# Patient Record
Sex: MALE | Race: WHITE | URBAN - METROPOLITAN AREA
[De-identification: names, ages, dates, MRNs, and addresses within clinical notes are randomized per-mention and may not be internally consistent; named-entity substitution may affect disease eponyms.]

---

## 2017-04-03 ENCOUNTER — DOCTOR'S OFFICE (OUTPATIENT)
Dept: URBAN - METROPOLITAN AREA CLINIC 137 | Facility: CLINIC | Age: 62
Setting detail: OPHTHALMOLOGY
End: 2017-04-03
Payer: COMMERCIAL

## 2017-04-03 DIAGNOSIS — H52.4: ICD-10-CM

## 2017-04-03 DIAGNOSIS — H25.13: ICD-10-CM

## 2017-04-03 DIAGNOSIS — E11.9: ICD-10-CM

## 2017-04-03 PROCEDURE — 92014 COMPRE OPH EXAM EST PT 1/>: CPT | Performed by: OPHTHALMOLOGY

## 2017-04-03 ASSESSMENT — REFRACTION_OUTSIDERX
OS_AXIS: 14
OS_ADD: +2.25
OD_VA3: 20/
OU_VA: 20/
OS_VA1: 20/20
OS_CYLINDER: +0.25
OD_SPHERE: 0.00
OD_VA1: 20/20
OS_SPHERE: +0.75
OS_VA2: 20/20(J1+)
OD_ADD: +2.25
OS_ADD: +2.25
OS_VA1: 20/20
OD_VA2: 20/
OS_VA3: 20/
OU_VA: 20/
OS_VA3: 20/
OD_ADD: +2.25
OD_VA3: 20/
OS_SPHERE: +0.75
OS_VA2: 20/
OD_VA1: 20/20-
OS_AXIS: 005
OD_CYLINDER: +1.00
OD_VA2: 20/20(J1+)
OS_CYLINDER: +0.50
OD_CYLINDER: +1.00
OD_SPHERE: +0.50
OD_AXIS: 154
OD_AXIS: 170

## 2017-04-03 ASSESSMENT — REFRACTION_CURRENTRX
OS_CYLINDER: +0.50
OS_AXIS: 005
OD_OVR_VA: 20/
OD_ADD: +2.25
OD_OVR_VA: 20/
OS_VPRISM_DIRECTION: PROGS
OD_OVR_VA: 20/
OD_SPHERE: +2.75
OD_CYLINDER: +1.00
OS_OVR_VA: 20/
OS_SPHERE: +2.75
OS_ADD: +2.25
OD_AXIS: 170
OS_SPHERE: +0.75
OD_VPRISM_DIRECTION: PROGS
OD_SPHERE: +0.50
OS_OVR_VA: 20/
OS_OVR_VA: 20/

## 2017-04-03 ASSESSMENT — REFRACTION_AUTOREFRACTION
OS_CYLINDER: +0.25
OD_AXIS: 009
OS_AXIS: 007
OS_SPHERE: +0.75
OD_SPHERE: +0.75
OD_CYLINDER: +0.75

## 2017-04-03 ASSESSMENT — REFRACTION_MANIFEST
OS_VA2: 20/
OD_VA3: 20/
OD_VA2: 20/
OD_VA1: 20/
OU_VA: 20/
OS_VA1: 20/
OS_VA3: 20/

## 2017-04-03 ASSESSMENT — SPHEQUIV_DERIVED
OD_SPHEQUIV: 1.125
OS_SPHEQUIV: 0.875

## 2017-04-03 ASSESSMENT — VISUAL ACUITY
OS_BCVA: 20/20-2
OD_BCVA: 20/20-3

## 2017-04-03 ASSESSMENT — CONFRONTATIONAL VISUAL FIELD TEST (CVF)
OD_FINDINGS: FULL
OS_FINDINGS: FULL

## 2017-11-27 ENCOUNTER — OPTICAL OFFICE (OUTPATIENT)
Dept: URBAN - METROPOLITAN AREA CLINIC 146 | Facility: CLINIC | Age: 62
Setting detail: OPHTHALMOLOGY
End: 2017-11-27
Payer: COMMERCIAL

## 2017-11-27 DIAGNOSIS — H52.223: ICD-10-CM

## 2017-11-27 PROCEDURE — V2781 PROGRESSIVE LENS PER LENS: HCPCS | Performed by: OPHTHALMOLOGY

## 2017-11-27 PROCEDURE — V2203 LENS SPHCYL BIFOCAL 4.00D/.1: HCPCS | Performed by: OPHTHALMOLOGY

## 2017-11-27 PROCEDURE — V2750 ANTI-REFLECTIVE COATING: HCPCS | Performed by: OPHTHALMOLOGY

## 2017-11-27 PROCEDURE — V2744 TINT PHOTOCHROMATIC LENS/ES: HCPCS | Performed by: OPHTHALMOLOGY

## 2018-04-12 ENCOUNTER — DOCTOR'S OFFICE (OUTPATIENT)
Dept: URBAN - METROPOLITAN AREA CLINIC 137 | Facility: CLINIC | Age: 63
Setting detail: OPHTHALMOLOGY
End: 2018-04-12
Payer: COMMERCIAL

## 2018-04-12 DIAGNOSIS — H52.4: ICD-10-CM

## 2018-04-12 PROCEDURE — 92014 COMPRE OPH EXAM EST PT 1/>: CPT | Performed by: OPHTHALMOLOGY

## 2018-04-12 ASSESSMENT — REFRACTION_OUTSIDERX
OS_SPHERE: +0.75
OD_VA2: 20/20(J1+)
OS_VA1: 20/20
OS_VA1: 20/20-1
OD_VA3: 20/
OD_CYLINDER: +1.00
OD_VA1: 20/20-1
OU_VA: 20/20-1
OD_VA1: 20/20-
OD_AXIS: 180
OS_SPHERE: +0.50
OD_ADD: +2.25
OD_VA3: 20/
OS_CYLINDER: +0.50
OS_ADD: +2.25
OD_AXIS: 170
OS_AXIS: 180
OS_AXIS: 005
OS_VA3: 20/
OD_ADD: +2.25
OD_CYLINDER: +1.00
OD_SPHERE: +0.50
OS_VA3: 20/
OS_ADD: +2.25
OD_VA2: 20/20
OS_VA2: 20/20(J1+)
OS_CYLINDER: +0.50
OD_SPHERE: +0.50
OS_VA2: 20/20
OU_VA: 20/

## 2018-04-12 ASSESSMENT — REFRACTION_CURRENTRX
OS_ADD: +2.25
OD_OVR_VA: 20/
OS_OVR_VA: 20/
OS_SPHERE: +0.75
OD_AXIS: 169
OS_AXIS: 026
OS_OVR_VA: 20/
OD_VPRISM_DIRECTION: PROGS
OS_CYLINDER: +2.00
OD_OVR_VA: 20/
OD_SPHERE: +2.75
OD_SPHERE: +0.50
OD_ADD: +2.25
OS_VPRISM_DIRECTION: PROGS
OS_OVR_VA: 20/
OD_CYLINDER: +1.75
OS_SPHERE: +2.75
OD_OVR_VA: 20/

## 2018-04-12 ASSESSMENT — SPHEQUIV_DERIVED: OS_SPHEQUIV: 1.125

## 2018-04-12 ASSESSMENT — CONFRONTATIONAL VISUAL FIELD TEST (CVF)
OS_FINDINGS: FULL
OD_FINDINGS: FULL

## 2018-04-12 ASSESSMENT — REFRACTION_AUTOREFRACTION
OS_CYLINDER: +0.75
OS_SPHERE: +0.75
OS_AXIS: 180
OD_CYLINDER: +1.50
OD_SPHERE: PLANO
OD_AXIS: 153

## 2018-04-12 ASSESSMENT — REFRACTION_MANIFEST
OD_VA3: 20/
OS_VA2: 20/
OU_VA: 20/
OD_VA2: 20/
OS_VA1: 20/
OD_VA1: 20/
OS_VA3: 20/

## 2018-04-12 ASSESSMENT — VISUAL ACUITY
OD_BCVA: 20/20
OS_BCVA: 20/30

## 2018-05-04 ENCOUNTER — OFFICE VISIT (OUTPATIENT)
Dept: AUDIOLOGY | Facility: CLINIC | Age: 63
End: 2018-05-04
Payer: COMMERCIAL

## 2018-05-04 DIAGNOSIS — H69.93 EUSTACHIAN TUBE DISORDER, BILATERAL: Primary | ICD-10-CM

## 2018-05-04 DIAGNOSIS — H90.0 CONDUCTIVE HEARING LOSS, BILATERAL: ICD-10-CM

## 2018-05-04 PROCEDURE — 92557 COMPREHENSIVE HEARING TEST: CPT | Performed by: AUDIOLOGIST

## 2018-05-04 PROCEDURE — 92567 TYMPANOMETRY: CPT | Performed by: AUDIOLOGIST

## 2018-05-04 NOTE — LETTER
May 5, 2018     Wayne Portillo MD  [address]  [place] [id number]    Patient: Candice Lenz   YOB: 1955   Date of Visit: 2018       Dear Dr Edilma Ramos:    Thank you for referring Jf Oh to me for evaluation  Below are my notes for this consultation  If you have questions, please do not hesitate to call me  Sincerely,        Jesusa Favre, Au D , CCC/A  Clinical Audiologist   [place] [id number]        CC: Jarvis Hoang  2018 10:54 AM  Sign at close encounter  [address] EVALUATION      Name:  Candice Lenz  :  1955  Age:  60 y o  Date of Evaluation:  18    History: Ear Infection, Tinnitus and noise exposure   Reason for visit: Candice Lenz is being seen today at the request of Dr Edilma Ramos for an evaluation of hearing  Patient reports decreased hearing over the past few months  He is being treated for allergies and middle ear issues currently  He had PE tubes placed as an adult over 15 years ago per his report  He describes a history of work and recreationally related noise exposure  EVALUATION:    Otoscopic Evaluation:   Right Ear: Minimum cerumen    Left Ear: canal clear, beige/yellow appearance to eardrum/see audio    Tympanometry:   See tracings attached  Both ears abnormal    Audiogram Results:    *see attached audiogram    RECOMMENDATIONS:  1  Return to Dr Edilma Ramos for follow-up  The results were faxed to the office 18  2  Audiological re-evaluation upon completion of otologic intervention  PATIENT EDUCATION:   Discussed results and recommendations with Mr Elise Devi  Questions were addressed and the patient was encouraged to contact our department should concerns arise        Jesusa Favre, Au D , CCC-A, [place]# [id number], Hearing Aid Dispenser, [place]# [id number]  Clinical Audiologist

## 2018-06-06 LAB
ALBUMIN (HISTORICAL): 69.7 MG/DL
ALBUMIN CREATININE RATIO (HISTORICAL): 383.6 MG/G
ALBUMIN SERPL BCP-MCNC: 4.2 G/DL (ref 3.5–5.7)
ALP SERPL-CCNC: 40 IU/L (ref 55–165)
ALT SERPL W P-5'-P-CCNC: 17 IU/L (ref 10–35)
ANION GAP SERPL CALCULATED.3IONS-SCNC: 12.3 MM/L
AST SERPL W P-5'-P-CCNC: 15 U/L (ref 8–27)
BILIRUB SERPL-MCNC: 0.5 MG/DL (ref 0.3–1)
BUN SERPL-MCNC: 11 MG/DL (ref 7–25)
CALCIUM SERPL-MCNC: 9.2 MG/DL (ref 8.6–10.5)
CHLORIDE SERPL-SCNC: 102 MM/L (ref 98–107)
CHOLEST SERPL-MCNC: 165 MG/DL (ref 0–200)
CO2 SERPL-SCNC: 28 MM/L (ref 21–31)
CREAT SERPL-MCNC: 0.81 MG/DL (ref 0.7–1.3)
CREATININE, RANDOM URINE (HISTORICAL): 181.7 MG/DL
EGFR (HISTORICAL): > 60 GFR
EGFR AFRICAN AMERICAN (HISTORICAL): > 60 GFR
EST. AVERAGE GLUCOSE BLD GHB EST-MCNC: 158 MG/DL
GLUCOSE (HISTORICAL): 85 MG/DL (ref 65–99)
HBA1C MFR BLD HPLC: 7.1 % (ref 4–6.2)
HDLC SERPL-MCNC: 58 MG/DL (ref 40–60)
LDLC SERPL CALC-MCNC: 81.5 MG/DL (ref 75–193)
OSMOLALITY, SERUM (HISTORICAL): 274 MOSM (ref 262–291)
POTASSIUM SERPL-SCNC: 4.3 MM/L (ref 3.5–5.5)
SODIUM SERPL-SCNC: 138 MM/L (ref 134–143)
TOTAL PROTEIN (HISTORICAL): 6.9 G/DL (ref 6.4–8.9)
TRIGL SERPL-MCNC: 129 MG/DL (ref 44–166)
VLDL CHOLESTEROL (HISTORICAL): 26 MG/DL (ref 5–51)

## 2018-09-24 ENCOUNTER — TRANSCRIBE ORDERS (OUTPATIENT)
Dept: ADMINISTRATIVE | Facility: HOSPITAL | Age: 63
End: 2018-09-24

## 2018-09-24 ENCOUNTER — APPOINTMENT (OUTPATIENT)
Dept: LAB | Facility: HOSPITAL | Age: 63
End: 2018-09-24
Payer: COMMERCIAL

## 2018-09-24 DIAGNOSIS — I10 BENIGN HYPERTENSION: ICD-10-CM

## 2018-09-24 DIAGNOSIS — Z79.4 TYPE 2 DIABETES MELLITUS WITHOUT COMPLICATION, WITH LONG-TERM CURRENT USE OF INSULIN (HCC): ICD-10-CM

## 2018-09-24 DIAGNOSIS — I10 BENIGN HYPERTENSION: Primary | ICD-10-CM

## 2018-09-24 DIAGNOSIS — E11.9 TYPE 2 DIABETES MELLITUS WITHOUT COMPLICATION, WITH LONG-TERM CURRENT USE OF INSULIN (HCC): ICD-10-CM

## 2018-09-24 DIAGNOSIS — E78.00 HYPERCHOLESTEREMIA: ICD-10-CM

## 2018-09-24 LAB
ALBUMIN SERPL BCP-MCNC: 4.1 G/DL (ref 3.5–5.7)
ALP SERPL-CCNC: 44 U/L (ref 55–165)
ALT SERPL W P-5'-P-CCNC: 17 U/L (ref 7–52)
ANION GAP SERPL CALCULATED.3IONS-SCNC: 11 MMOL/L (ref 4–13)
AST SERPL W P-5'-P-CCNC: 16 U/L (ref 13–39)
BILIRUB SERPL-MCNC: 0.3 MG/DL (ref 0.2–1)
BUN SERPL-MCNC: 12 MG/DL (ref 7–25)
CALCIUM SERPL-MCNC: 9.4 MG/DL (ref 8.6–10.5)
CHLORIDE SERPL-SCNC: 103 MMOL/L (ref 98–107)
CHOLEST SERPL-MCNC: 228 MG/DL (ref 0–200)
CO2 SERPL-SCNC: 25 MMOL/L (ref 21–31)
CREAT SERPL-MCNC: 0.8 MG/DL (ref 0.7–1.3)
CREAT UR-MCNC: 141 MG/DL
EST. AVERAGE GLUCOSE BLD GHB EST-MCNC: 148 MG/DL
GFR SERPL CREATININE-BSD FRML MDRD: 95 ML/MIN/1.73SQ M
GLUCOSE P FAST SERPL-MCNC: 81 MG/DL (ref 65–99)
HBA1C MFR BLD: 6.8 % (ref 4.2–6.3)
HCT VFR BLD AUTO: 44.8 % (ref 42–52)
HDLC SERPL-MCNC: 62 MG/DL (ref 40–60)
HGB BLD-MCNC: 14.8 G/DL (ref 12–17)
LDLC SERPL CALC-MCNC: 134 MG/DL (ref 0–100)
MICROALBUMIN UR-MCNC: 290 MG/L (ref 0–20)
MICROALBUMIN/CREAT 24H UR: 206 MG/G CREATININE (ref 0–30)
NONHDLC SERPL-MCNC: 166 MG/DL
POTASSIUM SERPL-SCNC: 4 MMOL/L (ref 3.5–5.5)
PROT SERPL-MCNC: 6.8 G/DL (ref 6.4–8.9)
SODIUM SERPL-SCNC: 139 MMOL/L (ref 134–143)
TRIGL SERPL-MCNC: 158 MG/DL (ref 44–166)

## 2018-09-24 PROCEDURE — 82570 ASSAY OF URINE CREATININE: CPT | Performed by: INTERNAL MEDICINE

## 2018-09-24 PROCEDURE — 82043 UR ALBUMIN QUANTITATIVE: CPT | Performed by: INTERNAL MEDICINE

## 2018-09-24 PROCEDURE — 80053 COMPREHEN METABOLIC PANEL: CPT

## 2018-09-24 PROCEDURE — 85014 HEMATOCRIT: CPT

## 2018-09-24 PROCEDURE — 36415 COLL VENOUS BLD VENIPUNCTURE: CPT

## 2018-09-24 PROCEDURE — 83036 HEMOGLOBIN GLYCOSYLATED A1C: CPT

## 2018-09-24 PROCEDURE — 85018 HEMOGLOBIN: CPT

## 2018-09-24 PROCEDURE — 80061 LIPID PANEL: CPT

## 2019-01-08 ENCOUNTER — TRANSCRIBE ORDERS (OUTPATIENT)
Dept: ADMINISTRATIVE | Facility: HOSPITAL | Age: 64
End: 2019-01-08

## 2019-01-08 ENCOUNTER — APPOINTMENT (OUTPATIENT)
Dept: LAB | Facility: HOSPITAL | Age: 64
End: 2019-01-08
Payer: COMMERCIAL

## 2019-01-08 DIAGNOSIS — D55.9: ICD-10-CM

## 2019-01-08 DIAGNOSIS — E11.9 TYPE 2 DIABETES MELLITUS WITHOUT COMPLICATION, UNSPECIFIED WHETHER LONG TERM INSULIN USE (HCC): ICD-10-CM

## 2019-01-08 DIAGNOSIS — I10 ESSENTIAL HYPERTENSION: ICD-10-CM

## 2019-01-08 DIAGNOSIS — E78.2 MIXED HYPERLIPIDEMIA: ICD-10-CM

## 2019-01-08 DIAGNOSIS — D63.8 CHRONIC DISEASE ANEMIA: ICD-10-CM

## 2019-01-08 DIAGNOSIS — E03.9 HYPOTHYROIDISM, UNSPECIFIED TYPE: ICD-10-CM

## 2019-01-08 DIAGNOSIS — I10 ESSENTIAL HYPERTENSION: Primary | ICD-10-CM

## 2019-01-08 LAB
25(OH)D3 SERPL-MCNC: 33.1 NG/ML (ref 30–100)
ALBUMIN SERPL BCP-MCNC: 4.1 G/DL (ref 3.5–5.7)
ALP SERPL-CCNC: 40 U/L (ref 55–165)
ALT SERPL W P-5'-P-CCNC: 25 U/L (ref 7–52)
ANION GAP SERPL CALCULATED.3IONS-SCNC: 9 MMOL/L (ref 4–13)
AST SERPL W P-5'-P-CCNC: 24 U/L (ref 13–39)
BILIRUB SERPL-MCNC: 0.4 MG/DL (ref 0.2–1)
BUN SERPL-MCNC: 11 MG/DL (ref 7–25)
CALCIUM SERPL-MCNC: 9.1 MG/DL (ref 8.6–10.5)
CHLORIDE SERPL-SCNC: 101 MMOL/L (ref 98–107)
CHOLEST SERPL-MCNC: 161 MG/DL (ref 0–200)
CO2 SERPL-SCNC: 27 MMOL/L (ref 21–31)
CREAT SERPL-MCNC: 0.86 MG/DL (ref 0.7–1.3)
CREAT UR-MCNC: 171 MG/DL
EST. AVERAGE GLUCOSE BLD GHB EST-MCNC: 154 MG/DL
GFR SERPL CREATININE-BSD FRML MDRD: 92 ML/MIN/1.73SQ M
GLUCOSE P FAST SERPL-MCNC: 131 MG/DL (ref 65–99)
HBA1C MFR BLD: 7 % (ref 4.2–6.3)
HCT VFR BLD AUTO: 44.9 % (ref 42–52)
HDLC SERPL-MCNC: 71 MG/DL (ref 40–60)
HGB BLD-MCNC: 15.1 G/DL (ref 12–17)
LDLC SERPL CALC-MCNC: 67 MG/DL (ref 0–100)
MICROALBUMIN UR-MCNC: 1050 MG/L (ref 0–20)
MICROALBUMIN/CREAT 24H UR: 614 MG/G CREATININE (ref 0–30)
NONHDLC SERPL-MCNC: 90 MG/DL
POTASSIUM SERPL-SCNC: 4.1 MMOL/L (ref 3.5–5.5)
PROT SERPL-MCNC: 6.8 G/DL (ref 6.4–8.9)
PSA SERPL-MCNC: 2 NG/ML (ref 0–4)
SODIUM SERPL-SCNC: 137 MMOL/L (ref 134–143)
TRIGL SERPL-MCNC: 114 MG/DL (ref 44–166)
TSH SERPL DL<=0.05 MIU/L-ACNC: 2.55 UIU/ML (ref 0.45–5.33)

## 2019-01-08 PROCEDURE — 80053 COMPREHEN METABOLIC PANEL: CPT

## 2019-01-08 PROCEDURE — 85018 HEMOGLOBIN: CPT

## 2019-01-08 PROCEDURE — 82043 UR ALBUMIN QUANTITATIVE: CPT | Performed by: INTERNAL MEDICINE

## 2019-01-08 PROCEDURE — 82570 ASSAY OF URINE CREATININE: CPT | Performed by: INTERNAL MEDICINE

## 2019-01-08 PROCEDURE — 84443 ASSAY THYROID STIM HORMONE: CPT

## 2019-01-08 PROCEDURE — G0103 PSA SCREENING: HCPCS

## 2019-01-08 PROCEDURE — 82306 VITAMIN D 25 HYDROXY: CPT

## 2019-01-08 PROCEDURE — 36415 COLL VENOUS BLD VENIPUNCTURE: CPT

## 2019-01-08 PROCEDURE — 85014 HEMATOCRIT: CPT

## 2019-01-08 PROCEDURE — 83036 HEMOGLOBIN GLYCOSYLATED A1C: CPT

## 2019-01-08 PROCEDURE — 80061 LIPID PANEL: CPT

## 2019-01-14 ENCOUNTER — TRANSCRIBE ORDERS (OUTPATIENT)
Dept: ADMINISTRATIVE | Facility: HOSPITAL | Age: 64
End: 2019-01-14

## 2019-01-14 DIAGNOSIS — I70.1 ATHEROSCLEROSIS OF RENAL ARTERY (HCC): Primary | ICD-10-CM

## 2019-01-15 ENCOUNTER — DOCTOR'S OFFICE (OUTPATIENT)
Dept: URBAN - METROPOLITAN AREA CLINIC 137 | Facility: CLINIC | Age: 64
Setting detail: OPHTHALMOLOGY
End: 2019-01-15
Payer: COMMERCIAL

## 2019-01-15 DIAGNOSIS — H25.13: ICD-10-CM

## 2019-01-15 DIAGNOSIS — E11.9: ICD-10-CM

## 2019-01-15 PROCEDURE — 99214 OFFICE O/P EST MOD 30 MIN: CPT | Performed by: OPTOMETRIST

## 2019-01-15 ASSESSMENT — REFRACTION_AUTOREFRACTION
OD_CYLINDER: +1.50
OD_AXIS: 153
OS_CYLINDER: +0.75
OS_AXIS: 180
OS_SPHERE: +0.75
OD_SPHERE: PLANO

## 2019-01-15 ASSESSMENT — CONFRONTATIONAL VISUAL FIELD TEST (CVF)
OD_FINDINGS: FULL
OS_FINDINGS: FULL

## 2019-01-15 ASSESSMENT — REFRACTION_CURRENTRX
OD_OVR_VA: 20/
OD_AXIS: 169
OD_OVR_VA: 20/
OS_ADD: +2.25
OS_AXIS: 026
OD_VPRISM_DIRECTION: PROGS
OD_SPHERE: +0.50
OS_OVR_VA: 20/
OS_VPRISM_DIRECTION: PROGS
OS_SPHERE: +0.75
OS_OVR_VA: 20/
OS_CYLINDER: +2.00
OD_CYLINDER: +1.75
OD_ADD: +2.25
OS_OVR_VA: 20/
OS_SPHERE: +2.75
OD_OVR_VA: 20/
OD_SPHERE: +2.75

## 2019-01-15 ASSESSMENT — REFRACTION_MANIFEST
OU_VA: 20/
OS_CYLINDER: +0.50
OS_ADD: +2.25
OS_VA3: 20/
OD_VA3: 20/
OS_VA1: 20/20-1
OD_VA1: 20/20-1
OD_SPHERE: +0.50
OD_SPHERE: +0.50
OD_VA3: 20/
OD_AXIS: 180
OS_SPHERE: +0.75
OS_VA2: 20/20(J1+)
OS_VA2: 20/20
OS_ADD: +2.25
OD_AXIS: 170
OS_AXIS: 005
OS_VA3: 20/
OS_VA1: 20/20
OU_VA: 20/20-1
OS_SPHERE: +0.50
OD_VA1: 20/20-
OS_AXIS: 180
OD_CYLINDER: +1.00
OD_ADD: +2.25
OD_VA2: 20/20
OD_CYLINDER: +1.00
OD_ADD: +2.25
OS_CYLINDER: +0.50
OD_VA2: 20/20(J1+)

## 2019-01-15 ASSESSMENT — SPHEQUIV_DERIVED
OS_SPHEQUIV: 0.75
OS_SPHEQUIV: 1.125
OD_SPHEQUIV: 1
OS_SPHEQUIV: 1
OD_SPHEQUIV: 1

## 2019-01-15 ASSESSMENT — VISUAL ACUITY
OS_BCVA: 20/40-1
OD_BCVA: 20/40

## 2019-02-25 ENCOUNTER — APPOINTMENT (OUTPATIENT)
Dept: LAB | Facility: HOSPITAL | Age: 64
End: 2019-02-25
Attending: INTERNAL MEDICINE
Payer: COMMERCIAL

## 2019-02-25 ENCOUNTER — HOSPITAL ENCOUNTER (OUTPATIENT)
Dept: RADIOLOGY | Facility: HOSPITAL | Age: 64
Discharge: HOME | End: 2019-02-25
Attending: INTERNAL MEDICINE
Payer: COMMERCIAL

## 2019-02-25 ENCOUNTER — TRANSCRIBE ORDERS (OUTPATIENT)
Dept: ADMINISTRATIVE | Facility: HOSPITAL | Age: 64
End: 2019-02-25

## 2019-02-25 DIAGNOSIS — I70.1 ATHEROSCLEROSIS OF RENAL ARTERY (HCC): ICD-10-CM

## 2019-02-25 DIAGNOSIS — I10 ESSENTIAL HYPERTENSION, MALIGNANT: Primary | ICD-10-CM

## 2019-02-25 LAB
ANION GAP SERPL CALCULATED.3IONS-SCNC: 10 MMOL/L (ref 4–13)
BUN SERPL-MCNC: 15 MG/DL (ref 5–25)
CALCIUM SERPL-MCNC: 9.3 MG/DL (ref 8.3–10.1)
CHLORIDE SERPL-SCNC: 101 MMOL/L (ref 100–108)
CO2 SERPL-SCNC: 28 MMOL/L (ref 21–32)
CREAT SERPL-MCNC: 0.93 MG/DL (ref 0.6–1.3)
GFR SERPL CREATININE-BSD FRML MDRD: 86 ML/MIN/1.73SQ M
GLUCOSE P FAST SERPL-MCNC: 157 MG/DL (ref 65–99)
POTASSIUM SERPL-SCNC: 4.1 MMOL/L (ref 3.5–5.3)
SODIUM SERPL-SCNC: 139 MMOL/L (ref 136–145)

## 2019-02-25 PROCEDURE — 80048 BASIC METABOLIC PNL TOTAL CA: CPT | Performed by: INTERNAL MEDICINE

## 2019-02-25 PROCEDURE — 93975 VASCULAR STUDY: CPT | Performed by: SURGERY

## 2019-02-25 PROCEDURE — 36415 COLL VENOUS BLD VENIPUNCTURE: CPT | Performed by: INTERNAL MEDICINE

## 2019-02-25 PROCEDURE — 93975 VASCULAR STUDY: CPT

## 2019-03-05 ENCOUNTER — DOCTOR'S OFFICE (OUTPATIENT)
Dept: URBAN - METROPOLITAN AREA CLINIC 136 | Facility: CLINIC | Age: 64
Setting detail: OPHTHALMOLOGY
End: 2019-03-05
Payer: COMMERCIAL

## 2019-03-05 DIAGNOSIS — H25.013: ICD-10-CM

## 2019-03-05 DIAGNOSIS — E11.9: ICD-10-CM

## 2019-03-05 PROCEDURE — 92014 COMPRE OPH EXAM EST PT 1/>: CPT | Performed by: OPHTHALMOLOGY

## 2019-03-05 ASSESSMENT — CONFRONTATIONAL VISUAL FIELD TEST (CVF)
OD_FINDINGS: FULL
OS_FINDINGS: FULL

## 2019-03-05 ASSESSMENT — REFRACTION_CURRENTRX
OS_AXIS: 026
OS_OVR_VA: 20/
OD_SPHERE: +0.50
OS_OVR_VA: 20/
OD_ADD: +2.25
OS_OVR_VA: 20/
OS_SPHERE: +2.75
OS_ADD: +2.25
OD_OVR_VA: 20/
OS_CYLINDER: +2.00
OS_SPHERE: +0.75
OD_OVR_VA: 20/
OD_AXIS: 169
OS_VPRISM_DIRECTION: PROGS
OD_VPRISM_DIRECTION: PROGS
OD_OVR_VA: 20/
OD_SPHERE: +2.75
OD_CYLINDER: +1.75

## 2019-03-05 ASSESSMENT — SPHEQUIV_DERIVED
OD_SPHEQUIV: 1
OD_SPHEQUIV: 1.25
OS_SPHEQUIV: 1
OD_SPHEQUIV: 1
OS_SPHEQUIV: 0.75

## 2019-03-05 ASSESSMENT — REFRACTION_MANIFEST
OD_CYLINDER: +1.00
OD_ADD: +2.25
OS_VA2: 20/20(J1+)
OS_VA1: 20/20-1
OD_VA1: 20/20-1
OS_ADD: +2.25
OS_AXIS: 005
OD_VA2: 20/20
OS_VA3: 20/
OS_VA1: 20/20
OS_CYLINDER: +0.50
OS_SPHERE: +0.75
OS_SPHERE: +0.50
OD_AXIS: 170
OD_VA3: 20/
OS_AXIS: 180
OU_VA: 20/
OS_VA2: 20/20
OS_VA3: 20/
OD_AXIS: 180
OS_CYLINDER: +0.50
OD_VA3: 20/
OS_ADD: +2.25
OD_SPHERE: +0.50
OU_VA: 20/20-1
OD_VA1: 20/20-
OD_VA2: 20/20(J1+)
OD_CYLINDER: +1.00
OD_ADD: +2.25
OD_SPHERE: +0.50

## 2019-03-05 ASSESSMENT — REFRACTION_AUTOREFRACTION
OS_CYLINDER: PLANO
OS_SPHERE: +1.25
OD_AXIS: 082
OD_SPHERE: +2.00
OD_CYLINDER: -1.50

## 2019-03-05 ASSESSMENT — VISUAL ACUITY
OD_BCVA: 20/30-2
OS_BCVA: 20/30-2

## 2019-04-17 ENCOUNTER — OPTICAL OFFICE (OUTPATIENT)
Dept: URBAN - METROPOLITAN AREA CLINIC 146 | Facility: CLINIC | Age: 64
Setting detail: OPHTHALMOLOGY
End: 2019-04-17
Payer: COMMERCIAL

## 2019-04-17 ENCOUNTER — DOCTOR'S OFFICE (OUTPATIENT)
Dept: URBAN - METROPOLITAN AREA CLINIC 137 | Facility: CLINIC | Age: 64
Setting detail: OPHTHALMOLOGY
End: 2019-04-17
Payer: COMMERCIAL

## 2019-04-17 DIAGNOSIS — H52.223: ICD-10-CM

## 2019-04-17 DIAGNOSIS — H52.03: ICD-10-CM

## 2019-04-17 DIAGNOSIS — H52.4: ICD-10-CM

## 2019-04-17 PROCEDURE — V2750 ANTI-REFLECTIVE COATING: HCPCS | Performed by: OPTOMETRIST

## 2019-04-17 PROCEDURE — V2020 VISION SVCS FRAMES PURCHASES: HCPCS | Performed by: OPTOMETRIST

## 2019-04-17 PROCEDURE — V2203 LENS SPHCYL BIFOCAL 4.00D/.1: HCPCS | Performed by: OPTOMETRIST

## 2019-04-17 PROCEDURE — V2781 PROGRESSIVE LENS PER LENS: HCPCS | Performed by: OPTOMETRIST

## 2019-04-17 PROCEDURE — 92014 COMPRE OPH EXAM EST PT 1/>: CPT | Performed by: OPTOMETRIST

## 2019-04-17 PROCEDURE — V2025 EYEGLASSES DELUX FRAMES: HCPCS | Performed by: OPTOMETRIST

## 2019-04-17 PROCEDURE — V2744 TINT PHOTOCHROMATIC LENS/ES: HCPCS | Performed by: OPTOMETRIST

## 2019-04-17 ASSESSMENT — REFRACTION_MANIFEST
OD_CYLINDER: +1.00
OS_ADD: +2.25
OD_VA3: 20/
OD_AXIS: 180
OS_SPHERE: +0.75
OS_CYLINDER: -0.75
OD_ADD: +2.25
OD_VA3: 20/
OD_VA1: 20/20-
OD_VA1: 20/20-1
OS_CYLINDER: +0.50
OD_SPHERE: +0.50
OD_AXIS: 105
OS_CYLINDER: +0.50
OD_VA2: 20/20(J1+)
OS_VA2: 20/20
OS_VA3: 20/
OS_VA1: 20/20-1
OS_AXIS: 005
OS_VA3: 20/
OS_AXIS: 66
OS_SPHERE: +1.75
OS_VA1: 20/20
OD_VA2: 20/20
OS_VA1: 20/20
OD_CYLINDER: +1.00
OD_ADD: +2.25
OD_CYLINDER: -1.75
OS_ADD: +2.25
OD_ADD: +2.25
OS_SPHERE: +0.50
OU_VA: 20/20-1
OD_SPHERE: +2.50
OS_VA2: 20/20(J1+)
OD_SPHERE: +0.50
OU_VA: 20/
OD_AXIS: 170
OD_VA1: 20/20
OS_ADD: +2.25
OS_AXIS: 180

## 2019-04-17 ASSESSMENT — REFRACTION_CURRENTRX
OS_SPHERE: +2.75
OS_CYLINDER: -1.50
OS_OVR_VA: 20/
OS_SPHERE: +2.75
OD_ADD: +2.25
OS_OVR_VA: 20/
OS_AXIS: 117
OD_OVR_VA: 20/
OD_VPRISM_DIRECTION: PROGS
OD_OVR_VA: 20/
OD_AXIS: 69
OS_OVR_VA: 20/
OD_OVR_VA: 20/
OD_SPHERE: +3.00
OD_CYLINDER: -2.00
OS_ADD: +2.25
OS_VPRISM_DIRECTION: PROGS
OD_SPHERE: +2.75

## 2019-04-17 ASSESSMENT — VISUAL ACUITY
OS_BCVA: 20/40+2
OD_BCVA: 20/30-1

## 2019-04-17 ASSESSMENT — SPHEQUIV_DERIVED
OD_SPHEQUIV: 1.625
OD_SPHEQUIV: 1
OD_SPHEQUIV: 1
OD_SPHEQUIV: 1.25
OS_SPHEQUIV: 1.375
OS_SPHEQUIV: 1
OS_SPHEQUIV: 0.875
OS_SPHEQUIV: 0.75

## 2019-04-17 ASSESSMENT — REFRACTION_AUTOREFRACTION
OD_AXIS: 93
OS_AXIS: 86
OD_CYLINDER: -1.00
OD_SPHERE: +1.75
OS_CYLINDER: -0.75
OS_SPHERE: +1.25

## 2019-04-17 ASSESSMENT — CONFRONTATIONAL VISUAL FIELD TEST (CVF)
OS_FINDINGS: FULL
OD_FINDINGS: FULL

## 2019-06-18 ENCOUNTER — APPOINTMENT (OUTPATIENT)
Dept: LAB | Facility: HOSPITAL | Age: 64
End: 2019-06-18
Payer: COMMERCIAL

## 2019-06-18 ENCOUNTER — TRANSCRIBE ORDERS (OUTPATIENT)
Dept: ADMINISTRATIVE | Facility: HOSPITAL | Age: 64
End: 2019-06-18

## 2019-06-18 DIAGNOSIS — I10 ESSENTIAL HYPERTENSION: Primary | ICD-10-CM

## 2019-06-18 DIAGNOSIS — E78.2 MIXED HYPERLIPIDEMIA: ICD-10-CM

## 2019-06-18 DIAGNOSIS — I10 ESSENTIAL HYPERTENSION: ICD-10-CM

## 2019-06-18 DIAGNOSIS — E11.9 TYPE 2 DIABETES MELLITUS WITHOUT COMPLICATION, WITH LONG-TERM CURRENT USE OF INSULIN (HCC): ICD-10-CM

## 2019-06-18 DIAGNOSIS — Z79.4 TYPE 2 DIABETES MELLITUS WITHOUT COMPLICATION, WITH LONG-TERM CURRENT USE OF INSULIN (HCC): ICD-10-CM

## 2019-06-18 LAB
ALBUMIN SERPL BCP-MCNC: 4.1 G/DL (ref 3.5–5.7)
ALP SERPL-CCNC: 42 U/L (ref 55–165)
ALT SERPL W P-5'-P-CCNC: 16 U/L (ref 7–52)
ANION GAP SERPL CALCULATED.3IONS-SCNC: 10 MMOL/L (ref 4–13)
AST SERPL W P-5'-P-CCNC: 14 U/L (ref 13–39)
BILIRUB SERPL-MCNC: 0.5 MG/DL (ref 0.2–1)
BUN SERPL-MCNC: 14 MG/DL (ref 7–25)
CALCIUM SERPL-MCNC: 9.5 MG/DL (ref 8.6–10.5)
CHLORIDE SERPL-SCNC: 100 MMOL/L (ref 98–107)
CHOLEST SERPL-MCNC: 164 MG/DL (ref 0–200)
CO2 SERPL-SCNC: 28 MMOL/L (ref 21–31)
CREAT SERPL-MCNC: 1.04 MG/DL (ref 0.7–1.3)
CREAT UR-MCNC: 164 MG/DL
EST. AVERAGE GLUCOSE BLD GHB EST-MCNC: 171 MG/DL
GFR SERPL CREATININE-BSD FRML MDRD: 76 ML/MIN/1.73SQ M
GLUCOSE P FAST SERPL-MCNC: 142 MG/DL (ref 65–99)
HBA1C MFR BLD: 7.6 % (ref 4.2–6.3)
HDLC SERPL-MCNC: 51 MG/DL (ref 40–60)
LDLC SERPL CALC-MCNC: 84 MG/DL (ref 0–100)
MICROALBUMIN UR-MCNC: 231 MG/L (ref 0–20)
MICROALBUMIN/CREAT 24H UR: 141 MG/G CREATININE (ref 0–30)
NONHDLC SERPL-MCNC: 113 MG/DL
POTASSIUM SERPL-SCNC: 4.3 MMOL/L (ref 3.5–5.5)
PROT SERPL-MCNC: 6.7 G/DL (ref 6.4–8.9)
SODIUM SERPL-SCNC: 138 MMOL/L (ref 134–143)
TRIGL SERPL-MCNC: 144 MG/DL (ref 44–166)

## 2019-06-18 PROCEDURE — 82570 ASSAY OF URINE CREATININE: CPT | Performed by: INTERNAL MEDICINE

## 2019-06-18 PROCEDURE — 36415 COLL VENOUS BLD VENIPUNCTURE: CPT

## 2019-06-18 PROCEDURE — 82043 UR ALBUMIN QUANTITATIVE: CPT | Performed by: INTERNAL MEDICINE

## 2019-06-18 PROCEDURE — 80053 COMPREHEN METABOLIC PANEL: CPT

## 2019-06-18 PROCEDURE — 83036 HEMOGLOBIN GLYCOSYLATED A1C: CPT

## 2019-06-18 PROCEDURE — 80061 LIPID PANEL: CPT

## 2019-09-10 ENCOUNTER — DOCTOR'S OFFICE (OUTPATIENT)
Dept: URBAN - METROPOLITAN AREA CLINIC 136 | Facility: CLINIC | Age: 64
Setting detail: OPHTHALMOLOGY
End: 2019-09-10
Payer: COMMERCIAL

## 2019-09-10 DIAGNOSIS — E11.9: ICD-10-CM

## 2019-09-10 DIAGNOSIS — H25.013: ICD-10-CM

## 2019-09-10 PROCEDURE — 92014 COMPRE OPH EXAM EST PT 1/>: CPT | Performed by: OPHTHALMOLOGY

## 2019-09-10 ASSESSMENT — REFRACTION_MANIFEST
OS_VA3: 20/
OS_SPHERE: +0.50
OS_VA2: 20/20(J1+)
OS_CYLINDER: +0.50
OS_AXIS: 005
OD_ADD: +2.25
OD_CYLINDER: +1.00
OS_VA3: 20/
OD_ADD: +2.25
OS_CYLINDER: +0.50
OS_SPHERE: +1.75
OD_AXIS: 180
OS_VA2: 20/
OS_ADD: +2.25
OD_VA1: 20/20
OD_CYLINDER: +1.00
OS_VA3: 20/
OD_VA2: 20/20
OD_VA3: 20/
OD_ADD: +2.25
OD_VA1: 20/20-1
OD_VA3: 20/
OS_AXIS: 66
OD_SPHERE: +0.50
OD_SPHERE: +0.50
OS_VA2: 20/20
OD_VA3: 20/
OS_CYLINDER: -0.75
OD_VA1: 20/20-
OS_ADD: +2.25
OS_AXIS: 180
OS_VA1: 20/20
OS_ADD: +2.25
OD_VA2: 20/
OD_VA2: 20/20(J1+)
OD_AXIS: 170
OD_AXIS: 105
OU_VA: 20/
OU_VA: 20/
OD_CYLINDER: -1.75
OD_SPHERE: +2.50
OS_VA1: 20/20
OS_SPHERE: +0.75
OU_VA: 20/20-1
OS_VA1: 20/20-1

## 2019-09-10 ASSESSMENT — CONFRONTATIONAL VISUAL FIELD TEST (CVF)
OS_FINDINGS: FULL
OD_FINDINGS: FULL

## 2019-09-10 ASSESSMENT — REFRACTION_CURRENTRX
OD_OVR_VA: 20/
OD_OVR_VA: 20/
OS_SPHERE: +2.75
OS_OVR_VA: 20/
OD_CYLINDER: -2.00
OD_SPHERE: +3.00
OS_CYLINDER: -1.50
OS_ADD: +2.25
OD_VPRISM_DIRECTION: PROGS
OS_OVR_VA: 20/
OD_AXIS: 69
OS_OVR_VA: 20/
OS_VPRISM_DIRECTION: PROGS
OS_AXIS: 117
OD_ADD: +2.25
OS_SPHERE: +2.75
OD_OVR_VA: 20/
OD_SPHERE: +2.75

## 2019-09-10 ASSESSMENT — SPHEQUIV_DERIVED
OS_SPHEQUIV: 1.375
OD_SPHEQUIV: 1
OD_SPHEQUIV: 1.625
OS_SPHEQUIV: 1
OD_SPHEQUIV: 1
OS_SPHEQUIV: 0.75
OD_SPHEQUIV: 1.25
OS_SPHEQUIV: 0.875

## 2019-09-10 ASSESSMENT — REFRACTION_AUTOREFRACTION
OD_SPHERE: +1.75
OS_SPHERE: +1.25
OS_AXIS: 86
OS_CYLINDER: -0.75
OD_AXIS: 93
OD_CYLINDER: -1.00

## 2019-09-10 ASSESSMENT — VISUAL ACUITY
OS_BCVA: 20/25-2
OD_BCVA: 20/40-2

## 2019-09-23 ENCOUNTER — APPOINTMENT (OUTPATIENT)
Dept: LAB | Facility: HOSPITAL | Age: 64
End: 2019-09-23
Payer: COMMERCIAL

## 2019-09-23 ENCOUNTER — TRANSCRIBE ORDERS (OUTPATIENT)
Dept: ADMINISTRATIVE | Facility: HOSPITAL | Age: 64
End: 2019-09-23

## 2019-09-23 DIAGNOSIS — E10.9 TYPE 1 DIABETES MELLITUS WITHOUT COMPLICATION (HCC): ICD-10-CM

## 2019-09-23 DIAGNOSIS — I10 ESSENTIAL HYPERTENSION: Primary | ICD-10-CM

## 2019-09-23 DIAGNOSIS — I10 ESSENTIAL HYPERTENSION: ICD-10-CM

## 2019-09-23 DIAGNOSIS — E78.2 MIXED HYPERLIPIDEMIA: ICD-10-CM

## 2019-09-23 LAB
ALBUMIN SERPL BCP-MCNC: 3.6 G/DL (ref 3.5–5)
ALP SERPL-CCNC: 40 U/L (ref 46–116)
ALT SERPL W P-5'-P-CCNC: 29 U/L (ref 12–78)
ANION GAP SERPL CALCULATED.3IONS-SCNC: 5 MMOL/L (ref 4–13)
AST SERPL W P-5'-P-CCNC: 13 U/L (ref 5–45)
BILIRUB SERPL-MCNC: 0.43 MG/DL (ref 0.2–1)
BUN SERPL-MCNC: 11 MG/DL (ref 5–25)
CALCIUM SERPL-MCNC: 9.6 MG/DL (ref 8.3–10.1)
CHLORIDE SERPL-SCNC: 102 MMOL/L (ref 100–108)
CHOLEST SERPL-MCNC: 137 MG/DL (ref 50–200)
CO2 SERPL-SCNC: 26 MMOL/L (ref 21–32)
CREAT SERPL-MCNC: 1.25 MG/DL (ref 0.6–1.3)
CREAT UR-MCNC: 116 MG/DL
EST. AVERAGE GLUCOSE BLD GHB EST-MCNC: 166 MG/DL
GFR SERPL CREATININE-BSD FRML MDRD: 60 ML/MIN/1.73SQ M
GLUCOSE P FAST SERPL-MCNC: 144 MG/DL (ref 65–99)
HBA1C MFR BLD: 7.4 % (ref 4.2–6.3)
HDLC SERPL-MCNC: 47 MG/DL (ref 40–60)
LDLC SERPL CALC-MCNC: 71 MG/DL (ref 0–100)
MICROALBUMIN UR-MCNC: 35.5 MG/L (ref 0–20)
MICROALBUMIN/CREAT 24H UR: 31 MG/G CREATININE (ref 0–30)
NONHDLC SERPL-MCNC: 90 MG/DL
POTASSIUM SERPL-SCNC: 4.7 MMOL/L (ref 3.5–5.3)
PROT SERPL-MCNC: 7.2 G/DL (ref 6.4–8.2)
SODIUM SERPL-SCNC: 133 MMOL/L (ref 136–145)
TRIGL SERPL-MCNC: 96 MG/DL

## 2019-09-23 PROCEDURE — 82043 UR ALBUMIN QUANTITATIVE: CPT

## 2019-09-23 PROCEDURE — 36415 COLL VENOUS BLD VENIPUNCTURE: CPT

## 2019-09-23 PROCEDURE — 82570 ASSAY OF URINE CREATININE: CPT

## 2019-09-23 PROCEDURE — 83036 HEMOGLOBIN GLYCOSYLATED A1C: CPT

## 2019-09-23 PROCEDURE — 80053 COMPREHEN METABOLIC PANEL: CPT

## 2019-09-23 PROCEDURE — 80061 LIPID PANEL: CPT

## 2019-09-26 PROBLEM — K75.81 STEATOHEPATITIS: Status: ACTIVE | Noted: 2019-09-26

## 2019-09-26 PROBLEM — E55.9 VITAMIN D DEFICIENCY: Status: ACTIVE | Noted: 2019-09-26

## 2019-09-26 PROBLEM — R80.9 PROTEINURIA: Status: ACTIVE | Noted: 2019-09-26

## 2019-09-26 PROBLEM — J30.9 ALLERGIC RHINITIS: Status: ACTIVE | Noted: 2019-09-26

## 2019-09-26 PROBLEM — I10 ESSENTIAL HYPERTENSION: Status: ACTIVE | Noted: 2019-09-26

## 2019-09-26 PROBLEM — Z79.4 TYPE 2 DIABETES MELLITUS, WITH LONG-TERM CURRENT USE OF INSULIN (HCC): Status: ACTIVE | Noted: 2019-09-26

## 2019-09-26 PROBLEM — E66.9 OBESITY: Status: ACTIVE | Noted: 2019-09-26

## 2019-09-26 PROBLEM — E11.9 TYPE 2 DIABETES MELLITUS, WITH LONG-TERM CURRENT USE OF INSULIN (HCC): Status: ACTIVE | Noted: 2019-09-26

## 2019-09-26 PROBLEM — E78.00 HYPERCHOLESTEREMIA: Status: ACTIVE | Noted: 2019-09-26

## 2019-10-21 ENCOUNTER — DOCTOR'S OFFICE (OUTPATIENT)
Dept: URBAN - METROPOLITAN AREA CLINIC 137 | Facility: CLINIC | Age: 64
Setting detail: OPHTHALMOLOGY
End: 2019-10-21

## 2019-10-21 DIAGNOSIS — H52.03: ICD-10-CM

## 2019-10-21 PROCEDURE — NCRX N/C GLASSES RX: Performed by: OPTOMETRIST

## 2019-10-21 ASSESSMENT — REFRACTION_MANIFEST
OU_VA: 20/
OD_VA1: 20/20-
OD_VA2: 20/
OD_VA3: 20/
OS_ADD: +2.25
OD_VA2: 20/20
OS_VA3: 20/
OD_SPHERE: +0.50
OD_VA3: 20/
OD_ADD: +2.25
OS_VA1: 20/20-1
OS_VA2: 20/
OS_VA1: 20/20
OD_AXIS: 105
OS_SPHERE: +1.75
OS_CYLINDER: +0.50
OS_VA1: 20/20
OS_VA3: 20/
OS_AXIS: 180
OD_CYLINDER: +1.00
OD_ADD: +2.25
OS_AXIS: 005
OU_VA: 20/20-1
OS_VA2: 20/20(J1+)
OD_VA2: 20/20(J1+)
OS_CYLINDER: +0.50
OD_VA1: 20/20
OS_CYLINDER: -0.75
OD_SPHERE: +0.50
OS_VA2: 20/20
OS_ADD: +2.25
OD_CYLINDER: -1.75
OD_CYLINDER: +1.00
OD_AXIS: 180
OU_VA: 20/
OD_VA1: 20/20-1
OS_ADD: +2.25
OS_SPHERE: +0.50
OS_AXIS: 66
OD_AXIS: 170
OD_ADD: +2.25
OS_SPHERE: +0.75
OD_SPHERE: +2.50
OS_VA3: 20/
OD_VA3: 20/

## 2019-10-21 ASSESSMENT — REFRACTION_CURRENTRX
OD_SPHERE: +2.50
OS_SPHERE: +1.75
OS_ADD: +2.25
OS_CYLINDER: -0.75
OS_OVR_VA: 20/
OD_OVR_VA: 20/
OS_OVR_VA: 20/
OS_SPHERE: +2.75
OS_VPRISM_DIRECTION: PROGS
OS_OVR_VA: 20/
OD_VPRISM_DIRECTION: PROGS
OD_OVR_VA: 20/
OD_CYLINDER: -1.75
OD_SPHERE: +2.75
OD_OVR_VA: 20/
OS_AXIS: 63
OD_ADD: +2.25
OD_AXIS: 101

## 2019-10-21 ASSESSMENT — SPHEQUIV_DERIVED
OD_SPHEQUIV: 1.25
OS_SPHEQUIV: 1.375
OD_SPHEQUIV: 1
OD_SPHEQUIV: 1.625
OD_SPHEQUIV: 1
OS_SPHEQUIV: 0.875
OS_SPHEQUIV: 0.75
OS_SPHEQUIV: 1

## 2019-10-21 ASSESSMENT — REFRACTION_AUTOREFRACTION
OS_CYLINDER: -0.75
OD_CYLINDER: -1.00
OD_AXIS: 93
OS_SPHERE: +1.25
OD_SPHERE: +1.75
OS_AXIS: 86

## 2019-10-21 ASSESSMENT — VISUAL ACUITY
OS_BCVA: 20/30-2
OD_BCVA: 20/30-2

## 2019-10-21 ASSESSMENT — CONFRONTATIONAL VISUAL FIELD TEST (CVF)
OS_FINDINGS: FULL
OD_FINDINGS: FULL

## 2019-12-23 ENCOUNTER — APPOINTMENT (OUTPATIENT)
Dept: LAB | Facility: HOSPITAL | Age: 64
End: 2019-12-23
Payer: MEDICARE

## 2019-12-23 ENCOUNTER — TRANSCRIBE ORDERS (OUTPATIENT)
Dept: ADMINISTRATIVE | Facility: HOSPITAL | Age: 64
End: 2019-12-23

## 2019-12-23 DIAGNOSIS — E11.29 TYPE 2 DIABETES MELLITUS WITH MICROALBUMINURIA, UNSPECIFIED WHETHER LONG TERM INSULIN USE (HCC): ICD-10-CM

## 2019-12-23 DIAGNOSIS — I10 ESSENTIAL HYPERTENSION: ICD-10-CM

## 2019-12-23 DIAGNOSIS — Z79.4 ENCOUNTER FOR LONG-TERM (CURRENT) USE OF INSULIN (HCC): ICD-10-CM

## 2019-12-23 DIAGNOSIS — R80.9 TYPE 2 DIABETES MELLITUS WITH MICROALBUMINURIA, UNSPECIFIED WHETHER LONG TERM INSULIN USE (HCC): ICD-10-CM

## 2019-12-23 DIAGNOSIS — I10 ESSENTIAL HYPERTENSION, MALIGNANT: Primary | ICD-10-CM

## 2019-12-23 DIAGNOSIS — R80.9 PROTEINURIA, UNSPECIFIED TYPE: ICD-10-CM

## 2019-12-23 DIAGNOSIS — I10 ESSENTIAL HYPERTENSION, MALIGNANT: ICD-10-CM

## 2019-12-23 LAB
ALBUMIN SERPL BCP-MCNC: 3.5 G/DL (ref 3.5–5)
ALP SERPL-CCNC: 45 U/L (ref 46–116)
ALT SERPL W P-5'-P-CCNC: 21 U/L (ref 12–78)
ANION GAP SERPL CALCULATED.3IONS-SCNC: 6 MMOL/L (ref 4–13)
AST SERPL W P-5'-P-CCNC: 10 U/L (ref 5–45)
BILIRUB SERPL-MCNC: 0.39 MG/DL (ref 0.2–1)
BUN SERPL-MCNC: 22 MG/DL (ref 5–25)
CALCIUM SERPL-MCNC: 9.5 MG/DL (ref 8.3–10.1)
CHLORIDE SERPL-SCNC: 106 MMOL/L (ref 100–108)
CHOLEST SERPL-MCNC: 216 MG/DL (ref 50–200)
CO2 SERPL-SCNC: 26 MMOL/L (ref 21–32)
CREAT SERPL-MCNC: 1.08 MG/DL (ref 0.6–1.3)
CREAT UR-MCNC: 73 MG/DL
EST. AVERAGE GLUCOSE BLD GHB EST-MCNC: 146 MG/DL
GFR SERPL CREATININE-BSD FRML MDRD: 72 ML/MIN/1.73SQ M
GLUCOSE P FAST SERPL-MCNC: 101 MG/DL (ref 65–99)
HBA1C MFR BLD: 6.7 % (ref 4.2–6.3)
HDLC SERPL-MCNC: 62 MG/DL
LDLC SERPL CALC-MCNC: 123 MG/DL (ref 0–100)
MICROALBUMIN UR-MCNC: 15.8 MG/L (ref 0–20)
MICROALBUMIN/CREAT 24H UR: 22 MG/G CREATININE (ref 0–30)
NONHDLC SERPL-MCNC: 154 MG/DL
POTASSIUM SERPL-SCNC: 4.2 MMOL/L (ref 3.5–5.3)
PROT SERPL-MCNC: 6.9 G/DL (ref 6.4–8.2)
SODIUM SERPL-SCNC: 138 MMOL/L (ref 136–145)
TRIGL SERPL-MCNC: 157 MG/DL

## 2019-12-23 PROCEDURE — 80061 LIPID PANEL: CPT

## 2019-12-23 PROCEDURE — 82043 UR ALBUMIN QUANTITATIVE: CPT | Performed by: INTERNAL MEDICINE

## 2019-12-23 PROCEDURE — 82570 ASSAY OF URINE CREATININE: CPT | Performed by: INTERNAL MEDICINE

## 2019-12-23 PROCEDURE — 80053 COMPREHEN METABOLIC PANEL: CPT

## 2019-12-23 PROCEDURE — 36415 COLL VENOUS BLD VENIPUNCTURE: CPT

## 2019-12-23 PROCEDURE — 83036 HEMOGLOBIN GLYCOSYLATED A1C: CPT

## 2019-12-27 PROBLEM — E66.812 CLASS 2 SEVERE OBESITY DUE TO EXCESS CALORIES WITH SERIOUS COMORBIDITY AND BODY MASS INDEX (BMI) OF 35.0 TO 35.9 IN ADULT (HCC): Status: ACTIVE | Noted: 2019-09-26

## 2019-12-27 PROBLEM — E66.01 CLASS 2 SEVERE OBESITY DUE TO EXCESS CALORIES WITH SERIOUS COMORBIDITY AND BODY MASS INDEX (BMI) OF 35.0 TO 35.9 IN ADULT (HCC): Status: ACTIVE | Noted: 2019-09-26

## 2019-12-27 PROBLEM — R80.1 PERSISTENT PROTEINURIA: Status: ACTIVE | Noted: 2019-09-26

## 2020-03-24 ENCOUNTER — APPOINTMENT (OUTPATIENT)
Dept: LAB | Facility: CLINIC | Age: 65
End: 2020-03-24
Payer: MEDICARE

## 2020-03-24 ENCOUNTER — TRANSCRIBE ORDERS (OUTPATIENT)
Dept: URGENT CARE | Facility: CLINIC | Age: 65
End: 2020-03-24

## 2020-03-24 DIAGNOSIS — E78.00 HYPERCHOLESTEREMIA: ICD-10-CM

## 2020-03-24 DIAGNOSIS — E10.9 TYPE 1 DIABETES MELLITUS WITHOUT COMPLICATION (HCC): Primary | ICD-10-CM

## 2020-03-24 DIAGNOSIS — E10.9 TYPE 1 DIABETES MELLITUS WITHOUT COMPLICATION (HCC): ICD-10-CM

## 2020-03-24 LAB
ALBUMIN SERPL BCP-MCNC: 3.6 G/DL (ref 3.5–5)
ALP SERPL-CCNC: 31 U/L (ref 46–116)
ALT SERPL W P-5'-P-CCNC: 18 U/L (ref 12–78)
ANION GAP SERPL CALCULATED.3IONS-SCNC: 3 MMOL/L (ref 4–13)
AST SERPL W P-5'-P-CCNC: 13 U/L (ref 5–45)
BILIRUB SERPL-MCNC: 0.48 MG/DL (ref 0.2–1)
BUN SERPL-MCNC: 9 MG/DL (ref 5–25)
CALCIUM SERPL-MCNC: 9.1 MG/DL (ref 8.3–10.1)
CHLORIDE SERPL-SCNC: 104 MMOL/L (ref 100–108)
CHOLEST SERPL-MCNC: 214 MG/DL (ref 50–200)
CO2 SERPL-SCNC: 29 MMOL/L (ref 21–32)
CREAT SERPL-MCNC: 0.91 MG/DL (ref 0.6–1.3)
EST. AVERAGE GLUCOSE BLD GHB EST-MCNC: 134 MG/DL
GFR SERPL CREATININE-BSD FRML MDRD: 88 ML/MIN/1.73SQ M
GLUCOSE P FAST SERPL-MCNC: 97 MG/DL (ref 65–99)
HBA1C MFR BLD: 6.3 %
HDLC SERPL-MCNC: 89 MG/DL
LDLC SERPL CALC-MCNC: 93 MG/DL (ref 0–100)
NONHDLC SERPL-MCNC: 125 MG/DL
POTASSIUM SERPL-SCNC: 4.2 MMOL/L (ref 3.5–5.3)
PROT SERPL-MCNC: 6.8 G/DL (ref 6.4–8.2)
SODIUM SERPL-SCNC: 136 MMOL/L (ref 136–145)
TRIGL SERPL-MCNC: 158 MG/DL

## 2020-03-24 PROCEDURE — 80061 LIPID PANEL: CPT

## 2020-03-24 PROCEDURE — 80053 COMPREHEN METABOLIC PANEL: CPT

## 2020-03-24 PROCEDURE — 83036 HEMOGLOBIN GLYCOSYLATED A1C: CPT

## 2020-03-24 PROCEDURE — 36415 COLL VENOUS BLD VENIPUNCTURE: CPT

## 2020-05-18 ENCOUNTER — DOCTOR'S OFFICE (OUTPATIENT)
Dept: URBAN - METROPOLITAN AREA CLINIC 137 | Facility: CLINIC | Age: 65
Setting detail: OPHTHALMOLOGY
End: 2020-05-18
Payer: COMMERCIAL

## 2020-05-18 DIAGNOSIS — E11.9: ICD-10-CM

## 2020-05-18 DIAGNOSIS — H25.013: ICD-10-CM

## 2020-05-18 PROCEDURE — 99214 OFFICE O/P EST MOD 30 MIN: CPT | Performed by: OPTOMETRIST

## 2020-05-18 ASSESSMENT — REFRACTION_MANIFEST
OS_AXIS: 005
OS_VA2: 20/20(J1+)
OD_AXIS: 170
OD_SPHERE: +2.50
OD_CYLINDER: +1.00
OS_VA1: 20/20
OS_CYLINDER: +0.50
OU_VA: 20/20-1
OD_VA2: 20/20
OS_CYLINDER: -0.75
OS_CYLINDER: +0.50
OS_ADD: +2.25
OS_ADD: +2.25
OS_SPHERE: +0.50
OD_AXIS: 105
OS_ADD: +2.25
OS_SPHERE: +1.75
OD_CYLINDER: -1.75
OD_ADD: +2.25
OS_SPHERE: +0.75
OD_AXIS: 180
OD_CYLINDER: +1.00
OD_VA1: 20/20
OD_VA2: 20/20(J1+)
OD_ADD: +2.25
OS_AXIS: 66
OS_VA2: 20/20
OD_VA1: 20/20-
OD_ADD: +2.25
OS_VA1: 20/20-1
OD_SPHERE: +0.50
OD_VA1: 20/20-1
OS_AXIS: 180
OS_VA1: 20/20
OD_SPHERE: +0.50

## 2020-05-18 ASSESSMENT — CONFRONTATIONAL VISUAL FIELD TEST (CVF)
OD_FINDINGS: FULL
OS_FINDINGS: FULL

## 2020-05-18 ASSESSMENT — SPHEQUIV_DERIVED
OD_SPHEQUIV: 1.625
OS_SPHEQUIV: 1
OD_SPHEQUIV: 1.25
OD_SPHEQUIV: 1
OS_SPHEQUIV: 0.75
OS_SPHEQUIV: 0.875
OD_SPHEQUIV: 1
OS_SPHEQUIV: 1.375

## 2020-05-18 ASSESSMENT — REFRACTION_CURRENTRX
OD_ADD: +2.25
OS_ADD: +2.25
OD_VPRISM_DIRECTION: PROGS
OD_AXIS: 101
OS_OVR_VA: 20/
OD_SPHERE: +2.75
OD_OVR_VA: 20/
OS_CYLINDER: -0.75
OS_SPHERE: +1.75
OS_AXIS: 63
OD_CYLINDER: -1.75
OD_OVR_VA: 20/
OS_OVR_VA: 20/
OD_SPHERE: +2.50
OS_VPRISM_DIRECTION: PROGS
OS_SPHERE: +2.75

## 2020-05-18 ASSESSMENT — REFRACTION_AUTOREFRACTION
OD_SPHERE: +1.75
OS_AXIS: 86
OS_CYLINDER: -0.75
OS_SPHERE: +1.25
OD_CYLINDER: -1.00
OD_AXIS: 93

## 2020-05-18 ASSESSMENT — VISUAL ACUITY
OD_BCVA: 20/30-2
OS_BCVA: 20/30-2

## 2020-06-29 ENCOUNTER — APPOINTMENT (OUTPATIENT)
Dept: LAB | Facility: CLINIC | Age: 65
End: 2020-06-29
Payer: MEDICARE

## 2020-06-29 DIAGNOSIS — E11.29 TYPE 2 DIABETES MELLITUS WITH MICROALBUMINURIA, WITH LONG-TERM CURRENT USE OF INSULIN (HCC): ICD-10-CM

## 2020-06-29 DIAGNOSIS — I10 ESSENTIAL HYPERTENSION: ICD-10-CM

## 2020-06-29 DIAGNOSIS — E78.00 HYPERCHOLESTEREMIA: ICD-10-CM

## 2020-06-29 DIAGNOSIS — R80.9 TYPE 2 DIABETES MELLITUS WITH MICROALBUMINURIA, WITH LONG-TERM CURRENT USE OF INSULIN (HCC): ICD-10-CM

## 2020-06-29 DIAGNOSIS — Z79.4 TYPE 2 DIABETES MELLITUS WITH MICROALBUMINURIA, WITH LONG-TERM CURRENT USE OF INSULIN (HCC): ICD-10-CM

## 2020-06-29 LAB
ALBUMIN SERPL BCP-MCNC: 3.5 G/DL (ref 3.5–5)
ALP SERPL-CCNC: 31 U/L (ref 46–116)
ALT SERPL W P-5'-P-CCNC: 17 U/L (ref 12–78)
ANION GAP SERPL CALCULATED.3IONS-SCNC: 5 MMOL/L (ref 4–13)
AST SERPL W P-5'-P-CCNC: 12 U/L (ref 5–45)
BILIRUB SERPL-MCNC: 0.33 MG/DL (ref 0.2–1)
BUN SERPL-MCNC: 16 MG/DL (ref 5–25)
CALCIUM SERPL-MCNC: 9.3 MG/DL (ref 8.3–10.1)
CHLORIDE SERPL-SCNC: 111 MMOL/L (ref 100–108)
CHOLEST SERPL-MCNC: 230 MG/DL (ref 50–200)
CO2 SERPL-SCNC: 26 MMOL/L (ref 21–32)
CREAT SERPL-MCNC: 0.93 MG/DL (ref 0.6–1.3)
EST. AVERAGE GLUCOSE BLD GHB EST-MCNC: 131 MG/DL
GFR SERPL CREATININE-BSD FRML MDRD: 86 ML/MIN/1.73SQ M
GLUCOSE P FAST SERPL-MCNC: 84 MG/DL (ref 65–99)
HBA1C MFR BLD: 6.2 %
HDLC SERPL-MCNC: 87 MG/DL
LDLC SERPL CALC-MCNC: 122 MG/DL (ref 0–100)
NONHDLC SERPL-MCNC: 143 MG/DL
POTASSIUM SERPL-SCNC: 4.6 MMOL/L (ref 3.5–5.3)
PROT SERPL-MCNC: 7 G/DL (ref 6.4–8.2)
SODIUM SERPL-SCNC: 142 MMOL/L (ref 136–145)
TRIGL SERPL-MCNC: 105 MG/DL

## 2020-06-29 PROCEDURE — 83036 HEMOGLOBIN GLYCOSYLATED A1C: CPT

## 2020-06-29 PROCEDURE — 80061 LIPID PANEL: CPT

## 2020-06-29 PROCEDURE — 36415 COLL VENOUS BLD VENIPUNCTURE: CPT

## 2020-06-29 PROCEDURE — 80053 COMPREHEN METABOLIC PANEL: CPT

## 2020-11-02 ENCOUNTER — APPOINTMENT (OUTPATIENT)
Dept: LAB | Facility: CLINIC | Age: 65
End: 2020-11-02
Payer: MEDICARE

## 2020-11-02 ENCOUNTER — TRANSCRIBE ORDERS (OUTPATIENT)
Dept: URGENT CARE | Facility: CLINIC | Age: 65
End: 2020-11-02

## 2020-11-02 DIAGNOSIS — E78.00 HYPERCHOLESTEREMIA: ICD-10-CM

## 2020-11-02 DIAGNOSIS — I10 ESSENTIAL HYPERTENSION: Primary | ICD-10-CM

## 2020-11-02 DIAGNOSIS — E11.69 TYPE 2 DIABETES MELLITUS WITH OTHER SPECIFIED COMPLICATION, WITH LONG-TERM CURRENT USE OF INSULIN (HCC): ICD-10-CM

## 2020-11-02 DIAGNOSIS — Z79.4 TYPE 2 DIABETES MELLITUS WITH OTHER SPECIFIED COMPLICATION, WITH LONG-TERM CURRENT USE OF INSULIN (HCC): ICD-10-CM

## 2020-11-02 DIAGNOSIS — I10 ESSENTIAL HYPERTENSION: ICD-10-CM

## 2020-11-02 LAB
ALBUMIN SERPL BCP-MCNC: 3.6 G/DL (ref 3.5–5)
ALP SERPL-CCNC: 37 U/L (ref 46–116)
ALT SERPL W P-5'-P-CCNC: 27 U/L (ref 12–78)
ANION GAP SERPL CALCULATED.3IONS-SCNC: 3 MMOL/L (ref 4–13)
AST SERPL W P-5'-P-CCNC: 15 U/L (ref 5–45)
BILIRUB SERPL-MCNC: 0.35 MG/DL (ref 0.2–1)
BUN SERPL-MCNC: 14 MG/DL (ref 5–25)
CALCIUM SERPL-MCNC: 8.8 MG/DL (ref 8.3–10.1)
CHLORIDE SERPL-SCNC: 106 MMOL/L (ref 100–108)
CHOLEST SERPL-MCNC: 163 MG/DL (ref 50–200)
CO2 SERPL-SCNC: 29 MMOL/L (ref 21–32)
CREAT SERPL-MCNC: 0.92 MG/DL (ref 0.6–1.3)
EST. AVERAGE GLUCOSE BLD GHB EST-MCNC: 157 MG/DL
GFR SERPL CREATININE-BSD FRML MDRD: 86 ML/MIN/1.73SQ M
GLUCOSE P FAST SERPL-MCNC: 108 MG/DL (ref 65–99)
HBA1C MFR BLD: 7.1 %
HDLC SERPL-MCNC: 84 MG/DL
LDLC SERPL CALC-MCNC: 59 MG/DL (ref 0–100)
NONHDLC SERPL-MCNC: 79 MG/DL
POTASSIUM SERPL-SCNC: 4.6 MMOL/L (ref 3.5–5.3)
PROT SERPL-MCNC: 6.9 G/DL (ref 6.4–8.2)
PSA SERPL-MCNC: 1.8 NG/ML (ref 0–4)
SODIUM SERPL-SCNC: 138 MMOL/L (ref 136–145)
TRIGL SERPL-MCNC: 99 MG/DL

## 2020-11-02 PROCEDURE — 80061 LIPID PANEL: CPT

## 2020-11-02 PROCEDURE — G0103 PSA SCREENING: HCPCS

## 2020-11-02 PROCEDURE — 80053 COMPREHEN METABOLIC PANEL: CPT

## 2020-11-02 PROCEDURE — 36415 COLL VENOUS BLD VENIPUNCTURE: CPT

## 2020-11-02 PROCEDURE — 83036 HEMOGLOBIN GLYCOSYLATED A1C: CPT

## 2020-11-23 LAB
LEFT EYE DIABETIC RETINOPATHY: NORMAL
RIGHT EYE DIABETIC RETINOPATHY: NORMAL

## 2020-11-24 PROBLEM — Z00.00 MEDICARE ANNUAL WELLNESS VISIT, INITIAL: Status: ACTIVE | Noted: 2020-11-24

## 2020-11-25 ENCOUNTER — DOCTOR'S OFFICE (OUTPATIENT)
Dept: URBAN - METROPOLITAN AREA CLINIC 136 | Facility: CLINIC | Age: 65
Setting detail: OPHTHALMOLOGY
End: 2020-11-25
Payer: COMMERCIAL

## 2020-11-25 DIAGNOSIS — H25.013: ICD-10-CM

## 2020-11-25 DIAGNOSIS — E11.9: ICD-10-CM

## 2020-11-25 PROBLEM — H52.03 HYPEROPIA; BOTH EYES: Status: ACTIVE | Noted: 2019-04-17

## 2020-11-25 PROBLEM — H52.4 PRESBYOPIA ; BOTH EYES: Status: ACTIVE | Noted: 2017-04-03

## 2020-11-25 PROBLEM — H52.223 ASTIGMATISM, REGULAR; BOTH EYES: Status: ACTIVE | Noted: 2019-04-17

## 2020-11-25 PROCEDURE — 92014 COMPRE OPH EXAM EST PT 1/>: CPT | Performed by: OPHTHALMOLOGY

## 2020-11-25 ASSESSMENT — REFRACTION_MANIFEST
OD_VA1: 20/20
OD_VA1: 20/20-1
OD_VA2: 20/20
OD_CYLINDER: +1.00
OD_CYLINDER: +1.00
OD_AXIS: 105
OS_AXIS: 005
OS_ADD: +2.25
OD_SPHERE: +0.50
OS_VA1: 20/20-1
OS_AXIS: 180
OD_VA1: 20/20-
OS_ADD: +2.25
OD_ADD: +2.25
OS_AXIS: 66
OS_CYLINDER: +0.50
OS_SPHERE: +1.75
OS_ADD: +2.25
OS_SPHERE: +0.75
OS_VA2: 20/20
OS_VA1: 20/20
OD_VA2: 20/20(J1+)
OD_ADD: +2.25
OD_AXIS: 170
OU_VA: 20/20-1
OS_CYLINDER: -0.75
OS_CYLINDER: +0.50
OS_VA2: 20/20(J1+)
OD_ADD: +2.25
OD_SPHERE: +2.50
OD_CYLINDER: -1.75
OS_SPHERE: +0.50
OS_VA1: 20/20
OD_AXIS: 180
OD_SPHERE: +0.50

## 2020-11-25 ASSESSMENT — REFRACTION_AUTOREFRACTION
OD_SPHERE: +1.75
OS_AXIS: 110
OD_CYLINDER: -1.00
OD_AXIS: 84
OS_CYLINDER: -0.25
OS_SPHERE: +1.50

## 2020-11-25 ASSESSMENT — REFRACTION_CURRENTRX
OD_CYLINDER: -1.75
OS_SPHERE: +1.75
OD_OVR_VA: 20/
OD_SPHERE: +2.50
OD_ADD: +2.25
OS_SPHERE: +2.75
OS_ADD: +2.25
OS_OVR_VA: 20/
OD_VPRISM_DIRECTION: PROGS
OS_OVR_VA: 20/
OD_AXIS: 105
OS_AXIS: 66
OS_CYLINDER: -0.75
OD_OVR_VA: 20/
OD_SPHERE: +2.75
OS_VPRISM_DIRECTION: PROGS

## 2020-11-25 ASSESSMENT — SPHEQUIV_DERIVED
OS_SPHEQUIV: 1.375
OD_SPHEQUIV: 1
OD_SPHEQUIV: 1.25
OD_SPHEQUIV: 1.625
OD_SPHEQUIV: 1
OS_SPHEQUIV: 0.75
OS_SPHEQUIV: 1
OS_SPHEQUIV: 1.375

## 2020-11-25 ASSESSMENT — TONOMETRY
OD_IOP_MMHG: 21
OS_IOP_MMHG: 19

## 2020-11-25 ASSESSMENT — VISUAL ACUITY
OD_BCVA: 20/25
OS_BCVA: 20/25

## 2020-11-25 ASSESSMENT — CONFRONTATIONAL VISUAL FIELD TEST (CVF)
OS_FINDINGS: FULL
OD_FINDINGS: FULL

## 2021-03-16 ENCOUNTER — APPOINTMENT (OUTPATIENT)
Dept: LAB | Facility: CLINIC | Age: 66
End: 2021-03-16
Payer: MEDICARE

## 2021-03-16 DIAGNOSIS — Z79.4 TYPE 2 DIABETES MELLITUS WITH MICROALBUMINURIA, WITH LONG-TERM CURRENT USE OF INSULIN (HCC): ICD-10-CM

## 2021-03-16 DIAGNOSIS — Z11.59 ENCOUNTER FOR HEPATITIS C SCREENING TEST FOR LOW RISK PATIENT: ICD-10-CM

## 2021-03-16 DIAGNOSIS — E11.29 TYPE 2 DIABETES MELLITUS WITH MICROALBUMINURIA, WITH LONG-TERM CURRENT USE OF INSULIN (HCC): ICD-10-CM

## 2021-03-16 DIAGNOSIS — E10.9 TYPE 1 DIABETES MELLITUS WITHOUT COMPLICATION (HCC): ICD-10-CM

## 2021-03-16 DIAGNOSIS — E78.00 HYPERCHOLESTEREMIA: ICD-10-CM

## 2021-03-16 DIAGNOSIS — R80.9 TYPE 2 DIABETES MELLITUS WITH MICROALBUMINURIA, WITH LONG-TERM CURRENT USE OF INSULIN (HCC): ICD-10-CM

## 2021-03-16 LAB
CHOLEST SERPL-MCNC: 178 MG/DL (ref 50–200)
EST. AVERAGE GLUCOSE BLD GHB EST-MCNC: 206 MG/DL
HBA1C MFR BLD: 8.8 %
HCV AB SER QL: NORMAL
HDLC SERPL-MCNC: 81 MG/DL
LDLC SERPL CALC-MCNC: 61 MG/DL (ref 0–100)
NONHDLC SERPL-MCNC: 97 MG/DL
TRIGL SERPL-MCNC: 181 MG/DL

## 2021-03-16 PROCEDURE — 36415 COLL VENOUS BLD VENIPUNCTURE: CPT

## 2021-03-16 PROCEDURE — 83036 HEMOGLOBIN GLYCOSYLATED A1C: CPT

## 2021-03-16 PROCEDURE — 80061 LIPID PANEL: CPT

## 2021-03-16 PROCEDURE — 86803 HEPATITIS C AB TEST: CPT

## 2021-03-19 ENCOUNTER — OFFICE VISIT (OUTPATIENT)
Dept: INTERNAL MEDICINE CLINIC | Facility: CLINIC | Age: 66
End: 2021-03-19
Payer: MEDICARE

## 2021-03-19 VITALS
TEMPERATURE: 98.1 F | HEIGHT: 71 IN | WEIGHT: 275 LBS | OXYGEN SATURATION: 97 % | HEART RATE: 77 BPM | BODY MASS INDEX: 38.5 KG/M2

## 2021-03-19 DIAGNOSIS — R80.1 PERSISTENT PROTEINURIA: ICD-10-CM

## 2021-03-19 DIAGNOSIS — E11.29 TYPE 2 DIABETES MELLITUS WITH MICROALBUMINURIA, WITH LONG-TERM CURRENT USE OF INSULIN (HCC): Primary | ICD-10-CM

## 2021-03-19 DIAGNOSIS — Z79.4 TYPE 2 DIABETES MELLITUS WITH MICROALBUMINURIA, WITH LONG-TERM CURRENT USE OF INSULIN (HCC): Primary | ICD-10-CM

## 2021-03-19 DIAGNOSIS — J30.1 ALLERGIC RHINITIS DUE TO POLLEN, UNSPECIFIED SEASONALITY: ICD-10-CM

## 2021-03-19 DIAGNOSIS — R80.9 TYPE 2 DIABETES MELLITUS WITH MICROALBUMINURIA, WITH LONG-TERM CURRENT USE OF INSULIN (HCC): Primary | ICD-10-CM

## 2021-03-19 DIAGNOSIS — E55.9 VITAMIN D DEFICIENCY: ICD-10-CM

## 2021-03-19 DIAGNOSIS — I10 ESSENTIAL HYPERTENSION: ICD-10-CM

## 2021-03-19 DIAGNOSIS — E78.00 HYPERCHOLESTEREMIA: ICD-10-CM

## 2021-03-19 DIAGNOSIS — E66.01 CLASS 2 SEVERE OBESITY DUE TO EXCESS CALORIES WITH SERIOUS COMORBIDITY AND BODY MASS INDEX (BMI) OF 35.0 TO 35.9 IN ADULT (HCC): ICD-10-CM

## 2021-03-19 PROCEDURE — 99214 OFFICE O/P EST MOD 30 MIN: CPT | Performed by: INTERNAL MEDICINE

## 2021-03-19 RX ORDER — BLOOD SUGAR DIAGNOSTIC
STRIP MISCELLANEOUS
Qty: 300 EACH | Refills: 5 | Status: SHIPPED | OUTPATIENT
Start: 2021-03-19

## 2021-03-19 NOTE — PATIENT INSTRUCTIONS
Recommend to go and get the colonoscopy done in April  Watch your carbohydrate intake  Hypertension( High Blood Pressure ):    Continue Home BP check daily and bring log, if you are not checking consider checking daily    Take your Blood Pressure medicine as advised    Do not take your Blood pressure medicine if systolic Blood Pressure (top number) is less than 100 or heart rate less than 60  Notify you physician  Diabetes    Check your fingerstick Accu-Chek once a day  Please check your fingerstick Accu-Chek different time of the day either at 7:00 a m  or 11:00 a m  for for p m  4 9:00 p m  [facility] Follow Diabetic 1800 calorie diet for diabetes as advised  If you would sugar less than 80 or more than 300 to please call us on next visit is day  If the sugar is less than 80 follow-up hypoglycemia instructions as advised  Take your diabetic medicine as advised  Cholesterol    Eat low cholesterol diet    Continue taking your cholesterol medicine as advised    Call if any side effects    Lipid Profile and LFT prior to next visit or as advised  ( You should Get periodically to monitor liver side effects)    KNOW YOUR DIABETIC GOAL( HBA1C AND SUGAR), BLOOD PRESSURE TARGET NUMBER AND CHOLESTEROL( LDL, HDL AND TRIGLYCERIDE)   Follow with Consultants as per their and our suggestion    Follow up in 12 week(s) or as needed earlier    Follow all instructions as advised and discussed  Take your medications as prescribed  Call the office immediately if you experience any side effects  Ask questions if you do not understand  Keep your scheduled appointment as advised or come sooner if necessary or in doubt  Best time to call for non-urgent matter or questions on weekdays is between 9am and 12 noon  See physician for any new symptoms or worsening of current symptoms  Urgent or emergent situations call 911 and report to nearest emergency room      I spent  30 -40 minutes taking care of this patient including clinical care, conseling, collaboration, chart, lab and consultaion review as appropriate    Patient is to get labs 1 week(s) prior to next visit if advised      BMI Counseling:       The BMI is above normal  Know Body weight goal    Weigh yourself daily or weekly as per your doctor    Nutrition recommendations include decreasing portion sizes, encouraging healthy choices of fruits and vegetables, decreasing     fast food intake, consuming healthier snacks, limiting drinks that contain sugar, moderation in carbohydrate intake, increasing     intake of lean protein, reducing intake of saturated and trans fat and reducing intake of cholesterol

## 2021-03-19 NOTE — PROGRESS NOTES
[facility] Office Visit Note  21     Abby Mcpherson 76 y o  male MRN: [id number]  : 1955    Assessment:     No problem-specific Assessment & Plan notes found for this encounter  Diagnoses and all orders for this visit:    Allergic rhinitis due to pollen, unspecified seasonality    Type 2 diabetes mellitus with microalbuminuria, with long-term current use of insulin ([facility])  -     glucose blood (OneTouch Ultra) test strip; Test 3 times daily  -     Comprehensive metabolic panel; Future  -     Hemoglobin A1C; Future  -     Microalbumin / creatinine urine ratio  -     Lipid panel; Future    Class 2 severe obesity due to excess calories with serious comorbidity and body mass index (BMI) of 35 0 to 35 9 in adult [facility])    Essential hypertension  -     Comprehensive metabolic panel; Future  -     Hemoglobin A1C; Future  -     Microalbumin / creatinine urine ratio  -     Lipid panel; Future    Persistent proteinuria    Vitamin D deficiency    Hypercholesteremia  -     Comprehensive metabolic panel; Future  -     Hemoglobin A1C; Future  -     Microalbumin / creatinine urine ratio  -     Lipid panel; Future          Discussion Summary and Plan: Today's care plan and medications were reviewed with patient in detail and all their questions answered to their satisfaction  In summary diabetes suboptimal control due to eating lot more carbohydrate  Patient educated  Allergic rhinitis reasonable  Of obesity recommend exercise lifestyle modification  Hypertension fair control  Protein urea continue losartan as as well as amlodipine  Vitamin-D on supplement  Hyperlipidemia fair control  Recommend to stay up-to-date with eye examination  How will follow back in 3 months  He did very well on the same insulin before    Of    The up-to-date with PSA and colonoscopy advised to stay up to date he is going in summer for it  Chief Complaint   Patient presents with    Hypertension    Hyperlipidemia    Obesity    Abnormal Lab      Subjective: The patient is here for chronic disease management  PATIENT IS DOING VERY WELL  OFFERS NO SPECIFIC COMPLAINT  Diabetes:  Symptom-free Kita A1c 6 2- went up to 8 8  Urine for microalbumin reasonable  Currently on NovoLog 70/30 CURRENTLY ON 8 units twice a day  Has been doing exercises regularly  Remains on metformin 1000 mg twice a day  Up-to-date with eye checkup  Obesity weight coming down watching diet  Diet reviewed    Hypertension:  Symptom-free he is  on amlodipine 10 mg daily losartan 100 mg daily and metoprolol the  mg daily a  His blood pressure is well controlled today  Hyperlipidemia LDL is 66 currently is on atorvastatin 40 mg daily  The PSA 1 8 11-8-2020    Eye examination of done UPTO DATE 2020    Now no other specific complaint particularly fatty liver unchanged, high allergic rhinitis reasonable        Appointment on 03/16/2021  Hemoglobin A1C            Value: 8 8(%)*            Dt: 03/16/2021  EAG                       Value: 206(mg/dl)         Dt: 03/16/2021  Cholesterol               Value: 178(mg/dL)         Dt: 03/16/2021  Triglycerides             Value: 181(mg/dL)*        Dt: 03/16/2021  HDL, Direct               Value: 81(mg/dL)          Dt: 03/16/2021  LDL Calculated            Value: 61(mg/dL)          Dt: 03/16/2021  Non-HDL-Chol (CHOL-HDL)   Value: 97(mg/dl)          Dt: 03/16/2021  Hepatitis C Ab                                      Dt: 03/16/2021                  Value: Non-reactive   ------------ - 2 weeks    Sodium                    Value: 142(mmol/L)        Dt: 06/29/2020  Potassium                 Value: 4 6(mmol/L)        Dt: 06/29/2020  Chloride                  Value: 111(mmol/L)*       Dt: 06/29/2020  CO2                       Value: 26(mmol/L)         Dt: 06/29/2020  ANION GAP                 Value: 5(mmol/L)          Dt: 06/29/2020  BUN                       Value: 16(mg/dL) Dt: 06/29/2020  Creatinine                Value: 0 93(mg/dL)        Dt: 06/29/2020  Glucose, Fasting          Value: 84(mg/dL)          Dt: 06/29/2020  Calcium                   Value: 9 3(mg/dL)         Dt: 06/29/2020  AST                       Value: 12(U/L)            Dt: 06/29/2020  ALT                       Value: 17(U/L)            Dt: 06/29/2020  Alkaline Phosphatase      Value: 31(U/L)*           Dt: 06/29/2020  Total Protein             Value: 7 0(g/dL)          Dt: 06/29/2020  Albumin                   Value: 3 5(g/dL)          Dt: 06/29/2020  Total Bilirubin           Value: 0 33(mg/dL)        Dt: 06/29/2020  eGFR                      Value: 86(ml/min/1 73sq m) Dt: 06/29/2020  Hemoglobin A1C            Value: 6 2(%)*            Dt: 06/29/2020  EAG                       Value: 131(mg/dl)         Dt: 06/29/2020  Cholesterol               Value: 230(mg/dL)*        Dt: 06/29/2020  Triglycerides             Value: 105(mg/dL)         Dt: 06/29/2020  HDL, Direct               Value: 87(mg/dL)          Dt: 06/29/2020  LDL Calculated            Value: 122(mg/dL)*        Dt: 06/29/2020  Non-HDL-Chol (CHOL-HDL)   Value: 143(mg/dl)         Dt: 06/29/2020  ------------ - 2 weeks          The following portions of the patient's history were reviewed and updated as appropriate: allergies, current medications, past family history, past medical history, past social history, past surgical history and problem list     Review of Systems   Constitutional: Negative for chills, fatigue, fever and unexpected weight change  HENT: Negative for ear pain, facial swelling, hearing loss, sinus pressure, sinus pain and sore throat  Eyes: Negative for photophobia, redness and visual disturbance  Respiratory: Negative for cough and shortness of breath  Cardiovascular: Negative for chest pain, palpitations and leg swelling  Gastrointestinal: Negative for abdominal pain, diarrhea and nausea     Endocrine: Negative for cold intolerance, heat intolerance, polyphagia and polyuria  Genitourinary: Negative for dysuria, frequency and urgency  Musculoskeletal: Negative for arthralgias, gait problem and myalgias  Neurological: Positive for numbness  Negative for dizziness, tremors, syncope, weakness and headaches  Hematological: Negative  Psychiatric/Behavioral: Negative  Historical Information   Patient Active Problem List   Diagnosis    Type 2 diabetes mellitus, with long-term current use of insulin ([address] )    Essential hypertension    Hypercholesteremia    Class 2 severe obesity due to excess calories with serious comorbidity and body mass index (BMI) of 35 0 to 35 9 in adult [facility])    Steatohepatitis    Allergic rhinitis    Vitamin D deficiency    Persistent proteinuria    Medicare annual wellness visit, initial     History reviewed  No pertinent past medical history  History reviewed  No pertinent surgical history  Social History     Substance and Sexual Activity   Alcohol Use None     Social History     Substance and Sexual Activity   Drug Use Not on file     Social History     Tobacco Use   Smoking Status Former Smoker   Smokeless Tobacco Never Used     History reviewed  No pertinent family history    Health Maintenance Due   Topic    DM Eye Exam     COVID-19 Vaccine (1)    DTaP,Tdap,and Td Vaccines (1 - Tdap)    Colorectal Cancer Screening     Pneumococcal Vaccine: 65+ Years (1 of 1 - PPSV23)    Influenza Vaccine (1)      Meds/Allergies       Current Outpatient Medications:     amLODIPine (NORVASC) 10 mg tablet, Take 1 tablet (10 mg total) by mouth daily, Disp: 30 tablet, Rfl: 5    ASA-APAP-Caff Buffered (Vanquish) 403-824-41 MG TABS, Take by mouth, Disp: , Rfl:     aspirin (ECOTRIN LOW STRENGTH) 81 mg EC tablet, Take 81 mg by mouth daily, Disp: , Rfl:     atorvastatin (LIPITOR) 40 mg tablet, Take 1 tablet (40 mg total) by mouth daily, Disp: 90 tablet, Rfl: 1    azelastine (ASTELIN) 0 1 % nasal spray, 1 spray into each nostril 2 (two) times a day Use in each nostril as directed, Disp: 3 Bottle, Rfl: 1    BD Pen Needle Katelyn U/F 32G X 4 MM MISC, Inject as directed 2 (two) times a day, Disp: 100 each, Rfl: 4    Cholecalciferol (VITAMIN D3) 2000 units TABS, Take 2,000 Units by mouth daily, Disp: , Rfl:     Glucose Blood (ONETOUCH ULTRA BLUE VI), 1 Units by In Vitro route 3 (three) times a day, Disp: , Rfl:     glucose blood (OneTouch Ultra) test strip, Test 3 times daily, Disp: 300 each, Rfl: 5    insulin aspart protamine-insulin aspart (NovoLOG Mix 70/30 FlexPen) 100 Units/mL injection pen, Inject 8 Units under the skin 2 (two) times a day before meals (Patient taking differently: Inject 6 Units under the skin 2 (two) times a day before meals ), Disp: 5 pen, Rfl: 5    Krill Oil 1000 MG CAPS, Take 1 capsule by mouth daily, Disp: , Rfl:     losartan (COZAAR) 100 MG tablet, Take 1 tablet (100 mg total) by mouth daily, Disp: 30 tablet, Rfl: 5    metFORMIN (GLUCOPHAGE) 1000 MG tablet, TAKE 1 TABLET BY MOUTH TWICE A DAY, Disp: 180 tablet, Rfl: 1    metoprolol succinate (TOPROL-XL) 100 mg 24 hr tablet, TAKE 1 TABLET BY MOUTH EVERY DAY, Disp: 90 tablet, Rfl: 1    Pseudoeph-HYDROcodone (PSEUDOEPHEDRINE-HYDROCODONE PO), Take 1 tablet by mouth 2 (two) times a day, Disp: , Rfl:     vitamin E, tocopherol, 400 units capsule, Take 400 Units by mouth daily, Disp: , Rfl:     ofloxacin (FLOXIN) 0 3 % otic solution, INSTILL 1 DROP TWICE DAILY INTO EACH EAR FOR 10 DAYS, Disp: , Rfl:       Objective:    Vitals:   Pulse 77   Temp 98 1 °F (36 7 °C)   Ht 5' 11" (1 803 m)   Wt 125 kg (275 lb)   SpO2 97%   BMI 38 35 kg/m²   Body mass index is 38 35 kg/m²  Vitals:    03/19/21 1244   Weight: 125 kg (275 lb)       Physical Exam  Vitals signs and nursing note reviewed  Constitutional:       Appearance: He is well-developed  HENT:      Head: Normocephalic and atraumatic     Eyes:      Conjunctiva/sclera: Conjunctivae normal    Neck:      Thyroid: No thyromegaly  Vascular: No JVD  Cardiovascular:      Rate and Rhythm: Regular rhythm  Pulses: no weak pulses          Dorsalis pedis pulses are 1+ on the right side and 1+ on the left side  Posterior tibial pulses are 1+ on the right side and 1+ on the left side  Heart sounds: Normal heart sounds  Pulmonary:      Effort: No respiratory distress  Breath sounds: Normal breath sounds  No wheezing or rales  Abdominal:      General: Bowel sounds are normal  There is no distension  Palpations: There is no mass  Tenderness: There is no abdominal tenderness  There is no rebound  Feet:      Right foot:      Skin integrity: No ulcer, skin breakdown, erythema, warmth, callus or dry skin  Left foot:      Skin integrity: No ulcer, skin breakdown, erythema, warmth, callus or dry skin  Lymphadenopathy:      Cervical: No cervical adenopathy  Skin:     General: Skin is warm  Findings: No rash  Neurological:      General: No focal deficit present  Mental Status: He is oriented to person, place, and time  Cranial Nerves: No cranial nerve deficit  Coordination: Coordination normal       Deep Tendon Reflexes: Reflexes normal    Psychiatric:         Mood and Affect: Mood normal          Thought Content: Thought content normal      Diabetic Foot Exam    Patient's shoes and socks removed  Right Foot/Ankle   Right Foot Inspection  Skin Exam: no dry skin, no warmth, no callus, no erythema, no maceration, no ulcer and no callus                          Toe Exam:  no right toe deformity  Sensory   Vibration: intact  Proprioception: intact   Monofilament testing: intact  Vascular  Capillary refills: < 3 seconds  The right DP pulse is 1+  The right PT pulse is 1+       Left Foot/Ankle  Left Foot Inspection  Skin Exam: no dry skin, no warmth, no erythema, no maceration, no ulcer and no callus                         Toe Exam: no left toe deformity                   Sensory   Vibration: intact  Proprioception: intact  Monofilament: intact  Vascular    The left DP pulse is 1+  The left PT pulse is 1+  Assign Risk Category:  No deformity present; No loss of protective sensation; No weak pulses       Risk: 0  BMI Counseling: Body mass index is 38 35 kg/m²  The BMI is above normal  Nutrition recommendations include decreasing portion sizes, encouraging healthy choices of fruits and vegetables, decreasing fast food intake, moderation in carbohydrate intake and increasing intake of lean protein  Exercise recommendations include moderate physical activity 150 minutes/week and exercising 3-5 times per week  Lab Review   Appointment on 03/16/2021   Component Date Value Ref Range Status    Hemoglobin A1C 03/16/2021 8 8* Normal 3 8-5 6%; PreDiabetic 5 7-6 4%; Diabetic >=6 5%; Glycemic control for adults with diabetes <7 0% % Final    EAG 03/16/2021 206  mg/dl Final    Cholesterol 03/16/2021 178  50 - 200 mg/dL Final    Cholesterol:       Desirable         <200 mg/dl       Borderline         200-239 mg/dl       High              >239           Triglycerides 03/16/2021 181* <=150 mg/dL Final    Triglyceride:     Normal          <150 mg/dl     Borderline High 150-199 mg/dl     High            200-499 mg/dl        Very High       >499 mg/dl    Specimen collection should occur prior to N-Acetylcysteine or Metamizole administration due to the potential for falsely depressed results   HDL, Direct 03/16/2021 81  >=40 mg/dL Final    HDL Cholesterol:       Low     <41 mg/dL  Specimen collection should occur prior to Metamizole administration due to the potential for falsley depressed results      LDL Calculated 03/16/2021 61  0 - 100 mg/dL Final    LDL Cholesterol:     Optimal           <100 mg/dl     Near Optimal      100-129 mg/dl     Above Optimal       Borderline High 130-159 mg/dl       High            160-189 mg/dl       Very High       >189 mg/dl         This screening LDL is a calculated result  It does not have the accuracy of the Direct Measured LDL in the monitoring of patients with hyperlipidemia and/or statin therapy  Direct Measure LDL (GDW707) must be ordered separately in these patients   Non-HDL-Chol (CHOL-HDL) 03/16/2021 97  mg/dl Final    Hepatitis C Ab 03/16/2021 Non-reactive  Non-reactive Final         Patient Instructions   Recommend to go and get the colonoscopy done in April  Watch your carbohydrate intake  Hypertension( High Blood Pressure ):    Continue Home BP check daily and bring log, if you are not checking consider checking daily    Take your Blood Pressure medicine as advised    Do not take your Blood pressure medicine if systolic Blood Pressure (top number) is less than 100 or heart rate less than 60  Notify you physician  Diabetes    Check your fingerstick Accu-Chek once a day  Please check your fingerstick Accu-Chek different time of the day either at 7:00 a m  or 11:00 a m  for for p m  4 9:00 p m  [facility] Follow Diabetic 1800 calorie diet for diabetes as advised  If you would sugar less than 80 or more than 300 to please call us on next visit is day  If the sugar is less than 80 follow-up hypoglycemia instructions as advised  Take your diabetic medicine as advised  Cholesterol    Eat low cholesterol diet    Continue taking your cholesterol medicine as advised    Call if any side effects    Lipid Profile and LFT prior to next visit or as advised  ( You should Get periodically to monitor liver side effects)    KNOW YOUR DIABETIC GOAL( HBA1C AND SUGAR), BLOOD PRESSURE TARGET NUMBER AND CHOLESTEROL( LDL, HDL AND TRIGLYCERIDE)   Follow with Consultants as per their and our suggestion    Follow up in 12 week(s) or as needed earlier    Follow all instructions as advised and discussed  Take your medications as prescribed  Call the office immediately if you experience any side effects      Ask questions if you do not understand  Keep your scheduled appointment as advised or come sooner if necessary or in doubt  Best time to call for non-urgent matter or questions on weekdays is between 9am and 12 noon  See physician for any new symptoms or worsening of current symptoms  Urgent or emergent situations call 911 and report to nearest emergency room  I spent  30 -40 minutes taking care of this patient including clinical care, conseling, collaboration, chart, lab and consultaion review as appropriate    Patient is to get labs 1 week(s) prior to next visit if advised      BMI Counseling: The BMI is above normal  Know Body weight goal    Weigh yourself daily or weekly as per your doctor    Nutrition recommendations include decreasing portion sizes, encouraging healthy choices of fruits and vegetables, decreasing     fast food intake, consuming healthier snacks, limiting drinks that contain sugar, moderation in carbohydrate intake, increasing     intake of lean protein, reducing intake of saturated and trans fat and reducing intake of cholesterol         Dr Katrina Yap MD  [facility] [place]       "This note has been constructed using a voice recognition system  Therefore there may be syntax, spelling, and/or grammatical errors   Please call if you have any questions  "

## 2021-03-22 ENCOUNTER — TELEPHONE (OUTPATIENT)
Dept: ADMINISTRATIVE | Facility: OTHER | Age: 66
End: 2021-03-22

## 2021-03-22 NOTE — TELEPHONE ENCOUNTER
----- Message from Alan Bell MD sent at 3/19/2021  1:19 PM EDT -----  Regarding: retinopathy  03/19/21 1:19 PM    Keenan, our patient Vonda Hampton has had Diabetic Eye Exam completed/performed  Please assist in updating the patient chart by making an External outreach to [facility] in [facility] located in [place]  The date of service is 2020      Thank you,  Alan Bell MD  [facility]

## 2021-03-22 NOTE — TELEPHONE ENCOUNTER
Upon review of the In Basket request and the patient's chart, initial outreach has been made via fax, please see Contacts section for details       Thank you  Fred Hebert MA

## 2021-03-22 NOTE — LETTER
Diabetic Eye Exam Form    Date Requested: 21  Patient: Anup Zarate  Patient : 1955   Referring Provider: Reji Hodges MD    Dilated Retinal Exam, Optomap-Iris Exam, or Fundus Photography Done         Yes ([place] one above)         No     Date of Diabetic Eye Exam ______________________________  Left Eye      Exam did show retinopathy    Exam did not show retinopathy         Mild       Moderate       None       Proliferative       Severe     Right Eye     Exam did show retinopathy    Exam did not show retinopathy         Mild       Moderate       None       Proliferative       Severe     Comments __________________________________________________________    Practice Providing Exam ______________________________________________    Exam Performed By (print name) _______________________________________      Provider Signature ___________________________________________________      These reports are needed for  compliance    Please fax this completed form and a copy of the Diabetic Eye Exam report to our office located at [place] [address] as soon as possible to [phone] kimberly Zuluaga: Phone [phone]    We thank you for your assistance in treating our mutual patient

## 2021-03-24 NOTE — TELEPHONE ENCOUNTER
Upon review of the In Basket request we were able to locate, review, and update the patient chart as requested for Diabetic Eye Exam     Any additional questions or concerns should be emailed to the Practice Liaisons via [email or web address] email, please do not reply via In Basket      Thank you  Manny Jacobo MA

## 2021-03-29 DIAGNOSIS — R80.9 TYPE 2 DIABETES MELLITUS WITH MICROALBUMINURIA, WITH LONG-TERM CURRENT USE OF INSULIN (HCC): ICD-10-CM

## 2021-03-29 DIAGNOSIS — I10 ESSENTIAL HYPERTENSION: ICD-10-CM

## 2021-03-29 DIAGNOSIS — Z79.4 TYPE 2 DIABETES MELLITUS WITH MICROALBUMINURIA, WITH LONG-TERM CURRENT USE OF INSULIN (HCC): ICD-10-CM

## 2021-03-29 DIAGNOSIS — E11.29 TYPE 2 DIABETES MELLITUS WITH MICROALBUMINURIA, WITH LONG-TERM CURRENT USE OF INSULIN (HCC): ICD-10-CM

## 2021-03-29 RX ORDER — HYDROCHLOROTHIAZIDE 25 MG/1
25 TABLET ORAL DAILY
COMMUNITY
End: 2021-03-29 | Stop reason: SDUPTHER

## 2021-03-29 RX ORDER — ATORVASTATIN CALCIUM 40 MG/1
40 TABLET, FILM COATED ORAL DAILY
Qty: 90 TABLET | Refills: 1 | Status: SHIPPED | OUTPATIENT
Start: 2021-03-29 | End: 2021-09-21

## 2021-03-29 RX ORDER — HYDROCHLOROTHIAZIDE 25 MG/1
25 TABLET ORAL DAILY
Qty: 90 TABLET | Refills: 1 | Status: SHIPPED | OUTPATIENT
Start: 2021-03-29 | End: 2021-09-21

## 2021-06-11 ENCOUNTER — APPOINTMENT (OUTPATIENT)
Dept: LAB | Facility: CLINIC | Age: 66
End: 2021-06-11
Payer: MEDICARE

## 2021-06-11 DIAGNOSIS — Z79.4 TYPE 2 DIABETES MELLITUS WITH MICROALBUMINURIA, WITH LONG-TERM CURRENT USE OF INSULIN (HCC): ICD-10-CM

## 2021-06-11 DIAGNOSIS — I10 ESSENTIAL HYPERTENSION: ICD-10-CM

## 2021-06-11 DIAGNOSIS — E11.29 TYPE 2 DIABETES MELLITUS WITH MICROALBUMINURIA, WITH LONG-TERM CURRENT USE OF INSULIN (HCC): ICD-10-CM

## 2021-06-11 DIAGNOSIS — E78.00 HYPERCHOLESTEREMIA: ICD-10-CM

## 2021-06-11 DIAGNOSIS — R80.9 TYPE 2 DIABETES MELLITUS WITH MICROALBUMINURIA, WITH LONG-TERM CURRENT USE OF INSULIN (HCC): ICD-10-CM

## 2021-06-11 LAB
ALBUMIN SERPL BCP-MCNC: 3.7 G/DL (ref 3.5–5)
ALP SERPL-CCNC: 40 U/L (ref 46–116)
ALT SERPL W P-5'-P-CCNC: 23 U/L (ref 12–78)
ANION GAP SERPL CALCULATED.3IONS-SCNC: 4 MMOL/L (ref 4–13)
AST SERPL W P-5'-P-CCNC: 15 U/L (ref 5–45)
BILIRUB SERPL-MCNC: 0.51 MG/DL (ref 0.2–1)
BUN SERPL-MCNC: 14 MG/DL (ref 5–25)
CALCIUM SERPL-MCNC: 10.1 MG/DL (ref 8.3–10.1)
CHLORIDE SERPL-SCNC: 101 MMOL/L (ref 100–108)
CHOLEST SERPL-MCNC: 174 MG/DL (ref 50–200)
CO2 SERPL-SCNC: 30 MMOL/L (ref 21–32)
CREAT SERPL-MCNC: 1 MG/DL (ref 0.6–1.3)
CREAT UR-MCNC: 234 MG/DL
EST. AVERAGE GLUCOSE BLD GHB EST-MCNC: 200 MG/DL
GFR SERPL CREATININE-BSD FRML MDRD: 78 ML/MIN/1.73SQ M
GLUCOSE P FAST SERPL-MCNC: 151 MG/DL (ref 65–99)
HBA1C MFR BLD: 8.6 %
HDLC SERPL-MCNC: 67 MG/DL
LDLC SERPL CALC-MCNC: 80 MG/DL (ref 0–100)
MICROALBUMIN UR-MCNC: 10 MG/L (ref 0–20)
MICROALBUMIN/CREAT 24H UR: 4 MG/G CREATININE (ref 0–30)
NONHDLC SERPL-MCNC: 107 MG/DL
POTASSIUM SERPL-SCNC: 4.2 MMOL/L (ref 3.5–5.3)
PROT SERPL-MCNC: 7.4 G/DL (ref 6.4–8.2)
SODIUM SERPL-SCNC: 135 MMOL/L (ref 136–145)
TRIGL SERPL-MCNC: 136 MG/DL

## 2021-06-11 PROCEDURE — 82043 UR ALBUMIN QUANTITATIVE: CPT | Performed by: INTERNAL MEDICINE

## 2021-06-11 PROCEDURE — 82570 ASSAY OF URINE CREATININE: CPT | Performed by: INTERNAL MEDICINE

## 2021-06-11 PROCEDURE — 36415 COLL VENOUS BLD VENIPUNCTURE: CPT

## 2021-06-11 PROCEDURE — 83036 HEMOGLOBIN GLYCOSYLATED A1C: CPT

## 2021-06-11 PROCEDURE — 80061 LIPID PANEL: CPT

## 2021-06-11 PROCEDURE — 80053 COMPREHEN METABOLIC PANEL: CPT

## 2021-07-08 ENCOUNTER — OFFICE VISIT (OUTPATIENT)
Dept: INTERNAL MEDICINE CLINIC | Facility: CLINIC | Age: 66
End: 2021-07-08
Payer: MEDICARE

## 2021-07-08 VITALS
OXYGEN SATURATION: 97 % | DIASTOLIC BLOOD PRESSURE: 100 MMHG | WEIGHT: 267 LBS | HEIGHT: 71 IN | HEART RATE: 75 BPM | SYSTOLIC BLOOD PRESSURE: 180 MMHG | BODY MASS INDEX: 37.38 KG/M2

## 2021-07-08 DIAGNOSIS — I10 ESSENTIAL HYPERTENSION: ICD-10-CM

## 2021-07-08 DIAGNOSIS — R80.9 TYPE 2 DIABETES MELLITUS WITH MICROALBUMINURIA, WITH LONG-TERM CURRENT USE OF INSULIN (HCC): ICD-10-CM

## 2021-07-08 DIAGNOSIS — E66.01 CLASS 2 SEVERE OBESITY DUE TO EXCESS CALORIES WITH SERIOUS COMORBIDITY AND BODY MASS INDEX (BMI) OF 35.0 TO 35.9 IN ADULT (HCC): ICD-10-CM

## 2021-07-08 DIAGNOSIS — E55.9 VITAMIN D DEFICIENCY: ICD-10-CM

## 2021-07-08 DIAGNOSIS — E10.9 TYPE 1 DIABETES MELLITUS WITHOUT COMPLICATION (HCC): ICD-10-CM

## 2021-07-08 DIAGNOSIS — K75.81 STEATOHEPATITIS: ICD-10-CM

## 2021-07-08 DIAGNOSIS — E78.00 HYPERCHOLESTEREMIA: ICD-10-CM

## 2021-07-08 DIAGNOSIS — R80.1 PERSISTENT PROTEINURIA: ICD-10-CM

## 2021-07-08 DIAGNOSIS — Z79.4 TYPE 2 DIABETES MELLITUS WITH MICROALBUMINURIA, WITH LONG-TERM CURRENT USE OF INSULIN (HCC): ICD-10-CM

## 2021-07-08 DIAGNOSIS — E11.29 TYPE 2 DIABETES MELLITUS WITH MICROALBUMINURIA, WITH LONG-TERM CURRENT USE OF INSULIN (HCC): ICD-10-CM

## 2021-07-08 DIAGNOSIS — J30.1 ALLERGIC RHINITIS DUE TO POLLEN, UNSPECIFIED SEASONALITY: Primary | ICD-10-CM

## 2021-07-08 PROCEDURE — 99214 OFFICE O/P EST MOD 30 MIN: CPT | Performed by: INTERNAL MEDICINE

## 2021-07-08 RX ORDER — METOPROLOL SUCCINATE 100 MG/1
100 TABLET, EXTENDED RELEASE ORAL DAILY
Qty: 90 TABLET | Refills: 1 | Status: SHIPPED | OUTPATIENT
Start: 2021-07-08 | End: 2022-07-07 | Stop reason: SDUPTHER

## 2021-07-08 RX ORDER — PEN NEEDLE, DIABETIC 32GX 5/32"
NEEDLE, DISPOSABLE MISCELLANEOUS 2 TIMES DAILY
Qty: 100 EACH | Refills: 4 | Status: SHIPPED | OUTPATIENT
Start: 2021-07-08 | End: 2021-10-15 | Stop reason: SDUPTHER

## 2021-07-08 RX ORDER — INSULIN ASPART 100 [IU]/ML
12 INJECTION, SUSPENSION SUBCUTANEOUS
Qty: 5 PEN | Refills: 5 | Status: SHIPPED | OUTPATIENT
Start: 2021-07-08 | End: 2022-06-17 | Stop reason: SDUPTHER

## 2021-07-08 RX ORDER — AMLODIPINE BESYLATE 10 MG/1
10 TABLET ORAL DAILY
Qty: 90 TABLET | Refills: 1 | Status: SHIPPED | OUTPATIENT
Start: 2021-07-08 | End: 2021-11-24 | Stop reason: SDUPTHER

## 2021-07-08 NOTE — PATIENT INSTRUCTIONS
Increase your NovoLog 147469 units in the morning 12 units in the evening  Paragraphs check with your insurance company if they would pay for Trulicity or Ozempic  Watch her diet more carefully as well as do exercise  You did very well with the sugar last year now it is going up your weight is going up hemoglobin A1c is going up  Consider going for colonoscopy as soon as possible      Hypertension( High Blood Pressure ):    Continue Home BP check daily and bring log, if you are not checking consider checking daily    Take your Blood Pressure medicine as advised    Do not take your Blood pressure medicine if systolic Blood Pressure (top number) is less than 100 or heart rate less than 60  Notify you physician  Diabetes    Check your fingerstick Accu-Chek once a day  Please check your fingerstick Accu-Chek different time of the day either at 7:00 a m  or 11:00 a m  for for p m  4 9:00 p m  Peyton Barakat Follow Diabetic 1800 calorie diet for diabetes as advised  If you would sugar less than 80 or more than 300 to please call us on next visit is day  If the sugar is less than 80 follow-up hypoglycemia instructions as advised  Take your diabetic medicine as advised  Cholesterol    Eat low cholesterol diet    Continue taking your cholesterol medicine as advised    Call if any side effects    Lipid Profile and LFT prior to next visit or as advised  ( You should Get periodically to monitor liver side effects)    KNOW YOUR DIABETIC GOAL( HBA1C AND SUGAR), BLOOD PRESSURE TARGET NUMBER AND CHOLESTEROL( LDL, HDL AND TRIGLYCERIDE)   Follow with Consultants as per their and our suggestion    Follow up in  4 and  12 week(s) or as needed earlier    Follow all instructions as advised and discussed  Take your medications as prescribed  Call the office immediately if you experience any side effects  Ask questions if you do not understand      Keep your scheduled appointment as advised or come sooner if necessary or in doubt  Best time to call for non-urgent matter or questions on weekdays is between 9am and 12 noon  See physician for any new symptoms or worsening of current symptoms  Urgent or emergent situations call 911 and report to nearest emergency room      I spent  30 -40 minutes taking care of this patient including clinical care, conseling, collaboration, chart, lab and consultaion review as appropriate    Patient is to get labs 1 week(s) prior to next visit if advised

## 2021-07-08 NOTE — PROGRESS NOTES
[facility] Office Visit Note  21     Jo Ann Jane 76 y o  male MRN: [id number]  : 1955    Assessment:     Steatohepatitis  Watch periodic LFT  The recommend weight loss  Continue statin  Type 2 diabetes mellitus, with long-term current use of insulin (HCC)    Lab Results   Component Value Date    HGBA1C 8 6 (H) 2021   Suboptimal diabetes control  Increase NovoLog [id number] units in the morning and 12 units in the evening  Recommend to think about Ozempic or Trulicity  Patient to check with his insurance  Patient to follow up with us in 4 weeks with Accu-Chek  Recommend to stay up-to-date with eye examination  Allergic rhinitis  Fair control  Allergic rhinitis fair control of symptoms    Recommend : 1  Avoidance of allergan that gives you allergic symptom  2  Saline nasal spray three times a day as needed  3  Nasal Fraser and/or Oral Medicine for allergy as advised or prescribed as appropriate        Essential hypertension  Blood pressure well controlled  Continue metoprolol  mg daily hydro chlorothiazide 25 mg daily losartan 100 mg daily amlodipine 10 mg daily  Recommend weight loss  A  Hypertension( High Blood Pressure ):    Continue Home BP check daily and bring log, if you are not checking consider checking daily    Take your Blood Pressure medicine as advised    Do not take your Blood pressure medicine if systolic Blood Pressure (top number) is less than 100 or heart rate less than 60  Notify you physician  Blood pressure is I due to he ran out of 2 blood pressure medication for a week and was not taking including home Norvasc as well as losartan    Class 2 severe obesity due to excess calories with serious comorbidity and body mass index (BMI) of 35 0 to 35 9 in adult ([facility] [address] )  BMI Counseling:       The BMI is above normal  Know Body weight goal    Weigh yourself daily or weekly as per your doctor    Nutrition recommendations include decreasing portion sizes, encouraging healthy choices of fruits and vegetables, decreasing     fast food intake, consuming healthier snacks, limiting drinks that contain sugar, moderation in carbohydrate intake, increasing     intake of lean protein, reducing intake of saturated and trans fat and reducing intake of cholesterol      Vitamin D deficiency  Continue vitamin-D 2000 unit daily    Persistent proteinuria  Continue losartan 100 mg daily       Diagnoses and all orders for this visit:    Allergic rhinitis due to pollen, unspecified seasonality    Essential hypertension  -     amLODIPine (NORVASC) 10 mg tablet; Take 1 tablet (10 mg total) by mouth daily  -     metoprolol succinate (TOPROL-XL) 100 mg 24 hr tablet; Take 1 tablet (100 mg total) by mouth daily    Type 2 diabetes mellitus with microalbuminuria, with long-term current use of insulin ([facility])  -     metFORMIN (GLUCOPHAGE) 1000 MG tablet; Take 1 tablet (1,000 mg total) by mouth 2 (two) times a day  -     insulin aspart protamine-insulin aspart (NovoLOG Mix 70/30 FlexPen) 100 Units/mL injection pen; Inject 12 Units under the skin 2 (two) times a day before meals    Type 1 diabetes mellitus without complication ([facility])  -     BD Pen Needle Katelyn U/F 32G X 4 MM MISC; Inject as directed 2 (two) times a day    Hypercholesteremia    Class 2 severe obesity due to excess calories with serious comorbidity and body mass index (BMI) of 35 0 to 35 9 in [facility])    Vitamin D deficiency    Persistent proteinuria    Steatohepatitis          Discussion Summary and Plan: Today's care plan and medications were reviewed with patient in detail and all their questions answered to their satisfaction  Chief Complaint   Patient presents with    Follow-up     no complaints    Hyperlipidemia    Diabetes    Obesity      Subjective: The patient is here for chronic disease management  PATIENT IS DOING VERY WELL  OFFERS NO SPECIFIC COMPLAINT      Diabetes:  Symptom-free Radhaan A1c 6 2- went up to 8 8   Urine for microalbumin reasonable  Currently on NovoLog 70/30 CURRENTLY ON 8 units twice a day  Has been doing exercises regularly  Remains on metformin 1000 mg twice a day  Up-to-date with eye checkup  Obesity weight coming down watching diet  Diet reviewed    Hypertension:  Symptom-free he is  on amlodipine 10 mg daily losartan 100 mg daily and metoprolol the  mg daily a  hydrochlorothiazide 12 5 mg daily  Patient ran out of the 2 blood pressure medication for a week and was not taking it  Complains recommended  He should have call us for the refill  Hyperlipidemia LDL is80 currently is on atorvastatin 40 mg daily  The PSA 1 8 11-8-2020    Eye examination of done UPTO DATE 2020    Now no other specific complaint particularly fatty liver unchanged, high allergic rhinitis reasonable  06/11/2021: How BMP sodium 135 rest BMP normal LFT normal blood sugar 151 GFR 78 hemoglobin A1c 8 6 urine for microalbumin/creatinine ratio 4 cholesterol 174, LDL 80,  A 03/16/2021:  Hemoglobin A1c 8 8, cholesterol 178 LDL 61 hepatitis C negative  06/29/2020 CMP normal except blood sugar 84, hemoglobin A1c 6 2  rest CMP and lipid profile unremarkable              The following portions of the patient's history were reviewed and updated as appropriate: allergies, current medications, past family history, past medical history, past social history, past surgical history and problem list     Review of Systems   Constitutional: Negative for chills, fatigue, fever and unexpected weight change  HENT: Negative for ear pain, hearing loss, postnasal drip, sinus pain and sore throat  Eyes: Negative for pain, redness and visual disturbance  Respiratory: Negative for cough and shortness of breath  Cardiovascular: Negative for chest pain, palpitations and leg swelling  Gastrointestinal: Negative for abdominal pain, diarrhea and nausea     Endocrine: Negative for cold intolerance, polydipsia and polyuria  Genitourinary: Negative for dysuria, frequency and urgency  Musculoskeletal: Negative for arthralgias, gait problem and myalgias  Skin: Negative for rash  Allergic/Immunologic: Negative  Neurological: Negative for dizziness, seizures, speech difficulty, numbness and headaches  Hematological: Negative for adenopathy  Psychiatric/Behavioral: Negative for behavioral problems, decreased concentration, dysphoric mood and hallucinations  The patient is not hyperactive  Historical Information   Patient Active Problem List   Diagnosis    Type 2 diabetes mellitus, with long-term current use of insulin ([address] )    Essential hypertension    Hypercholesteremia    Class 2 severe obesity due to excess calories with serious comorbidity and body mass index (BMI) of 35 0 to 35 9 in adult [facility])    Steatohepatitis    Allergic rhinitis    Vitamin D deficiency    Persistent proteinuria    Medicare annual wellness visit, initial     History reviewed  No pertinent past medical history  History reviewed  No pertinent surgical history  Social History     Substance and Sexual Activity   Alcohol Use None     Social History     Substance and Sexual Activity   Drug Use Not on file     Social History     Tobacco Use   Smoking Status Former Smoker   Smokeless Tobacco Never Used     History reviewed  No pertinent family history    Health Maintenance Due   Topic    Pneumococcal Vaccine: 65+ Years (1 of 2 - PPSV23)    COVID-19 Vaccine (1)    DTaP,Tdap,and Td Vaccines (1 - Tdap)    Colorectal Cancer Screening     Influenza Vaccine (1)    Depression Screening PHQ       Meds/Allergies       Current Outpatient Medications:     amLODIPine (NORVASC) 10 mg tablet, Take 1 tablet (10 mg total) by mouth daily, Disp: 90 tablet, Rfl: 1    ASA-APAP-Caff Buffered ([place]ish) 641-342-51 MG TABS, Take by mouth, Disp: , Rfl:     aspirin (ECOTRIN LOW STRENGTH) 81 mg EC tablet, Take 81 mg by mouth daily, Disp: , Rfl:   atorvastatin (LIPITOR) 40 mg tablet, Take 1 tablet (40 mg total) by mouth daily, Disp: 90 tablet, Rfl: 1    azelastine (ASTELIN) 0 1 % nasal spray, 1 spray into each nostril 2 (two) times a day Use in each nostril as directed, Disp: 3 Bottle, Rfl: 1    BD Pen Needle Katelyn U/F 32G X 4 MM MISC, Inject as directed 2 (two) times a day, Disp: 100 each, Rfl: 4    Cholecalciferol (VITAMIN D3) 2000 units TABS, Take 2,000 Units by mouth daily, Disp: , Rfl:     Glucose Blood (ONETOUCH ULTRA BLUE VI), 1 Units by In Vitro route 3 (three) times a day, Disp: , Rfl:     glucose blood (OneTouch Ultra) test strip, Test 3 times daily, Disp: 300 each, Rfl: 5    hydrochlorothiazide (HYDRODIURIL) 25 mg tablet, Take 1 tablet (25 mg total) by mouth daily, Disp: 90 tablet, Rfl: 1    insulin aspart protamine-insulin aspart (NovoLOG Mix 70/30 FlexPen) 100 Units/mL injection pen, Inject 12 Units under the skin 2 (two) times a day before meals, Disp: 5 pen, Rfl: 5    Krill Oil 1000 MG CAPS, Take 1 capsule by mouth daily, Disp: , Rfl:     losartan (COZAAR) 100 MG tablet, TAKE 1 TABLET BY MOUTH EVERY DAY, Disp: 90 tablet, Rfl: 1    metFORMIN (GLUCOPHAGE) 1000 MG tablet, Take 1 tablet (1,000 mg total) by mouth 2 (two) times a day, Disp: 180 tablet, Rfl: 1    metoprolol succinate (TOPROL-XL) 100 mg 24 hr tablet, Take 1 tablet (100 mg total) by mouth daily, Disp: 90 tablet, Rfl: 1    ofloxacin (FLOXIN) 0 3 % otic solution, INSTILL 1 DROP TWICE DAILY INTO EACH EAR FOR 10 DAYS, Disp: , Rfl:     Pseudoeph-HYDROcodone (PSEUDOEPHEDRINE-HYDROCODONE PO), Take 1 tablet by mouth 2 (two) times a day, Disp: , Rfl:     vitamin E, tocopherol, 400 units capsule, Take 400 Units by mouth daily, Disp: , Rfl:       Objective:    Vitals:   BP (!) 180/100   Pulse 75   Ht 5' 11" (1 803 m)   Wt 121 kg (267 lb)   SpO2 97%   BMI 37 24 kg/m²   Body mass index is 37 24 kg/m²    Vitals:    07/08/21 1146   Weight: 121 kg (267 lb)       Physical Exam  Vitals and nursing note reviewed  Constitutional:       Appearance: He is well-developed  HENT:      Head: Normocephalic and atraumatic  Right Ear: External ear normal       Left Ear: External ear normal    Eyes:      General:         Right eye: No discharge  Left eye: No discharge  Conjunctiva/sclera: Conjunctivae normal    Neck:      Thyroid: No thyromegaly  Vascular: No JVD  Cardiovascular:      Rate and Rhythm: Regular rhythm  Pulses:           Dorsalis pedis pulses are 1+ on the right side and 1+ on the left side  Posterior tibial pulses are 1+ on the right side and 1+ on the left side  Heart sounds: No murmur heard  No gallop  Pulmonary:      Effort: No respiratory distress  Breath sounds: Normal breath sounds  No wheezing or rales  Abdominal:      General: Bowel sounds are normal  There is no distension  Palpations: There is no mass  Tenderness: There is no abdominal tenderness  There is no rebound  Musculoskeletal:      Right lower leg: No edema  Feet:      Right foot:      Skin integrity: No ulcer, skin breakdown, erythema, warmth, callus or dry skin  Left foot:      Skin integrity: No ulcer, skin breakdown, erythema, warmth, callus or dry skin  Lymphadenopathy:      Cervical: No cervical adenopathy  Skin:     General: Skin is warm  Coloration: Skin is not jaundiced or pale  Findings: No rash  Neurological:      General: No focal deficit present  Mental Status: He is oriented to person, place, and time  Mental status is at baseline  Cranial Nerves: No cranial nerve deficit  Coordination: Coordination normal       Deep Tendon Reflexes: Reflexes normal    Psychiatric:         Mood and Affect: Mood normal          Behavior: Behavior normal          Thought Content:  Thought content normal          Judgment: Judgment normal          Lab Review   Appointment on 06/11/2021   Component Date Value Ref Range Status    Sodium 06/11/2021 135* 136 - 145 mmol/L Final    Potassium 06/11/2021 4 2  3 5 - 5 3 mmol/L Final    Chloride 06/11/2021 101  100 - 108 mmol/L Final    CO2 06/11/2021 30  21 - 32 mmol/L Final    ANION GAP 06/11/2021 4  4 - 13 mmol/L Final    BUN 06/11/2021 14  5 - 25 mg/dL Final    Creatinine 06/11/2021 1 00  0 60 - 1 30 mg/dL Final    Standardized to IDMS reference method    Glucose, Fasting 06/11/2021 151* 65 - 99 mg/dL Final    Specimen collection should occur prior to Sulfasalazine administration due to the potential for falsely depressed results  Specimen collection should occur prior to Sulfapyridine administration due to the potential for falsely elevated results   Calcium 06/11/2021 10 1  8 3 - 10 1 mg/dL Final    AST 06/11/2021 15  5 - 45 U/L Final    Specimen collection should occur prior to Sulfasalazine administration due to the potential for falsely depressed results   ALT 06/11/2021 23  12 - 78 U/L Final    Specimen collection should occur prior to Sulfasalazine and/or Sulfapyridine administration due to the potential for falsely depressed results   Alkaline Phosphatase 06/11/2021 40* 46 - 116 U/L Final    Total Protein 06/11/2021 7 4  6 4 - 8 2 g/dL Final    Albumin 06/11/2021 3 7  3 5 - 5 0 g/dL Final    Total Bilirubin 06/11/2021 0 51  0 20 - 1 00 mg/dL Final    Use of this assay is not recommended for patients undergoing treatment with eltrombopag due to the potential for falsely elevated results   eGFR 06/11/2021 78  ml/min/1 73sq m Final    Hemoglobin A1C 06/11/2021 8 6* Normal 3 8-5 6%; PreDiabetic 5 7-6 4%;  Diabetic >=6 5%; Glycemic control for adults with diabetes <7 0% % Final    EAG 06/11/2021 200  mg/dl Final    Cholesterol 06/11/2021 174  50 - 200 mg/dL Final    Cholesterol:       Desirable         <200 mg/dl       Borderline         200-239 mg/dl       High              >239           Triglycerides 06/11/2021 136  <=150 mg/dL Final Triglyceride:     Normal          <150 mg/dl     Borderline High 150-199 mg/dl     High            200-499 mg/dl        Very High       >499 mg/dl    Specimen collection should occur prior to N-Acetylcysteine or Metamizole administration due to the potential for falsely depressed results   HDL, Direct 06/11/2021 67  >=40 mg/dL Final    HDL Cholesterol:       Low     <41 mg/dL  Specimen collection should occur prior to Metamizole administration due to the potential for falsley depressed results   LDL Calculated 06/11/2021 80  0 - 100 mg/dL Final    LDL Cholesterol:     Optimal           <100 mg/dl     Near Optimal      100-129 mg/dl     Above Optimal       Borderline High 130-159 mg/dl       High            160-189 mg/dl       Very High       >189 mg/dl         This screening LDL is a calculated result  It does not have the accuracy of the Direct Measured LDL in the monitoring of patients with hyperlipidemia and/or statin therapy  Direct Measure LDL (YIX232) must be ordered separately in these patients   Non-HDL-Chol (CHOL-HDL) 06/11/2021 107  mg/dl Final         Patient Instructions   Increase your NovoLog [id number] units in the morning 12 units in the evening  Paragraphs check with your insurance company if they would pay for Trulicity or Ozempic  Watch her diet more carefully as well as do exercise  You did very well with the sugar last year now it is going up your weight is going up hemoglobin A1c is going up  Consider going for colonoscopy as soon as possible      Hypertension( High Blood Pressure ):    Continue Home BP check daily and bring log, if you are not checking consider checking daily    Take your Blood Pressure medicine as advised    Do not take your Blood pressure medicine if systolic Blood Pressure (top number) is less than 100 or heart rate less than 60  Notify you physician  Diabetes    Check your fingerstick Accu-Chek once a day      Please check your fingerstick Accu-Chek

## 2021-07-20 NOTE — ASSESSMENT & PLAN NOTE
Blood pressure well controlled  Continue metoprolol  mg daily hydro chlorothiazide 25 mg daily losartan 100 mg daily amlodipine 10 mg daily  Recommend weight loss  A  Hypertension( High Blood Pressure ):    Continue Home BP check daily and bring log, if you are not checking consider checking daily    Take your Blood Pressure medicine as advised    Do not take your Blood pressure medicine if systolic Blood Pressure (top number) is less than 100 or heart rate less than 60  Notify you physician        Blood pressure is I due to he ran out of 2 blood pressure medication for a week and was not taking including home Norvasc as well as losartan

## 2021-07-20 NOTE — ASSESSMENT & PLAN NOTE
Fair control  Allergic rhinitis fair control of symptoms    Recommend : 1  Avoidance of allergan that gives you allergic symptom  2  Saline nasal spray three times a day as needed  3   Nasal Filion and/or Oral Medicine for allergy as advised or prescribed as appropriate

## 2021-07-20 NOTE — ASSESSMENT & PLAN NOTE
Lab Results   Component Value Date    HGBA1C 8 6 (H) 06/11/2021   Suboptimal diabetes control  Increase NovoLog [id number] units in the morning and 12 units in the evening  Recommend to think about Ozempic or Trulicity  Patient to check with his insurance  Patient to follow up with us in 4 weeks with Accu-Chek  Recommend to stay up-to-date with eye examination

## 2021-08-13 NOTE — PROGRESS NOTES
AUDIOLOGY AUDIOMETRIC EVALUATION      Name:  Tia Wilson  :  1955  Age:  60 y o  Date of Evaluation:  18    History: Ear Infection, Tinnitus and noise exposure   Reason for visit: Tia Wilson is being seen today at the request of Dr Feroz Yoo for an evaluation of hearing  Patient reports decreased hearing over the past few months  He is being treated for allergies and middle ear issues currently  He had PE tubes placed as an adult over 15 years ago per his report  He describes a history of work and recreationally related noise exposure  EVALUATION:    Otoscopic Evaluation:   Right Ear: Minimum cerumen    Left Ear: canal clear, beige/yellow appearance to eardrum/see audio    Tympanometry:   See tracings attached  Both ears abnormal    Audiogram Results:    *see attached audiogram    RECOMMENDATIONS:  1  Return to Dr Feroz Yoo for follow-up  The results were faxed to the office 18  2  Audiological re-evaluation upon completion of otologic intervention  PATIENT EDUCATION:   Discussed results and recommendations with Mr Boykin Overall  Questions were addressed and the patient was encouraged to contact our department should concerns arise        Cathie Luz , [facility], [place]# [id number], [facility], [place]# [id number]  Clinical Audiologist 13-Aug-2021

## 2021-09-02 ENCOUNTER — OFFICE VISIT (OUTPATIENT)
Dept: INTERNAL MEDICINE CLINIC | Facility: CLINIC | Age: 66
End: 2021-09-02
Payer: MEDICARE

## 2021-09-02 VITALS
DIASTOLIC BLOOD PRESSURE: 84 MMHG | HEIGHT: 71 IN | HEART RATE: 72 BPM | SYSTOLIC BLOOD PRESSURE: 142 MMHG | OXYGEN SATURATION: 97 % | BODY MASS INDEX: 36.68 KG/M2 | WEIGHT: 262 LBS

## 2021-09-02 DIAGNOSIS — E11.29 TYPE 2 DIABETES MELLITUS WITH MICROALBUMINURIA, WITH LONG-TERM CURRENT USE OF INSULIN (HCC): ICD-10-CM

## 2021-09-02 DIAGNOSIS — R80.1 PERSISTENT PROTEINURIA: ICD-10-CM

## 2021-09-02 DIAGNOSIS — Z79.4 TYPE 2 DIABETES MELLITUS WITH MICROALBUMINURIA, WITH LONG-TERM CURRENT USE OF INSULIN (HCC): ICD-10-CM

## 2021-09-02 DIAGNOSIS — K75.81 STEATOHEPATITIS: ICD-10-CM

## 2021-09-02 DIAGNOSIS — E66.01 CLASS 2 SEVERE OBESITY DUE TO EXCESS CALORIES WITH SERIOUS COMORBIDITY AND BODY MASS INDEX (BMI) OF 35.0 TO 35.9 IN ADULT (HCC): ICD-10-CM

## 2021-09-02 DIAGNOSIS — I10 ESSENTIAL HYPERTENSION: Primary | ICD-10-CM

## 2021-09-02 DIAGNOSIS — E78.00 HYPERCHOLESTEREMIA: ICD-10-CM

## 2021-09-02 DIAGNOSIS — R80.9 TYPE 2 DIABETES MELLITUS WITH MICROALBUMINURIA, WITH LONG-TERM CURRENT USE OF INSULIN (HCC): ICD-10-CM

## 2021-09-02 PROCEDURE — 99213 OFFICE O/P EST LOW 20 MIN: CPT | Performed by: INTERNAL MEDICINE

## 2021-09-02 NOTE — PATIENT INSTRUCTIONS
Please check your blood pressure daily, check your weight daily, check your sugar daily, write down and bring report with you  Work on your [employer]  Your weight is 262 lb losing weight will help or blood pressure diabetes and a cholesterol  Hypertension( High Blood Pressure ):    Continue Home BP check daily and bring log, if you are not checking consider checking daily    Take your Blood Pressure medicine as advised    Do not take your Blood pressure medicine if systolic Blood Pressure (top number) is less than 100 or heart rate less than 60  Notify you physician  Diabetes    Check your fingerstick Accu-Chek once a day  Please check your fingerstick Accu-Chek different time of the day either at 7:00 a m  or 11:00 a m  for for p m  4 9:00 p m  Nan Rob Follow Diabetic 1800 calorie diet for diabetes as advised  If you would sugar less than 80 or more than 300 to please call us on next visit is day  If the sugar is less than 80 follow-up hypoglycemia instructions as advised  Take your diabetic medicine as advised  Cholesterol    Eat low cholesterol diet    Continue taking your cholesterol medicine as advised    Call if any side effects    Lipid Profile and LFT prior to next visit or as advised  ( You should Get periodically to monitor liver side effects)    KNOW YOUR DIABETIC GOAL( HBA1C AND SUGAR), BLOOD PRESSURE TARGET NUMBER AND CHOLESTEROL( LDL, HDL AND TRIGLYCERIDE)   BMI Counseling:       The BMI is above normal  Know Body weight goal    Weigh yourself daily or weekly as per your doctor    Nutrition recommendations include decreasing portion sizes, encouraging healthy choices of fruits and vegetables, decreasing     fast food intake, consuming healthier snacks, limiting drinks that contain sugar, moderation in carbohydrate intake, increasing     intake of lean protein, reducing intake of saturated and trans fat and reducing intake of cholesterol  Follow with Consultants as per their and our suggestion    Follow up in 2 months or as needed earlier    Follow all instructions as advised and discussed  Take your medications as prescribed  Call the office immediately if you experience any side effects  Ask questions if you do not understand  Keep your scheduled appointment as advised or come sooner if necessary or in doubt  Best time to call for non-urgent matter or questions on weekdays is between 9am and 12 noon  See physician for any new symptoms or worsening of current symptoms  Urgent or emergent situations call 911 and report to nearest emergency room      I spent  30+ minutes taking care of this patient including clinical care, conseling, collaboration, chart, lab and consultaion review as appropriate    Patient is to get labs 1 week(s) prior to next visit if advised

## 2021-09-02 NOTE — PROGRESS NOTES
[facility] Office Visit Note  21     Leopold Holler 76 y o  male MRN: [id number]  : 1955    Assessment:     1  Essential hypertension  Assessment & Plan:  imrpoving control    Continue amlodipine 10 mg daily HCTZ 25 mg daily and Metoprpolol  mg daily    Hypertension( High Blood Pressure ):    Continue Home BP check daily and bring log, if you are not checking consider checking daily    Take your Blood Pressure medicine as advised    Do not take your Blood pressure medicine if systolic Blood Pressure (top number) is less than 100 or heart rate less than 60  Notify you physician  rec weight loss    Orders:  -     Comprehensive metabolic panel; Future; Expected date: 10/02/2021  -     Hemoglobin A1C; Future; Expected date: 10/02/2021  -     Lipid panel; Future; Expected date: 10/02/2021  -     Microalbumin / creatinine urine ratio    2  Type 2 diabetes mellitus with microalbuminuria, with long-term current use of insulin (HCC)  -     Comprehensive metabolic panel; Future; Expected date: 10/02/2021  -     Hemoglobin A1C; Future; Expected date: 10/02/2021  -     Lipid panel; Future; Expected date: 10/02/2021  -     Microalbumin / creatinine urine ratio    3  Steatohepatitis    4  Hypercholesteremia  -     Comprehensive metabolic panel; Future; Expected date: 10/02/2021  -     Hemoglobin A1C; Future; Expected date: 10/02/2021  -     Lipid panel; Future; Expected date: 10/02/2021  -     Microalbumin / creatinine urine ratio    5  Class 2 severe obesity due to excess calories with serious comorbidity and body mass index (BMI) of 35 0 to 35 9 in adult (HCC)    6  Persistent proteinuria  -     Microalbumin / creatinine urine ratio        Discussion Summary and Plan: Today's care plan and medications were reviewed with patient in detail and all their questions answered to their satisfaction      Chief Complaint   Patient presents with    Hypertension    Hyperlipidemia    Diabetes    Obesity    Abnormal Lab      Subjective: The patient is here for chronic disease management  Is here for follow-up of his blood pressure  Last time his blood pressure was high as he ran out of the medication any was not taking report him back on his blood pressure medicine  PATIENT IS DOING VERY WELL  OFFERS NO SPECIFIC COMPLAINT  Diabetes:  Symptom-free Kita A1c 6 2- went up to 8 8  Urine for microalbumin reasonable  Currently on NovoLog 70/30 CURRENTLY ON 8 units twice a day  Has been doing exercises regularly  Remains on metformin 1000 mg twice a day  Up-to-date with eye checkup  Obesity weight coming down watching diet  Diet reviewed    Hypertension:  Symptom-free he is  on amlodipine 10 mg daily losartan 100 mg daily and metoprolol the  mg daily a  hydrochlorothiazide 12 5 mg daily  Hyperlipidemia LDL is80 currently is on atorvastatin 40 mg daily  The PSA 1 8 11-8-2020    Eye examination of done UPTO DATE 2020    Now no other specific complaint particularly fatty liver unchanged, high allergic rhinitis reasonable  06/11/2021: How BMP sodium 135 rest BMP normal LFT normal blood sugar 151 GFR 78 hemoglobin A1c 8 6 urine for microalbumin/creatinine ratio 4 cholesterol 174, LDL 80,  A 03/16/2021:  Hemoglobin A1c 8 8, cholesterol 178 LDL 61 hepatitis C negative  06/29/2020 CMP normal except blood sugar 84, hemoglobin A1c 6 2  rest CMP and lipid profile unremarkable                The following portions of the patient's history were reviewed and updated as appropriate: allergies, current medications, past family history, past medical history, past social history, past surgical history and problem list     Review of Systems   All other systems reviewed and are negative          Historical Information   Patient Active Problem List   Diagnosis    Type 2 diabetes mellitus, with long-term current use of insulin ([address] )    Essential hypertension    Hypercholesteremia    Class 2 severe obesity due to excess calories with serious comorbidity and body mass index (BMI) of 35 0 to 35 9 in adult [facility])    Steatohepatitis    Allergic rhinitis    Vitamin D deficiency    Persistent proteinuria    Medicare annual wellness visit, initial     History reviewed  No pertinent past medical history  History reviewed  No pertinent surgical history  Social History     Substance and Sexual Activity   Alcohol Use None     Social History     Substance and Sexual Activity   Drug Use Not on file     Social History     Tobacco Use   Smoking Status Former Smoker   Smokeless Tobacco Never Used     History reviewed  No pertinent family history    Health Maintenance Due   Topic    Pneumococcal Vaccine: 65+ Years (1 of 2 - PPSV23)    COVID-19 Vaccine (1)    DTaP,Tdap,and Td Vaccines (1 - Tdap)    Colorectal Cancer Screening     Influenza Vaccine (1)    Fall Risk     Depression Screening PHQ     Medicare Annual Wellness Visit (AWV)     HEMOGLOBIN A1C       Meds/Allergies       Current Outpatient Medications:     amLODIPine (NORVASC) 10 mg tablet, Take 1 tablet (10 mg total) by mouth daily, Disp: 90 tablet, Rfl: 1    ASA-APAP-Caff Buffered (Vanquish) 606-081-09 MG TABS, Take by mouth, Disp: , Rfl:     aspirin (ECOTRIN LOW STRENGTH) 81 mg EC tablet, Take 81 mg by mouth daily, Disp: , Rfl:     atorvastatin (LIPITOR) 40 mg tablet, Take 1 tablet (40 mg total) by mouth daily, Disp: 90 tablet, Rfl: 1    azelastine (ASTELIN) 0 1 % nasal spray, 1 spray into each nostril 2 (two) times a day Use in each nostril as directed, Disp: 3 Bottle, Rfl: 1    BD Pen Needle Katelyn U/F 32G X 4 MM MISC, Inject as directed 2 (two) times a day, Disp: 100 each, Rfl: 4    Cholecalciferol (VITAMIN D3) 2000 units TABS, Take 2,000 Units by mouth daily, Disp: , Rfl:     Glucose Blood (ONETOUCH ULTRA BLUE VI), 1 Units by In Vitro route 3 (three) times a day, Disp: , Rfl:     glucose blood (OneTouch Ultra) test strip, Test 3 times daily, Disp: 300 each, Rfl: 5    hydrochlorothiazide (HYDRODIURIL) 25 mg tablet, Take 1 tablet (25 mg total) by mouth daily, Disp: 90 tablet, Rfl: 1    insulin aspart protamine-insulin aspart (NovoLOG Mix 70/30 FlexPen) 100 Units/mL injection pen, Inject 12 Units under the skin 2 (two) times a day before meals, Disp: 5 pen, Rfl: 5    Krill Oil 1000 MG CAPS, Take 1 capsule by mouth daily, Disp: , Rfl:     losartan (COZAAR) 100 MG tablet, TAKE 1 TABLET BY MOUTH EVERY DAY, Disp: 90 tablet, Rfl: 1    metFORMIN (GLUCOPHAGE) 1000 MG tablet, Take 1 tablet (1,000 mg total) by mouth 2 (two) times a day, Disp: 180 tablet, Rfl: 1    metoprolol succinate (TOPROL-XL) 100 mg 24 hr tablet, Take 1 tablet (100 mg total) by mouth daily, Disp: 90 tablet, Rfl: 1    ofloxacin (FLOXIN) 0 3 % otic solution, INSTILL 1 DROP TWICE DAILY INTO EACH EAR FOR 10 DAYS, Disp: , Rfl:     Pseudoeph-HYDROcodone (PSEUDOEPHEDRINE-HYDROCODONE PO), Take 1 tablet by mouth 2 (two) times a day, Disp: , Rfl:     vitamin E, tocopherol, 400 units capsule, Take 400 Units by mouth daily, Disp: , Rfl:       Objective:    Vitals:   /84   Pulse 72   Ht 5' 11" (1 803 m)   Wt 119 kg (262 lb)   SpO2 97%   BMI 36 54 kg/m²   Body mass index is 36 54 kg/m²  Vitals:    09/02/21 1221   Weight: 119 kg (262 lb)       Physical Exam  Vitals and nursing note reviewed  Constitutional:       General: He is not in acute distress  Appearance: He is well-developed  He is not ill-appearing, toxic-appearing or diaphoretic  HENT:      Head: Normocephalic and atraumatic  Right Ear: External ear normal       Left Ear: External ear normal    Eyes:      General:         Right eye: No discharge  Left eye: No discharge  Conjunctiva/sclera: Conjunctivae normal    Neck:      Thyroid: No thyromegaly  Vascular: No carotid bruit or JVD  Cardiovascular:      Rate and Rhythm: Regular rhythm        Pulses:           Dorsalis pedis pulses are 1+ on the right side and 1+ on the left side  Posterior tibial pulses are 1+ on the right side and 1+ on the left side  Heart sounds: No murmur heard  No gallop  Pulmonary:      Effort: No respiratory distress  Breath sounds: Normal breath sounds  No wheezing or rales  Abdominal:      General: Bowel sounds are normal  There is no distension  Palpations: There is no mass  Tenderness: There is no abdominal tenderness  There is no rebound  Musculoskeletal:      Right lower leg: No edema  Feet:      Right foot:      Skin integrity: No ulcer, skin breakdown, erythema, warmth, callus or dry skin  Left foot:      Skin integrity: No ulcer, skin breakdown, erythema, warmth, callus or dry skin  Lymphadenopathy:      Cervical: No cervical adenopathy  Skin:     General: Skin is warm  Coloration: Skin is not jaundiced or pale  Findings: No rash  Neurological:      General: No focal deficit present  Mental Status: He is oriented to person, place, and time  Mental status is at baseline  Cranial Nerves: No cranial nerve deficit  Coordination: Coordination normal       Deep Tendon Reflexes: Reflexes normal    Psychiatric:         Mood and Affect: Mood normal          Behavior: Behavior normal          Thought Content: Thought content normal          Judgment: Judgment normal          Lab Review   No visits with results within 2 Month(s) from this visit     Latest known visit with results is:   Appointment on 06/11/2021   Component Date Value Ref Range Status    Sodium 06/11/2021 135* 136 - 145 mmol/L Final    Potassium 06/11/2021 4 2  3 5 - 5 3 mmol/L Final    Chloride 06/11/2021 101  100 - 108 mmol/L Final    CO2 06/11/2021 30  21 - 32 mmol/L Final    ANION GAP 06/11/2021 4  4 - 13 mmol/L Final    BUN 06/11/2021 14  5 - 25 mg/dL Final    Creatinine 06/11/2021 1 00  0 60 - 1 30 mg/dL Final    Standardized to IDMS reference method    Glucose, Fasting 06/11/2021 151* 65 - 99 mg/dL Final    Specimen collection should occur prior to Sulfasalazine administration due to the potential for falsely depressed results  Specimen collection should occur prior to Sulfapyridine administration due to the potential for falsely elevated results   Calcium 06/11/2021 10 1  8 3 - 10 1 mg/dL Final    AST 06/11/2021 15  5 - 45 U/L Final    Specimen collection should occur prior to Sulfasalazine administration due to the potential for falsely depressed results   ALT 06/11/2021 23  12 - 78 U/L Final    Specimen collection should occur prior to Sulfasalazine and/or Sulfapyridine administration due to the potential for falsely depressed results   Alkaline Phosphatase 06/11/2021 40* 46 - 116 U/L Final    Total Protein 06/11/2021 7 4  6 4 - 8 2 g/dL Final    Albumin 06/11/2021 3 7  3 5 - 5 0 g/dL Final    Total Bilirubin 06/11/2021 0 51  0 20 - 1 00 mg/dL Final    Use of this assay is not recommended for patients undergoing treatment with eltrombopag due to the potential for falsely elevated results   eGFR 06/11/2021 78  ml/min/1 73sq m Final    Hemoglobin A1C 06/11/2021 8 6* Normal 3 8-5 6%; PreDiabetic 5 7-6 4%; Diabetic >=6 5%; Glycemic control for adults with diabetes <7 0% % Final    EAG 06/11/2021 200  mg/dl Final    Cholesterol 06/11/2021 174  50 - 200 mg/dL Final    Cholesterol:       Desirable         <200 mg/dl       Borderline         200-239 mg/dl       High              >239           Triglycerides 06/11/2021 136  <=150 mg/dL Final    Triglyceride:     Normal          <150 mg/dl     Borderline High 150-199 mg/dl     High            200-499 mg/dl        Very High       >499 mg/dl    Specimen collection should occur prior to N-Acetylcysteine or Metamizole administration due to the potential for falsely depressed results      HDL, Direct 06/11/2021 67  >=40 mg/dL Final    HDL Cholesterol:       Low     <41 mg/dL  Specimen collection should occur prior to Metamizole administration due to the potential for falsley depressed results   LDL Calculated 06/11/2021 80  0 - 100 mg/dL Final    LDL Cholesterol:     Optimal           <100 mg/dl     Near Optimal      100-129 mg/dl     Above Optimal       Borderline High 130-159 mg/dl       High            160-189 mg/dl       Very High       >189 mg/dl         This screening LDL is a calculated result  It does not have the accuracy of the Direct Measured LDL in the monitoring of patients with hyperlipidemia and/or statin therapy  Direct Measure LDL (VKL599) must be ordered separately in these patients   Non-HDL-Chol (CHOL-HDL) 06/11/2021 107  mg/dl Final         Patient Instructions   Please check your blood pressure daily, check your weight daily, check your sugar daily, write down and bring report with you  Work on your [employer]  Your weight is 262 lb losing weight will help or blood pressure diabetes and a cholesterol  Hypertension( High Blood Pressure ):    Continue Home BP check daily and bring log, if you are not checking consider checking daily    Take your Blood Pressure medicine as advised    Do not take your Blood pressure medicine if systolic Blood Pressure (top number) is less than 100 or heart rate less than 60  Notify you physician  Diabetes    Check your fingerstick Accu-Chek once a day  Please check your fingerstick Accu-Chek different time of the day either at 7:00 a m  or 11:00 a m  for for p m  4 9:00 p m  Rosella Goldmann Follow Diabetic 1800 calorie diet for diabetes as advised  If you would sugar less than 80 or more than 300 to please call us on next visit is day  If the sugar is less than 80 follow-up hypoglycemia instructions as advised  Take your diabetic medicine as advised  Cholesterol    Eat low cholesterol diet    Continue taking your cholesterol medicine as advised    Call if any side effects    Lipid Profile and LFT prior to next visit or as advised  ( You should Get periodically to monitor liver side effects)    KNOW YOUR DIABETIC GOAL( HBA1C AND SUGAR), BLOOD PRESSURE TARGET NUMBER AND CHOLESTEROL( LDL, HDL AND TRIGLYCERIDE)   BMI Counseling: The BMI is above normal  Know Body weight goal    Weigh yourself daily or weekly as per your doctor    Nutrition recommendations include decreasing portion sizes, encouraging healthy choices of fruits and vegetables, decreasing     fast food intake, consuming healthier snacks, limiting drinks that contain sugar, moderation in carbohydrate intake, increasing     intake of lean protein, reducing intake of saturated and trans fat and reducing intake of cholesterol  Follow with Consultants as per their and our suggestion    Follow up in 2 months or as needed earlier    Follow all instructions as advised and discussed  Take your medications as prescribed  Call the office immediately if you experience any side effects  Ask questions if you do not understand  Keep your scheduled appointment as advised or come sooner if necessary or in doubt  Best time to call for non-urgent matter or questions on weekdays is between 9am and 12 noon  See physician for any new symptoms or worsening of current symptoms  Urgent or emergent situations call 911 and report to nearest emergency room  I spent  30+ minutes taking care of this patient including clinical care, conseling, collaboration, chart, lab and consultaion review as appropriate    Patient is to get labs 1 week(s) prior to next visit if advised         Dr Mary Jane Boswell MD  [facility]       "This note has been constructed using a voice recognition system  Therefore there may be syntax, spelling, and/or grammatical errors   Please call if you have any questions  "

## 2021-09-13 NOTE — ASSESSMENT & PLAN NOTE
imrpoving control    Continue amlodipine 10 mg daily HCTZ 25 mg daily and Metoprpolol  mg daily    Hypertension( High Blood Pressure ):    Continue Home BP check daily and bring log, if you are not checking consider checking daily    Take your Blood Pressure medicine as advised    Do not take your Blood pressure medicine if systolic Blood Pressure (top number) is less than 100 or heart rate less than 60  Notify you physician          rec weight loss

## 2021-10-15 DIAGNOSIS — E10.9 TYPE 1 DIABETES MELLITUS WITHOUT COMPLICATION (HCC): ICD-10-CM

## 2021-10-15 RX ORDER — PEN NEEDLE, DIABETIC 32GX 5/32"
NEEDLE, DISPOSABLE MISCELLANEOUS 2 TIMES DAILY
Qty: 100 EACH | Refills: 4 | Status: SHIPPED | OUTPATIENT
Start: 2021-10-15 | End: 2022-07-07 | Stop reason: SDUPTHER

## 2021-11-19 ENCOUNTER — APPOINTMENT (OUTPATIENT)
Dept: LAB | Facility: CLINIC | Age: 66
End: 2021-11-19

## 2021-11-19 DIAGNOSIS — E78.00 HYPERCHOLESTEREMIA: ICD-10-CM

## 2021-11-19 DIAGNOSIS — Z79.4 TYPE 2 DIABETES MELLITUS WITH MICROALBUMINURIA, WITH LONG-TERM CURRENT USE OF INSULIN (HCC): ICD-10-CM

## 2021-11-19 DIAGNOSIS — I10 ESSENTIAL HYPERTENSION: ICD-10-CM

## 2021-11-19 DIAGNOSIS — R80.9 TYPE 2 DIABETES MELLITUS WITH MICROALBUMINURIA, WITH LONG-TERM CURRENT USE OF INSULIN (HCC): ICD-10-CM

## 2021-11-19 DIAGNOSIS — E11.29 TYPE 2 DIABETES MELLITUS WITH MICROALBUMINURIA, WITH LONG-TERM CURRENT USE OF INSULIN (HCC): ICD-10-CM

## 2021-11-19 LAB
ALBUMIN SERPL BCP-MCNC: 3.7 G/DL (ref 3.5–5)
ALP SERPL-CCNC: 39 U/L (ref 46–116)
ALT SERPL W P-5'-P-CCNC: 20 U/L (ref 12–78)
ANION GAP SERPL CALCULATED.3IONS-SCNC: 5 MMOL/L (ref 4–13)
AST SERPL W P-5'-P-CCNC: 14 U/L (ref 5–45)
BILIRUB SERPL-MCNC: 0.41 MG/DL (ref 0.2–1)
BUN SERPL-MCNC: 22 MG/DL (ref 5–25)
CALCIUM SERPL-MCNC: 9.6 MG/DL (ref 8.3–10.1)
CHLORIDE SERPL-SCNC: 105 MMOL/L (ref 100–108)
CHOLEST SERPL-MCNC: 165 MG/DL
CO2 SERPL-SCNC: 28 MMOL/L (ref 21–32)
CREAT SERPL-MCNC: 1.07 MG/DL (ref 0.6–1.3)
CREAT UR-MCNC: 80.1 MG/DL
EST. AVERAGE GLUCOSE BLD GHB EST-MCNC: 146 MG/DL
GFR SERPL CREATININE-BSD FRML MDRD: 71 ML/MIN/1.73SQ M
GLUCOSE P FAST SERPL-MCNC: 109 MG/DL (ref 65–99)
HBA1C MFR BLD: 6.7 %
HDLC SERPL-MCNC: 84 MG/DL
LDLC SERPL CALC-MCNC: 59 MG/DL (ref 0–100)
MICROALBUMIN UR-MCNC: 55 MG/L (ref 0–20)
MICROALBUMIN/CREAT 24H UR: 69 MG/G CREATININE (ref 0–30)
NONHDLC SERPL-MCNC: 81 MG/DL
POTASSIUM SERPL-SCNC: 3.9 MMOL/L (ref 3.5–5.3)
PROT SERPL-MCNC: 7.4 G/DL (ref 6.4–8.2)
SODIUM SERPL-SCNC: 138 MMOL/L (ref 136–145)
TRIGL SERPL-MCNC: 109 MG/DL

## 2021-11-19 PROCEDURE — 80053 COMPREHEN METABOLIC PANEL: CPT

## 2021-11-19 PROCEDURE — 82570 ASSAY OF URINE CREATININE: CPT | Performed by: INTERNAL MEDICINE

## 2021-11-19 PROCEDURE — 36415 COLL VENOUS BLD VENIPUNCTURE: CPT

## 2021-11-19 PROCEDURE — 82043 UR ALBUMIN QUANTITATIVE: CPT | Performed by: INTERNAL MEDICINE

## 2021-11-19 PROCEDURE — 80061 LIPID PANEL: CPT

## 2021-11-19 PROCEDURE — 83036 HEMOGLOBIN GLYCOSYLATED A1C: CPT

## 2021-11-24 ENCOUNTER — TELEPHONE (OUTPATIENT)
Dept: INTERNAL MEDICINE CLINIC | Facility: CLINIC | Age: 66
End: 2021-11-24

## 2021-11-24 PROBLEM — R00.0 TACHYCARDIA: Status: ACTIVE | Noted: 2021-11-24

## 2021-11-26 ENCOUNTER — APPOINTMENT (OUTPATIENT)
Dept: LAB | Facility: CLINIC | Age: 66
End: 2021-11-26

## 2021-11-26 DIAGNOSIS — R00.0 TACHYCARDIA: ICD-10-CM

## 2021-11-26 DIAGNOSIS — I10 ESSENTIAL HYPERTENSION: ICD-10-CM

## 2021-11-26 LAB
D DIMER PPP FEU-MCNC: 0.41 UG/ML FEU
ERYTHROCYTE [DISTWIDTH] IN BLOOD BY AUTOMATED COUNT: 14 % (ref 11.6–15.1)
ERYTHROCYTE [SEDIMENTATION RATE] IN BLOOD: 18 MM/HOUR (ref 0–19)
HCT VFR BLD AUTO: 37.5 % (ref 36.5–49.3)
HGB BLD-MCNC: 12.6 G/DL (ref 12–17)
MCH RBC QN AUTO: 31.7 PG (ref 26.8–34.3)
MCHC RBC AUTO-ENTMCNC: 33.6 G/DL (ref 31.4–37.4)
MCV RBC AUTO: 95 FL (ref 82–98)
PLATELET # BLD AUTO: 211 THOUSANDS/UL (ref 149–390)
PMV BLD AUTO: 8.7 FL (ref 8.9–12.7)
RBC # BLD AUTO: 3.97 MILLION/UL (ref 3.88–5.62)
T4 FREE SERPL-MCNC: 0.93 NG/DL (ref 0.76–1.46)
TSH SERPL DL<=0.05 MIU/L-ACNC: 1.9 UIU/ML (ref 0.36–3.74)
WBC # BLD AUTO: 8.55 THOUSAND/UL (ref 4.31–10.16)

## 2021-11-26 PROCEDURE — 84443 ASSAY THYROID STIM HORMONE: CPT

## 2021-11-26 PROCEDURE — 36415 COLL VENOUS BLD VENIPUNCTURE: CPT

## 2021-11-26 PROCEDURE — 85379 FIBRIN DEGRADATION QUANT: CPT

## 2021-11-26 PROCEDURE — 85027 COMPLETE CBC AUTOMATED: CPT

## 2021-11-26 PROCEDURE — 84439 ASSAY OF FREE THYROXINE: CPT

## 2021-11-26 PROCEDURE — 85652 RBC SED RATE AUTOMATED: CPT

## 2022-04-06 ENCOUNTER — TELEPHONE (OUTPATIENT)
Dept: INTERNAL MEDICINE CLINIC | Facility: CLINIC | Age: 67
End: 2022-04-06

## 2022-04-28 DIAGNOSIS — R00.0 TACHYCARDIA: ICD-10-CM

## 2022-04-28 DIAGNOSIS — I10 ESSENTIAL HYPERTENSION: ICD-10-CM

## 2022-04-29 RX ORDER — METOPROLOL SUCCINATE 50 MG/1
TABLET, EXTENDED RELEASE ORAL
Qty: 90 TABLET | Refills: 0 | Status: SHIPPED | OUTPATIENT
Start: 2022-04-29 | End: 2022-07-07 | Stop reason: SDUPTHER

## 2022-06-17 DIAGNOSIS — Z12.5 SCREENING FOR PROSTATE CANCER: ICD-10-CM

## 2022-06-17 DIAGNOSIS — Z79.4 TYPE 2 DIABETES MELLITUS WITH MICROALBUMINURIA, WITH LONG-TERM CURRENT USE OF INSULIN (HCC): Primary | ICD-10-CM

## 2022-06-17 DIAGNOSIS — Z79.4 TYPE 2 DIABETES MELLITUS WITH MICROALBUMINURIA, WITH LONG-TERM CURRENT USE OF INSULIN (HCC): ICD-10-CM

## 2022-06-17 DIAGNOSIS — E78.00 HYPERCHOLESTEREMIA: ICD-10-CM

## 2022-06-17 DIAGNOSIS — R80.9 TYPE 2 DIABETES MELLITUS WITH MICROALBUMINURIA, WITH LONG-TERM CURRENT USE OF INSULIN (HCC): Primary | ICD-10-CM

## 2022-06-17 DIAGNOSIS — R80.9 TYPE 2 DIABETES MELLITUS WITH MICROALBUMINURIA, WITH LONG-TERM CURRENT USE OF INSULIN (HCC): ICD-10-CM

## 2022-06-17 DIAGNOSIS — E11.29 TYPE 2 DIABETES MELLITUS WITH MICROALBUMINURIA, WITH LONG-TERM CURRENT USE OF INSULIN (HCC): Primary | ICD-10-CM

## 2022-06-17 DIAGNOSIS — E11.29 TYPE 2 DIABETES MELLITUS WITH MICROALBUMINURIA, WITH LONG-TERM CURRENT USE OF INSULIN (HCC): ICD-10-CM

## 2022-06-17 DIAGNOSIS — I10 ESSENTIAL HYPERTENSION: ICD-10-CM

## 2022-06-17 RX ORDER — INSULIN ASPART 100 [IU]/ML
12 INJECTION, SUSPENSION SUBCUTANEOUS
Qty: 15 ML | Refills: 1 | Status: SHIPPED | OUTPATIENT
Start: 2022-06-17

## 2022-07-01 ENCOUNTER — APPOINTMENT (OUTPATIENT)
Dept: LAB | Facility: CLINIC | Age: 67
End: 2022-07-01
Payer: MEDICARE

## 2022-07-01 LAB
CREAT UR-MCNC: 96.9 MG/DL
MICROALBUMIN UR-MCNC: 65.1 MG/L (ref 0–20)
MICROALBUMIN/CREAT 24H UR: 67 MG/G CREATININE (ref 0–30)

## 2022-07-01 PROCEDURE — 82570 ASSAY OF URINE CREATININE: CPT

## 2022-07-01 PROCEDURE — 82043 UR ALBUMIN QUANTITATIVE: CPT

## 2022-07-07 ENCOUNTER — OFFICE VISIT (OUTPATIENT)
Dept: INTERNAL MEDICINE CLINIC | Facility: CLINIC | Age: 67
End: 2022-07-07
Payer: MEDICARE

## 2022-07-07 VITALS
BODY MASS INDEX: 40.6 KG/M2 | HEART RATE: 80 BPM | WEIGHT: 290 LBS | SYSTOLIC BLOOD PRESSURE: 144 MMHG | OXYGEN SATURATION: 96 % | DIASTOLIC BLOOD PRESSURE: 84 MMHG | HEIGHT: 71 IN

## 2022-07-07 DIAGNOSIS — R00.0 TACHYCARDIA: ICD-10-CM

## 2022-07-07 DIAGNOSIS — E10.9 TYPE 1 DIABETES MELLITUS WITHOUT COMPLICATION (HCC): ICD-10-CM

## 2022-07-07 DIAGNOSIS — K75.81 STEATOHEPATITIS: ICD-10-CM

## 2022-07-07 DIAGNOSIS — Z00.00 MEDICARE ANNUAL WELLNESS VISIT, SUBSEQUENT: ICD-10-CM

## 2022-07-07 DIAGNOSIS — R80.1 PERSISTENT PROTEINURIA: ICD-10-CM

## 2022-07-07 DIAGNOSIS — I10 ESSENTIAL HYPERTENSION: ICD-10-CM

## 2022-07-07 DIAGNOSIS — J30.1 ALLERGIC RHINITIS DUE TO POLLEN, UNSPECIFIED SEASONALITY: ICD-10-CM

## 2022-07-07 DIAGNOSIS — E55.9 VITAMIN D DEFICIENCY: ICD-10-CM

## 2022-07-07 DIAGNOSIS — Z79.4 TYPE 2 DIABETES MELLITUS WITH MICROALBUMINURIA, WITH LONG-TERM CURRENT USE OF INSULIN (HCC): Primary | ICD-10-CM

## 2022-07-07 DIAGNOSIS — R80.9 TYPE 2 DIABETES MELLITUS WITH MICROALBUMINURIA, WITH LONG-TERM CURRENT USE OF INSULIN (HCC): Primary | ICD-10-CM

## 2022-07-07 DIAGNOSIS — E11.29 TYPE 2 DIABETES MELLITUS WITH MICROALBUMINURIA, WITH LONG-TERM CURRENT USE OF INSULIN (HCC): Primary | ICD-10-CM

## 2022-07-07 DIAGNOSIS — E78.00 HYPERCHOLESTEREMIA: ICD-10-CM

## 2022-07-07 DIAGNOSIS — E66.01 CLASS 2 SEVERE OBESITY DUE TO EXCESS CALORIES WITH SERIOUS COMORBIDITY AND BODY MASS INDEX (BMI) OF 35.0 TO 35.9 IN ADULT (HCC): ICD-10-CM

## 2022-07-07 DIAGNOSIS — N18.30 STAGE 3 CHRONIC KIDNEY DISEASE, UNSPECIFIED WHETHER STAGE 3A OR 3B CKD (HCC): ICD-10-CM

## 2022-07-07 DIAGNOSIS — Z00.00 MEDICARE ANNUAL WELLNESS VISIT, INITIAL: ICD-10-CM

## 2022-07-07 PROCEDURE — 99214 OFFICE O/P EST MOD 30 MIN: CPT | Performed by: INTERNAL MEDICINE

## 2022-07-07 PROCEDURE — G0439 PPPS, SUBSEQ VISIT: HCPCS | Performed by: INTERNAL MEDICINE

## 2022-07-07 PROCEDURE — 1123F ACP DISCUSS/DSCN MKR DOCD: CPT | Performed by: INTERNAL MEDICINE

## 2022-07-07 RX ORDER — METOPROLOL SUCCINATE 100 MG/1
100 TABLET, EXTENDED RELEASE ORAL DAILY
Qty: 90 TABLET | Refills: 1 | Status: SHIPPED | OUTPATIENT
Start: 2022-07-07 | End: 2022-10-13 | Stop reason: DRUGHIGH

## 2022-07-07 RX ORDER — PEN NEEDLE, DIABETIC 32GX 5/32"
NEEDLE, DISPOSABLE MISCELLANEOUS 2 TIMES DAILY
Qty: 100 EACH | Refills: 4 | Status: SHIPPED | OUTPATIENT
Start: 2022-07-07 | End: 2022-08-26 | Stop reason: SDUPTHER

## 2022-07-07 RX ORDER — LOSARTAN POTASSIUM 100 MG/1
100 TABLET ORAL DAILY
Qty: 90 TABLET | Refills: 0 | Status: SHIPPED | OUTPATIENT
Start: 2022-07-07 | End: 2022-10-07

## 2022-07-07 RX ORDER — HYDROCHLOROTHIAZIDE 25 MG/1
25 TABLET ORAL DAILY
Qty: 90 TABLET | Refills: 0 | Status: SHIPPED | OUTPATIENT
Start: 2022-07-07 | End: 2022-10-06

## 2022-07-07 RX ORDER — METOPROLOL SUCCINATE 50 MG/1
50 TABLET, EXTENDED RELEASE ORAL DAILY
Qty: 90 TABLET | Refills: 0 | Status: SHIPPED | OUTPATIENT
Start: 2022-07-07 | End: 2022-10-02

## 2022-07-07 RX ORDER — AMLODIPINE BESYLATE 10 MG/1
10 TABLET ORAL DAILY
Qty: 90 TABLET | Refills: 0 | Status: SHIPPED | OUTPATIENT
Start: 2022-07-07 | End: 2022-10-06

## 2022-07-07 RX ORDER — AZELASTINE 1 MG/ML
1 SPRAY, METERED NASAL 2 TIMES DAILY
Qty: 30 ML | Refills: 1 | Status: SHIPPED | OUTPATIENT
Start: 2022-07-07

## 2022-07-07 RX ORDER — ATORVASTATIN CALCIUM 40 MG/1
40 TABLET, FILM COATED ORAL DAILY
Qty: 90 TABLET | Refills: 0 | Status: SHIPPED | OUTPATIENT
Start: 2022-07-07 | End: 2022-10-06

## 2022-07-07 NOTE — ASSESSMENT & PLAN NOTE
Lab Results   Component Value Date    HGBA1C 7 8 (H) 07/01/2022   Increase your NovoLog 70/30   15 units in the morning and 15 units in the evening instead of 12 units in the morning and 12 units in the evening

## 2022-07-07 NOTE — PATIENT INSTRUCTIONS
Increase your NovoLog 70/30   15 units in the morning and 15 units in the evening instead of 12 units in the morning and 12 units in the evening  Increase your fluid intake and water drinking  Of we will recheck your kidney functions and calcium back in 4 weeks  We will also recheck your diabetes check cholesterol and routine blood test for in 3 months  Consider losing weight you gain 30 lb weight that probably is contributing to your uncontrolled diabetes     Follow with Consultants as per their and our suggestion    Follow up in 12 week(s) or as needed earlier    Follow all instructions as advised and discussed  Take your medications as prescribed  Call the office immediately if you experience any side effects  Ask questions if you do not understand  Keep your scheduled appointment as advised or come sooner if necessary or in doubt  Best time to call for non-urgent matter or questions on weekdays is between 9am and 12 noon  See physician for any new symptoms or worsening of current symptoms  Urgent or emergent situations call 911 and report to nearest emergency room  I spent  30 -40 minutes taking care of this patient including clinical care, conseling, collaboration, chart, lab and consultaion review as appropriate    Patient is to get labs 1 week(s) prior to next visit if advised        Foot Care for People with Diabetes   AMBULATORY CARE:   What you need to know about foot care:   · Foot care helps protect your feet and prevent foot ulcers or sores  Long-term high blood sugar levels can damage the blood vessels and nerves in your legs and feet  This damage makes it hard to feel pressure, pain, temperature, and touch  You may not be able to feel a cut or sore, or shoes that are too tight  Foot care is needed to prevent serious problems, such as an infection or amputation  · Diabetes may cause your toes to become crooked or curved under   These changes may affect the way you walk and can lead to increased pressure on your foot  The pressure can decrease blood flow to your feet  Lack of blood flow increases your risk for a foot ulcer  Do not ignore small problems, such as dry skin or small wounds  These can become life-threatening over time without proper care  Call your care team provider if:   · Your feet become numb, weak, or hard to move  · You have pus draining from a sore on your foot  · You have a wound on your foot that gets bigger, deeper, or does not heal      · You see blisters, cuts, scratches, calluses, or sores on your foot  · You have a fever, and your feet become red, warm, and swollen  · Your toenails become thick, curled, or yellow  · You find it hard to check your feet because your vision is poor  · You have questions or concerns about your condition or care  How to care for your feet:   · Check your feet each day  Look at your whole foot, including the bottom, and between and under your toes  Check for wounds, corns, and calluses  Use a mirror to see the bottom of your feet  The skin on your feet may be shiny, tight, or darker than normal  Your feet may also be cold and pale  Feel your feet by running your hands along the tops, bottoms, sides, and between your toes  Redness, swelling, and warmth are signs of blood flow problems that can lead to a foot ulcer  Do not try to remove corns or calluses yourself  · Wash your feet each day with soap and warm water  Do not use hot water, because this can injure your foot  Dry your feet gently with a towel after you wash them  Dry between and under your toes  · Apply lotion or a moisturizer on your dry feet  Ask your care team provider what lotions are best to use  Do not put lotion or moisturizer between your toes  Moisture between your toes could lead to skin breakdown  · Cut your toenails correctly  File or cut your toenails straight across   Use a soft brush to clean around your toenails  If your toenails are very thick, you may need to have a care team provider or specialist cut them  · Protect your feet  Do not walk barefoot or wear your shoes without socks  Check your shoes for rocks or other objects that can hurt your feet  Wear cotton socks to help keep your feet dry  Wear socks without toe seams, or wear them with the seams inside out  Change your socks each day  Do not wear socks that are dirty or damp  · Wear shoes that fit well  Wear shoes that do not rub against any area of your feet  Your shoes should be ½ to ¾ inch (1 to 2 centimeters) longer than your feet  Your shoes should also have extra space around the widest part of your feet  Walking or athletic shoes with laces or straps that adjust are best  Ask your care team provider for help to choose shoes that fit you best  Ask him or her if you need to wear an insert, orthotic, or bandage on your feet  · Go to your follow-up visits  Your care team provider will do a foot exam at least once a year  You may need a foot exam more often if you have nerve damage, foot deformities, or ulcers  He will check for nerve damage and how well you can feel your feet  He will check your shoes to see if they fit well  · Do not smoke  Smoking can damage your blood vessels and put you at increased risk for foot ulcers  Ask your care team provider for information if you currently smoke and need help to quit  E-cigarettes or smokeless tobacco still contain nicotine  Talk to your care team provider before you use these products  Follow up with your diabetes care team provider or foot specialist as directed: You will need to have your feet checked at least once a year  You may need a foot exam more often if you have nerve damage, foot deformities, or ulcers  Write down your questions so you remember to ask them during your visits    © Copyright [employer] 2022 Information is for End User's use only and may not be sold, redistributed or otherwise used for commercial purposes  All illustrations and images included in CareNotes® are the copyrighted property of [employer]  or Leslie Hernandez  The above information is an  only  It is not intended as medical advice for individual conditions or treatments  Talk to your doctor, nurse or pharmacist before following any medical regimen to see if it is safe and effective for you  Medicare Preventive Visit Patient Instructions  Thank you for completing your Welcome to Medicare Visit or Medicare Annual Wellness Visit today  Your next wellness visit will be due in one year (7/8/2023)  The screening/preventive services that you may require over the next 5-10 years are detailed below  Some tests may not apply to you based off risk factors and/or age  Screening tests ordered at today's visit but not completed yet may show as past due  Also, please note that scanned in results may not display below  Preventive Screenings:  Service Recommendations Previous Testing/Comments   Colorectal Cancer Screening  · Colonoscopy    · Fecal Occult Blood Test (FOBT)/Fecal Immunochemical Test (FIT)  · Fecal DNA/Cologuard Test  · Flexible Sigmoidoscopy Age: 54-65 years old   Colonoscopy: every 10 years (May be performed more frequently if at higher risk)  OR  FOBT/FIT: every 1 year  OR  Cologuard: every 3 years  OR  Sigmoidoscopy: every 5 years  Screening may be recommended earlier than age 48 if at higher risk for colorectal cancer  Also, an individualized decision between you and your healthcare provider will decide whether screening between the ages of 74-80 would be appropriate   Colonoscopy: Not on file  FOBT/FIT: Not on file  Cologuard: Not on file  Sigmoidoscopy: Not on file          Prostate Cancer Screening Individualized decision between patient and health care provider in men between ages of 53-78   Medicare will cover every 12 months beginning on the day after your 50th birthday PSA: 2 6 ng/mL           Hepatitis C Screening Once for adults born between 1945 and 1965  More frequently in patients at high risk for Hepatitis C Hep C Antibody: 03/16/2021        Diabetes Screening 1-2 times per year if you're at risk for diabetes or have pre-diabetes Fasting glucose: 170 mg/dL   A1C: 7 8 %        Cholesterol Screening Once every 5 years if you don't have a lipid disorder  May order more often based on risk factors  Lipid panel: 07/01/2022           Other Preventive Screenings Covered by Medicare:  1  Abdominal Aortic Aneurysm (AAA) Screening: covered once if your at risk  You're considered to be at risk if you have a family history of AAA or a male between the age of 73-68 who smoking at least 100 cigarettes in your lifetime  2  Lung Cancer Screening: covers low dose CT scan once per year if you meet all of the following conditions: (1) Age 50-69; (2) No signs or symptoms of lung cancer; (3) Current smoker or have quit smoking within the last 15 years; (4) You have a tobacco smoking history of at least 30 pack years (packs per day x number of years you smoked); (5) You get a written order from a healthcare provider  3  Glaucoma Screening: covered annually if you're considered high risk: (1) You have diabetes OR (2) Family history of glaucoma OR (3)  aged 48 and older OR (3)  American aged 72 and older  3  Osteoporosis Screening: covered every 2 years if you meet one of the following conditions: (1) Have a vertebral abnormality; (2) On glucocorticoid therapy for more than 3 months; (3) Have primary hyperparathyroidism; (4) On osteoporosis medications and need to assess response to drug therapy  5  HIV Screening: covered annually if you're between the age of 12-76  Also covered annually if you are younger than 13 and older than 72 with risk factors for HIV infection  For pregnant patients, it is covered up to 3 times per pregnancy      Immunizations:  Immunization Recommendations   Influenza Vaccine Annual influenza vaccination during flu season is recommended for all persons aged >= 6 months who do not have contraindications   Pneumococcal Vaccine (Prevnar and Pneumovax)  * Prevnar = PCV13  * Pneumovax = PPSV23 Adults 25-60 years old: 1-3 doses may be recommended based on certain risk factors  Adults 72 years old: Prevnar (PCV13) vaccine recommended followed by Pneumovax (PPSV23) vaccine  If already received PPSV23 since turning 65, then PCV13 recommended at least one year after PPSV23 dose  Hepatitis B Vaccine 3 dose series if at intermediate or high risk (ex: diabetes, end stage renal disease, liver disease)   Tetanus (Td) Vaccine - COST NOT COVERED BY MEDICARE PART B Following completion of primary series, a booster dose should be given every 10 years to maintain immunity against tetanus  Td may also be given as tetanus wound prophylaxis  Tdap Vaccine - COST NOT COVERED BY MEDICARE PART B Recommended at least once for all adults  For pregnant patients, recommended with each pregnancy  Shingles Vaccine (Shingrix) - COST NOT COVERED BY MEDICARE PART B  2 shot series recommended in those aged 48 and above     Health Maintenance Due:      Topic Date Due    Colorectal Cancer Screening  Never done    Hepatitis C Screening  Completed     Immunizations Due:      Topic Date Due    COVID-19 Vaccine (1) Never done    Pneumococcal Vaccine: 65+ Years (1 - PCV) Never done    DTaP,Tdap,and Td Vaccines (1 - Tdap) Never done    Influenza Vaccine (1) 09/01/2022     Advance Directives   What are advance directives? Advance directives are legal documents that state your wishes and plans for medical care  These plans are made ahead of time in case you lose your ability to make decisions for yourself  Advance directives can apply to any medical decision, such as the treatments you want, and if you want to donate organs  What are the types of advance directives?   There are many types of advance directives, and each state has rules about how to use them  You may choose a combination of any of the following:  · Living will: This is a written record of the treatment you want  You can also choose which treatments you do not want, which to limit, and which to stop at a certain time  This includes surgery, medicine, IV fluid, and tube feedings  · Durable power of  for healthcare [facility]): This is a written record that states who you want to make healthcare choices for you when you are unable to make them for yourself  This person, called a proxy, is usually a family member or a friend  You may choose more than 1 proxy  · Do not resuscitate (DNR) order:  A DNR order is used in case your heart stops beating or you stop breathing  It is a request not to have certain forms of treatment, such as CPR  A DNR order may be included in other types of advance directives  · Medical directive: This covers the care that you want if you are in a coma, near death, or unable to make decisions for yourself  You can list the treatments you want for each condition  Treatment may include pain medicine, surgery, blood transfusions, dialysis, IV or tube feedings, and a ventilator (breathing machine)  · Values history: This document has questions about your views, beliefs, and how you feel and think about life  This information can help others choose the care that you would choose  Why are advance directives important? An advance directive helps you control your care  Although spoken wishes may be used, it is better to have your wishes written down  Spoken wishes can be misunderstood, or not followed  Treatments may be given even if you do not want them  An advance directive may make it easier for your family to make difficult choices about your care     Weight Management   Why it is important to manage your weight:  Being overweight increases your risk of health conditions such as heart disease, high blood pressure, type 2 diabetes, and certain types of cancer  It can also increase your risk for osteoarthritis, sleep apnea, and other respiratory problems  Aim for a slow, steady weight loss  Even a small amount of weight loss can lower your risk of health problems  How to lose weight safely:  A safe and healthy way to lose weight is to eat fewer calories and get regular exercise  You can lose up about 1 pound a week by decreasing the number of calories you eat by 500 calories each day  Healthy meal plan for weight management:  A healthy meal plan includes a variety of foods, contains fewer calories, and helps you stay healthy  A healthy meal plan includes the following:  · Eat whole-grain foods more often  A healthy meal plan should contain fiber  Fiber is the part of grains, fruits, and vegetables that is not broken down by your body  Whole-grain foods are healthy and provide extra fiber in your diet  Some examples of whole-grain foods are whole-wheat breads and pastas, oatmeal, brown rice, and bulgur  · Eat a variety of vegetables every day  Include dark, leafy greens such as spinach, kale, baldev greens, and mustard greens  Eat yellow and orange vegetables such as carrots, sweet potatoes, and winter squash  · Eat a variety of fruits every day  Choose fresh or canned fruit (canned in its own juice or light syrup) instead of juice  Fruit juice has very little or no fiber  · Eat low-fat dairy foods  Drink fat-free (skim) milk or 1% milk  Eat fat-free yogurt and low-fat cottage cheese  Try low-fat cheeses such as mozzarella and other reduced-fat cheeses  · Choose meat and other protein foods that are low in fat  Choose beans or other legumes such as split peas or lentils  Choose fish, skinless poultry (chicken or turkey), or lean cuts of red meat (beef or pork)  Before you cook meat or poultry, cut off any visible fat  · Use less fat and oil  Try baking foods instead of frying them   Add less fat, such as margarine, sour cream, regular salad dressing and mayonnaise to foods  Eat fewer high-fat foods  Some examples of high-fat foods include french fries, doughnuts, ice cream, and cakes  · Eat fewer sweets  Limit foods and drinks that are high in sugar  This includes candy, cookies, regular soda, and sweetened drinks  Exercise:  Exercise at least 30 minutes per day on most days of the week  Some examples of exercise include walking, biking, dancing, and swimming  You can also fit in more physical activity by taking the stairs instead of the elevator or parking farther away from stores  Ask your healthcare provider about the best exercise plan for you  © Copyright [employer] 2018 Information is for End User's use only and may not be sold, redistributed or otherwise used for commercial purposes  All illustrations and images included in CareNotes® are the copyrighted property of [employer]  or [facility] Preventive Visit Patient Instructions  Thank you for completing your Welcome to Medicare Visit or Medicare Annual Wellness Visit today  Your next wellness visit will be due in one year (7/8/2023)  The screening/preventive services that you may require over the next 5-10 years are detailed below  Some tests may not apply to you based off risk factors and/or age  Screening tests ordered at today's visit but not completed yet may show as past due  Also, please note that scanned in results may not display below    Preventive Screenings:  Service Recommendations Previous Testing/Comments   Colorectal Cancer Screening  · Colonoscopy    · Fecal Occult Blood Test (FOBT)/Fecal Immunochemical Test (FIT)  · Fecal DNA/Cologuard Test  · Flexible Sigmoidoscopy Age: 54-65 years old   Colonoscopy: every 10 years (May be performed more frequently if at higher risk)  OR  FOBT/FIT: every 1 year  OR  Cologuard: every 3 years  OR  Sigmoidoscopy: every 5 years  Screening may be recommended earlier than age 48 if at higher risk for colorectal cancer  Also, an individualized decision between you and your healthcare provider will decide whether screening between the ages of 74-80 would be appropriate  Colonoscopy: Not on file  FOBT/FIT: Not on file  Cologuard: Not on file  Sigmoidoscopy: Not on file          Prostate Cancer Screening Individualized decision between patient and health care provider in men between ages of 53-78   Medicare will cover every 12 months beginning on the day after your 50th birthday PSA: 2 6 ng/mL           Hepatitis C Screening Once for adults born between 1945 and 1965  More frequently in patients at high risk for Hepatitis C Hep C Antibody: 03/16/2021        Diabetes Screening 1-2 times per year if you're at risk for diabetes or have pre-diabetes Fasting glucose: 170 mg/dL   A1C: 7 8 %        Cholesterol Screening Once every 5 years if you don't have a lipid disorder  May order more often based on risk factors  Lipid panel: 07/01/2022           Other Preventive Screenings Covered by Medicare:  6  Abdominal Aortic Aneurysm (AAA) Screening: covered once if your at risk  You're considered to be at risk if you have a family history of AAA or a male between the age of 73-68 who smoking at least 100 cigarettes in your lifetime  7  Lung Cancer Screening: covers low dose CT scan once per year if you meet all of the following conditions: (1) Age 50-69; (2) No signs or symptoms of lung cancer; (3) Current smoker or have quit smoking within the last 15 years; (4) You have a tobacco smoking history of at least 30 pack years (packs per day x number of years you smoked); (5) You get a written order from a healthcare provider  8  Glaucoma Screening: covered annually if you're considered high risk: (1) You have diabetes OR (2) Family history of glaucoma OR (3)  aged 48 and older OR (3)  American aged 72 and older  5   Osteoporosis Screening: covered every 2 years if you meet one of the following conditions: (1) Have a vertebral abnormality; (2) On glucocorticoid therapy for more than 3 months; (3) Have primary hyperparathyroidism; (4) On osteoporosis medications and need to assess response to drug therapy  10  HIV Screening: covered annually if you're between the age of 12-76  Also covered annually if you are younger than 13 and older than 72 with risk factors for HIV infection  For pregnant patients, it is covered up to 3 times per pregnancy  Immunizations:  Immunization Recommendations   Influenza Vaccine Annual influenza vaccination during flu season is recommended for all persons aged >= 6 months who do not have contraindications   Pneumococcal Vaccine (Prevnar and Pneumovax)  * Prevnar = PCV13  * Pneumovax = PPSV23 Adults 25-60 years old: 1-3 doses may be recommended based on certain risk factors  Adults 72 years old: Prevnar (PCV13) vaccine recommended followed by Pneumovax (PPSV23) vaccine  If already received PPSV23 since turning 65, then PCV13 recommended at least one year after PPSV23 dose  Hepatitis B Vaccine 3 dose series if at intermediate or high risk (ex: diabetes, end stage renal disease, liver disease)   Tetanus (Td) Vaccine - COST NOT COVERED BY MEDICARE PART B Following completion of primary series, a booster dose should be given every 10 years to maintain immunity against tetanus  Td may also be given as tetanus wound prophylaxis  Tdap Vaccine - COST NOT COVERED BY MEDICARE PART B Recommended at least once for all adults  For pregnant patients, recommended with each pregnancy     Shingles Vaccine (Shingrix) - COST NOT COVERED BY MEDICARE PART B  2 shot series recommended in those aged 48 and above     Health Maintenance Due:      Topic Date Due    Colorectal Cancer Screening  Never done    Hepatitis C Screening  Completed     Immunizations Due:      Topic Date Due    COVID-19 Vaccine (1) Never done    Pneumococcal Vaccine: 65+ Years (1 - PCV) Never done   Anjelica Birch DTaP,Tdap,and Td Vaccines (1 - Tdap) Never done    Influenza Vaccine (1) 09/01/2022     Advance Directives   What are advance directives? Advance directives are legal documents that state your wishes and plans for medical care  These plans are made ahead of time in case you lose your ability to make decisions for yourself  Advance directives can apply to any medical decision, such as the treatments you want, and if you want to donate organs  What are the types of advance directives? There are many types of advance directives, and each state has rules about how to use them  You may choose a combination of any of the following:  · Living will: This is a written record of the treatment you want  You can also choose which treatments you do not want, which to limit, and which to stop at a certain time  This includes surgery, medicine, IV fluid, and tube feedings  · Durable power of  for healthcare [facility]): This is a written record that states who you want to make healthcare choices for you when you are unable to make them for yourself  This person, called a proxy, is usually a family member or a friend  You may choose more than 1 proxy  · Do not resuscitate (DNR) order:  A DNR order is used in case your heart stops beating or you stop breathing  It is a request not to have certain forms of treatment, such as CPR  A DNR order may be included in other types of advance directives  · Medical directive: This covers the care that you want if you are in a coma, near death, or unable to make decisions for yourself  You can list the treatments you want for each condition  Treatment may include pain medicine, surgery, blood transfusions, dialysis, IV or tube feedings, and a ventilator (breathing machine)  · Values history: This document has questions about your views, beliefs, and how you feel and think about life  This information can help others choose the care that you would choose    Why are advance directives important? An advance directive helps you control your care  Although spoken wishes may be used, it is better to have your wishes written down  Spoken wishes can be misunderstood, or not followed  Treatments may be given even if you do not want them  An advance directive may make it easier for your family to make difficult choices about your care  Weight Management   Why it is important to manage your weight:  Being overweight increases your risk of health conditions such as heart disease, high blood pressure, type 2 diabetes, and certain types of cancer  It can also increase your risk for osteoarthritis, sleep apnea, and other respiratory problems  Aim for a slow, steady weight loss  Even a small amount of weight loss can lower your risk of health problems  How to lose weight safely:  A safe and healthy way to lose weight is to eat fewer calories and get regular exercise  You can lose up about 1 pound a week by decreasing the number of calories you eat by 500 calories each day  Healthy meal plan for weight management:  A healthy meal plan includes a variety of foods, contains fewer calories, and helps you stay healthy  A healthy meal plan includes the following:  · Eat whole-grain foods more often  A healthy meal plan should contain fiber  Fiber is the part of grains, fruits, and vegetables that is not broken down by your body  Whole-grain foods are healthy and provide extra fiber in your diet  Some examples of whole-grain foods are whole-wheat breads and pastas, oatmeal, brown rice, and bulgur  · Eat a variety of vegetables every day  Include dark, leafy greens such as spinach, kale, baldev greens, and mustard greens  Eat yellow and orange vegetables such as carrots, sweet potatoes, and winter squash  · Eat a variety of fruits every day  Choose fresh or canned fruit (canned in its own juice or light syrup) instead of juice  Fruit juice has very little or no fiber    · Eat low-fat dairy foods   Drink fat-free (skim) milk or 1% milk  Eat fat-free yogurt and low-fat cottage cheese  Try low-fat cheeses such as mozzarella and other reduced-fat cheeses  · Choose meat and other protein foods that are low in fat  Choose beans or other legumes such as split peas or lentils  Choose fish, skinless poultry (chicken or turkey), or lean cuts of red meat (beef or pork)  Before you cook meat or poultry, cut off any visible fat  · Use less fat and oil  Try baking foods instead of frying them  Add less fat, such as margarine, sour cream, regular salad dressing and mayonnaise to foods  Eat fewer high-fat foods  Some examples of high-fat foods include french fries, doughnuts, ice cream, and cakes  · Eat fewer sweets  Limit foods and drinks that are high in sugar  This includes candy, cookies, regular soda, and sweetened drinks  Exercise:  Exercise at least 30 minutes per day on most days of the week  Some examples of exercise include walking, biking, dancing, and swimming  You can also fit in more physical activity by taking the stairs instead of the elevator or parking farther away from stores  Ask your healthcare provider about the best exercise plan for you  © Copyright [employer] 2018 Information is for End User's use only and may not be sold, redistributed or otherwise used for commercial purposes  All illustrations and images included in CareNotes® are the copyrighted property of [employer]  or [facility] checking your blood pressure keep diary at home    Also bring your   home sugar report  Hypertension( High Blood Pressure ):    Continue Home BP check daily and bring log, if you are not checking consider checking daily    Take your Blood Pressure medicine as advised    Do not take your Blood pressure medicine if systolic Blood Pressure (top number) is less than 100 or heart rate less than 60  Notify you physician      Diabetes    Check your fingerstick Accu-Chek once a day     Please check your fingerstick Accu-Chek different time of the day either at 7:00 a m  or 11:00 a m  for for p m  4 9:00 p m  Rand Dougherty Follow Diabetic 1800 calorie diet for diabetes as advised  If you would sugar less than 80 or more than 300 to please call us on next visit is day  If the sugar is less than 80 follow-up hypoglycemia instructions as advised  Take your diabetic medicine as advised  Cholesterol    Eat low cholesterol diet    Continue taking your cholesterol medicine as advised    Call if any side effects    Lipid Profile and LFT prior to next visit or as advised  ( You should Get periodically to monitor liver side effects)    KNOW YOUR DIABETIC GOAL( HBA1C AND SUGAR), BLOOD PRESSURE TARGET NUMBER AND CHOLESTEROL( LDL, HDL AND TRIGLYCERIDE)   Basic Carbohydrate Counting   AMBULATORY CARE:   Carbohydrate counting  is a way to plan your meals by counting the amount of carbohydrate in foods  Carbohydrates are the sugars, starches, and fiber found in fruit, grains, vegetables, and milk products  Carbohydrates increase your blood sugar levels  Carbohydrate counting can help you eat the right amount of carbohydrate to keep your blood sugar levels under control  What you need to know about planning meals using carbohydrate counting:  · A dietitian or healthcare provider will help you develop a healthy meal plan that works best for you  You will be taught how much carbohydrate to eat or drink for each meal and snack  Your meal plan will be based on your age, weight, usual food intake, and physical activity level  If you have diabetes, it will also include your blood sugar levels and diabetes medicine  Once you know how much carbohydrate you should eat, you can decide what type of food you want to eat  · You will need to know what foods contain carbohydrate and how much they contain  Keep track of the amount of carbohydrate in meals and snacks in order to follow your meal plan   Do not avoid carbohydrates or skip meals  Your blood sugar may fall too low if you do not eat enough carbohydrate or you skip meals  Foods that contain carbohydrate:   · Breads:  Each serving of food listed below contains about 15 g of carbohydrate   ? 1 slice of bread (1 ounce) or 1 flour or corn tortilla (6 inch)    ? ½ of a hamburger bun or ¼ of a large bagel (about 1 ounce)    ? 1 pancake (about 4 inches across and ¼ inch thick)    · Cereals and grains:  Serving sizes of ready-to-eat cereals vary  Look at the serving size and the total carbohydrate amount listed on the food label  Each serving of food listed below contains about 15 g of carbohydrate   ? ¾ cup of dry, unsweetened, ready-to-eat cereal or ¼ cup of low-fat granola     ? ½ cup of oatmeal or other cooked cereal     ? ? cup of cooked rice or pasta    · Starchy vegetables and beans:  Each serving of food listed below contains about 15 g of carbohydrate   ? ½ cup of corn, green peas, sweet potatoes, or mashed potatoes    ? ¼ of a large baked potato    ? ½ cup of beans, lentils, and peas (garbanzo, lopez, kidney, white, split, black-eyed)    · Crackers and snacks:  Each serving of food listed below contains about 15 g of carbohydrate   ? 3 melquiades cracker squares or 8 animal crackers     ? 6 saltine-type crackers    ? 3 cups of popcorn or ¾ ounce of pretzels, potato chips, or tortilla chips    · Fruit:  Each serving of food listed below contains about 15 g of carbohydrate   ? 1 small (4 ounce) piece of fresh fruit or ¾ to 1 cup of fresh fruit    ? ½ cup of canned or frozen fruit, packed in natural juice    ? ½ cup (4 ounces) of unsweetened fruit juice    ? 2 tablespoons of dried fruit    · Desserts or sugary foods:  Each serving of food listed below contains about 15 g of carbohydrate   ? 2-inch square unfrosted cake or brownie     ? 2 small cookies    ? ½ cup of ice cream, frozen yogurt, or nondairy frozen yogurt    ?  ¼ cup of sherbet or sorbet    ? 1 tablespoon of regular syrup, jam, or jelly    ? 2 tablespoons of light syrup    · Milk and yogurt:  Foods from the milk group contain about 12 g of carbohydrate per serving  ? 1 cup of fat-free or low-fat milk    ? 1 cup of soy milk    ? ? cup of fat-free, yogurt sweetened with artificial sweetener    · Non-starchy vegetables:  Each serving contains about 5 g of carbohydrate   Three servings of non-starch vegetables count as 1 carbohydrate serving  ? ½ cup of cooked vegetables or 1 cup of raw vegetables  This includes beets, broccoli, cabbage, cauliflower, cucumber, mushrooms, tomatoes, and zucchini    ? ½ cup of vegetable juice    How to use carbohydrate counting to plan meals:   · Count carbohydrate amounts using serving sizes:      ? Pasta dinner example: You plan to have pasta, tossed salad, and an 8-ounce glass of milk  Your healthcare provider tells you that you may have 4 carbohydrate servings for dinner  One carbohydrate serving of pasta is ? cup  One cup of pasta will equal 3 carbohydrate servings  An 8-ounce glass of milk will count as 1 carbohydrate serving  These amounts of food would equal 4 carbohydrate servings  One cup of tossed salad does not count toward your carbohydrate servings  · Count carbohydrate amounts using food labels:  Find the total amount of carbohydrate in a packaged food by reading the food label  Food labels tell you the serving size of the food and the total carbohydrate amount in each serving  Find the serving size on the food label and then decide how many servings you will eat  Multiply the number of servings you plan to eat by the carbohydrate amount per serving  ? Granola bar snack example: Your meal plan allows you to have 2 carbohydrate servings (30 grams) of carbohydrate for a snack  You plan to eat 1 package of granola bars, which contains 2 bars  According to the food label, the serving size of food in this package is 1 bar   Each serving (1 bar) contains 25 grams of carbohydrate  The total amount of carbohydrate in this package of granola bars would be 50 g  Based on your meal plan, you should eat only 1 bar  Follow up with your doctor as directed:  Write down your questions so you remember to ask them during your visits  © Copyright [employer] 2022 Information is for End User's use only and may not be sold, redistributed or otherwise used for commercial purposes  All illustrations and images included in CareNotes® are the copyrighted property of [employer]  or [facility] [employer]   The above information is an  only  It is not intended as medical advice for individual conditions or treatments  Talk to your doctor, nurse or pharmacist before following any medical regimen to see if it is safe and effective for you

## 2022-07-07 NOTE — ASSESSMENT & PLAN NOTE
Lab Results   Component Value Date    CHOLESTEROL 151 07/01/2022    CHOLESTEROL 165 11/19/2021    CHOLESTEROL 174 06/11/2021     Lab Results   Component Value Date    HDL 76 07/01/2022    HDL 84 11/19/2021    HDL 67 06/11/2021     Lab Results   Component Value Date    TRIG 77 07/01/2022    TRIG 109 11/19/2021    TRIG 136 06/11/2021     Lab Results   Component Value Date    [place] 75 07/01/2022    [place] 81 11/19/2021    [place] 107 06/11/2021     Hyperlipidemia well controlled triglyceride well controlled    Will continue atorvastatin 40 mg daily

## 2022-07-07 NOTE — PROGRESS NOTES
Assessment and Plan:     Problem List Items Addressed This Visit        Digestive    Steatohepatitis    Relevant Orders    Comprehensive metabolic panel       Endocrine    Type 2 diabetes mellitus, with long-term current use of insulin ([facility] ) - Primary       Lab Results   Component Value Date    HGBA1C 7 8 (H) 07/01/2022   Increase your NovoLog 70/30   15 units in the morning and 15 units in the evening instead of 12 units in the morning and 12 units in the evening  Relevant Medications    metFORMIN (GLUCOPHAGE) 1000 MG tablet    Other Relevant Orders    Comprehensive metabolic panel    Hemoglobin A1C    Microalbumin / creatinine urine ratio    Lipid panel       Respiratory    Allergic rhinitis     Chronic allergy symptoms  Recommend to avoid Sudafed  Under care of ENT  Continue local nasal spray saline nasal spray           Relevant Medications    azelastine (ASTELIN) 0 1 % nasal spray       Cardiovascular and Mediastinum    Essential hypertension     Patient's does have a tendency to have high blood pressure ear home blood pressure is well controlled  Remains on hydrochlorothiazide, losartan 100 mg daily, metoprolol succinate 150 mg daily total, home amlodipine 10 mg daily  Recommend to restart checking blood pressure at home    30 lb           Relevant Medications    amLODIPine (NORVASC) 10 mg tablet    atorvastatin (LIPITOR) 40 mg tablet    hydrochlorothiazide (HYDRODIURIL) 25 mg tablet    metoprolol succinate (TOPROL-XL) 50 mg 24 hr tablet    metoprolol succinate (TOPROL-XL) 100 mg 24 hr tablet    losartan (COZAAR) 100 MG tablet    Other Relevant Orders    Basic metabolic panel    Comprehensive metabolic panel    Hemoglobin A1C    Microalbumin / creatinine urine ratio    Lipid panel       Genitourinary    Stage 3 chronic kidney disease, unspecified whether stage 3a or 3b CKD ([facility] )     Lab Results   Component Value Date    EGFR 52 07/01/2022    EGFR 71 11/19/2021    EGFR 78 06/11/2021 CREATININE 1 39 (H) 07/01/2022    CREATININE 1 07 11/19/2021    CREATININE 1 00 06/11/2021   Patient's previous GFR was in the range of 60-90  Creatinine went up to 1 39 usual range was 0 9-1 1  We will encouraged to drink plenty of fluid  Recheck basic metabolic profile in 4 weeks  As well as CMP back in 3 months  If kidney function remains high we may have to back off on some of the metformin           Relevant Medications    hydrochlorothiazide (HYDRODIURIL) 25 mg tablet    Other Relevant Orders    Basic metabolic panel    Comprehensive metabolic panel       Other    Hypercholesteremia     Lab Results   Component Value Date    CHOLESTEROL 151 07/01/2022    CHOLESTEROL 165 11/19/2021    CHOLESTEROL 174 06/11/2021     Lab Results   Component Value Date    HDL 76 07/01/2022    HDL 84 11/19/2021    HDL 67 06/11/2021     Lab Results   Component Value Date    TRIG 77 07/01/2022    TRIG 109 11/19/2021    TRIG 136 06/11/2021     Lab Results   Component Value Date    [place] 07/01/2022    [place] 81 11/19/2021    [place] 107 06/11/2021     Hyperlipidemia well controlled triglyceride well controlled  Will continue atorvastatin 40 mg daily           Relevant Medications    atorvastatin (LIPITOR) 40 mg tablet    Other Relevant Orders    Comprehensive metabolic panel    Hemoglobin A1C    Microalbumin / creatinine urine ratio    Lipid panel    Class 2 severe obesity due to excess calories with serious comorbidity and body mass index (BMI) of 35 0 to 35 9 in adult ([facility] )     30 lb weight gain    Recommend exercise lifestyle modification weight loss diet and weekly weight           Vitamin D deficiency    Persistent proteinuria     Continue amlodipine as well as losartan and risk factor management           Medicare annual wellness visit, subsequent    Tachycardia    Relevant Medications    metoprolol succinate (TOPROL-XL) 50 mg 24 hr tablet          Depression Screening and Follow-up Plan: Patient was screened for depression during today's encounter  They screened negative with a PHQ-2 score of 0  Preventive health issues were discussed with patient, and age appropriate screening tests were ordered as noted in patient's After Visit Summary  Personalized health advice and appropriate referrals for health education or preventive services given if needed, as noted in patient's After Visit Summary  History of Present Illness:     Patient presents for a Medicare Wellness Visit    The patient is here for chronic disease management  Patient is here after long time be due to insurance he could not come here for a while  Hypertension:  Home blood pressure usually is excellent  He does have a tendency to have high blood pressure here  His heart rate is fair at this time  His symptom-free from the hypertension standpoint remains on amlodipine 10 mg daily  Hydrochlorothiazide 25 mg daily  Losartan 100 mg daily  Metoprolol succinate 100 mg in the morning and 50 mg in the evening  Of    He gain 30 lb weight probably contributing to his uncontrolled diabetes and uncontrolled blood pressure  The BMI is 40  Patient advised  Diabetes:  Currently he is on NovoLog 70/30 12 units twice a day    Denies any polydipsia polyuria  Home sugar in the range of 175-200 in the morning and 200-225 in the evening checks twice a day  Of recommend to follow-up and stay up-to-date with eye examination  Diabetic foot examination done today  Denies any hypoglycemic event  Also remains on metformin  We also talked about renal functions and metformin related issues  Hemoglobin A1c went up  7 8    Allergic rhinitis more symptomatic this time of the year under care of ENT physicians remains on nasal spray  Of recommend not to take Sudafed he takes at times  Symptoms are well controlled  He is also on Astelin nasal spray  Hyperlipidemia LDL is80 currently is on atorvastatin 40 mg daily      The PSA 1 8 11-8-2020, 07/01/2022: 2 6    CKD 2:  Creatinine went up 1 39 GFR dropped  He is not drinking enough fluid  Remains on hydrochlorothiazide as well as multiple other medications  Of he does not have any significant voiding problem  GFR is 52  Calcium is slightly up 10 4  All this is interrelated issues reviewed with him at length  Recommend to do a increase fluid intake recheck BMP in 4 weeks and CMP in 12 weeks  He is willing to go for that    Steatohepatitis unchanged  Now no other specific     11/19/2021:  Glucose 109, LFTs normal, electrolytes normal, hemoglobin A1c 6 7, cholesterol 165 LDL 59 HDL 84 urine for microalbumin 69  06/11/2021: How BMP sodium 135 rest BMP normal LFT normal blood sugar 151 GFR 78 hemoglobin A1c 8 6 urine for microalbumin/creatinine ratio 4 cholesterol 174, LDL 80,  A 03/16/2021:  Hemoglobin A1c 8 8, cholesterol 178 LDL 61 hepatitis C negative  06/29/2020 CMP normal except blood sugar 84, hemoglobin A1c 6 2  rest CMP and lipid profile unremarkable     07/01/2022:  CMP normal except creatinine up to 1 39 GFR 52 calcium a 10 4 lipid profile normal PSA 2 6, hemoglobin A1c 7 8, LFT normal, CBC normal urine for microalbumin/creatinine ratio 67               Patient Care Team:  Cecilia Rodríguez MD as PCP - General (Internal Medicine)     Review of Systems:     Review of Systems   All other systems reviewed and are negative         Problem List:     Patient Active Problem List   Diagnosis    Type 2 diabetes mellitus, with long-term current use of insulin ([facility] )    Essential hypertension    Hypercholesteremia    Class 2 severe obesity due to excess calories with serious comorbidity and body mass index (BMI) of 35 0 to 35 9 in adult [facility])    Steatohepatitis    Allergic rhinitis    Vitamin D deficiency    Persistent proteinuria    Medicare annual wellness visit, subsequent    Tachycardia    Stage 3 chronic kidney disease, unspecified whether stage 3a or 3b CKD ([facility] )      Past Medical and Surgical History:     History reviewed  No pertinent past medical history  History reviewed  No pertinent surgical history  Family History:     History reviewed  No pertinent family history     Social History:     Social History     Socioeconomic History    Marital status: /Civil Union     Spouse name: None    Number of children: None    Years of education: None    Highest education level: None   Occupational History    None   Tobacco Use    Smoking status: Former Smoker    Smokeless tobacco: Never Used   Substance and Sexual Activity    Alcohol use: None    Drug use: None    Sexual activity: None   Other Topics Concern    None   Social History Narrative    None     Social Determinants of Health     Financial Resource Strain: Not on file   Food Insecurity: Not on file   Transportation Needs: Not on file   Physical Activity: Not on file   Stress: Not on file   Social Connections: Not on file   Intimate Partner Violence: Not on file   Housing Stability: Not on file      Medications and Allergies:     Current Outpatient Medications   Medication Sig Dispense Refill    amLODIPine (NORVASC) 10 mg tablet Take 1 tablet (10 mg total) by mouth daily 90 tablet 0    ASA-APAP-Caff Buffered 227-194-33 MG TABS Take by mouth      aspirin (ECOTRIN LOW STRENGTH) 81 mg EC tablet Take 81 mg by mouth daily      atorvastatin (LIPITOR) 40 mg tablet Take 1 tablet (40 mg total) by mouth daily 90 tablet 0    azelastine (ASTELIN) 0 1 % nasal spray 1 spray into each nostril 2 (two) times a day Use in each nostril as directed 30 mL 1    BD Pen Needle Katelyn U/F 32G X 4 MM MISC Inject as directed 2 (two) times a day 100 each 4    Cholecalciferol (VITAMIN D3) 2000 units TABS Take 2,000 Units by mouth daily      Glucose Blood (ONETOUCH ULTRA BLUE VI) 1 Units by In Vitro route 3 (three) times a day      glucose blood (OneTouch Ultra) test strip Test 3 times daily 300 each 5    hydrochlorothiazide (HYDRODIURIL) 25 mg tablet Take 1 tablet (25 mg total) by mouth daily 90 tablet 0    insulin aspart protamine-insulin aspart (NovoLOG Mix 70/30 FlexPen) 100 Units/mL injection pen Inject 12 Units under the skin 2 (two) times a day before meals 15 mL 1    Krill Oil 1000 MG CAPS Take 1 capsule by mouth daily      losartan (COZAAR) 100 MG tablet Take 1 tablet (100 mg total) by mouth daily 90 tablet 0    metFORMIN (GLUCOPHAGE) 1000 MG tablet Take 1 tablet (1,000 mg total) by mouth 2 (two) times a day 180 tablet 0    metoprolol succinate (TOPROL-XL) 100 mg 24 hr tablet Take 1 tablet (100 mg total) by mouth daily 90 tablet 1    metoprolol succinate (TOPROL-XL) 50 mg 24 hr tablet Take 1 tablet (50 mg total) by mouth daily 90 tablet 0    ofloxacin (FLOXIN) 0 3 % otic solution INSTILL 1 DROP TWICE DAILY INTO EACH EAR FOR 10 DAYS      Pseudoeph-HYDROcodone (PSEUDOEPHEDRINE-HYDROCODONE PO) Take 1 tablet by mouth 2 (two) times a day      vitamin E, tocopherol, 400 units capsule Take 400 Units by mouth daily       No current facility-administered medications for this visit  No Known Allergies   Immunizations: There is no immunization history on file for this patient  Health Maintenance:         Topic Date Due    Colorectal Cancer Screening  Never done    Hepatitis C Screening  Completed         Topic Date Due    COVID-19 Vaccine (1) Never done    Pneumococcal Vaccine: 65+ Years (1 - PCV) Never done    DTaP,Tdap,and Td Vaccines (1 - Tdap) Never done    Influenza Vaccine (1) 09/01/2022      Medicare Screening Tests and Risk Assessments:     Kvng Puga is here for his Initial Wellness and Subsequent Wellness visit  Health Risk Assessment:   Patient rates overall health as very good  Patient feels that their physical health rating is same  Patient is satisfied with their life  Eyesight was rated as same  Hearing was rated as same  Patient feels that their emotional and mental health rating is slightly better   Patients states they are never, rarely angry  Patient states they are sometimes unusually tired/fatigued  Pain experienced in the last 7 days has been none  Patient states that he has experienced no weight loss or gain in last 6 months  Weight stays about the same  Weight is challenging for him  Depression Screening:   PHQ-2 Score: 0      Fall Risk Screening: In the past year, patient has experienced: no history of falling in past year      Home Safety:  Patient does not have trouble with stairs inside or outside of their home  Patient has working smoke alarms and has working carbon monoxide detector  Home safety hazards include: none  No home hazards  Pt feels safe  Lives with wife    Nutrition:   Current diet is Diabetic  Eats a Keto diet  Medications:   Patient is currently taking over-the-counter supplements  OTC medications include: see medication list  Patient is able to manage medications  Pt has no issues with meds  Activities of Daily Living (ADLs)/Instrumental Activities of Daily Living (IADLs):   Walk and transfer into and out of bed and chair?: Yes  Dress and groom yourself?: Yes    Bathe or shower yourself?: Yes    Feed yourself? Yes  Do your laundry/housekeeping?: Yes  Manage your money, pay your bills and track your expenses?: Yes  Make your own meals?: Yes    Do your own shopping?: Yes    ADL comments: Pt is independent  Still works  Previous Hospitalizations:   Any hospitalizations or ED visits within the last 12 months?: No      Hospitalization Comments: Chronic Conditions have been stable    Advance Care Planning:   Living will: Yes    Durable POA for healthcare: Yes    Advanced directive: Yes    Advanced directive counseling given: No    Five wishes given: No    Patient declined ACP directive: No    End of Life Decisions reviewed with patient: Yes    Provider agrees with end of life decisions: Yes      Comments: All documents in place per pt  Pt to discuss with wife   [id number] [facility] POA     Cognitive Screening:   Provider or family/friend/caregiver concerned regarding cognition?: No    PREVENTIVE SCREENINGS      Cardiovascular Screening:    General: Screening Not Indicated and History Lipid Disorder      Diabetes Screening:     General: Screening Not Indicated and History Diabetes      Colorectal Cancer Screening:     General: Risks and Benefits Discussed and Patient Declines    Due for: Colonoscopy - Low Risk      Prostate Cancer Screening:    General: Screening Current      Osteoporosis Screening:    General: Screening Not Indicated      Abdominal Aortic Aneurysm (AAA) Screening:    Risk factors include: family history of AAA, age between 73-69 yo and tobacco use        General: Risks and Benefits Discussed    Due for: Screening AAA Ultrasound      Lung Cancer Screening:     General: Screening Not Indicated      Hepatitis C Screening:    General: Screening Current    Hep C Screening Accepted: Yes        Preventive Screening Comments: He will do colgard  Pt agrees to AAA screening and Hep C screening  UTD with flu and pneumo  Pt sees his eye doctor solange six months  Has a Dilated Retianl exam one a year  Screening, Brief Intervention, and Referral to Treatment (SBIRT)    Screening  Typical number of drinks in a day: 2  Typical number of drinks in a week: 6  Interpretation: Low risk drinking behavior  AUDIT-C Screenin) How often did you have a drink containing alcohol in the past year? 4 or more times a week  2) How many drinks did you have on a typical day when you were drinking in the past year? 1 to 2  3) How often did you have 6 or more drinks on one occasion in the past year? never    AUDIT-C Score: 4  Interpretation: Score 4-12 (male): POSITIVE screen for alcohol misuse    AUDIT Screenin) How often during the last year have you found that you were not able to stop drinking once you had started?  0 - never  5) How often during the last year have you failed to do what was normally expected from you because of drinking? 0 - never  6) How often during the last year have you needed a first drink in the morning to get yourself going after a heavy drinking session? 0 - never  7) How often during the last year have you had a feeling of guilt or remorse after drinking? 0 - never  8) How often during the last year have you been unable to remember what happened the night before because you had been drinking? 0 - never  9) Have you or someone else been injured as a result of your drinking? 0 - no  10) Has a relative or friend or a doctor or another health worker been concerned about your drinking or suggested you cut down? 0 - no    AUDIT Score: 4  Interpretation: Low risk alcohol consumption    Single Item Drug Screening:  How often have you used an illegal drug (including marijuana) or a prescription medication for non-medical reasons in the past year? never    Single Item Drug Screen Score: 0  Interpretation: Negative screen for possible drug use disorder    Brief Intervention  Healthy alcohol use/limits discussed  Other Counseling Topics:   Skin self-exam      No exam data present     Physical Exam:     /84   Pulse 80   Ht 5' 11" (1 803 m)   Wt 132 kg (290 lb)   SpO2 96%   BMI 40 45 kg/m²     Physical Exam  Constitutional:       General: He is not in acute distress  Appearance: He is well-developed  He is obese  He is not ill-appearing, toxic-appearing or diaphoretic  HENT:      Head: Normocephalic and atraumatic  Right Ear: External ear normal       Left Ear: External ear normal    Eyes:      General:         Right eye: No discharge  Left eye: No discharge  Conjunctiva/sclera: Conjunctivae normal    Neck:      Thyroid: No thyromegaly  Vascular: No JVD  Cardiovascular:      Rate and Rhythm: Regular rhythm  Pulses: no weak pulses          Dorsalis pedis pulses are 2+ on the right side and 2+ on the left side          Posterior tibial pulses are 1+ on the right side and 1+ on the left side  Heart sounds: Normal heart sounds  Pulmonary:      Effort: No respiratory distress  Breath sounds: Normal breath sounds  No wheezing or rales  Abdominal:      General: Bowel sounds are normal  There is no distension  Palpations: There is no mass  Tenderness: There is no abdominal tenderness  There is no rebound  Musculoskeletal:      Right lower leg: No edema  Left lower leg: No edema  Feet:      Right foot:      Skin integrity: No ulcer, skin breakdown, erythema, warmth, callus or dry skin  Left foot:      Skin integrity: No ulcer, skin breakdown, erythema, warmth, callus or dry skin  Lymphadenopathy:      Cervical: No cervical adenopathy  Skin:     General: Skin is warm  Coloration: Skin is not jaundiced or pale  Findings: No rash  Neurological:      General: No focal deficit present  Mental Status: He is oriented to person, place, and time  Coordination: Coordination normal    Psychiatric:         Mood and Affect: Mood normal          Behavior: Behavior normal          Thought Content: Thought content normal          Judgment: Judgment normal        Diabetic Foot Exam    Patient's shoes and socks removed  Right Foot/Ankle   Right Foot Inspection  Skin Exam: skin normal and skin intact  No dry skin, no warmth, no callus, no erythema, no maceration, no abnormal color, no pre-ulcer, no ulcer and no callus  Toe Exam: ROM and strength within normal limits  no right toe deformity    Sensory   Proprioception: intact  Monofilament testing: intact    Vascular  Capillary refills: < 3 seconds  The right DP pulse is 2+  The right PT pulse is 1+  Left Foot/Ankle  Left Foot Inspection  Skin Exam: skin normal and skin intact  No dry skin, no warmth, no erythema, no maceration, normal color, no pre-ulcer, no ulcer and no callus  Toe Exam: ROM and strength within normal limits  No left toe deformity  Sensory   Proprioception: intact  Monofilament testing: intact    Vascular  Capillary refills: < 3 seconds  The left DP pulse is 2+  The left PT pulse is 1+       Assign Risk Category  No deformity present  No loss of protective sensation  No weak pulses  Risk: 0      David Avery MD

## 2022-07-07 NOTE — ASSESSMENT & PLAN NOTE
Patient's does have a tendency to have high blood pressure ear home blood pressure is well controlled  Remains on hydrochlorothiazide, losartan 100 mg daily, metoprolol succinate 150 mg daily total, home amlodipine 10 mg daily  Recommend to restart checking blood pressure at home    30 lb

## 2022-07-07 NOTE — PROGRESS NOTES
Carlos Flores Office Visit Note  22     Tigre Numbers  78 y o  male MRN: [id number]  : 1955    Assessment:     1  Type 2 diabetes mellitus with microalbuminuria, with long-term current use of insulin ([facility])  Assessment & Plan:    Lab Results   Component Value Date    HGBA1C 7 8 (H) 2022   Increase your NovoLog 70/30   15 units in the morning and 15 units in the evening instead of 12 units in the morning and 12 units in the evening  Orders:  -     metFORMIN (GLUCOPHAGE) 1000 MG tablet; Take 1 tablet (1,000 mg total) by mouth 2 (two) times a day  -     Comprehensive metabolic panel; Future; Expected date: 10/07/2022  -     Hemoglobin A1C; Future  -     Microalbumin / creatinine urine ratio  -     Lipid panel; Future    2  Steatohepatitis  -     Comprehensive metabolic panel; Future; Expected date: 10/07/2022    3  Essential hypertension  -     amLODIPine (NORVASC) 10 mg tablet; Take 1 tablet (10 mg total) by mouth daily  -     atorvastatin (LIPITOR) 40 mg tablet; Take 1 tablet (40 mg total) by mouth daily  -     hydrochlorothiazide (HYDRODIURIL) 25 mg tablet; Take 1 tablet (25 mg total) by mouth daily  -     metoprolol succinate (TOPROL-XL) 50 mg 24 hr tablet; Take 1 tablet (50 mg total) by mouth daily  -     metoprolol succinate (TOPROL-XL) 100 mg 24 hr tablet; Take 1 tablet (100 mg total) by mouth daily  -     losartan (COZAAR) 100 MG tablet; Take 1 tablet (100 mg total) by mouth daily  -     Basic metabolic panel; Future; Expected date: 2022  -     Comprehensive metabolic panel; Future; Expected date: 10/07/2022  -     Hemoglobin A1C; Future  -     Microalbumin / creatinine urine ratio  -     Lipid panel; Future    4   Hypercholesteremia  Assessment & Plan:  Lab Results   Component Value Date    CHOLESTEROL 151 2022    CHOLESTEROL 165 2021    CHOLESTEROL 174 2021     Lab Results   Component Value Date    HDL 76 2022    HDL 84 2021    HDL 67 2021 Lab Results   Component Value Date    TRIG 77 07/01/2022    TRIG 109 11/19/2021    TRIG 136 06/11/2021     Lab Results   Component Value Date    [place] 75 07/01/2022    [place] 81 11/19/2021    [place] 107 06/11/2021         Orders:  -     Comprehensive metabolic panel; Future; Expected date: 10/07/2022  -     Hemoglobin A1C; Future  -     Microalbumin / creatinine urine ratio  -     Lipid panel; Future    5  Class 2 severe obesity due to excess calories with serious comorbidity and body mass index (BMI) of 35 0 to 35 9 in adult (HCC)    6  Persistent proteinuria    7  Vitamin D deficiency    8  Stage 3 chronic kidney disease, unspecified whether stage 3a or 3b CKD [facility])  Assessment & Plan:  Lab Results   Component Value Date    EGFR 52 07/01/2022    EGFR 71 11/19/2021    EGFR 78 06/11/2021    CREATININE 1 39 (H) 07/01/2022    CREATININE 1 07 11/19/2021    CREATININE 1 00 06/11/2021   Patient's previous GFR was in the range of 60-90  Creatinine went up to 1 39 usual range was 0 9-1 1  We will encouraged to drink plenty of fluid  Recheck basic metabolic profile in 4 weeks  As well as CMP back in 3 months  If kidney function remains high we may have to back off on some of the metformin    Orders:  -     Basic metabolic panel; Future; Expected date: 08/07/2022  -     Comprehensive metabolic panel; Future; Expected date: 10/07/2022    9  Medicare annual wellness visit, subsequent    10  Allergic rhinitis due to pollen, unspecified seasonality  -     azelastine (ASTELIN) 0 1 % nasal spray; 1 spray into each nostril 2 (two) times a day Use in each nostril as directed    11  Tachycardia  -     metoprolol succinate (TOPROL-XL) 50 mg 24 hr tablet; Take 1 tablet (50 mg total) by mouth daily        Discussion Summary and Plan: Today's care plan and medications were reviewed with patient in detail and all their questions answered to their satisfaction      Chief Complaint   Patient presents with    Hypertension    Hyperlipidemia    Diabetes    Follow-up    Abnormal Lab    Obesity    Medicare Wellness Visit      Subjective: The patient is here for chronic disease management  Hypertension:  Patient is here for follow-up of hypertension  Home blood pressure in the range of 140/90  Heart rate today noted to be 112  He does report that in the morning his pulse rate is in the range of 110  He denies any chest pain palpitation PND orthopnea edema  Patient is noticed to have a tachycardia on arrival heart rate was 112 repeat heart rate was 106 when I did a checked manually repeat heart rate continue to be in the range of 100-106  EKG was done which revealed heart rate of 94  Unusual P axis though it appears regular to me on V3 to V6  Patient does not have any symptoms  Will check CBC teeth 4 free, the D-dimer,  Will increase metoprolol succinate additional 50 mg 1 tablet daily each evening in on top of 5100 mg daily a m     Will refer to the cardiologist   Annalisa Perez back in 2-3 weeks  Diabetes:  Symptom-free [place] A1c 6 7  Urine for microalbumin reasonable  Currently on NovoLog 70/30 CURRENTLY ON 10 units twice a day  Has been doing exercises regularly  Remains on metformin 1000 mg twice a day  Up-to-date with eye checkup  Diabetes:  Diagnosed:  Current Medicine for Diabetes: Per Med List  Finger stick: Once a day  Glucose Log:  Check sugar once a day no hypoglycemia  Hypoglycemia event and intervention: None  Diet: reviewed,  Exercise: None  Comorbidity: see HPI and Assessment/Plan  Last Eye Exam:  Done earlier  Last Foot exam done earlier     Allergic rhinitis:  He he is under care of ENT physician he has been taking Sudafed twice a day  He does have a chronic stuffy nose runny nose nasal congestion  Symptoms are well controlled  He is also on Astelin nasal spray      Obesity weight coming down watching diet  Diet reviewed    BMI 37 38    Hypertension:  Symptom-free he is  on amlodipine 10 mg daily losartan 100 mg daily and metoprolol the  mg daily a  hydrochlorothiazide 12 5 mg daily  Hyperlipidemia LDL is80 currently is on atorvastatin 40 mg daily  The PSA 1 8 11-8-2020    Eye examination of done UPTO DATE 2020    Now no other specific complaint particularly fatty liver unchanged, high allergic rhinitis reasonable  11/19/2021:  Glucose 109, LFTs normal, electrolytes normal, hemoglobin A1c 6 7, cholesterol 165 LDL 59 HDL 84 urine for microalbumin 69  06/11/2021: How BMP sodium 135 rest BMP normal LFT normal blood sugar 151 GFR 78 hemoglobin A1c 8 6 urine for microalbumin/creatinine ratio 4 cholesterol 174, LDL 80,  A 03/16/2021:  Hemoglobin A1c 8 8, cholesterol 178 LDL 61 hepatitis C negative  06/29/2020 CMP normal except blood sugar 84, hemoglobin A1c 6 2  rest CMP and lipid profile unremarkable      Lab on 06/30/2022  Sodium                    Value: 136(mmol/L)        Dt: 07/01/2022  Potassium                 Value: 4 3(mmol/L)        Dt: 07/01/2022  Chloride                  Value: 99(mmol/L) (A)     Dt: 07/01/2022  CO2                       Value: 30(mmol/L)         Dt: 07/01/2022  ANION GAP                 Value: 7(mmol/L)          Dt: 07/01/2022  BUN                       Value: 31(mg/dL) (A)      Dt: 07/01/2022  Creatinine                Value: 1 39(mg/dL) (A)    Dt: 07/01/2022  Glucose, Fasting          Value: 170(mg/dL) (A)     Dt: 07/01/2022  Calcium                   Value: 10  4(mg/dL) (A)    Dt: 07/01/2022  AST                       Value: 15(U/L)            Dt: 07/01/2022  ALT                       Value: 22(U/L)            Dt: 07/01/2022  Alkaline Phosphatase      Value: 41(U/L) (A)        Dt: 07/01/2022  Total Protein             Value: 7 7(g/dL)          Dt: 07/01/2022  Albumin                   Value: 3 6(g/dL)          Dt: 07/01/2022  Total Bilirubin           Value: 0 50(mg/dL)        Dt: 07/01/2022  eGFR                      Value: 52(ml/min/1 73sq m) Dt: 07/01/2022  WBC                       Value: 9 87(Thousand/uL)  Dt: 07/01/2022  RBC                       Value: 4 36(Million/uL)   Dt: 07/01/2022  Hemoglobin                Value: 14 0(g/dL)         Dt: 07/01/2022  Hematocrit                Value: 40 9(%)            Dt: 07/01/2022  MCV                       Value: 94(fL)             Dt: 07/01/2022  MCH                       Value: 32 1(pg)           Dt: 07/01/2022  MCHC                      Value: 34 2(g/dL)         Dt: 07/01/2022  RDW                       Value: 13 1(%)            Dt: 07/01/2022  MPV                       Value: 9 1(fL)            Dt: 07/01/2022  Platelets                 Value: 208(Thousands/uL)  Dt: 07/01/2022  nRBC                      Value: 0(/100 WBCs)       Dt: 07/01/2022  Neutrophils Relative      Value: 37(%) (A)          Dt: 07/01/2022  Immat GRANS %             Value: 1(%)               Dt: 07/01/2022  Lymphocytes Relative      Value: 42(%)              Dt: 07/01/2022  Monocytes Relative        Value: 9(%)               Dt: 07/01/2022  Eosinophils Relative      Value: 10(%) (A)          Dt: 07/01/2022  Basophils Relative        Value: 1(%)               Dt: 07/01/2022  Neutrophils Absolute      Value: 3 69(Thousands/µL) Dt: 07/01/2022  Immature Grans Absolute   Value: 0 05(Thousand/uL)  Dt: 07/01/2022  Lymphocytes Absolute      Value: 4 21(Thousands/µL) Dt: 07/01/2022  Monocytes Absolute        Value: 0 90(Thousand/µL)  Dt: 07/01/2022  Eosinophils Absolute      Value: 0 96(Thousand/µL) (A)Dt: 07/01/2022  Basophils Absolute        Value: 0 06(Thousands/µL) Dt: 07/01/2022  Cholesterol               Value: 151(mg/dL)         Dt: 07/01/2022  Triglycerides             Value: 77(mg/dL)          Dt: 07/01/2022  HDL, Direct               Value: 76(mg/dL)          Dt: 07/01/2022  LDL Calculated            Value: 60(mg/dL)          Dt: 07/01/2022  Non-HDL-Chol (CHOL-HDL)   Value: 75(mg/dl)          Dt: 07/01/2022  Hemoglobin A1C Value: 7 8(%) (A)         Dt: 07/01/2022  EAG                       Value: 177(mg/dl)         Dt: 07/01/2022  PSA                       Value: 2 6(ng/mL)         Dt: 07/01/2022  ------------ - 2 weeks              The following portions of the patient's history were reviewed and updated as appropriate: allergies, current medications, past family history, past medical history, past social history, past surgical history and problem list     Review of Systems   All other systems reviewed and are negative  Historical Information   Patient Active Problem List   Diagnosis    Type 2 diabetes mellitus, with long-term current use of insulin ([place] [address] )    Essential hypertension    Hypercholesteremia    Class 2 severe obesity due to excess calories with serious comorbidity and body mass index (BMI) of 35 0 to 35 9 in adult ([facility] )    Steatohepatitis    Allergic rhinitis    Vitamin D deficiency    Persistent proteinuria    Medicare annual wellness visit, initial    Tachycardia    Stage 3 chronic kidney disease, unspecified whether stage 3a or 3b CKD ([place] [address] )     History reviewed  No pertinent past medical history  History reviewed  No pertinent surgical history  Social History     Substance and Sexual Activity   Alcohol Use None     Social History     Substance and Sexual Activity   Drug Use Not on file     Social History     Tobacco Use   Smoking Status Former Smoker   Smokeless Tobacco Never Used     History reviewed  No pertinent family history    Health Maintenance Due   Topic    COVID-19 Vaccine (1)    Pneumococcal Vaccine: 65+ Years (1 - PCV)    DTaP,Tdap,and Td Vaccines (1 - Tdap)    Colorectal Cancer Screening     Medicare Annual Wellness Visit (AWV)     Diabetic Foot Exam     Influenza Vaccine (1)      Meds/Allergies       Current Outpatient Medications:     amLODIPine (NORVASC) 10 mg tablet, Take 1 tablet (10 mg total) by mouth daily, Disp: 90 tablet, Rfl: 0    ASA-APAP-Caff Buffered 227-194-33 MG TABS, Take by mouth, Disp: , Rfl:     aspirin (ECOTRIN LOW STRENGTH) 81 mg EC tablet, Take 81 mg by mouth daily, Disp: , Rfl:     atorvastatin (LIPITOR) 40 mg tablet, Take 1 tablet (40 mg total) by mouth daily, Disp: 90 tablet, Rfl: 0    azelastine (ASTELIN) 0 1 % nasal spray, 1 spray into each nostril 2 (two) times a day Use in each nostril as directed, Disp: 30 mL, Rfl: 1    BD Pen Needle Katelyn U/F 32G X 4 MM MISC, Inject as directed 2 (two) times a day, Disp: 100 each, Rfl: 4    Cholecalciferol (VITAMIN D3) 2000 units TABS, Take 2,000 Units by mouth daily, Disp: , Rfl:     Glucose Blood (ONETOUCH ULTRA BLUE VI), 1 Units by In Vitro route 3 (three) times a day, Disp: , Rfl:     glucose blood (OneTouch Ultra) test strip, Test 3 times daily, Disp: 300 each, Rfl: 5    hydrochlorothiazide (HYDRODIURIL) 25 mg tablet, Take 1 tablet (25 mg total) by mouth daily, Disp: 90 tablet, Rfl: 0    insulin aspart protamine-insulin aspart (NovoLOG Mix 70/30 FlexPen) 100 Units/mL injection pen, Inject 12 Units under the skin 2 (two) times a day before meals, Disp: 15 mL, Rfl: 1    Krill Oil 1000 MG CAPS, Take 1 capsule by mouth daily, Disp: , Rfl:     losartan (COZAAR) 100 MG tablet, Take 1 tablet (100 mg total) by mouth daily, Disp: 90 tablet, Rfl: 0    metFORMIN (GLUCOPHAGE) 1000 MG tablet, Take 1 tablet (1,000 mg total) by mouth 2 (two) times a day, Disp: 180 tablet, Rfl: 0    metoprolol succinate (TOPROL-XL) 100 mg 24 hr tablet, Take 1 tablet (100 mg total) by mouth daily, Disp: 90 tablet, Rfl: 1    metoprolol succinate (TOPROL-XL) 50 mg 24 hr tablet, Take 1 tablet (50 mg total) by mouth daily, Disp: 90 tablet, Rfl: 0    ofloxacin (FLOXIN) 0 3 % otic solution, INSTILL 1 DROP TWICE DAILY INTO EACH EAR FOR 10 DAYS, Disp: , Rfl:     Pseudoeph-HYDROcodone (PSEUDOEPHEDRINE-HYDROCODONE PO), Take 1 tablet by mouth 2 (two) times a day, Disp: , Rfl:     vitamin E, tocopherol, 400 units capsule, Take 400 Units by mouth daily, Disp: , Rfl:       Objective:    Vitals:   Pulse 80   Ht 5' 11" (1 803 m)   Wt 132 kg (290 lb)   SpO2 96%   BMI 40 45 kg/m²   Body mass index is 40 45 kg/m²  Vitals:    07/07/22 1043   Weight: 132 kg (290 lb)       Physical Exam  Vitals and nursing note reviewed  Constitutional:       General: He is not in acute distress  Appearance: He is well-developed  He is not ill-appearing, toxic-appearing or diaphoretic  HENT:      Head: Normocephalic and atraumatic  Right Ear: External ear normal       Left Ear: External ear normal    Eyes:      General:         Right eye: No discharge  Left eye: No discharge  Conjunctiva/sclera: Conjunctivae normal    Neck:      Thyroid: No thyromegaly  Vascular: No carotid bruit or JVD  Cardiovascular:      Rate and Rhythm: Regular rhythm  Pulses:           Dorsalis pedis pulses are 1+ on the right side and 1+ on the left side  Posterior tibial pulses are 1+ on the right side and 1+ on the left side  Heart sounds: No murmur heard  No gallop  Pulmonary:      Effort: No respiratory distress  Breath sounds: Normal breath sounds  No stridor  No wheezing, rhonchi or rales  Chest:      Chest wall: No tenderness  Abdominal:      General: Bowel sounds are normal  There is no distension  Palpations: There is no mass  Tenderness: There is no abdominal tenderness  There is no rebound  Musculoskeletal:      Right lower leg: No edema  Feet:      Right foot:      Skin integrity: No ulcer, skin breakdown, erythema, warmth, callus or dry skin  Left foot:      Skin integrity: No ulcer, skin breakdown, erythema, warmth, callus or dry skin  Lymphadenopathy:      Cervical: No cervical adenopathy  Skin:     General: Skin is warm  Coloration: Skin is not jaundiced or pale  Findings: No rash  Neurological:      General: No focal deficit present        Mental Status: He is oriented to person, place, and time  Mental status is at baseline  Cranial Nerves: No cranial nerve deficit  Coordination: Coordination normal       Deep Tendon Reflexes: Reflexes normal    Psychiatric:         Mood and Affect: Mood normal          Behavior: Behavior normal          Thought Content: Thought content normal          Judgment: Judgment normal      Diabetic Foot Exam    Patient's shoes and socks removed  Right Foot/Ankle   Right Foot Inspection  Skin Exam: skin normal and skin intact  No dry skin, no warmth, no callus, no erythema, no maceration, no abnormal color, no pre-ulcer, no ulcer and no callus  Toe Exam: ROM and strength within normal limits  no right toe deformity    Sensory   Proprioception: intact  Monofilament testing: intact    Vascular  Capillary refills: < 3 seconds  The right DP pulse is 1+  The right PT pulse is 1+  Left Foot/Ankle  Left Foot Inspection  Skin Exam: skin normal and skin intact  No dry skin, no warmth, no erythema, no maceration, normal color, no pre-ulcer, no ulcer and no callus  Toe Exam: ROM and strength within normal limits  No left toe deformity  Sensory   Proprioception: intact  Monofilament testing: intact    Vascular  Capillary refills: < 3 seconds  The left DP pulse is 1+  The left PT pulse is 1+  BMI Counseling: Body mass index is 40 45 kg/m²  The BMI is above normal  Nutrition recommendations include reducing portion sizes, decreasing overall calorie intake, 3-5 servings of fruits/vegetables daily, reducing fast food intake, consuming healthier snacks and decreasing soda and/or juice intake  Exercise recommendations include exercising 3-5 times per week and joining a gym    Lab Review   Lab on 06/30/2022   Component Date Value Ref Range Status    Sodium 07/01/2022 136  136 - 145 mmol/L Final    Potassium 07/01/2022 4 3  3 5 - 5 3 mmol/L Final    Chloride 07/01/2022 99 (A) 100 - 108 mmol/L Final    CO2 07/01/2022 30  21 - 32 mmol/L Final    ANION GAP 07/01/2022 7  4 - 13 mmol/L Final    BUN 07/01/2022 31 (A) 5 - 25 mg/dL Final    Creatinine 07/01/2022 1 39 (A) 0 60 - 1 30 mg/dL Final    Standardized to IDMS reference method    Glucose, Fasting 07/01/2022 170 (A) 65 - 99 mg/dL Final    Specimen collection should occur prior to Sulfasalazine administration due to the potential for falsely depressed results  Specimen collection should occur prior to Sulfapyridine administration due to the potential for falsely elevated results   Calcium 07/01/2022 10 4 (A) 8 3 - 10 1 mg/dL Final    AST 07/01/2022 15  5 - 45 U/L Final    Specimen collection should occur prior to Sulfasalazine administration due to the potential for falsely depressed results   ALT 07/01/2022 22  12 - 78 U/L Final    Specimen collection should occur prior to Sulfasalazine and/or Sulfapyridine administration due to the potential for falsely depressed results   Alkaline Phosphatase 07/01/2022 41 (A) 46 - 116 U/L Final    Total Protein 07/01/2022 7 7  6 4 - 8 2 g/dL Final    Albumin 07/01/2022 3 6  3 5 - 5 0 g/dL Final    Total Bilirubin 07/01/2022 0 50  0 20 - 1 00 mg/dL Final    Use of this assay is not recommended for patients undergoing treatment with eltrombopag due to the potential for falsely elevated results      eGFR 07/01/2022 52  ml/min/1 73sq m Final    WBC 07/01/2022 9 87  4 31 - 10 16 Thousand/uL Final    RBC 07/01/2022 4 36  3 88 - 5 62 Million/uL Final    Hemoglobin 07/01/2022 14 0  12 0 - 17 0 g/dL Final    Hematocrit 07/01/2022 40 9  36 5 - 49 3 % Final    MCV 07/01/2022 94  82 - 98 fL Final    MCH 07/01/2022 32 1  26 8 - 34 3 pg Final    MCHC 07/01/2022 34 2  31 4 - 37 4 g/dL Final    RDW 07/01/2022 13 1  11 6 - 15 1 % Final    MPV 07/01/2022 9 1  8 9 - 12 7 fL Final    Platelets 08/08/0363 208  149 - 390 Thousands/uL Final    nRBC 07/01/2022 0  /100 WBCs Final    Neutrophils Relative 07/01/2022 37 (A) 43 - 75 % Final    Immat GRANS % 07/01/2022 1  0 - 2 % Final    Lymphocytes Relative 07/01/2022 42  14 - 44 % Final    Monocytes Relative 07/01/2022 9  4 - 12 % Final    Eosinophils Relative 07/01/2022 10 (A) 0 - 6 % Final    Basophils Relative 07/01/2022 1  0 - 1 % Final    Neutrophils Absolute 07/01/2022 3 69  1 85 - 7 62 Thousands/µL Final    Immature Grans Absolute 07/01/2022 0 05  0 00 - 0 20 Thousand/uL Final    Lymphocytes Absolute 07/01/2022 4 21  0 60 - 4 47 Thousands/µL Final    Monocytes Absolute 07/01/2022 0 90  0 17 - 1 22 Thousand/µL Final    Eosinophils Absolute 07/01/2022 0 96 (A) 0 00 - 0 61 Thousand/µL Final    Basophils Absolute 07/01/2022 0 06  0 00 - 0 10 Thousands/µL Final    Cholesterol 07/01/2022 151  See Comment mg/dL Final    Cholesterol:         Pediatric <18 Years        Desirable          <170 mg/dL      Borderline High    170-199 mg/dL      High               >=200 mg/dL        Adult >=18 Years            Desirable         <200 mg/dL      Borderline High   200-239 mg/dL      High              >239 mg/dL      Triglycerides 07/01/2022 77  See Comment mg/dL Final    Triglyceride:     0-9Y            <75mg/dL     10Y-17Y         <90 mg/dL       >=18Y     Normal          <150 mg/dL     Borderline High 150-199 mg/dL     High            200-499 mg/dL        Very High       >499 mg/dL    Specimen collection should occur prior to N-Acetylcysteine or Metamizole administration due to the potential for falsely depressed results   HDL, Direct 07/01/2022 76  >=40 mg/dL Final    Specimen collection should occur prior to Metamizole administration due to the potential for falsley depressed results   LDL Calculated 07/01/2022 60  0 - 100 mg/dL Final    LDL Cholesterol:     Optimal           <100 mg/dl     Near Optimal      100-129 mg/dl     Above Optimal       Borderline High 130-159 mg/dl       High            160-189 mg/dl       Very High       >189 mg/dl         This screening LDL is a calculated result     It does not have the accuracy of the Direct Measured LDL in the monitoring of patients with hyperlipidemia and/or statin therapy  Direct Measure LDL (EVE270) must be ordered separately in these patients   Non-HDL-Chol (CHOL-HDL) 07/01/2022 75  mg/dl Final    Hemoglobin A1C 07/01/2022 7 8 (A) Normal 3 8-5 6%; PreDiabetic 5 7-6 4%; Diabetic >=6 5%; Glycemic control for adults with diabetes <7 0% % Final    EAG 07/01/2022 177  mg/dl Final    PSA 07/01/2022 2 6  0 0 - 4 0 ng/mL Final    American Urological Association Guidelines define biochemical recurrence of prostate cancer as a detectable or rising PSA value post-radical prostatectomy that is greater than or equal to 0 2 ng/mL with a second confirmatory level of greater than or equal to 0 2 ng/mL  Refill on 06/17/2022   Component Date Value Ref Range Status    Creatinine, Ur 07/01/2022 96 9  mg/dL Final    Microalbum  ,U,Random 07/01/2022 65 1 (A) 0 0 - 20 0 mg/L Final    Microalb Creat Ratio 07/01/2022 67 (A) 0 - 30 mg/g creatinine Final         Patient Instructions     Increase your NovoLog 70/30   15 units in the morning and 15 units in the evening instead of 12 units in the morning and 12 units in the evening  Increase your fluid intake and water drinking  Of we will recheck your kidney functions and calcium back in 4 weeks  We will also recheck your diabetes check cholesterol and routine blood test for in 3 months  Consider losing weight you gain 30 lb weight that probably is contributing to your uncontrolled diabetes     Follow with Consultants as per their and our suggestion    Follow up in 12 week(s) or as needed earlier    Follow all instructions as advised and discussed  Take your medications as prescribed  Call the office immediately if you experience any side effects  Ask questions if you do not understand  Keep your scheduled appointment as advised or come sooner if necessary or in doubt      Best time to call for non-urgent matter or questions on weekdays is between 9am and 12 noon  See physician for any new symptoms or worsening of current symptoms  Urgent or emergent situations call 911 and report to nearest emergency room  I spent  30 -40 minutes taking care of this patient including clinical care, conseling, collaboration, chart, lab and consultaion review as appropriate    Patient is to get labs 1 week(s) prior to next visit if advised        Foot Care for People with Diabetes   AMBULATORY CARE:   What you need to know about foot care:   · Foot care helps protect your feet and prevent foot ulcers or sores  Long-term high blood sugar levels can damage the blood vessels and nerves in your legs and feet  This damage makes it hard to feel pressure, pain, temperature, and touch  You may not be able to feel a cut or sore, or shoes that are too tight  Foot care is needed to prevent serious problems, such as an infection or amputation  · Diabetes may cause your toes to become crooked or curved under  These changes may affect the way you walk and can lead to increased pressure on your foot  The pressure can decrease blood flow to your feet  Lack of blood flow increases your risk for a foot ulcer  Do not ignore small problems, such as dry skin or small wounds  These can become life-threatening over time without proper care  Call your care team provider if:   · Your feet become numb, weak, or hard to move  · You have pus draining from a sore on your foot  · You have a wound on your foot that gets bigger, deeper, or does not heal      · You see blisters, cuts, scratches, calluses, or sores on your foot  · You have a fever, and your feet become red, warm, and swollen  · Your toenails become thick, curled, or yellow  · You find it hard to check your feet because your vision is poor  · You have questions or concerns about your condition or care  How to care for your feet:   · Check your feet each day    Look at your whole foot, including the bottom, and between and under your toes  Check for wounds, corns, and calluses  Use a mirror to see the bottom of your feet  The skin on your feet may be shiny, tight, or darker than normal  Your feet may also be cold and pale  Feel your feet by running your hands along the tops, bottoms, sides, and between your toes  Redness, swelling, and warmth are signs of blood flow problems that can lead to a foot ulcer  Do not try to remove corns or calluses yourself  · Wash your feet each day with soap and warm water  Do not use hot water, because this can injure your foot  Dry your feet gently with a towel after you wash them  Dry between and under your toes  · Apply lotion or a moisturizer on your dry feet  Ask your care team provider what lotions are best to use  Do not put lotion or moisturizer between your toes  Moisture between your toes could lead to skin breakdown  · Cut your toenails correctly  File or cut your toenails straight across  Use a soft brush to clean around your toenails  If your toenails are very thick, you may need to have a care team provider or specialist cut them  · Protect your feet  Do not walk barefoot or wear your shoes without socks  Check your shoes for rocks or other objects that can hurt your feet  Wear cotton socks to help keep your feet dry  Wear socks without toe seams, or wear them with the seams inside out  Change your socks each day  Do not wear socks that are dirty or damp  · Wear shoes that fit well  Wear shoes that do not rub against any area of your feet  Your shoes should be ½ to ¾ inch (1 to 2 centimeters) longer than your feet  Your shoes should also have extra space around the widest part of your feet  Walking or athletic shoes with laces or straps that adjust are best  Ask your care team provider for help to choose shoes that fit you best  Ask him or her if you need to wear an insert, orthotic, or bandage on your feet      · Go to your follow-up visits  Your care team provider will do a foot exam at least once a year  You may need a foot exam more often if you have nerve damage, foot deformities, or ulcers  He will check for nerve damage and how well you can feel your feet  He will check your shoes to see if they fit well  · Do not smoke  Smoking can damage your blood vessels and put you at increased risk for foot ulcers  Ask your care team provider for information if you currently smoke and need help to quit  E-cigarettes or smokeless tobacco still contain nicotine  Talk to your care team provider before you use these products  Follow up with your diabetes care team provider or foot specialist as directed: You will need to have your feet checked at least once a year  You may need a foot exam more often if you have nerve damage, foot deformities, or ulcers  Write down your questions so you remember to ask them during your visits  © [employer] 2022 Information is for End User's use only and may not be sold, redistributed or otherwise used for commercial purposes  All illustrations and images included in CareNotes® are the copyrighted property of A D A M , Inc  or [facility] Digna Hallman   The above information is an  only  It is not intended as medical advice for individual conditions or treatments  Talk to your doctor, nurse or pharmacist before following any medical regimen to see if it is safe and effective for you  Medicare Preventive Visit Patient Instructions  Thank you for completing your Welcome to Medicare Visit or Medicare Annual Wellness Visit today  Your next wellness visit will be due in one year (7/8/2023)  The screening/preventive services that you may require over the next 5-10 years are detailed below  Some tests may not apply to you based off risk factors and/or age  Screening tests ordered at today's visit but not completed yet may show as past due   Also, please note that scanned in results may not display below  Preventive Screenings:  Service Recommendations Previous Testing/Comments   Colorectal Cancer Screening  · Colonoscopy    · Fecal Occult Blood Test (FOBT)/Fecal Immunochemical Test (FIT)  · Fecal DNA/Cologuard Test  · Flexible Sigmoidoscopy Age: 54-65 years old   Colonoscopy: every 10 years (May be performed more frequently if at higher risk)  OR  FOBT/FIT: every 1 year  OR  Cologuard: every 3 years  OR  Sigmoidoscopy: every 5 years  Screening may be recommended earlier than age 48 if at higher risk for colorectal cancer  Also, an individualized decision between you and your healthcare provider will decide whether screening between the ages of 74-80 would be appropriate  Colonoscopy: Not on file  FOBT/FIT: Not on file  Cologuard: Not on file  Sigmoidoscopy: Not on file          Prostate Cancer Screening Individualized decision between patient and health care provider in men between ages of 53-78   Medicare will cover every 12 months beginning on the day after your 50th birthday PSA: 2 6 ng/mL           Hepatitis C Screening Once for adults born between 1945 and 1965  More frequently in patients at high risk for Hepatitis C Hep C Antibody: 03/16/2021        Diabetes Screening 1-2 times per year if you're at risk for diabetes or have pre-diabetes Fasting glucose: 170 mg/dL   A1C: 7 8 %        Cholesterol Screening Once every 5 years if you don't have a lipid disorder  May order more often based on risk factors  Lipid panel: 07/01/2022           Other Preventive Screenings Covered by Medicare:  1  Abdominal Aortic Aneurysm (AAA) Screening: covered once if your at risk  You're considered to be at risk if you have a family history of AAA or a male between the age of 73-68 who smoking at least 100 cigarettes in your lifetime    2  Lung Cancer Screening: covers low dose CT scan once per year if you meet all of the following conditions: (1) Age 50-69; (2) No signs or symptoms of lung cancer; (3) Current smoker or have quit smoking within the last 15 years; (4) You have a tobacco smoking history of at least 30 pack years (packs per day x number of years you smoked); (5) You get a written order from a healthcare provider  3  Glaucoma Screening: covered annually if you're considered high risk: (1) You have diabetes OR (2) Family history of glaucoma OR (3)  aged 48 and older OR (3)  American aged 72 and older  3  Osteoporosis Screening: covered every 2 years if you meet one of the following conditions: (1) Have a vertebral abnormality; (2) On glucocorticoid therapy for more than 3 months; (3) Have primary hyperparathyroidism; (4) On osteoporosis medications and need to assess response to drug therapy  5  HIV Screening: covered annually if you're between the age of 12-76  Also covered annually if you are younger than 13 and older than 72 with risk factors for HIV infection  For pregnant patients, it is covered up to 3 times per pregnancy  Immunizations:  Immunization Recommendations   Influenza Vaccine Annual influenza vaccination during flu season is recommended for all persons aged >= 6 months who do not have contraindications   Pneumococcal Vaccine (Prevnar and Pneumovax)  * Prevnar = PCV13  * Pneumovax = PPSV23 Adults 25-60 years old: 1-3 doses may be recommended based on certain risk factors  Adults 72 years old: Prevnar (PCV13) vaccine recommended followed by Pneumovax (PPSV23) vaccine  If already received PPSV23 since turning 65, then PCV13 recommended at least one year after PPSV23 dose  Hepatitis B Vaccine 3 dose series if at intermediate or high risk (ex: diabetes, end stage renal disease, liver disease)   Tetanus (Td) Vaccine - COST NOT COVERED BY MEDICARE PART B Following completion of primary series, a booster dose should be given every 10 years to maintain immunity against tetanus  Td may also be given as tetanus wound prophylaxis     Tdap Vaccine - COST NOT COVERED BY MEDICARE PART B Recommended at least once for all adults  For pregnant patients, recommended with each pregnancy  Shingles Vaccine (Shingrix) - COST NOT COVERED BY MEDICARE PART B  2 shot series recommended in those aged 48 and above     Health Maintenance Due:      Topic Date Due    Colorectal Cancer Screening  Never done    Hepatitis C Screening  Completed     Immunizations Due:      Topic Date Due    COVID-19 Vaccine (1) Never done    Pneumococcal Vaccine: 65+ Years (1 - PCV) Never done    DTaP,Tdap,and Td Vaccines (1 - Tdap) Never done    Influenza Vaccine (1) 09/01/2022     Advance Directives   What are advance directives? Advance directives are legal documents that state your wishes and plans for medical care  These plans are made ahead of time in case you lose your ability to make decisions for yourself  Advance directives can apply to any medical decision, such as the treatments you want, and if you want to donate organs  What are the types of advance directives? There are many types of advance directives, and each state has rules about how to use them  You may choose a combination of any of the following:  · Living will: This is a written record of the treatment you want  You can also choose which treatments you do not want, which to limit, and which to stop at a certain time  This includes surgery, medicine, IV fluid, and tube feedings  · Durable power of  for healthcare [facility]): This is a written record that states who you want to make healthcare choices for you when you are unable to make them for yourself  This person, called a proxy, is usually a family member or a friend  You may choose more than 1 proxy  · Do not resuscitate (DNR) order:  A DNR order is used in case your heart stops beating or you stop breathing  It is a request not to have certain forms of treatment, such as CPR  A DNR order may be included in other types of advance directives    · Medical directive: This covers the care that you want if you are in a coma, near death, or unable to make decisions for yourself  You can list the treatments you want for each condition  Treatment may include pain medicine, surgery, blood transfusions, dialysis, IV or tube feedings, and a ventilator (breathing machine)  · Values history: This document has questions about your views, beliefs, and how you feel and think about life  This information can help others choose the care that you would choose  Why are advance directives important? An advance directive helps you control your care  Although spoken wishes may be used, it is better to have your wishes written down  Spoken wishes can be misunderstood, or not followed  Treatments may be given even if you do not want them  An advance directive may make it easier for your family to make difficult choices about your care  Weight Management   Why it is important to manage your weight:  Being overweight increases your risk of health conditions such as heart disease, high blood pressure, type 2 diabetes, and certain types of cancer  It can also increase your risk for osteoarthritis, sleep apnea, and other respiratory problems  Aim for a slow, steady weight loss  Even a small amount of weight loss can lower your risk of health problems  How to lose weight safely:  A safe and healthy way to lose weight is to eat fewer calories and get regular exercise  You can lose up about 1 pound a week by decreasing the number of calories you eat by 500 calories each day  Healthy meal plan for weight management:  A healthy meal plan includes a variety of foods, contains fewer calories, and helps you stay healthy  A healthy meal plan includes the following:  · Eat whole-grain foods more often  A healthy meal plan should contain fiber  Fiber is the part of grains, fruits, and vegetables that is not broken down by your body   Whole-grain foods are healthy and provide extra fiber in your diet  Some examples of whole-grain foods are whole-wheat breads and pastas, oatmeal, brown rice, and bulgur  · Eat a variety of vegetables every day  Include dark, leafy greens such as spinach, kale, baldev greens, and mustard greens  Eat yellow and orange vegetables such as carrots, sweet potatoes, and winter squash  · Eat a variety of fruits every day  Choose fresh or canned fruit (canned in its own juice or light syrup) instead of juice  Fruit juice has very little or no fiber  · Eat low-fat dairy foods  Drink fat-free (skim) milk or 1% milk  Eat fat-free yogurt and low-fat cottage cheese  Try low-fat cheeses such as mozzarella and other reduced-fat cheeses  · Choose meat and other protein foods that are low in fat  Choose beans or other legumes such as split peas or lentils  Choose fish, skinless poultry (chicken or turkey), or lean cuts of red meat (beef or pork)  Before you cook meat or poultry, cut off any visible fat  · Use less fat and oil  Try baking foods instead of frying them  Add less fat, such as margarine, sour cream, regular salad dressing and mayonnaise to foods  Eat fewer high-fat foods  Some examples of high-fat foods include french fries, doughnuts, ice cream, and cakes  · Eat fewer sweets  Limit foods and drinks that are high in sugar  This includes candy, cookies, regular soda, and sweetened drinks  Exercise:  Exercise at least 30 minutes per day on most days of the week  Some examples of exercise include walking, biking, dancing, and swimming  You can also fit in more physical activity by taking the stairs instead of the elevator or parking farther away from stores  Ask your healthcare provider about the best exercise plan for you  © Copyright [employer] 2018 Information is for End User's use only and may not be sold, redistributed or otherwise used for commercial purposes   All illustrations and images included in CareNotes® are the copyrighted property of [employer]  or [facility] Preventive Visit Patient Instructions  Thank you for completing your Welcome to Medicare Visit or Medicare Annual Wellness Visit today  Your next wellness visit will be due in one year (7/8/2023)  The screening/preventive services that you may require over the next 5-10 years are detailed below  Some tests may not apply to you based off risk factors and/or age  Screening tests ordered at today's visit but not completed yet may show as past due  Also, please note that scanned in results may not display below  Preventive Screenings:  Service Recommendations Previous Testing/Comments   Colorectal Cancer Screening  · Colonoscopy    · Fecal Occult Blood Test (FOBT)/Fecal Immunochemical Test (FIT)  · Fecal DNA/Cologuard Test  · Flexible Sigmoidoscopy Age: 54-65 years old   Colonoscopy: every 10 years (May be performed more frequently if at higher risk)  OR  FOBT/FIT: every 1 year  OR  Cologuard: every 3 years  OR  Sigmoidoscopy: every 5 years  Screening may be recommended earlier than age 48 if at higher risk for colorectal cancer  Also, an individualized decision between you and your healthcare provider will decide whether screening between the ages of 74-80 would be appropriate  Colonoscopy: Not on file  FOBT/FIT: Not on file  Cologuard: Not on file  Sigmoidoscopy: Not on file          Prostate Cancer Screening Individualized decision between patient and health care provider in men between ages of 53-78   Medicare will cover every 12 months beginning on the day after your 50th birthday PSA: 2 6 ng/mL           Hepatitis C Screening Once for adults born between 1945 and 1965  More frequently in patients at high risk for Hepatitis C Hep C Antibody: 03/16/2021        Diabetes Screening 1-2 times per year if you're at risk for diabetes or have pre-diabetes Fasting glucose: 170 mg/dL   A1C: 7 8 %        Cholesterol Screening Once every 5 years if you don't have a lipid disorder   May order more often based on risk factors  Lipid panel: 07/01/2022           Other Preventive Screenings Covered by Medicare:  6  Abdominal Aortic Aneurysm (AAA) Screening: covered once if your at risk  You're considered to be at risk if you have a family history of AAA or a male between the age of 73-68 who smoking at least 100 cigarettes in your lifetime  7  Lung Cancer Screening: covers low dose CT scan once per year if you meet all of the following conditions: (1) Age 50-69; (2) No signs or symptoms of lung cancer; (3) Current smoker or have quit smoking within the last 15 years; (4) You have a tobacco smoking history of at least 30 pack years (packs per day x number of years you smoked); (5) You get a written order from a healthcare provider  8  Glaucoma Screening: covered annually if you're considered high risk: (1) You have diabetes OR (2) Family history of glaucoma OR (3)  aged 48 and older OR (3)  American aged 72 and older  5  Osteoporosis Screening: covered every 2 years if you meet one of the following conditions: (1) Have a vertebral abnormality; (2) On glucocorticoid therapy for more than 3 months; (3) Have primary hyperparathyroidism; (4) On osteoporosis medications and need to assess response to drug therapy  10  HIV Screening: covered annually if you're between the age of 12-76  Also covered annually if you are younger than 13 and older than 72 with risk factors for HIV infection  For pregnant patients, it is covered up to 3 times per pregnancy      Immunizations:  Immunization Recommendations   Influenza Vaccine Annual influenza vaccination during flu season is recommended for all persons aged >= 6 months who do not have contraindications   Pneumococcal Vaccine (Prevnar and Pneumovax)  * Prevnar = PCV13  * Pneumovax = PPSV23 Adults 25-60 years old: 1-3 doses may be recommended based on certain risk factors  Adults 72 years old: Prevnar (PCV13) vaccine recommended followed by Pneumovax (PPSV23) vaccine  If already received PPSV23 since turning 65, then PCV13 recommended at least one year after PPSV23 dose  Hepatitis B Vaccine 3 dose series if at intermediate or high risk (ex: diabetes, end stage renal disease, liver disease)   Tetanus (Td) Vaccine - COST NOT COVERED BY MEDICARE PART B Following completion of primary series, a booster dose should be given every 10 years to maintain immunity against tetanus  Td may also be given as tetanus wound prophylaxis  Tdap Vaccine - COST NOT COVERED BY MEDICARE PART B Recommended at least once for all adults  For pregnant patients, recommended with each pregnancy  Shingles Vaccine (Shingrix) - COST NOT COVERED BY MEDICARE PART B  2 shot series recommended in those aged 48 and above     Health Maintenance Due:      Topic Date Due    Colorectal Cancer Screening  Never done    Hepatitis C Screening  Completed     Immunizations Due:      Topic Date Due    COVID-19 Vaccine (1) Never done    Pneumococcal Vaccine: 65+ Years (1 - PCV) Never done    DTaP,Tdap,and Td Vaccines (1 - Tdap) Never done    Influenza Vaccine (1) 09/01/2022     Advance Directives   What are advance directives? Advance directives are legal documents that state your wishes and plans for medical care  These plans are made ahead of time in case you lose your ability to make decisions for yourself  Advance directives can apply to any medical decision, such as the treatments you want, and if you want to donate organs  What are the types of advance directives? There are many types of advance directives, and each state has rules about how to use them  You may choose a combination of any of the following:  · Living will: This is a written record of the treatment you want  You can also choose which treatments you do not want, which to limit, and which to stop at a certain time  This includes surgery, medicine, IV fluid, and tube feedings     · Durable power of  for [facility]): This is a written record that states who you want to make healthcare choices for you when you are unable to make them for yourself  This person, called a proxy, is usually a family member or a friend  You may choose more than 1 proxy  · Do not resuscitate (DNR) order:  A DNR order is used in case your heart stops beating or you stop breathing  It is a request not to have certain forms of treatment, such as CPR  A DNR order may be included in other types of advance directives  · Medical directive: This covers the care that you want if you are in a coma, near death, or unable to make decisions for yourself  You can list the treatments you want for each condition  Treatment may include pain medicine, surgery, blood transfusions, dialysis, IV or tube feedings, and a ventilator (breathing machine)  · Values history: This document has questions about your views, beliefs, and how you feel and think about life  This information can help others choose the care that you would choose  Why are advance directives important? An advance directive helps you control your care  Although spoken wishes may be used, it is better to have your wishes written down  Spoken wishes can be misunderstood, or not followed  Treatments may be given even if you do not want them  An advance directive may make it easier for your family to make difficult choices about your care  Weight Management   Why it is important to manage your weight:  Being overweight increases your risk of health conditions such as heart disease, high blood pressure, type 2 diabetes, and certain types of cancer  It can also increase your risk for osteoarthritis, sleep apnea, and other respiratory problems  Aim for a slow, steady weight loss  Even a small amount of weight loss can lower your risk of health problems  How to lose weight safely:  A safe and healthy way to lose weight is to eat fewer calories and get regular exercise   You can lose up about 1 pound a week by decreasing the number of calories you eat by 500 calories each day  Healthy meal plan for weight management:  A healthy meal plan includes a variety of foods, contains fewer calories, and helps you stay healthy  A healthy meal plan includes the following:  · Eat whole-grain foods more often  A healthy meal plan should contain fiber  Fiber is the part of grains, fruits, and vegetables that is not broken down by your body  Whole-grain foods are healthy and provide extra fiber in your diet  Some examples of whole-grain foods are whole-wheat breads and pastas, oatmeal, brown rice, and bulgur  · Eat a variety of vegetables every day  Include dark, leafy greens such as spinach, kale, baldev greens, and mustard greens  Eat yellow and orange vegetables such as carrots, sweet potatoes, and winter squash  · Eat a variety of fruits every day  Choose fresh or canned fruit (canned in its own juice or light syrup) instead of juice  Fruit juice has very little or no fiber  · Eat low-fat dairy foods  Drink fat-free (skim) milk or 1% milk  Eat fat-free yogurt and low-fat cottage cheese  Try low-fat cheeses such as mozzarella and other reduced-fat cheeses  · Choose meat and other protein foods that are low in fat  Choose beans or other legumes such as split peas or lentils  Choose fish, skinless poultry (chicken or turkey), or lean cuts of red meat (beef or pork)  Before you cook meat or poultry, cut off any visible fat  · Use less fat and oil  Try baking foods instead of frying them  Add less fat, such as margarine, sour cream, regular salad dressing and mayonnaise to foods  Eat fewer high-fat foods  Some examples of high-fat foods include french fries, doughnuts, ice cream, and cakes  · Eat fewer sweets  Limit foods and drinks that are high in sugar  This includes candy, cookies, regular soda, and sweetened drinks  Exercise:  Exercise at least 30 minutes per day on most days of the week  Some examples of exercise include walking, biking, dancing, and swimming  You can also fit in more physical activity by taking the stairs instead of the elevator or parking farther away from stores  Ask your healthcare provider about the best exercise plan for you  © Copyright [address] 2018 Information is for End User's use only and may not be sold, redistributed or otherwise used for commercial purposes  All illustrations and images included in CareNotes® are the copyrighted property of [employer]  or [employer]       Dr Ximena Hernandez MD  [facility]       "This note has been constructed using a voice recognition system  Therefore there may be syntax, spelling, and/or grammatical errors   Please call if you have any questions  "

## 2022-07-07 NOTE — ASSESSMENT & PLAN NOTE
Chronic allergy symptoms  Recommend to avoid Sudafed  Under care of ENT    Continue local nasal spray saline nasal spray

## 2022-07-07 NOTE — ASSESSMENT & PLAN NOTE
Lab Results   Component Value Date    EGFR 52 07/01/2022    EGFR 71 11/19/2021    EGFR 78 06/11/2021    CREATININE 1 39 (H) 07/01/2022    CREATININE 1 07 11/19/2021    CREATININE 1 00 06/11/2021   Patient's previous GFR was in the range of 60-90  Creatinine went up to 1 39 usual range was 0 9-1 1  We will encouraged to drink plenty of fluid  Recheck basic metabolic profile in 4 weeks  As well as CMP back in 3 months    If kidney function remains high we may have to back off on some of the metformin

## 2022-07-11 ENCOUNTER — APPOINTMENT (EMERGENCY)
Dept: RADIOLOGY | Facility: HOSPITAL | Age: 67
End: 2022-07-11
Payer: MEDICARE

## 2022-07-11 ENCOUNTER — HOSPITAL ENCOUNTER (EMERGENCY)
Facility: HOSPITAL | Age: 67
Discharge: HOME | End: 2022-07-11
Attending: EMERGENCY MEDICINE
Payer: MEDICARE

## 2022-07-11 VITALS
SYSTOLIC BLOOD PRESSURE: 205 MMHG | RESPIRATION RATE: 18 BRPM | TEMPERATURE: 98.4 F | HEART RATE: 98 BPM | OXYGEN SATURATION: 97 % | DIASTOLIC BLOOD PRESSURE: 107 MMHG

## 2022-07-11 DIAGNOSIS — S86.112A GASTROCNEMIUS TEAR, LEFT, INITIAL ENCOUNTER: Primary | ICD-10-CM

## 2022-07-11 PROCEDURE — 99282 EMERGENCY DEPT VISIT SF MDM: CPT | Performed by: PHYSICIAN ASSISTANT

## 2022-07-11 PROCEDURE — 99283 EMERGENCY DEPT VISIT LOW MDM: CPT

## 2022-07-11 PROCEDURE — 73590 X-RAY EXAM OF LOWER LEG: CPT

## 2022-07-11 RX ORDER — MELOXICAM 7.5 MG/1
7.5 TABLET ORAL DAILY
Qty: 5 TABLET | Refills: 0 | Status: SHIPPED | OUTPATIENT
Start: 2022-07-11 | End: 2022-07-11

## 2022-07-11 RX ORDER — MELOXICAM 7.5 MG/1
7.5 TABLET ORAL DAILY
Qty: 5 TABLET | Refills: 0 | Status: SHIPPED | OUTPATIENT
Start: 2022-07-11 | End: 2022-10-13

## 2022-07-11 NOTE — Clinical Note
Melissa Leyva was seen and treated in our emergency department on 7/11/2022  No weight-bearing left leg until cleared by Orthopedics    Diagnosis: Left calf the injury    Blondell Finger    He may return on this date: 07/13/2022         If you have any questions or concerns, please don't hesitate to call        Felicia Whitfield PA-C    ______________________________           _______________          _______________  Hospital Representative                              Date                                Time
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
## 2022-07-11 NOTE — DISCHARGE INSTRUCTIONS
It is not clear whether it is the muscle verses the tendon or both  You must elevate ice rest and follow-up with orthopedics  When walking use the crutches      Return with any worsening symptoms questions, or concerns

## 2022-07-29 ENCOUNTER — OFFICE VISIT (OUTPATIENT)
Dept: OBGYN CLINIC | Facility: CLINIC | Age: 67
End: 2022-07-29
Payer: MEDICARE

## 2022-07-29 ENCOUNTER — HOSPITAL ENCOUNTER (OUTPATIENT)
Dept: NON INVASIVE DIAGNOSTICS | Facility: HOSPITAL | Age: 67
Discharge: HOME | End: 2022-07-29
Attending: FAMILY MEDICINE
Payer: MEDICARE

## 2022-07-29 VITALS
SYSTOLIC BLOOD PRESSURE: 171 MMHG | BODY MASS INDEX: 40.6 KG/M2 | HEIGHT: 71 IN | WEIGHT: 290 LBS | DIASTOLIC BLOOD PRESSURE: 94 MMHG | HEART RATE: 91 BPM

## 2022-07-29 DIAGNOSIS — M79.605 PAIN AND SWELLING OF LOWER EXTREMITY, LEFT: ICD-10-CM

## 2022-07-29 DIAGNOSIS — M79.662 PAIN OF LEFT CALF: ICD-10-CM

## 2022-07-29 DIAGNOSIS — M79.89 PAIN AND SWELLING OF LOWER EXTREMITY, LEFT: ICD-10-CM

## 2022-07-29 DIAGNOSIS — S86.812A STRAIN OF CALF MUSCLE, LEFT, INITIAL ENCOUNTER: Primary | ICD-10-CM

## 2022-07-29 PROCEDURE — 99204 OFFICE O/P NEW MOD 45 MIN: CPT | Performed by: FAMILY MEDICINE

## 2022-07-29 PROCEDURE — 93971 EXTREMITY STUDY: CPT

## 2022-07-29 NOTE — LETTER
July 29, 2022     Patient: Lucianawanda Tawanda  YOB: 1955  Date of Visit: 7/29/2022      To Whom it May Concern:    [place] Bakari is under my professional care  Nellym Lefort was seen in my office on 7/29/2022  Charm Lefort may return to work with limitations - sedentary duty only  No climbing ladders  Limited walking with crutches/boot  If you have any questions or concerns, please don't hesitate to call           Sincerely,          Octavio Saleh MD        CC: Pierce Neff

## 2022-07-29 NOTE — PATIENT INSTRUCTIONS
F/u 2 wks  STAT- Venous duplex LLE-  L calf pain/swelling/injury/immobilization  r/o DVT  Walking boot  Crutches/cane as needed  Icing/heat/OTC pain meds as needed    Begin physical therapy
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
## 2022-07-30 PROCEDURE — 93971 EXTREMITY STUDY: CPT | Performed by: SURGERY

## 2022-08-04 ENCOUNTER — OFFICE VISIT (OUTPATIENT)
Dept: OBGYN CLINIC | Facility: CLINIC | Age: 67
End: 2022-08-04
Payer: MEDICARE

## 2022-08-04 VITALS
HEIGHT: 71 IN | WEIGHT: 290 LBS | HEART RATE: 97 BPM | DIASTOLIC BLOOD PRESSURE: 87 MMHG | BODY MASS INDEX: 40.6 KG/M2 | SYSTOLIC BLOOD PRESSURE: 155 MMHG

## 2022-08-04 DIAGNOSIS — S86.812D STRAIN OF CALF MUSCLE, LEFT, SUBSEQUENT ENCOUNTER: Primary | ICD-10-CM

## 2022-08-04 PROCEDURE — 99213 OFFICE O/P EST LOW 20 MIN: CPT | Performed by: FAMILY MEDICINE

## 2022-08-04 NOTE — LETTER
August 4, 2022     Patient: Ashley Voss  YOB: 1955  Date of Visit: 8/4/2022      To Whom it May Concern:    Reneealan William is under my professional care  Livier Mills was seen in my office on 8/4/2022  Livierrussell Mills may return to work with limitations -  sedentary duty only  No climbing ladders  Limited walking  If you have any questions or concerns, please don't hesitate to call           Sincerely,          Radha Mckeon MD        CC: No Recipients

## 2022-08-04 NOTE — PROGRESS NOTES
[facility] [place] [address]  [facility] [id number] [phone]      Chief Complaint:  Chief Complaint   Patient presents with    Left Leg - Follow-up       Vitals:  /87   Pulse 97   Ht 5' 11" (1 803 m)   Wt 132 kg (290 lb)   BMI 40 45 kg/m²     The following portions of the patient's history were reviewed and updated as appropriate: allergies, current medications, past family history, past medical history, past social history, past surgical history, and problem list       Subjective:   Patient ID: Wes Barraza  is a 78 y o  male  [place] f/u  L calf pain  Having less pain  Walking better  Stopped wearing boot  Taking aspirin PRN  Elevation  Mild pain with steps      Review of Systems   Constitutional: Negative for fatigue and fever  Respiratory: Negative for shortness of breath  Cardiovascular: Negative for chest pain  Gastrointestinal: Negative for abdominal pain and nausea  Genitourinary: Negative for dysuria  Musculoskeletal: Positive for gait problem and myalgias  Skin: Negative for rash and wound  Neurological: Negative for weakness and headaches  Objective:  Ortho Exam    Physical Exam  Constitutional:       Appearance: Normal appearance  He is normal weight  Eyes:      Extraocular Movements: Extraocular movements intact  Pulmonary:      Effort: Pulmonary effort is normal    Musculoskeletal:         General: Swelling and tenderness present  Cervical back: Normal range of motion  Comments: L calf TTP  No pain with resistance to plantarflexion   Skin:     General: Skin is warm and dry  Neurological:      General: No focal deficit present  Mental Status: He is alert and oriented to person, place, and time  Mental status is at baseline  Psychiatric:         Mood and Affect: Mood normal          Behavior: Behavior normal          Thought Content:  Thought content normal          Judgment: Judgment normal                Assessment/Plan:  Assessment/Plan   Diagnoses and all orders for this visit:    Strain of calf muscle, left, subsequent encounter  -     Ambulatory Referral to Physical Therapy; Future        Return in about 4 weeks (around 9/1/2022) for Recheck       Omar Thurston MD

## 2022-08-05 ENCOUNTER — EVALUATION (OUTPATIENT)
Dept: PHYSICAL THERAPY | Facility: CLINIC | Age: 67
End: 2022-08-05
Payer: MEDICARE

## 2022-08-05 DIAGNOSIS — R26.2 DIFFICULTY WALKING: ICD-10-CM

## 2022-08-05 DIAGNOSIS — S86.812D STRAIN OF CALF MUSCLE, LEFT, SUBSEQUENT ENCOUNTER: Primary | ICD-10-CM

## 2022-08-05 PROCEDURE — 97162 PT EVAL MOD COMPLEX 30 MIN: CPT | Performed by: PHYSICAL THERAPIST

## 2022-08-05 PROCEDURE — 97110 THERAPEUTIC EXERCISES: CPT | Performed by: PHYSICAL THERAPIST

## 2022-08-05 PROCEDURE — 97140 MANUAL THERAPY 1/> REGIONS: CPT | Performed by: PHYSICAL THERAPIST
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
## 2022-08-09 ENCOUNTER — OFFICE VISIT (OUTPATIENT)
Dept: PHYSICAL THERAPY | Facility: CLINIC | Age: 67
End: 2022-08-09
Payer: MEDICARE

## 2022-08-09 DIAGNOSIS — R26.2 DIFFICULTY WALKING: ICD-10-CM

## 2022-08-09 DIAGNOSIS — S86.812D STRAIN OF CALF MUSCLE, LEFT, SUBSEQUENT ENCOUNTER: Primary | ICD-10-CM

## 2022-08-09 PROCEDURE — 97110 THERAPEUTIC EXERCISES: CPT | Performed by: PHYSICAL THERAPIST

## 2022-08-09 PROCEDURE — 97140 MANUAL THERAPY 1/> REGIONS: CPT | Performed by: PHYSICAL THERAPIST

## 2022-08-09 NOTE — PROGRESS NOTES
Daily Note     Today's date: 2022  Patient name: Barney Welch  : 1955  MRN: [id number]  Referring provider: Marcela Girard MD  Dx:   Encounter Diagnosis     ICD-10-CM    1  Strain of calf muscle, left, subsequent encounter  S86 812D    2  Difficulty walking  R26 2        Start Time: 820  Stop Time: 920  Total time in clinic (min): 60 minutes    Subjective: Patient states he really isn't getting pain in calf with walking any more  Does c/o stiffness in left ankle when first getting OOB in am       Objective: See treatment diary below      Assessment: Tolerated treatment well  Restriction of TC jt mobility with tightness of medial calf contributing to current symptoms  Tenderness noted along medial gastroc  Patient was given updated HEP this session  Patient would benefit from continued PT working on ankle strength, flexibility and stability  Plan: Continue per plan of care  Progress treatment as tolerated         Precautions: none noted  Progress note:   POC:     Manuals       PROM        Active release/stretch plantarfoot Plantarfoot, calf      TC, midfoot, rearfoot mobs Grade III Grade III-IV      IASTM Graston to calf using GT 4,5 graston to calf using GT 4,5      Neuro Re-Ed        BAPS (cw,ccw)  10x ea      SLB        Tandem   *     airex        Fitter board                        Ther Ex        Nustep  L1 10 min      Ankle pumps 30x 30x      ABCs 1x 1x      Ankle TB all planes        HR  10x      TR  10x      Towel scrunch  2 min      Toe yoga  2 min      [place] pickup        Arch lifts        gastroc stretch With strap :30x3 With strap :30x3      soleus stretch With strap :30x3 With strap :30x3      Hamstring stretch  Seated 3x:30                                              Ther Activity                        Gait Training                        Modalities        cryo                Barney Welch  was given a handout and verbal instruction of customized home exercise program  Patient accepted program and was able to demonstrate exercises  Access Code: [email or web address]  URL: [email or web address]  Date: 08/05/2022  Prepared by: Emily Quiroga    Exercises  · Seated Ankle Pumps - 3 x daily - 7 x weekly - 3 sets - 10 reps  · Seated Ankle Alphabet - 3 x daily - 7 x weekly - 3 reps  · Seated Gastroc Stretch with Strap - 3 x daily - 7 x weekly - 1 sets - 3 reps - 30 sec hold  · Long Sitting Soleus Stretch on Bolster with Strap - 3 x daily - 7 x weekly - 1 sets - 3 reps - 30 sec hold

## 2022-08-12 ENCOUNTER — OFFICE VISIT (OUTPATIENT)
Dept: PHYSICAL THERAPY | Facility: CLINIC | Age: 67
End: 2022-08-12
Payer: MEDICARE

## 2022-08-12 DIAGNOSIS — S86.812D STRAIN OF CALF MUSCLE, LEFT, SUBSEQUENT ENCOUNTER: Primary | ICD-10-CM

## 2022-08-12 DIAGNOSIS — R26.2 DIFFICULTY WALKING: ICD-10-CM

## 2022-08-12 PROCEDURE — 97110 THERAPEUTIC EXERCISES: CPT | Performed by: PHYSICAL THERAPIST

## 2022-08-12 PROCEDURE — 97140 MANUAL THERAPY 1/> REGIONS: CPT | Performed by: PHYSICAL THERAPIST

## 2022-08-12 PROCEDURE — 97112 NEUROMUSCULAR REEDUCATION: CPT | Performed by: PHYSICAL THERAPIST

## 2022-08-12 NOTE — PROGRESS NOTES
Daily Note     Today's date: 2022  Patient name: Celestine Baker  : 1955  MRN: [id number]  Referring provider: Anselmo Lawton MD  Dx:   Encounter Diagnosis     ICD-10-CM    1  Strain of calf muscle, left, subsequent encounter  S86 812D    2  Difficulty walking  R26 2        Start Time: 0830  Stop Time: 0920  Total time in clinic (min): 50 minutes    Subjective: C/o numbness when waking in 3rd-5th digits  Overall is feeling improvement, has been consistent with HEP  Objective: See treatment diary below      Assessment: Tolerated treatment well  Continued tightness and restriction of medial left calf with limited mobility of TC jt  Tenderness to deep palpation of medial calf with some pitting edema  Patient would benefit from continued PT      Plan: Continue per plan of care  Progress treatment as tolerated         Precautions: none noted  Progress note:   POC:     Manuals      PROM        Active release/stretch plantarfoot Plantarfoot, calf Plantarfoot, calf     TC, midfoot, rearfoot mobs Grade III Grade III-IV Grade III-IV     IASTM Graston to calf using GT 4,5 graston to calf using GT 4,5 graston to calf using GT 4,5     Neuro Re-Ed        BAPS (cw,ccw)  10x ea 10x ea     SLB        Tandem   2x:30 bilat     airex        Fitter board                        Ther Ex        Nustep  L1 10 min L2 10 min     Ankle pumps 30x 30x      ABCs 1x 1x      Ankle TB all planes        HR  10x 3x10     TR  10x 3x10     Towel scrunch  2 min 2 min     Toe yoga  2 min 2 min     [place] pickup        Arch lifts        gastroc stretch With strap :30x3 With strap :30x3 Wedge 3x:30     soleus stretch With strap :30x3 With strap :30x3      Hamstring stretch  Seated 3x:30 Seated 3x:30                                             Ther Activity                        Gait Training                        Modalities        cryo                Celestine Baker  was given a handout and verbal instruction of customized home exercise program  Patient accepted program and was able to demonstrate exercises  Access Code: [email or web address]  URL: [email or web address]  Date: 08/05/2022  Prepared by: Belgica Valera    Exercises  · Seated Ankle Pumps - 3 x daily - 7 x weekly - 3 sets - 10 reps  · Seated Ankle Alphabet - 3 x daily - 7 x weekly - 3 reps  · Seated Gastroc Stretch with Strap - 3 x daily - 7 x weekly - 1 sets - 3 reps - 30 sec hold  · Long Sitting Soleus Stretch on Bolster with Strap - 3 x daily - 7 x weekly - 1 sets - 3 reps - 30 sec hold

## 2022-08-16 ENCOUNTER — OFFICE VISIT (OUTPATIENT)
Dept: PHYSICAL THERAPY | Facility: CLINIC | Age: 67
End: 2022-08-16
Payer: MEDICARE

## 2022-08-16 DIAGNOSIS — R26.2 DIFFICULTY WALKING: ICD-10-CM

## 2022-08-16 DIAGNOSIS — S86.812D STRAIN OF CALF MUSCLE, LEFT, SUBSEQUENT ENCOUNTER: Primary | ICD-10-CM

## 2022-08-16 PROCEDURE — 97140 MANUAL THERAPY 1/> REGIONS: CPT | Performed by: PHYSICAL THERAPIST

## 2022-08-16 PROCEDURE — 97112 NEUROMUSCULAR REEDUCATION: CPT | Performed by: PHYSICAL THERAPIST

## 2022-08-16 PROCEDURE — 97110 THERAPEUTIC EXERCISES: CPT | Performed by: PHYSICAL THERAPIST

## 2022-08-16 NOTE — PROGRESS NOTES
Daily Note     Today's date: 2022  Patient name: Janey Looney  : 1955  MRN: [id number]  Referring provider: Eloisa Carter MD  Dx:   Encounter Diagnosis     ICD-10-CM    1  Strain of calf muscle, left, subsequent encounter  S86 812D    2  Difficulty walking  R26 2        Start Time: 0830  Stop Time: 2885  Total time in clinic (min): 55 minutes    Subjective: Stiffness in ankle in am, improves with movement  Also c/o low leg "itching" when getting OOBin am        Objective: See treatment diary below      Assessment: Tolerated treatment well  Improved tone noted during MT today, although still restricted  Noted continued restriction of TC and mid foot mobility  Instructed patient to attempt back extensions in am to see if they cause a change with low leg symptoms  Will reassess next sessions  Patient would benefit from continued PT      Plan: Continue per plan of care  Progress treatment as tolerated         Precautions: none noted  Progress note:   POC:     Manuals     PROM        Active release/stretch plantarfoot Plantarfoot, calf Plantarfoot, calf Plantarfoot, calf    TC, midfoot, rearfoot mobs Grade III Grade III-IV Grade III-IV Grade III-IV    IASTM Graston to calf using GT 4,5 graston to calf using GT 4,5 graston to calf using GT 4,5 graston to calf using GT 4,5,3    Neuro Re-Ed        BAPS (cw,ccw)  10x ea 10x ea 10x ea    SLB        Tandem   2x:30 bilat 2x:30 bilat    airex        Fitter board    10x ea                    Ther Ex        Nustep  L1 10 min L2 10 min L3 10 min    Ankle pumps 30x 30x      ABCs 1x 1x      Ankle TB all planes        HR  10x 3x10 3x10    TR  10x 3x10 3x10    Towel scrunch  2 min 2 min 2 min    Toe yoga  2 min 2 min 2 min    [place] pickup        Arch lifts        gastroc stretch With strap :30x3 With strap :30x3 Wedge 3x:30 Wedge 3x:30    soleus stretch With strap :30x3 With strap :30x3      Hamstring stretch  Seated 3x:30 Seated 3x:30 Seated 3x:30                                            Ther Activity                        Gait Training                        Modalities        cryo                Ines Childers  was given a handout and verbal instruction of customized home exercise program  Patient accepted program and was able to demonstrate exercises  Access Code: [email or web address]  URL: [email or web address]  Date: 08/05/2022  Prepared by: Marga Terrell    Exercises  · Seated Ankle Pumps - 3 x daily - 7 x weekly - 3 sets - 10 reps  · Seated Ankle Alphabet - 3 x daily - 7 x weekly - 3 reps  · Seated Gastroc Stretch with Strap - 3 x daily - 7 x weekly - 1 sets - 3 reps - 30 sec hold  · Long Sitting Soleus Stretch on Bolster with Strap - 3 x daily - 7 x weekly - 1 sets - 3 reps - 30 sec hold

## 2022-08-19 ENCOUNTER — OFFICE VISIT (OUTPATIENT)
Dept: PHYSICAL THERAPY | Facility: CLINIC | Age: 67
End: 2022-08-19
Payer: MEDICARE

## 2022-08-19 DIAGNOSIS — R26.2 DIFFICULTY WALKING: ICD-10-CM

## 2022-08-19 DIAGNOSIS — S86.812D STRAIN OF CALF MUSCLE, LEFT, SUBSEQUENT ENCOUNTER: Primary | ICD-10-CM

## 2022-08-19 PROCEDURE — 97112 NEUROMUSCULAR REEDUCATION: CPT | Performed by: PHYSICAL THERAPIST

## 2022-08-19 PROCEDURE — 97110 THERAPEUTIC EXERCISES: CPT | Performed by: PHYSICAL THERAPIST

## 2022-08-19 PROCEDURE — 97140 MANUAL THERAPY 1/> REGIONS: CPT | Performed by: PHYSICAL THERAPIST

## 2022-08-19 NOTE — PROGRESS NOTES
Daily Note     Today's date: 2022  Patient name: Tay Garcia  : 1955  MRN: [id number]  Referring provider: Tianna Lane MD  Dx:   Encounter Diagnosis     ICD-10-CM    1  Strain of calf muscle, left, subsequent encounter  S86 812D    2  Difficulty walking  R26 2        Start Time: 0815  Stop Time: 0910  Total time in clinic (min): 55 minutes    Subjective: States the most tender area has been his medial calf, otherwise is feeling good  Does continue to feel tightness in anterior ankle  Objective: See treatment diary below      Assessment: Tolerated treatment well  Continued restriction of left TC jt, AP and PA contributing to symptoms of stiffness  Included self mob into program today and was instructed to continue at home  Showing progress with tone of left calf, still with hypertone and TTP of medial portion  Patient would benefit from continued PT      Plan: Continue per plan of care  Progress treatment as tolerated         Precautions: none noted  Progress note:   POC:     Manuals    PROM        Active release/stretch plantarfoot Plantarfoot, calf Plantarfoot, calf Plantarfoot, calf Plantarfoot, calf   TC, midfoot, rearfoot mobs Grade III Grade III-IV Grade III-IV Grade III-IV Grade III-IV   IASTM Graston to calf using GT 4,5 graston to calf using GT 4,5 graston to calf using GT 4,5 graston to calf using GT 4,5,3 graston to calf using GT 4,5,3   Neuro Re-Ed        BAPS (cw,ccw)  10x ea 10x ea 10x ea 10x ea   SLB        Tandem   2x:30 bilat 2x:30 bilat 2x:30 bilat   airex        Fitter board    10x ea 10x ea                   Ther Ex        Nustep  L1 10 min L2 10 min L3 10 min L3 10 min   Ankle pumps 30x 30x      ABCs 1x 1x      Ankle TB all planes        HR  10x 3x10 3x10 3x10   TR  10x 3x10 3x10 3x10   Towel scrunch  2 min 2 min 2 min 2 min   Toe yoga  2 min 2 min 2 min 2 min   [place] pickup        Arch lifts        gastroc stretch With strap :30x3 With strap :30x3 Wedge 3x:30 Wedge 3x:30 Wedge 3x:30   soleus stretch With strap :30x3 With strap :30x3      Hamstring stretch  Seated 3x:30 Seated 3x:30 Seated 3x:30 Seated 3x:30   DF mob on step     :10x10                                   Ther Activity                        Gait Training                        Modalities        trista Bertrand  was given a handout and verbal instruction of customized home exercise program  Patient accepted program and was able to demonstrate exercises  Access Code: [email or web address]  URL: [email or web address]  Date: 08/05/2022  Prepared by: Chelsea Yeboah    Exercises  · Seated Ankle Pumps - 3 x daily - 7 x weekly - 3 sets - 10 reps  · Seated Ankle Alphabet - 3 x daily - 7 x weekly - 3 reps  · Seated Gastroc Stretch with Strap - 3 x daily - 7 x weekly - 1 sets - 3 reps - 30 sec hold  · Long Sitting Soleus Stretch on Bolster with Strap - 3 x daily - 7 x weekly - 1 sets - 3 reps - 30 sec hold

## 2022-08-23 ENCOUNTER — OFFICE VISIT (OUTPATIENT)
Dept: PHYSICAL THERAPY | Facility: CLINIC | Age: 67
End: 2022-08-23
Payer: MEDICARE

## 2022-08-23 DIAGNOSIS — S86.812D STRAIN OF CALF MUSCLE, LEFT, SUBSEQUENT ENCOUNTER: Primary | ICD-10-CM

## 2022-08-23 DIAGNOSIS — R26.2 DIFFICULTY WALKING: ICD-10-CM

## 2022-08-23 PROCEDURE — 97110 THERAPEUTIC EXERCISES: CPT | Performed by: PHYSICAL THERAPIST

## 2022-08-23 PROCEDURE — 97140 MANUAL THERAPY 1/> REGIONS: CPT | Performed by: PHYSICAL THERAPIST

## 2022-08-23 PROCEDURE — 97112 NEUROMUSCULAR REEDUCATION: CPT | Performed by: PHYSICAL THERAPIST

## 2022-08-23 NOTE — PROGRESS NOTES
Daily Note     Today's date: 2022  Patient name: Mag Talavera  : 1955  MRN: [id number]  Referring provider: Fide Maldonado MD  Dx:   Encounter Diagnosis     ICD-10-CM    1  Strain of calf muscle, left, subsequent encounter  S86 812D    2  Difficulty walking  R26 2        Start Time: 0845  Stop Time: 09  Total time in clinic (min): 45 minutes    Subjective: Patient states no pain, only itching at night and in am making it difficult to sleep  Objective: See treatment diary below      Assessment: Tolerated treatment well  Decreased pitting edema noted at medial calf with improved tone and flexibility  Showing good improvement towards goals  Discussed desensitization techniques for itching, but also discussed speaking with referring provider or PCP about possible topical for itching  Patient would benefit from continued PT      Plan: Continue per plan of care  Progress treatment as tolerated         Precautions: none noted  Progress note:   POC:     Manuals    PROM        Active release/stretch Plantarfoot, calf Plantarfoot, calf Plantarfoot, calf Plantarfoot, calf Plantarfoot, calf   TC, midfoot, rearfoot mobs Grade III-IV Grade III-IV Grade III-IV Grade III-IV Grade III-IV   IASTM graston to calf using GT 4,5,3 graston to calf using GT 4,5 graston to calf using GT 4,5 graston to calf using GT 4,5,3 graston to calf using GT 4,5,3   Neuro Re-Ed        BAPS (cw,ccw) 10x ea 10x ea 10x ea 10x ea 10x ea   SLB        Tandem 3x:30 bilat  2x:30 bilat 2x:30 bilat 2x:30 bilat   airex        Fitter board 10x ea   10x ea 10x ea                   Ther Ex        Nustep L3 10 min L1 10 min L2 10 min L3 10 min L3 10 min   Ankle pumps  30x      ABCs  1x      Ankle TB all planes        HR 3x10 10x 3x10 3x10 3x10   TR 3x10 10x 3x10 3x10 3x10   Towel scrunch 2 min 2 min 2 min 2 min 2 min   Toe yoga 2 min 2 min 2 min 2 min 2 min   [place] pickup        Arch lifts        gastroc stretch Wedge 3x:30 With strap :30x3 Wedge 3x:30 Wedge 3x:30 Wedge 3x:30   soleus stretch  With strap :30x3      Hamstring stretch Seated 3x:30 Seated 3x:30 Seated 3x:30 Seated 3x:30 Seated 3x:30   DF mob on step :10x10    :10x10                                   Ther Activity                        Gait Training                        Modalities        trista Talavera  was given a handout and verbal instruction of customized home exercise program  Patient accepted program and was able to demonstrate exercises  Access Code: [email or web address]  URL: [email or web address]  Date: 08/05/2022  Prepared by: Klarissa Gonzalez    Exercises  · Seated Ankle Pumps - 3 x daily - 7 x weekly - 3 sets - 10 reps  · Seated Ankle Alphabet - 3 x daily - 7 x weekly - 3 reps  · Seated Gastroc Stretch with Strap - 3 x daily - 7 x weekly - 1 sets - 3 reps - 30 sec hold  · Long Sitting Soleus Stretch on Bolster with Strap - 3 x daily - 7 x weekly - 1 sets - 3 reps - 30 sec hold

## 2022-08-26 ENCOUNTER — EVALUATION (OUTPATIENT)
Dept: PHYSICAL THERAPY | Facility: CLINIC | Age: 67
End: 2022-08-26
Payer: MEDICARE

## 2022-08-26 DIAGNOSIS — R26.2 DIFFICULTY WALKING: ICD-10-CM

## 2022-08-26 DIAGNOSIS — E10.9 TYPE 1 DIABETES MELLITUS WITHOUT COMPLICATION (HCC): ICD-10-CM

## 2022-08-26 DIAGNOSIS — S86.812D STRAIN OF CALF MUSCLE, LEFT, SUBSEQUENT ENCOUNTER: Primary | ICD-10-CM

## 2022-08-26 PROCEDURE — 97112 NEUROMUSCULAR REEDUCATION: CPT

## 2022-08-26 PROCEDURE — 97110 THERAPEUTIC EXERCISES: CPT | Performed by: PHYSICAL THERAPIST

## 2022-08-26 PROCEDURE — 97140 MANUAL THERAPY 1/> REGIONS: CPT

## 2022-08-26 RX ORDER — PEN NEEDLE, DIABETIC 32GX 5/32"
NEEDLE, DISPOSABLE MISCELLANEOUS 2 TIMES DAILY
Qty: 100 EACH | Refills: 4 | Status: SHIPPED | OUTPATIENT
Start: 2022-08-26

## 2022-08-26 NOTE — PROGRESS NOTES
PT Re-Evaluation  and PT Discharge    Today's date: 2022  Patient name: Marsha Ann  : 1955  MRN: [id number]  Referring provider: Kimberly Valdez MD  Dx:   Encounter Diagnosis     ICD-10-CM    1  Strain of calf muscle, left, subsequent encounter  S86 812D    2  Difficulty walking  R26 2        Start Time: 0804  Stop Time: 0934  Total time in clinic (min): 46 minutes      Flowsheet Rows    Flowsheet Row Most Recent Value   PT/OT G-Codes    Current Score 57   Projected Score 71       Assessment  Assessment details: Marsha Ann  was seen for an initial PT evaluation and 6 f/u sessions  Patient is a 78 y o  male with diagnosis of left calf strain and past medical history significant for DM type 2, HTN, Stage 3 CKD, hypercholesteremia, tachycardia, obesity  FOTO functional score shows improvement from 47% at intake to 57% today progressing towards predicted value of 71%  Findings of examination today show return to baseline with left ankle range of motion and stability  Minor deficit with left plantar flexor strength which was addressed in home exercise program  Did note new onset of redness in left lower leg with streaking and petechiae, recommended patient consult PCP or urgent care for possible cellulitis  At this time patient no longer requires skilled care and will be discharged from PT at this time  Home exercise program was reviewed  Goals  STG (6 weeks)  1  Patient will have reported 0/10 pain in left calf at rest  - MET   2  Improve patient's left ankle DF to 10 degrees for increased ability to take proper strides during ambulation  - MET   3  Increase patient's left single leg balance to 5 seconds for increased stability on stairs  LTG (12 weeks)  1  Patient's LE strength will be equal bilaterally for ability to ambulate and return to functional activities at PLOF  - PROGRESSING  2  Patient will be able to return to work with 0/10 pain in left calf     3  Patient will be independent with home exercise program for continued maintenance post PT discharge  - MET       Plan  Plan details: DC PT  Plan of Care beginning date: 2022  Treatment plan discussed with: patient and PTA        Subjective Evaluation    History of Present Illness  Date of onset: 2022  Subjective : Patient states 100% improvement since the start of PT  Will occasionally have random "zings" of pain in medial left calf, itch of skin is more bothersome than anything else (has been improving with cortisone treatment)  Does have am numbness in toes  Some fatigue with prolonged standing and walking  Mechanism of injury: Jordan Elam  is a 78 y o  male who presents to outpatient Physical Therapy today with complaints of left calf pain  States approximately 2 weeks ago he was pushing a broke down car off the road when he felt "like something hit my leg"  Had immediate pain and difficulty walking  Progressively worsening and he went to ED  Dx with calf strain, saw ortho who Rx CAM boot which was removed yesterday at f/u  Instructed to begin PT  To return in 1 month  C/o swelling at end of day    Pain      Current pain ratin  0  At best pain ratin  0  At worst pain ratin  3  Location: posterior calf and ankle  Relieving factors: medications    Social Support    Employment status: working (large machine  (ladders, kneeling, walking, 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, crawling))  Patient Goals  Patient goals for therapy: return to work and decreased pain  Patient goal: decrease swelling, improve walking        Objective     Observations   Left Ankle/Foot   Positive for edema and effusion  : (+) lower leg redness with anterior shin streaking and petechiae  Palpation   Left   Hypertonic in the lateral gastrocnemius  Tenderness of the lateral gastrocnemius and medial gastrocnemius       Tenderness     Additional Tenderness Details  Medial calf    Neurological Testing     Sensation Ankle/Foot   Left Ankle/Foot   Hypersensation: light touch    Right Ankle/Foot   Intact: light touch     Active Range of Motion   8/26   Left Ankle/Foot   Dorsiflexion (ke): 5 degrees with pain  15 pain free  Plantar flexion: 30 degrees    40 stiff  Inversion: 30 degrees     40  Eversion: 30 degrees     30    Right Ankle/Foot   Dorsiflexion (ke): 15 degrees   Plantar flexion: 40 degrees   Inversion: 40 degrees   Eversion: 30 degrees         Joint Play   Left Ankle/Foot  Hypomobile in the talocrural joint, subtalar joint, midfoot and forefoot       Strength/Myotome Testing  8/26    Left Ankle/Foot   Dorsiflexion: 5  Plantar flexion: 4+   4+ SL HR  Inversion: 5  Eversion: 5    Right Ankle/Foot   Normal strength    Swelling   Left Ankle/Foot   Figure 8: 62 5 cm    Right Ankle/Foot   Figure 8: 65 cm  8/26: equal bilat             Precautions: none noted      Manuals 8/23 8/26 8/12 8/16 8/19   PROM        Active release/stretch Plantarfoot, calf Plantarfoot, calf Plantarfoot, calf Plantarfoot, calf Plantarfoot, calf   TC, midfoot, rearfoot mobs Grade III-IV Grade III-IV Grade III-IV Grade III-IV Grade III-IV   IASTM graston to calf using GT 4,5,3 graston to calf using GT 4,5,3 graston to calf using GT 4,5 graston to calf using GT 4,5,3 graston to calf using GT 4,5,3   Neuro Re-Ed        BAPS (cw,ccw) 10x ea  10x ea 10x ea 10x ea   SLB        Tandem 3x:30 bilat 3x:30 bilat 2x:30 bilat 2x:30 bilat 2x:30 bilat   airex        Fitter board 10x ea   10x ea 10x ea                   Ther Ex        Nustep L3 10 min L1 10 min L2 10 min L3 10 min L3 10 min   Ankle pumps        ABCs        Ankle TB all planes        HR 3x10 3x10 3x10 3x10 3x10   TR 3x10 3x10 3x10 3x10 3x10   Towel scrunch 2 min 2 min 2 min 2 min 2 min   Toe yoga 2 min 2 min 2 min 2 min 2 min   [place] pickup        Arch lifts        gastroc stretch Wedge 3x:30 Strap :30x3 Wedge 3x:30 Wedge 3x:30 Wedge 3x:30   soleus stretch        Hamstring stretch Seated 3x:30 Seated :30x3 Seated 3x:30 Seated 3x:30 Seated 3x:30   DF mob on step :10x10    :10x10                                   Ther Activity                        Gait Training                        Modalities        trista Marrero Delphine  was given a handout and verbal instruction of customized home exercise program  Patient accepted program and was able to demonstrate exercises  Access Code: [email or web address]  URL: [email or web address]  Date: 08/05/2022  Prepared by: Carlos Mak    Exercises  · Seated Ankle Pumps - 3 x daily - 7 x weekly - 3 sets - 10 reps  · Seated Ankle Alphabet - 3 x daily - 7 x weekly - 3 reps  · Seated Gastroc Stretch with Strap - 3 x daily - 7 x weekly - 1 sets - 3 reps - 30 sec hold  · Long Sitting Soleus Stretch on Bolster with Strap - 3 x daily - 7 x weekly - 1 sets - 3 reps - 30 sec hold

## 2022-08-30 ENCOUNTER — APPOINTMENT (OUTPATIENT)
Dept: PHYSICAL THERAPY | Facility: CLINIC | Age: 67
End: 2022-08-30
Payer: MEDICARE

## 2022-09-01 ENCOUNTER — OFFICE VISIT (OUTPATIENT)
Dept: OBGYN CLINIC | Facility: CLINIC | Age: 67
End: 2022-09-01
Payer: MEDICARE

## 2022-09-01 VITALS
WEIGHT: 299 LBS | SYSTOLIC BLOOD PRESSURE: 147 MMHG | DIASTOLIC BLOOD PRESSURE: 81 MMHG | BODY MASS INDEX: 41.86 KG/M2 | HEART RATE: 85 BPM | HEIGHT: 71 IN

## 2022-09-01 DIAGNOSIS — R21 RASH: ICD-10-CM

## 2022-09-01 DIAGNOSIS — S86.812D STRAIN OF CALF MUSCLE, LEFT, SUBSEQUENT ENCOUNTER: ICD-10-CM

## 2022-09-01 DIAGNOSIS — L03.116 CELLULITIS AND ABSCESS OF LEFT LEG: Primary | ICD-10-CM

## 2022-09-01 DIAGNOSIS — L02.416 CELLULITIS AND ABSCESS OF LEFT LEG: Primary | ICD-10-CM

## 2022-09-01 PROCEDURE — 99214 OFFICE O/P EST MOD 30 MIN: CPT | Performed by: FAMILY MEDICINE

## 2022-09-01 RX ORDER — CEPHALEXIN 500 MG/1
500 CAPSULE ORAL EVERY 8 HOURS SCHEDULED
Qty: 21 CAPSULE | Refills: 0 | Status: SHIPPED | OUTPATIENT
Start: 2022-09-01 | End: 2022-09-08

## 2022-09-01 RX ORDER — TRIAMCINOLONE ACETONIDE 1 MG/G
CREAM TOPICAL 2 TIMES DAILY
Qty: 30 G | Refills: 0 | Status: SHIPPED | OUTPATIENT
Start: 2022-09-01

## 2022-09-01 NOTE — PROGRESS NOTES
[facility] [place] [address]  [facility] [address]  [phone]      Chief Complaint:  Chief Complaint   Patient presents with    Left Leg - Follow-up       Vitals:  /81   Pulse 85   Ht 5' 11" (1 803 m)   Wt 136 kg (299 lb)   BMI 41 70 kg/m²     The following portions of the patient's history were reviewed and updated as appropriate: allergies, current medications, past family history, past medical history, past social history, past surgical history, and problem list       Subjective:   Patient ID: Sherley Baker  is a 78 y o  male  [place] f/u  L calf pain  No pain  Finished PT  Doing much better  He also has a rash on lower leg- itching/ using steroid cream- helps a little  Walking better  Denies new soap/detergent/creams  Rash started about 4 wks ago      Review of Systems   Constitutional: Negative for fatigue and fever  Respiratory: Negative for shortness of breath  Cardiovascular: Negative for chest pain  Gastrointestinal: Negative for abdominal pain and nausea  Genitourinary: Negative for dysuria  Skin: Positive for rash  Negative for wound  Neurological: Negative for weakness and headaches  Objective:  Ortho Exam    Physical Exam  Constitutional:       Appearance: Normal appearance  He is normal weight  Eyes:      Extraocular Movements: Extraocular movements intact  Pulmonary:      Effort: Pulmonary effort is normal    Musculoskeletal:      Cervical back: Normal range of motion  Comments: L calf no TTP  Pos swelling  LLE- erythematous plaque- rash ant/posterior lower leg  No TTP   Skin:     General: Skin is warm and dry  Neurological:      General: No focal deficit present  Mental Status: He is alert and oriented to person, place, and time  Mental status is at baseline  Psychiatric:         Mood and Affect: Mood normal          Behavior: Behavior normal          Thought Content:  Thought content normal          Judgment: Judgment normal                Assessment/Plan:  Assessment/Plan   There are no diagnoses linked to this encounter  No follow-ups on file       Ren Norris MD

## 2022-10-02 DIAGNOSIS — R00.0 TACHYCARDIA: ICD-10-CM

## 2022-10-02 DIAGNOSIS — I10 ESSENTIAL HYPERTENSION: ICD-10-CM

## 2022-10-02 RX ORDER — METOPROLOL SUCCINATE 50 MG/1
TABLET, EXTENDED RELEASE ORAL
Qty: 90 TABLET | Refills: 0 | Status: SHIPPED | OUTPATIENT
Start: 2022-10-02 | End: 2022-10-13 | Stop reason: DRUGHIGH

## 2022-10-06 ENCOUNTER — RA CDI HCC (OUTPATIENT)
Dept: OTHER | Facility: HOSPITAL | Age: 67
End: 2022-10-06

## 2022-10-06 DIAGNOSIS — I10 ESSENTIAL HYPERTENSION: ICD-10-CM

## 2022-10-06 RX ORDER — HYDROCHLOROTHIAZIDE 25 MG/1
25 TABLET ORAL DAILY
Qty: 90 TABLET | Refills: 0 | Status: SHIPPED | OUTPATIENT
Start: 2022-10-06 | End: 2022-10-13 | Stop reason: SDUPTHER

## 2022-10-06 RX ORDER — ATORVASTATIN CALCIUM 40 MG/1
TABLET, FILM COATED ORAL
Qty: 90 TABLET | Refills: 0 | Status: SHIPPED | OUTPATIENT
Start: 2022-10-06 | End: 2022-10-13 | Stop reason: SDUPTHER

## 2022-10-06 RX ORDER — AMLODIPINE BESYLATE 10 MG/1
TABLET ORAL
Qty: 90 TABLET | Refills: 0 | Status: SHIPPED | OUTPATIENT
Start: 2022-10-06 | End: 2022-10-13 | Stop reason: SDUPTHER

## 2022-10-06 NOTE — PROGRESS NOTES
[facility] 75  coding opportunities          Chart Reviewed number of suggestions sent to Provider: 2  E11 65  E11 22, N18 30   Patients Insurance     Medicare Insurance: [employer]

## 2022-10-07 DIAGNOSIS — E11.29 TYPE 2 DIABETES MELLITUS WITH MICROALBUMINURIA, WITH LONG-TERM CURRENT USE OF INSULIN (HCC): ICD-10-CM

## 2022-10-07 DIAGNOSIS — I10 ESSENTIAL HYPERTENSION: ICD-10-CM

## 2022-10-07 DIAGNOSIS — R80.9 TYPE 2 DIABETES MELLITUS WITH MICROALBUMINURIA, WITH LONG-TERM CURRENT USE OF INSULIN (HCC): ICD-10-CM

## 2022-10-07 DIAGNOSIS — Z79.4 TYPE 2 DIABETES MELLITUS WITH MICROALBUMINURIA, WITH LONG-TERM CURRENT USE OF INSULIN (HCC): ICD-10-CM

## 2022-10-07 RX ORDER — LOSARTAN POTASSIUM 100 MG/1
TABLET ORAL
Qty: 90 TABLET | Refills: 0 | Status: SHIPPED | OUTPATIENT
Start: 2022-10-07 | End: 2022-10-13 | Stop reason: SDUPTHER

## 2022-10-11 ENCOUNTER — LAB (OUTPATIENT)
Dept: LAB | Facility: CLINIC | Age: 67
End: 2022-10-11
Payer: MEDICARE

## 2022-10-11 DIAGNOSIS — Z79.4 TYPE 2 DIABETES MELLITUS WITH MICROALBUMINURIA, WITH LONG-TERM CURRENT USE OF INSULIN (HCC): ICD-10-CM

## 2022-10-11 DIAGNOSIS — R80.9 TYPE 2 DIABETES MELLITUS WITH MICROALBUMINURIA, WITH LONG-TERM CURRENT USE OF INSULIN (HCC): ICD-10-CM

## 2022-10-11 DIAGNOSIS — K75.81 STEATOHEPATITIS: ICD-10-CM

## 2022-10-11 DIAGNOSIS — E78.00 HYPERCHOLESTEREMIA: ICD-10-CM

## 2022-10-11 DIAGNOSIS — I10 ESSENTIAL HYPERTENSION: ICD-10-CM

## 2022-10-11 DIAGNOSIS — E11.29 TYPE 2 DIABETES MELLITUS WITH MICROALBUMINURIA, WITH LONG-TERM CURRENT USE OF INSULIN (HCC): ICD-10-CM

## 2022-10-11 DIAGNOSIS — N18.30 STAGE 3 CHRONIC KIDNEY DISEASE, UNSPECIFIED WHETHER STAGE 3A OR 3B CKD (HCC): ICD-10-CM

## 2022-10-11 PROBLEM — Z00.00 MEDICARE ANNUAL WELLNESS VISIT, SUBSEQUENT: Status: RESOLVED | Noted: 2020-11-24 | Resolved: 2022-10-11

## 2022-10-11 LAB
ALBUMIN SERPL BCP-MCNC: 3.7 G/DL (ref 3.5–5)
ALP SERPL-CCNC: 44 U/L (ref 46–116)
ALT SERPL W P-5'-P-CCNC: 27 U/L (ref 12–78)
ANION GAP SERPL CALCULATED.3IONS-SCNC: 5 MMOL/L (ref 4–13)
AST SERPL W P-5'-P-CCNC: 14 U/L (ref 5–45)
BILIRUB SERPL-MCNC: 0.45 MG/DL (ref 0.2–1)
BUN SERPL-MCNC: 22 MG/DL (ref 5–25)
CALCIUM SERPL-MCNC: 9.8 MG/DL (ref 8.3–10.1)
CHLORIDE SERPL-SCNC: 100 MMOL/L (ref 96–108)
CHOLEST SERPL-MCNC: 165 MG/DL
CO2 SERPL-SCNC: 29 MMOL/L (ref 21–32)
CREAT SERPL-MCNC: 1.18 MG/DL (ref 0.6–1.3)
CREAT UR-MCNC: 81.7 MG/DL
EST. AVERAGE GLUCOSE BLD GHB EST-MCNC: 209 MG/DL
GFR SERPL CREATININE-BSD FRML MDRD: 63 ML/MIN/1.73SQ M
GLUCOSE P FAST SERPL-MCNC: 225 MG/DL (ref 65–99)
HBA1C MFR BLD: 8.9 %
HDLC SERPL-MCNC: 81 MG/DL
LDLC SERPL CALC-MCNC: 47 MG/DL (ref 0–100)
MICROALBUMIN UR-MCNC: 179 MG/L (ref 0–20)
MICROALBUMIN/CREAT 24H UR: 219 MG/G CREATININE (ref 0–30)
NONHDLC SERPL-MCNC: 84 MG/DL
POTASSIUM SERPL-SCNC: 4.2 MMOL/L (ref 3.5–5.3)
PROT SERPL-MCNC: 7.4 G/DL (ref 6.4–8.4)
SODIUM SERPL-SCNC: 134 MMOL/L (ref 135–147)
TRIGL SERPL-MCNC: 186 MG/DL

## 2022-10-11 PROCEDURE — 82043 UR ALBUMIN QUANTITATIVE: CPT | Performed by: INTERNAL MEDICINE

## 2022-10-11 PROCEDURE — 82570 ASSAY OF URINE CREATININE: CPT | Performed by: INTERNAL MEDICINE

## 2022-10-11 PROCEDURE — 36415 COLL VENOUS BLD VENIPUNCTURE: CPT

## 2022-10-11 PROCEDURE — 83036 HEMOGLOBIN GLYCOSYLATED A1C: CPT

## 2022-10-11 PROCEDURE — 80061 LIPID PANEL: CPT

## 2022-10-11 PROCEDURE — 80053 COMPREHEN METABOLIC PANEL: CPT

## 2022-10-13 ENCOUNTER — OFFICE VISIT (OUTPATIENT)
Dept: INTERNAL MEDICINE CLINIC | Facility: CLINIC | Age: 67
End: 2022-10-13
Payer: MEDICARE

## 2022-10-13 VITALS
BODY MASS INDEX: 40.88 KG/M2 | SYSTOLIC BLOOD PRESSURE: 152 MMHG | HEIGHT: 71 IN | HEART RATE: 99 BPM | OXYGEN SATURATION: 97 % | DIASTOLIC BLOOD PRESSURE: 80 MMHG | WEIGHT: 292 LBS | TEMPERATURE: 97.8 F

## 2022-10-13 DIAGNOSIS — I10 ESSENTIAL HYPERTENSION: ICD-10-CM

## 2022-10-13 DIAGNOSIS — N18.30 STAGE 3 CHRONIC KIDNEY DISEASE, UNSPECIFIED WHETHER STAGE 3A OR 3B CKD (HCC): Primary | ICD-10-CM

## 2022-10-13 DIAGNOSIS — J30.1 ALLERGIC RHINITIS DUE TO POLLEN, UNSPECIFIED SEASONALITY: ICD-10-CM

## 2022-10-13 DIAGNOSIS — E66.01 CLASS 2 SEVERE OBESITY DUE TO EXCESS CALORIES WITH SERIOUS COMORBIDITY AND BODY MASS INDEX (BMI) OF 35.0 TO 35.9 IN ADULT (HCC): ICD-10-CM

## 2022-10-13 DIAGNOSIS — R80.9 TYPE 2 DIABETES MELLITUS WITH MICROALBUMINURIA, WITH LONG-TERM CURRENT USE OF INSULIN (HCC): ICD-10-CM

## 2022-10-13 DIAGNOSIS — Z79.4 TYPE 2 DIABETES MELLITUS WITH MICROALBUMINURIA, WITH LONG-TERM CURRENT USE OF INSULIN (HCC): ICD-10-CM

## 2022-10-13 DIAGNOSIS — R00.0 TACHYCARDIA: ICD-10-CM

## 2022-10-13 DIAGNOSIS — E78.00 HYPERCHOLESTEREMIA: ICD-10-CM

## 2022-10-13 DIAGNOSIS — E11.29 TYPE 2 DIABETES MELLITUS WITH MICROALBUMINURIA, WITH LONG-TERM CURRENT USE OF INSULIN (HCC): ICD-10-CM

## 2022-10-13 DIAGNOSIS — E55.9 VITAMIN D DEFICIENCY: ICD-10-CM

## 2022-10-13 PROCEDURE — 99214 OFFICE O/P EST MOD 30 MIN: CPT | Performed by: INTERNAL MEDICINE

## 2022-10-13 RX ORDER — HYDROCHLOROTHIAZIDE 25 MG/1
25 TABLET ORAL DAILY
Qty: 90 TABLET | Refills: 0 | Status: SHIPPED | OUTPATIENT
Start: 2022-10-13

## 2022-10-13 RX ORDER — LOSARTAN POTASSIUM 100 MG/1
100 TABLET ORAL DAILY
Qty: 90 TABLET | Refills: 1 | Status: SHIPPED | OUTPATIENT
Start: 2022-10-13

## 2022-10-13 RX ORDER — ATORVASTATIN CALCIUM 40 MG/1
40 TABLET, FILM COATED ORAL DAILY
Qty: 90 TABLET | Refills: 0 | Status: SHIPPED | OUTPATIENT
Start: 2022-10-13

## 2022-10-13 RX ORDER — INSULIN ASPART 100 [IU]/ML
20 INJECTION, SUSPENSION SUBCUTANEOUS
Qty: 36 ML | Refills: 1 | Status: SHIPPED | OUTPATIENT
Start: 2022-10-13 | End: 2023-01-11

## 2022-10-13 RX ORDER — METOPROLOL SUCCINATE 100 MG/1
100 TABLET, EXTENDED RELEASE ORAL DAILY
Qty: 90 TABLET | Refills: 1 | Status: SHIPPED | OUTPATIENT
Start: 2022-10-13 | End: 2023-01-11

## 2022-10-13 RX ORDER — AMLODIPINE BESYLATE 10 MG/1
10 TABLET ORAL DAILY
Qty: 90 TABLET | Refills: 0 | Status: SHIPPED | OUTPATIENT
Start: 2022-10-13

## 2022-10-13 NOTE — ASSESSMENT & PLAN NOTE
Lab Results   Component Value Date    ALT 27 10/11/2022    AST 14 10/11/2022    ALKPHOS 44 (L) 10/11/2022    BILITOT 0 5 06/06/2018   Continue to monitor LFT

## 2022-10-13 NOTE — ASSESSMENT & PLAN NOTE
Continue vitamin-D3 2000 unit daily Quality 431: Preventive Care And Screening: Unhealthy Alcohol Use - Screening: Patient not identified as an unhealthy alcohol user when screened for unhealthy alcohol use using a systematic screening method Quality 111:Pneumonia Vaccination Status For Older Adults: Pneumococcal Vaccination Previously Received Quality 110: Preventive Care And Screening: Influenza Immunization: Influenza Immunization previously received during influenza season Quality 226: Preventive Care And Screening: Tobacco Use: Screening And Cessation Intervention: Patient screened for tobacco use and is an ex/non-smoker Quality 130: Documentation Of Current Medications In The Medical Record: Current Medications Documented Detail Level: Detailed

## 2022-10-13 NOTE — ASSESSMENT & PLAN NOTE
Lab Results   Component Value Date    HGBA1C 8 9 (H) 10/11/2022   Diabetes suboptimal control  Last year around same time it was 6 7 and then it went up to 7 8 hemoglobin A1c  We talked about weight diet exercise weight loss  Currently taking metformin 1000 mg twice a day,    Currently on NovoLog mix 70/30  15 units twice a day    Will increase NovoLog mix 70/30 20 units twice  A day    Mild to check sugar 2 to 3 times a day and we will meet with him in 6 weeks advised to bring Home Sugar records

## 2022-10-13 NOTE — ASSESSMENT & PLAN NOTE
CMP reviewed  Sodium 134  Heart rate is 99  Blood pressure at home is in the range of 150  Blood pressure here is twice high in the same range of 150  Home diastolic around 90  Currently on hydrochlorothiazide 25 mg daily losartan 100 mg daily and metoprolol succinate 50 mg daily as well as amlodipine 10 mg daily    Plan will be to increase metoprolol succinate 100 mg daily  Continue hydrochlorothiazide 25 mg daily, , continue amlodipine 10 mg daily and continue losartan 100 mg daily  recommend lifestyle modifications low-salt diet exercise weight loss on the they all will help     Will recheck blood pressure back in 6 weeks

## 2022-10-13 NOTE — ASSESSMENT & PLAN NOTE
Lab Results   Component Value Date    LDLCALC 47 10/11/2022       Lab Results   Component Value Date    CHOLESTEROL 165 10/11/2022    CHOLESTEROL 151 07/01/2022    CHOLESTEROL 165 11/19/2021     Lab Results   Component Value Date    HDL 81 10/11/2022    HDL 76 07/01/2022    HDL 84 11/19/2021     Lab Results   Component Value Date    TRIG 186 (H) 10/11/2022    TRIG 77 07/01/2022    TRIG 109 11/19/2021     Lab Results   Component Value Date    NONHDLC 84 10/11/2022    [place] 75 07/01/2022    [place] 81 11/19/2021     Lab Results   Component Value Date    ALT 27 10/11/2022    ALT 17 06/06/2018     Continue atorvastatin 40 mg daily continue low-cholesterol diet

## 2022-10-13 NOTE — PROGRESS NOTES
Name: Janes Akers  : 1955      MRN: [id number]  Encounter Provider: Rajesh Espino MD  Encounter Date: 10/13/2022   Encounter department: [address]     1  Stage 3 chronic kidney disease, unspecified whether stage 3a or 3b CKD [facility])  Assessment & Plan:  Lab Results   Component Value Date    EGFR 63 10/11/2022    EGFR 52 2022    EGFR 71 2021    CREATININE 1 18 10/11/2022    CREATININE 1 39 (H) 2022    CREATININE 1 07 2021   GFR is baseline  Creat is baseline  Recommend periodic blood test for monitoring of BUN and Creat  Avoid Non Steroidal Antiinflammatory such as ibuprofen, aleve etc   Potassium, HCO3 and calcium are wnl and will require periodic blood test   Bone and Mineral disease monitoring as appropriate  All patient with GFR less than 30( CKD 4 or 5 or 6) advised to follow with nephrologist         2  Essential hypertension  Assessment & Plan:  CMP reviewed  Sodium 134  Heart rate is 99  Blood pressure at home is in the range of 150  Blood pressure here is twice high in the same range of 150  Home diastolic around 90  Currently on hydrochlorothiazide 25 mg daily losartan 100 mg daily and metoprolol succinate 50 mg daily as well as amlodipine 10 mg daily    Plan will be to increase metoprolol succinate 100 mg daily  Continue hydrochlorothiazide 25 mg daily, , continue amlodipine 10 mg daily and continue losartan 100 mg daily  recommend lifestyle modifications low-salt diet exercise weight loss on the they all will help     Will recheck blood pressure back in 6 weeks    Orders:  -     amLODIPine (NORVASC) 10 mg tablet; Take 1 tablet (10 mg total) by mouth daily  -     atorvastatin (LIPITOR) 40 mg tablet; Take 1 tablet (40 mg total) by mouth daily  -     hydrochlorothiazide (HYDRODIURIL) 25 mg tablet; Take 1 tablet (25 mg total) by mouth daily  -     losartan (COZAAR) 100 MG tablet;  Take 1 tablet (100 mg total) by mouth daily  -     metoprolol succinate (TOPROL-XL) 100 mg 24 hr tablet; Take 1 tablet (100 mg total) by mouth daily    3  Tachycardia  Assessment & Plan:  Increase metoprolol succinate 100 mg daily  Check TSH and free T4 in the future    Orders:  -     metoprolol succinate (TOPROL-XL) 100 mg 24 hr tablet; Take 1 tablet (100 mg total) by mouth daily    4  Type 2 diabetes mellitus with microalbuminuria, with long-term current use of insulin [facility])  Assessment & Plan:    Lab Results   Component Value Date    HGBA1C 8 9 (H) 10/11/2022   Diabetes suboptimal control  Last year around same time it was 6 7 and then it went up to 7 8 hemoglobin A1c  We talked about weight diet exercise weight loss  Currently taking metformin 1000 mg twice a day,  Currently on NovoLog mix 70/30  15 units twice a day    Will increase NovoLog mix 70/30 20 units twice  A day    Mild to check sugar 2 to 3 times a day and we will meet with him in 6 weeks advised to bring Home Sugar records    Orders:  -     metFORMIN (GLUCOPHAGE) 1000 MG tablet; Take 1 tablet (1,000 mg total) by mouth 2 (two) times a day  -     insulin aspart protamine-insulin aspart (NovoLOG Mix 70/30 FlexPen) 100 Units/mL injection pen; Inject 20 Units under the skin 2 (two) times a day before meals    5   Hypercholesteremia  Assessment & Plan:  Lab Results   Component Value Date    LDLCALC 47 10/11/2022       Lab Results   Component Value Date    CHOLESTEROL 165 10/11/2022    CHOLESTEROL 151 07/01/2022    CHOLESTEROL 165 11/19/2021     Lab Results   Component Value Date    HDL 81 10/11/2022    HDL 76 07/01/2022    HDL 84 11/19/2021     Lab Results   Component Value Date    TRIG 186 (H) 10/11/2022    TRIG 77 07/01/2022    TRIG 109 11/19/2021     Lab Results   Component Value Date    [place] 84 10/11/2022    [place] 75 07/01/2022    [place] 81 11/19/2021     Lab Results   Component Value Date    ALT 27 10/11/2022    ALT 17 06/06/2018     Continue atorvastatin 40 mg daily continue low-cholesterol diet        6  Class 2 severe obesity due to excess calories with serious comorbidity and body mass index (BMI) of 35 0 to 35 9 in adult [facility])  Assessment & Plan:  BMI is high discussed with the patient's he could    Do better he will try his best   To lose weight      BMI Counseling: The BMI is above normal  Know Body weight goal    Weigh yourself daily or weekly as per your doctor    Nutrition recommendations include decreasing portion sizes, encouraging healthy choices of fruits and vegetables, decreasing     fast food intake, consuming healthier snacks, limiting drinks that contain sugar, moderation in carbohydrate intake, increasing     intake of lean protein, reducing intake of saturated and trans fat and reducing intake of cholesterol          7  Vitamin D deficiency  Assessment & Plan:  Continue vitamin-D3 2000 unit daily      8  Allergic rhinitis due to pollen, unspecified seasonality  Assessment & Plan:  Allergy not bad this year  Recommend saline nasal spray p r n  Subjective     The patient is here for chronic disease management  Patient is here after long time be due to insurance he could not come here for a while  Hypertension:    Home blood pressure usually is excellent  He does have a tendency to have high blood pressure here  His heart rate is fair at this time  His symptom-free from the hypertension standpoint remains on amlodipine 10 mg daily  Hydrochlorothiazide 25 mg daily  Losartan 100 mg daily  Metoprolol succinate 100 mg in the morning and 50 mg in the evening  Of    He gain 30 lb weight probably contributing to his uncontrolled diabetes and uncontrolled blood pressure  The BMI is 40  Patient advised  Diabetes:  Currently he is on NovoLog 70/30 12 units twice a day    Denies any polydipsia polyuria  Home sugar in the range of 175-200 in the morning and 200-225 in the evening checks twice a day    Of recommend to follow-up and stay up-to-date with eye examination  Diabetic foot examination done today  Denies any hypoglycemic event  Also remains on metformin  We also talked about renal functions and metformin related issues  Hemoglobin A1c went up  7 8    Allergic rhinitis more symptomatic this time of the year under care of ENT physicians remains on nasal spray  Of recommend not to take Sudafed he takes at times  Symptoms are well controlled  He is also on Astelin nasal spray  Hyperlipidemia LDL is80 currently is on atorvastatin 40 mg daily  The PSA 1 8 11-8-2020, 07/01/2022:  2 6    CKD 2:  Creatinine went up 1 39 GFR dropped  He is not drinking enough fluid  Remains on hydrochlorothiazide as well as multiple other medications  Of he does not have any significant voiding problem  GFR is 52  Calcium is slightly up 10 4  All this is interrelated issues reviewed with him at length  Recommend to do a increase fluid intake recheck BMP in 4 weeks and CMP in 12 weeks  He is willing to go for that    Steatohepatitis unchanged  Now no other specific     11/19/2021:  Glucose 109, LFTs normal, electrolytes normal, hemoglobin A1c 6 7, cholesterol 165 LDL 59 HDL 84 urine for microalbumin 69  06/11/2021:   How BMP sodium 135 rest BMP normal LFT normal blood sugar 151 GFR 78 hemoglobin A1c 8 6 urine for microalbumin/creatinine ratio 4 cholesterol 174, LDL 80,  A 03/16/2021:  Hemoglobin A1c 8 8, cholesterol 178 LDL 61 hepatitis C negative  06/29/2020 CMP normal except blood sugar 84, hemoglobin A1c 6 2  rest CMP and lipid profile unremarkable     07/01/2022:  CMP normal except creatinine up to 1 39 GFR 52 calcium a 10 4 lipid profile normal PSA 2 6, hemoglobin A1c 7 8, LFT normal, CBC normal urine for microalbumin/creatinine ratio 67      Lab on 10/11/2022  Sodium                    Value: 134(mmol/L) (A)    Dt: 10/11/2022  Potassium                 Value: 4 2(mmol/L)        Dt: 10/11/2022  Chloride Value: 100(mmol/L)        Dt: 10/11/2022  CO2                       Value: 29(mmol/L)         Dt: 10/11/2022  ANION GAP                 Value: 5(mmol/L)          Dt: 10/11/2022  BUN                       Value: 22(mg/dL)          Dt: 10/11/2022  Creatinine                Value: 1 18(mg/dL)        Dt: 10/11/2022  Glucose, Fasting          Value: 225(mg/dL) (A)     Dt: 10/11/2022  Calcium                   Value: 9 8(mg/dL)         Dt: 10/11/2022  AST                       Value: 14(U/L)            Dt: 10/11/2022  ALT                       Value: 27(U/L)            Dt: 10/11/2022  Alkaline Phosphatase      Value: 44(U/L) (A)        Dt: 10/11/2022  Total Protein             Value: 7 4(g/dL)          Dt: 10/11/2022  Albumin                   Value: 3 7(g/dL)          Dt: 10/11/2022  Total Bilirubin           Value: 0 45(mg/dL)        Dt: 10/11/2022  eGFR                      Value: 63(ml/min/1 73sq m) Dt: 10/11/2022  Hemoglobin A1C            Value: 8 9(%) (A)         Dt: 10/11/2022  EAG                       Value: 209(mg/dl)         Dt: 10/11/2022  Cholesterol               Value: 165(mg/dL)         Dt: 10/11/2022  Triglycerides             Value: 186(mg/dL) (A)     Dt: 10/11/2022  HDL, Direct               Value: 81(mg/dL)          Dt: 10/11/2022  LDL Calculated            Value: 47(mg/dL)          Dt: 10/11/2022  Non-HDL-Chol (CHOL-HDL)   Value: 84(mg/dl)          Dt: 10/11/2022  ------------ - 2 weeks          Review of Systems   Constitutional: Negative for chills, fatigue, fever and unexpected weight change  HENT: Negative for ear pain, postnasal drip, sinus pain and sore throat  Eyes: Negative for pain and redness  Respiratory: Negative for cough and shortness of breath  Cardiovascular: Negative for chest pain, palpitations and leg swelling  Gastrointestinal: Negative for abdominal pain, diarrhea and nausea  Endocrine: Negative for cold intolerance, polydipsia and polyuria     Genitourinary: Negative for dysuria, frequency and urgency  Musculoskeletal: Negative for arthralgias, gait problem and myalgias  Skin: Negative for rash  Allergic/Immunologic: Negative  Neurological: Negative for dizziness and headaches  Hematological: Negative for adenopathy  Psychiatric/Behavioral: Negative for behavioral problems, confusion, decreased concentration, dysphoric mood, hallucinations, self-injury, sleep disturbance and suicidal ideas  The patient is not nervous/anxious and is not hyperactive  Past Medical History:   Diagnosis Date   • Hypertension      History reviewed  No pertinent surgical history  History reviewed  No pertinent family history  Social History     Socioeconomic History   • Marital status: /Civil Union     Spouse name: None   • Number of children: None   • Years of education: None   • Highest education level: None   Occupational History   • None   Tobacco Use   • Smoking status: Former Smoker   • Smokeless tobacco: Never Used   Substance and Sexual Activity   • Alcohol use:  Yes     Alcohol/week: 4 0 standard drinks     Types: 4 Shots of liquor per week   • Drug use: Never   • Sexual activity: None   Other Topics Concern   • None   Social History Narrative   • None     Social Determinants of Health     Financial Resource Strain: Not on file   Food Insecurity: Not on file   Transportation Needs: Not on file   Physical Activity: Not on file   Stress: Not on file   Social Connections: Not on file   Intimate Partner Violence: Not on file   Housing Stability: Not on file     Current Outpatient Medications on File Prior to Visit   Medication Sig   • ASA-APAP-Caff Buffered 227-194-33 MG TABS Take by mouth   • aspirin (ECOTRIN LOW STRENGTH) 81 mg EC tablet Take 81 mg by mouth daily   • azelastine (ASTELIN) 0 1 % nasal spray 1 spray into each nostril 2 (two) times a day Use in each nostril as directed   • BD Pen Needle Katelyn U/F 32G X 4 MM MISC Inject as directed 2 (two) times a day   • Cholecalciferol (VITAMIN D3) 2000 units TABS Take 2,000 Units by mouth daily   • Glucose Blood (ONETOUCH ULTRA BLUE VI) 1 Units by In Vitro route 3 (three) times a day   • glucose blood (OneTouch Ultra) test strip Test 3 times daily   • Krill Oil 1000 MG CAPS Take 1 capsule by mouth daily   • ofloxacin (FLOXIN) 0 3 % otic solution INSTILL 1 DROP TWICE DAILY INTO EACH EAR FOR 10 DAYS   • triamcinolone (KENALOG) 0 1 % cream Apply topically 2 (two) times a day   • vitamin E, tocopherol, 400 units capsule Take 400 Units by mouth daily   • [DISCONTINUED] amLODIPine (NORVASC) 10 mg tablet TAKE 1 TABLET BY MOUTH EVERY DAY   • [DISCONTINUED] atorvastatin (LIPITOR) 40 mg tablet TAKE 1 TABLET BY MOUTH EVERY DAY   • [DISCONTINUED] hydrochlorothiazide (HYDRODIURIL) 25 mg tablet TAKE 1 TABLET (25 MG TOTAL) BY MOUTH DAILY  • [DISCONTINUED] insulin aspart protamine-insulin aspart (NovoLOG Mix 70/30 FlexPen) 100 Units/mL injection pen Inject 12 Units under the skin 2 (two) times a day before meals (Patient taking differently: Inject 15 Units under the skin 2 (two) times a day before meals)   • [DISCONTINUED] losartan (COZAAR) 100 MG tablet TAKE 1 TABLET BY MOUTH EVERY DAY   • [DISCONTINUED] metFORMIN (GLUCOPHAGE) 1000 MG tablet TAKE 1 TABLET BY MOUTH TWICE A DAY   • [DISCONTINUED] metoprolol succinate (TOPROL-XL) 50 mg 24 hr tablet TAKE 1 TABLET BY MOUTH EVERY DAY   • [DISCONTINUED] meloxicam (Mobic) 7 5 mg tablet Take 1 tablet (7 5 mg total) by mouth daily (Patient not taking: Reported on 10/13/2022)   • [DISCONTINUED] metoprolol succinate (TOPROL-XL) 100 mg 24 hr tablet Take 1 tablet (100 mg total) by mouth daily (Patient not taking: Reported on 10/13/2022)   • [DISCONTINUED] Pseudoeph-HYDROcodone (PSEUDOEPHEDRINE-HYDROCODONE PO) Take 1 tablet by mouth 2 (two) times a day (Patient not taking: Reported on 10/13/2022)     No Known Allergies    There is no immunization history on file for this patient      Objective     BP 152/80   Pulse 99   Temp 97 8 °F (36 6 °C)   Ht 5' 11" (1 803 m)   Wt 132 kg (292 lb)   SpO2 97%   BMI 40 73 kg/m²     Physical Exam  Constitutional:       General: He is not in acute distress  Appearance: He is well-developed  He is not ill-appearing or diaphoretic  HENT:      Head: Normocephalic and atraumatic  Right Ear: External ear normal       Left Ear: External ear normal    Eyes:      General: Lids are normal          Right eye: No discharge  Left eye: No discharge  Conjunctiva/sclera: Conjunctivae normal    Neck:      Thyroid: No thyroid mass or thyromegaly  Vascular: No carotid bruit or JVD  Trachea: Trachea normal    Cardiovascular:      Rate and Rhythm: Regular rhythm  Heart sounds: Normal heart sounds  Pulmonary:      Effort: No respiratory distress  Breath sounds: Normal breath sounds  No wheezing or rales  Abdominal:      General: Bowel sounds are normal  There is no distension  Palpations: There is no mass  Tenderness: There is no rebound  Musculoskeletal:      Right lower leg: No edema  Left lower leg: No edema  Lymphadenopathy:      Cervical: No cervical adenopathy  Skin:     General: Skin is warm  Coloration: Skin is not jaundiced or pale  Findings: No rash  Neurological:      General: No focal deficit present  Mental Status: He is alert and oriented to person, place, and time  Mental status is at baseline  Coordination: Coordination normal    Psychiatric:         Behavior: Behavior normal          Judgment: Judgment normal      Recently tore/sprain left calf muscle orthopedic notes were noted  Much better at this time  Not taking nonsteroidal anymore anyway function is better    Vandana Richardson MD

## 2022-10-13 NOTE — ASSESSMENT & PLAN NOTE
Lab Results   Component Value Date    EGFR 63 10/11/2022    EGFR 52 07/01/2022    EGFR 71 11/19/2021    CREATININE 1 18 10/11/2022    CREATININE 1 39 (H) 07/01/2022    CREATININE 1 07 11/19/2021   GFR is baseline  Creat is baseline  Recommend periodic blood test for monitoring of BUN and Creat  Avoid Non Steroidal Antiinflammatory such as ibuprofen, aleve etc   Potassium, HCO3 and calcium are wnl and will require periodic blood test   Bone and Mineral disease monitoring as appropriate    All patient with GFR less than 30( CKD 4 or 5 or 6) advised to follow with nephrologist

## 2022-10-13 NOTE — PATIENT INSTRUCTIONS
Plan will be to increase metoprolol succinate 100 mg daily  Continue hydrochlorothiazide 25 mg daily , continue amlodipine 10 mg daily and continue losartan 100 mg daily  recommend lifestyle modifications low-salt diet exercise weight loss on the they all will help     Will recheck blood pressure back in 6 weeks    Will increase NovoLog mix 70/30 20 units twice  A day    Mild to check sugar 2 to 3 times a day and we will meet with him in 6 weeks advised to bring Home Sugar records    Follow with Consultants as per their and our suggestion    Follow up in 6  week(s) or as needed earlier    Follow all instructions as advised and discussed  Take your medications as prescribed  Call the office immediately if you experience any side effects  Ask questions if you do not understand  Keep your scheduled appointment as advised or come sooner if necessary or in doubt  Best time to call for non-urgent matter or questions on weekdays is between 9am and 12 noon  See physician for any new symptoms or worsening of current symptoms  Urgent or emergent situations call 911 and report to nearest emergency room  I spent  30 minutes taking care of this patient including clinical care, conseling, collaboration, chart, lab and consultaion review  [facility]  BMI Counseling:       The BMI is above normal  Know Body weight goal    Weigh yourself daily or weekly as per your doctor    Nutrition recommendations include decreasing portion sizes, encouraging healthy choices of fruits and vegetables, decreasing     fast food intake, consuming healthier snacks, limiting drinks that contain sugar, moderation in carbohydrate intake, increasing     intake of lean protein, reducing intake of saturated and trans fat and reducing intake of cholesterol    Check your fingerstick Accu-Chek three times a day and any time you feel like having a low sugar, Document and bring record with you every visit    Please check your fingerstick Accu-Chek different time of the day either at 7:00 a m ,11:00 a m  4 p m , 9:00 p m ( Check three different times out these four times)    Diabetic diet for diabetes as advised  and Snacks at night time    If you would sugar less than 80 or more than 300 to please call us on next visit is day  If the sugar is less than 80 follow-up hypoglycemia instructions as advised  You and your family members should know and be prepared how to handle low sugar symptoms and/or low sugar number  See your eye doctor yearly or per them    Take your diabetic medicine as advised  Know your goal of HBA1c and urine for protein  Basic Carbohydrate Counting   AMBULATORY CARE:   Carbohydrate counting  is a way to plan your meals by counting the amount of carbohydrate in foods  Carbohydrates are the sugars, starches, and fiber found in fruit, grains, vegetables, and milk products  Carbohydrates increase your blood sugar levels  Carbohydrate counting can help you eat the right amount of carbohydrate to keep your blood sugar levels under control  What you need to know about planning meals using carbohydrate counting:  · A dietitian or healthcare provider will help you develop a healthy meal plan that works best for you  You will be taught how much carbohydrate to eat or drink for each meal and snack  Your meal plan will be based on your age, weight, usual food intake, and physical activity level  If you have diabetes, it will also include your blood sugar levels and diabetes medicine  Once you know how much carbohydrate you should eat, you can decide what type of food you want to eat  · You will need to know what foods contain carbohydrate and how much they contain  Keep track of the amount of carbohydrate in meals and snacks in order to follow your meal plan  Do not avoid carbohydrates or skip meals   Your blood sugar may fall too low if you do not eat enough carbohydrate or you skip meals  Foods that contain carbohydrate:   · Breads:  Each serving of food listed below contains about 15 g of carbohydrate   ? 1 slice of bread (1 ounce) or 1 flour or corn tortilla (6 inch)    ? ½ of a hamburger bun or ¼ of a large bagel (about 1 ounce)    ? 1 pancake (about 4 inches across and ¼ inch thick)    · Cereals and grains:  Serving sizes of ready-to-eat cereals vary  Look at the serving size and the total carbohydrate amount listed on the food label  Each serving of food listed below contains about 15 g of carbohydrate   ? ¾ cup of dry, unsweetened, ready-to-eat cereal or ¼ cup of low-fat granola     ? ½ cup of oatmeal or other cooked cereal     ? ? cup of cooked rice or pasta    · Starchy vegetables and beans:  Each serving of food listed below contains about 15 g of carbohydrate   ? ½ cup of corn, green peas, sweet potatoes, or mashed potatoes    ? ¼ of a large baked potato    ? ½ cup of beans, lentils, and peas (garbanzo, lopez, kidney, white, split, black-eyed)    · Crackers and snacks:  Each serving of food listed below contains about 15 g of carbohydrate   ? 3 melquiades cracker squares or 8 animal crackers     ? 6 saltine-type crackers    ? 3 cups of popcorn or ¾ ounce of pretzels, potato chips, or tortilla chips    · Fruit:  Each serving of food listed below contains about 15 g of carbohydrate   ? 1 small (4 ounce) piece of fresh fruit or ¾ to 1 cup of fresh fruit    ? ½ cup of canned or frozen fruit, packed in natural juice    ? ½ cup (4 ounces) of unsweetened fruit juice    ? 2 tablespoons of dried fruit    · Desserts or sugary foods:  Each serving of food listed below contains about 15 g of carbohydrate   ? 2-inch square unfrosted cake or brownie     ? 2 small cookies    ? ½ cup of ice cream, frozen yogurt, or nondairy frozen yogurt    ? ¼ cup of sherbet or sorbet    ? 1 tablespoon of regular syrup, jam, or jelly    ?  2 tablespoons of light syrup    · Milk and yogurt:  Foods from the milk group contain about 12 g of carbohydrate per serving  ? 1 cup of fat-free or low-fat milk    ? 1 cup of soy milk    ? ? cup of fat-free, yogurt sweetened with artificial sweetener    · Non-starchy vegetables:  Each serving contains about 5 g of carbohydrate   Three servings of non-starch vegetables count as 1 carbohydrate serving  ? ½ cup of cooked vegetables or 1 cup of raw vegetables  This includes beets, broccoli, cabbage, cauliflower, cucumber, mushrooms, tomatoes, and zucchini    ? ½ cup of vegetable juice    How to use carbohydrate counting to plan meals:   · Count carbohydrate amounts using serving sizes:      ? Pasta dinner example: You plan to have pasta, tossed salad, and an 8-ounce glass of milk  Your healthcare provider tells you that you may have 4 carbohydrate servings for dinner  One carbohydrate serving of pasta is ? cup  One cup of pasta will equal 3 carbohydrate servings  An 8-ounce glass of milk will count as 1 carbohydrate serving  These amounts of food would equal 4 carbohydrate servings  One cup of tossed salad does not count toward your carbohydrate servings  · Count carbohydrate amounts using food labels:  Find the total amount of carbohydrate in a packaged food by reading the food label  Food labels tell you the serving size of the food and the total carbohydrate amount in each serving  Find the serving size on the food label and then decide how many servings you will eat  Multiply the number of servings you plan to eat by the carbohydrate amount per serving  ? Granola bar snack example: Your meal plan allows you to have 2 carbohydrate servings (30 grams) of carbohydrate for a snack  You plan to eat 1 package of granola bars, which contains 2 bars  According to the food label, the serving size of food in this package is 1 bar  Each serving (1 bar) contains 25 grams of carbohydrate   The total amount of carbohydrate in this package of granola bars would be 50 g  Based on your meal plan, you should eat only 1 bar  Follow up with your doctor as directed:  Write down your questions so you remember to ask them during your visits  © Copyright [employer] 2022 Information is for End User's use only and may not be sold, redistributed or otherwise used for commercial purposes  All illustrations and images included in CareNotes® are the copyrighted property of A D A M , Inc  or [facility] Digna Hallman   The above information is an  only  It is not intended as medical advice for individual conditions or treatments  Talk to your doctor, nurse or pharmacist before following any medical regimen to see if it is safe and effective for you

## 2022-11-18 ENCOUNTER — RA CDI HCC (OUTPATIENT)
Dept: OTHER | Facility: HOSPITAL | Age: 67
End: 2022-11-18

## 2022-11-18 NOTE — PROGRESS NOTES
[facility] 75  coding opportunities          Chart Reviewed number of suggestions sent to Provider: 2  E11 65  E11 22     Patients Insurance     Medicare Insurance: [employer]

## 2022-12-06 ENCOUNTER — OFFICE VISIT (OUTPATIENT)
Dept: INTERNAL MEDICINE CLINIC | Facility: CLINIC | Age: 67
End: 2022-12-06

## 2022-12-06 VITALS
OXYGEN SATURATION: 97 % | HEIGHT: 71 IN | WEIGHT: 297.2 LBS | BODY MASS INDEX: 41.61 KG/M2 | HEART RATE: 80 BPM | SYSTOLIC BLOOD PRESSURE: 152 MMHG | DIASTOLIC BLOOD PRESSURE: 90 MMHG

## 2022-12-06 DIAGNOSIS — E11.29 TYPE 2 DIABETES MELLITUS WITH MICROALBUMINURIA, WITH LONG-TERM CURRENT USE OF INSULIN (HCC): ICD-10-CM

## 2022-12-06 DIAGNOSIS — R80.9 TYPE 2 DIABETES MELLITUS WITH MICROALBUMINURIA, WITH LONG-TERM CURRENT USE OF INSULIN (HCC): ICD-10-CM

## 2022-12-06 DIAGNOSIS — Z12.11 COLON CANCER SCREENING: ICD-10-CM

## 2022-12-06 DIAGNOSIS — R60.0 EDEMA OF BOTH LEGS: Primary | ICD-10-CM

## 2022-12-06 DIAGNOSIS — N18.30 STAGE 3 CHRONIC KIDNEY DISEASE, UNSPECIFIED WHETHER STAGE 3A OR 3B CKD (HCC): ICD-10-CM

## 2022-12-06 DIAGNOSIS — I10 ESSENTIAL HYPERTENSION: ICD-10-CM

## 2022-12-06 DIAGNOSIS — Z79.4 TYPE 2 DIABETES MELLITUS WITH MICROALBUMINURIA, WITH LONG-TERM CURRENT USE OF INSULIN (HCC): ICD-10-CM

## 2022-12-06 DIAGNOSIS — R21 RASH: ICD-10-CM

## 2022-12-06 RX ORDER — FUROSEMIDE 40 MG/1
40 TABLET ORAL DAILY
Qty: 30 TABLET | Refills: 1 | Status: SHIPPED | OUTPATIENT
Start: 2022-12-06

## 2022-12-06 RX ORDER — POTASSIUM CHLORIDE 20 MEQ/1
20 TABLET, EXTENDED RELEASE ORAL DAILY
Qty: 30 TABLET | Refills: 1 | Status: SHIPPED | OUTPATIENT
Start: 2022-12-06

## 2022-12-06 RX ORDER — CLOBETASOL PROPIONATE 0.5 MG/G
CREAM TOPICAL 2 TIMES DAILY
Qty: 30 G | Refills: 2 | Status: SHIPPED | OUTPATIENT
Start: 2022-12-06

## 2022-12-06 NOTE — PROGRESS NOTES
Name: Ayaz Dolan  : 1955      MRN: [id number]  Encounter Provider: Monroe Curling, MD  Encounter Date: 2022   Encounter department: [address]     1  Edema of both legs  Assessment & Plan: Will discontinue hydrochlorothiazide 25 mg daily  We will switch to Lasix 40 mg daily and KCl 20 M EQ daily  Recommend CMP and proBNP in 2 weeks  Recommend to avoid salt  Orders:  -     furosemide (LASIX) 40 mg tablet; Take 1 tablet (40 mg total) by mouth daily  -     potassium chloride (K-DUR,KLOR-CON) 20 mEq tablet; Take 1 tablet (20 mEq total) by mouth daily  -     Comprehensive metabolic panel; Future; Expected date: 2022  -     TSH, 3rd generation; Future; Expected date: 2022  -     CBC; Future; Expected date: 2022  -     Ambulatory Referral to Cardiology; Future    2  Type 2 diabetes mellitus with microalbuminuria, with long-term current use of insulin (HCC)  Assessment & Plan:    Lab Results   Component Value Date    HGBA1C 8 9 (H) 10/11/2022   Suboptimal control of diabetes but improving  NovoLog mix 70/30 20 units twice  Recommend 3 healthy meals  Check sugar bring the home records next visit        Orders:  -     Comprehensive metabolic panel; Future; Expected date: 2022  -     TSH, 3rd generation; Future; Expected date: 2022  -     CBC; Future; Expected date: 2022    3  Essential hypertension  Assessment & Plan:  Hypertension improving control  However he does have edema which is probably contributing to his elevated blood pressure  Will discontinue hydrochlorothiazide we will switch it over to Lasix 40 mg daily KCl 20 [place] daily  Continue losartan 100 mg daily and metoprolol succinate 100 mg daily  Recommend daily weight  Follow-up lab in 3 to 4 weeks  Follow-up back in 4 weeks  No symptoms of LV failure    Orders:  -     Comprehensive metabolic panel;  Future; Expected date: 12/20/2022  -     TSH, 3rd generation; Future; Expected date: 12/20/2022  -     CBC; Future; Expected date: 12/20/2022  -     Ambulatory Referral to Cardiology; Future    4  Stage 3 chronic kidney disease, unspecified whether stage 3a or 3b CKD [facility])  Assessment & Plan:  Lab Results   Component Value Date    EGFR 63 10/11/2022    EGFR 52 07/01/2022    EGFR 71 11/19/2021    CREATININE 1 18 10/11/2022    CREATININE 1 39 (H) 07/01/2022    CREATININE 1 07 11/19/2021   CKD level 2/3 will manage as above    Orders:  -     Comprehensive metabolic panel; Future; Expected date: 12/20/2022  -     TSH, 3rd generation; Future; Expected date: 12/20/2022  -     CBC; Future; Expected date: 12/20/2022  -     Ambulatory Referral to Cardiology; Future    5  Rash  Assessment & Plan:  New -since he tore the Muscle probably in July or August     He has been applying hydrocortisone cream with the partial relief does have itching  Appearance is that of eczema  Size is that of a 2 x 1 inch on location is behind the left lower medial side of the calf  Recommend clobetasol cream twice a day    Orders:  -     clobetasol (TEMOVATE) 0 05 % cream; Apply topically 2 (two) times a day Apply to the left lower leg at your rash twice a day  -     Comprehensive metabolic panel; Future; Expected date: 12/20/2022  -     TSH, 3rd generation; Future; Expected date: 12/20/2022  -     CBC; Future; Expected date: 12/20/2022    6  Colon cancer screening  -     Ambulatory referral for colonoscopy; Future           Subjective     The patient is here for chronic disease management  Patient is here after long time be due to insurance he could not come here for a while  Hypertension: Hypertension was high last visit  We had increase metoprolol succinate 100 mg daily from 50 mg daily  Patient's current hypertensive medications regiment is metoprolol succinate 100 mg daily    Continue hydrochlorothiazide 25 mg daily, , continue amlodipine 10 mg daily and continue losartan 100 mg daily  Home blood pressure reviewed currently once systolic average 003 since last visit and diastolic in the range of 90  He is noticed to have mild to moderate edema of both lower extremities somewhat more on the right than the left  Right is mild left is mild to moderate  In July he had a negative venous Doppler  This problem started when he had pulled his legs and had sprain left leg  He was seen by orthopedic doctor does not have any pain anymore it is totally healed  New complaint is that of a rash localized on the left leg somewhat pruritic patient tried hydrocortisone cream with partial relief but he does have a prior pruritus  Rest started probably in the month of August when he sprained his leg  Rash appears classic that of eczema        Weight gain: Patient had gained 30 pound weight  His BMI is 40+  Last visit it was 292 this time it is 297  Strongly recommend diet exercise (modification and weight loss  Diabetes: NovoLog mix 70/30 20 units twice  He is watching diet his home sugar last week the average sugar is 123 no hypoglycemia  He is watching diet  He was working hard and skipping lunch I think that it impacted we talked about risk of hypoglycemia he will eat 3 healthy meals right amount of the calories and continue to monitor blood sugar regularly  Hyperlipidemia continue atorvastatin 40 mg  Allergic rhinitis : He is not taking Sudafed anymore  He is under care of ear nose throat doctor  Remains on Astelin nasal spray symptoms well can    He is due for eye examination he will call directly  He is due for colonoscopy will assist       The PSA 1 8 11-8-2020, 07/01/2022:  2 6    CKD 2:  Creatinine went up 1 39 GFR dropped  He is not drinking enough fluid  Remains on hydrochlorothiazide as well as multiple other medications  Of he does not have any significant voiding problem  GFR is 52  Calcium is slightly up 10 4    All this is interrelated issues reviewed with him at length  Recommend to do a increase fluid intake recheck BMP in 4 weeks and CMP in 12 weeks  He is willing to go for that    Steatohepatitis unchanged  Now no other specific     11/19/2021:  Glucose 109, LFTs normal, electrolytes normal, hemoglobin A1c 6 7, cholesterol 165 LDL 59 HDL 84 urine for microalbumin 69  06/11/2021: How BMP sodium 135 rest BMP normal LFT normal blood sugar 151 GFR 78 hemoglobin A1c 8 6 urine for microalbumin/creatinine ratio 4 cholesterol 174, LDL 80,  A 03/16/2021:  Hemoglobin A1c 8 8, cholesterol 178 LDL 61 hepatitis C negative  06/29/2020 CMP normal except blood sugar 84, hemoglobin A1c 6 2  rest CMP and lipid profile unremarkable     07/01/2022:  CMP normal except creatinine up to 1 39 GFR 52 calcium a 10 4 lipid profile normal PSA 2 6, hemoglobin A1c 7 8, LFT normal, CBC normal urine for microalbumin/creatinine ratio 67  10/11/2022: CMP normal except blood sugar 225: GFR 63: Hemoglobin A1c 8 9, cholesterol 165, LDL 47            Review of Systems   Constitutional: Negative for chills, fatigue, fever and unexpected weight change  HENT: Negative for ear pain, postnasal drip, sinus pain and sore throat  Eyes: Negative for pain and redness  Respiratory: Negative for cough and shortness of breath  Cardiovascular: Positive for leg swelling  Negative for chest pain and palpitations  Gastrointestinal: Negative for abdominal pain, diarrhea and nausea  Endocrine: Negative for cold intolerance, polydipsia and polyuria  Genitourinary: Negative for dysuria, frequency and urgency  Musculoskeletal: Negative for arthralgias, gait problem and myalgias  Skin: Positive for rash  Allergic/Immunologic: Negative  Neurological: Negative for dizziness and headaches  Hematological: Negative for adenopathy  Psychiatric/Behavioral: Negative for behavioral problems         Past Medical History:   Diagnosis Date   • Hypertension History reviewed  No pertinent surgical history  History reviewed  No pertinent family history  Social History     Socioeconomic History   • Marital status: /Civil Union     Spouse name: None   • Number of children: None   • Years of education: None   • Highest education level: None   Occupational History   • None   Tobacco Use   • Smoking status: Former   • Smokeless tobacco: Never   Substance and Sexual Activity   • Alcohol use:  Yes     Alcohol/week: 4 0 standard drinks     Types: 4 Shots of liquor per week   • Drug use: Never   • Sexual activity: None   Other Topics Concern   • None   Social History Narrative   • None     Social Determinants of Health     Financial Resource Strain: Not on file   Food Insecurity: Not on file   Transportation Needs: Not on file   Physical Activity: Not on file   Stress: Not on file   Social Connections: Not on file   Intimate Partner Violence: Not on file   Housing Stability: Not on file     Current Outpatient Medications on File Prior to Visit   Medication Sig   • amLODIPine (NORVASC) 10 mg tablet Take 1 tablet (10 mg total) by mouth daily   • ASA-APAP-Caff Buffered 227-194-33 MG TABS Take by mouth   • aspirin (ECOTRIN LOW STRENGTH) 81 mg EC tablet Take 81 mg by mouth daily   • atorvastatin (LIPITOR) 40 mg tablet Take 1 tablet (40 mg total) by mouth daily   • azelastine (ASTELIN) 0 1 % nasal spray 1 spray into each nostril 2 (two) times a day Use in each nostril as directed   • BD Pen Needle Katelyn U/F 32G X 4 MM MISC Inject as directed 2 (two) times a day   • Cholecalciferol (VITAMIN D3) 2000 units TABS Take 2,000 Units by mouth daily   • Glucose Blood (ONETOUCH ULTRA BLUE VI) 1 Units by In Vitro route 3 (three) times a day   • glucose blood (OneTouch Ultra) test strip Test 3 times daily   • insulin aspart protamine-insulin aspart (NovoLOG Mix 70/30 FlexPen) 100 Units/mL injection pen Inject 20 Units under the skin 2 (two) times a day before meals   • Krill Oil 1000 MG CAPS Take 1 capsule by mouth daily   • losartan (COZAAR) 100 MG tablet Take 1 tablet (100 mg total) by mouth daily   • metFORMIN (GLUCOPHAGE) 1000 MG tablet Take 1 tablet (1,000 mg total) by mouth 2 (two) times a day   • metoprolol succinate (TOPROL-XL) 100 mg 24 hr tablet Take 1 tablet (100 mg total) by mouth daily   • ofloxacin (FLOXIN) 0 3 % otic solution INSTILL 1 DROP TWICE DAILY INTO EACH EAR FOR 10 DAYS   • triamcinolone (KENALOG) 0 1 % cream Apply topically 2 (two) times a day   • vitamin E, tocopherol, 400 units capsule Take 400 Units by mouth daily   • [DISCONTINUED] hydrochlorothiazide (HYDRODIURIL) 25 mg tablet Take 1 tablet (25 mg total) by mouth daily     No Known Allergies    There is no immunization history on file for this patient  Objective     /90   Pulse 80   Ht 5' 11" (1 803 m)   Wt 135 kg (297 lb 3 2 oz)   SpO2 97%   BMI 41 45 kg/m²     Physical Exam  Constitutional:       General: He is not in acute distress  Appearance: He is well-developed  He is obese  He is not ill-appearing or diaphoretic  HENT:      Head: Normocephalic and atraumatic  Right Ear: External ear normal       Left Ear: External ear normal    Eyes:      General: Lids are normal          Right eye: No discharge  Left eye: No discharge  Conjunctiva/sclera: Conjunctivae normal    Neck:      Thyroid: No thyroid mass or thyromegaly  Vascular: No carotid bruit or JVD  Trachea: Trachea normal    Cardiovascular:      Rate and Rhythm: Regular rhythm  Heart sounds: Normal heart sounds  Pulmonary:      Effort: No respiratory distress  Breath sounds: Normal breath sounds  No wheezing or rales  Abdominal:      General: Bowel sounds are normal  There is no distension  Palpations: There is no mass  Tenderness: There is no rebound  Musculoskeletal:      Right lower le+ Edema present  Left lower le+ Edema present     Lymphadenopathy:      Cervical: No cervical adenopathy  Skin:     General: Skin is warm  Coloration: Skin is not jaundiced or pale  Findings: Rash present  No bruising  Rash is crusting, papular and scaling  Comments: 2 x 1 inch eczematous type of rash on the right left distal medial side of the leg above ankle pain had type of somewhat crusty scaly rash   Neurological:      General: No focal deficit present  Mental Status: He is alert and oriented to person, place, and time  Mental status is at baseline        Coordination: Coordination normal    Psychiatric:         Behavior: Behavior normal          Judgment: Judgment normal        Koki Boyd MD

## 2022-12-06 NOTE — ASSESSMENT & PLAN NOTE
Hypertension improving control  However he does have edema which is probably contributing to his elevated blood pressure  Will discontinue hydrochlorothiazide we will switch it over to Lasix 40 mg daily KCl 20 [place] daily  Continue losartan 100 mg daily and metoprolol succinate 100 mg daily  Recommend daily weight  Follow-up lab in 3 to 4 weeks  Follow-up back in 4 weeks    No symptoms of LV failure

## 2022-12-06 NOTE — ASSESSMENT & PLAN NOTE
Lab Results   Component Value Date    HGBA1C 8 9 (H) 10/11/2022   Suboptimal control of diabetes but improving  NovoLog mix 70/30 20 units twice  Recommend 3 healthy meals    Check sugar bring the home records next visit

## 2022-12-06 NOTE — ASSESSMENT & PLAN NOTE
New -since he tore the Muscle probably in July or August     He has been applying hydrocortisone cream with the partial relief does have itching  Appearance is that of eczema  Size is that of a 2 x 1 inch on location is behind the left lower medial side of the calf      Recommend clobetasol cream twice a day

## 2022-12-06 NOTE — ASSESSMENT & PLAN NOTE
Lab Results   Component Value Date    EGFR 63 10/11/2022    EGFR 52 07/01/2022    EGFR 71 11/19/2021    CREATININE 1 18 10/11/2022    CREATININE 1 39 (H) 07/01/2022    CREATININE 1 07 11/19/2021   CKD level 2/3 will manage as above

## 2022-12-06 NOTE — ASSESSMENT & PLAN NOTE
Will discontinue hydrochlorothiazide 25 mg daily  We will switch to Lasix 40 mg daily and KCl 20 M EQ daily  Recommend CMP and proBNP in 2 weeks  Recommend to avoid salt

## 2023-01-03 ENCOUNTER — RA CDI HCC (OUTPATIENT)
Dept: OTHER | Facility: HOSPITAL | Age: 68
End: 2023-01-03

## 2023-01-03 NOTE — PROGRESS NOTES
[address]  coding opportunities          Chart Reviewed number of suggestions sent to Provider: 1  E11 65     Patients Insurance     Medicare Insurance: [employer]

## 2023-02-01 DIAGNOSIS — R60.0 EDEMA OF BOTH LEGS: ICD-10-CM

## 2023-02-01 RX ORDER — FUROSEMIDE 40 MG/1
40 TABLET ORAL DAILY
Qty: 30 TABLET | Refills: 1 | Status: SHIPPED | OUTPATIENT
Start: 2023-02-01

## 2023-04-01 DIAGNOSIS — R60.0 EDEMA OF BOTH LEGS: ICD-10-CM

## 2023-04-03 DIAGNOSIS — R80.9 TYPE 2 DIABETES MELLITUS WITH MICROALBUMINURIA, WITH LONG-TERM CURRENT USE OF INSULIN (HCC): ICD-10-CM

## 2023-04-03 DIAGNOSIS — I10 ESSENTIAL HYPERTENSION: ICD-10-CM

## 2023-04-03 DIAGNOSIS — E11.29 TYPE 2 DIABETES MELLITUS WITH MICROALBUMINURIA, WITH LONG-TERM CURRENT USE OF INSULIN (HCC): ICD-10-CM

## 2023-04-03 DIAGNOSIS — Z79.4 TYPE 2 DIABETES MELLITUS WITH MICROALBUMINURIA, WITH LONG-TERM CURRENT USE OF INSULIN (HCC): ICD-10-CM

## 2023-04-03 RX ORDER — ATORVASTATIN CALCIUM 40 MG/1
TABLET, FILM COATED ORAL
Qty: 90 TABLET | Refills: 0 | Status: SHIPPED | OUTPATIENT
Start: 2023-04-03

## 2023-04-03 RX ORDER — FUROSEMIDE 40 MG/1
TABLET ORAL
Qty: 90 TABLET | Refills: 1 | Status: SHIPPED | OUTPATIENT
Start: 2023-04-03

## 2023-04-03 RX ORDER — AMLODIPINE BESYLATE 10 MG/1
TABLET ORAL
Qty: 90 TABLET | Refills: 0 | Status: SHIPPED | OUTPATIENT
Start: 2023-04-03

## 2023-05-23 DIAGNOSIS — E10.9 TYPE 1 DIABETES MELLITUS WITHOUT COMPLICATION (HCC): ICD-10-CM

## 2023-05-23 RX ORDER — PEN NEEDLE, DIABETIC 32GX 5/32"
NEEDLE, DISPOSABLE MISCELLANEOUS 2 TIMES DAILY
Qty: 100 EACH | Refills: 4 | Status: SHIPPED | OUTPATIENT
Start: 2023-05-23

## 2023-05-24 ENCOUNTER — APPOINTMENT (OUTPATIENT)
Dept: LAB | Facility: CLINIC | Age: 68
End: 2023-05-24

## 2023-05-24 DIAGNOSIS — R21 RASH: ICD-10-CM

## 2023-05-24 DIAGNOSIS — N18.30 STAGE 3 CHRONIC KIDNEY DISEASE, UNSPECIFIED WHETHER STAGE 3A OR 3B CKD (HCC): ICD-10-CM

## 2023-05-24 DIAGNOSIS — R60.0 EDEMA OF BOTH LEGS: ICD-10-CM

## 2023-05-24 DIAGNOSIS — I10 ESSENTIAL HYPERTENSION: ICD-10-CM

## 2023-05-24 DIAGNOSIS — Z79.4 TYPE 2 DIABETES MELLITUS WITH MICROALBUMINURIA, WITH LONG-TERM CURRENT USE OF INSULIN (HCC): ICD-10-CM

## 2023-05-24 DIAGNOSIS — R80.9 TYPE 2 DIABETES MELLITUS WITH MICROALBUMINURIA, WITH LONG-TERM CURRENT USE OF INSULIN (HCC): ICD-10-CM

## 2023-05-24 DIAGNOSIS — E11.29 TYPE 2 DIABETES MELLITUS WITH MICROALBUMINURIA, WITH LONG-TERM CURRENT USE OF INSULIN (HCC): ICD-10-CM

## 2023-05-24 LAB
ALBUMIN SERPL BCP-MCNC: 3.4 G/DL (ref 3.5–5)
ALP SERPL-CCNC: 54 U/L (ref 46–116)
ALT SERPL W P-5'-P-CCNC: 28 U/L (ref 12–78)
ANION GAP SERPL CALCULATED.3IONS-SCNC: 0 MMOL/L (ref 4–13)
AST SERPL W P-5'-P-CCNC: 15 U/L (ref 5–45)
BILIRUB SERPL-MCNC: 0.54 MG/DL (ref 0.2–1)
BUN SERPL-MCNC: 21 MG/DL (ref 5–25)
CALCIUM ALBUM COR SERPL-MCNC: 10 MG/DL (ref 8.3–10.1)
CALCIUM SERPL-MCNC: 9.5 MG/DL (ref 8.3–10.1)
CHLORIDE SERPL-SCNC: 100 MMOL/L (ref 96–108)
CO2 SERPL-SCNC: 32 MMOL/L (ref 21–32)
CREAT SERPL-MCNC: 1.3 MG/DL (ref 0.6–1.3)
ERYTHROCYTE [DISTWIDTH] IN BLOOD BY AUTOMATED COUNT: 12.2 % (ref 11.6–15.1)
GFR SERPL CREATININE-BSD FRML MDRD: 56 ML/MIN/1.73SQ M
GLUCOSE P FAST SERPL-MCNC: 242 MG/DL (ref 65–99)
HCT VFR BLD AUTO: 40.2 % (ref 36.5–49.3)
HGB BLD-MCNC: 13.9 G/DL (ref 12–17)
MCH RBC QN AUTO: 31.6 PG (ref 26.8–34.3)
MCHC RBC AUTO-ENTMCNC: 34.6 G/DL (ref 31.4–37.4)
MCV RBC AUTO: 91 FL (ref 82–98)
PLATELET # BLD AUTO: 302 THOUSANDS/UL (ref 149–390)
PMV BLD AUTO: 8.7 FL (ref 8.9–12.7)
POTASSIUM SERPL-SCNC: 4.1 MMOL/L (ref 3.5–5.3)
PROT SERPL-MCNC: 7.9 G/DL (ref 6.4–8.4)
RBC # BLD AUTO: 4.4 MILLION/UL (ref 3.88–5.62)
SODIUM SERPL-SCNC: 132 MMOL/L (ref 135–147)
TSH SERPL DL<=0.05 MIU/L-ACNC: 2.88 UIU/ML (ref 0.45–4.5)
WBC # BLD AUTO: 10.06 THOUSAND/UL (ref 4.31–10.16)

## 2023-05-26 DIAGNOSIS — Z79.4 TYPE 2 DIABETES MELLITUS WITH MICROALBUMINURIA, WITH LONG-TERM CURRENT USE OF INSULIN (HCC): ICD-10-CM

## 2023-05-26 DIAGNOSIS — R80.9 TYPE 2 DIABETES MELLITUS WITH MICROALBUMINURIA, WITH LONG-TERM CURRENT USE OF INSULIN (HCC): ICD-10-CM

## 2023-05-26 DIAGNOSIS — E11.29 TYPE 2 DIABETES MELLITUS WITH MICROALBUMINURIA, WITH LONG-TERM CURRENT USE OF INSULIN (HCC): ICD-10-CM

## 2023-05-26 RX ORDER — INSULIN ASPART 100 [IU]/ML
INJECTION, SUSPENSION SUBCUTANEOUS
Qty: 15 ML | Refills: 2 | Status: SHIPPED | OUTPATIENT
Start: 2023-05-26 | End: 2023-05-30 | Stop reason: SDUPTHER

## 2023-05-29 NOTE — ASSESSMENT & PLAN NOTE
Needs continued risk factor management with Good DM, HTN HLD and obesity- advised and target discussed    Lab Results   Component Value Date    HGBA1C 8 9 (H) 10/11/2022     Relevant medicine: Novolog 70/30  20 units twice a day  And metformin 1000 mg twice a day    GFR 56    Rec to stay upto date with Eye exam    Diet Carb control    Needs Hba1c and urine for MA  Lab Results   Component Value Date    AGAP 0 (L) 05/24/2023    ALKPHOS 54 05/24/2023    ALT 28 05/24/2023    ANIONGAP 12 3 06/06/2018    AST 15 05/24/2023    BILITOT 0 5 06/06/2018    BUN 21 05/24/2023    CALCIUM 9 5 05/24/2023     05/24/2023    CO2 32 05/24/2023    CREATININE 1 30 05/24/2023    EGFR 56 05/24/2023    GLUF 242 (H) 05/24/2023    K 4 1 05/24/2023     06/06/2018    PROT 6 9 06/06/2018    SODIUM 132 (L) 05/24/2023    TBILI 0 54 05/24/2023    TP 7 9 05/24/2023

## 2023-05-29 NOTE — ASSESSMENT & PLAN NOTE
Lab Results   Component Value Date    LDLCALC 47 10/11/2022     Lab Results   Component Value Date    ALT 28 05/24/2023    ALT 17 06/06/2018     Lab Results   Component Value Date    CHOLESTEROL 165 10/11/2022    CHOLESTEROL 151 07/01/2022    CHOLESTEROL 165 11/19/2021     Lab Results   Component Value Date    HDL 81 10/11/2022    HDL 76 07/01/2022    HDL 84 11/19/2021     Lab Results   Component Value Date    TRIG 186 (H) 10/11/2022    TRIG 77 07/01/2022    TRIG 109 11/19/2021     Lab Results   Component Value Date    NONHDLC 84 10/11/2022    [place] 75 07/01/2022    [place] 81 11/19/2021     Overdue for lipid profile, advised to get it done in next couple of weeks    Relevant medicine[de-identified] atorvastatin 40 mg daily No

## 2023-05-29 NOTE — ASSESSMENT & PLAN NOTE
Relevant medicine: amlodipine, atorvastatin, losartan, metformin, metoprolol, lasix, kcl,    Needs continued risk factor management with Good DM, HTN HLD and obesity    Needs Urine for Ma/creat asap

## 2023-05-29 NOTE — ASSESSMENT & PLAN NOTE
Lab Results   Component Value Date    CREATININE 1 30 05/24/2023    CREATININE 1 18 10/11/2022    CREATININE 1 39 (H) 07/01/2022    EGFR 56 05/24/2023    EGFR 63 10/11/2022    EGFR 52 07/01/2022   Needs continued risk factor management with Good DM, HTN HLD and obesity- advised and target discussed      GFR is baseline  Creat is baseline  Electrolytes stable  Recommend periodic blood test for monitoring of  Renal Function, lytes, GFR and urine for MA/Creat  Avoid Non Steroidal Antiinflammatory such as ibuprofen, aleve etc     Bone and Mineral disease monitoring as appropriate      All patient with GFR less than 30( CKD 4 or 5 or 6) are advised to follow with nephrologist

## 2023-05-29 NOTE — ASSESSMENT & PLAN NOTE
Lab Results   Component Value Date    ALKPHOS 54 05/24/2023    ALT 28 05/24/2023    AST 15 05/24/2023    BILITOT 0 5 06/06/2018     rec weight loss    Relevant medicine:  Continue atorvastatin 40 mg daily

## 2023-05-29 NOTE — ASSESSMENT & PLAN NOTE
Sx free blood pressure is fair controlled,    Needs continued risk factor management with Good DM, HTN HLD and obesity- advised and target discussed      Relevant medicine: lasix 40 mg daily, losartan 100 mg ferreira, metoprolol succ 100 mg daily, kcl 20 meq daily    Lab Results   Component Value Date    AGAP 0 (L) 05/24/2023    ALKPHOS 54 05/24/2023    ALT 28 05/24/2023    ANIONGAP 12 3 06/06/2018    AST 15 05/24/2023    BILITOT 0 5 06/06/2018    BUN 21 05/24/2023    CALCIUM 9 5 05/24/2023     05/24/2023    CO2 32 05/24/2023    CREATININE 1 30 05/24/2023    EGFR 56 05/24/2023    GLUF 242 (H) 05/24/2023    K 4 1 05/24/2023     06/06/2018    PROT 6 9 06/06/2018    SODIUM 132 (L) 05/24/2023    TBILI 0 54 05/24/2023    TP 7 9 05/24/2023

## 2023-05-29 NOTE — PROGRESS NOTES
Name: Sammie Larios  : 1955      MRN: [id number]  Encounter Provider: Merline Serrano MD  Encounter Date: 2023   Encounter department: [address]     1  Numbness of left foot  Assessment & Plan:  Complains of numbness on the bottom of the left foot going on for 2 months  No low back pain no radicular symptoms  Drinks 2 shots a day-he likes bourbon  Recheck hemoglobin A1c vitamin B12 antinuclear antibody and Lyme's titer sed rate    Recommend to avoid alcohol  Recommend to take 1 multivitamin tablet a day  Recommend to start gabapentin 100 mg at bedtime    Orders:  -     CBC; Future; Expected date: 2023  -     Vitamin B12; Future  -     Lyme Disease Serology w/Reflex; Future  -     Antinuclear Antibodies (NINFA), IFA; Future  -     VAS lower limb venous duplex study, complete bilateral; Future; Expected date: 2023  -     Folate; Future; Expected date: 2023  -     gabapentin (Neurontin) 100 mg capsule; Take 1 capsule (100 mg total) by mouth daily at bedtime    2  Edema of both legs  Assessment & Plan:  Patient reports worsening of the edema of left lower extremity since he turned his car to the left leg patient torn left calf muscle 6 months ago, was to the emergency room subsequently has been under care of orthopedic  No significant pain however he reports worsening of the edema of the left lower extremity  He does have chronic edema  He is on Lasix 40 mg once a day he is also on amlodipine  We will do the venous Doppler of both lower extremity as well as the chest x-ray  Recheck back in 1 to 2 weeks        Orders:  -     CBC; Future; Expected date: 2023  -     VAS lower limb venous duplex study, complete bilateral; Future; Expected date: 2023    3   Stage 3 chronic kidney disease, unspecified whether stage 3a or 3b CKD ([facility] [address] )  Assessment & Plan:  Lab Results   Component Value Date    CREATININE 1 30 05/24/2023    CREATININE 1 18 10/11/2022    CREATININE 1 39 (H) 07/01/2022    EGFR 56 05/24/2023    EGFR 63 10/11/2022    EGFR 52 07/01/2022   Needs continued risk factor management with Good DM, HTN HLD and obesity- advised and target discussed      GFR is baseline  Creat is baseline  Electrolytes stable  Recommend periodic blood test for monitoring of  Renal Function, lytes, GFR and urine for MA/Creat  Avoid Non Steroidal Antiinflammatory such as ibuprofen, aleve etc     Bone and Mineral disease monitoring as appropriate  All patient with GFR less than 30( CKD 4 or 5 or 6) are advised to follow with nephrologist         Orders:  -     Hemoglobin A1c (w/out EAG) ([facility] ONLY); Future; Expected date: 05/31/2023  -     Microalbumin, Random Urine (W/Creatinine) ([facility] ONLY); Future; Expected date: 05/31/2023  -     Lipid Panel with Direct LDL reflex; Future; Expected date: 05/31/2023    4   Type 2 diabetes mellitus with microalbuminuria, with long-term current use of insulin (HCC)  Assessment & Plan:  Needs continued risk factor management with Good DM, HTN HLD and obesity- advised and target discussed    Lab Results   Component Value Date    HGBA1C 8 9 (H) 10/11/2022     Relevant medicine: Novolog 70/30  20 units twice a day  And metformin 1000 mg twice a day    GFR 56    Rec to stay upto date with Eye exam    Diet Carb control    Needs Hba1c and urine for MA  Lab Results   Component Value Date    AGAP 0 (L) 05/24/2023    ALKPHOS 54 05/24/2023    ALT 28 05/24/2023    ANIONGAP 12 3 06/06/2018    AST 15 05/24/2023    BILITOT 0 5 06/06/2018    BUN 21 05/24/2023    CALCIUM 9 5 05/24/2023     05/24/2023    CO2 32 05/24/2023    CREATININE 1 30 05/24/2023    EGFR 56 05/24/2023    GLUF 242 (H) 05/24/2023    K 4 1 05/24/2023     06/06/2018    PROT 6 9 06/06/2018    SODIUM 132 (L) 05/24/2023    TBILI 0 54 05/24/2023    TP 7 9 05/24/2023         Orders:  -     insulin aspart protamine-insulin aspart (NovoLOG Mix 70/30 FlexPen) 100 Units/mL injection pen; Inject 20 Units under the skin 2 (two) times a day with meals  -     Hemoglobin A1c (w/out EAG) ([facility] ONLY); Future; Expected date: 05/31/2023  -     Microalbumin, Random Urine (W/Creatinine) ([facility] ONLY); Future; Expected date: 05/31/2023  -     Lipid Panel with Direct LDL reflex; Future; Expected date: 05/31/2023    5  Steatohepatitis  Assessment & Plan:  Lab Results   Component Value Date    ALKPHOS 54 05/24/2023    ALT 28 05/24/2023    AST 15 05/24/2023    BILITOT 0 5 06/06/2018     rec weight loss    Relevant medicine:  Continue atorvastatin 40 mg daily    Orders:  -     Vitamin B12; Future    6  Essential hypertension  Assessment & Plan:  Sx free blood pressure is fair controlled,    Needs continued risk factor management with Good DM, HTN HLD and obesity- advised and target discussed      Relevant medicine: lasix 40 mg daily, losartan 100 mg ferreira, metoprolol succ 100 mg daily, kcl 20 meq daily    Lab Results   Component Value Date    AGAP 0 (L) 05/24/2023    ALKPHOS 54 05/24/2023    ALT 28 05/24/2023    ANIONGAP 12 3 06/06/2018    AST 15 05/24/2023    BILITOT 0 5 06/06/2018    BUN 21 05/24/2023    CALCIUM 9 5 05/24/2023     05/24/2023    CO2 32 05/24/2023    CREATININE 1 30 05/24/2023    EGFR 56 05/24/2023    GLUF 242 (H) 05/24/2023    K 4 1 05/24/2023     06/06/2018    PROT 6 9 06/06/2018    SODIUM 132 (L) 05/24/2023    TBILI 0 54 05/24/2023    TP 7 9 05/24/2023         Orders:  -     amLODIPine (NORVASC) 10 mg tablet; Take 1 tablet (10 mg total) by mouth daily  -     metoprolol succinate (TOPROL-XL) 100 mg 24 hr tablet; Take 1 tablet (100 mg total) by mouth daily  -     losartan (COZAAR) 100 MG tablet; Take 1 tablet (100 mg total) by mouth daily  -     CBC; Future; Expected date: 05/30/2023  -     Antinuclear Antibodies (NINFA), IFA; Future  -     Hemoglobin A1c (w/out EAG) ([facility] ONLY);  Future; Expected date: 05/31/2023  -     Microalbumin, Random Urine (W/Creatinine) ([facility] ONLY); Future; Expected date: 05/31/2023  -     Lipid Panel with Direct LDL reflex; Future; Expected date: 05/31/2023    7  Allergic rhinitis due to pollen, unspecified seasonality  Assessment & Plan:  Relevant medicine: astelin nasal spray, avoidance and saline nasal  Spray as needed      8  Class 2 severe obesity due to excess calories with serious comorbidity and body mass index (BMI) of 35 0 to 35 9 in adult (HCC)  -     Hemoglobin A1c (w/out EAG) ([facility] ONLY); Future; Expected date: 05/31/2023  -     Microalbumin, Random Urine (W/Creatinine) ([facility] ONLY); Future; Expected date: 05/31/2023  -     Lipid Panel with Direct LDL reflex; Future; Expected date: 05/31/2023    9  Hypercholesteremia  Assessment & Plan:  Lab Results   Component Value Date    LDLCALC 47 10/11/2022     Lab Results   Component Value Date    ALT 28 05/24/2023    ALT 17 06/06/2018     Lab Results   Component Value Date    CHOLESTEROL 165 10/11/2022    CHOLESTEROL 151 07/01/2022    CHOLESTEROL 165 11/19/2021     Lab Results   Component Value Date    HDL 81 10/11/2022    HDL 76 07/01/2022    HDL 84 11/19/2021     Lab Results   Component Value Date    TRIG 186 (H) 10/11/2022    TRIG 77 07/01/2022    TRIG 109 11/19/2021     Lab Results   Component Value Date    NONHDLC 84 10/11/2022    [place] 75 07/01/2022    [place] 81 11/19/2021     Overdue for lipid profile, advised to get it done in next couple of weeks    Relevant medicine[de-identified] atorvastatin 40 mg daily      Orders:  -     Hemoglobin A1c (w/out EAG) ([facility] ONLY); Future; Expected date: 05/31/2023  -     Microalbumin, Random Urine (W/Creatinine) ([facility] ONLY); Future; Expected date: 05/31/2023  -     Lipid Panel with Direct LDL reflex; Future; Expected date: 05/31/2023    10  Vitamin D deficiency  Assessment & Plan:  Needs follow up vitamin d      11   Persistent proteinuria  Assessment & Plan:  Relevant medicine: amlodipine, atorvastatin, losartan, metformin, metoprolol, lasix, kcl,    Needs continued risk factor management with Good DM, HTN HLD and obesity    Needs Urine for Ma/creat asap      12  Tachycardia  -     metoprolol succinate (TOPROL-XL) 100 mg 24 hr tablet; Take 1 tablet (100 mg total) by mouth daily    13  Folic acid deficiency  -     Folate; Future; Expected date: 05/31/2023    14  Vitamin B12 deficiency  -     Vitamin B12; Future      BMI Counseling: Body mass index is 42 4 kg/m²  The BMI is above normal  Nutrition recommendations include decreasing portion sizes, encouraging healthy choices of fruits and vegetables, decreasing fast food intake, consuming healthier snacks, moderation in carbohydrate intake and increasing intake of lean protein  Exercise recommendations include obtaining a gym membership  Rationale for BMI follow-up plan is due to patient being overweight or obese  Depression Screening and Follow-up Plan: Patient was screened for depression during today's encounter  They screened negative with a PHQ-2 score of 0  Subjective        Patient is here after long time  First of all patient complains of worsening of the left lower extremity edema edema for last 2 3 months  He tore his calf muscle and startednoticing more edema of the extremity he does have a chronic edema of lower extremity  In December we had switched discontinued hydrochlorothiazide and switch him to the Lasix  He never went for follow-up CMP never went to see cardiologist     We will do the venous Doppler, will do the chest x-ray, will do follow-up lab data, continue Lasix at this time, no obvious symptoms of LV for LV failure  Diabetes: Overdue for hemoglobin A1c and urine for microalbumin overdue to see eye doctor recommend to see eye doctor  Hypertension fair control continue same regimen  Hyperlipidemia remains on statin  LFTs are normal   Due for lipid profile  Obesity remains unchanged      New complaint is that of a numbness for last 2 to 3 months on the bottom of the left foot ,  Numbness is worse in the morning  Better with the walking  He denies any claudication  Foot diabetic foot examination done  Differential diagnosis discussed  He drinks 2 scotch bourbon a day  Differential diagnosis of neuropathy likely diabetes but alcohol cannot be ruled out we will check NINFA  Recent TSH was normal   We will also check vitamin H51 folic acid, antinuclear antibody Lyme's titer  Appointment on 05/24/2023  Sodium                    Value: 132(mmol/L) (L)    Dt: 05/24/2023  Potassium                 Value: 4  1(mmol/L)        Dt: 05/24/2023  Chloride                  Value: 100(mmol/L)        Dt: 05/24/2023  CO2                       Value: 32(mmol/L)         Dt: 05/24/2023  ANION GAP                 Value: 0(mmol/L) (L)      Dt: 05/24/2023  BUN                       Value: 21(mg/dL)          Dt: 05/24/2023  Creatinine                Value: 1 30(mg/dL)        Dt: 05/24/2023  Glucose, Fasting          Value: 242(mg/dL) (H)     Dt: 05/24/2023  Calcium                   Value: 9 5(mg/dL)         Dt: 05/24/2023  Corrected Calcium         Value: 10  0(mg/dL)        Dt: 05/24/2023  AST                       Value: 15(U/L)            Dt: 05/24/2023  ALT                       Value: 28(U/L)            Dt: 05/24/2023  Alkaline Phosphatase      Value: 54(U/L)            Dt: 05/24/2023  Total Protein             Value: 7 9(g/dL)          Dt: 05/24/2023  Albumin                   Value: 3 4(g/dL) (L)      Dt: 05/24/2023  Total Bilirubin           Value: 0 54(mg/dL)        Dt: 05/24/2023  eGFR                      Value: 56(ml/min/1 73sq m) Dt: 05/24/2023  TSH 3RD GENERATON         Value: 2 882(uIU/mL)      Dt: 05/24/2023  WBC                       Value: 10 06(Thousand/uL) Dt: 05/24/2023  RBC                       Value: 4 40(Million/uL)   Dt: 05/24/2023  Hemoglobin                Value: 13 9(g/dL)         Dt: 05/24/2023  Hematocrit                Value: 40 2(%) Dt: 05/24/2023  MCV                       Value: 91(fL)             Dt: 05/24/2023  MCH                       Value: 31 6(pg)           Dt: 05/24/2023  MCHC                      Value: 34 6(g/dL)         Dt: 05/24/2023  RDW                       Value: 12 2(%)            Dt: 05/24/2023  Platelets                 Value: 302(Thousands/uL)  Dt: 05/24/2023  MPV                       Value: 8 7(fL) (L)        Dt: 05/24/2023  ------------ - 2 weeks      Lab Results       Component                Value               Date                       PSA                      2 6                 07/01/2022                 PSA                      1 8                 11/02/2020                 PSA                      2 0                 01/08/2019                Review of Systems   Constitutional: Negative for appetite change, fatigue, fever and unexpected weight change  HENT: Negative for congestion, ear pain and sore throat  Eyes: Negative for pain and redness  Respiratory: Negative for cough and shortness of breath  Cardiovascular: Positive for leg swelling  Negative for chest pain and palpitations  Gastrointestinal: Negative for abdominal pain, diarrhea, nausea and vomiting  Endocrine: Negative for cold intolerance, polydipsia and polyuria  Genitourinary: Negative for dysuria, frequency and urgency  Musculoskeletal: Negative for arthralgias, gait problem and myalgias  Skin: Negative for rash  Allergic/Immunologic: Negative  Neurological: Positive for numbness  Negative for dizziness and headaches  Hematological: Negative for adenopathy  Psychiatric/Behavioral: Negative for behavioral problems         Current Outpatient Medications on File Prior to Visit   Medication Sig   • aspirin (ECOTRIN LOW STRENGTH) 81 mg EC tablet Take 81 mg by mouth daily   • atorvastatin (LIPITOR) 40 mg tablet TAKE 1 TABLET BY MOUTH EVERY DAY   • azelastine (ASTELIN) 0 1 % nasal spray 1 spray into each nostril 2 "(two) times a day Use in each nostril as directed   • BD Pen Needle Katelyn U/F 32G X 4 MM MISC Inject as directed 2 (two) times a day   • Cholecalciferol (VITAMIN D3) 2000 units TABS Take 2,000 Units by mouth daily   • furosemide (LASIX) 40 mg tablet TAKE 1 TABLET BY MOUTH EVERY DAY   • Glucose Blood (ONETOUCH ULTRA BLUE VI) 1 Units by In Vitro route 3 (three) times a day   • glucose blood (OneTouch Ultra) test strip Test 3 times daily   • Krill Oil 1000 MG CAPS Take 1 capsule by mouth daily   • metFORMIN (GLUCOPHAGE) 1000 MG tablet TAKE 1 TABLET BY MOUTH TWICE A DAY   • vitamin E, tocopherol, 400 units capsule Take 400 Units by mouth daily   • [DISCONTINUED] amLODIPine (NORVASC) 10 mg tablet TAKE 1 TABLET BY MOUTH EVERY DAY   • [DISCONTINUED] losartan (COZAAR) 100 MG tablet Take 1 tablet (100 mg total) by mouth daily   • [DISCONTINUED] metoprolol succinate (TOPROL-XL) 100 mg 24 hr tablet TAKE 1 TABLET BY MOUTH EVERY DAY   • [DISCONTINUED] NovoLOG Mix 70/30 FlexPen (70-30) 100 units/mL injection pen INJECT 20 UNITS UNDER SKIN 2 TIMES A DAY BEFORE MEALS   • potassium chloride (K-DUR,KLOR-CON) 20 mEq tablet Take 1 tablet (20 mEq total) by mouth daily (Patient not taking: Reported on 5/30/2023)   • triamcinolone (KENALOG) 0 1 % cream Apply topically 2 (two) times a day (Patient not taking: Reported on 5/30/2023)   • [DISCONTINUED] ASA-APAP-Caff Buffered 436-592-49 MG TABS Take by mouth (Patient not taking: Reported on 5/30/2023)   • [DISCONTINUED] clobetasol (TEMOVATE) 0 05 % cream Apply topically 2 (two) times a day Apply to the left lower leg at your rash twice a day (Patient not taking: Reported on 5/30/2023)   • [DISCONTINUED] ofloxacin (FLOXIN) 0 3 % otic solution INSTILL 1 DROP TWICE DAILY INTO EACH EAR FOR 10 DAYS (Patient not taking: Reported on 5/30/2023)       Objective     /78   Pulse 88   Ht 5' 11\" (1 803 m)   Wt (!) 138 kg (304 lb)   SpO2 98%   BMI 42 40 kg/m²     Physical Exam  Constitutional:  " General: He is not in acute distress  Appearance: He is well-developed  He is not ill-appearing or diaphoretic  HENT:      Head: Normocephalic and atraumatic  Right Ear: External ear normal       Left Ear: External ear normal    Eyes:      General: Lids are normal          Right eye: No discharge  Left eye: No discharge  Conjunctiva/sclera: Conjunctivae normal    Neck:      Thyroid: No thyroid mass or thyromegaly  Vascular: No carotid bruit or JVD  Trachea: Trachea normal    Cardiovascular:      Rate and Rhythm: Regular rhythm  Pulses: Pulses are weak  Dorsalis pedis pulses are 1+ on the right side and 1+ on the left side  Posterior tibial pulses are 1+ on the right side and 1+ on the left side  Heart sounds: Normal heart sounds  No murmur heard  No friction rub  No gallop  Comments: No DVT clinically  Pulmonary:      Effort: No respiratory distress  Breath sounds: No wheezing, rhonchi or rales  Abdominal:      General: There is no distension  Palpations: There is no mass  Tenderness: There is no abdominal tenderness  There is no left CVA tenderness  Musculoskeletal:      Right lower leg: 3+ Edema present  Left lower le+ Edema present  Feet:      Right foot:      Skin integrity: No ulcer, skin breakdown, erythema, warmth, callus or dry skin  Left foot:      Skin integrity: No ulcer, skin breakdown, erythema, warmth, callus or dry skin  Lymphadenopathy:      Cervical: No cervical adenopathy  Skin:     General: Skin is warm  Coloration: Skin is not jaundiced or pale  Findings: No rash  Neurological:      General: No focal deficit present  Mental Status: He is alert and oriented to person, place, and time  Sensory: No sensory deficit  Psychiatric:         Mood and Affect: Mood normal          Behavior: Behavior normal          Thought Content:  Thought content normal  Judgment: Judgment normal      Diabetic Foot Exam    Patient's shoes and socks removed  Right Foot/Ankle   Right Foot Inspection  Skin Exam: skin normal and skin intact  No dry skin, no warmth, no callus, no erythema, no maceration, no abnormal color, no pre-ulcer, no ulcer and no callus  Toe Exam: ROM and strength within normal limits  no right toe deformity    Sensory   Proprioception: intact  Monofilament testing: diminished    Vascular  Capillary refills: < 3 seconds  The right DP pulse is 1+  The right PT pulse is 1+  Left Foot/Ankle  Left Foot Inspection  Skin Exam: skin normal and skin intact  No dry skin, no warmth, no erythema, no maceration, normal color, no pre-ulcer, no ulcer and no callus  Toe Exam: ROM and strength within normal limits  No left toe deformity  Sensory   Proprioception: intact  Monofilament testing: intact    Vascular  Capillary refills: < 3 seconds  The left DP pulse is 1+  The left PT pulse is 1+       Assign Risk Category  No deformity present  No loss of protective sensation  Weak pulses  Risk: 0    Lupe Zapata MD

## 2023-05-30 ENCOUNTER — OFFICE VISIT (OUTPATIENT)
Dept: INTERNAL MEDICINE CLINIC | Facility: CLINIC | Age: 68
End: 2023-05-30

## 2023-05-30 VITALS
WEIGHT: 304 LBS | DIASTOLIC BLOOD PRESSURE: 78 MMHG | HEIGHT: 71 IN | HEART RATE: 88 BPM | OXYGEN SATURATION: 98 % | SYSTOLIC BLOOD PRESSURE: 131 MMHG | BODY MASS INDEX: 42.56 KG/M2

## 2023-05-30 DIAGNOSIS — R80.9 TYPE 2 DIABETES MELLITUS WITH MICROALBUMINURIA, WITH LONG-TERM CURRENT USE OF INSULIN (HCC): Primary | ICD-10-CM

## 2023-05-30 DIAGNOSIS — Z79.4 TYPE 2 DIABETES MELLITUS WITH MICROALBUMINURIA, WITH LONG-TERM CURRENT USE OF INSULIN (HCC): ICD-10-CM

## 2023-05-30 DIAGNOSIS — J30.1 ALLERGIC RHINITIS DUE TO POLLEN, UNSPECIFIED SEASONALITY: ICD-10-CM

## 2023-05-30 DIAGNOSIS — E66.01 CLASS 2 SEVERE OBESITY DUE TO EXCESS CALORIES WITH SERIOUS COMORBIDITY AND BODY MASS INDEX (BMI) OF 35.0 TO 35.9 IN ADULT (HCC): ICD-10-CM

## 2023-05-30 DIAGNOSIS — E55.9 VITAMIN D DEFICIENCY: ICD-10-CM

## 2023-05-30 DIAGNOSIS — K75.81 STEATOHEPATITIS: ICD-10-CM

## 2023-05-30 DIAGNOSIS — E11.29 TYPE 2 DIABETES MELLITUS WITH MICROALBUMINURIA, WITH LONG-TERM CURRENT USE OF INSULIN (HCC): Primary | ICD-10-CM

## 2023-05-30 DIAGNOSIS — R80.1 PERSISTENT PROTEINURIA: ICD-10-CM

## 2023-05-30 DIAGNOSIS — R60.0 EDEMA OF BOTH LEGS: ICD-10-CM

## 2023-05-30 DIAGNOSIS — Z79.4 TYPE 2 DIABETES MELLITUS WITH MICROALBUMINURIA, WITH LONG-TERM CURRENT USE OF INSULIN (HCC): Primary | ICD-10-CM

## 2023-05-30 DIAGNOSIS — N18.30 STAGE 3 CHRONIC KIDNEY DISEASE, UNSPECIFIED WHETHER STAGE 3A OR 3B CKD (HCC): ICD-10-CM

## 2023-05-30 DIAGNOSIS — I10 ESSENTIAL HYPERTENSION: ICD-10-CM

## 2023-05-30 DIAGNOSIS — R80.9 TYPE 2 DIABETES MELLITUS WITH MICROALBUMINURIA, WITH LONG-TERM CURRENT USE OF INSULIN (HCC): ICD-10-CM

## 2023-05-30 DIAGNOSIS — R00.0 TACHYCARDIA: ICD-10-CM

## 2023-05-30 DIAGNOSIS — E78.00 HYPERCHOLESTEREMIA: ICD-10-CM

## 2023-05-30 DIAGNOSIS — R20.0 NUMBNESS OF LEFT FOOT: Primary | ICD-10-CM

## 2023-05-30 DIAGNOSIS — E11.29 TYPE 2 DIABETES MELLITUS WITH MICROALBUMINURIA, WITH LONG-TERM CURRENT USE OF INSULIN (HCC): ICD-10-CM

## 2023-05-30 DIAGNOSIS — E53.8 FOLIC ACID DEFICIENCY: ICD-10-CM

## 2023-05-30 DIAGNOSIS — E53.8 VITAMIN B12 DEFICIENCY: ICD-10-CM

## 2023-05-30 RX ORDER — AMLODIPINE BESYLATE 10 MG/1
10 TABLET ORAL DAILY
Qty: 90 TABLET | Refills: 0 | Status: SHIPPED | OUTPATIENT
Start: 2023-05-30

## 2023-05-30 RX ORDER — LOSARTAN POTASSIUM 100 MG/1
100 TABLET ORAL DAILY
Qty: 90 TABLET | Refills: 1 | Status: SHIPPED | OUTPATIENT
Start: 2023-05-30

## 2023-05-30 RX ORDER — METOPROLOL SUCCINATE 100 MG/1
100 TABLET, EXTENDED RELEASE ORAL DAILY
Qty: 30 TABLET | Refills: 0 | Status: SHIPPED | OUTPATIENT
Start: 2023-05-30

## 2023-05-30 RX ORDER — GABAPENTIN 100 MG/1
100 CAPSULE ORAL
Qty: 30 CAPSULE | Refills: 1 | Status: SHIPPED | OUTPATIENT
Start: 2023-05-30

## 2023-05-30 RX ORDER — INSULIN ASPART 100 [IU]/ML
20 INJECTION, SUSPENSION SUBCUTANEOUS 2 TIMES DAILY WITH MEALS
Qty: 15 ML | Refills: 2 | Status: SHIPPED | OUTPATIENT
Start: 2023-05-30 | End: 2023-05-30

## 2023-05-30 NOTE — PATIENT INSTRUCTIONS
Recommend to avoid alcohol  Recommend to take 1 multivitamin tablet a day  Recommend to start gabapentin 100 mg at bedtime    Go for additional blood test to evaluation for your diabetes, neuropathy, tomorrow  Also go for the chest x-ray evaluation because of the edema as well as venous Doppler tomorrow  I like to see you back in a week to 2 weeks    Follow with Consultants as per their and our suggestion    Follow up in approximately one week or as needed earlier    Follow all instructions as advised and discussed  Take your medications as prescribed  Call the office immediately if you experience any side effects  Ask questions if you do not understand  Keep your scheduled appointment as advised or come sooner if necessary or in doubt  Best time to call for non-urgent matter or questions on weekdays is between 9am and 12 noon  See physician for any new symptoms or worsening of current symptoms  Urgent or emergent situations call 911 and report to nearest emergency room  I spent  time taking care of this patient including clinical care, conseling, collaboration, chart, lab and consultant's follow up note,images report, documentation, pre visit  review as appropriate      Patient is to get labs 1 week(s) prior to next visit if advised     Consider seeing a cardiologist as mentioned before

## 2023-05-30 NOTE — ASSESSMENT & PLAN NOTE
Patient reports worsening of the edema of left lower extremity since he turned his car to the left leg patient torn left calf muscle 6 months ago, was to the emergency room subsequently has been under care of orthopedic  No significant pain however he reports worsening of the edema of the left lower extremity  He does have chronic edema  He is on Lasix 40 mg once a day he is also on amlodipine  We will do the venous Doppler of both lower extremity as well as the chest x-ray    Recheck back in 1 to 2 weeks

## 2023-05-30 NOTE — ASSESSMENT & PLAN NOTE
Complains of numbness on the bottom of the left foot going on for 2 months  No low back pain no radicular symptoms  Drinks 2 shots a day-he likes martina  Recheck hemoglobin A1c vitamin B12 antinuclear antibody and Lyme's titer sed rate    Recommend to avoid alcohol  Recommend to take 1 multivitamin tablet a day      Recommend to start gabapentin 100 mg at bedtime

## 2023-05-31 ENCOUNTER — LAB (OUTPATIENT)
Dept: LAB | Facility: CLINIC | Age: 68
End: 2023-05-31
Payer: MEDICARE

## 2023-05-31 ENCOUNTER — HOSPITAL ENCOUNTER (OUTPATIENT)
Dept: NON INVASIVE DIAGNOSTICS | Facility: CLINIC | Age: 68
Discharge: HOME | End: 2023-05-31

## 2023-05-31 DIAGNOSIS — R80.9 TYPE 2 DIABETES MELLITUS WITH MICROALBUMINURIA, WITH LONG-TERM CURRENT USE OF INSULIN (HCC): ICD-10-CM

## 2023-05-31 DIAGNOSIS — R60.0 EDEMA OF BOTH LEGS: ICD-10-CM

## 2023-05-31 DIAGNOSIS — E11.29 TYPE 2 DIABETES MELLITUS WITH MICROALBUMINURIA, WITH LONG-TERM CURRENT USE OF INSULIN (HCC): ICD-10-CM

## 2023-05-31 DIAGNOSIS — E78.00 HYPERCHOLESTEREMIA: ICD-10-CM

## 2023-05-31 DIAGNOSIS — I10 ESSENTIAL HYPERTENSION: ICD-10-CM

## 2023-05-31 DIAGNOSIS — N18.30 STAGE 3 CHRONIC KIDNEY DISEASE, UNSPECIFIED WHETHER STAGE 3A OR 3B CKD (HCC): ICD-10-CM

## 2023-05-31 DIAGNOSIS — Z79.4 TYPE 2 DIABETES MELLITUS WITH MICROALBUMINURIA, WITH LONG-TERM CURRENT USE OF INSULIN (HCC): ICD-10-CM

## 2023-05-31 DIAGNOSIS — K75.81 STEATOHEPATITIS: ICD-10-CM

## 2023-05-31 DIAGNOSIS — R20.0 NUMBNESS OF LEFT FOOT: ICD-10-CM

## 2023-05-31 DIAGNOSIS — E53.8 VITAMIN B12 DEFICIENCY: ICD-10-CM

## 2023-05-31 DIAGNOSIS — E53.8 FOLIC ACID DEFICIENCY: ICD-10-CM

## 2023-05-31 DIAGNOSIS — E66.01 CLASS 2 SEVERE OBESITY DUE TO EXCESS CALORIES WITH SERIOUS COMORBIDITY AND BODY MASS INDEX (BMI) OF 35.0 TO 35.9 IN ADULT (HCC): ICD-10-CM

## 2023-05-31 LAB
ANA SER QL IA: NEGATIVE
B BURGDOR IGG+IGM SER-ACNC: <0.2 AI
CHOLEST SERPL-MCNC: 181 MG/DL
CREAT UR-MCNC: <13 MG/DL
ERYTHROCYTE [DISTWIDTH] IN BLOOD BY AUTOMATED COUNT: 12.7 % (ref 11.6–15.1)
EST. AVERAGE GLUCOSE BLD GHB EST-MCNC: 200 MG/DL
FOLATE SERPL-MCNC: 12.6 NG/ML
HBA1C MFR BLD: 8.6 %
HCT VFR BLD AUTO: 42 % (ref 36.5–49.3)
HDLC SERPL-MCNC: 64 MG/DL
HGB BLD-MCNC: 13.7 G/DL (ref 12–17)
LDLC SERPL CALC-MCNC: 75 MG/DL (ref 0–100)
MCH RBC QN AUTO: 30.5 PG (ref 26.8–34.3)
MCHC RBC AUTO-ENTMCNC: 32.6 G/DL (ref 31.4–37.4)
MCV RBC AUTO: 94 FL (ref 82–98)
MICROALBUMIN UR-MCNC: 37.1 MG/L (ref 0–20)
MICROALBUMIN/CREAT 24H UR: >285 MG/G CREATININE (ref 0–30)
PLATELET # BLD AUTO: 258 THOUSANDS/UL (ref 149–390)
PMV BLD AUTO: 9.1 FL (ref 8.9–12.7)
RBC # BLD AUTO: 4.49 MILLION/UL (ref 3.88–5.62)
TRIGL SERPL-MCNC: 211 MG/DL
VIT B12 SERPL-MCNC: 964 PG/ML (ref 180–914)
WBC # BLD AUTO: 10.39 THOUSAND/UL (ref 4.31–10.16)

## 2023-05-31 PROCEDURE — 82043 UR ALBUMIN QUANTITATIVE: CPT

## 2023-05-31 PROCEDURE — 80061 LIPID PANEL: CPT

## 2023-05-31 PROCEDURE — 85027 COMPLETE CBC AUTOMATED: CPT

## 2023-05-31 PROCEDURE — 83036 HEMOGLOBIN GLYCOSYLATED A1C: CPT

## 2023-05-31 PROCEDURE — 86038 ANTINUCLEAR ANTIBODIES: CPT

## 2023-05-31 PROCEDURE — 86618 LYME DISEASE ANTIBODY: CPT

## 2023-05-31 PROCEDURE — 36415 COLL VENOUS BLD VENIPUNCTURE: CPT

## 2023-05-31 PROCEDURE — 82746 ASSAY OF FOLIC ACID SERUM: CPT

## 2023-05-31 PROCEDURE — 82570 ASSAY OF URINE CREATININE: CPT

## 2023-05-31 PROCEDURE — 82607 VITAMIN B-12: CPT

## 2023-05-31 RX ORDER — INSULIN HUMAN 100 [IU]/ML
20 INJECTION, SUSPENSION SUBCUTANEOUS 2 TIMES DAILY
Qty: 15 ML | Refills: 5 | Status: SHIPPED | OUTPATIENT
Start: 2023-05-31

## 2023-06-06 ENCOUNTER — RA CDI HCC (OUTPATIENT)
Dept: OTHER | Facility: HOSPITAL | Age: 68
End: 2023-06-06

## 2023-06-06 NOTE — PROGRESS NOTES
[facility]  coding opportunities          Chart Reviewed number of suggestions sent to Provider: 3     Patients Insurance     Medicare Insurance: Medicare        [id number]  [id number]  [id number]

## 2023-06-13 ENCOUNTER — OFFICE VISIT (OUTPATIENT)
Dept: INTERNAL MEDICINE CLINIC | Facility: CLINIC | Age: 68
End: 2023-06-13
Payer: MEDICARE

## 2023-06-13 VITALS
BODY MASS INDEX: 42.14 KG/M2 | HEIGHT: 71 IN | DIASTOLIC BLOOD PRESSURE: 78 MMHG | OXYGEN SATURATION: 98 % | WEIGHT: 301 LBS | SYSTOLIC BLOOD PRESSURE: 122 MMHG | HEART RATE: 89 BPM

## 2023-06-13 DIAGNOSIS — E11.29 TYPE 2 DIABETES MELLITUS WITH MICROALBUMINURIA, WITH LONG-TERM CURRENT USE OF INSULIN (HCC): ICD-10-CM

## 2023-06-13 DIAGNOSIS — R60.0 EDEMA OF BOTH LEGS: ICD-10-CM

## 2023-06-13 DIAGNOSIS — N18.30 STAGE 3 CHRONIC KIDNEY DISEASE, UNSPECIFIED WHETHER STAGE 3A OR 3B CKD (HCC): Primary | ICD-10-CM

## 2023-06-13 DIAGNOSIS — Z79.4 TYPE 2 DIABETES MELLITUS WITH MICROALBUMINURIA, WITH LONG-TERM CURRENT USE OF INSULIN (HCC): ICD-10-CM

## 2023-06-13 DIAGNOSIS — R20.0 NUMBNESS OF LEFT FOOT: ICD-10-CM

## 2023-06-13 DIAGNOSIS — R80.1 PERSISTENT PROTEINURIA: ICD-10-CM

## 2023-06-13 DIAGNOSIS — I10 ESSENTIAL HYPERTENSION: ICD-10-CM

## 2023-06-13 DIAGNOSIS — E78.00 HYPERCHOLESTEREMIA: ICD-10-CM

## 2023-06-13 DIAGNOSIS — R80.9 TYPE 2 DIABETES MELLITUS WITH MICROALBUMINURIA, WITH LONG-TERM CURRENT USE OF INSULIN (HCC): ICD-10-CM

## 2023-06-13 DIAGNOSIS — J30.1 ALLERGIC RHINITIS DUE TO POLLEN, UNSPECIFIED SEASONALITY: ICD-10-CM

## 2023-06-13 PROCEDURE — 99214 OFFICE O/P EST MOD 30 MIN: CPT | Performed by: INTERNAL MEDICINE

## 2023-06-13 RX ORDER — GABAPENTIN 100 MG/1
200 CAPSULE ORAL
Qty: 60 CAPSULE | Refills: 3 | Status: SHIPPED | OUTPATIENT
Start: 2023-06-13

## 2023-06-13 NOTE — ASSESSMENT & PLAN NOTE
Lab Results   Component Value Date    LDLCALC 75 05/31/2023     Lab Results   Component Value Date    ALT 28 05/24/2023    ALT 17 06/06/2018     Lab Results   Component Value Date    CHOLESTEROL 181 05/31/2023    CHOLESTEROL 165 10/11/2022    CHOLESTEROL 151 07/01/2022     Lab Results   Component Value Date    HDL 64 05/31/2023    HDL 81 10/11/2022    HDL 76 07/01/2022     Lab Results   Component Value Date    TRIG 211 (H) 05/31/2023    TRIG 186 (H) 10/11/2022    TRIG 77 07/01/2022     Lab Results   Component Value Date    NONHDLC 84 10/11/2022    [place] 75 07/01/2022    NONHDLC 81 11/19/2021   Continue atorvastatin 40 mg daily

## 2023-06-13 NOTE — ASSESSMENT & PLAN NOTE
Modest edema  Weight unchanged  Currently on Lasix 40 mg daily and KCl 20 mEq daily    Will add Jardiance hopefully that will help edema 10 mg daily

## 2023-06-13 NOTE — ASSESSMENT & PLAN NOTE
Lab Results   Component Value Date    HGBA1C 8 6 (H) 05/31/2023   Diabetes hemoglobin A1c 8 6: Current regimen metformin 1000 mg twice a day, Humulin 70/30 20 units twice a day  GFR fair  Urine for microalbumin modestly elevated    Will add Jardiance 10 mg daily hopefully that will help hemoglobin A1c as well as edema of legs

## 2023-06-13 NOTE — ASSESSMENT & PLAN NOTE
Lab Results   Component Value Date    CREATININE 1 30 05/24/2023    CREATININE 1 18 10/11/2022    CREATININE 1 39 (H) 07/01/2022    EGFR 56 05/24/2023    EGFR 63 10/11/2022    EGFR 52 07/01/2022   Will add Jardiance 10 mg daily monitor renal functions

## 2023-06-13 NOTE — PROGRESS NOTES
Name: Huyen Ellington  : 1955      MRN: [id number]  Encounter Provider: Ishmael Browning MD  Encounter Date: 2023   Encounter department: [address]     1  Stage 3 chronic kidney disease, unspecified whether stage 3a or 3b CKD ([facility] [address] )  Assessment & Plan:  Lab Results   Component Value Date    CREATININE 1 30 2023    CREATININE 1 18 10/11/2022    CREATININE 1 39 (H) 2022    EGFR 56 2023    EGFR 63 10/11/2022    EGFR 52 2022   Will add Jardiance 10 mg daily monitor renal functions    Orders:  -     Albumin / creatinine urine ratio; Future; Expected date: 2023  -     Comprehensive metabolic panel; Future; Expected date: 2023  -     Hemoglobin A1C; Future; Expected date: 2023  -     Lipid Panel with Direct LDL reflex; Future; Expected date: 2023    2  Edema of both legs  Assessment & Plan:  Modest edema  Weight unchanged  Currently on Lasix 40 mg daily and KCl 20 mEq daily  Will add Jardiance hopefully that will help edema 10 mg daily    Orders:  -     Empagliflozin (JARDIANCE) 10 MG TABS tablet; Take 1 tablet (10 mg total) by mouth daily  -     Lipid panel; Future    3  Numbness of left foot  Assessment & Plan:  70% better neuropathy on the left foot  Will increase gabapentin 100 mg 2 tablet daily    2023: Creatinine 1 3 blood sugar 242 corrected calcium 10 rest of the CMP normal TSH 2 8 CBC normal  2023: Lyme's titer negative, CBC normal except WBC 10 39, vitamin W19 449, folic acid 68 1, cholesterol 181 LDL 75 triglyceride 211 NINFA negative hemoglobin A1c 8 6 urine for microalbumin/creatinine ratio more than 285      Orders:  -     gabapentin (Neurontin) 100 mg capsule; Take 2 capsules (200 mg total) by mouth daily at bedtime    4   Persistent proteinuria  Assessment & Plan:  2023: Creatinine 1 3 blood sugar 242 corrected calcium 10 rest of the CMP normal TSH 2 8 CBC normal  5/21/2023: Lyme's titer negative, CBC normal except WBC 10 39, vitamin T84 371, folic acid 39 8, cholesterol 181 LDL 75 triglyceride 211 NINFA negative hemoglobin A1c 8 6 urine for microalbumin/creatinine ratio more than 285      Continue losartan we will aim for the better control of the diabetes    Orders:  -     Empagliflozin (JARDIANCE) 10 MG TABS tablet; Take 1 tablet (10 mg total) by mouth daily  -     Albumin / creatinine urine ratio; Future; Expected date: 09/13/2023  -     Comprehensive metabolic panel; Future; Expected date: 09/13/2023  -     Hemoglobin A1C; Future; Expected date: 09/13/2023  -     Lipid Panel with Direct LDL reflex; Future; Expected date: 09/13/2023    5  Hypercholesteremia  Assessment & Plan:  Lab Results   Component Value Date    LDLCALC 75 05/31/2023     Lab Results   Component Value Date    ALT 28 05/24/2023    ALT 17 06/06/2018     Lab Results   Component Value Date    CHOLESTEROL 181 05/31/2023    CHOLESTEROL 165 10/11/2022    CHOLESTEROL 151 07/01/2022     Lab Results   Component Value Date    HDL 64 05/31/2023    HDL 81 10/11/2022    HDL 76 07/01/2022     Lab Results   Component Value Date    TRIG 211 (H) 05/31/2023    TRIG 186 (H) 10/11/2022    TRIG 77 07/01/2022     Lab Results   Component Value Date    NONHDLC 84 10/11/2022    [place] 75 07/01/2022    NONHDLC 81 11/19/2021   Continue atorvastatin 40 mg daily      Orders:  -     Lipid panel; Future  -     Albumin / creatinine urine ratio; Future; Expected date: 09/13/2023  -     Comprehensive metabolic panel; Future; Expected date: 09/13/2023  -     Hemoglobin A1C; Future; Expected date: 09/13/2023  -     Lipid Panel with Direct LDL reflex; Future; Expected date: 09/13/2023    6   Type 2 diabetes mellitus with microalbuminuria, with long-term current use of insulin [facility])  Assessment & Plan:    Lab Results   Component Value Date    HGBA1C 8 6 (H) 05/31/2023   Diabetes hemoglobin A1c 8 6: Current regimen metformin 1000 mg twice a day, Humulin 70/30 20 units twice a day  GFR fair  Urine for microalbumin modestly elevated  Will add Jardiance 10 mg daily hopefully that will help hemoglobin A1c as well as edema of legs    Orders:  -     Empagliflozin (JARDIANCE) 10 MG TABS tablet; Take 1 tablet (10 mg total) by mouth daily  -     Lipid panel; Future  -     Albumin / creatinine urine ratio; Future; Expected date: 09/13/2023  -     Comprehensive metabolic panel; Future; Expected date: 09/13/2023  -     Hemoglobin A1C; Future; Expected date: 09/13/2023  -     Lipid Panel with Direct LDL reflex; Future; Expected date: 09/13/2023    7  Essential hypertension  -     Lipid panel; Future  -     Albumin / creatinine urine ratio; Future; Expected date: 09/13/2023  -     Comprehensive metabolic panel; Future; Expected date: 09/13/2023  -     Hemoglobin A1C; Future; Expected date: 09/13/2023  -     Lipid Panel with Direct LDL reflex; Future; Expected date: 09/13/2023    8  Allergic rhinitis due to pollen, unspecified seasonality           Subjective         Patient is here for follow-up of his numbness and feet on the left side going on for 2 to 3 months  Numbness is more in the morning  Better with the walking  Patient underwent extensive blood test as follows  5/21/2023: Lyme's titer negative, CBC normal except WBC 10 39, vitamin B25 907, folic acid 46 1, cholesterol 181 LDL 75 triglyceride 211 NINFA negative hemoglobin A1c 8 6 urine for microalbumin/creatinine ratio more than 285    Patient is also here for worsening edema of the both lower extremity more on the left than the right  Venous Doppler is negative for DVT  Other problems as follows: Diabetes hemoglobin A1c 8 6: Current regimen metformin 1000 mg twice a day, Humulin 70/30 20 units twice a day    Hypertension current regimen losartan 100 mg daily, metoprolol succinate 100 mg daily, Lasix 40 mg daily, KCl 20 mEq daily, amlodipine 10 mg daily  Allergy on Astelin nasal spray  Edema of legs on Lasix 40 mg daily and KCl 20 mEq daily  Hyperlipidemia on atorvastatin 40 mg daily      5/24/2023: Creatinine 1 3 blood sugar 242 corrected calcium 10 rest of the CMP normal TSH 2 8 CBC normal  5/21/2023: Lyme's titer negative, CBC normal except WBC 10 39, vitamin Q30 996, folic acid 44 5, cholesterol 181 LDL 75 triglyceride 211 NINFA negative hemoglobin A1c 8 6 urine for microalbumin/creatinine ratio more than 285          Lab Results       Component                Value               Date                       PSA                      2 6                 07/01/2022                 PSA                      1 8                 11/02/2020                 PSA                      2 0                 01/08/2019                Review of Systems   Constitutional: Negative for chills and fever  HENT: Negative for ear pain, sore throat and trouble swallowing  Eyes: Negative for pain and visual disturbance  Respiratory: Negative for cough and shortness of breath  Cardiovascular: Positive for leg swelling  Negative for chest pain and palpitations  Gastrointestinal: Negative for abdominal pain, constipation, diarrhea and vomiting  Genitourinary: Negative for dysuria, hematuria and testicular pain  Musculoskeletal: Negative for arthralgias and back pain  Skin: Negative for color change and rash  Neurological: Positive for numbness  Negative for dizziness, seizures, syncope and headaches  Psychiatric/Behavioral: Negative for agitation, confusion and hallucinations  All other systems reviewed and are negative  Past Medical History:   Diagnosis Date   • Hypertension      History reviewed  No pertinent surgical history  History reviewed  No pertinent family history    Social History     Socioeconomic History   • Marital status: /Civil Union     Spouse name: None   • Number of children: None   • Years of education: None   • Highest education level: None   Occupational History   • None   Tobacco Use   • Smoking status: Former   • Smokeless tobacco: Never   Substance and Sexual Activity   • Alcohol use:  Yes     Alcohol/week: 4 0 standard drinks of alcohol     Types: 4 Shots of liquor per week   • Drug use: Never   • Sexual activity: None   Other Topics Concern   • None   Social History Narrative   • None     Social Determinants of Health     Financial Resource Strain: Not on file   Food Insecurity: Not on file   Transportation Needs: Not on file   Physical Activity: Not on file   Stress: Not on file   Social Connections: Not on file   Intimate Partner Violence: Not on file   Housing Stability: Not on file     Current Outpatient Medications on File Prior to Visit   Medication Sig   • amLODIPine (NORVASC) 10 mg tablet Take 1 tablet (10 mg total) by mouth daily   • aspirin (ECOTRIN LOW STRENGTH) 81 mg EC tablet Take 81 mg by mouth daily   • atorvastatin (LIPITOR) 40 mg tablet TAKE 1 TABLET BY MOUTH EVERY DAY   • azelastine (ASTELIN) 0 1 % nasal spray 1 spray into each nostril 2 (two) times a day Use in each nostril as directed   • BD Pen Needle Katelyn U/F 32G X 4 MM MISC Inject as directed 2 (two) times a day   • Cholecalciferol (VITAMIN D3) 2000 units TABS Take 2,000 Units by mouth daily   • furosemide (LASIX) 40 mg tablet TAKE 1 TABLET BY MOUTH EVERY DAY   • Glucose Blood (ONETOUCH ULTRA BLUE VI) 1 Units by In Vitro route 3 (three) times a day   • glucose blood (OneTouch Ultra) test strip Test 3 times daily   • insulin isophane-insulin regular (HumuLIN 70/30 KwikPen) 100 units/mL injection pen Inject 20 Units under the skin 2 (two) times a day   • Krill Oil 1000 MG CAPS Take 1 capsule by mouth daily   • losartan (COZAAR) 100 MG tablet Take 1 tablet (100 mg total) by mouth daily   • metFORMIN (GLUCOPHAGE) 1000 MG tablet TAKE 1 TABLET BY MOUTH TWICE A DAY   • metoprolol succinate (TOPROL-XL) 100 mg 24 hr tablet Take 1 tablet (100 mg total) by mouth daily   • potassium chloride "(K-DUR,KLOR-CON) 20 mEq tablet Take 1 tablet (20 mEq total) by mouth daily   • vitamin E, tocopherol, 400 units capsule Take 400 Units by mouth daily   • [DISCONTINUED] gabapentin (Neurontin) 100 mg capsule Take 1 capsule (100 mg total) by mouth daily at bedtime   • [DISCONTINUED] triamcinolone (KENALOG) 0 1 % cream Apply topically 2 (two) times a day (Patient not taking: Reported on 6/13/2023)     No Known Allergies    There is no immunization history on file for this patient  Objective     /78   Pulse 89   Ht 5' 11\" (1 803 m)   Wt (!) 137 kg (301 lb)   SpO2 98%   BMI 41 98 kg/m²     Physical Exam  Vitals and nursing note reviewed  Constitutional:       Appearance: Normal appearance  He is well-developed  He is obese  He is not ill-appearing  HENT:      Head: Normocephalic and atraumatic  Right Ear: External ear normal       Left Ear: External ear normal       Nose: No congestion or rhinorrhea  Eyes:      General: Lids are normal          Right eye: No discharge  Left eye: No discharge  Conjunctiva/sclera: Conjunctivae normal    Neck:      Thyroid: No thyroid mass or thyromegaly  Vascular: No JVD  Trachea: Trachea normal    Cardiovascular:      Rate and Rhythm: Normal rate and regular rhythm  Pulses:           Dorsalis pedis pulses are 1+ on the right side and 1+ on the left side  Posterior tibial pulses are 1+ on the right side and 1+ on the left side  Heart sounds: Normal heart sounds  Comments: No DVT clinically  Pulmonary:      Effort: Pulmonary effort is normal       Breath sounds: Normal breath sounds  Musculoskeletal:      Cervical back: Normal range of motion  Left lower leg: Edema present  Feet:      Right foot:      Skin integrity: No ulcer, skin breakdown, erythema, warmth, callus or dry skin  Left foot:      Skin integrity: No ulcer, skin breakdown, erythema, warmth, callus or dry skin  Skin:     General: Skin is warm   " Coloration: Skin is not pale  Findings: No bruising or lesion  Neurological:      General: No focal deficit present  Mental Status: He is alert and oriented to person, place, and time  Sensory: Sensory deficit present  Coordination: Coordination normal       Gait: Gait normal    Psychiatric:         Mood and Affect: Mood normal          Thought Content:  Thought content normal        Analilia Menon MD

## 2023-06-13 NOTE — ASSESSMENT & PLAN NOTE
5/24/2023: Creatinine 1 3 blood sugar 242 corrected calcium 10 rest of the CMP normal TSH 2 8 CBC normal  5/21/2023: Lyme's titer negative, CBC normal except WBC 10 39, vitamin E06 697, folic acid 93 2, cholesterol 181 LDL 75 triglyceride 211 NINFA negative hemoglobin A1c 8 6 urine for microalbumin/creatinine ratio more than 285      Continue losartan we will aim for the better control of the diabetes

## 2023-06-13 NOTE — ASSESSMENT & PLAN NOTE
70% better neuropathy on the left foot    Will increase gabapentin 100 mg 2 tablet daily    5/24/2023: Creatinine 1 3 blood sugar 242 corrected calcium 10 rest of the CMP normal TSH 2 8 CBC normal  5/21/2023: Lyme's titer negative, CBC normal except WBC 10 39, vitamin K63 941, folic acid 83 5, cholesterol 181 LDL 75 triglyceride 211 NINFA negative hemoglobin A1c 8 6 urine for microalbumin/creatinine ratio more than 285

## 2023-06-13 NOTE — PATIENT INSTRUCTIONS
Follow with Consultants as per their and our suggestion    Follow up in 12 week(s) or as needed earlier    Follow all instructions as advised and discussed  Take your medications as prescribed  We are making 2 changes today  We will increase your gabapentin 100 mg 2 tablet daily at bedtime  We will also add Jardiance 10 mg daily  Make you make sure you see your eye doctor before your next visit  Call the office immediately if you experience any side effects  Ask questions if you do not understand  Keep your scheduled appointment as advised or come sooner if necessary or in doubt  Best time to call for non-urgent matter or questions on weekdays is between 9am and 12 noon  See physician for any new symptoms or worsening of current symptoms  Urgent or emergent situations call 911 and report to nearest emergency room  I spent  time taking care of this patient including clinical care, conseling, collaboration, chart, lab and consultant's follow up note,images report, documentation, pre visit  review as appropriate      Patient is to get labs 1 week(s) prior to next visit if advised

## 2023-06-26 DIAGNOSIS — J30.1 ALLERGIC RHINITIS DUE TO POLLEN, UNSPECIFIED SEASONALITY: ICD-10-CM

## 2023-06-26 RX ORDER — AZELASTINE HYDROCHLORIDE 137 UG/1
SPRAY, METERED NASAL
Qty: 30 ML | Refills: 1 | Status: SHIPPED | OUTPATIENT
Start: 2023-06-26

## 2023-07-10 DIAGNOSIS — E11.29 TYPE 2 DIABETES MELLITUS WITH MICROALBUMINURIA, WITH LONG-TERM CURRENT USE OF INSULIN (HCC): ICD-10-CM

## 2023-07-10 DIAGNOSIS — Z79.4 TYPE 2 DIABETES MELLITUS WITH MICROALBUMINURIA, WITH LONG-TERM CURRENT USE OF INSULIN (HCC): ICD-10-CM

## 2023-07-10 DIAGNOSIS — R80.9 TYPE 2 DIABETES MELLITUS WITH MICROALBUMINURIA, WITH LONG-TERM CURRENT USE OF INSULIN (HCC): ICD-10-CM

## 2023-07-10 DIAGNOSIS — I10 ESSENTIAL HYPERTENSION: ICD-10-CM

## 2023-07-10 RX ORDER — ATORVASTATIN CALCIUM 40 MG/1
TABLET, FILM COATED ORAL
Qty: 90 TABLET | Refills: 0 | Status: SHIPPED | OUTPATIENT
Start: 2023-07-10 | End: 2023-07-17

## 2023-07-17 DIAGNOSIS — R80.9 TYPE 2 DIABETES MELLITUS WITH MICROALBUMINURIA, WITH LONG-TERM CURRENT USE OF INSULIN (HCC): ICD-10-CM

## 2023-07-17 DIAGNOSIS — R60.0 EDEMA OF BOTH LEGS: ICD-10-CM

## 2023-07-17 DIAGNOSIS — E11.29 TYPE 2 DIABETES MELLITUS WITH MICROALBUMINURIA, WITH LONG-TERM CURRENT USE OF INSULIN (HCC): ICD-10-CM

## 2023-07-17 DIAGNOSIS — I10 ESSENTIAL HYPERTENSION: ICD-10-CM

## 2023-07-17 DIAGNOSIS — Z79.4 TYPE 2 DIABETES MELLITUS WITH MICROALBUMINURIA, WITH LONG-TERM CURRENT USE OF INSULIN (HCC): ICD-10-CM

## 2023-07-17 RX ORDER — ATORVASTATIN CALCIUM 40 MG/1
TABLET, FILM COATED ORAL
Qty: 90 TABLET | Refills: 0 | Status: SHIPPED | OUTPATIENT
Start: 2023-07-17 | End: 2023-09-28 | Stop reason: SDUPTHER

## 2023-07-17 RX ORDER — FUROSEMIDE 40 MG/1
TABLET ORAL
Qty: 90 TABLET | Refills: 1 | Status: SHIPPED | OUTPATIENT
Start: 2023-07-17 | End: 2023-09-28 | Stop reason: SDUPTHER

## 2023-07-17 RX ORDER — AMLODIPINE BESYLATE 10 MG/1
TABLET ORAL
Qty: 90 TABLET | Refills: 0 | Status: SHIPPED | OUTPATIENT
Start: 2023-07-17 | End: 2023-09-25

## 2023-09-24 DIAGNOSIS — I10 ESSENTIAL HYPERTENSION: ICD-10-CM

## 2023-09-25 ENCOUNTER — LAB (OUTPATIENT)
Dept: LAB | Facility: CLINIC | Age: 68
End: 2023-09-25
Payer: MEDICARE

## 2023-09-25 DIAGNOSIS — E11.29 TYPE 2 DIABETES MELLITUS WITH MICROALBUMINURIA, WITH LONG-TERM CURRENT USE OF INSULIN (HCC): ICD-10-CM

## 2023-09-25 DIAGNOSIS — N18.30 STAGE 3 CHRONIC KIDNEY DISEASE, UNSPECIFIED WHETHER STAGE 3A OR 3B CKD (HCC): ICD-10-CM

## 2023-09-25 DIAGNOSIS — R80.1 PERSISTENT PROTEINURIA: ICD-10-CM

## 2023-09-25 DIAGNOSIS — I10 ESSENTIAL HYPERTENSION: ICD-10-CM

## 2023-09-25 DIAGNOSIS — R80.9 TYPE 2 DIABETES MELLITUS WITH MICROALBUMINURIA, WITH LONG-TERM CURRENT USE OF INSULIN (HCC): ICD-10-CM

## 2023-09-25 DIAGNOSIS — R60.0 EDEMA OF BOTH LEGS: ICD-10-CM

## 2023-09-25 DIAGNOSIS — Z79.4 TYPE 2 DIABETES MELLITUS WITH MICROALBUMINURIA, WITH LONG-TERM CURRENT USE OF INSULIN (HCC): ICD-10-CM

## 2023-09-25 DIAGNOSIS — E78.00 HYPERCHOLESTEREMIA: ICD-10-CM

## 2023-09-25 LAB
ALBUMIN SERPL BCP-MCNC: 4 G/DL (ref 3.5–5)
ALP SERPL-CCNC: 43 U/L (ref 34–104)
ALT SERPL W P-5'-P-CCNC: 11 U/L (ref 7–52)
ANION GAP SERPL CALCULATED.3IONS-SCNC: 8 MMOL/L
AST SERPL W P-5'-P-CCNC: 11 U/L (ref 13–39)
B BURGDOR IGG+IGM SER QL IA: NEGATIVE
BILIRUB SERPL-MCNC: 0.55 MG/DL (ref 0.2–1)
BUN SERPL-MCNC: 20 MG/DL (ref 5–25)
CALCIUM SERPL-MCNC: 8.8 MG/DL (ref 8.4–10.2)
CHLORIDE SERPL-SCNC: 102 MMOL/L (ref 96–108)
CHOLEST SERPL-MCNC: 165 MG/DL
CO2 SERPL-SCNC: 28 MMOL/L (ref 21–32)
CREAT SERPL-MCNC: 1.2 MG/DL (ref 0.6–1.3)
CREAT UR-MCNC: 15.2 MG/DL
EST. AVERAGE GLUCOSE BLD GHB EST-MCNC: 226 MG/DL
GFR SERPL CREATININE-BSD FRML MDRD: 61 ML/MIN/1.73SQ M
GLUCOSE P FAST SERPL-MCNC: 161 MG/DL (ref 65–99)
HBA1C MFR BLD: 9.5 %
HDLC SERPL-MCNC: 72 MG/DL
LDLC SERPL CALC-MCNC: 72 MG/DL (ref 0–100)
LEFT EYE DIABETIC RETINOPATHY: NORMAL
MICROALBUMIN UR-MCNC: 18.3 MG/L
MICROALBUMIN/CREAT 24H UR: 120 MG/G CREATININE (ref 0–30)
POTASSIUM SERPL-SCNC: 3.7 MMOL/L (ref 3.5–5.3)
PROT SERPL-MCNC: 7.4 G/DL (ref 6.4–8.4)
RIGHT EYE DIABETIC RETINOPATHY: NORMAL
SEVERITY (EYE EXAM): NORMAL
SODIUM SERPL-SCNC: 138 MMOL/L (ref 135–147)
TRIGL SERPL-MCNC: 104 MG/DL

## 2023-09-25 PROCEDURE — 82570 ASSAY OF URINE CREATININE: CPT

## 2023-09-25 PROCEDURE — 36415 COLL VENOUS BLD VENIPUNCTURE: CPT

## 2023-09-25 PROCEDURE — 83036 HEMOGLOBIN GLYCOSYLATED A1C: CPT

## 2023-09-25 PROCEDURE — 80061 LIPID PANEL: CPT

## 2023-09-25 PROCEDURE — 80053 COMPREHEN METABOLIC PANEL: CPT

## 2023-09-25 PROCEDURE — 86618 LYME DISEASE ANTIBODY: CPT

## 2023-09-25 PROCEDURE — 82043 UR ALBUMIN QUANTITATIVE: CPT

## 2023-09-25 RX ORDER — AMLODIPINE BESYLATE 10 MG/1
TABLET ORAL
Qty: 90 TABLET | Refills: 0 | Status: SHIPPED | OUTPATIENT
Start: 2023-09-25 | End: 2023-09-28 | Stop reason: SDUPTHER

## 2023-09-28 ENCOUNTER — OFFICE VISIT (OUTPATIENT)
Dept: INTERNAL MEDICINE CLINIC | Facility: CLINIC | Age: 68
End: 2023-09-28
Payer: MEDICARE

## 2023-09-28 VITALS
DIASTOLIC BLOOD PRESSURE: 80 MMHG | HEART RATE: 65 BPM | BODY MASS INDEX: 42.73 KG/M2 | WEIGHT: 305.2 LBS | HEIGHT: 71 IN | OXYGEN SATURATION: 95 % | SYSTOLIC BLOOD PRESSURE: 138 MMHG

## 2023-09-28 DIAGNOSIS — I10 ESSENTIAL HYPERTENSION: ICD-10-CM

## 2023-09-28 DIAGNOSIS — Z00.00 MEDICARE ANNUAL WELLNESS VISIT, SUBSEQUENT: ICD-10-CM

## 2023-09-28 DIAGNOSIS — E55.9 VITAMIN D DEFICIENCY: ICD-10-CM

## 2023-09-28 DIAGNOSIS — R80.9 TYPE 2 DIABETES MELLITUS WITH MICROALBUMINURIA, WITH LONG-TERM CURRENT USE OF INSULIN (HCC): Primary | ICD-10-CM

## 2023-09-28 DIAGNOSIS — E11.29 TYPE 2 DIABETES MELLITUS WITH MICROALBUMINURIA, WITH LONG-TERM CURRENT USE OF INSULIN (HCC): Primary | ICD-10-CM

## 2023-09-28 DIAGNOSIS — E78.00 HYPERCHOLESTEREMIA: ICD-10-CM

## 2023-09-28 DIAGNOSIS — Z12.5 SCREENING PSA (PROSTATE SPECIFIC ANTIGEN): ICD-10-CM

## 2023-09-28 DIAGNOSIS — R00.0 TACHYCARDIA: ICD-10-CM

## 2023-09-28 DIAGNOSIS — Z79.4 TYPE 2 DIABETES MELLITUS WITH MICROALBUMINURIA, WITH LONG-TERM CURRENT USE OF INSULIN (HCC): Primary | ICD-10-CM

## 2023-09-28 DIAGNOSIS — E66.01 CLASS 2 SEVERE OBESITY DUE TO EXCESS CALORIES WITH SERIOUS COMORBIDITY AND BODY MASS INDEX (BMI) OF 35.0 TO 35.9 IN ADULT: ICD-10-CM

## 2023-09-28 DIAGNOSIS — K75.81 STEATOHEPATITIS: ICD-10-CM

## 2023-09-28 DIAGNOSIS — R20.0 NUMBNESS OF LEFT FOOT: ICD-10-CM

## 2023-09-28 DIAGNOSIS — R60.0 EDEMA OF BOTH LEGS: ICD-10-CM

## 2023-09-28 DIAGNOSIS — R80.1 PERSISTENT PROTEINURIA: ICD-10-CM

## 2023-09-28 DIAGNOSIS — R21 RASH: ICD-10-CM

## 2023-09-28 DIAGNOSIS — N18.30 STAGE 3 CHRONIC KIDNEY DISEASE, UNSPECIFIED WHETHER STAGE 3A OR 3B CKD (HCC): ICD-10-CM

## 2023-09-28 PROCEDURE — 99214 OFFICE O/P EST MOD 30 MIN: CPT | Performed by: INTERNAL MEDICINE

## 2023-09-28 PROCEDURE — G0439 PPPS, SUBSEQ VISIT: HCPCS | Performed by: INTERNAL MEDICINE

## 2023-09-28 RX ORDER — AMLODIPINE BESYLATE 10 MG/1
10 TABLET ORAL DAILY
Qty: 90 TABLET | Refills: 0 | Status: SHIPPED | OUTPATIENT
Start: 2023-09-28

## 2023-09-28 RX ORDER — ATORVASTATIN CALCIUM 40 MG/1
40 TABLET, FILM COATED ORAL DAILY
Qty: 90 TABLET | Refills: 0 | Status: SHIPPED | OUTPATIENT
Start: 2023-09-28

## 2023-09-28 RX ORDER — GABAPENTIN 100 MG/1
200 CAPSULE ORAL
Qty: 60 CAPSULE | Refills: 3 | Status: SHIPPED | OUTPATIENT
Start: 2023-09-28

## 2023-09-28 RX ORDER — LOSARTAN POTASSIUM 100 MG/1
100 TABLET ORAL DAILY
Qty: 90 TABLET | Refills: 1 | Status: SHIPPED | OUTPATIENT
Start: 2023-09-28

## 2023-09-28 RX ORDER — METOPROLOL SUCCINATE 100 MG/1
100 TABLET, EXTENDED RELEASE ORAL DAILY
Qty: 90 TABLET | Refills: 1 | Status: SHIPPED | OUTPATIENT
Start: 2023-09-28

## 2023-09-28 RX ORDER — FUROSEMIDE 40 MG/1
40 TABLET ORAL DAILY
Qty: 90 TABLET | Refills: 1 | Status: SHIPPED | OUTPATIENT
Start: 2023-09-28

## 2023-09-28 NOTE — ASSESSMENT & PLAN NOTE
9/25/2023: Lyme's antibody negative, urine for microalbumin/creatinine ratio 120 improved, blood sugar 161 rest of the CMP normal GFR improved 61, hemoglobin A1c went up to 9.5 from 8.6, LDL 72 rest of the lipid profile normal,    Diet reviewed, he could do better, strongly recommend diet, keep track of sugar and do exercise    Current regimen: Jardiance 10 mg daily, Humulin 70/30 20 units twice a day, metformin 1000 mg twice a day,    Plan increase Jardiance 25 mg daily, continue Humulin 70/30 20 units twice a day, and continue metformin 1000 mg twice your home Accu-Chek sugar in 1 month.   Lab Results   Component Value Date    HGBA1C 9.5 (H) 09/25/2023

## 2023-09-28 NOTE — ASSESSMENT & PLAN NOTE
Some numbness in the toes of both distal.  We will continue gabapentin at bedtime    Also it helps the leg cramp

## 2023-09-28 NOTE — ASSESSMENT & PLAN NOTE
No significant edema. We will continue Lasix.     Lab Results   Component Value Date    SODIUM 138 09/25/2023    K 3.7 09/25/2023     09/25/2023    CO2 28 09/25/2023    BUN 20 09/25/2023    CREATININE 1.20 09/25/2023    CALCIUM 8.8 09/25/2023

## 2023-09-28 NOTE — ASSESSMENT & PLAN NOTE
Think about Ozempic. BMI Counseling: The BMI is above normal. Know Body weight goal    Weigh yourself daily or weekly as per your doctor    Nutrition recommendations include decreasing portion sizes, encouraging healthy choices of fruits and vegetables, decreasing     fast food intake, consuming healthier snacks, limiting drinks that contain sugar, moderation in carbohydrate intake, increasing     intake of lean protein, reducing intake of saturated and trans fat and reducing intake of cholesterol    Body mass index is 42.57 kg/m².

## 2023-09-28 NOTE — ASSESSMENT & PLAN NOTE
Protein in the urine got better we will continue risk factor management with hypertension hyperlipidemia diabetes medication as outlined and discussed above

## 2023-09-28 NOTE — PATIENT INSTRUCTIONS
strongly recommend diet, keep track of sugar and do exercise    Plan increase Jardiance 25 mg daily, continue Humulin 70/30 20 units twice a day, and continue metformin 1000 mg twice your home Accu-Chek sugar in 1 month. Follow with Consultants as per their and our suggestion    Follow up in 12 week(s) or as needed earlier    Follow all instructions as advised and discussed. Take your medications as prescribed. Call the office immediately if you experience any side effects. Ask questions if you do not understand. Keep your scheduled appointment as advised or come sooner if necessary or in doubt. Best time to call for non-urgent matter or questions on weekdays is between 9am and 12 noon. See physician for any new symptoms or worsening of current symptoms. Urgent or emergent situations call 911 and report to nearest emergency room. I spent  time taking care of this patient including clinical care, conseling, collaboration, chart, lab and consultant's follow up note,images report, documentation, pre visit  review as appropriate. Patient is to get labs 1 week(s) prior to next visit if advised         Medicare Preventive Visit Patient Instructions  Thank you for completing your Welcome to Medicare Visit or Medicare Annual Wellness Visit today. Your next wellness visit will be due in one year (9/28/2024). The screening/preventive services that you may require over the next 5-10 years are detailed below. Some tests may not apply to you based off risk factors and/or age. Screening tests ordered at today's visit but not completed yet may show as past due. Also, please note that scanned in results may not display below.   Preventive Screenings:  Service Recommendations Previous Testing/Comments   Colorectal Cancer Screening  Colonoscopy    Fecal Occult Blood Test (FOBT)/Fecal Immunochemical Test (FIT)  Fecal DNA/Cologuard Test  Flexible Sigmoidoscopy Age: 43-73 years old   Colonoscopy: every 10 years (May be performed more frequently if at higher risk)  OR  FOBT/FIT: every 1 year  OR  Cologuard: every 3 years  OR  Sigmoidoscopy: every 5 years  Screening may be recommended earlier than age 39 if at higher risk for colorectal cancer. Also, an individualized decision between you and your healthcare provider will decide whether screening between the ages of 77-80 would be appropriate. Colonoscopy: Not on file  FOBT/FIT: Not on file  Cologuard: Not on file  Sigmoidoscopy: Not on file          Prostate Cancer Screening Individualized decision between patient and health care provider in men between ages of 53-66   Medicare will cover every 12 months beginning on the day after your 50th birthday PSA: 2.6 ng/mL           Hepatitis C Screening Once for adults born between 1945 and 1965  More frequently in patients at high risk for Hepatitis C Hep C Antibody: 03/16/2021        Diabetes Screening 1-2 times per year if you're at risk for diabetes or have pre-diabetes Fasting glucose: 161 mg/dL (9/25/2023)  A1C: 9.5 % (9/25/2023)      Cholesterol Screening Once every 5 years if you don't have a lipid disorder. May order more often based on risk factors. Lipid panel: 09/25/2023         Other Preventive Screenings Covered by Medicare:  Abdominal Aortic Aneurysm (AAA) Screening: covered once if your at risk. You're considered to be at risk if you have a family history of AAA or a male between the age of 70-76 who smoking at least 100 cigarettes in your lifetime. Lung Cancer Screening: covers low dose CT scan once per year if you meet all of the following conditions: (1) Age 48-67; (2) No signs or symptoms of lung cancer; (3) Current smoker or have quit smoking within the last 15 years; (4) You have a tobacco smoking history of at least 20 pack years (packs per day x number of years you smoked); (5) You get a written order from a healthcare provider.   Glaucoma Screening: covered annually if you're considered high risk: (1) You have diabetes OR (2) Family history of glaucoma OR (3)  aged 48 and older OR (3)  American aged 72 and older  Osteoporosis Screening: covered every 2 years if you meet one of the following conditions: (1) Have a vertebral abnormality; (2) On glucocorticoid therapy for more than 3 months; (3) Have primary hyperparathyroidism; (4) On osteoporosis medications and need to assess response to drug therapy. HIV Screening: covered annually if you're between the age of 14-79. Also covered annually if you are younger than 13 and older than 72 with risk factors for HIV infection. For pregnant patients, it is covered up to 3 times per pregnancy. Immunizations:  Immunization Recommendations   Influenza Vaccine Annual influenza vaccination during flu season is recommended for all persons aged >= 6 months who do not have contraindications   Pneumococcal Vaccine   * Pneumococcal conjugate vaccine = PCV13 (Prevnar 13), PCV15 (Vaxneuvance), PCV20 (Prevnar 20)  * Pneumococcal polysaccharide vaccine = PPSV23 (Pneumovax) Adults 20-63 years old: 1-3 doses may be recommended based on certain risk factors  Adults 72 years old: 1-2 doses may be recommended based off what pneumonia vaccine you previously received   Hepatitis B Vaccine 3 dose series if at intermediate or high risk (ex: diabetes, end stage renal disease, liver disease)   Tetanus (Td) Vaccine - COST NOT COVERED BY MEDICARE PART B Following completion of primary series, a booster dose should be given every 10 years to maintain immunity against tetanus. Td may also be given as tetanus wound prophylaxis. Tdap Vaccine - COST NOT COVERED BY MEDICARE PART B Recommended at least once for all adults. For pregnant patients, recommended with each pregnancy.    Shingles Vaccine (Shingrix) - COST NOT COVERED BY MEDICARE PART B  2 shot series recommended in those aged 48 and above     Health Maintenance Due:      Topic Date Due    Colorectal Cancer Screening  Never done    Hepatitis C Screening  Completed     Immunizations Due:      Topic Date Due    COVID-19 Vaccine (1) Never done    Influenza Vaccine (1) 09/01/2023     Advance Directives   What are advance directives? Advance directives are legal documents that state your wishes and plans for medical care. These plans are made ahead of time in case you lose your ability to make decisions for yourself. Advance directives can apply to any medical decision, such as the treatments you want, and if you want to donate organs. What are the types of advance directives? There are many types of advance directives, and each state has rules about how to use them. You may choose a combination of any of the following:  Living will: This is a written record of the treatment you want. You can also choose which treatments you do not want, which to limit, and which to stop at a certain time. This includes surgery, medicine, IV fluid, and tube feedings. Durable power of  for healthcare [facility]): This is a written record that states who you want to make healthcare choices for you when you are unable to make them for yourself. This person, called a proxy, is usually a family member or a friend. You may choose more than 1 proxy. Do not resuscitate (DNR) order:  A DNR order is used in case your heart stops beating or you stop breathing. It is a request not to have certain forms of treatment, such as CPR. A DNR order may be included in other types of advance directives. Medical directive: This covers the care that you want if you are in a coma, near death, or unable to make decisions for yourself. You can list the treatments you want for each condition. Treatment may include pain medicine, surgery, blood transfusions, dialysis, IV or tube feedings, and a ventilator (breathing machine). Values history: This document has questions about your views, beliefs, and how you feel and think about life.  This information can help others choose the care that you would choose. Why are advance directives important? An advance directive helps you control your care. Although spoken wishes may be used, it is better to have your wishes written down. Spoken wishes can be misunderstood, or not followed. Treatments may be given even if you do not want them. An advance directive may make it easier for your family to make difficult choices about your care. Weight Management   Why it is important to manage your weight:  Being overweight increases your risk of health conditions such as heart disease, high blood pressure, type 2 diabetes, and certain types of cancer. It can also increase your risk for osteoarthritis, sleep apnea, and other respiratory problems. Aim for a slow, steady weight loss. Even a small amount of weight loss can lower your risk of health problems. How to lose weight safely:  A safe and healthy way to lose weight is to eat fewer calories and get regular exercise. You can lose up about 1 pound a week by decreasing the number of calories you eat by 500 calories each day. Healthy meal plan for weight management:  A healthy meal plan includes a variety of foods, contains fewer calories, and helps you stay healthy. A healthy meal plan includes the following:  Eat whole-grain foods more often. A healthy meal plan should contain fiber. Fiber is the part of grains, fruits, and vegetables that is not broken down by your body. Whole-grain foods are healthy and provide extra fiber in your diet. Some examples of whole-grain foods are whole-wheat breads and pastas, oatmeal, brown rice, and bulgur. Eat a variety of vegetables every day. Include dark, leafy greens such as spinach, kale, baldev greens, and mustard greens. Eat yellow and orange vegetables such as carrots, sweet potatoes, and winter squash. Eat a variety of fruits every day. Choose fresh or canned fruit (canned in its own juice or light syrup) instead of juice. Fruit juice has very little or no fiber. Eat low-fat dairy foods. Drink fat-free (skim) milk or 1% milk. Eat fat-free yogurt and low-fat cottage cheese. Try low-fat cheeses such as mozzarella and other reduced-fat cheeses. Choose meat and other protein foods that are low in fat. Choose beans or other legumes such as split peas or lentils. Choose fish, skinless poultry (chicken or turkey), or lean cuts of red meat (beef or pork). Before you cook meat or poultry, cut off any visible fat. Use less fat and oil. Try baking foods instead of frying them. Add less fat, such as margarine, sour cream, regular salad dressing and mayonnaise to foods. Eat fewer high-fat foods. Some examples of high-fat foods include french fries, doughnuts, ice cream, and cakes. Eat fewer sweets. Limit foods and drinks that are high in sugar. This includes candy, cookies, regular soda, and sweetened drinks. Exercise:  Exercise at least 30 minutes per day on most days of the week. Some examples of exercise include walking, biking, dancing, and swimming. You can also fit in more physical activity by taking the stairs instead of the elevator or parking farther away from stores. Ask your healthcare provider about the best exercise plan for you. © Copyright [employer] 2018 Information is for End User's use only and may not be sold, redistributed or otherwise used for commercial purposes.  All illustrations and images included in CareNotes® are the copyrighted property of [employer] Inc[employer] or [address] [place], [place] [address]ols

## 2023-09-28 NOTE — ASSESSMENT & PLAN NOTE
9/25/2023: Lyme's antibody negative, urine for microalbumin/creatinine ratio 120 improved, blood sugar 161 rest of the CMP normal GFR improved 61, hemoglobin A1c went up to 9.5 from 8.6, LDL 72 rest of the lipid profile normal,      BP controlled    Continue respiratory management with hypertension hyperlipidemia diabetes and obesity.   Current medications will include Jardiance losartan atorvastatin amlodipine metoprolol gabapentin Lasix and metformin

## 2023-09-28 NOTE — PROGRESS NOTES
Name: Alexandrea Mendoza. : 1955      MRN: [id number]  Encounter Provider: Nadia Riojas MD  Encounter Date: 2023   Encounter department: [facility] INTERNAL MEDICINE    Assessment & Plan     1. Type 2 diabetes mellitus with microalbuminuria, with long-term current use of insulin [facility])  Assessment & Plan:  2023: Lyme's antibody negative, urine for microalbumin/creatinine ratio 120 improved, blood sugar 161 rest of the CMP normal GFR improved 61, hemoglobin A1c went up to 9.5 from 8.6, LDL 72 rest of the lipid profile normal,    Diet reviewed, he could do better, strongly recommend diet, keep track of sugar and do exercise    Current regimen: Jardiance 10 mg daily, Humulin 70/30 20 units twice a day, metformin 1000 mg twice a day,    Plan increase Jardiance 25 mg daily, continue Humulin 70/30 20 units twice a day, and continue metformin 1000 mg twice your home Accu-Chek sugar in 1 month. Lab Results   Component Value Date    HGBA1C 9.5 (H) 2023       Orders:  -     metFORMIN (GLUCOPHAGE) 1000 MG tablet; Take 1 tablet (1,000 mg total) by mouth 2 (two) times a day  -     Empagliflozin 25 MG TABS; Take 1 tablet (25 mg total) by mouth daily    2. Medicare annual wellness visit, subsequent    3. Edema of both legs  Assessment & Plan:  No significant edema. We will continue Lasix. Lab Results   Component Value Date    SODIUM 138 2023    K 3.7 2023     2023    CO2 28 2023    BUN 20 2023    CREATININE 1.20 2023    CALCIUM 8.8 2023         Orders:  -     furosemide (LASIX) 40 mg tablet; Take 1 tablet (40 mg total) by mouth daily  -     Empagliflozin 25 MG TABS; Take 1 tablet (25 mg total) by mouth daily    4. Numbness of left foot  Assessment & Plan:  Some numbness in the toes of both distal.  We will continue gabapentin at bedtime    Also it helps the leg cramp    Orders:  -     gabapentin (Neurontin) 100 mg capsule;  Take 2 capsules (200 mg total) by mouth daily at bedtime    5. Essential hypertension  Assessment & Plan:  9/25/2023: Lyme's antibody negative, urine for microalbumin/creatinine ratio 120 improved, blood sugar 161 rest of the CMP normal GFR improved 61, hemoglobin A1c went up to 9.5 from 8.6, LDL 72 rest of the lipid profile normal,      BP controlled    Continue respiratory management with hypertension hyperlipidemia diabetes and obesity. Current medications will include Jardiance losartan atorvastatin amlodipine metoprolol gabapentin Lasix and metformin    Orders:  -     metoprolol succinate (TOPROL-XL) 100 mg 24 hr tablet; Take 1 tablet (100 mg total) by mouth daily  -     amLODIPine (NORVASC) 10 mg tablet; Take 1 tablet (10 mg total) by mouth daily  -     atorvastatin (LIPITOR) 40 mg tablet; Take 1 tablet (40 mg total) by mouth daily  -     losartan (COZAAR) 100 MG tablet; Take 1 tablet (100 mg total) by mouth daily    6. Tachycardia  -     metoprolol succinate (TOPROL-XL) 100 mg 24 hr tablet; Take 1 tablet (100 mg total) by mouth daily    7. Persistent proteinuria  Assessment & Plan:  Protein in the urine got better we will continue risk factor management with hypertension hyperlipidemia diabetes medication as outlined and discussed above    Orders:  -     Empagliflozin 25 MG TABS; Take 1 tablet (25 mg total) by mouth daily    8. Steatohepatitis  Assessment & Plan:  Lab Results   Component Value Date    ALT 11 09/25/2023    AST 11 (L) 09/25/2023    ALKPHOS 43 09/25/2023    BILITOT 0.5 06/06/2018   LFTs are stable. We will continue statin atorvastatin 40 mg daily. Recommend diet exercise lifestyle modification and weight loss        9. Rash  Assessment & Plan:  9/25/2023: Lyme's antibody negative,       Rash is resolved      Dx eczema      10.  Stage 3 chronic kidney disease, unspecified whether stage 3a or 3b CKD [facility])  Assessment & Plan:  Lab Results   Component Value Date    EGFR 61 09/25/2023    EGFR 56 05/24/2023    EGFR 63 10/11/2022    CREATININE 1.20 09/25/2023    CREATININE 1.30 05/24/2023    CREATININE 1.18 10/11/2022   9/25/2023: Lyme's antibody negative, urine for microalbumin/creatinine ratio 120 improved, blood sugar 161 rest of the CMP normal GFR improved 61, hemoglobin A1c went up to 9.5 from 8.6, LDL 72 rest of the lipid profile normal,      Improved    Now CKD 2 -borderline    Watch labs  Continue respiratory management with hypertension hyperlipidemia diabetes and obesity. Current medications will include Jardiance losartan atorvastatin amlodipine metoprolol gabapentin Lasix and metformin      11. Hypercholesteremia  Assessment & Plan:  Lab Results   Component Value Date    LDLCALC 72 09/25/2023   \  Lab Results   Component Value Date    CHOLESTEROL 165 09/25/2023    CHOLESTEROL 181 05/31/2023    CHOLESTEROL 165 10/11/2022     Lab Results   Component Value Date    HDL 72 09/25/2023    HDL 64 05/31/2023    HDL 81 10/11/2022     Lab Results   Component Value Date    TRIG 104 09/25/2023    TRIG 211 (H) 05/31/2023    TRIG 186 (H) 10/11/2022     Lab Results   Component Value Date    NONHDLC 84 10/11/2022    [address] 75 07/01/2022    [address] 81 11/19/2021     Lab Results   Component Value Date    ALT 11 09/25/2023    ALT 17 06/06/2018       LDL close to target    rec to continue atorvastatin 40 mg daily and cont low cholesterol diet      12. Class 2 severe obesity due to excess calories with serious comorbidity and body mass index (BMI) of 35.0 to 35.9 in adult [facility])  Assessment & Plan: Think about Ozempic. BMI Counseling:       The BMI is above normal. Know Body weight goal    Weigh yourself daily or weekly as per your doctor    Nutrition recommendations include decreasing portion sizes, encouraging healthy choices of fruits and vegetables, decreasing     fast food intake, consuming healthier snacks, limiting drinks that contain sugar, moderation in carbohydrate intake, increasing     intake of lean protein, reducing intake of saturated and trans fat and reducing intake of cholesterol    Body mass index is 42.57 kg/m². Depression Screening and Follow-up Plan: Patient was screened for depression during today's encounter. They screened negative with a PHQ-2 score of 0. Subjective      Patient is here for chronic disease management. He offers no specific complaint. Please refer to the individual examination nation's assessment and plan for the details discussions. Review of Systems   Constitutional: Negative for appetite change, fatigue, fever and unexpected weight change. HENT: Negative for congestion, ear pain and sore throat. Eyes: Negative for pain and redness. Respiratory: Negative for cough and shortness of breath. Cardiovascular: Negative for chest pain, palpitations and leg swelling. Gastrointestinal: Negative for abdominal pain, diarrhea, nausea and vomiting. Endocrine: Negative for cold intolerance, polydipsia and polyuria. Genitourinary: Negative for dysuria, frequency and urgency. Musculoskeletal: Negative for arthralgias, gait problem and myalgias. Skin: Negative for rash. Allergic/Immunologic: Negative. Neurological: Positive for numbness. Negative for dizziness and headaches. Hematological: Negative for adenopathy. Psychiatric/Behavioral: Negative for behavioral problems.        Current Outpatient Medications on File Prior to Visit   Medication Sig   • aspirin (ECOTRIN LOW STRENGTH) 81 mg EC tablet Take 81 mg by mouth daily   • Azelastine HCl 137 MCG/SPRAY SOLN 1 SPRAY INTO EACH NOSTRIL 2 (TWO) TIMES A DAY USE IN EACH NOSTRIL AS DIRECTED   • BD Pen Needle Katelyn U/F 32G X 4 MM MISC Inject as directed 2 (two) times a day   • Cholecalciferol (VITAMIN D3) 2000 units TABS Take 2,000 Units by mouth daily   • Glucose Blood (ONETOUCH ULTRA BLUE VI) 1 Units by In Vitro route 3 (three) times a day   • glucose blood (OneTouch Ultra) test strip Test 3 times daily   • insulin isophane-insulin regular (HumuLIN 70/30 KwikPen) 100 units/mL injection pen Inject 20 Units under the skin 2 (two) times a day   • Krill Oil 1000 MG CAPS Take 1 capsule by mouth daily   • potassium chloride (K-DUR,KLOR-CON) 20 mEq tablet Take 1 tablet (20 mEq total) by mouth daily   • vitamin E, tocopherol, 400 units capsule Take 400 Units by mouth daily   • [DISCONTINUED] amLODIPine (NORVASC) 10 mg tablet TAKE 1 TABLET BY MOUTH EVERY DAY   • [DISCONTINUED] atorvastatin (LIPITOR) 40 mg tablet TAKE 1 TABLET BY MOUTH EVERY DAY   • [DISCONTINUED] Empagliflozin (JARDIANCE) 10 MG TABS tablet Take 1 tablet (10 mg total) by mouth daily   • [DISCONTINUED] furosemide (LASIX) 40 mg tablet TAKE 1 TABLET BY MOUTH EVERY DAY   • [DISCONTINUED] gabapentin (Neurontin) 100 mg capsule Take 2 capsules (200 mg total) by mouth daily at bedtime   • [DISCONTINUED] losartan (COZAAR) 100 MG tablet Take 1 tablet (100 mg total) by mouth daily   • [DISCONTINUED] metFORMIN (GLUCOPHAGE) 1000 MG tablet TAKE 1 TABLET BY MOUTH TWICE A DAY   • [DISCONTINUED] metoprolol succinate (TOPROL-XL) 100 mg 24 hr tablet TAKE 1 TABLET BY MOUTH EVERY DAY       Objective     /80   Pulse 65   Ht 5' 11" (1.803 m)   Wt (!) 138 kg (305 lb 3.2 oz)   SpO2 95%   BMI 42.57 kg/m²     Physical Exam  Constitutional:       General: He is not in acute distress. Appearance: He is well-developed. He is not ill-appearing or diaphoretic. HENT:      Head: Normocephalic and atraumatic. Right Ear: External ear normal.      Left Ear: External ear normal.   Eyes:      General: Lids are normal.         Right eye: No discharge. Left eye: No discharge. Conjunctiva/sclera: Conjunctivae normal.   Neck:      Thyroid: No thyroid mass or thyromegaly. Vascular: No carotid bruit or JVD. Trachea: Trachea normal.   Cardiovascular:      Rate and Rhythm: Regular rhythm. Heart sounds: Normal heart sounds.    Pulmonary:      Effort: No respiratory distress. Breath sounds: No wheezing, rhonchi or rales. Musculoskeletal:      Right lower leg: No edema. Left lower leg: No edema. Lymphadenopathy:      Cervical: No cervical adenopathy. Skin:     General: Skin is warm. Coloration: Skin is not jaundiced or pale. Findings: No rash. Neurological:      Mental Status: He is alert and oriented to person, place, and time. Psychiatric:         Mood and Affect: Mood normal.         Behavior: Behavior normal.         Thought Content:  Thought content normal.         Judgment: Judgment normal.       Kirby Becerra MD

## 2023-09-28 NOTE — ASSESSMENT & PLAN NOTE
Lab Results   Component Value Date    EGFR 61 09/25/2023    EGFR 56 05/24/2023    EGFR 63 10/11/2022    CREATININE 1.20 09/25/2023    CREATININE 1.30 05/24/2023    CREATININE 1.18 10/11/2022   9/25/2023: Lyme's antibody negative, urine for microalbumin/creatinine ratio 120 improved, blood sugar 161 rest of the CMP normal GFR improved 61, hemoglobin A1c went up to 9.5 from 8.6, LDL 72 rest of the lipid profile normal,      Improved    Now CKD 2 -borderline    Watch labs  Continue respiratory management with hypertension hyperlipidemia diabetes and obesity.   Current medications will include Jardiance losartan atorvastatin amlodipine metoprolol gabapentin Lasix and metformin

## 2023-09-28 NOTE — ASSESSMENT & PLAN NOTE
Lab Results   Component Value Date    LDLCALC 72 09/25/2023   \  Lab Results   Component Value Date    CHOLESTEROL 165 09/25/2023    CHOLESTEROL 181 05/31/2023    CHOLESTEROL 165 10/11/2022     Lab Results   Component Value Date    HDL 72 09/25/2023    HDL 64 05/31/2023    HDL 81 10/11/2022     Lab Results   Component Value Date    TRIG 104 09/25/2023    TRIG 211 (H) 05/31/2023    TRIG 186 (H) 10/11/2022     Lab Results   Component Value Date    NONHDLC 84 10/11/2022    [address] 75 07/01/2022    [address] 81 11/19/2021     Lab Results   Component Value Date    ALT 11 09/25/2023    ALT 17 06/06/2018       LDL close to target    rec to continue atorvastatin 40 mg daily and cont low cholesterol diet

## 2023-09-28 NOTE — PROGRESS NOTES
Assessment and Plan:     Problem List Items Addressed This Visit    None  Visit Diagnoses     Medicare annual wellness visit, subsequent    -  Primary           Preventive health issues were discussed with patient, and age appropriate screening tests were ordered as noted in patient's After Visit Summary. Personalized health advice and appropriate referrals for health education or preventive services given if needed, as noted in patient's After Visit Summary. History of Present Illness:     Patient presents for a Medicare Wellness Visit              Patient is here for follow-up of his numbness and feet on the left side going on for 2 to 3 months. Numbness is more in the morning. Better with the walking. Patient underwent extensive blood test as follows. 5/21/2023: Lyme's titer negative, CBC normal except WBC 10.39, vitamin W08 412, folic acid 26.3, cholesterol 181 LDL 75 triglyceride 211 NINFA negative hemoglobin A1c 8.6 urine for microalbumin/creatinine ratio more than 285    Patient is also here for worsening edema of the both lower extremity more on the left than the right. Venous Doppler is negative for DVT. Other problems as follows: Diabetes hemoglobin A1c 8.6: Current regimen metformin 1000 mg twice a day, Humulin 70/30 20 units twice a day.   Hypertension current regimen losartan 100 mg daily, metoprolol succinate 100 mg daily, Lasix 40 mg daily, KCl 20 mEq daily, amlodipine 10 mg daily  Allergy on Astelin nasal spray  Edema of legs on Lasix 40 mg daily and KCl 20 mEq daily  Hyperlipidemia on atorvastatin 40 mg daily      5/24/2023: Creatinine 1.3 blood sugar 242 corrected calcium 10 rest of the CMP normal TSH 2.8 CBC normal  5/21/2023: Lyme's titer negative, CBC normal except WBC 10.39, vitamin K62 514, folic acid 90.2, cholesterol 181 LDL 75 triglyceride 211 NINFA negative hemoglobin A1c 8.6 urine for microalbumin/creatinine ratio more than 285          Lab Results       Component Value               Date                       PSA                      2.6                 07/01/2022                 PSA                      1.8                 11/02/2020                 PSA                      2.0                 01/08/2019                 Patient Care Team:  Monica Wright MD as PCP - General (Internal Medicine)  Nava Cortez DO (Cardiology)     Review of Systems:     Review of Systems   Constitutional: Negative for chills, fatigue and fever. HENT: Negative for ear pain, sore throat and trouble swallowing. Eyes: Negative for pain and visual disturbance. Respiratory: Negative for cough and shortness of breath. Cardiovascular: Negative for chest pain and palpitations. Gastrointestinal: Negative for abdominal pain, constipation, diarrhea, nausea and vomiting. Genitourinary: Negative for difficulty urinating, dysuria, hematuria and testicular pain. Musculoskeletal: Negative for arthralgias and back pain. Skin: Negative for color change and rash. Neurological: Negative for dizziness, seizures, syncope and headaches. Psychiatric/Behavioral: Negative for confusion, hallucinations and sleep disturbance. All other systems reviewed and are negative.        Problem List:     Patient Active Problem List   Diagnosis   • Type 2 diabetes mellitus with microalbuminuria, with long-term current use of insulin (HCC)   • Essential hypertension   • Hypercholesteremia   • Class 2 severe obesity due to excess calories with serious comorbidity and body mass index (BMI) of 35.0 to 35.9 in adult [facility])   • Steatohepatitis   • Allergic rhinitis   • Vitamin D deficiency   • Persistent proteinuria   • Tachycardia   • Stage 3 chronic kidney disease, unspecified whether stage 3a or 3b CKD (HCC)   • Edema of both legs   • Rash   • Numbness of left foot      Past Medical and Surgical History:     Past Medical History:   Diagnosis Date   • Hypertension      No past surgical history on file.   Family History:     No family history on file. Social History:     Social History     Socioeconomic History   • Marital status: /Civil Union     Spouse name: Not on file   • Number of children: Not on file   • Years of education: Not on file   • Highest education level: Not on file   Occupational History   • Not on file   Tobacco Use   • Smoking status: Former   • Smokeless tobacco: Never   Substance and Sexual Activity   • Alcohol use:  Yes     Alcohol/week: 4.0 standard drinks of alcohol     Types: 4 Shots of liquor per week   • Drug use: Never   • Sexual activity: Not on file   Other Topics Concern   • Not on file   Social History Narrative   • Not on file     Social Determinants of Health     Financial Resource Strain: Not on file   Food Insecurity: Not on file   Transportation Needs: Not on file   Physical Activity: Not on file   Stress: Not on file   Social Connections: Not on file   Intimate Partner Violence: Not on file   Housing Stability: Not on file      Medications and Allergies:     Current Outpatient Medications   Medication Sig Dispense Refill   • amLODIPine (NORVASC) 10 mg tablet TAKE 1 TABLET BY MOUTH EVERY DAY 90 tablet 0   • aspirin (ECOTRIN LOW STRENGTH) 81 mg EC tablet Take 81 mg by mouth daily     • atorvastatin (LIPITOR) 40 mg tablet TAKE 1 TABLET BY MOUTH EVERY DAY 90 tablet 0   • Azelastine HCl 137 MCG/SPRAY SOLN 1 SPRAY INTO EACH NOSTRIL 2 (TWO) TIMES A DAY USE IN EACH NOSTRIL AS DIRECTED 30 mL 1   • BD Pen Needle Katelyn U/F 32G X 4 MM MISC Inject as directed 2 (two) times a day 100 each 4   • Cholecalciferol (VITAMIN D3) 2000 units TABS Take 2,000 Units by mouth daily     • Empagliflozin (JARDIANCE) 10 MG TABS tablet Take 1 tablet (10 mg total) by mouth daily 30 tablet 5   • furosemide (LASIX) 40 mg tablet TAKE 1 TABLET BY MOUTH EVERY DAY 90 tablet 1   • gabapentin (Neurontin) 100 mg capsule Take 2 capsules (200 mg total) by mouth daily at bedtime 60 capsule 3   • Glucose Blood (ONETOUCH ULTRA BLUE VI) 1 Units by In Vitro route 3 (three) times a day     • glucose blood (OneTouch Ultra) test strip Test 3 times daily 300 each 5   • insulin isophane-insulin regular (HumuLIN 70/30 KwikPen) 100 units/mL injection pen Inject 20 Units under the skin 2 (two) times a day 15 mL 5   • Krill Oil 1000 MG CAPS Take 1 capsule by mouth daily     • losartan (COZAAR) 100 MG tablet Take 1 tablet (100 mg total) by mouth daily 90 tablet 1   • metFORMIN (GLUCOPHAGE) 1000 MG tablet TAKE 1 TABLET BY MOUTH TWICE A  tablet 0   • metoprolol succinate (TOPROL-XL) 100 mg 24 hr tablet TAKE 1 TABLET BY MOUTH EVERY DAY 90 tablet 1   • potassium chloride (K-DUR,KLOR-CON) 20 mEq tablet Take 1 tablet (20 mEq total) by mouth daily 30 tablet 1   • vitamin E, tocopherol, 400 units capsule Take 400 Units by mouth daily       No current facility-administered medications for this visit. No Known Allergies   Immunizations: There is no immunization history on file for this patient. Health Maintenance:         Topic Date Due   • Colorectal Cancer Screening  Never done   • Hepatitis C Screening  Completed         Topic Date Due   • COVID-19 Vaccine (1) Never done   • Influenza Vaccine (1) 09/01/2023      Medicare Screening Tests and Risk Assessments:     Kenrick Morris is here for his Subsequent Wellness visit. Last Medicare Wellness visit information reviewed, patient interviewed and updates made to the record today. Health Risk Assessment:   Patient rates overall health as very good. Patient feels that their physical health rating is same. Patient is very satisfied with their life. Eyesight was rated as slightly worse. Hearing was rated as same. Patient feels that their emotional and mental health rating is slightly better. Patients states they are never, rarely angry. Patient states they are never, rarely unusually tired/fatigued. Pain experienced in the last 7 days has been none.  Patient states that he has experienced no weight loss or gain in last 6 months. Weight is stable    Fall Risk Screening: In the past year, patient has experienced: no history of falling in past year      Home Safety:  Patient does not have trouble with stairs inside or outside of their home. Patient has working smoke alarms and has working carbon monoxide detector. Home safety hazards include: none. No home safety hazards    Nutrition:   Current diet is Diabetic and Regular. Medications:   Patient is currently taking over-the-counter supplements. OTC medications include: see medication list. Patient is able to manage medications. No problems managing meds    Activities of Daily Living (ADLs)/Instrumental Activities of Daily Living (IADLs):   Walk and transfer into and out of bed and chair?: Yes  Dress and groom yourself?: Yes    Bathe or shower yourself?: Yes    Feed yourself? Yes  Do your laundry/housekeeping?: Yes  Manage your money, pay your bills and track your expenses?: Yes  Make your own meals?: Yes    Do your own shopping?: Yes    ADL comments: Pt is independent. Previous Hospitalizations:   Any hospitalizations or ED visits within the last 12 months?: No      Advance Care Planning:   Living will: Yes    Durable POA for healthcare:  Yes    Advanced directive: Yes    Advanced directive counseling given: No    Five wishes given: No    Patient declined ACP directive: No    End of Life Decisions reviewed with patient: Yes    Provider agrees with end of life decisions: Yes      Cognitive Screening:   Provider or family/friend/caregiver concerned regarding cognition?: No    PREVENTIVE SCREENINGS      Cardiovascular Screening:    General: Screening Not Indicated and History Lipid Disorder      Diabetes Screening:     General: Screening Not Indicated and History Diabetes      Colorectal Cancer Screening:     General: Risks and Benefits Discussed and Patient Declines    Due for: Colonoscopy - Low Risk      Prostate Cancer Screening: General: Screening Current      Osteoporosis Screening:    General: Screening Not Indicated      Abdominal Aortic Aneurysm (AAA) Screening:    Risk factors include: family history of AAA, age between 70-75 yo and tobacco use        General: Risks and Benefits Discussed    Due for: Screening AAA Ultrasound      Lung Cancer Screening:     General: Screening Not Indicated      Hepatitis C Screening:    General: Screening Current    Hep C Screening Accepted: Yes      Screening, Brief Intervention, and Referral to Treatment (SBIRT)    Screening    Typical number of drinks in a week: 5    AUDIT-C Screenin) How often did you have a drink containing alcohol in the past year? 4 or more times a week  2) How many drinks did you have on a typical day when you were drinking in the past year? 1 to 2  3) How often did you have 6 or more drinks on one occasion in the past year? never    AUDIT-C Score: 4  Interpretation: Score 4-12 (male): POSITIVE screen for alcohol misuse    AUDIT Screenin) How often during the last year have you found that you were not able to stop drinking once you had started? 0 - never  5) How often during the last year have you failed to do what was normally expected from you because of drinking? 0 - never  6) How often during the last year have you needed a first drink in the morning to get yourself going after a heavy drinking session? 0 - never  7) How often during the last year have you had a feeling of guilt or remorse after drinking? 0 - never  8) How often during the last year have you been unable to remember what happened the night before because you had been drinking? 0 - never  9) Have you or someone else been injured as a result of your drinking? 0 - no  10) Has a relative or friend or a doctor or another health worker been concerned about your drinking or suggested you cut down? 0 - no    AUDIT Score: 4  Interpretation: Low risk alcohol consumption    Single Item Drug Screening:   How often have you used an illegal drug (including marijuana) or a prescription medication for non-medical reasons in the past year? never    Single Item Drug Screen Score: 0  Interpretation: Negative screen for possible drug use disorder    Brief Intervention  Healthy alcohol use/limits discussed. Other Counseling Topics:   Car/seat belt/driving safety and skin self-exam.     No results found. Physical Exam:     Ht 5' 11" (1.803 m)   BMI 41.98 kg/m²     Physical Exam  Vitals and nursing note reviewed. Constitutional:       Appearance: Normal appearance. He is well-developed. He is obese. He is not ill-appearing. HENT:      Head: Normocephalic and atraumatic. Right Ear: External ear normal.      Left Ear: External ear normal.   Eyes:      General:         Right eye: No discharge. Left eye: No discharge. Conjunctiva/sclera: Conjunctivae normal.   Neck:      Thyroid: No thyromegaly. Vascular: No JVD. Cardiovascular:      Rate and Rhythm: Regular rhythm. Pulses:           Dorsalis pedis pulses are 2+ on the right side and 2+ on the left side. Posterior tibial pulses are 1+ on the right side and 1+ on the left side. Heart sounds: Normal heart sounds. Pulmonary:      Effort: No respiratory distress. Breath sounds: Normal breath sounds. No wheezing. Abdominal:      General: Bowel sounds are normal.      Palpations: There is no mass. Hernia: No hernia is present. Musculoskeletal:      Right lower leg: No edema. Left lower leg: No edema. Feet:      Right foot:      Skin integrity: No ulcer, skin breakdown, erythema, warmth, callus or dry skin. Left foot:      Skin integrity: No ulcer, skin breakdown, erythema, warmth, callus or dry skin. Skin:     General: Skin is warm. Coloration: Skin is not jaundiced or pale. Neurological:      General: No focal deficit present. Mental Status: He is alert and oriented to person, place, and time. Sensory: No sensory deficit. Motor: No weakness. Coordination: Coordination normal.      Gait: Gait normal.   Psychiatric:         Mood and Affect: Mood normal.         Behavior: Behavior normal.         Thought Content:  Thought content normal.         Judgment: Judgment normal.          Yaz Garcia MD

## 2023-09-28 NOTE — ASSESSMENT & PLAN NOTE
Lab Results   Component Value Date    ALT 11 09/25/2023    AST 11 (L) 09/25/2023    ALKPHOS 43 09/25/2023    BILITOT 0.5 06/06/2018   LFTs are stable. We will continue statin atorvastatin 40 mg daily.   Recommend diet exercise lifestyle modification and weight loss

## 2023-10-02 ENCOUNTER — TELEPHONE (OUTPATIENT)
Dept: ADMINISTRATIVE | Facility: OTHER | Age: 68
End: 2023-10-02

## 2023-10-02 NOTE — TELEPHONE ENCOUNTER
Upon review of the In Basket request and the patient's chart, initial outreach has been made via fax to facility. Please see Contacts section for details.      Thank you  Jada Rajan MA

## 2023-10-02 NOTE — LETTER
2nd Request      Diabetic Eye Exam Form    Date Requested: 10/05/23  Patient: Hong Nguyen. Patient : 1955   Referring Provider: Wayne Beltran MD      DIABETIC Eye Exam Date _______________________________      Type of Exam MUST be documented for Diabetic Eye Exams. Please CHECK ONE. Retinal Exam       Dilated Retinal Exam       OCT       Optomap-Iris Exam      Fundus Photography       Left Eye - Please check Retinopathy or No Retinopathy        Exam did show retinopathy    Exam did not show retinopathy       Right Eye - Please check Retinopathy or No Retinopathy       Exam did show retinopathy    Exam did not show retinopathy       Comments __________________________________________________________    Practice Providing Exam ______________________________________________    Exam Performed By (print name) _______________________________________      Provider Signature ___________________________________________________      These reports are needed for  compliance. Please fax this completed form and a copy of the Diabetic Eye Exam report to our office located at [address] [place] [address] as soon as possible via Fax [phone] attention [place]: Phone [phone]  We thank you for your assistance in treating our mutual patient.

## 2023-10-02 NOTE — TELEPHONE ENCOUNTER
----- Message from [employer] sent at 9/29/2023  2:16 PM EDT -----  Regarding: care gap request DM EYE  09/29/23 2:16 PM    Hello, our patient attached above has had Diabetic Eye Exam completed/performed. Please assist in updating the patient chart by making an External outreach to [address] [place] [address] located in [address] [place] [address]  [phone]. The date of service is MOST RECENT.     Thank you,  Danie Stover  [facility]

## 2023-10-02 NOTE — LETTER
Diabetic Eye Exam Form    Date Requested: 10/02/23  Patient: Bessie Dangelo. Patient : 1955   Referring Provider: Monica Wright MD      DIABETIC Eye Exam Date _______________________________      Type of Exam MUST be documented for Diabetic Eye Exams. Please CHECK ONE. Retinal Exam       Dilated Retinal Exam       OCT       Optomap-Iris Exam      Fundus Photography       Left Eye - Please check Retinopathy or No Retinopathy        Exam did show retinopathy    Exam did not show retinopathy       Right Eye - Please check Retinopathy or No Retinopathy       Exam did show retinopathy    Exam did not show retinopathy       Comments __________________________________________________________    Practice Providing Exam ______________________________________________    Exam Performed By (print name) _______________________________________      Provider Signature ___________________________________________________      These reports are needed for  compliance. Please fax this completed form and a copy of the Diabetic Eye Exam report to our office located at [address] [place] [address] as soon as possible via Fax [phone] attention [place]: Phone [phone]  We thank you for your assistance in treating our mutual patient.

## 2023-10-05 NOTE — TELEPHONE ENCOUNTER
As a follow-up, a second attempt has been made for outreach via fax to facility. Please see Contacts section for details.     Thank you  William Peterson MA

## 2023-10-09 NOTE — TELEPHONE ENCOUNTER
Upon review of the In Basket request we were able to locate, review, and update the patient chart as requested for Diabetic Eye Exam.    Any additional questions or concerns should be emailed to the Practice Liaisons via the appropriate education email address, please do not reply via In Basket.     Thank you  Navarro Ribeiro MA

## 2023-10-20 ENCOUNTER — CONSULT (OUTPATIENT)
Dept: INTERNAL MEDICINE CLINIC | Facility: CLINIC | Age: 68
End: 2023-10-20
Payer: MEDICARE

## 2023-10-20 VITALS
HEIGHT: 71 IN | SYSTOLIC BLOOD PRESSURE: 144 MMHG | DIASTOLIC BLOOD PRESSURE: 88 MMHG | HEART RATE: 63 BPM | WEIGHT: 302 LBS | OXYGEN SATURATION: 91 % | BODY MASS INDEX: 42.28 KG/M2

## 2023-10-20 DIAGNOSIS — E78.00 HYPERCHOLESTEREMIA: ICD-10-CM

## 2023-10-20 DIAGNOSIS — Z79.4 TYPE 2 DIABETES MELLITUS WITH MICROALBUMINURIA, WITH LONG-TERM CURRENT USE OF INSULIN (HCC): ICD-10-CM

## 2023-10-20 DIAGNOSIS — R60.0 EDEMA OF BOTH LEGS: ICD-10-CM

## 2023-10-20 DIAGNOSIS — E11.29 TYPE 2 DIABETES MELLITUS WITH MICROALBUMINURIA, WITH LONG-TERM CURRENT USE OF INSULIN (HCC): ICD-10-CM

## 2023-10-20 DIAGNOSIS — R80.9 TYPE 2 DIABETES MELLITUS WITH MICROALBUMINURIA, WITH LONG-TERM CURRENT USE OF INSULIN (HCC): ICD-10-CM

## 2023-10-20 DIAGNOSIS — N18.30 STAGE 3 CHRONIC KIDNEY DISEASE, UNSPECIFIED WHETHER STAGE 3A OR 3B CKD (HCC): ICD-10-CM

## 2023-10-20 DIAGNOSIS — E66.01 CLASS 2 SEVERE OBESITY DUE TO EXCESS CALORIES WITH SERIOUS COMORBIDITY AND BODY MASS INDEX (BMI) OF 35.0 TO 35.9 IN ADULT: ICD-10-CM

## 2023-10-20 DIAGNOSIS — H25.013 CORTICAL AGE-RELATED CATARACT OF BOTH EYES: Primary | ICD-10-CM

## 2023-10-20 DIAGNOSIS — I10 ESSENTIAL HYPERTENSION: ICD-10-CM

## 2023-10-20 PROCEDURE — 99214 OFFICE O/P EST MOD 30 MIN: CPT | Performed by: INTERNAL MEDICINE

## 2023-10-20 NOTE — PROGRESS NOTES
Name: Joseph Warner. : 1955      MRN: [id number]  Encounter Provider: Rom Multani MD  Encounter Date: 10/20/2023   Encounter department: [address]     1. Cortical age-related cataract of both eyes  Assessment & Plan:  Cleared for upcoming elective  eye surgery for cataract due to vision and implant lense      2. Type 2 diabetes mellitus with microalbuminuria, with long-term current use of insulin (HCC)  Assessment & Plan:  Polydipsia polyuria. No hypoglycemic symptoms. Watching diet very carefully. Lost 3 pound weight. No hypoglycemia. Home sugar in the range of 101 75 with average sugar of 151. Making nice progress in 3 weeks and I am proud of him. Relevant medications includes Humulin 70/30 20 units twice a day, jardiance 25 mg daily, metformin 1000 mg twice a day,    Recommend to think about ozempic, he want to wait and see how jardiance is working along with diet and other dm meds  Lab Results   Component Value Date    HGBA1C 9.5 (H) 2023         3. Essential hypertension    4. Stage 3 chronic kidney disease, unspecified whether stage 3a or 3b CKD ([address] )    5. Hypercholesteremia    6. Class 2 severe obesity due to excess calories with serious comorbidity and body mass index (BMI) of 35.0 to 35.9 in adult     7.  Edema of both legs           Subjective     Patient is here for Pre Op Clearance:    Date of Surgery:  and second eye s   Name of Surgeon: Dr Leydi Reyes of proposed Surgery: catract extraction and lense implant  Type of Anestheis; local/ twilight    Recovery anticipation and care:Home    Pertinent Hx:  Cardiac: No history of Acute MI, CHF or arrythmia in last 6 months  Renal: Renal function stable, CKD level as per BMP  Anasethesia hx: No hx previous anaesthesia related problem  Endo: not pertinent except documented  Hemato: no pertinent blood disorder, hx of blood transfuciton  Dental: No obvious mouth infection    Multiple comorbidities diabetes not much improving sugar with addition of Jardiance. Stable steatohepatitis controlled hypertension stable renal functions on statin for hyperlipidemia obesity 3 pound better weight edema fair. Neuropathy fair. Not on any blood thinner. Pre Op Labs;  Telephone on 10/02/2023  Severity                  Value: Normal             Dt: 09/25/2023  Right Eye Diabetic Retin* Value: None               Dt: 09/25/2023  Left Eye Diabetic Retino* Value: None               Dt: 09/25/2023  ------------  Lab on 09/25/2023  Creatinine, Ur            Value: 15. 2(mg/dL)        Dt: 09/25/2023  Albumin,U,Random          Value: 18. 3(mg/L)         Dt: 09/25/2023  Albumin Creat Ratio       Value: 120(mg/g creatinine) (H) Dt: 09/25/2023  Sodium                    Value: 138(mmol/L)        Dt: 09/25/2023  Potassium                 Value: 3.7(mmol/L)        Dt: 09/25/2023  Chloride                  Value: 102(mmol/L)        Dt: 09/25/2023  CO2                       Value: 28(mmol/L)         Dt: 09/25/2023  ANION GAP                 Value:  8(mmol/L)          Dt: 09/25/2023  BUN                       Value: 20(mg/dL)          Dt: 09/25/2023  Creatinine                Value: 1.20(mg/dL)        Dt: 09/25/2023  Glucose, Fasting          Value: 161(mg/dL) (H)     Dt: 09/25/2023  Calcium                   Value: 8.8(mg/dL)         Dt: 09/25/2023  AST                       Value: 11(U/L) (L)        Dt: 09/25/2023  ALT                       Value: 11(U/L)            Dt: 09/25/2023  Alkaline Phosphatase      Value: 43(U/L)            Dt: 09/25/2023  Total Protein             Value: 7.4(g/dL)          Dt: 09/25/2023  Albumin                   Value: 4.0(g/dL)          Dt: 09/25/2023  Total Bilirubin           Value: 0.55(mg/dL)        Dt: 09/25/2023  eGFR                      Value: 61(ml/min/1.73sq m) Dt: 09/25/2023  Hemoglobin A1C            Value: 9.5(%) (H)         Dt: 09/25/2023  EAG                       Value: 226(mg/dl)         Dt: 09/25/2023  Cholesterol               Value: 165(mg/dL)         Dt: 09/25/2023  Triglycerides             Value: 104(mg/dL)         Dt: 09/25/2023  HDL, Direct               Value: 72(mg/dL)          Dt: 09/25/2023  LDL Calculated            Value: 72(mg/dL)          Dt: 09/25/2023  Lyme Total Antibodies     Value: Negative           Dt: 09/25/2023          Review of Systems   Constitutional:  Negative for chills, fatigue and fever. HENT:  Negative for ear pain, sore throat and trouble swallowing. Eyes:  Positive for visual disturbance. Negative for photophobia, pain, discharge, redness and itching. Respiratory:  Negative for cough and shortness of breath. Cardiovascular:  Negative for chest pain and palpitations. Gastrointestinal:  Negative for abdominal pain, constipation, diarrhea and vomiting. Genitourinary:  Negative for difficulty urinating, dysuria, frequency, hematuria and testicular pain. Musculoskeletal:  Negative for arthralgias and back pain. Skin:  Negative for color change and rash. Neurological:  Negative for dizziness, seizures, syncope, light-headedness and headaches. Psychiatric/Behavioral:  Negative for agitation, confusion and hallucinations. All other systems reviewed and are negative. Past Medical History:   Diagnosis Date   • Hypertension      History reviewed. No pertinent surgical history. History reviewed. No pertinent family history. Social History     Socioeconomic History   • Marital status: /Civil Union     Spouse name: None   • Number of children: None   • Years of education: None   • Highest education level: None   Occupational History   • None   Tobacco Use   • Smoking status: Former   • Smokeless tobacco: Never   Substance and Sexual Activity   • Alcohol use:  Yes     Alcohol/week: 4.0 standard drinks of alcohol     Types: 4 Shots of liquor per week   • Drug use: Never   • Sexual activity: None   Other Topics Concern   • None   Social History Narrative   • None     Social Determinants of Health     Financial Resource Strain: Low Risk  (9/28/2023)    Overall Financial Resource Strain (CARDIA)    • Difficulty of Paying Living Expenses: Not hard at all   Food Insecurity: Not on file   Transportation Needs: No Transportation Needs (9/28/2023)    PRAPARE - Transportation    • Lack of Transportation (Medical): No    • Lack of Transportation (Non-Medical):  No   Physical Activity: Not on file   Stress: Not on file   Social Connections: Not on file   Intimate Partner Violence: Not on file   Housing Stability: Not on file     Current Outpatient Medications on File Prior to Visit   Medication Sig   • amLODIPine (NORVASC) 10 mg tablet Take 1 tablet (10 mg total) by mouth daily   • aspirin (ECOTRIN LOW STRENGTH) 81 mg EC tablet Take 81 mg by mouth daily   • atorvastatin (LIPITOR) 40 mg tablet Take 1 tablet (40 mg total) by mouth daily   • Azelastine HCl 137 MCG/SPRAY SOLN 1 SPRAY INTO EACH NOSTRIL 2 (TWO) TIMES A DAY USE IN EACH NOSTRIL AS DIRECTED   • BD Pen Needle Katelyn U/F 32G X 4 MM MISC Inject as directed 2 (two) times a day   • Cholecalciferol (VITAMIN D3) 2000 units TABS Take 2,000 Units by mouth daily   • Empagliflozin 25 MG TABS Take 1 tablet (25 mg total) by mouth daily   • furosemide (LASIX) 40 mg tablet Take 1 tablet (40 mg total) by mouth daily   • gabapentin (Neurontin) 100 mg capsule Take 2 capsules (200 mg total) by mouth daily at bedtime   • Glucose Blood (ONETOUCH ULTRA BLUE VI) 1 Units by In Vitro route 3 (three) times a day   • glucose blood (OneTouch Ultra) test strip Test 3 times daily   • insulin isophane-insulin regular (HumuLIN 70/30 KwikPen) 100 units/mL injection pen Inject 20 Units under the skin 2 (two) times a day   • Krill Oil 1000 MG CAPS Take 1 capsule by mouth daily   • losartan (COZAAR) 100 MG tablet Take 1 tablet (100 mg total) by mouth daily • metFORMIN (GLUCOPHAGE) 1000 MG tablet Take 1 tablet (1,000 mg total) by mouth 2 (two) times a day   • metoprolol succinate (TOPROL-XL) 100 mg 24 hr tablet Take 1 tablet (100 mg total) by mouth daily   • potassium chloride (K-DUR,KLOR-CON) 20 mEq tablet Take 1 tablet (20 mEq total) by mouth daily   • vitamin E, tocopherol, 400 units capsule Take 400 Units by mouth daily     No Known Allergies    There is no immunization history on file for this patient. Objective     /88   Pulse 63   Ht 5' 11" (1.803 m)   Wt (!) 137 kg (302 lb)   SpO2 91%   BMI 42.12 kg/m²     Physical Exam  Vitals and nursing note reviewed. Constitutional:       General: He is not in acute distress. Appearance: Normal appearance. He is well-developed. He is obese. He is not ill-appearing. HENT:      Head: Normocephalic and atraumatic. Right Ear: External ear normal.      Left Ear: External ear normal.   Eyes:      General: Lids are normal. No allergic shiner or scleral icterus. Right eye: No discharge. Left eye: No discharge. Extraocular Movements:      Right eye: Normal extraocular motion and no nystagmus. Left eye: Normal extraocular motion and no nystagmus. Conjunctiva/sclera: Conjunctivae normal.      Right eye: Right conjunctiva is not injected. No exudate or hemorrhage. Left eye: Left conjunctiva is not injected. No exudate or hemorrhage. Neck:      Thyroid: No thyroid mass or thyromegaly. Vascular: No JVD. Trachea: Trachea normal.   Cardiovascular:      Rate and Rhythm: Regular rhythm. Pulses: Normal pulses. Heart sounds: Normal heart sounds. Pulmonary:      Effort: Pulmonary effort is normal. No respiratory distress. Breath sounds: No wheezing. Musculoskeletal:      Right lower leg: No edema. Left lower leg: No edema. Skin:     General: Skin is warm. Coloration: Skin is not jaundiced or pale.    Neurological:      General: No focal deficit present. Mental Status: He is alert and oriented to person, place, and time. Motor: No weakness. Gait: Gait normal.   Psychiatric:         Mood and Affect: Mood normal.         Behavior: Behavior normal.         Thought Content:  Thought content normal.         Judgment: Judgment normal.       Mary Ellen Barron MD

## 2023-10-20 NOTE — ASSESSMENT & PLAN NOTE
Polydipsia polyuria. No hypoglycemic symptoms. Watching diet very carefully. Lost 3 pound weight. No hypoglycemia. Home sugar in the range of 101 75 with average sugar of 151. Making nice progress in 3 weeks and I am proud of him.     Relevant medications includes Humulin 70/30 20 units twice a day, jardiance 25 mg daily, metformin 1000 mg twice a day,    Recommend to think about ozempic, he want to wait and see how jardiance is working along with diet and other dm meds  Lab Results   Component Value Date    HGBA1C 9.5 (H) 09/25/2023

## 2023-10-20 NOTE — PATIENT INSTRUCTIONS
Cleared for upcoming bilateral cataract surgery. Do not take insulin on the morning of surgery as well as do not take Jardiance on the morning of surgery as well as do not take metformin on the morning of surgery.     Recommend fingerstick Accu-Chek preop and postop per your anesthesiologist.    IV fluid per anesthesiologist.

## 2023-10-25 ENCOUNTER — TELEPHONE (OUTPATIENT)
Age: 68
End: 2023-10-25

## 2023-10-25 NOTE — TELEPHONE ENCOUNTER
[address] [place] [address] is requesting additional pre op paper .  Past medical history, surgical history and patients allergy list ,to be faxed to [phone]

## 2023-11-23 DIAGNOSIS — E10.9 TYPE 1 DIABETES MELLITUS WITHOUT COMPLICATION (HCC): ICD-10-CM

## 2023-11-24 RX ORDER — PEN NEEDLE, DIABETIC 32GX 5/32"
NEEDLE, DISPOSABLE MISCELLANEOUS 2 TIMES DAILY
Qty: 100 EACH | Refills: 4 | Status: SHIPPED | OUTPATIENT
Start: 2023-11-24

## 2023-12-21 DIAGNOSIS — I10 ESSENTIAL HYPERTENSION: ICD-10-CM

## 2023-12-21 RX ORDER — ATORVASTATIN CALCIUM 40 MG/1
40 TABLET, FILM COATED ORAL DAILY
Qty: 90 TABLET | Refills: 0 | Status: SHIPPED | OUTPATIENT
Start: 2023-12-21

## 2023-12-22 ENCOUNTER — LAB (OUTPATIENT)
Dept: LAB | Facility: CLINIC | Age: 68
End: 2023-12-22
Payer: MEDICARE

## 2023-12-22 DIAGNOSIS — I10 ESSENTIAL HYPERTENSION: ICD-10-CM

## 2023-12-22 DIAGNOSIS — R20.0 NUMBNESS OF LEFT FOOT: ICD-10-CM

## 2023-12-22 DIAGNOSIS — Z12.5 SCREENING PSA (PROSTATE SPECIFIC ANTIGEN): ICD-10-CM

## 2023-12-22 DIAGNOSIS — E78.00 HYPERCHOLESTEREMIA: ICD-10-CM

## 2023-12-22 DIAGNOSIS — E11.29 TYPE 2 DIABETES MELLITUS WITH MICROALBUMINURIA, WITH LONG-TERM CURRENT USE OF INSULIN (HCC): ICD-10-CM

## 2023-12-22 DIAGNOSIS — R60.0 EDEMA OF BOTH LEGS: ICD-10-CM

## 2023-12-22 DIAGNOSIS — K75.81 STEATOHEPATITIS: ICD-10-CM

## 2023-12-22 DIAGNOSIS — R80.9 TYPE 2 DIABETES MELLITUS WITH MICROALBUMINURIA, WITH LONG-TERM CURRENT USE OF INSULIN (HCC): ICD-10-CM

## 2023-12-22 DIAGNOSIS — E66.01 CLASS 2 SEVERE OBESITY DUE TO EXCESS CALORIES WITH SERIOUS COMORBIDITY AND BODY MASS INDEX (BMI) OF 35.0 TO 35.9 IN ADULT: ICD-10-CM

## 2023-12-22 DIAGNOSIS — Z79.4 TYPE 2 DIABETES MELLITUS WITH MICROALBUMINURIA, WITH LONG-TERM CURRENT USE OF INSULIN (HCC): ICD-10-CM

## 2023-12-22 DIAGNOSIS — R80.1 PERSISTENT PROTEINURIA: ICD-10-CM

## 2023-12-22 DIAGNOSIS — N18.30 STAGE 3 CHRONIC KIDNEY DISEASE, UNSPECIFIED WHETHER STAGE 3A OR 3B CKD (HCC): ICD-10-CM

## 2023-12-22 LAB
ALBUMIN SERPL BCP-MCNC: 4.3 G/DL (ref 3.5–5)
ALP SERPL-CCNC: 49 U/L (ref 34–104)
ALT SERPL W P-5'-P-CCNC: 19 U/L (ref 7–52)
ANION GAP SERPL CALCULATED.3IONS-SCNC: 11 MMOL/L
AST SERPL W P-5'-P-CCNC: 17 U/L (ref 13–39)
BILIRUB SERPL-MCNC: 0.53 MG/DL (ref 0.2–1)
BUN SERPL-MCNC: 29 MG/DL (ref 5–25)
CALCIUM SERPL-MCNC: 9.5 MG/DL (ref 8.4–10.2)
CHLORIDE SERPL-SCNC: 97 MMOL/L (ref 96–108)
CHOLEST SERPL-MCNC: 172 MG/DL
CO2 SERPL-SCNC: 30 MMOL/L (ref 21–32)
CREAT SERPL-MCNC: 1.31 MG/DL (ref 0.6–1.3)
CREAT UR-MCNC: 16.7 MG/DL
ERYTHROCYTE [DISTWIDTH] IN BLOOD BY AUTOMATED COUNT: 13.2 % (ref 11.6–15.1)
EST. AVERAGE GLUCOSE BLD GHB EST-MCNC: 206 MG/DL
GFR SERPL CREATININE-BSD FRML MDRD: 55 ML/MIN/1.73SQ M
GLUCOSE P FAST SERPL-MCNC: 93 MG/DL (ref 65–99)
HBA1C MFR BLD: 8.8 %
HCT VFR BLD AUTO: 45.9 % (ref 36.5–49.3)
HDLC SERPL-MCNC: 51 MG/DL
HGB BLD-MCNC: 15 G/DL (ref 12–17)
LDLC SERPL CALC-MCNC: 79 MG/DL (ref 0–100)
MCH RBC QN AUTO: 30.4 PG (ref 26.8–34.3)
MCHC RBC AUTO-ENTMCNC: 32.7 G/DL (ref 31.4–37.4)
MCV RBC AUTO: 93 FL (ref 82–98)
MICROALBUMIN UR-MCNC: 18.1 MG/L
MICROALBUMIN/CREAT 24H UR: 108 MG/G CREATININE (ref 0–30)
NONHDLC SERPL-MCNC: 121 MG/DL
PLATELET # BLD AUTO: 225 THOUSANDS/UL (ref 149–390)
PMV BLD AUTO: 8.7 FL (ref 8.9–12.7)
POTASSIUM SERPL-SCNC: 4 MMOL/L (ref 3.5–5.3)
PROT SERPL-MCNC: 7.9 G/DL (ref 6.4–8.4)
PSA SERPL-MCNC: 3.13 NG/ML (ref 0–4)
RBC # BLD AUTO: 4.93 MILLION/UL (ref 3.88–5.62)
SODIUM SERPL-SCNC: 138 MMOL/L (ref 135–147)
TRIGL SERPL-MCNC: 212 MG/DL
WBC # BLD AUTO: 8.69 THOUSAND/UL (ref 4.31–10.16)

## 2023-12-22 PROCEDURE — 85027 COMPLETE CBC AUTOMATED: CPT

## 2023-12-22 PROCEDURE — 80053 COMPREHEN METABOLIC PANEL: CPT

## 2023-12-22 PROCEDURE — G0103 PSA SCREENING: HCPCS

## 2023-12-22 PROCEDURE — 36415 COLL VENOUS BLD VENIPUNCTURE: CPT

## 2023-12-22 PROCEDURE — 80061 LIPID PANEL: CPT

## 2023-12-22 PROCEDURE — 83036 HEMOGLOBIN GLYCOSYLATED A1C: CPT

## 2023-12-24 DIAGNOSIS — R80.9 TYPE 2 DIABETES MELLITUS WITH MICROALBUMINURIA, WITH LONG-TERM CURRENT USE OF INSULIN (HCC): ICD-10-CM

## 2023-12-24 DIAGNOSIS — Z79.4 TYPE 2 DIABETES MELLITUS WITH MICROALBUMINURIA, WITH LONG-TERM CURRENT USE OF INSULIN (HCC): ICD-10-CM

## 2023-12-24 DIAGNOSIS — E11.29 TYPE 2 DIABETES MELLITUS WITH MICROALBUMINURIA, WITH LONG-TERM CURRENT USE OF INSULIN (HCC): ICD-10-CM

## 2024-01-02 ENCOUNTER — OFFICE VISIT (OUTPATIENT)
Dept: INTERNAL MEDICINE CLINIC | Facility: CLINIC | Age: 69
End: 2024-01-02
Payer: MEDICARE

## 2024-01-02 VITALS
TEMPERATURE: 98 F | HEIGHT: 71 IN | HEART RATE: 78 BPM | BODY MASS INDEX: 43.26 KG/M2 | DIASTOLIC BLOOD PRESSURE: 76 MMHG | OXYGEN SATURATION: 95 % | SYSTOLIC BLOOD PRESSURE: 142 MMHG | WEIGHT: 309 LBS

## 2024-01-02 DIAGNOSIS — E66.01 CLASS 3 SEVERE OBESITY DUE TO EXCESS CALORIES WITH SERIOUS COMORBIDITY AND BODY MASS INDEX (BMI) OF 40.0 TO 44.9 IN ADULT (HCC): ICD-10-CM

## 2024-01-02 DIAGNOSIS — E11.29 TYPE 2 DIABETES MELLITUS WITH MICROALBUMINURIA, WITH LONG-TERM CURRENT USE OF INSULIN (HCC): ICD-10-CM

## 2024-01-02 DIAGNOSIS — R80.9 TYPE 2 DIABETES MELLITUS WITH MICROALBUMINURIA, WITH LONG-TERM CURRENT USE OF INSULIN (HCC): ICD-10-CM

## 2024-01-02 DIAGNOSIS — I10 ESSENTIAL HYPERTENSION: ICD-10-CM

## 2024-01-02 DIAGNOSIS — R80.1 PERSISTENT PROTEINURIA: ICD-10-CM

## 2024-01-02 DIAGNOSIS — R21 RASH: ICD-10-CM

## 2024-01-02 DIAGNOSIS — J30.1 ALLERGIC RHINITIS DUE TO POLLEN, UNSPECIFIED SEASONALITY: ICD-10-CM

## 2024-01-02 DIAGNOSIS — R00.0 TACHYCARDIA: ICD-10-CM

## 2024-01-02 DIAGNOSIS — L03.115 CELLULITIS OF RIGHT LOWER EXTREMITY: ICD-10-CM

## 2024-01-02 DIAGNOSIS — R60.0 EDEMA OF BOTH LEGS: ICD-10-CM

## 2024-01-02 DIAGNOSIS — N18.30 STAGE 3 CHRONIC KIDNEY DISEASE, UNSPECIFIED WHETHER STAGE 3A OR 3B CKD (HCC): ICD-10-CM

## 2024-01-02 DIAGNOSIS — E11.65 TYPE 2 DIABETES MELLITUS WITH HYPERGLYCEMIA, WITH LONG-TERM CURRENT USE OF INSULIN (HCC): Primary | ICD-10-CM

## 2024-01-02 DIAGNOSIS — R20.0 NUMBNESS OF LEFT FOOT: ICD-10-CM

## 2024-01-02 DIAGNOSIS — Z12.11 SCREENING FOR COLON CANCER: ICD-10-CM

## 2024-01-02 DIAGNOSIS — Z79.4 TYPE 2 DIABETES MELLITUS WITH HYPERGLYCEMIA, WITH LONG-TERM CURRENT USE OF INSULIN (HCC): Primary | ICD-10-CM

## 2024-01-02 DIAGNOSIS — Z79.4 TYPE 2 DIABETES MELLITUS WITH MICROALBUMINURIA, WITH LONG-TERM CURRENT USE OF INSULIN (HCC): ICD-10-CM

## 2024-01-02 PROBLEM — E66.813 CLASS 3 SEVERE OBESITY DUE TO EXCESS CALORIES WITH SERIOUS COMORBIDITY AND BODY MASS INDEX (BMI) OF 40.0 TO 44.9 IN ADULT (HCC): Status: ACTIVE | Noted: 2019-09-26

## 2024-01-02 PROCEDURE — 99214 OFFICE O/P EST MOD 30 MIN: CPT | Performed by: INTERNAL MEDICINE

## 2024-01-02 RX ORDER — LOSARTAN POTASSIUM 100 MG/1
100 TABLET ORAL DAILY
Qty: 30 TABLET | Refills: 5 | Status: SHIPPED | OUTPATIENT
Start: 2024-01-02

## 2024-01-02 RX ORDER — CEPHALEXIN 500 MG/1
500 CAPSULE ORAL EVERY 6 HOURS SCHEDULED
Qty: 28 CAPSULE | Refills: 0 | Status: SHIPPED | OUTPATIENT
Start: 2024-01-02 | End: 2024-01-09

## 2024-01-02 RX ORDER — FUROSEMIDE 40 MG/1
40 TABLET ORAL DAILY
Qty: 30 TABLET | Refills: 5 | Status: SHIPPED | OUTPATIENT
Start: 2024-01-02

## 2024-01-02 RX ORDER — ATORVASTATIN CALCIUM 40 MG/1
40 TABLET, FILM COATED ORAL DAILY
Qty: 30 TABLET | Refills: 5 | Status: SHIPPED | OUTPATIENT
Start: 2024-01-02

## 2024-01-02 RX ORDER — GABAPENTIN 100 MG/1
200 CAPSULE ORAL
Qty: 60 CAPSULE | Refills: 5 | Status: SHIPPED | OUTPATIENT
Start: 2024-01-02

## 2024-01-02 RX ORDER — AMLODIPINE BESYLATE 10 MG/1
10 TABLET ORAL DAILY
Qty: 30 TABLET | Refills: 5 | Status: SHIPPED | OUTPATIENT
Start: 2024-01-02 | End: 2024-01-03

## 2024-01-02 RX ORDER — METOPROLOL SUCCINATE 100 MG/1
100 TABLET, EXTENDED RELEASE ORAL DAILY
Qty: 30 TABLET | Refills: 5 | Status: SHIPPED | OUTPATIENT
Start: 2024-01-02 | End: 2024-01-03

## 2024-01-02 RX ORDER — AZELASTINE HYDROCHLORIDE 137 UG/1
SPRAY, METERED NASAL
Qty: 30 ML | Refills: 1 | Status: SHIPPED | OUTPATIENT
Start: 2024-01-02

## 2024-01-02 RX ORDER — INSULIN HUMAN 100 [IU]/ML
20 INJECTION, SUSPENSION SUBCUTANEOUS 2 TIMES DAILY
Qty: 15 ML | Refills: 5 | Status: SHIPPED | OUTPATIENT
Start: 2024-01-02

## 2024-01-02 RX ORDER — POTASSIUM CHLORIDE 20 MEQ/1
20 TABLET, EXTENDED RELEASE ORAL DAILY
Qty: 30 TABLET | Refills: 5 | Status: SHIPPED | OUTPATIENT
Start: 2024-01-02

## 2024-01-02 NOTE — ASSESSMENT & PLAN NOTE
Came in complaint redness swelling minimal pain right lower extremity scattered ranging from 1 cm to 3 cm diameter maculopapular erythematous rash suspected cellulitis  Treat with Keflex 503 times a day  Awaiting to be seen by dermatology

## 2024-01-02 NOTE — ASSESSMENT & PLAN NOTE
Blood pressure seems to be controlled agree and continue current management management as follows    Amlodipine 10 mg daily    Lasix 40 mg daily    Losartan 100 mg daily

## 2024-01-02 NOTE — PROGRESS NOTES
Dr. Glass's Office Visit Note  24     Nadeem Purdy  68 y.o. male MRN: [id number]  : 1955    Assessment:     1. Type 2 diabetes mellitus with hyperglycemia, with long-term current use of insulin ([facility])  Assessment & Plan:    Lab Results   Component Value Date    HGBA1C 8.8 (H) 2023   Patient's diabetes uncontrolled on the basis of A1c 8.8 monitoring blood sugar at home seems to be uncontrolled due to the noncompliance with the diet for now patient recommended regimen with GLP-1 agonist although patient reluctant like to continue this current regimen as follows with strict diabetic diet continue current med regimen agree with the current regimen as follows    Metformin 1000 mg twice a day the insulin Humulin and 7030 20 units 2 times a day Jardiance 25 mg daily monitor blood sugar at home      2. Edema of both legs  Assessment & Plan:  Much improved continue current management as follows agree with current management as follows  Lasix 40 mg daily    Orders:  -     potassium chloride (K-DUR,KLOR-CON) 20 mEq tablet; Take 1 tablet (20 mEq total) by mouth daily  -     furosemide (LASIX) 40 mg tablet; Take 1 tablet (40 mg total) by mouth daily  -     Empagliflozin 25 MG TABS; Take 1 tablet (25 mg total) by mouth daily    3. Essential hypertension  Assessment & Plan:  Blood pressure seems to be controlled agree and continue current management management as follows    Amlodipine 10 mg daily    Lasix 40 mg daily    Losartan 100 mg daily    Orders:  -     metoprolol succinate (TOPROL-XL) 100 mg 24 hr tablet; Take 1 tablet (100 mg total) by mouth daily  -     losartan (COZAAR) 100 MG tablet; Take 1 tablet (100 mg total) by mouth daily  -     atorvastatin (LIPITOR) 40 mg tablet; Take 1 tablet (40 mg total) by mouth daily  -     amLODIPine (NORVASC) 10 mg tablet; Take 1 tablet (10 mg total) by mouth daily    4. Tachycardia  -     metoprolol succinate (TOPROL-XL) 100 mg 24 hr tablet; Take 1 tablet (100 mg  total) by mouth daily    5. Type 2 diabetes mellitus with microalbuminuria, with long-term current use of insulin ([facility])  Assessment & Plan:    Lab Results   Component Value Date    HGBA1C 8.8 (H) 12/22/2023   Diabetes not controlled  Advised GLP-1 agonist patient reluctant  Continue agree with current management as follows  Metformin 1000 mg twice a day  Humulin and 70 3020 needs twice a day  Jardiance 10 mg daily  Diabetic diet    Orders:  -     metFORMIN (GLUCOPHAGE) 1000 MG tablet; Take 1 tablet (1,000 mg total) by mouth 2 (two) times a day  -     insulin isophane-insulin regular (HumuLIN 70/30 KwikPen) 100 units/mL injection pen; Inject 20 Units under the skin 2 (two) times a day  -     Empagliflozin 25 MG TABS; Take 1 tablet (25 mg total) by mouth daily    6. Numbness of left foot  Assessment & Plan:  Possibly due to diabetes diabetic neuropathy    Continue and agree with current management as follows  Gabapentin 100 mg at bedtime to take 2 pills at bedtime    Orders:  -     gabapentin (Neurontin) 100 mg capsule; Take 2 capsules (200 mg total) by mouth daily at bedtime    7. Persistent proteinuria  Assessment & Plan:  Recommended good diabetes control and the blood pressure control see above    Orders:  -     Empagliflozin 25 MG TABS; Take 1 tablet (25 mg total) by mouth daily    8. Allergic rhinitis due to pollen, unspecified seasonality  -     Azelastine HCl 137 MCG/SPRAY SOLN; 1 puff each nostril twice a day    9. Cellulitis of right lower extremity  Assessment & Plan:  Came in complaint redness swelling minimal pain right lower extremity scattered ranging from 1 cm to 3 cm diameter maculopapular erythematous rash suspected cellulitis  Treat with Keflex 503 times a day  Awaiting to be seen by dermatology    Orders:  -     cephalexin (KEFLEX) 500 mg capsule; Take 1 capsule (500 mg total) by mouth every 6 (six) hours for 7 days    10. Screening for colon cancer  -     Ambulatory Referral to Gastroenterology;  Future    11. Rash  Assessment & Plan:  Maculopapular erythematous rash right lower extremity itching from ankle to knee for last 4 months getting worse to be seen by dermatology      Orders:  -     Ambulatory Referral to Dermatology; Future    12. Class 3 severe obesity due to excess calories with serious comorbidity and body mass index (BMI) of 40.0 to 44.9 in adult ([facility])  Assessment & Plan:  Patient's BMI 43.1 not able to lose weight counseling done at length very low calorie diet cut down the portion calorie change in lifestyle  Discussed options of HealthyCORE-Intensive Lifestyle Intervention Program, Very Low Calorie Diet-VLCD and Conservative Program and the role of weight loss medications.  - Explained the importance of making lifestyle changes first before starting anti-obesity medications.  - Patient should demonstrate lifestyle changes first before anti-obesity medication initiated.   - Patient is interested in pursuing HealthyCORE-Intensive Lifestyle Intervention Program and follow up visits with medical weight management provider.  - Initial weight loss goal of 5-10% weight loss for improved health  - Weight loss can improve patient's co-morbid conditions and/or prevent weight-related complications.  - Labs reviewed from 4/2023  -Patient lifestyle habits were reviewed today.  Patient was encouraged to get back to tracking her foods and balancing her nutrition more evenly throughout the day.  Discussed lunch options to avoid grazing behaviors and more healthy snack options to incorporate throughout the day.  She was encouraged to drink at least 64 ounces of water and discussed going for walks at least 20 minutes 3 times a week.  Patient will start with healthy core program and be able to make some healthy lifestyle changes.      Goals:  Do not skip meals.  Calorie goal of 1000 to 1200 marino/day  Food log via christine - options include  [email or web address], [email or web address], [email or web address], [email or web address],  [email or web address]  No sugary beverages.   At least 64oz of water daily.  Increase physical activity by 10 minutes daily. Gradually increase physical activity to a goal of 5 days per week for 30 minutes of MODERATE intensity PLUS 2 days per week of FULL BODY resistance training above reviewed discussed GLP-1 agonist patient reluctant      13. Stage 3 chronic kidney disease, unspecified whether stage 3a or 3b CKD ([facility])  Assessment & Plan:  Lab Results   Component Value Date    EGFR 55 12/22/2023    EGFR 61 09/25/2023    EGFR 56 05/24/2023    CREATININE 1.31 (H) 12/22/2023    CREATININE 1.20 09/25/2023    CREATININE 1.30 05/24/2023   GFR 55 creatinine 1.3 stable at baseline as above reviewed avoid nephrotoxic drug continue current regimen with diabetes and hypertension  GFR is baseline  Creat is baseline.   Recommend periodic blood test for monitoring of BUN and Creat  Avoid Non Steroidal Antiinflammatory such as ibuprofen, aleve etc.  Potassium, HCO3 and calcium are wnl and will require periodic blood test.  Bone and Mineral disease monitoring as appropriate.  All patient with GFR less than 30( CKD 4 or 5 or 6) advised to follow with nephrologist.             Discussion Summary and Plan:  Today's care plan and medications were reviewed with patient in detail and all their questions answered to their satisfaction.    Chief Complaint   Patient presents with   • Follow-up     90 day followup      Subjective:  Came in follow-up chronic medical condition listed under visit diagnosis review of the labs labs reviewed done A1c high 8.3 diabetes uncontrolled LFT abnormal persistent swelling both lower extremity unchanged now has erythematous maculopapular rash right lower extremity getting worse for the last 4 months denies any chest pain difficulty breathing counseling done to lose weight see under visit diagnosis for all assessment plan        The following portions of the patient's history were reviewed and updated as appropriate:  allergies, current medications, past family history, past medical history, past social history, past surgical history and problem list.    Review of Systems   Constitutional:  Positive for activity change. Negative for appetite change, chills, diaphoresis, fatigue, fever and unexpected weight change.   HENT:  Negative for congestion, dental problem, drooling, ear discharge, ear pain, facial swelling, hearing loss, mouth sores, nosebleeds, postnasal drip, rhinorrhea, sinus pressure, sneezing, sore throat, tinnitus, trouble swallowing and voice change.    Eyes:  Negative for photophobia, pain, discharge, redness, itching and visual disturbance.   Respiratory:  Negative for apnea, cough, choking, chest tightness, shortness of breath, wheezing and stridor.    Cardiovascular:  Positive for leg swelling. Negative for chest pain and palpitations.   Gastrointestinal:  Negative for abdominal distention, abdominal pain, anal bleeding, blood in stool, constipation, diarrhea, nausea, rectal pain and vomiting.   Endocrine: Negative for cold intolerance, heat intolerance, polydipsia, polyphagia and polyuria.   Genitourinary:  Negative for decreased urine volume, difficulty urinating, dysuria, enuresis, flank pain, frequency, genital sores, hematuria and urgency.   Musculoskeletal:  Positive for back pain. Negative for arthralgias, gait problem, joint swelling, myalgias, neck pain and neck stiffness.   Skin:  Negative for color change, pallor, rash and wound.   Allergic/Immunologic: Negative.  Negative for environmental allergies, food allergies and immunocompromised state.   Neurological:  Negative for dizziness, tremors, seizures, syncope, facial asymmetry, speech difficulty, weakness, light-headedness, numbness and headaches.   Psychiatric/Behavioral:  Negative for agitation, behavioral problems, confusion, decreased concentration, dysphoric mood, hallucinations, self-injury, sleep disturbance and suicidal ideas. The patient is  not nervous/anxious and is not hyperactive.          Historical Information   Patient Active Problem List   Diagnosis   • Type 2 diabetes mellitus with microalbuminuria, with long-term current use of insulin ([facility])   • Essential hypertension   • Hypercholesteremia   • Class 3 severe obesity due to excess calories with serious comorbidity and body mass index (BMI) of 40.0 to 44.9 in adult ([facility])   • Steatohepatitis   • Allergic rhinitis   • Vitamin D deficiency   • Persistent proteinuria   • Tachycardia   • Stage 3 chronic kidney disease, unspecified whether stage 3a or 3b CKD ([facility])   • Edema of both legs   • Rash   • Numbness of left foot   • Cortical age-related cataract of both eyes   • Type 2 diabetes mellitus with hyperglycemia, with long-term current use of insulin ([facility])   • Cellulitis of right lower extremity     Past Medical History:   Diagnosis Date   • Hypertension      History reviewed. No pertinent surgical history.  Social History     Substance and Sexual Activity   Alcohol Use Yes   • Alcohol/week: 4.0 standard drinks of alcohol   • Types: 4 Shots of liquor per week     Social History     Substance and Sexual Activity   Drug Use Never     Social History     Tobacco Use   Smoking Status Former   Smokeless Tobacco Never     History reviewed. No pertinent family history.  Health Maintenance Due   Topic   • COVID-19 Vaccine (1)   • Pneumococcal Vaccine: 65+ Years (1 - PCV)   • Colorectal Cancer Screening    • PT PLAN OF CARE    • Influenza Vaccine (1)      Meds/Allergies       Current Outpatient Medications:   •  amLODIPine (NORVASC) 10 mg tablet, Take 1 tablet (10 mg total) by mouth daily, Disp: 30 tablet, Rfl: 5  •  aspirin (ECOTRIN LOW STRENGTH) 81 mg EC tablet, Take 81 mg by mouth daily, Disp: , Rfl:   •  atorvastatin (LIPITOR) 40 mg tablet, Take 1 tablet (40 mg total) by mouth daily, Disp: 30 tablet, Rfl: 5  •  Azelastine HCl 137 MCG/SPRAY SOLN, 1 puff each nostril twice a day, Disp: 30 mL, Rfl: 1  •  BD  "Pen Needle Katelyn U/F 32G X 4 MM MISC, INJECT AS DIRECTED 2 (TWO) TIMES A DAY, Disp: 100 each, Rfl: 4  •  cephalexin (KEFLEX) 500 mg capsule, Take 1 capsule (500 mg total) by mouth every 6 (six) hours for 7 days, Disp: 28 capsule, Rfl: 0  •  Cholecalciferol (VITAMIN D3) 2000 units TABS, Take 2,000 Units by mouth daily, Disp: , Rfl:   •  Empagliflozin 25 MG TABS, Take 1 tablet (25 mg total) by mouth daily, Disp: 30 tablet, Rfl: 5  •  furosemide (LASIX) 40 mg tablet, Take 1 tablet (40 mg total) by mouth daily, Disp: 30 tablet, Rfl: 5  •  gabapentin (Neurontin) 100 mg capsule, Take 2 capsules (200 mg total) by mouth daily at bedtime, Disp: 60 capsule, Rfl: 5  •  Glucose Blood (ONETOUCH ULTRA BLUE VI), 1 Units by In Vitro route 3 (three) times a day, Disp: , Rfl:   •  glucose blood (OneTouch Ultra) test strip, Test 3 times daily, Disp: 300 each, Rfl: 5  •  insulin isophane-insulin regular (HumuLIN 70/30 KwikPen) 100 units/mL injection pen, Inject 20 Units under the skin 2 (two) times a day, Disp: 15 mL, Rfl: 5  •  Krill Oil 1000 MG CAPS, Take 1 capsule by mouth daily, Disp: , Rfl:   •  losartan (COZAAR) 100 MG tablet, Take 1 tablet (100 mg total) by mouth daily, Disp: 30 tablet, Rfl: 5  •  metFORMIN (GLUCOPHAGE) 1000 MG tablet, Take 1 tablet (1,000 mg total) by mouth 2 (two) times a day, Disp: 60 tablet, Rfl: 5  •  metoprolol succinate (TOPROL-XL) 100 mg 24 hr tablet, Take 1 tablet (100 mg total) by mouth daily, Disp: 30 tablet, Rfl: 5  •  potassium chloride (K-DUR,KLOR-CON) 20 mEq tablet, Take 1 tablet (20 mEq total) by mouth daily, Disp: 30 tablet, Rfl: 5  •  vitamin E, tocopherol, 400 units capsule, Take 400 Units by mouth daily, Disp: , Rfl:       Objective:    Vitals:   /76   Pulse 78   Temp 98 °F (36.7 °C)   Ht 5' 11\" (1.803 m)   Wt (!) 140 kg (309 lb)   SpO2 95%   BMI 43.10 kg/m²   Body mass index is 43.1 kg/m².  Vitals:    01/02/24 0851   Weight: (!) 140 kg (309 lb)       Physical Exam  Vitals and " nursing note reviewed.   Constitutional:       General: He is not in acute distress.     Appearance: He is well-developed. He is obese. He is not ill-appearing, toxic-appearing or diaphoretic.   HENT:      Head: Normocephalic and atraumatic.      Right Ear: External ear normal.      Left Ear: External ear normal.      Nose: Nose normal.      Mouth/Throat:      Pharynx: No oropharyngeal exudate.   Eyes:      General: Lids are normal. Lids are everted, no foreign bodies appreciated. No scleral icterus.        Right eye: No discharge.         Left eye: No discharge.      Conjunctiva/sclera: Conjunctivae normal.      Pupils: Pupils are equal, round, and reactive to light.   Neck:      Thyroid: No thyromegaly.      Vascular: Normal carotid pulses. No carotid bruit, hepatojugular reflux or JVD.      Trachea: No tracheal tenderness or tracheal deviation.   Cardiovascular:      Rate and Rhythm: Normal rate and regular rhythm.      Pulses: Normal pulses.      Heart sounds: Normal heart sounds. No murmur heard.     No friction rub. No gallop.   Pulmonary:      Effort: Pulmonary effort is normal. No respiratory distress.      Breath sounds: Normal breath sounds. No stridor. No wheezing or rales.   Chest:      Chest wall: No tenderness.   Abdominal:      General: Bowel sounds are normal. There is no distension.      Palpations: Abdomen is soft. There is no mass.      Tenderness: There is no abdominal tenderness. There is no guarding or rebound.   Musculoskeletal:         General: No tenderness or deformity. Normal range of motion.      Cervical back: Normal range of motion and neck supple. No edema, erythema or rigidity. No spinous process tenderness or muscular tenderness. Normal range of motion.      Right lower leg: Edema present.      Left lower leg: Edema present.   Lymphadenopathy:      Head:      Right side of head: No submental, submandibular, tonsillar, preauricular or posterior auricular adenopathy.      Left side of  head: No submental, submandibular, tonsillar, preauricular, posterior auricular or occipital adenopathy.      Cervical: No cervical adenopathy.      Right cervical: No superficial, deep or posterior cervical adenopathy.     Left cervical: No superficial, deep or posterior cervical adenopathy.      Upper Body:      Right upper body: No pectoral adenopathy.      Left upper body: No pectoral adenopathy.   Skin:     General: Skin is warm and dry.      Coloration: Skin is not pale.      Findings: No erythema or rash.   Neurological:      General: No focal deficit present.      Mental Status: He is alert and oriented to person, place, and time.      Cranial Nerves: No cranial nerve deficit.      Sensory: No sensory deficit.      Motor: No tremor, abnormal muscle tone or seizure activity.      Coordination: Coordination normal.      Gait: Gait normal.      Deep Tendon Reflexes: Reflexes are normal and symmetric. Reflexes normal.   Psychiatric:         Behavior: Behavior normal.         Thought Content: Thought content normal.         Judgment: Judgment normal.         Lab Review   Lab on 12/22/2023   Component Date Value Ref Range Status   • Sodium 12/22/2023 138  135 - 147 mmol/L Final   • Potassium 12/22/2023 4.0  3.5 - 5.3 mmol/L Final   • Chloride 12/22/2023 97  96 - 108 mmol/L Final   • CO2 12/22/2023 30  21 - 32 mmol/L Final   • ANION GAP 12/22/2023 11  mmol/L Final   • BUN 12/22/2023 29 (H)  5 - 25 mg/dL Final   • Creatinine 12/22/2023 1.31 (H)  0.60 - 1.30 mg/dL Final    Standardized to IDMS reference method   • Glucose, Fasting 12/22/2023 93  65 - 99 mg/dL Final   • Calcium 12/22/2023 9.5  8.4 - 10.2 mg/dL Final   • AST 12/22/2023 17  13 - 39 U/L Final   • ALT 12/22/2023 19  7 - 52 U/L Final    Specimen collection should occur prior to Sulfasalazine administration due to the potential for falsely depressed results.    • Alkaline Phosphatase 12/22/2023 49  34 - 104 U/L Final   • Total Protein 12/22/2023 7.9  6.4 -  8.4 g/dL Final   • Albumin 12/22/2023 4.3  3.5 - 5.0 g/dL Final   • Total Bilirubin 12/22/2023 0.53  0.20 - 1.00 mg/dL Final    Use of this assay is not recommended for patients undergoing treatment with eltrombopag due to the potential for falsely elevated results.  N-acetyl-p-benzoquinone imine (metabolite of Acetaminophen) will generate erroneously low results in samples for patients that have taken an overdose of Acetaminophen.   • eGFR 12/22/2023 55  ml/min/1.73sq m Final   • Hemoglobin A1C 12/22/2023 8.8 (H)  Normal 4.0-5.6%; PreDiabetic 5.7-6.4%; Diabetic >=6.5%; Glycemic control for adults with diabetes <7.0% % Final   • EAG 12/22/2023 206  mg/dl Final   • Cholesterol 12/22/2023 172  See Comment mg/dL Final    Cholesterol:         Pediatric <18 Years        Desirable          <170 mg/dL      Borderline High    170-199 mg/dL      High               >=200 mg/dL        Adult >=18 Years            Desirable         <200 mg/dL      Borderline High   200-239 mg/dL      High              >239 mg/dL     • Triglycerides 12/22/2023 212 (H)  See Comment mg/dL Final    Triglyceride:     0-9Y            <75mg/dL     10Y-17Y         <90 mg/dL       >=18Y     Normal          <150 mg/dL     Borderline High 150-199 mg/dL     High            200-499 mg/dL        Very High       >499 mg/dL    Specimen collection should occur prior to Metamizole administration due to the potential for falsely depressed results.   • HDL, Direct 12/22/2023 51  >=40 mg/dL Final   • LDL Calculated 12/22/2023 79  0 - 100 mg/dL Final    LDL Cholesterol:     Optimal           <100 mg/dl     Near Optimal      100-129 mg/dl     Above Optimal       Borderline High 130-159 mg/dl       High            160-189 mg/dl       Very High       >189 mg/dl         This screening LDL is a calculated result.   It does not have the accuracy of the Direct Measured LDL in the monitoring of patients with hyperlipidemia and/or statin therapy.   Direct Measure LDL (TGV939)  must be ordered separately in these patients.   • Non-HDL-Chol (CHOL-HDL) 12/22/2023 121  mg/dl Final   • WBC 12/22/2023 8.69  4.31 - 10.16 Thousand/uL Final   • RBC 12/22/2023 4.93  3.88 - 5.62 Million/uL Final   • Hemoglobin 12/22/2023 15.0  12.0 - 17.0 g/dL Final   • Hematocrit 12/22/2023 45.9  36.5 - 49.3 % Final   • MCV 12/22/2023 93  82 - 98 fL Final   • MCH 12/22/2023 30.4  26.8 - 34.3 pg Final   • MCHC 12/22/2023 32.7  31.4 - 37.4 g/dL Final   • RDW 12/22/2023 13.2  11.6 - 15.1 % Final   • Platelets 12/22/2023 225  149 - 390 Thousands/uL Final   • MPV 12/22/2023 8.7 (L)  8.9 - 12.7 fL Final   • PSA 12/22/2023 3.13  0.00 - 4.00 ng/mL Final    Swipesense Access chemiluminescent immunoassay. Confirm baseline values for patients being serially monitored.          Patient Instructions   Cellulitis   WHAT YOU NEED TO KNOW:   Cellulitis is a skin infection caused by bacteria. Cellulitis is common and can become severe. Cellulitis usually appears on the lower legs. It can also appear on the arms, face, and other areas. Cellulitis develops when bacteria enter a crack or break in your skin, such as a scratch, bite, or cut.       DISCHARGE INSTRUCTIONS:   Return to the emergency department if:   Your wound gets larger and more painful.    You feel a crackling under your skin when you touch it.    You have purple dots or bumps on your skin, or you see bleeding under your skin.    You see red streaks coming from the infected area.    Call your doctor if:   The red, warm, swollen area gets larger.    Your fever or pain does not go away or gets worse.    The area does not get smaller after 3 days of antibiotics.    You have questions or concerns about your condition or care.    Medicines:  You should start to see improvement in 3 days. If cellulitis is not treated, the infection can spread through your body and become life-threatening. You may need any of the following medicines:  Antibiotics  help treat a bacterial  infection.    Acetaminophen  decreases pain and fever. It is available without a doctor's order. Ask how much to take and how often to take it. Follow directions. Read the labels of all other medicines you are using to see if they also contain acetaminophen, or ask your doctor or pharmacist. Acetaminophen can cause liver damage if not taken correctly.    NSAIDs , such as ibuprofen, help decrease swelling, pain, and fever. This medicine is available with or without a doctor's order. NSAIDs can cause stomach bleeding or kidney problems in certain people. If you take blood thinner medicine, always ask your healthcare provider if NSAIDs are safe for you. Always read the medicine label and follow directions.    Take your medicine as directed.  Contact your healthcare provider if you think your medicine is not helping or if you have side effects. Tell your provider if you are allergic to any medicine. Keep a list of the medicines, vitamins, and herbs you take. Include the amounts, and when and why you take them. Bring the list or the pill bottles to follow-up visits. Carry your medicine list with you in case of an emergency.    Self-care:   Wash the area with soap and water every day.  Gently pat dry. Use bandages if directed by your healthcare provider.    Apply cream or ointment as directed.  These help protect the area. Most over-the-counter products, such as petroleum jelly, are good to use. Ask your healthcare provider about specific creams or ointments you should use.    Place a cool, damp cloth on the area.  Use clean cloths and clean water. You can do this as often as you need to. Cool, damp cloths may help decrease pain.    Elevate the area above the level of your heart  as often as you can. This will help decrease swelling and pain. Prop the area on pillows or blankets to keep it elevated comfortably.       Prevent cellulitis:   Do not scratch bug bites or areas of injury.  You increase your risk for cellulitis  "by scratching these areas.    Do not share personal items, such as towels, clothing, and razors.    Clean exercise equipment  with germ-killing  before and after you use it.    Treat athlete's foot.  This can help prevent the spread of a bacterial skin infection.    Wash your hands often.  Use soap and water. Wash your hands after you use the bathroom, change a child's diapers, or sneeze. Wash your hands before you prepare or eat food. Use lotion to prevent dry, cracked skin.       Follow up with your doctor within 3 days, or as directed:  He or she will check if your cellulitis is getting better. Write down your questions so you remember to ask them during your visits.  © Copyright [employer] 2023 Information is for End User's use only and may not be sold, redistributed or otherwise used for commercial purposes.  The above information is an  only. It is not intended as medical advice for individual conditions or treatments. Talk to your doctor, nurse or pharmacist before following any medical regimen to see if it is safe and effective for you.       Jluis Glass MD        \"This note has been constructed using a voice recognition system.Therefore there may be syntax, spelling, and/or grammatical errors. Please call if you have any questions. \"BMI Counseling: Body mass index is 43.1 kg/m². The BMI is above normal. Nutrition recommendations include decreasing portion sizes, encouraging healthy choices of fruits and vegetables, decreasing fast food intake, consuming healthier snacks, limiting drinks that contain sugar, moderation in carbohydrate intake, increasing intake of lean protein, reducing intake of saturated and trans fat and reducing intake of cholesterol. Exercise recommendations include strength training exercises. No pharmacotherapy was ordered. Rationale for BMI follow-up plan is due to patient being overweight or obese.       "

## 2024-01-02 NOTE — ASSESSMENT & PLAN NOTE
Maculopapular erythematous rash right lower extremity itching from ankle to knee for last 4 months getting worse to be seen by dermatology

## 2024-01-02 NOTE — ASSESSMENT & PLAN NOTE
Lab Results   Component Value Date    HGBA1C 8.8 (H) 12/22/2023   Patient's diabetes uncontrolled on the basis of A1c 8.8 monitoring blood sugar at home seems to be uncontrolled due to the noncompliance with the diet for now patient recommended regimen with GLP-1 agonist although patient reluctant like to continue this current regimen as follows with strict diabetic diet continue current med regimen agree with the current regimen as follows    Metformin 1000 mg twice a day the insulin Humulin and 7030 20 units 2 times a day Jardiance 25 mg daily monitor blood sugar at home

## 2024-01-02 NOTE — ASSESSMENT & PLAN NOTE
Lab Results   Component Value Date    EGFR 55 12/22/2023    EGFR 61 09/25/2023    EGFR 56 05/24/2023    CREATININE 1.31 (H) 12/22/2023    CREATININE 1.20 09/25/2023    CREATININE 1.30 05/24/2023   GFR 55 creatinine 1.3 stable at baseline as above reviewed avoid nephrotoxic drug continue current regimen with diabetes and hypertension  GFR is baseline  Creat is baseline.   Recommend periodic blood test for monitoring of BUN and Creat  Avoid Non Steroidal Antiinflammatory such as ibuprofen, aleve etc.  Potassium, HCO3 and calcium are wnl and will require periodic blood test.  Bone and Mineral disease monitoring as appropriate.  All patient with GFR less than 30( CKD 4 or 5 or 6) advised to follow with nephrologist.

## 2024-01-02 NOTE — ASSESSMENT & PLAN NOTE
Patient's BMI 43.1 not able to lose weight counseling done at length very low calorie diet cut down the portion calorie change in lifestyle  Discussed options of HealthyCORE-Intensive Lifestyle Intervention Program, Very Low Calorie Diet-VLCD and Conservative Program and the role of weight loss medications.  - Explained the importance of making lifestyle changes first before starting anti-obesity medications.  - Patient should demonstrate lifestyle changes first before anti-obesity medication initiated.   - Patient is interested in pursuing Healthy[facility]-Intensive Lifestyle Intervention Program and follow up visits with medical weight management provider.  - Initial weight loss goal of 5-10% weight loss for improved health  - Weight loss can improve patient's co-morbid conditions and/or prevent weight-related complications.  - Labs reviewed from 4/2023  -Patient lifestyle habits were reviewed today.  Patient was encouraged to get back to tracking her foods and balancing her nutrition more evenly throughout the day.  Discussed lunch options to avoid grazing behaviors and more healthy snack options to incorporate throughout the day.  She was encouraged to drink at least 64 ounces of water and discussed going for walks at least 20 minutes 3 times a week.  Patient will start with healthy core program and be able to make some healthy lifestyle changes.      Goals:  Do not skip meals.  Calorie goal of 1000 to 1200 marino/day  Food log via christine - options include  [email or web address], sparkpeople.com, [email or web address], calorieking.com, [employer]itastic  No sugary beverages.   At least 64oz of water daily.  Increase physical activity by 10 minutes daily. Gradually increase physical activity to a goal of 5 days per week for 30 minutes of MODERATE intensity PLUS 2 days per week of FULL BODY resistance training above reviewed discussed GLP-1 agonist patient reluctant

## 2024-01-02 NOTE — ASSESSMENT & PLAN NOTE
Possibly due to diabetes diabetic neuropathy    Continue and agree with current management as follows  Gabapentin 100 mg at bedtime to take 2 pills at bedtime

## 2024-01-02 NOTE — PATIENT INSTRUCTIONS
Cellulitis   WHAT YOU NEED TO KNOW:   Cellulitis is a skin infection caused by bacteria. Cellulitis is common and can become severe. Cellulitis usually appears on the lower legs. It can also appear on the arms, face, and other areas. Cellulitis develops when bacteria enter a crack or break in your skin, such as a scratch, bite, or cut.       DISCHARGE INSTRUCTIONS:   Return to the emergency department if:   Your wound gets larger and more painful.    You feel a crackling under your skin when you touch it.    You have purple dots or bumps on your skin, or you see bleeding under your skin.    You see red streaks coming from the infected area.    Call your doctor if:   The red, warm, swollen area gets larger.    Your fever or pain does not go away or gets worse.    The area does not get smaller after 3 days of antibiotics.    You have questions or concerns about your condition or care.    Medicines:  You should start to see improvement in 3 days. If cellulitis is not treated, the infection can spread through your body and become life-threatening. You may need any of the following medicines:  Antibiotics  help treat a bacterial infection.    Acetaminophen  decreases pain and fever. It is available without a doctor's order. Ask how much to take and how often to take it. Follow directions. Read the labels of all other medicines you are using to see if they also contain acetaminophen, or ask your doctor or pharmacist. Acetaminophen can cause liver damage if not taken correctly.    NSAIDs , such as ibuprofen, help decrease swelling, pain, and fever. This medicine is available with or without a doctor's order. NSAIDs can cause stomach bleeding or kidney problems in certain people. If you take blood thinner medicine, always ask your healthcare provider if NSAIDs are safe for you. Always read the medicine label and follow directions.    Take your medicine as directed.  Contact your healthcare provider if you think your  medicine is not helping or if you have side effects. Tell your provider if you are allergic to any medicine. Keep a list of the medicines, vitamins, and herbs you take. Include the amounts, and when and why you take them. Bring the list or the pill bottles to follow-up visits. Carry your medicine list with you in case of an emergency.    Self-care:   Wash the area with soap and water every day.  Gently pat dry. Use bandages if directed by your healthcare provider.    Apply cream or ointment as directed.  These help protect the area. Most over-the-counter products, such as petroleum jelly, are good to use. Ask your healthcare provider about specific creams or ointments you should use.    Place a cool, damp cloth on the area.  Use clean cloths and clean water. You can do this as often as you need to. Cool, damp cloths may help decrease pain.    Elevate the area above the level of your heart  as often as you can. This will help decrease swelling and pain. Prop the area on pillows or blankets to keep it elevated comfortably.       Prevent cellulitis:   Do not scratch bug bites or areas of injury.  You increase your risk for cellulitis by scratching these areas.    Do not share personal items, such as towels, clothing, and razors.    Clean exercise equipment  with germ-killing  before and after you use it.    Treat athlete's foot.  This can help prevent the spread of a bacterial skin infection.    Wash your hands often.  Use soap and water. Wash your hands after you use the bathroom, change a child's diapers, or sneeze. Wash your hands before you prepare or eat food. Use lotion to prevent dry, cracked skin.       Follow up with your doctor within 3 days, or as directed:  He or she will check if your cellulitis is getting better. Write down your questions so you remember to ask them during your visits.  © Copyright [employer] 2023 Information is for End User's use only and may not be sold, redistributed or otherwise  used for commercial purposes.  The above information is an  only. It is not intended as medical advice for individual conditions or treatments. Talk to your doctor, nurse or pharmacist before following any medical regimen to see if it is safe and effective for you.

## 2024-01-02 NOTE — ASSESSMENT & PLAN NOTE
Lab Results   Component Value Date    HGBA1C 8.8 (H) 12/22/2023   Diabetes not controlled  Advised GLP-1 agonist patient reluctant  Continue agree with current management as follows  Metformin 1000 mg twice a day  Humulin and 70 3020 needs twice a day  Jardiance 10 mg daily  Diabetic diet

## 2024-01-02 NOTE — ASSESSMENT & PLAN NOTE
Much improved continue current management as follows agree with current management as follows  Lasix 40 mg daily

## 2024-01-03 RX ORDER — AMLODIPINE BESYLATE 10 MG/1
10 TABLET ORAL DAILY
Qty: 90 TABLET | Refills: 1 | Status: SHIPPED | OUTPATIENT
Start: 2024-01-03

## 2024-01-03 RX ORDER — METOPROLOL SUCCINATE 100 MG/1
100 TABLET, EXTENDED RELEASE ORAL DAILY
Qty: 90 TABLET | Refills: 1 | Status: SHIPPED | OUTPATIENT
Start: 2024-01-03

## 2024-01-27 DIAGNOSIS — I10 ESSENTIAL HYPERTENSION: ICD-10-CM

## 2024-01-27 DIAGNOSIS — R60.0 EDEMA OF BOTH LEGS: ICD-10-CM

## 2024-01-29 RX ORDER — ATORVASTATIN CALCIUM 40 MG/1
40 TABLET, FILM COATED ORAL DAILY
Qty: 90 TABLET | Refills: 1 | Status: SHIPPED | OUTPATIENT
Start: 2024-01-29

## 2024-01-29 RX ORDER — POTASSIUM CHLORIDE 1500 MG/1
20 TABLET, EXTENDED RELEASE ORAL DAILY
Qty: 90 TABLET | Refills: 1 | Status: SHIPPED | OUTPATIENT
Start: 2024-01-29

## 2024-04-03 ENCOUNTER — OFFICE VISIT (OUTPATIENT)
Dept: INTERNAL MEDICINE CLINIC | Facility: CLINIC | Age: 69
End: 2024-04-03
Payer: MEDICARE

## 2024-04-03 VITALS — HEIGHT: 71 IN | BODY MASS INDEX: 42.7 KG/M2 | OXYGEN SATURATION: 96 % | WEIGHT: 305 LBS | HEART RATE: 84 BPM

## 2024-04-03 DIAGNOSIS — E78.2 MIXED HYPERLIPIDEMIA: ICD-10-CM

## 2024-04-03 DIAGNOSIS — E11.29 TYPE 2 DIABETES MELLITUS WITH MICROALBUMINURIA, WITH LONG-TERM CURRENT USE OF INSULIN (HCC): Primary | ICD-10-CM

## 2024-04-03 DIAGNOSIS — Z79.4 TYPE 2 DIABETES MELLITUS WITH MICROALBUMINURIA, WITH LONG-TERM CURRENT USE OF INSULIN (HCC): Primary | ICD-10-CM

## 2024-04-03 DIAGNOSIS — R00.0 TACHYCARDIA: ICD-10-CM

## 2024-04-03 DIAGNOSIS — L03.115 CELLULITIS OF RIGHT LOWER EXTREMITY: ICD-10-CM

## 2024-04-03 DIAGNOSIS — R60.0 EDEMA OF BOTH LEGS: ICD-10-CM

## 2024-04-03 DIAGNOSIS — J30.1 ALLERGIC RHINITIS DUE TO POLLEN, UNSPECIFIED SEASONALITY: ICD-10-CM

## 2024-04-03 DIAGNOSIS — R80.1 PERSISTENT PROTEINURIA: ICD-10-CM

## 2024-04-03 DIAGNOSIS — R20.0 NUMBNESS OF LEFT FOOT: ICD-10-CM

## 2024-04-03 DIAGNOSIS — R80.9 TYPE 2 DIABETES MELLITUS WITH MICROALBUMINURIA, WITH LONG-TERM CURRENT USE OF INSULIN (HCC): Primary | ICD-10-CM

## 2024-04-03 DIAGNOSIS — I10 ESSENTIAL HYPERTENSION: ICD-10-CM

## 2024-04-03 DIAGNOSIS — Z13.0 SCREENING FOR DEFICIENCY ANEMIA: ICD-10-CM

## 2024-04-03 PROCEDURE — 99214 OFFICE O/P EST MOD 30 MIN: CPT | Performed by: INTERNAL MEDICINE

## 2024-04-03 PROCEDURE — G2211 COMPLEX E/M VISIT ADD ON: HCPCS | Performed by: INTERNAL MEDICINE

## 2024-04-03 RX ORDER — POTASSIUM CHLORIDE 20 MEQ/1
20 TABLET, EXTENDED RELEASE ORAL DAILY
Qty: 90 TABLET | Refills: 1 | Status: SHIPPED | OUTPATIENT
Start: 2024-04-03

## 2024-04-03 RX ORDER — FUROSEMIDE 40 MG/1
40 TABLET ORAL DAILY
Qty: 90 TABLET | Refills: 1 | Status: SHIPPED | OUTPATIENT
Start: 2024-04-03

## 2024-04-03 RX ORDER — GABAPENTIN 100 MG/1
200 CAPSULE ORAL
Qty: 180 CAPSULE | Refills: 1 | Status: SHIPPED | OUTPATIENT
Start: 2024-04-03

## 2024-04-03 RX ORDER — ATORVASTATIN CALCIUM 40 MG/1
40 TABLET, FILM COATED ORAL DAILY
Qty: 90 TABLET | Refills: 1 | Status: SHIPPED | OUTPATIENT
Start: 2024-04-03

## 2024-04-03 RX ORDER — METOPROLOL SUCCINATE 100 MG/1
100 TABLET, EXTENDED RELEASE ORAL DAILY
Qty: 90 TABLET | Refills: 1 | Status: SHIPPED | OUTPATIENT
Start: 2024-04-03

## 2024-04-03 RX ORDER — AZELASTINE HYDROCHLORIDE 137 UG/1
SPRAY, METERED NASAL
Qty: 30 ML | Refills: 1 | Status: SHIPPED | OUTPATIENT
Start: 2024-04-03

## 2024-04-03 RX ORDER — CEPHALEXIN 500 MG/1
500 CAPSULE ORAL EVERY 8 HOURS SCHEDULED
Qty: 21 CAPSULE | Refills: 0 | Status: SHIPPED | OUTPATIENT
Start: 2024-04-03 | End: 2024-04-10

## 2024-04-03 NOTE — ASSESSMENT & PLAN NOTE
Patient complains of itching on the right side with some open area surrounding redness mid leg suspected cellulitis    Will treat with Keflex 503 times a day

## 2024-04-03 NOTE — ASSESSMENT & PLAN NOTE
Blood pressure very well-controlled asymptomatic agree and continue management medication as follows    Amlodipine 10 mg daily    Jardiance 25 mg daily    Lasix 40 mg daily    Metoprolol  mg daily

## 2024-04-03 NOTE — ASSESSMENT & PLAN NOTE
Lab Results   Component Value Date    HGBA1C 8.8 (H) 12/22/2023   Diabetes not controlled  Advised GLP-1 agonist patient reluctant  Continue agree with current management as follows  Metformin 1000 mg twice a day  Humulin and 70 3020 needs twice a day  Jardiance 10 mg daily  Diabetic diet    Level reviewed advised diabetic diet and exercise to lose weight yearly dilated eye exam    Foot exam done normal    Advised to increase Humulin 70/30 was 20 units twice a day advised to increase 20 to twice a day repeat A1c before next visit

## 2024-04-03 NOTE — ASSESSMENT & PLAN NOTE
Persistent proteinuria    Advised ACE patient reluctant    Will check urine microalbumin protein before next visit and monitor closely

## 2024-04-03 NOTE — PROGRESS NOTES
Dr. Glass's Office Visit Note  24     Nadeem Purdy  68 y.o. male MRN: [id number]  : 1955    Assessment:     1. Type 2 diabetes mellitus with microalbuminuria, with long-term current use of insulin ([facility])  Assessment & Plan:    Lab Results   Component Value Date    HGBA1C 8.8 (H) 2023   Diabetes not controlled  Advised GLP-1 agonist patient reluctant  Continue agree with current management as follows  Metformin 1000 mg twice a day  Humulin and 70 3020 needs twice a day  Jardiance 10 mg daily  Diabetic diet    Level reviewed advised diabetic diet and exercise to lose weight yearly dilated eye exam    Foot exam done normal    Advised to increase Humulin 70/30 was 20 units twice a day advised to increase 20 to twice a day repeat A1c before next visit    Orders:  -     Empagliflozin 25 MG TABS; Take 1 tablet (25 mg total) by mouth daily  -     Comprehensive metabolic panel; Future  -     Hemoglobin A1C; Future  -     Albumin / creatinine urine ratio; Future  -     Urinalysis with microscopic; Future    2. Numbness of left foot  Assessment & Plan:  Very minimal stable unchanged    Control blood sugar no further intervention needed    Orders:  -     gabapentin (Neurontin) 100 mg capsule; Take 2 capsules (200 mg total) by mouth daily at bedtime    3. Edema of both legs  Assessment & Plan:  Much improved    Agree and continue with a low-salt diet fluid restriction Lasix 40 mg daily    Orders:  -     potassium chloride (Klor-Con M20) 20 mEq tablet; Take 1 tablet (20 mEq total) by mouth daily  -     furosemide (LASIX) 40 mg tablet; Take 1 tablet (40 mg total) by mouth daily  -     Empagliflozin 25 MG TABS; Take 1 tablet (25 mg total) by mouth daily    4. Essential hypertension  Assessment & Plan:  Blood pressure very well-controlled asymptomatic agree and continue management medication as follows    Amlodipine 10 mg daily    Jardiance 25 mg daily    Lasix 40 mg daily    Metoprolol  mg  daily    Orders:  -     atorvastatin (LIPITOR) 40 mg tablet; Take 1 tablet (40 mg total) by mouth daily  -     metoprolol succinate (TOPROL-XL) 100 mg 24 hr tablet; Take 1 tablet (100 mg total) by mouth daily  -     Comprehensive metabolic panel; Future    5. Tachycardia  Assessment & Plan:  Now much improved and resolved continue metoprolol    Orders:  -     metoprolol succinate (TOPROL-XL) 100 mg 24 hr tablet; Take 1 tablet (100 mg total) by mouth daily    6. Allergic rhinitis due to pollen, unspecified seasonality  -     Azelastine HCl 137 MCG/SPRAY SOLN; 1 puff each nostril twice a day    7. Persistent proteinuria  Assessment & Plan:  Persistent proteinuria    Advised ACE patient reluctant    Will check urine microalbumin protein before next visit and monitor closely    Orders:  -     Empagliflozin 25 MG TABS; Take 1 tablet (25 mg total) by mouth daily  -     Comprehensive metabolic panel; Future  -     Urinalysis with microscopic; Future    8. Screening for deficiency anemia  -     CBC and differential; Future    9. Mixed hyperlipidemia  -     Lipid Panel with Direct LDL reflex; Future    10. Cellulitis of right lower extremity  Assessment & Plan:  Patient complains of itching on the right side with some open area surrounding redness mid leg suspected cellulitis    Will treat with Keflex 503 times a day    Orders:  -     cephalexin (KEFLEX) 500 mg capsule; Take 1 capsule (500 mg total) by mouth every 8 (eight) hours for 7 days          Discussion Summary and Plan:  Today's care plan and medications were reviewed with patient in detail and all their questions answered to their satisfaction.    No chief complaint on file.     Subjective:  For follow-up chronic medical conditions seen by dermatology was advised compression stocking patient's red blotches possibly due to the fluid overload as per dermatology blood sugar still remains uncontrolled patient complained of some itching right leg causing some open area  followed by redness and swelling and also claims been drinking excessive water so was advised fluid restriction for the treatment for bilateral leg edema no chest pain no difficulty breathing complete blood work was given before the next visit for detail refer to assessment plan under visit diagnosis      Diabetic Foot Exam    Diabetic Foot Exam  The following portions of the patient's history were reviewed and updated as appropriate: allergies, current medications, past family history, past medical history, past social history, past surgical history and problem list.    Review of Systems   Constitutional:  Positive for activity change. Negative for appetite change, chills, diaphoresis, fatigue, fever and unexpected weight change.   HENT:  Negative for congestion, dental problem, drooling, ear discharge, ear pain, facial swelling, hearing loss, mouth sores, nosebleeds, postnasal drip, rhinorrhea, sinus pressure, sneezing, sore throat, tinnitus, trouble swallowing and voice change.    Eyes:  Negative for photophobia, pain, discharge, redness, itching and visual disturbance.   Respiratory:  Negative for apnea, cough, choking, chest tightness, shortness of breath, wheezing and stridor.    Cardiovascular:  Positive for leg swelling. Negative for chest pain and palpitations.   Gastrointestinal:  Negative for abdominal distention, abdominal pain, anal bleeding, blood in stool, constipation, diarrhea, nausea, rectal pain and vomiting.   Endocrine: Negative for cold intolerance, heat intolerance, polydipsia, polyphagia and polyuria.   Genitourinary:  Negative for decreased urine volume, difficulty urinating, dysuria, enuresis, flank pain, frequency, genital sores, hematuria and urgency.   Musculoskeletal:  Positive for arthralgias. Negative for back pain, gait problem, joint swelling, myalgias, neck pain and neck stiffness.   Skin:  Negative for color change, pallor, rash and wound.   Allergic/Immunologic: Negative.   Negative for environmental allergies, food allergies and immunocompromised state.   Neurological:  Negative for dizziness, tremors, seizures, syncope, facial asymmetry, speech difficulty, weakness, light-headedness, numbness and headaches.   Psychiatric/Behavioral:  Negative for agitation, behavioral problems, confusion, decreased concentration, dysphoric mood, hallucinations, self-injury, sleep disturbance and suicidal ideas. The patient is not nervous/anxious and is not hyperactive.          Historical Information   Patient Active Problem List   Diagnosis   • Type 2 diabetes mellitus with microalbuminuria, with long-term current use of insulin ([facility])   • Essential hypertension   • Hypercholesteremia   • Class 3 severe obesity due to excess calories with serious comorbidity and body mass index (BMI) of 40.0 to 44.9 in adult ([facility])   • Steatohepatitis   • Allergic rhinitis   • Vitamin D deficiency   • Persistent proteinuria   • Tachycardia   • Stage 3 chronic kidney disease, unspecified whether stage 3a or 3b CKD ([facility])   • Edema of both legs   • Rash   • Numbness of left foot   • Cortical age-related cataract of both eyes   • Type 2 diabetes mellitus with hyperglycemia, with long-term current use of insulin ([facility])   • Cellulitis of right lower extremity     Past Medical History:   Diagnosis Date   • Hypertension      History reviewed. No pertinent surgical history.  Social History     Substance and Sexual Activity   Alcohol Use Yes   • Alcohol/week: 4.0 standard drinks of alcohol   • Types: 4 Shots of liquor per week     Social History     Substance and Sexual Activity   Drug Use Never     Social History     Tobacco Use   Smoking Status Former   Smokeless Tobacco Never     History reviewed. No pertinent family history.  Health Maintenance Due   Topic   • Pneumococcal Vaccine: 65+ Years (1 of 2 - PCV)   • Colorectal Cancer Screening    • Zoster Vaccine (1 of 2)   • PT PLAN OF CARE    • Influenza Vaccine (1)   • COVID-19  Vaccine (1 - 2023-24 season)   • Diabetic Foot Exam    • HEMOGLOBIN A1C       Meds/Allergies       Current Outpatient Medications:   •  amLODIPine (NORVASC) 10 mg tablet, TAKE 1 TABLET BY MOUTH EVERY DAY, Disp: 90 tablet, Rfl: 1  •  aspirin (ECOTRIN LOW STRENGTH) 81 mg EC tablet, Take 81 mg by mouth daily, Disp: , Rfl:   •  atorvastatin (LIPITOR) 40 mg tablet, Take 1 tablet (40 mg total) by mouth daily, Disp: 90 tablet, Rfl: 1  •  Azelastine HCl 137 MCG/SPRAY SOLN, 1 puff each nostril twice a day, Disp: 30 mL, Rfl: 1  •  BD Pen Needle Katelyn U/F 32G X 4 MM MISC, INJECT AS DIRECTED 2 (TWO) TIMES A DAY, Disp: 100 each, Rfl: 4  •  cephalexin (KEFLEX) 500 mg capsule, Take 1 capsule (500 mg total) by mouth every 8 (eight) hours for 7 days, Disp: 21 capsule, Rfl: 0  •  Cholecalciferol (VITAMIN D3) 2000 units TABS, Take 2,000 Units by mouth daily, Disp: , Rfl:   •  Empagliflozin 25 MG TABS, Take 1 tablet (25 mg total) by mouth daily, Disp: 30 tablet, Rfl: 5  •  furosemide (LASIX) 40 mg tablet, Take 1 tablet (40 mg total) by mouth daily, Disp: 90 tablet, Rfl: 1  •  gabapentin (Neurontin) 100 mg capsule, Take 2 capsules (200 mg total) by mouth daily at bedtime, Disp: 180 capsule, Rfl: 1  •  Glucose Blood (ONETOUCH ULTRA BLUE VI), 1 Units by In Vitro route 3 (three) times a day, Disp: , Rfl:   •  glucose blood (OneTouch Ultra) test strip, Test 3 times daily, Disp: 300 each, Rfl: 5  •  insulin isophane-insulin regular (HumuLIN 70/30 KwikPen) 100 units/mL injection pen, Inject 20 Units under the skin 2 (two) times a day, Disp: 15 mL, Rfl: 5  •  Krill Oil 1000 MG CAPS, Take 1 capsule by mouth daily, Disp: , Rfl:   •  losartan (COZAAR) 100 MG tablet, Take 1 tablet (100 mg total) by mouth daily, Disp: 30 tablet, Rfl: 5  •  metFORMIN (GLUCOPHAGE) 1000 MG tablet, Take 1 tablet (1,000 mg total) by mouth 2 (two) times a day, Disp: 60 tablet, Rfl: 5  •  metoprolol succinate (TOPROL-XL) 100 mg 24 hr tablet, Take 1 tablet (100 mg total) by  "mouth daily, Disp: 90 tablet, Rfl: 1  •  potassium chloride (Klor-Con M20) 20 mEq tablet, Take 1 tablet (20 mEq total) by mouth daily, Disp: 90 tablet, Rfl: 1  •  vitamin E, tocopherol, 400 units capsule, Take 400 Units by mouth daily, Disp: , Rfl:       Objective:    Vitals:   Pulse 84   Ht 5' 11\" (1.803 m)   Wt (!) 138 kg (305 lb)   SpO2 96%   BMI 42.54 kg/m²   Body mass index is 42.54 kg/m².  Vitals:    04/03/24 1043   Weight: (!) 138 kg (305 lb)       Physical Exam  Vitals and nursing note reviewed.   Constitutional:       General: He is not in acute distress.     Appearance: He is well-developed. He is obese. He is not ill-appearing, toxic-appearing or diaphoretic.   HENT:      Head: Normocephalic and atraumatic.      Right Ear: External ear normal.      Left Ear: External ear normal.      Nose: Nose normal.      Mouth/Throat:      Pharynx: No oropharyngeal exudate.   Eyes:      General: Lids are normal. Lids are everted, no foreign bodies appreciated. No scleral icterus.        Right eye: No discharge.         Left eye: No discharge.      Conjunctiva/sclera: Conjunctivae normal.      Pupils: Pupils are equal, round, and reactive to light.   Neck:      Thyroid: No thyromegaly.      Vascular: Normal carotid pulses. No carotid bruit, hepatojugular reflux or JVD.      Trachea: No tracheal tenderness or tracheal deviation.   Cardiovascular:      Rate and Rhythm: Normal rate and regular rhythm.      Pulses: Normal pulses.      Heart sounds: Murmur heard.      No friction rub. No gallop.   Pulmonary:      Effort: Pulmonary effort is normal. No respiratory distress.      Breath sounds: Normal breath sounds. No stridor. No wheezing or rales.   Chest:      Chest wall: No tenderness.   Abdominal:      General: Bowel sounds are normal. There is no distension.      Palpations: Abdomen is soft. There is no mass.      Tenderness: There is no abdominal tenderness. There is no guarding or rebound.   Musculoskeletal:        "  General: No tenderness or deformity. Normal range of motion.      Cervical back: Normal range of motion and neck supple. No edema, erythema or rigidity. No spinous process tenderness or muscular tenderness. Normal range of motion.      Right lower leg: Edema present.      Left lower leg: Edema present.      Comments: Redness right mid leg   Lymphadenopathy:      Head:      Right side of head: No submental, submandibular, tonsillar, preauricular or posterior auricular adenopathy.      Left side of head: No submental, submandibular, tonsillar, preauricular, posterior auricular or occipital adenopathy.      Cervical: No cervical adenopathy.      Right cervical: No superficial, deep or posterior cervical adenopathy.     Left cervical: No superficial, deep or posterior cervical adenopathy.      Upper Body:      Right upper body: No pectoral adenopathy.      Left upper body: No pectoral adenopathy.   Skin:     General: Skin is warm and dry.      Coloration: Skin is not pale.      Findings: No erythema or rash.   Neurological:      General: No focal deficit present.      Mental Status: He is alert and oriented to person, place, and time.      Cranial Nerves: No cranial nerve deficit.      Sensory: No sensory deficit.      Motor: No tremor, abnormal muscle tone or seizure activity.      Coordination: Coordination normal.      Gait: Gait normal.      Deep Tendon Reflexes: Reflexes are normal and symmetric. Reflexes normal.   Psychiatric:         Behavior: Behavior normal.         Thought Content: Thought content normal.         Judgment: Judgment normal.         Lab Review   No visits with results within 2 Month(s) from this visit.   Latest known visit with results is:   Lab on 12/22/2023   Component Date Value Ref Range Status   • Sodium 12/22/2023 138  135 - 147 mmol/L Final   • Potassium 12/22/2023 4.0  3.5 - 5.3 mmol/L Final   • Chloride 12/22/2023 97  96 - 108 mmol/L Final   • CO2 12/22/2023 30  21 - 32 mmol/L Final    • ANION GAP 12/22/2023 11  mmol/L Final   • BUN 12/22/2023 29 (H)  5 - 25 mg/dL Final   • Creatinine 12/22/2023 1.31 (H)  0.60 - 1.30 mg/dL Final    Standardized to IDMS reference method   • Glucose, Fasting 12/22/2023 93  65 - 99 mg/dL Final   • Calcium 12/22/2023 9.5  8.4 - 10.2 mg/dL Final   • AST 12/22/2023 17  13 - 39 U/L Final   • ALT 12/22/2023 19  7 - 52 U/L Final    Specimen collection should occur prior to Sulfasalazine administration due to the potential for falsely depressed results.    • Alkaline Phosphatase 12/22/2023 49  34 - 104 U/L Final   • Total Protein 12/22/2023 7.9  6.4 - 8.4 g/dL Final   • Albumin 12/22/2023 4.3  3.5 - 5.0 g/dL Final   • Total Bilirubin 12/22/2023 0.53  0.20 - 1.00 mg/dL Final    Use of this assay is not recommended for patients undergoing treatment with eltrombopag due to the potential for falsely elevated results.  N-acetyl-p-benzoquinone imine (metabolite of Acetaminophen) will generate erroneously low results in samples for patients that have taken an overdose of Acetaminophen.   • eGFR 12/22/2023 55  ml/min/1.73sq m Final   • Hemoglobin A1C 12/22/2023 8.8 (H)  Normal 4.0-5.6%; PreDiabetic 5.7-6.4%; Diabetic >=6.5%; Glycemic control for adults with diabetes <7.0% % Final   • EAG 12/22/2023 206  mg/dl Final   • Cholesterol 12/22/2023 172  See Comment mg/dL Final    Cholesterol:         Pediatric <18 Years        Desirable          <170 mg/dL      Borderline High    170-199 mg/dL      High               >=200 mg/dL        Adult >=18 Years            Desirable         <200 mg/dL      Borderline High   200-239 mg/dL      High              >239 mg/dL     • Triglycerides 12/22/2023 212 (H)  See Comment mg/dL Final    Triglyceride:     0-9Y            <75mg/dL     10Y-17Y         <90 mg/dL       >=18Y     Normal          <150 mg/dL     Borderline High 150-199 mg/dL     High            200-499 mg/dL        Very High       >499 mg/dL    Specimen collection should occur prior to  "Metamizole administration due to the potential for falsely depressed results.   • HDL, Direct 12/22/2023 51  >=40 mg/dL Final   • LDL Calculated 12/22/2023 79  0 - 100 mg/dL Final    LDL Cholesterol:     Optimal           <100 mg/dl     Near Optimal      100-129 mg/dl     Above Optimal       Borderline High 130-159 mg/dl       High            160-189 mg/dl       Very High       >189 mg/dl         This screening LDL is a calculated result.   It does not have the accuracy of the Direct Measured LDL in the monitoring of patients with hyperlipidemia and/or statin therapy.   Direct Measure LDL (CFS036) must be ordered separately in these patients.   • Non-HDL-Chol (CHOL-HDL) 12/22/2023 121  mg/dl Final   • WBC 12/22/2023 8.69  4.31 - 10.16 Thousand/uL Final   • RBC 12/22/2023 4.93  3.88 - 5.62 Million/uL Final   • Hemoglobin 12/22/2023 15.0  12.0 - 17.0 g/dL Final   • Hematocrit 12/22/2023 45.9  36.5 - 49.3 % Final   • MCV 12/22/2023 93  82 - 98 fL Final   • MCH 12/22/2023 30.4  26.8 - 34.3 pg Final   • MCHC 12/22/2023 32.7  31.4 - 37.4 g/dL Final   • RDW 12/22/2023 13.2  11.6 - 15.1 % Final   • Platelets 12/22/2023 225  149 - 390 Thousands/uL Final   • MPV 12/22/2023 8.7 (L)  8.9 - 12.7 fL Final   • PSA 12/22/2023 3.13  0.00 - 4.00 ng/mL Final    Lise Shane Access chemiluminescent immunoassay. Confirm baseline values for patients being serially monitored.          Patient Instructions   Diabetes    Check your fingerstick Accu-Chek once a day.    Please check your fingerstick Accu-Chek different time of the day either at 7:00 a.m. or 11:00 a.m. for for p.m. 4 9:00 p.m..    Follow Diabetic diet for diabetes as advised.    If you would sugar less than 80 or more than 300 to please call us on next visit is day.    If the sugar is less than 80 follow-up hypoglycemia instructions as advised.    Take your diabetic medicine as advised.            Jluis Glass MD        \"This note has been constructed using a voice " "recognition system.Therefore there may be syntax, spelling, and/or grammatical errors. Please call if you have any questions. \"  "

## 2024-04-03 NOTE — PATIENT INSTRUCTIONS
Diabetes    Check your fingerstick Accu-Chek once a day.    Please check your fingerstick Accu-Chek different time of the day either at 7:00 a.m. or 11:00 a.m. for for p.m. 4 9:00 p.m..    Follow Diabetic diet for diabetes as advised.    If you would sugar less than 80 or more than 300 to please call us on next visit is day.    If the sugar is less than 80 follow-up hypoglycemia instructions as advised.    Take your diabetic medicine as advised.

## 2024-04-15 DIAGNOSIS — E10.9 TYPE 1 DIABETES MELLITUS WITHOUT COMPLICATION (HCC): ICD-10-CM

## 2024-04-15 RX ORDER — PEN NEEDLE, DIABETIC 32GX 5/32"
NEEDLE, DISPOSABLE MISCELLANEOUS
Qty: 100 EACH | Refills: 2 | Status: SHIPPED | OUTPATIENT
Start: 2024-04-15

## 2024-04-18 NOTE — TELEPHONE ENCOUNTER
Patient called in stating he needed refill for Metformin. I informed him that he should have refills at the pharmacy and directed him to give them a call. He will call back if he has any issues.

## 2024-05-10 DIAGNOSIS — R80.9 TYPE 2 DIABETES MELLITUS WITH MICROALBUMINURIA, WITH LONG-TERM CURRENT USE OF INSULIN (HCC): ICD-10-CM

## 2024-05-10 DIAGNOSIS — Z79.4 TYPE 2 DIABETES MELLITUS WITH MICROALBUMINURIA, WITH LONG-TERM CURRENT USE OF INSULIN (HCC): ICD-10-CM

## 2024-05-10 DIAGNOSIS — E11.29 TYPE 2 DIABETES MELLITUS WITH MICROALBUMINURIA, WITH LONG-TERM CURRENT USE OF INSULIN (HCC): ICD-10-CM

## 2024-05-29 ENCOUNTER — OFFICE VISIT (OUTPATIENT)
Dept: OBGYN CLINIC | Facility: CLINIC | Age: 69
End: 2024-05-29
Payer: MEDICARE

## 2024-05-29 VITALS
TEMPERATURE: 99.4 F | HEART RATE: 78 BPM | BODY MASS INDEX: 43.15 KG/M2 | HEIGHT: 71 IN | DIASTOLIC BLOOD PRESSURE: 80 MMHG | SYSTOLIC BLOOD PRESSURE: 157 MMHG | WEIGHT: 308.2 LBS

## 2024-05-29 DIAGNOSIS — S83.206A MCMURRAY TEST POSITIVE, RIGHT, INITIAL ENCOUNTER: ICD-10-CM

## 2024-05-29 DIAGNOSIS — M25.561 ACUTE PAIN OF RIGHT KNEE: ICD-10-CM

## 2024-05-29 DIAGNOSIS — L03.115 CELLULITIS OF LEG, RIGHT: ICD-10-CM

## 2024-05-29 DIAGNOSIS — S89.91XA KNEE INJURY, RIGHT, INITIAL ENCOUNTER: Primary | ICD-10-CM

## 2024-05-29 PROCEDURE — 99214 OFFICE O/P EST MOD 30 MIN: CPT | Performed by: FAMILY MEDICINE

## 2024-05-29 RX ORDER — CEPHALEXIN 500 MG/1
500 CAPSULE ORAL EVERY 6 HOURS SCHEDULED
Qty: 40 CAPSULE | Refills: 0 | Status: SHIPPED | OUTPATIENT
Start: 2024-05-29 | End: 2024-06-08

## 2024-05-29 NOTE — PROGRESS NOTES
[facility] [address] [place]  [facility] [id number] [phone]      Assessment:  1. Knee injury, right, initial encounter  -     MRI knee right  wo contrast; Future; Expected date: 05/29/2024  2. Acute pain of right knee  -     XR knee 3 vw right non injury; Future; Expected date: 05/29/2024  3. Cellulitis of leg, right  -     cephalexin (KEFLEX) 500 mg capsule; Take 1 capsule (500 mg total) by mouth every 6 (six) hours for 10 days  4. Terrence test positive, right, initial encounter  -     MRI knee right  wo contrast; Future; Expected date: 05/29/2024      Plan:  Patient Instructions   F/u after MRI  MRI R knee- R knee pain/injury/effusion/pos terrence/XR shows OA/r/o meniscal tear.  Begin Keflex  Go to ER if redness spreading or fevers.  Icing/heat/OTC pain meds as needed.     Return for f/u after MRI R knee, Recheck.    Chief Complaint:  Chief Complaint   Patient presents with    Right Knee - Pain       Subjective:   HPI    Patient ID: Nadeem Purdy Jr. is a 68 y.o. male     Here c/o  R knee pain  Pain started about 2 wks ago- he got off the 4 [address]ompson wrong and twisted his R knee.  No swelling  Feels like it is giving out/locking  Less pain currently  Better at rest.  Pain with walking- using cane.  OTC pain pill- helps  Sharp at times- almost falls down  Dull achey pain.  Hx of torn cartilage    He also scratched his leg 2 days ago with his foot .  Redness started yesterday- itches    A1c 12/23- 8.8.  this morning .    Review of Systems   Constitutional:  Negative for fatigue and fever.   Respiratory:  Negative for shortness of breath.    Cardiovascular:  Negative for chest pain.   Gastrointestinal:  Negative for abdominal pain and nausea.   Genitourinary:  Negative for dysuria.   Musculoskeletal:  Positive for arthralgias.   Skin:  Negative for rash and wound.   Neurological:  Negative for weakness and headaches.       Objective:  Vitals:  /80  "(BP Location: Left arm, Patient Position: Sitting, Cuff Size: Large)   Pulse 78   Temp 99.4 °F (37.4 °C) (Tympanic)   Ht 5' 11\" (1.803 m)   Wt (!) 140 kg (308 lb 3.2 oz)   BMI 42.99 kg/m²     The following portions of the patient's history were reviewed and updated as appropriate: allergies, current medications, past family history, past medical history, past social history, past surgical history, and problem list.    Physical exam:  Physical Exam  Constitutional:       Appearance: Normal appearance. He is normal weight.   Eyes:      Extraocular Movements: Extraocular movements intact.   Pulmonary:      Effort: Pulmonary effort is normal.   Musculoskeletal:      Cervical back: Normal range of motion.      Right knee: Effusion present.      Instability Tests: Medial Ramírez test positive and lateral Ramírez test positive.   Skin:     General: Skin is warm and dry.      Findings: Lesion and rash present.   Neurological:      General: No focal deficit present.      Mental Status: He is alert and oriented to person, place, and time. Mental status is at baseline.   Psychiatric:         Mood and Affect: Mood normal.         Behavior: Behavior normal.         Thought Content: Thought content normal.         Judgment: Judgment normal.       Right Knee Exam     Tenderness   The patient is experiencing tenderness in the lateral joint line.    Range of Motion   Extension:  normal   Flexion:  abnormal     Tests   Ramírez:  Medial - positive Lateral - positive  Varus: negative Valgus: negative    Other   Swelling: moderate  Effusion: effusion present    Comments:  R LE multiple scratches/abrasions/erythema,edema          Observations     Right Knee   Positive for effusion.       I have personally reviewed pertinent films in PACS and my interpretation is XR R knee-  medial joint space narrowing. Mod OA. No fx.      Renaldo Oneal MD   "

## 2024-05-29 NOTE — PATIENT INSTRUCTIONS
F/u after MRI  MRI R knee- R knee pain/injury/effusion/pos terrence/XR shows OA/r/o meniscal tear.  Begin Keflex  Go to ER if redness spreading or fevers.  Icing/heat/OTC pain meds as needed.

## 2024-06-05 ENCOUNTER — HOSPITAL ENCOUNTER (OUTPATIENT)
Dept: MRI IMAGING | Facility: HOSPITAL | Age: 69
Discharge: HOME | End: 2024-06-05
Attending: FAMILY MEDICINE
Payer: MEDICARE

## 2024-06-05 DIAGNOSIS — S89.91XA KNEE INJURY, RIGHT, INITIAL ENCOUNTER: ICD-10-CM

## 2024-06-05 DIAGNOSIS — S83.206A MCMURRAY TEST POSITIVE, RIGHT, INITIAL ENCOUNTER: ICD-10-CM

## 2024-06-05 PROCEDURE — 73721 MRI JNT OF LWR EXTRE W/O DYE: CPT

## 2024-06-12 ENCOUNTER — OFFICE VISIT (OUTPATIENT)
Dept: OBGYN CLINIC | Facility: CLINIC | Age: 69
End: 2024-06-12
Payer: MEDICARE

## 2024-06-12 VITALS
HEIGHT: 71 IN | WEIGHT: 311.4 LBS | DIASTOLIC BLOOD PRESSURE: 83 MMHG | TEMPERATURE: 99.3 F | SYSTOLIC BLOOD PRESSURE: 156 MMHG | HEART RATE: 80 BPM | BODY MASS INDEX: 43.6 KG/M2

## 2024-06-12 DIAGNOSIS — S89.91XD RIGHT KNEE INJURY, SUBSEQUENT ENCOUNTER: Primary | ICD-10-CM

## 2024-06-12 DIAGNOSIS — M17.11 PRIMARY OSTEOARTHRITIS OF ONE KNEE, RIGHT: ICD-10-CM

## 2024-06-12 DIAGNOSIS — M94.9 OSTEOCHONDRAL LESION: ICD-10-CM

## 2024-06-12 DIAGNOSIS — I89.0 LYMPHEDEMA OF RIGHT LOWER EXTREMITY: ICD-10-CM

## 2024-06-12 DIAGNOSIS — S83.241A OTHER TEAR OF MEDIAL MENISCUS, CURRENT INJURY, RIGHT KNEE, INITIAL ENCOUNTER: ICD-10-CM

## 2024-06-12 DIAGNOSIS — M89.9 OSTEOCHONDRAL LESION: ICD-10-CM

## 2024-06-12 PROCEDURE — 99214 OFFICE O/P EST MOD 30 MIN: CPT | Performed by: FAMILY MEDICINE

## 2024-06-12 NOTE — PATIENT INSTRUCTIONS
F/u here as needed  F/u with PCP for DM care  Referral to ortho  Referral to Lymphedema clinic  Cane as needed  Icing/heat/OTC pain meds as needed.

## 2024-06-12 NOTE — PROGRESS NOTES
[facility] [address] [place] [address] [phone]      Assessment:  1. Right knee injury, subsequent encounter  -     Brace  -     Ambulatory Referral to Orthopedic Surgery; Future  2. Lymphedema of right lower extremity  -     Ambulatory Referral to Physical Therapy; Future  -     Ambulatory Referral to Orthopedic Surgery; Future  3. Osteochondral lesion  -     Ambulatory Referral to Orthopedic Surgery; Future  4. Other tear of medial meniscus, current injury, right knee, initial encounter  -     Ambulatory Referral to Orthopedic Surgery; Future  5. Primary osteoarthritis of one knee, right  -     Ambulatory Referral to Orthopedic Surgery; Future      Plan:  Patient Instructions   F/u here as needed  F/u with PCP for DM care  Referral to ortho  Referral to Lymphedema clinic  Cane as needed  Icing/heat/OTC pain meds as needed.     Return for ortho referral, Recheck.    Chief Complaint:  Chief Complaint   Patient presents with    Right Knee - Follow-up     Follow up to MRI       Subjective:   HPI    Patient ID: Nadeem Purdy Jr. is a 68 y.o. male     Here for f/u  R knee pain/MRI  Having less pain/dull pain  No swelling  Feels like it is giving out/locking at times but less  Better at rest.  Pain with walking- using cane.  ASA PRN helps  Sharp at times- almost falls down but less  Better with cane     A1c 12/23- 8.8.  this morning       Narrative & Impression   MRI RIGHT KNEE     INDICATION:   S89.91XA: Unspecified injury of right lower leg, initial encounter  S83.206A: Unspecified tear of unspecified meniscus, current injury, right knee, initial encounter. Patient presents with right knee pain onset approximately 2 weeks ago following twisting injury.     COMPARISON: Right knee radiograph 5/29/2024     TECHNIQUE: Multiplanar/multisequence MR of the right knee was performed.  Imaging performed on 1.5T MRI     FINDINGS:     SUBCUTANEOUS  TISSUES: There is diffuse subcutaneous edema.     JOINT EFFUSION: There is a moderate joint effusion. 8 mm T1 and T2 hypointense body overlying the anterior knee joint (series 4 image 27).     BAKER'S CYST: None.     MENISCI: There is a complex tear of the body and posterior horn of the medial meniscus. There is associated extrusion of the medial meniscus medially.     Lateral meniscus is intact.     CRUCIATE LIGAMENTS: There is mucoid degeneration of the anterior cruciate ligament (ACL). Posterior crucial ligament appears thickened with intermediate T2 and proton-density signal near the femoral attachment.     EXTENSOR APPARATUS: Intact.     COLLATERAL LIGAMENTS: Intact.     ARTICULAR SURFACES:  Medial compartment: Moderate osteoarthritis evidenced by marginal osteophytes and partial-thickness articular cartilage loss.  Lateral compartment: Mild osteoarthritis. There is a 6 mm osteochondral lesion within the anterior aspect of the lateral femoral condyle without evidence of instability.  Patellofemoral compartment: Mild osteoarthritis.     BONES: There is subchondral cystic changes adjacent to the intercondylar eminence. Incidentally noted medial bipartite patella.     MUSCULATURE:  Intact.     IMPRESSION:     1.  Complex tear of the body and posterior horn of the medial meniscus.. Associated extrusion of the medial meniscus medially.  2.  Mucoid degeneration of the anterior cruciate ligament. Thickening of and intermediate signal within the posterior cruciate ligament near the femoral attachment suspicious for tear.  3.  Tricompartmental osteoarthritis, most prominent in the medial compartment.  4.  Osteochondral lesion of the anterior aspect the lateral tibial plateau without evident instability.  5.  Moderate joint effusion. 8 mm intra-articular body overlying the anterior joint space.             Review of Systems   Constitutional:  Negative for fatigue and fever.   Respiratory:  Negative for shortness of  "breath.    Cardiovascular:  Negative for chest pain.   Gastrointestinal:  Negative for abdominal pain and nausea.   Genitourinary:  Negative for dysuria.   Musculoskeletal:  Positive for arthralgias.   Skin:  Negative for rash and wound.   Neurological:  Negative for weakness and headaches.       Objective:  Vitals:  /83 (BP Location: Left arm, Patient Position: Sitting, Cuff Size: Large)   Pulse 80   Temp 99.3 °F (37.4 °C) (Tympanic)   Ht 5' 11\" (1.803 m)   Wt (!) 141 kg (311 lb 6.4 oz)   BMI 43.43 kg/m²     The following portions of the patient's history were reviewed and updated as appropriate: allergies, current medications, past family history, past medical history, past social history, past surgical history, and problem list.    Physical exam:  Physical Exam  Constitutional:       Appearance: Normal appearance. He is normal weight.   Eyes:      Extraocular Movements: Extraocular movements intact.   Pulmonary:      Effort: Pulmonary effort is normal.   Musculoskeletal:         General: Tenderness present.      Cervical back: Normal range of motion.      Right knee:      Instability Tests: Medial Ramírez test positive. Lateral Ramírez test negative.   Skin:     General: Skin is warm and dry.   Neurological:      General: No focal deficit present.      Mental Status: He is alert and oriented to person, place, and time. Mental status is at baseline.   Psychiatric:         Mood and Affect: Mood normal.         Behavior: Behavior normal.         Thought Content: Thought content normal.         Judgment: Judgment normal.       Right Knee Exam     Tenderness   The patient is experiencing no tenderness.     Range of Motion   The patient has normal right knee ROM.  Extension:  normal     Tests   Ramírez:  Medial - positive Lateral - negative            I have personally reviewed pertinent films in PACS and my interpretation is MRI R knee- complex tear post medial meniscus, Tricompartmental OA. OCD lesion " intra articular body ant joint space.      Renaldo Oneal MD

## 2024-06-21 ENCOUNTER — OFFICE VISIT (OUTPATIENT)
Dept: OBGYN CLINIC | Facility: CLINIC | Age: 69
End: 2024-06-21
Payer: MEDICARE

## 2024-06-21 VITALS
BODY MASS INDEX: 42.7 KG/M2 | DIASTOLIC BLOOD PRESSURE: 80 MMHG | SYSTOLIC BLOOD PRESSURE: 145 MMHG | WEIGHT: 305 LBS | HEART RATE: 75 BPM | HEIGHT: 71 IN

## 2024-06-21 DIAGNOSIS — M17.11 PRIMARY OSTEOARTHRITIS OF ONE KNEE, RIGHT: Primary | ICD-10-CM

## 2024-06-21 DIAGNOSIS — M89.9 OSTEOCHONDRAL LESION: ICD-10-CM

## 2024-06-21 DIAGNOSIS — I89.0 LYMPHEDEMA OF RIGHT LOWER EXTREMITY: ICD-10-CM

## 2024-06-21 DIAGNOSIS — M94.9 OSTEOCHONDRAL LESION: ICD-10-CM

## 2024-06-21 DIAGNOSIS — S89.91XD RIGHT KNEE INJURY, SUBSEQUENT ENCOUNTER: ICD-10-CM

## 2024-06-21 DIAGNOSIS — S83.241A OTHER TEAR OF MEDIAL MENISCUS, CURRENT INJURY, RIGHT KNEE, INITIAL ENCOUNTER: ICD-10-CM

## 2024-06-21 PROCEDURE — 99214 OFFICE O/P EST MOD 30 MIN: CPT | Performed by: STUDENT IN AN ORGANIZED HEALTH CARE EDUCATION/TRAINING PROGRAM
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
## 2024-06-28 DIAGNOSIS — I10 ESSENTIAL HYPERTENSION: ICD-10-CM

## 2024-06-28 RX ORDER — AMLODIPINE BESYLATE 10 MG/1
10 TABLET ORAL DAILY
Qty: 90 TABLET | Refills: 1 | Status: SHIPPED | OUTPATIENT
Start: 2024-06-28

## 2024-07-03 ENCOUNTER — RA CDI HCC (OUTPATIENT)
Dept: OTHER | Facility: HOSPITAL | Age: 69
End: 2024-07-03

## 2024-07-03 NOTE — PROGRESS NOTES
HCC coding opportunities          Chart Reviewed number of suggestions sent to Provider: 3     Patients Insurance     Medicare Insurance: Medicare        E11.65  E11.22  E11.36

## 2024-07-06 DIAGNOSIS — E11.29 TYPE 2 DIABETES MELLITUS WITH MICROALBUMINURIA, WITH LONG-TERM CURRENT USE OF INSULIN (HCC): ICD-10-CM

## 2024-07-06 DIAGNOSIS — R80.9 TYPE 2 DIABETES MELLITUS WITH MICROALBUMINURIA, WITH LONG-TERM CURRENT USE OF INSULIN (HCC): ICD-10-CM

## 2024-07-06 DIAGNOSIS — Z79.4 TYPE 2 DIABETES MELLITUS WITH MICROALBUMINURIA, WITH LONG-TERM CURRENT USE OF INSULIN (HCC): ICD-10-CM

## 2024-07-08 ENCOUNTER — APPOINTMENT (OUTPATIENT)
Dept: LAB | Facility: CLINIC | Age: 69
End: 2024-07-08
Payer: MEDICARE

## 2024-07-08 DIAGNOSIS — R80.9 TYPE 2 DIABETES MELLITUS WITH MICROALBUMINURIA, WITH LONG-TERM CURRENT USE OF INSULIN (HCC): ICD-10-CM

## 2024-07-08 DIAGNOSIS — Z13.0 SCREENING FOR DEFICIENCY ANEMIA: ICD-10-CM

## 2024-07-08 DIAGNOSIS — I10 ESSENTIAL HYPERTENSION: ICD-10-CM

## 2024-07-08 DIAGNOSIS — R80.1 PERSISTENT PROTEINURIA: ICD-10-CM

## 2024-07-08 DIAGNOSIS — E78.2 MIXED HYPERLIPIDEMIA: ICD-10-CM

## 2024-07-08 DIAGNOSIS — E11.29 TYPE 2 DIABETES MELLITUS WITH MICROALBUMINURIA, WITH LONG-TERM CURRENT USE OF INSULIN (HCC): ICD-10-CM

## 2024-07-08 DIAGNOSIS — Z79.4 TYPE 2 DIABETES MELLITUS WITH MICROALBUMINURIA, WITH LONG-TERM CURRENT USE OF INSULIN (HCC): ICD-10-CM

## 2024-07-08 LAB
ALBUMIN SERPL BCG-MCNC: 3.6 G/DL (ref 3.5–5)
ALP SERPL-CCNC: 34 U/L (ref 34–104)
ALT SERPL W P-5'-P-CCNC: 33 U/L (ref 7–52)
ANION GAP SERPL CALCULATED.3IONS-SCNC: 13 MMOL/L (ref 4–13)
AST SERPL W P-5'-P-CCNC: 30 U/L (ref 13–39)
BACTERIA UR QL AUTO: ABNORMAL /HPF
BASOPHILS # BLD AUTO: 0.05 THOUSANDS/ÂΜL (ref 0–0.1)
BASOPHILS NFR BLD AUTO: 1 % (ref 0–1)
BILIRUB SERPL-MCNC: 0.42 MG/DL (ref 0.2–1)
BILIRUB UR QL STRIP: NEGATIVE
BUN SERPL-MCNC: 16 MG/DL (ref 5–25)
CALCIUM SERPL-MCNC: 9.4 MG/DL (ref 8.4–10.2)
CHLORIDE SERPL-SCNC: 100 MMOL/L (ref 96–108)
CHOLEST SERPL-MCNC: 165 MG/DL
CLARITY UR: CLEAR
CO2 SERPL-SCNC: 27 MMOL/L (ref 21–32)
COLOR UR: ABNORMAL
CREAT SERPL-MCNC: 1.21 MG/DL (ref 0.6–1.3)
CREAT UR-MCNC: 102.5 MG/DL
EOSINOPHIL # BLD AUTO: 0.44 THOUSAND/ÂΜL (ref 0–0.61)
EOSINOPHIL NFR BLD AUTO: 5 % (ref 0–6)
ERYTHROCYTE [DISTWIDTH] IN BLOOD BY AUTOMATED COUNT: 13.7 % (ref 11.6–15.1)
EST. AVERAGE GLUCOSE BLD GHB EST-MCNC: 174 MG/DL
GFR SERPL CREATININE-BSD FRML MDRD: 60 ML/MIN/1.73SQ M
GLUCOSE P FAST SERPL-MCNC: 110 MG/DL (ref 65–99)
GLUCOSE UR STRIP-MCNC: NEGATIVE MG/DL
HBA1C MFR BLD: 7.7 %
HCT VFR BLD AUTO: 43.8 % (ref 36.5–49.3)
HDLC SERPL-MCNC: 109 MG/DL
HGB BLD-MCNC: 14.5 G/DL (ref 12–17)
HGB UR QL STRIP.AUTO: NEGATIVE
IMM GRANULOCYTES # BLD AUTO: 0.05 THOUSAND/UL (ref 0–0.2)
IMM GRANULOCYTES NFR BLD AUTO: 1 % (ref 0–2)
KETONES UR STRIP-MCNC: NEGATIVE MG/DL
LDLC SERPL CALC-MCNC: 42 MG/DL (ref 0–100)
LEUKOCYTE ESTERASE UR QL STRIP: NEGATIVE
LYMPHOCYTES # BLD AUTO: 3.27 THOUSANDS/ÂΜL (ref 0.6–4.47)
LYMPHOCYTES NFR BLD AUTO: 39 % (ref 14–44)
MCH RBC QN AUTO: 30.9 PG (ref 26.8–34.3)
MCHC RBC AUTO-ENTMCNC: 33.1 G/DL (ref 31.4–37.4)
MCV RBC AUTO: 93 FL (ref 82–98)
MICROALBUMIN UR-MCNC: 59 MG/L
MICROALBUMIN/CREAT 24H UR: 58 MG/G CREATININE (ref 0–30)
MONOCYTES # BLD AUTO: 0.7 THOUSAND/ÂΜL (ref 0.17–1.22)
MONOCYTES NFR BLD AUTO: 8 % (ref 4–12)
NEUTROPHILS # BLD AUTO: 3.82 THOUSANDS/ÂΜL (ref 1.85–7.62)
NEUTS SEG NFR BLD AUTO: 46 % (ref 43–75)
NITRITE UR QL STRIP: NEGATIVE
NON-SQ EPI CELLS URNS QL MICRO: ABNORMAL /HPF
NRBC BLD AUTO-RTO: 0 /100 WBCS
PH UR STRIP.AUTO: 6 [PH]
PLATELET # BLD AUTO: 201 THOUSANDS/UL (ref 149–390)
PMV BLD AUTO: 8.9 FL (ref 8.9–12.7)
POTASSIUM SERPL-SCNC: 4.3 MMOL/L (ref 3.5–5.3)
PROT SERPL-MCNC: 6.8 G/DL (ref 6.4–8.4)
PROT UR STRIP-MCNC: ABNORMAL MG/DL
RBC # BLD AUTO: 4.7 MILLION/UL (ref 3.88–5.62)
RBC #/AREA URNS AUTO: ABNORMAL /HPF
SODIUM SERPL-SCNC: 140 MMOL/L (ref 135–147)
SP GR UR STRIP.AUTO: 1.01 (ref 1–1.03)
TRIGL SERPL-MCNC: 68 MG/DL
UROBILINOGEN UR STRIP-ACNC: <2 MG/DL
WBC # BLD AUTO: 8.33 THOUSAND/UL (ref 4.31–10.16)
WBC #/AREA URNS AUTO: ABNORMAL /HPF

## 2024-07-08 PROCEDURE — 85025 COMPLETE CBC W/AUTO DIFF WBC: CPT

## 2024-07-08 PROCEDURE — 80061 LIPID PANEL: CPT

## 2024-07-08 PROCEDURE — 83036 HEMOGLOBIN GLYCOSYLATED A1C: CPT

## 2024-07-08 PROCEDURE — 81001 URINALYSIS AUTO W/SCOPE: CPT

## 2024-07-08 PROCEDURE — 36415 COLL VENOUS BLD VENIPUNCTURE: CPT

## 2024-07-08 PROCEDURE — 82043 UR ALBUMIN QUANTITATIVE: CPT

## 2024-07-08 PROCEDURE — 80053 COMPREHEN METABOLIC PANEL: CPT

## 2024-07-08 PROCEDURE — 82570 ASSAY OF URINE CREATININE: CPT

## 2024-07-08 RX ORDER — INSULIN HUMAN 100 [IU]/ML
20 INJECTION, SUSPENSION SUBCUTANEOUS 2 TIMES DAILY
Qty: 15 ML | Refills: 5 | Status: SHIPPED | OUTPATIENT
Start: 2024-07-08 | End: 2024-07-11 | Stop reason: SDUPTHER

## 2024-07-11 ENCOUNTER — OFFICE VISIT (OUTPATIENT)
Dept: INTERNAL MEDICINE CLINIC | Facility: CLINIC | Age: 69
End: 2024-07-11
Payer: MEDICARE

## 2024-07-11 VITALS
TEMPERATURE: 97.6 F | OXYGEN SATURATION: 95 % | BODY MASS INDEX: 43.12 KG/M2 | DIASTOLIC BLOOD PRESSURE: 70 MMHG | RESPIRATION RATE: 16 BRPM | HEIGHT: 71 IN | HEART RATE: 108 BPM | WEIGHT: 308 LBS | SYSTOLIC BLOOD PRESSURE: 142 MMHG

## 2024-07-11 DIAGNOSIS — N18.30 STAGE 3 CHRONIC KIDNEY DISEASE, UNSPECIFIED WHETHER STAGE 3A OR 3B CKD (HCC): ICD-10-CM

## 2024-07-11 DIAGNOSIS — R80.9 TYPE 2 DIABETES MELLITUS WITH MICROALBUMINURIA, WITH LONG-TERM CURRENT USE OF INSULIN (HCC): ICD-10-CM

## 2024-07-11 DIAGNOSIS — I10 ESSENTIAL HYPERTENSION: ICD-10-CM

## 2024-07-11 DIAGNOSIS — R00.0 TACHYCARDIA: ICD-10-CM

## 2024-07-11 DIAGNOSIS — Z79.4 TYPE 2 DIABETES MELLITUS WITH MICROALBUMINURIA, WITH LONG-TERM CURRENT USE OF INSULIN (HCC): ICD-10-CM

## 2024-07-11 DIAGNOSIS — E11.29 TYPE 2 DIABETES MELLITUS WITH MICROALBUMINURIA, WITH LONG-TERM CURRENT USE OF INSULIN (HCC): ICD-10-CM

## 2024-07-11 DIAGNOSIS — E78.00 HYPERCHOLESTEREMIA: ICD-10-CM

## 2024-07-11 DIAGNOSIS — R60.0 EDEMA OF BOTH LEGS: ICD-10-CM

## 2024-07-11 DIAGNOSIS — Z12.11 COLON CANCER SCREENING: ICD-10-CM

## 2024-07-11 DIAGNOSIS — J30.1 ALLERGIC RHINITIS DUE TO POLLEN, UNSPECIFIED SEASONALITY: ICD-10-CM

## 2024-07-11 DIAGNOSIS — R80.1 PERSISTENT PROTEINURIA: ICD-10-CM

## 2024-07-11 PROCEDURE — 99214 OFFICE O/P EST MOD 30 MIN: CPT | Performed by: INTERNAL MEDICINE

## 2024-07-11 PROCEDURE — G2211 COMPLEX E/M VISIT ADD ON: HCPCS | Performed by: INTERNAL MEDICINE

## 2024-07-11 RX ORDER — ATORVASTATIN CALCIUM 40 MG/1
40 TABLET, FILM COATED ORAL DAILY
Qty: 90 TABLET | Refills: 1 | Status: SHIPPED | OUTPATIENT
Start: 2024-07-11

## 2024-07-11 RX ORDER — LOSARTAN POTASSIUM 100 MG/1
100 TABLET ORAL DAILY
Qty: 30 TABLET | Refills: 5 | Status: SHIPPED | OUTPATIENT
Start: 2024-07-11

## 2024-07-11 RX ORDER — METOPROLOL SUCCINATE 100 MG/1
100 TABLET, EXTENDED RELEASE ORAL DAILY
Qty: 90 TABLET | Refills: 1 | Status: SHIPPED | OUTPATIENT
Start: 2024-07-11

## 2024-07-11 RX ORDER — AZELASTINE HYDROCHLORIDE 137 UG/1
SPRAY, METERED NASAL
Qty: 30 ML | Refills: 1 | Status: SHIPPED | OUTPATIENT
Start: 2024-07-11

## 2024-07-11 RX ORDER — FUROSEMIDE 40 MG/1
40 TABLET ORAL DAILY
Qty: 90 TABLET | Refills: 1 | Status: SHIPPED | OUTPATIENT
Start: 2024-07-11

## 2024-07-11 RX ORDER — AMLODIPINE BESYLATE 10 MG/1
10 TABLET ORAL DAILY
Qty: 90 TABLET | Refills: 1 | Status: SHIPPED | OUTPATIENT
Start: 2024-07-11

## 2024-07-11 RX ORDER — INSULIN HUMAN 100 [IU]/ML
20 INJECTION, SUSPENSION SUBCUTANEOUS 2 TIMES DAILY
Qty: 15 ML | Refills: 5 | Status: SHIPPED | OUTPATIENT
Start: 2024-07-11

## 2024-07-11 NOTE — PROGRESS NOTES
Dr. Glass's Office Visit Note  24     Nadeem Purdy  68 y.o. male MRN: [id number]  : 1955    Assessment:     1. Type 2 diabetes mellitus with microalbuminuria, with long-term current use of insulin ([facility])  Assessment & Plan:    Lab Results   Component Value Date    HGBA1C 7.7 (H) 2024   1C improved now 7.7 although uncontrolled monitoring blood sugar at home    Agree and continue management medication as follows    Foot exam normal    Hypoglycemia protocol in place  Advised GLP-1 agonist patient reluctant  Continue agree with current management as follows  Metformin 1000 mg twice a day  Humulin and 70 3020 needs twice a day  Jardiance 10 mg daily  Diabetic diet    Orders:  -     metFORMIN (GLUCOPHAGE) 1000 MG tablet; Take 1 tablet (1,000 mg total) by mouth 2 (two) times a day  -     Empagliflozin 25 MG TABS; Take 1 tablet (25 mg total) by mouth daily  -     insulin isophane-insulin regular (HumuLIN 70/30 KwikPen) 100 units/mL injection pen; Inject 20 Units under the skin 2 (two) times a day  2. Edema of both legs  Assessment & Plan:  Overall unchanged at baseline    Agree and continue manage medication follow    Lasix 40 mg daily with compression stocking improved  Orders:  -     Empagliflozin 25 MG TABS; Take 1 tablet (25 mg total) by mouth daily  -     furosemide (LASIX) 40 mg tablet; Take 1 tablet (40 mg total) by mouth daily  3. Persistent proteinuria  Assessment & Plan:  Urinalysis reviewed    Stable at baseline    Will control the blood sugar as instructed for the management of diabetes same is under type 2 diabetes  Orders:  -     Empagliflozin 25 MG TABS; Take 1 tablet (25 mg total) by mouth daily  4. Essential hypertension  Assessment & Plan:  Symptomatic blood pressure very well-controlled agree continue manage medication as follows    BMP normal reviewed as follows    Amlodipine 10 mg daily     Jardiance 25 mg daily     Lasix 40 mg daily     Metoprolol  mg daily    Lab Results    Component Value Date    SODIUM 140 07/08/2024    K 4.3 07/08/2024     07/08/2024    CO2 27 07/08/2024    BUN 16 07/08/2024    CREATININE 1.21 07/08/2024    CALCIUM 9.4 07/08/2024      Orders:  -     atorvastatin (LIPITOR) 40 mg tablet; Take 1 tablet (40 mg total) by mouth daily  -     losartan (COZAAR) 100 MG tablet; Take 1 tablet (100 mg total) by mouth daily  -     metoprolol succinate (TOPROL-XL) 100 mg 24 hr tablet; Take 1 tablet (100 mg total) by mouth daily  -     amLODIPine (NORVASC) 10 mg tablet; Take 1 tablet (10 mg total) by mouth daily  5. Allergic rhinitis due to pollen, unspecified seasonality  -     Azelastine HCl 137 MCG/SPRAY SOLN; 1 puff each nostril twice a day  6. Tachycardia  -     metoprolol succinate (TOPROL-XL) 100 mg 24 hr tablet; Take 1 tablet (100 mg total) by mouth daily  7. Colon cancer screening  -     Ambulatory Referral to Gastroenterology; Future  8. Stage 3 chronic kidney disease, unspecified whether stage 3a or 3b CKD (HCC)  Assessment & Plan:  Lab Results   Component Value Date    EGFR 60 07/08/2024    EGFR 55 12/22/2023    EGFR 61 09/25/2023    CREATININE 1.21 07/08/2024    CREATININE 1.31 (H) 12/22/2023    CREATININE 1.20 09/25/2023   Kidney function GFR improved to 60 creatinine improved 1.21 avoid nephrotoxic continue current regimen labs reviewed will monitor closely  9. Hypercholesteremia  Assessment & Plan:  Lab Results   Component Value Date    LDLCALC 42 07/08/2024      Lab Results   Component Value Date    ALT 33 07/08/2024    ALT 17 06/06/2018   Above reviewed LDL controlled agree and continue management medication as followsLipitor 40 mg daily no side effects from the statin ALT normal reviewed    Diabetic Foot Exam    Patient's shoes and socks removed.    Right Foot/Ankle   Right Foot Inspection  Skin Exam: skin normal and skin intact. No dry skin, no warmth, no callus, no erythema, no maceration, no abnormal color, no pre-ulcer, no ulcer and no callus.     Toe  Exam: ROM and strength within normal limits.     Sensory   Monofilament testing: intact    Vascular  The right DP pulse is 2+. The right PT pulse is 1+.     Left Foot/Ankle  Left Foot Inspection  Skin Exam: skin normal and skin intact. No dry skin, no warmth, no erythema, no maceration, normal color, no pre-ulcer, no ulcer and no callus.     Toe Exam: ROM and strength within normal limits.     Sensory   Monofilament testing: intact    Vascular  The left DP pulse is 2+. The left PT pulse is 1+.     Assign Risk Category  No deformity present  No loss of protective sensation  No weak pulses  Risk: 0      Discussion Summary and Plan:  Today's care plan and medications were reviewed with patient in detail and all their questions answered to their satisfaction.    Chief Complaint   Patient presents with   • Follow-up     3 month f/u    Seeing ortho for R knee - has brace and easier to walk    DM foot      Subjective:  Follow-up chronic medical condition listed visit diagnosis denies chest pain difficulty breathing the leg edema improved labs reviewed A1c improved discussed GLP-1 agonists for detail also refills given for refill refer to assessment plan visit diagnosis      The following portions of the patient's history were reviewed and updated as appropriate: allergies, current medications, past family history, past medical history, past social history, past surgical history and problem list.    Review of Systems   Constitutional:  Positive for activity change. Negative for appetite change, chills, diaphoresis, fatigue, fever and unexpected weight change.   HENT:  Negative for congestion, dental problem, drooling, ear discharge, ear pain, facial swelling, hearing loss, mouth sores, nosebleeds, postnasal drip, rhinorrhea, sinus pressure, sneezing, sore throat, tinnitus, trouble swallowing and voice change.    Eyes:  Negative for photophobia, pain, discharge, redness, itching and visual disturbance.   Respiratory:   Negative for apnea, cough, choking, chest tightness, shortness of breath, wheezing and stridor.    Cardiovascular:  Positive for leg swelling. Negative for chest pain and palpitations.   Gastrointestinal:  Negative for abdominal distention, abdominal pain, anal bleeding, blood in stool, constipation, diarrhea, nausea, rectal pain and vomiting.   Endocrine: Negative for cold intolerance, heat intolerance, polydipsia, polyphagia and polyuria.   Genitourinary:  Negative for decreased urine volume, difficulty urinating, dysuria, enuresis, flank pain, frequency, genital sores, hematuria and urgency.   Musculoskeletal:  Positive for arthralgias and back pain. Negative for gait problem, joint swelling, myalgias, neck pain and neck stiffness.   Skin:  Negative for color change, pallor, rash and wound.   Allergic/Immunologic: Negative.  Negative for environmental allergies, food allergies and immunocompromised state.   Neurological:  Negative for dizziness, tremors, seizures, syncope, facial asymmetry, speech difficulty, weakness, light-headedness, numbness and headaches.   Psychiatric/Behavioral:  Negative for agitation, behavioral problems, confusion, decreased concentration, dysphoric mood, hallucinations, self-injury, sleep disturbance and suicidal ideas. The patient is not nervous/anxious and is not hyperactive.          Historical Information   Patient Active Problem List   Diagnosis   • Type 2 diabetes mellitus with microalbuminuria, with long-term current use of insulin ([facility])   • Essential hypertension   • Hypercholesteremia   • Class 3 severe obesity due to excess calories with serious comorbidity and body mass index (BMI) of 40.0 to 44.9 in adult ([facility])   • Steatohepatitis   • Allergic rhinitis   • Vitamin D deficiency   • Persistent proteinuria   • Tachycardia   • Stage 3 chronic kidney disease, unspecified whether stage 3a or 3b CKD ([facility])   • Edema of both legs   • Rash   • Numbness of left foot   • Cortical  age-related cataract of both eyes   • Type 2 diabetes mellitus with hyperglycemia, with long-term current use of insulin (HCC)   • Cellulitis of right lower extremity   • Right knee injury, subsequent encounter   • Osteochondral lesion   • Primary osteoarthritis of one knee, right   • Lymphedema of right lower extremity   • Other tear of medial meniscus, current injury, right knee, initial encounter     Past Medical History:   Diagnosis Date   • Hypertension      Past Surgical History:   Procedure Laterality Date   • CATARACT EXTRACTION, BILATERAL Bilateral      Social History     Substance and Sexual Activity   Alcohol Use Yes   • Alcohol/week: 4.0 standard drinks of alcohol   • Types: 4 Shots of liquor per week     Social History     Substance and Sexual Activity   Drug Use Never     Social History     Tobacco Use   Smoking Status Former   Smokeless Tobacco Never     History reviewed. No pertinent family history.  Health Maintenance Due   Topic   • Pneumococcal Vaccine: 65+ Years (1 of 2 - PCV)   • Colorectal Cancer Screening    • Zoster Vaccine (1 of 2)   • RSV Vaccine Age 60+ Years (1 - 1-dose 60+ series)   • PT PLAN OF CARE    • COVID-19 Vaccine (1 - 2023-24 season)   • Diabetic Foot Exam    • Influenza Vaccine (1)   • Fall Risk    • Medicare Annual Wellness Visit (AWV)       Meds/Allergies       Current Outpatient Medications:   •  amLODIPine (NORVASC) 10 mg tablet, Take 1 tablet (10 mg total) by mouth daily, Disp: 90 tablet, Rfl: 1  •  aspirin (ECOTRIN LOW STRENGTH) 81 mg EC tablet, Take 81 mg by mouth daily, Disp: , Rfl:   •  atorvastatin (LIPITOR) 40 mg tablet, Take 1 tablet (40 mg total) by mouth daily, Disp: 90 tablet, Rfl: 1  •  Azelastine HCl 137 MCG/SPRAY SOLN, 1 puff each nostril twice a day, Disp: 30 mL, Rfl: 1  •  BD Pen Needle Katelyn 2nd Gen 32G X 4 MM MISC, INJECT AS DIRECTED 2 (TWO) TIMES A DAY, Disp: 100 each, Rfl: 2  •  Cholecalciferol (VITAMIN D3) 2000 units TABS, Take 2,000 Units by mouth  "daily, Disp: , Rfl:   •  Empagliflozin 25 MG TABS, Take 1 tablet (25 mg total) by mouth daily, Disp: 30 tablet, Rfl: 5  •  furosemide (LASIX) 40 mg tablet, Take 1 tablet (40 mg total) by mouth daily, Disp: 90 tablet, Rfl: 1  •  gabapentin (Neurontin) 100 mg capsule, Take 2 capsules (200 mg total) by mouth daily at bedtime, Disp: 180 capsule, Rfl: 1  •  Glucose Blood (ONETOUCH ULTRA BLUE VI), 1 Units by In Vitro route 3 (three) times a day, Disp: , Rfl:   •  glucose blood (OneTouch Ultra) test strip, Test 3 times daily, Disp: 300 each, Rfl: 5  •  insulin isophane-insulin regular (HumuLIN 70/30 KwikPen) 100 units/mL injection pen, Inject 20 Units under the skin 2 (two) times a day, Disp: 15 mL, Rfl: 5  •  Krill Oil 1000 MG CAPS, Take 1 capsule by mouth daily, Disp: , Rfl:   •  losartan (COZAAR) 100 MG tablet, Take 1 tablet (100 mg total) by mouth daily, Disp: 30 tablet, Rfl: 5  •  metFORMIN (GLUCOPHAGE) 1000 MG tablet, Take 1 tablet (1,000 mg total) by mouth 2 (two) times a day, Disp: 180 tablet, Rfl: 1  •  metoprolol succinate (TOPROL-XL) 100 mg 24 hr tablet, Take 1 tablet (100 mg total) by mouth daily, Disp: 90 tablet, Rfl: 1  •  potassium chloride (Klor-Con M20) 20 mEq tablet, Take 1 tablet (20 mEq total) by mouth daily, Disp: 90 tablet, Rfl: 1  •  vitamin E, tocopherol, 400 units capsule, Take 400 Units by mouth daily, Disp: , Rfl:       Objective:    Vitals:   /70   Pulse (!) 108   Temp 97.6 °F (36.4 °C)   Resp 16   Ht 5' 11\" (1.803 m)   Wt (!) 140 kg (308 lb)   SpO2 95%   BMI 42.96 kg/m²   Body mass index is 42.96 kg/m².  Vitals:    07/11/24 0939   Weight: (!) 140 kg (308 lb)       Physical Exam  Vitals and nursing note reviewed.   Constitutional:       General: He is not in acute distress.     Appearance: He is well-developed. He is not ill-appearing, toxic-appearing or diaphoretic.   HENT:      Head: Normocephalic and atraumatic.      Right Ear: External ear normal.      Left Ear: External ear " normal.      Nose: Nose normal.      Mouth/Throat:      Pharynx: No oropharyngeal exudate.   Eyes:      General: Lids are normal. Lids are everted, no foreign bodies appreciated. No scleral icterus.        Right eye: No discharge.         Left eye: No discharge.      Conjunctiva/sclera: Conjunctivae normal.      Pupils: Pupils are equal, round, and reactive to light.   Neck:      Thyroid: No thyromegaly.      Vascular: Normal carotid pulses. No carotid bruit, hepatojugular reflux or JVD.      Trachea: No tracheal tenderness or tracheal deviation.   Cardiovascular:      Rate and Rhythm: Normal rate and regular rhythm.      Pulses: no weak pulses.           Dorsalis pedis pulses are 2+ on the right side and 2+ on the left side.        Posterior tibial pulses are 1+ on the right side and 1+ on the left side.      Heart sounds: Normal heart sounds. No murmur heard.     No friction rub. No gallop.   Pulmonary:      Effort: Pulmonary effort is normal. No respiratory distress.      Breath sounds: Normal breath sounds. No stridor. No wheezing or rales.   Chest:      Chest wall: No tenderness.   Abdominal:      General: Bowel sounds are normal. There is no distension.      Palpations: Abdomen is soft. There is no mass.      Tenderness: There is no abdominal tenderness. There is no guarding or rebound.   Musculoskeletal:         General: No tenderness or deformity. Normal range of motion.      Cervical back: Normal range of motion and neck supple. No edema, erythema or rigidity. No spinous process tenderness or muscular tenderness. Normal range of motion.      Right lower leg: Edema present.      Left lower leg: Edema present.   Feet:      Right foot:      Skin integrity: No ulcer, skin breakdown, erythema, warmth, callus or dry skin.      Left foot:      Skin integrity: No ulcer, skin breakdown, erythema, warmth, callus or dry skin.   Lymphadenopathy:      Head:      Right side of head: No submental, submandibular,  tonsillar, preauricular or posterior auricular adenopathy.      Left side of head: No submental, submandibular, tonsillar, preauricular, posterior auricular or occipital adenopathy.      Cervical: No cervical adenopathy.      Right cervical: No superficial, deep or posterior cervical adenopathy.     Left cervical: No superficial, deep or posterior cervical adenopathy.      Upper Body:      Right upper body: No pectoral adenopathy.      Left upper body: No pectoral adenopathy.   Skin:     General: Skin is warm and dry.      Coloration: Skin is not pale.      Findings: No erythema or rash.   Neurological:      Mental Status: He is alert and oriented to person, place, and time.      Cranial Nerves: No cranial nerve deficit.      Sensory: No sensory deficit.      Motor: No tremor, abnormal muscle tone or seizure activity.      Coordination: Coordination normal.      Gait: Gait normal.      Deep Tendon Reflexes: Reflexes are normal and symmetric. Reflexes normal.   Psychiatric:         Behavior: Behavior normal.         Thought Content: Thought content normal.         Judgment: Judgment normal.       Lab Review   Appointment on 07/08/2024   Component Date Value Ref Range Status   • WBC 07/08/2024 8.33  4.31 - 10.16 Thousand/uL Final   • RBC 07/08/2024 4.70  3.88 - 5.62 Million/uL Final   • Hemoglobin 07/08/2024 14.5  12.0 - 17.0 g/dL Final   • Hematocrit 07/08/2024 43.8  36.5 - 49.3 % Final   • MCV 07/08/2024 93  82 - 98 fL Final   • MCH 07/08/2024 30.9  26.8 - 34.3 pg Final   • MCHC 07/08/2024 33.1  31.4 - 37.4 g/dL Final   • RDW 07/08/2024 13.7  11.6 - 15.1 % Final   • MPV 07/08/2024 8.9  8.9 - 12.7 fL Final   • Platelets 07/08/2024 201  149 - 390 Thousands/uL Final   • nRBC 07/08/2024 0  /100 WBCs Final   • Segmented % 07/08/2024 46  43 - 75 % Final   • Immature Grans % 07/08/2024 1  0 - 2 % Final   • Lymphocytes % 07/08/2024 39  14 - 44 % Final   • Monocytes % 07/08/2024 8  4 - 12 % Final   • Eosinophils Relative  07/08/2024 5  0 - 6 % Final   • Basophils Relative 07/08/2024 1  0 - 1 % Final   • Absolute Neutrophils 07/08/2024 3.82  1.85 - 7.62 Thousands/µL Final   • Absolute Immature Grans 07/08/2024 0.05  0.00 - 0.20 Thousand/uL Final   • Absolute Lymphocytes 07/08/2024 3.27  0.60 - 4.47 Thousands/µL Final   • Absolute Monocytes 07/08/2024 0.70  0.17 - 1.22 Thousand/µL Final   • Eosinophils Absolute 07/08/2024 0.44  0.00 - 0.61 Thousand/µL Final   • Basophils Absolute 07/08/2024 0.05  0.00 - 0.10 Thousands/µL Final   • Sodium 07/08/2024 140  135 - 147 mmol/L Final   • Potassium 07/08/2024 4.3  3.5 - 5.3 mmol/L Final   • Chloride 07/08/2024 100  96 - 108 mmol/L Final   • CO2 07/08/2024 27  21 - 32 mmol/L Final   • ANION GAP 07/08/2024 13  4 - 13 mmol/L Final   • BUN 07/08/2024 16  5 - 25 mg/dL Final   • Creatinine 07/08/2024 1.21  0.60 - 1.30 mg/dL Final    Standardized to IDMS reference method   • Glucose, Fasting 07/08/2024 110 (H)  65 - 99 mg/dL Final   • Calcium 07/08/2024 9.4  8.4 - 10.2 mg/dL Final   • AST 07/08/2024 30  13 - 39 U/L Final   • ALT 07/08/2024 33  7 - 52 U/L Final    Specimen collection should occur prior to Sulfasalazine administration due to the potential for falsely depressed results.    • Alkaline Phosphatase 07/08/2024 34  34 - 104 U/L Final   • Total Protein 07/08/2024 6.8  6.4 - 8.4 g/dL Final   • Albumin 07/08/2024 3.6  3.5 - 5.0 g/dL Final   • Total Bilirubin 07/08/2024 0.42  0.20 - 1.00 mg/dL Final    Use of this assay is not recommended for patients undergoing treatment with eltrombopag due to the potential for falsely elevated results.  N-acetyl-p-benzoquinone imine (metabolite of Acetaminophen) will generate erroneously low results in samples for patients that have taken an overdose of Acetaminophen.   • eGFR 07/08/2024 60  ml/min/1.73sq m Final   • Hemoglobin A1C 07/08/2024 7.7 (H)  Normal 4.0-5.6%; PreDiabetic 5.7-6.4%; Diabetic >=6.5%; Glycemic control for adults with diabetes <7.0% %  Final   • EAG 07/08/2024 174  mg/dl Final   • Cholesterol 07/08/2024 165  See Comment mg/dL Final    Cholesterol:         Pediatric <18 Years        Desirable          <170 mg/dL      Borderline High    170-199 mg/dL      High               >=200 mg/dL        Adult >=18 Years            Desirable         <200 mg/dL      Borderline High   200-239 mg/dL      High              >239 mg/dL     • Triglycerides 07/08/2024 68  See Comment mg/dL Final    Triglyceride:     0-9Y            <75mg/dL     10Y-17Y         <90 mg/dL       >=18Y     Normal          <150 mg/dL     Borderline High 150-199 mg/dL     High            200-499 mg/dL        Very High       >499 mg/dL    Specimen collection should occur prior to Metamizole administration due to the potential for falsely depressed results.   • HDL, Direct 07/08/2024 109  >=40 mg/dL Final   • LDL Calculated 07/08/2024 42  0 - 100 mg/dL Final    LDL Cholesterol:     Optimal           <100 mg/dl     Near Optimal      100-129 mg/dl     Above Optimal       Borderline High 130-159 mg/dl       High            160-189 mg/dl       Very High       >189 mg/dl         This screening LDL is a calculated result.   It does not have the accuracy of the Direct Measured LDL in the monitoring of patients with hyperlipidemia and/or statin therapy.   Direct Measure LDL (SIW719) must be ordered separately in these patients.   • Creatinine, Ur 07/08/2024 102.5  Reference range not established. mg/dL Final   • Albumin,U,Random 07/08/2024 59.0 (H)  <20.0 mg/L Final   • Albumin Creat Ratio 07/08/2024 58 (H)  0 - 30 mg/g creatinine Final   • Color, UA 07/08/2024 Light Yellow   Final   • Clarity, UA 07/08/2024 Clear   Final   • Specific Gravity, UA 07/08/2024 1.011  1.003 - 1.030 Final   • pH, UA 07/08/2024 6.0  4.5, 5.0, 5.5, 6.0, 6.5, 7.0, 7.5, 8.0 Final   • Leukocytes, UA 07/08/2024 Negative  Negative Final   • Nitrite, UA 07/08/2024 Negative  Negative Final   • Protein, UA 07/08/2024 Trace (A)   "Negative mg/dl Final   • Glucose, UA 07/08/2024 Negative  Negative mg/dl Final   • Ketones, UA 07/08/2024 Negative  Negative mg/dl Final   • Urobilinogen, UA 07/08/2024 <2.0  <2.0 mg/dl mg/dl Final   • Bilirubin, UA 07/08/2024 Negative  Negative Final   • Occult Blood, UA 07/08/2024 Negative  Negative Final   • RBC, UA 07/08/2024 None Seen  None Seen, 1-2 /hpf Final   • WBC, UA 07/08/2024 None Seen  None Seen, 1-2 /hpf Final   • Epithelial Cells 07/08/2024 None Seen  None Seen, Occasional /hpf Final   • Bacteria, UA 07/08/2024 None Seen  None Seen, Occasional /hpf Final         Patient Instructions   Check your fingerstick Accu-Chek three times a day and any time you feel like having a low sugar, Document and bring record with you every visit    Please check your fingerstick Accu-Chek different time of the day either at 7:00 a.m.,11:00 a.m. 4 p.m., 9:00 p.m..( Check three different times out these four times)    Diabetic diet for diabetes as advised.and Snacks at night time    If you would sugar less than 80 or more than 300 to please call us on next visit is day.    If the sugar is less than 80 follow-up hypoglycemia instructions as advised.    You and your family members should know and be prepared how to handle low sugar symptoms and/or low sugar number.    See your eye doctor yearly or per them    Take your diabetic medicine as advised.    Know your goal of HBA1c and urine for protein.         Jluis Glass MD        \"This note has been constructed using a voice recognition system.Therefore there may be syntax, spelling, and/or grammatical errors. Please call if you have any questions. \"  "

## 2024-07-11 NOTE — ASSESSMENT & PLAN NOTE
Urinalysis reviewed    Stable at baseline    Will control the blood sugar as instructed for the management of diabetes same is under type 2 diabetes

## 2024-07-11 NOTE — ASSESSMENT & PLAN NOTE
Lab Results   Component Value Date    HGBA1C 7.7 (H) 07/08/2024   1C improved now 7.7 although uncontrolled monitoring blood sugar at home    Agree and continue management medication as follows    Foot exam normal    Hypoglycemia protocol in place  Advised GLP-1 agonist patient reluctant  Continue agree with current management as follows  Metformin 1000 mg twice a day  Humulin and 70 3020 needs twice a day  Jardiance 10 mg daily  Diabetic diet

## 2024-07-11 NOTE — ASSESSMENT & PLAN NOTE
Overall unchanged at baseline    Agree and continue manage medication follow    Lasix 40 mg daily with compression stocking improved

## 2024-07-11 NOTE — ASSESSMENT & PLAN NOTE
Lab Results   Component Value Date    EGFR 60 07/08/2024    EGFR 55 12/22/2023    EGFR 61 09/25/2023    CREATININE 1.21 07/08/2024    CREATININE 1.31 (H) 12/22/2023    CREATININE 1.20 09/25/2023   Kidney function GFR improved to 60 creatinine improved 1.21 avoid nephrotoxic continue current regimen labs reviewed will monitor closely

## 2024-07-11 NOTE — ASSESSMENT & PLAN NOTE
Lab Results   Component Value Date    LDLCALC 42 07/08/2024      Lab Results   Component Value Date    ALT 33 07/08/2024    ALT 17 06/06/2018   Above reviewed LDL controlled agree and continue management medication as followsLipitor 40 mg daily no side effects from the statin ALT normal reviewed

## 2024-08-05 ENCOUNTER — OFFICE VISIT (OUTPATIENT)
Dept: OBGYN CLINIC | Facility: CLINIC | Age: 69
End: 2024-08-05
Payer: MEDICARE

## 2024-08-05 VITALS
BODY MASS INDEX: 43.12 KG/M2 | SYSTOLIC BLOOD PRESSURE: 156 MMHG | WEIGHT: 308 LBS | HEART RATE: 90 BPM | HEIGHT: 71 IN | DIASTOLIC BLOOD PRESSURE: 90 MMHG

## 2024-08-05 DIAGNOSIS — M17.11 PRIMARY OSTEOARTHRITIS OF ONE KNEE, RIGHT: Primary | ICD-10-CM

## 2024-08-05 PROCEDURE — 99213 OFFICE O/P EST LOW 20 MIN: CPT | Performed by: STUDENT IN AN ORGANIZED HEALTH CARE EDUCATION/TRAINING PROGRAM

## 2024-08-05 NOTE — PROGRESS NOTES
Ortho Sports Medicine Knee New Patient Visit     Assesment:   68 y.o. male with right knee pain and mechanical symptoms for approximately 1 month with x-ray and MRI showing significant degenerative change as well as a loose body present in the anterior aspect of the knee.    Plan:  I reviewed the history and exam with the patient in clinic today.  Patient states that his knee pain and mechanical symptoms have improved significantly since the last visit.  States that he only has intermittent pain and catching in the knee.  He also has minimal swelling.  Patient has had a reduction in his A1c with alterations in his diet.  The lymphedema has also improved by wearing compression stockings.  I discussed with the patient that we could continue to treat this conservatively versus surgical intervention.  I discussed that conservative management would entail continued bracing and home exercises.  I discussed that surgery would entail arthroscopic removal of the loose body.  I discussed that there are risks associated with the surgery including increased risk of infection and wound complications given his elevated A1c and lymphedema.  Patient states that he would prefer to continue with conservative management as his symptoms have been improving and he does understand the risks of surgery.  I discussed that the patient can follow-up on an as-needed basis if his symptoms progress.  I did encourage him to continue working on improving his A1c and wearing the compression stockings to minimize any issues with lymphedema. The patient demonstrated understanding of the discussion and was in agreement with the plan.  All of the questions were answered.  Patient can reach out to clinic with any questions or concerns at any time.    Conservative treatment:  Ice to knee for 20 minutes at least 1-2 times daily.  Continue home exercise program.  Continue hinged knee brace.  Continue diabetes management and lymphedema treatment with  PCP  Follow-up prn     Imaging:  No new imaging at time of visit.     Injection:  No Injection planned at this time.    Surgery:  No surgery is recommended at this point, continue with conservative treatment plan as noted.    Follow up:  Return if symptoms worsen or fail to improve.      Chief Complaint   Patient presents with    Right Knee - Pain       History of Present Illness:  The patient is a 68 y.o. male presents for follow-up evaluation of his left knee. The patient was last seen on 6/21/2024 where the patient was evaluated regarding a loose body in the knee, however, he was not a surgical candidate due to elevated A1C. On today's presentation, the patient states that his knee is doing great. He reports occasional sharp pains when moving from deep flexion to extension. The patient states that his knee otherwise does not cause pain or instability. He has continued to wear the knee brace, which he believes helps his knee. He states that his A1C was improved at his last internal medicine visit.     The patient has the following co-morbidities: Type II diabetes (last A1c 7.7 on 7/8/24); Stage 3 CKD; Obesity     Past Medical, Social and Family History:  Past Medical History:   Diagnosis Date    Hypertension      Past Surgical History:   Procedure Laterality Date    CATARACT EXTRACTION, BILATERAL Bilateral      No Known Allergies  Current Outpatient Medications on File Prior to Visit   Medication Sig Dispense Refill    amLODIPine (NORVASC) 10 mg tablet Take 1 tablet (10 mg total) by mouth daily 90 tablet 1    aspirin (ECOTRIN LOW STRENGTH) 81 mg EC tablet Take 81 mg by mouth daily      atorvastatin (LIPITOR) 40 mg tablet Take 1 tablet (40 mg total) by mouth daily 90 tablet 1    Azelastine HCl 137 MCG/SPRAY SOLN 1 puff each nostril twice a day 30 mL 1    BD Pen Needle Katelyn 2nd Gen 32G X 4 MM MISC INJECT AS DIRECTED 2 (TWO) TIMES A  each 2    Cholecalciferol (VITAMIN D3) 2000 units TABS Take 2,000 Units by  mouth daily      Empagliflozin 25 MG TABS Take 1 tablet (25 mg total) by mouth daily 30 tablet 5    furosemide (LASIX) 40 mg tablet Take 1 tablet (40 mg total) by mouth daily 90 tablet 1    gabapentin (Neurontin) 100 mg capsule Take 2 capsules (200 mg total) by mouth daily at bedtime 180 capsule 1    Glucose Blood (ONETOUCH ULTRA BLUE VI) 1 Units by In Vitro route 3 (three) times a day      glucose blood (OneTouch Ultra) test strip Test 3 times daily 300 each 5    insulin isophane-insulin regular (HumuLIN 70/30 KwikPen) 100 units/mL injection pen Inject 20 Units under the skin 2 (two) times a day 15 mL 5    Krill Oil 1000 MG CAPS Take 1 capsule by mouth daily      losartan (COZAAR) 100 MG tablet Take 1 tablet (100 mg total) by mouth daily 30 tablet 5    metFORMIN (GLUCOPHAGE) 1000 MG tablet Take 1 tablet (1,000 mg total) by mouth 2 (two) times a day 180 tablet 1    metoprolol succinate (TOPROL-XL) 100 mg 24 hr tablet Take 1 tablet (100 mg total) by mouth daily 90 tablet 1    potassium chloride (Klor-Con M20) 20 mEq tablet Take 1 tablet (20 mEq total) by mouth daily 90 tablet 1    vitamin E, tocopherol, 400 units capsule Take 400 Units by mouth daily       No current facility-administered medications on file prior to visit.     Social History     Socioeconomic History    Marital status: /Civil Union     Spouse name: Not on file    Number of children: Not on file    Years of education: Not on file    Highest education level: Not on file   Occupational History    Not on file   Tobacco Use    Smoking status: Former    Smokeless tobacco: Never   Vaping Use    Vaping status: Never Used   Substance and Sexual Activity    Alcohol use: Yes     Alcohol/week: 4.0 standard drinks of alcohol     Types: 4 Shots of liquor per week    Drug use: Never    Sexual activity: Not on file   Other Topics Concern    Not on file   Social History Narrative    Not on file     Social Determinants of Health     Financial Resource Strain:  "Low Risk  (9/28/2023)    Overall Financial Resource Strain (CARDIA)     Difficulty of Paying Living Expenses: Not hard at all   Food Insecurity: Not on file   Transportation Needs: No Transportation Needs (9/28/2023)    PRAPARE - Transportation     Lack of Transportation (Medical): No     Lack of Transportation (Non-Medical): No   Physical Activity: Not on file   Stress: Not on file   Social Connections: Not on file   Intimate Partner Violence: Not on file   Housing Stability: Not on file         I have reviewed the past medical, surgical, social and family history, medications and allergies as documented in the EMR.    Review of systems: ROS is negative other than that noted in the HPI.  Constitutional: Negative for fatigue and fever.   HENT: Negative for sore throat.    Respiratory: Negative for shortness of breath.    Cardiovascular: Negative for chest pain.   Gastrointestinal: Negative for abdominal pain.   Endocrine: Negative for cold intolerance and heat intolerance.   Genitourinary: Negative for flank pain.   Musculoskeletal: Negative for back pain.   Skin: Negative for rash.   Allergic/Immunologic: Negative for immunocompromised state.   Neurological: Negative for dizziness.   Psychiatric/Behavioral: Negative for agitation.      Physical Exam:    Blood pressure 156/90, pulse 90, height 5' 11\" (1.803 m), weight (!) 140 kg (308 lb).    General/Constitutional: NAD, well developed, well nourished  HENT: Normocephalic, atraumatic  CV: Intact distal pulses, regular rate  Resp: No respiratory distress or labored breathing  Neuro: Alert and Oriented x 3  Psych: Normal mood, normal affect, normal judgement, normal behavior  Skin: Warm, dry, no rashes, no erythema      Focused right knee exam:  Ambulates with a antalgic gait.    Skin is intact. No evidence of ecchymosis, erythema, gross deformity or previous surgical incisions. minimal effusion.     Palpation of the knee demonstrates no tenderness over the medial " patellar facet, lateral patellar facet, tibial tubercle or patellar tendon.  There is no tenderness over the pes anserine bursa.  There is no tenderness over Gerdy's tubercle. There is no tenderness in the popliteal fossa.  There is no tenderness over the medial or lateral epicondyle.  There is no tenderness with palpation over the posterior calf without palpable cords.    Range of motion testing demonstrates that the patient is able to achieve 0 degrees of extension and 110 degrees of flexion. There is pain with hyperflexion or hyperextension.  There is positive patellar crepitus. The patient is able to do a straight leg raise.      Strength testing demonstrates 5/5 strength with hip flexion, 5/5 knee extension, 5/5 knee flexion, and 5/5 dorsiflexion and plantarflexion.    Provocation testing demonstrates  Mensicus- negative medial joint line tenderness, negative lateral joint line tenderness, negative Ramírez's, negative flexion compression.  Collateral ligaments- negative varus laxity at full extension and in 30° of knee flexion, negative valgus laxity at full extension and in 30° of knee flexion.  Cruciate ligament- 1A Lachman examination, negative pivot shift test, 1A anterior drawer, 1A posterior drawer, negative posterior sag.  Patella- negative apprehension with lateral patellar translation (able to sublux 2 quadrant with firm endpoint), negative apprehension with medial patellar translation (able to sublux 2 quadrant with firm endpoint), negative J-sign, negative patellar grind test, negative tight lateral patellar tilt.    Range of motion testing of the hip and ankle are within normal limits.    No calf tenderness to palpation bilaterally    LE NV Exam: +2 DP/PT pulses bilaterally  Sensation intact to light touch L2-S1 bilaterally, SPN/DPN/TA motor intact      Knee Imaging    X-rays of the right knee were obtained on 5/29/2024 and reviewed with the patient. Per my independent review, the imaging shows  moderate medial compartment osteoarthritis.  Large loose body noted in the anterior aspect of the intercondylar notch.      MRI of the right knee was obtained on 6/5/2024 and reviewed with the patient. Per my independent review, the imaging shows a complex tear of the posterior horn of the medial meniscus with associated extrusion.  There is tricompartmental osteoarthritis and most significant in the medial compartment.  Osteochondral lesion of the anterior aspect of the lateral tibial plateau but appears to be stable.  Joint effusion noted.  Intra-articular loose body noted in the anterior joint space.

## 2024-08-26 DIAGNOSIS — I10 ESSENTIAL HYPERTENSION: ICD-10-CM

## 2024-08-27 RX ORDER — LOSARTAN POTASSIUM 100 MG/1
100 TABLET ORAL DAILY
Qty: 90 TABLET | Refills: 1 | Status: SHIPPED | OUTPATIENT
Start: 2024-08-27

## 2024-09-08 DIAGNOSIS — E10.9 TYPE 1 DIABETES MELLITUS WITHOUT COMPLICATION (HCC): ICD-10-CM

## 2024-09-08 RX ORDER — PEN NEEDLE, DIABETIC 32GX 5/32"
NEEDLE, DISPOSABLE MISCELLANEOUS
Qty: 100 EACH | Refills: 2 | Status: SHIPPED | OUTPATIENT
Start: 2024-09-08

## 2024-10-08 ENCOUNTER — RA CDI HCC (OUTPATIENT)
Dept: OTHER | Facility: HOSPITAL | Age: 69
End: 2024-10-08

## 2024-10-08 PROBLEM — I12.9 HYPERTENSIVE KIDNEY DISEASE WITH CHRONIC KIDNEY DISEASE STAGE III (HCC): Status: ACTIVE | Noted: 2024-10-08

## 2024-10-08 PROBLEM — N18.30 HYPERTENSIVE KIDNEY DISEASE WITH CHRONIC KIDNEY DISEASE STAGE III (HCC): Status: ACTIVE | Noted: 2024-10-08

## 2024-10-08 NOTE — PROGRESS NOTES
HCC coding opportunities          Chart Reviewed number of suggestions sent to Provider: 2     Patients Insurance     Medicare Insurance: Medicare        E66.01  E11.36

## 2024-10-18 DIAGNOSIS — R60.0 EDEMA OF BOTH LEGS: ICD-10-CM

## 2024-10-18 RX ORDER — POTASSIUM CHLORIDE 1500 MG/1
20 TABLET, EXTENDED RELEASE ORAL DAILY
Qty: 90 TABLET | Refills: 1 | Status: SHIPPED | OUTPATIENT
Start: 2024-10-18

## 2024-11-03 DIAGNOSIS — R20.0 NUMBNESS OF LEFT FOOT: ICD-10-CM

## 2024-11-03 RX ORDER — GABAPENTIN 100 MG/1
200 CAPSULE ORAL
Qty: 180 CAPSULE | Refills: 1 | Status: SHIPPED | OUTPATIENT
Start: 2024-11-03 | End: 2024-11-06 | Stop reason: SDUPTHER

## 2024-11-06 ENCOUNTER — OFFICE VISIT (OUTPATIENT)
Dept: INTERNAL MEDICINE CLINIC | Facility: CLINIC | Age: 69
End: 2024-11-06
Payer: MEDICARE

## 2024-11-06 VITALS
HEIGHT: 71 IN | HEART RATE: 79 BPM | OXYGEN SATURATION: 97 % | SYSTOLIC BLOOD PRESSURE: 136 MMHG | WEIGHT: 300 LBS | RESPIRATION RATE: 15 BRPM | DIASTOLIC BLOOD PRESSURE: 72 MMHG | BODY MASS INDEX: 42 KG/M2 | TEMPERATURE: 98.9 F

## 2024-11-06 DIAGNOSIS — E11.29 TYPE 2 DIABETES MELLITUS WITH MICROALBUMINURIA, WITH LONG-TERM CURRENT USE OF INSULIN (HCC): ICD-10-CM

## 2024-11-06 DIAGNOSIS — J30.1 ALLERGIC RHINITIS DUE TO POLLEN, UNSPECIFIED SEASONALITY: ICD-10-CM

## 2024-11-06 DIAGNOSIS — R80.9 TYPE 2 DIABETES MELLITUS WITH MICROALBUMINURIA, WITH LONG-TERM CURRENT USE OF INSULIN (HCC): ICD-10-CM

## 2024-11-06 DIAGNOSIS — E11.65 TYPE 2 DIABETES MELLITUS WITH HYPERGLYCEMIA, WITH LONG-TERM CURRENT USE OF INSULIN (HCC): Primary | ICD-10-CM

## 2024-11-06 DIAGNOSIS — E55.9 VITAMIN D DEFICIENCY: ICD-10-CM

## 2024-11-06 DIAGNOSIS — R20.0 NUMBNESS OF LEFT FOOT: ICD-10-CM

## 2024-11-06 DIAGNOSIS — N18.31 HYPERTENSIVE KIDNEY DISEASE WITH STAGE 3A CHRONIC KIDNEY DISEASE (HCC): ICD-10-CM

## 2024-11-06 DIAGNOSIS — R60.0 EDEMA OF BOTH LEGS: ICD-10-CM

## 2024-11-06 DIAGNOSIS — I10 ESSENTIAL HYPERTENSION: ICD-10-CM

## 2024-11-06 DIAGNOSIS — Z79.4 TYPE 2 DIABETES MELLITUS WITH MICROALBUMINURIA, WITH LONG-TERM CURRENT USE OF INSULIN (HCC): ICD-10-CM

## 2024-11-06 DIAGNOSIS — E78.2 MIXED HYPERLIPIDEMIA: ICD-10-CM

## 2024-11-06 DIAGNOSIS — Z13.0 SCREENING FOR DEFICIENCY ANEMIA: ICD-10-CM

## 2024-11-06 DIAGNOSIS — I12.9 HYPERTENSIVE KIDNEY DISEASE WITH STAGE 3A CHRONIC KIDNEY DISEASE (HCC): ICD-10-CM

## 2024-11-06 DIAGNOSIS — Z12.11 COLON CANCER SCREENING: ICD-10-CM

## 2024-11-06 DIAGNOSIS — Z79.4 TYPE 2 DIABETES MELLITUS WITH HYPERGLYCEMIA, WITH LONG-TERM CURRENT USE OF INSULIN (HCC): Primary | ICD-10-CM

## 2024-11-06 PROCEDURE — G0439 PPPS, SUBSEQ VISIT: HCPCS | Performed by: INTERNAL MEDICINE

## 2024-11-06 PROCEDURE — 99214 OFFICE O/P EST MOD 30 MIN: CPT | Performed by: INTERNAL MEDICINE

## 2024-11-06 RX ORDER — FUROSEMIDE 40 MG/1
40 TABLET ORAL DAILY
Qty: 90 TABLET | Refills: 1 | Status: SHIPPED | OUTPATIENT
Start: 2024-11-06

## 2024-11-06 RX ORDER — POTASSIUM CHLORIDE 1500 MG/1
20 TABLET, EXTENDED RELEASE ORAL DAILY
Qty: 90 TABLET | Refills: 1 | Status: SHIPPED | OUTPATIENT
Start: 2024-11-06

## 2024-11-06 RX ORDER — AZELASTINE HYDROCHLORIDE 137 UG/1
SPRAY, METERED NASAL
Qty: 30 ML | Refills: 1 | Status: SHIPPED | OUTPATIENT
Start: 2024-11-06

## 2024-11-06 RX ORDER — INSULIN HUMAN 100 [IU]/ML
20 INJECTION, SUSPENSION SUBCUTANEOUS 2 TIMES DAILY
Qty: 15 ML | Refills: 5 | Status: SHIPPED | OUTPATIENT
Start: 2024-11-06

## 2024-11-06 RX ORDER — GABAPENTIN 100 MG/1
200 CAPSULE ORAL
Qty: 180 CAPSULE | Refills: 1 | Status: SHIPPED | OUTPATIENT
Start: 2024-11-06

## 2024-11-06 NOTE — PATIENT INSTRUCTIONS
Medicare Preventive Visit Patient Instructions  Thank you for completing your Welcome to Medicare Visit or Medicare Annual Wellness Visit today. Your next wellness visit will be due in one year (11/7/2025).  The screening/preventive services that you may require over the next 5-10 years are detailed below. Some tests may not apply to you based off risk factors and/or age. Screening tests ordered at today's visit but not completed yet may show as past due. Also, please note that scanned in results may not display below.  Preventive Screenings:  Service Recommendations Previous Testing/Comments   Colorectal Cancer Screening  Colonoscopy    Fecal Occult Blood Test (FOBT)/Fecal Immunochemical Test (FIT)  Fecal DNA/Cologuard Test  Flexible Sigmoidoscopy Age: 45-75 years old   Colonoscopy: every 10 years (May be performed more frequently if at higher risk)  OR  FOBT/FIT: every 1 year  OR  Cologuard: every 3 years  OR  Sigmoidoscopy: every 5 years  Screening may be recommended earlier than age 45 if at higher risk for colorectal cancer. Also, an individualized decision between you and your healthcare provider will decide whether screening between the ages of 76-85 would be appropriate. Colonoscopy: Not on file  FOBT/FIT: Not on file  Cologuard: Not on file  Sigmoidoscopy: Not on file    Risks and Benefits Discussed  Patient Declines  Due for Colonoscopy - Low Risk     Prostate Cancer Screening Individualized decision between patient and health care provider in men between ages of 55-69   Medicare will cover every 12 months beginning on the day after your 50th birthday PSA: 3.13 ng/mL     Screening Current     Hepatitis C Screening Once for adults born between 1945 and 1965  More frequently in patients at high risk for Hepatitis C Hep C Antibody: 03/16/2021    Screening Current  Due for Hepatitis C screening   Diabetes Screening 1-2 times per year if you're at risk for diabetes or have pre-diabetes Fasting glucose: 110  mg/dL (7/8/2024)  A1C: 7.7 % (7/8/2024)  Screening Not Indicated  History Diabetes   Cholesterol Screening Once every 5 years if you don't have a lipid disorder. May order more often based on risk factors. Lipid panel: 07/08/2024  Screening Not Indicated  History Lipid Disorder      Other Preventive Screenings Covered by Medicare:  Abdominal Aortic Aneurysm (AAA) Screening: covered once if your at risk. You're considered to be at risk if you have a family history of AAA or a male between the age of 65-75 who smoking at least 100 cigarettes in your lifetime.  Lung Cancer Screening: covers low dose CT scan once per year if you meet all of the following conditions: (1) Age 55-77; (2) No signs or symptoms of lung cancer; (3) Current smoker or have quit smoking within the last 15 years; (4) You have a tobacco smoking history of at least 20 pack years (packs per day x number of years you smoked); (5) You get a written order from a healthcare provider.  Glaucoma Screening: covered annually if you're considered high risk: (1) You have diabetes OR (2) Family history of glaucoma OR (3)  aged 50 and older OR (4)  American aged 65 and older  Osteoporosis Screening: covered every 2 years if you meet one of the following conditions: (1) Have a vertebral abnormality; (2) On glucocorticoid therapy for more than 3 months; (3) Have primary hyperparathyroidism; (4) On osteoporosis medications and need to assess response to drug therapy.  HIV Screening: covered annually if you're between the age of 15-65. Also covered annually if you are younger than 15 and older than 65 with risk factors for HIV infection. For pregnant patients, it is covered up to 3 times per pregnancy.    Immunizations:  Immunization Recommendations   Influenza Vaccine Annual influenza vaccination during flu season is recommended for all persons aged >= 6 months who do not have contraindications   Pneumococcal Vaccine   * Pneumococcal  conjugate vaccine = PCV13 (Prevnar 13), PCV15 (Vaxneuvance), PCV20 (Prevnar 20)  * Pneumococcal polysaccharide vaccine = PPSV23 (Pneumovax) Adults 19-63 yo with certain risk factors or if 65+ yo  If never received any pneumonia vaccine: recommend Prevnar 20 (PCV20)  Give PCV20 if previously received 1 dose of PCV13 or PPSV23   Hepatitis B Vaccine 3 dose series if at intermediate or high risk (ex: diabetes, end stage renal disease, liver disease)   Respiratory syncytial virus (RSV) Vaccine - COVERED BY MEDICARE PART D  * RSVPreF3 (Arexvy) CDC recommends that adults 60 years of age and older may receive a single dose of RSV vaccine using shared clinical decision-making (SCDM)   Tetanus (Td) Vaccine - COST NOT COVERED BY MEDICARE PART B Following completion of primary series, a booster dose should be given every 10 years to maintain immunity against tetanus. Td may also be given as tetanus wound prophylaxis.   Tdap Vaccine - COST NOT COVERED BY MEDICARE PART B Recommended at least once for all adults. For pregnant patients, recommended with each pregnancy.   Shingles Vaccine (Shingrix) - COST NOT COVERED BY MEDICARE PART B  2 shot series recommended in those 19 years and older who have or will have weakened immune systems or those 50 years and older     Health Maintenance Due:      Topic Date Due   • Colorectal Cancer Screening  Never done   • Hepatitis C Screening  Completed     Immunizations Due:      Topic Date Due   • Pneumococcal Vaccine: 65+ Years (1 of 2 - PCV) Never done     Advance Directives   What are advance directives?  Advance directives are legal documents that state your wishes and plans for medical care. These plans are made ahead of time in case you lose your ability to make decisions for yourself. Advance directives can apply to any medical decision, such as the treatments you want, and if you want to donate organs.   What are the types of advance directives?  There are many types of advance  directives, and each state has rules about how to use them. You may choose a combination of any of the following:  Living will:  This is a written record of the treatment you want. You can also choose which treatments you do not want, which to limit, and which to stop at a certain time. This includes surgery, medicine, IV fluid, and tube feedings.   Durable power of  for healthcare (DPAHC):  This is a written record that states who you want to make healthcare choices for you when you are unable to make them for yourself. This person, called a proxy, is usually a family member or a friend. You may choose more than 1 proxy.  Do not resuscitate (DNR) order:  A DNR order is used in case your heart stops beating or you stop breathing. It is a request not to have certain forms of treatment, such as CPR. A DNR order may be included in other types of advance directives.  Medical directive:  This covers the care that you want if you are in a coma, near death, or unable to make decisions for yourself. You can list the treatments you want for each condition. Treatment may include pain medicine, surgery, blood transfusions, dialysis, IV or tube feedings, and a ventilator (breathing machine).  Values history:  This document has questions about your views, beliefs, and how you feel and think about life. This information can help others choose the care that you would choose.  Why are advance directives important?  An advance directive helps you control your care. Although spoken wishes may be used, it is better to have your wishes written down. Spoken wishes can be misunderstood, or not followed. Treatments may be given even if you do not want them. An advance directive may make it easier for your family to make difficult choices about your care.   Weight Management   Why it is important to manage your weight:  Being overweight increases your risk of health conditions such as heart disease, high blood pressure, type 2  diabetes, and certain types of cancer. It can also increase your risk for osteoarthritis, sleep apnea, and other respiratory problems. Aim for a slow, steady weight loss. Even a small amount of weight loss can lower your risk of health problems.  How to lose weight safely:  A safe and healthy way to lose weight is to eat fewer calories and get regular exercise. You can lose up about 1 pound a week by decreasing the number of calories you eat by 500 calories each day.   Healthy meal plan for weight management:  A healthy meal plan includes a variety of foods, contains fewer calories, and helps you stay healthy. A healthy meal plan includes the following:  Eat whole-grain foods more often.  A healthy meal plan should contain fiber. Fiber is the part of grains, fruits, and vegetables that is not broken down by your body. Whole-grain foods are healthy and provide extra fiber in your diet. Some examples of whole-grain foods are whole-wheat breads and pastas, oatmeal, brown rice, and bulgur.  Eat a variety of vegetables every day.  Include dark, leafy greens such as spinach, kale, baldev greens, and mustard greens. Eat yellow and orange vegetables such as carrots, sweet potatoes, and winter squash.   Eat a variety of fruits every day.  Choose fresh or canned fruit (canned in its own juice or light syrup) instead of juice. Fruit juice has very little or no fiber.  Eat low-fat dairy foods.  Drink fat-free (skim) milk or 1% milk. Eat fat-free yogurt and low-fat cottage cheese. Try low-fat cheeses such as mozzarella and other reduced-fat cheeses.  Choose meat and other protein foods that are low in fat.  Choose beans or other legumes such as split peas or lentils. Choose fish, skinless poultry (chicken or turkey), or lean cuts of red meat (beef or pork). Before you cook meat or poultry, cut off any visible fat.   Use less fat and oil.  Try baking foods instead of frying them. Add less fat, such as margarine, sour cream,  regular salad dressing and mayonnaise to foods. Eat fewer high-fat foods. Some examples of high-fat foods include french fries, doughnuts, ice cream, and cakes.  Eat fewer sweets.  Limit foods and drinks that are high in sugar. This includes candy, cookies, regular soda, and sweetened drinks.  Exercise:  Exercise at least 30 minutes per day on most days of the week. Some examples of exercise include walking, biking, dancing, and swimming. You can also fit in more physical activity by taking the stairs instead of the elevator or parking farther away from stores. Ask your healthcare provider about the best exercise plan for you.      © Copyright [employer] 2018 Information is for End User's use only and may not be sold, redistributed or otherwise used for commercial purposes. All illustrations and images included in CareNotes® are the copyrighted property of [employer] or [employer]

## 2024-11-06 NOTE — PROGRESS NOTES
Ambulatory Visit  Name: Nadeem Purdy Jr.      : 1955      MRN: [id number]  Encounter Provider: Jluis Glass MD  Encounter Date: 2024   Encounter department: [facility], later [facility]    Assessment & Plan  Type 2 diabetes mellitus with microalbuminuria, with long-term current use of insulin (HCC)    Lab Results   Component Value Date    HGBA1C 7.7 (H) 2024   Same as above awaiting repeat urine for microalbumin    Orders:  •  insulin isophane-insulin regular (HumuLIN 70/30 KwikPen) 100 units/mL injection pen; Inject 20 Units under the skin 2 (two) times a day  •  Hemoglobin A1C; Future  •  Comprehensive metabolic panel; Future  •  Albumin / creatinine urine ratio; Future  •  Urinalysis with microscopic; Future    Allergic rhinitis due to pollen, unspecified seasonality  Symptoms controlled agree continue manage medication follow    Flonase 1 puff each nostril twice a day with Claritin daily    Orders:  •  Azelastine HCl 137 MCG/SPRAY SOLN; 1 puff each nostril twice a day    Numbness of left foot    Orders:  •  gabapentin (NEURONTIN) 100 mg capsule; Take 2 capsules (200 mg total) by mouth daily at bedtime    Edema of both legs  The edema controlled    Agree continue med medication follow    Lasix 40 mg daily with low-salt diet fluid restriction check BMP    Orders:  •  furosemide (LASIX) 40 mg tablet; Take 1 tablet (40 mg total) by mouth daily  •  potassium chloride (Klor-Con M20) 20 mEq tablet; Take 1 tablet (20 mEq total) by mouth daily  •  Comprehensive metabolic panel; Future    Colon cancer screening    Orders:  •  Ambulatory Referral to Gastroenterology; Future    Screening for deficiency anemia    Orders:  •  CBC and differential; Future    Essential hypertension  Blood pressure very well-controlled asymptomatic    Agree continue manage medication as follow    Amlodipine 10 mg daily     Jardiance 25 mg daily     Lasix 40 mg daily     Metoprolol  mg daily    Check  BMP urinalysis before next visit    Orders:  •  Comprehensive metabolic panel; Future    Hypertensive kidney disease with stage 3a chronic kidney disease (HCC)  Lab Results   Component Value Date    EGFR 60 07/08/2024    EGFR 55 12/22/2023    EGFR 61 09/25/2023    CREATININE 1.21 07/08/2024    CREATININE 1.31 (H) 12/22/2023    CREATININE 1.20 09/25/2023   GFR stable at 60 creatinine 1.31 at baseline avoid nephrotoxic blood pressure controlled repeat BMP before next visit    Orders:  •  Comprehensive metabolic panel; Future    Type 2 diabetes mellitus with hyperglycemia, with long-term current use of insulin (HCC)    Lab Results   Component Value Date    HGBA1C 7.7 (H) 07/08/2024   A1c improved from the last 1 as above monitoring blood sugar at home seems to be improving agree continue manage medication follow    Metformin 1000 mg twice a day    170 3020 units in the morning and 20 5 in the evening with diabetic diet    Jardiance 25 mg daily    Hypoglycemia protocol in place    Check A1c urine for microalbumin before next visit    Orders:  •  Comprehensive metabolic panel; Future    Mixed hyperlipidemia    Orders:  •  Lipid Panel with Direct LDL reflex; Future    Vitamin D deficiency            Preventive health issues were discussed with patient, and age appropriate screening tests were ordered as noted in patient's After Visit Summary. Personalized health advice and appropriate referrals for health education or preventive services given if needed, as noted in patient's After Visit Summary.    History of Present Illness     Came in follow-up chronic medical condition listed visit diagnosis denies any chest pain difficulty breathing patient's leg edema improved previous labs reviewed patient seen by orthopedist for the knee pain    Complete new set of labs request given to the patient to be done before the next visit    Complete annual wellness exam done for counseling screening follow-up recommendations see attached  encounter     Patient Care Team:  Jluis Glass MD as PCP - General (Internal Medicine)  Jorge A Templeton DO (Cardiology)    Review of Systems   Constitutional:  Positive for activity change. Negative for appetite change, chills, diaphoresis, fatigue, fever and unexpected weight change.   HENT:  Negative for congestion, dental problem, drooling, ear discharge, ear pain, facial swelling, hearing loss, mouth sores, nosebleeds, postnasal drip, rhinorrhea, sinus pressure, sneezing, sore throat, tinnitus, trouble swallowing and voice change.    Eyes:  Negative for photophobia, pain, discharge, redness, itching and visual disturbance.   Respiratory:  Negative for apnea, cough, choking, chest tightness, shortness of breath, wheezing and stridor.    Cardiovascular:  Positive for leg swelling. Negative for chest pain and palpitations.   Gastrointestinal:  Negative for abdominal distention, abdominal pain, anal bleeding, blood in stool, constipation, diarrhea, nausea, rectal pain and vomiting.   Endocrine: Negative for cold intolerance, heat intolerance, polydipsia, polyphagia and polyuria.   Genitourinary:  Negative for decreased urine volume, difficulty urinating, dysuria, enuresis, flank pain, frequency, genital sores, hematuria and urgency.   Musculoskeletal:  Positive for arthralgias and back pain. Negative for gait problem, joint swelling, myalgias, neck pain and neck stiffness.   Skin:  Negative for color change, pallor, rash and wound.   Allergic/Immunologic: Negative.  Negative for environmental allergies, food allergies and immunocompromised state.   Neurological:  Negative for dizziness, tremors, seizures, syncope, facial asymmetry, speech difficulty, weakness, light-headedness, numbness and headaches.   Psychiatric/Behavioral:  Negative for agitation, behavioral problems, confusion, decreased concentration, dysphoric mood, hallucinations, self-injury, sleep disturbance and suicidal ideas. The patient is not  nervous/anxious and is not hyperactive.      Medical History Reviewed by provider this encounter:       Annual Wellness Visit Questionnaire   Nadeem is here for his Subsequent Wellness visit. Last Medicare Wellness visit information reviewed, patient interviewed and updates made to the record today.      Health Risk Assessment:   Patient rates overall health as very good. Patient feels that their physical health rating is same. Patient is very satisfied with their life. Eyesight was rated as same. Hearing was rated as same. Patient feels that their emotional and mental health rating is same. Patients states they are never, rarely angry. Patient states they are never, rarely unusually tired/fatigued. Pain experienced in the last 7 days has been none. Patient states that he has experienced no weight loss or gain in last 6 months. Weight is stable    Depression Screening:   PHQ-2 Score: 1      Fall Risk Screening:   In the past year, patient has experienced: no history of falling in past year      Home Safety:  Patient has trouble with stairs inside or outside of their home. Patient has working smoke alarms and has working carbon monoxide detector. Home safety hazards include: none. No home safety hazards.  Has a little trouble going up stairs with arthritis in R knee.  Pt feels safe at home    Nutrition:   Current diet is Diabetic and Regular.     Medications:   Patient is currently taking over-the-counter supplements. OTC medications include: see medication list. Patient is able to manage medications. No problems managing meds    Activities of Daily Living (ADLs)/Instrumental Activities of Daily Living (IADLs):   Walk and transfer into and out of bed and chair?: Yes  Dress and groom yourself?: Yes    Bathe or shower yourself?: Yes    Feed yourself? Yes  Do your laundry/housekeeping?: Yes  Manage your money, pay your bills and track your expenses?: Yes  Make your own meals?: Yes    Do your own shopping?: Yes    ADL  comments: Pt is independent.    Previous Hospitalizations:   Any hospitalizations or ED visits within the last 12 months?: No      Advance Care Planning:   Living will: Yes    Durable POA for healthcare: Yes    Advanced directive: Yes    Advanced directive counseling given: No    Five wishes given: No    Patient declined ACP directive: No    End of Life Decisions reviewed with patient: Yes    Provider agrees with end of life decisions: Yes      Cognitive Screening:   Provider or family/friend/caregiver concerned regarding cognition?: No    PREVENTIVE SCREENINGS      Cardiovascular Screening:    General: Screening Not Indicated and History Lipid Disorder      Diabetes Screening:     General: Screening Not Indicated and History Diabetes      Colorectal Cancer Screening:     General: Risks and Benefits Discussed and Patient Declines    Due for: Colonoscopy - Low Risk      Prostate Cancer Screening:    General: Screening Current      Osteoporosis Screening:    General: Screening Not Indicated      Abdominal Aortic Aneurysm (AAA) Screening:    Risk factors include: family history of AAA, age between 65-76 yo and tobacco use        General: Risks and Benefits Discussed    Due for: Screening AAA Ultrasound      Lung Cancer Screening:     General: Screening Not Indicated      Hepatitis C Screening:    General: Screening Current    Hep C Screening Accepted: Yes      Screening, Brief Intervention, and Referral to Treatment (SBIRT)    Screening    Typical number of drinks in a week: 5    Single Item Drug Screening:  How often have you used an illegal drug (including marijuana) or a prescription medication for non-medical reasons in the past year? never    Single Item Drug Screen Score: 0  Interpretation: Negative screen for possible drug use disorder    Brief Intervention  Alcohol & drug use screenings were reviewed. No concerns regarding substance use disorder identified. Healthy alcohol use/limits discussed.     Other  Counseling Topics:   Car/seat belt/driving safety and skin self-exam.     Social Determinants of Health     Financial Resource Strain: Low Risk  (9/28/2023)    Overall Financial Resource Strain (CARDIA)    • Difficulty of Paying Living Expenses: Not hard at all   Transportation Needs: No Transportation Needs (9/28/2023)    PRAPARE - Transportation    • Lack of Transportation (Medical): No    • Lack of Transportation (Non-Medical): No     No results found.    Objective     There were no vitals taken for this visit.    Physical Exam  Vitals and nursing note reviewed.   Constitutional:       General: He is not in acute distress.     Appearance: He is well-developed. He is not ill-appearing, toxic-appearing or diaphoretic.   HENT:      Head: Normocephalic and atraumatic.      Right Ear: External ear normal.      Left Ear: External ear normal.      Nose: Nose normal.      Mouth/Throat:      Pharynx: No oropharyngeal exudate.   Eyes:      General: Lids are normal. Lids are everted, no foreign bodies appreciated. No scleral icterus.        Right eye: No discharge.         Left eye: No discharge.      Conjunctiva/sclera: Conjunctivae normal.      Pupils: Pupils are equal, round, and reactive to light.   Neck:      Thyroid: No thyromegaly.      Vascular: Normal carotid pulses. No carotid bruit, hepatojugular reflux or JVD.      Trachea: No tracheal tenderness or tracheal deviation.   Cardiovascular:      Rate and Rhythm: Normal rate and regular rhythm.      Pulses: Normal pulses.      Heart sounds: Normal heart sounds. No murmur heard.     No friction rub. No gallop.   Pulmonary:      Effort: Pulmonary effort is normal. No respiratory distress.      Breath sounds: Normal breath sounds. No stridor. No wheezing or rales.   Chest:      Chest wall: No tenderness.   Abdominal:      General: Bowel sounds are normal. There is no distension.      Palpations: Abdomen is soft. There is no mass.      Tenderness: There is no abdominal  tenderness. There is no guarding or rebound.   Musculoskeletal:         General: No tenderness or deformity. Normal range of motion.      Cervical back: Normal range of motion. Rigidity present. No edema or erythema. No spinous process tenderness or muscular tenderness. Normal range of motion.   Lymphadenopathy:      Head:      Right side of head: No submental, submandibular, tonsillar, preauricular or posterior auricular adenopathy.      Left side of head: No submental, submandibular, tonsillar, preauricular, posterior auricular or occipital adenopathy.      Cervical: No cervical adenopathy.      Right cervical: No superficial, deep or posterior cervical adenopathy.     Left cervical: No superficial, deep or posterior cervical adenopathy.      Upper Body:      Right upper body: No pectoral adenopathy.      Left upper body: No pectoral adenopathy.   Skin:     General: Skin is warm and dry.      Coloration: Skin is not pale.      Findings: No erythema or rash.   Neurological:      Mental Status: He is alert and oriented to person, place, and time.      Cranial Nerves: No cranial nerve deficit.      Sensory: No sensory deficit.      Motor: No tremor, abnormal muscle tone or seizure activity.      Coordination: Coordination normal.      Gait: Gait normal.      Deep Tendon Reflexes: Reflexes are normal and symmetric. Reflexes normal.   Psychiatric:         Behavior: Behavior normal.         Thought Content: Thought content normal.         Judgment: Judgment normal.

## 2024-11-06 NOTE — ASSESSMENT & PLAN NOTE
Blood pressure very well-controlled asymptomatic    Agree continue manage medication as follow    Amlodipine 10 mg daily     Jardiance 25 mg daily     Lasix 40 mg daily     Metoprolol  mg daily    Check BMP urinalysis before next visit    Orders:    Comprehensive metabolic panel; Future

## 2024-11-06 NOTE — ASSESSMENT & PLAN NOTE
Orders:    gabapentin (NEURONTIN) 100 mg capsule; Take 2 capsules (200 mg total) by mouth daily at bedtime

## 2024-11-06 NOTE — ASSESSMENT & PLAN NOTE
The edema controlled    Agree continue med medication follow    Lasix 40 mg daily with low-salt diet fluid restriction check BMP    Orders:    furosemide (LASIX) 40 mg tablet; Take 1 tablet (40 mg total) by mouth daily    potassium chloride (Klor-Con M20) 20 mEq tablet; Take 1 tablet (20 mEq total) by mouth daily    Comprehensive metabolic panel; Future

## 2024-11-06 NOTE — ASSESSMENT & PLAN NOTE
Lab Results   Component Value Date    EGFR 60 07/08/2024    EGFR 55 12/22/2023    EGFR 61 09/25/2023    CREATININE 1.21 07/08/2024    CREATININE 1.31 (H) 12/22/2023    CREATININE 1.20 09/25/2023   GFR stable at 60 creatinine 1.31 at baseline avoid nephrotoxic blood pressure controlled repeat BMP before next visit    Orders:    Comprehensive metabolic panel; Future

## 2024-11-06 NOTE — ASSESSMENT & PLAN NOTE
Symptoms controlled agree continue manage medication follow    Flonase 1 puff each nostril twice a day with Claritin daily    Orders:    Azelastine HCl 137 MCG/SPRAY SOLN; 1 puff each nostril twice a day

## 2024-11-06 NOTE — ASSESSMENT & PLAN NOTE
Lab Results   Component Value Date    HGBA1C 7.7 (H) 07/08/2024   A1c improved from the last 1 as above monitoring blood sugar at home seems to be improving agree continue manage medication follow    Metformin 1000 mg twice a day    170 3020 units in the morning and 20 5 in the evening with diabetic diet    Jardiance 25 mg daily    Hypoglycemia protocol in place    Check A1c urine for microalbumin before next visit    Orders:    Comprehensive metabolic panel; Future

## 2024-11-06 NOTE — ASSESSMENT & PLAN NOTE
Lab Results   Component Value Date    HGBA1C 7.7 (H) 07/08/2024   Same as above awaiting repeat urine for microalbumin    Orders:    insulin isophane-insulin regular (HumuLIN 70/30 KwikPen) 100 units/mL injection pen; Inject 20 Units under the skin 2 (two) times a day    Hemoglobin A1C; Future    Comprehensive metabolic panel; Future    Albumin / creatinine urine ratio; Future    Urinalysis with microscopic; Future

## 2025-02-03 ENCOUNTER — APPOINTMENT (OUTPATIENT)
Dept: LAB | Facility: CLINIC | Age: 70
End: 2025-02-03
Payer: MEDICARE

## 2025-02-03 DIAGNOSIS — Z79.4 TYPE 2 DIABETES MELLITUS WITH MICROALBUMINURIA, WITH LONG-TERM CURRENT USE OF INSULIN (HCC): ICD-10-CM

## 2025-02-03 DIAGNOSIS — I10 ESSENTIAL HYPERTENSION: ICD-10-CM

## 2025-02-03 DIAGNOSIS — I12.9 HYPERTENSIVE KIDNEY DISEASE WITH STAGE 3A CHRONIC KIDNEY DISEASE (HCC): ICD-10-CM

## 2025-02-03 DIAGNOSIS — N18.31 HYPERTENSIVE KIDNEY DISEASE WITH STAGE 3A CHRONIC KIDNEY DISEASE (HCC): ICD-10-CM

## 2025-02-03 DIAGNOSIS — E78.2 MIXED HYPERLIPIDEMIA: ICD-10-CM

## 2025-02-03 DIAGNOSIS — Z13.0 SCREENING FOR DEFICIENCY ANEMIA: ICD-10-CM

## 2025-02-03 DIAGNOSIS — E11.65 TYPE 2 DIABETES MELLITUS WITH HYPERGLYCEMIA, WITH LONG-TERM CURRENT USE OF INSULIN (HCC): ICD-10-CM

## 2025-02-03 DIAGNOSIS — R60.0 EDEMA OF BOTH LEGS: ICD-10-CM

## 2025-02-03 DIAGNOSIS — Z79.4 TYPE 2 DIABETES MELLITUS WITH HYPERGLYCEMIA, WITH LONG-TERM CURRENT USE OF INSULIN (HCC): ICD-10-CM

## 2025-02-03 DIAGNOSIS — E11.29 TYPE 2 DIABETES MELLITUS WITH MICROALBUMINURIA, WITH LONG-TERM CURRENT USE OF INSULIN (HCC): ICD-10-CM

## 2025-02-03 DIAGNOSIS — R80.9 TYPE 2 DIABETES MELLITUS WITH MICROALBUMINURIA, WITH LONG-TERM CURRENT USE OF INSULIN (HCC): ICD-10-CM

## 2025-02-03 LAB
ALBUMIN SERPL BCG-MCNC: 3.8 G/DL (ref 3.5–5)
ALP SERPL-CCNC: 38 U/L (ref 34–104)
ALT SERPL W P-5'-P-CCNC: 13 U/L (ref 7–52)
ANION GAP SERPL CALCULATED.3IONS-SCNC: 13 MMOL/L (ref 4–13)
AST SERPL W P-5'-P-CCNC: 16 U/L (ref 13–39)
BACTERIA UR QL AUTO: ABNORMAL /HPF
BASOPHILS # BLD AUTO: 0.06 THOUSANDS/ΜL (ref 0–0.1)
BASOPHILS NFR BLD AUTO: 1 % (ref 0–1)
BILIRUB SERPL-MCNC: 0.38 MG/DL (ref 0.2–1)
BILIRUB UR QL STRIP: NEGATIVE
BUN SERPL-MCNC: 20 MG/DL (ref 5–25)
CALCIUM SERPL-MCNC: 9.2 MG/DL (ref 8.4–10.2)
CHLORIDE SERPL-SCNC: 103 MMOL/L (ref 96–108)
CHOLEST SERPL-MCNC: 151 MG/DL (ref ?–200)
CLARITY UR: CLEAR
CO2 SERPL-SCNC: 25 MMOL/L (ref 21–32)
COLOR UR: ABNORMAL
CREAT SERPL-MCNC: 1.13 MG/DL (ref 0.6–1.3)
CREAT UR-MCNC: 118.2 MG/DL
EOSINOPHIL # BLD AUTO: 0.47 THOUSAND/ΜL (ref 0–0.61)
EOSINOPHIL NFR BLD AUTO: 6 % (ref 0–6)
ERYTHROCYTE [DISTWIDTH] IN BLOOD BY AUTOMATED COUNT: 13.2 % (ref 11.6–15.1)
EST. AVERAGE GLUCOSE BLD GHB EST-MCNC: 194 MG/DL
GFR SERPL CREATININE-BSD FRML MDRD: 65 ML/MIN/1.73SQ M
GLUCOSE P FAST SERPL-MCNC: 80 MG/DL (ref 65–99)
GLUCOSE UR STRIP-MCNC: NEGATIVE MG/DL
HBA1C MFR BLD: 8.4 %
HCT VFR BLD AUTO: 39.5 % (ref 36.5–49.3)
HDLC SERPL-MCNC: 80 MG/DL
HGB BLD-MCNC: 13.3 G/DL (ref 12–17)
HGB UR QL STRIP.AUTO: NEGATIVE
IMM GRANULOCYTES # BLD AUTO: 0.05 THOUSAND/UL (ref 0–0.2)
IMM GRANULOCYTES NFR BLD AUTO: 1 % (ref 0–2)
KETONES UR STRIP-MCNC: NEGATIVE MG/DL
LDLC SERPL CALC-MCNC: 44 MG/DL (ref 0–100)
LEUKOCYTE ESTERASE UR QL STRIP: NEGATIVE
LYMPHOCYTES # BLD AUTO: 2.86 THOUSANDS/ΜL (ref 0.6–4.47)
LYMPHOCYTES NFR BLD AUTO: 34 % (ref 14–44)
MCH RBC QN AUTO: 31.9 PG (ref 26.8–34.3)
MCHC RBC AUTO-ENTMCNC: 33.7 G/DL (ref 31.4–37.4)
MCV RBC AUTO: 95 FL (ref 82–98)
MICROALBUMIN UR-MCNC: 117.3 MG/L
MICROALBUMIN/CREAT 24H UR: 99 MG/G CREATININE (ref 0–30)
MONOCYTES # BLD AUTO: 0.76 THOUSAND/ΜL (ref 0.17–1.22)
MONOCYTES NFR BLD AUTO: 9 % (ref 4–12)
NEUTROPHILS # BLD AUTO: 4.2 THOUSANDS/ΜL (ref 1.85–7.62)
NEUTS SEG NFR BLD AUTO: 49 % (ref 43–75)
NITRITE UR QL STRIP: NEGATIVE
NON-SQ EPI CELLS URNS QL MICRO: ABNORMAL /HPF
NRBC BLD AUTO-RTO: 0 /100 WBCS
PH UR STRIP.AUTO: 6 [PH]
PLATELET # BLD AUTO: 246 THOUSANDS/UL (ref 149–390)
PMV BLD AUTO: 9 FL (ref 8.9–12.7)
POTASSIUM SERPL-SCNC: 4.5 MMOL/L (ref 3.5–5.3)
PROT SERPL-MCNC: 7.1 G/DL (ref 6.4–8.4)
PROT UR STRIP-MCNC: ABNORMAL MG/DL
RBC # BLD AUTO: 4.17 MILLION/UL (ref 3.88–5.62)
RBC #/AREA URNS AUTO: ABNORMAL /HPF
SODIUM SERPL-SCNC: 141 MMOL/L (ref 135–147)
SP GR UR STRIP.AUTO: 1.02 (ref 1–1.03)
TRIGL SERPL-MCNC: 134 MG/DL (ref ?–150)
UROBILINOGEN UR STRIP-ACNC: <2 MG/DL
WBC # BLD AUTO: 8.4 THOUSAND/UL (ref 4.31–10.16)
WBC #/AREA URNS AUTO: ABNORMAL /HPF

## 2025-02-03 PROCEDURE — 80053 COMPREHEN METABOLIC PANEL: CPT

## 2025-02-03 PROCEDURE — 36415 COLL VENOUS BLD VENIPUNCTURE: CPT

## 2025-02-03 PROCEDURE — 83036 HEMOGLOBIN GLYCOSYLATED A1C: CPT

## 2025-02-03 PROCEDURE — 85025 COMPLETE CBC W/AUTO DIFF WBC: CPT

## 2025-02-03 PROCEDURE — 82570 ASSAY OF URINE CREATININE: CPT

## 2025-02-03 PROCEDURE — 81001 URINALYSIS AUTO W/SCOPE: CPT

## 2025-02-03 PROCEDURE — 82043 UR ALBUMIN QUANTITATIVE: CPT

## 2025-02-03 PROCEDURE — 80061 LIPID PANEL: CPT

## 2025-02-04 ENCOUNTER — RESULTS FOLLOW-UP (OUTPATIENT)
Dept: FAMILY MEDICINE CLINIC | Facility: CLINIC | Age: 70
End: 2025-02-04

## 2025-02-07 ENCOUNTER — OFFICE VISIT (OUTPATIENT)
Dept: INTERNAL MEDICINE CLINIC | Facility: CLINIC | Age: 70
End: 2025-02-07
Payer: MEDICARE

## 2025-02-07 VITALS
SYSTOLIC BLOOD PRESSURE: 138 MMHG | DIASTOLIC BLOOD PRESSURE: 68 MMHG | OXYGEN SATURATION: 95 % | HEART RATE: 68 BPM | WEIGHT: 309 LBS | BODY MASS INDEX: 43.26 KG/M2 | HEIGHT: 71 IN

## 2025-02-07 DIAGNOSIS — N18.31 HYPERTENSIVE KIDNEY DISEASE WITH STAGE 3A CHRONIC KIDNEY DISEASE (HCC): ICD-10-CM

## 2025-02-07 DIAGNOSIS — R60.0 EDEMA OF BOTH LEGS: ICD-10-CM

## 2025-02-07 DIAGNOSIS — E66.813 CLASS 3 SEVERE OBESITY DUE TO EXCESS CALORIES WITH SERIOUS COMORBIDITY AND BODY MASS INDEX (BMI) OF 40.0 TO 44.9 IN ADULT (HCC): ICD-10-CM

## 2025-02-07 DIAGNOSIS — I12.9 HYPERTENSIVE KIDNEY DISEASE WITH STAGE 3A CHRONIC KIDNEY DISEASE (HCC): ICD-10-CM

## 2025-02-07 DIAGNOSIS — R80.9 TYPE 2 DIABETES MELLITUS WITH MICROALBUMINURIA, WITH LONG-TERM CURRENT USE OF INSULIN (HCC): Primary | ICD-10-CM

## 2025-02-07 DIAGNOSIS — E78.00 HYPERCHOLESTEREMIA: ICD-10-CM

## 2025-02-07 DIAGNOSIS — Z79.4 TYPE 2 DIABETES MELLITUS WITH MICROALBUMINURIA, WITH LONG-TERM CURRENT USE OF INSULIN (HCC): Primary | ICD-10-CM

## 2025-02-07 DIAGNOSIS — E66.01 CLASS 3 SEVERE OBESITY DUE TO EXCESS CALORIES WITH SERIOUS COMORBIDITY AND BODY MASS INDEX (BMI) OF 40.0 TO 44.9 IN ADULT (HCC): ICD-10-CM

## 2025-02-07 DIAGNOSIS — R20.0 NUMBNESS OF LEFT FOOT: ICD-10-CM

## 2025-02-07 DIAGNOSIS — E11.29 TYPE 2 DIABETES MELLITUS WITH MICROALBUMINURIA, WITH LONG-TERM CURRENT USE OF INSULIN (HCC): Primary | ICD-10-CM

## 2025-02-07 PROCEDURE — G2211 COMPLEX E/M VISIT ADD ON: HCPCS | Performed by: INTERNAL MEDICINE

## 2025-02-07 PROCEDURE — 99214 OFFICE O/P EST MOD 30 MIN: CPT | Performed by: INTERNAL MEDICINE

## 2025-02-07 RX ORDER — GABAPENTIN 100 MG/1
200 CAPSULE ORAL
Qty: 180 CAPSULE | Refills: 1 | Status: SHIPPED | OUTPATIENT
Start: 2025-02-07

## 2025-02-07 RX ORDER — POTASSIUM CHLORIDE 1500 MG/1
20 TABLET, EXTENDED RELEASE ORAL DAILY
Qty: 90 TABLET | Refills: 1 | Status: SHIPPED | OUTPATIENT
Start: 2025-02-07

## 2025-02-07 NOTE — ASSESSMENT & PLAN NOTE
Lab Results   Component Value Date    HGBA1C 8.4 (H) 02/03/2025   A1c reviewed as above elevated 8.4    Continue main medication as follow    Metformin 1000 mg twice a day     70 /30 20 units in the morning and 25 in the evening with diabetic diet     Jardiance 25 mg daily  This is the diabetic diet rather than adjusting the dose of insulin  Hypoglycemia protocol in place    Asymptomatic

## 2025-02-07 NOTE — ASSESSMENT & PLAN NOTE
Lab Results   Component Value Date    LDLCALC 44 02/03/2025      Lab Results   Component Value Date    ALT 13 02/03/2025    AST 16 02/03/2025    ALKPHOS 38 02/03/2025    BILITOT 0.5 06/06/2018   Very well-controlled no side effect LFT normal    Continue manage medication as follow  Lipitor 40 mg daily with low-fat low-cholesterol diet above reviewed

## 2025-02-07 NOTE — ASSESSMENT & PLAN NOTE
BMI 43.10 associated with type 2 diabetes hyperlipidemia hypertension counseling done to lose weight diet exercise cut down the portion calorie change lifestyle    Send done as follow    BMI Counseling:      The BMI is above normal. Know Body weight goal    Weigh yourself daily or weekly as per your doctor    Nutrition recommendations include decreasing portion sizes, encouraging healthy choices of fruits and vegetables, decreasing     fast food intake, consuming healthier snacks, limiting drinks that contain sugar, moderation in carbohydrate intake, increasing     intake of lean protein, reducing intake of saturated and trans fat and reducing intake of cholesterol  Discussed options of HealthyCORE-Intensive Lifestyle Intervention Program, Very Low Calorie Diet-VLCD and Conservative Program and the role of weight loss medications.  - Explained the importance of making lifestyle changes in addition to starting anti-obesity medications.   -

## 2025-02-07 NOTE — ASSESSMENT & PLAN NOTE
Agree continue Lasix 20 mg daily with potassium chloride seems to be helping patient control the swelling and edema  Orders:  •  potassium chloride (Klor-Con M20) 20 mEq tablet; Take 1 tablet (20 mEq total) by mouth daily

## 2025-02-07 NOTE — PROGRESS NOTES
Name: Nadeem Purdy Jr.      : 1955      MRN: [id number]  Encounter Provider: Jluis Glass MD  Encounter Date: 2025   Encounter department: [facility] INTERNAL MEDICINE  :  Assessment & Plan  Class 3 severe obesity due to excess calories with serious comorbidity and body mass index (BMI) of 40.0 to 44.9 in adult (HCC)  BMI 43.10 associated with type 2 diabetes hyperlipidemia hypertension counseling done to lose weight diet exercise cut down the portion calorie change lifestyle    Send done as follow    BMI Counseling:      The BMI is above normal. Know Body weight goal    Weigh yourself daily or weekly as per your doctor    Nutrition recommendations include decreasing portion sizes, encouraging healthy choices of fruits and vegetables, decreasing     fast food intake, consuming healthier snacks, limiting drinks that contain sugar, moderation in carbohydrate intake, increasing     intake of lean protein, reducing intake of saturated and trans fat and reducing intake of cholesterol  Discussed options of HealthyCORE-Intensive Lifestyle Intervention Program, Very Low Calorie Diet-VLCD and Conservative Program and the role of weight loss medications.  - Explained the importance of making lifestyle changes in addition to starting anti-obesity medications.   -         Type 2 diabetes mellitus with microalbuminuria, with long-term current use of insulin ([facility])    Lab Results   Component Value Date    HGBA1C 8.4 (H) 2025   A1c reviewed as above elevated 8.4    Continue main medication as follow    Metformin 1000 mg twice a day     70 /30 20 units in the morning and 25 in the evening with diabetic diet     Jardiance 25 mg daily  This is the diabetic diet rather than adjusting the dose of insulin  Hypoglycemia protocol in place    Asymptomatic         Hypertensive kidney disease with stage 3a chronic kidney disease ([facility])  Lab Results   Component Value Date    EGFR 65 2025    EGFR 60  07/08/2024    EGFR 55 12/22/2023    CREATININE 1.13 02/03/2025    CREATININE 1.21 07/08/2024    CREATININE 1.31 (H) 12/22/2023     ESR improved 65 creatinine 1.13 stable at baseline avoid nephrotoxic blood pressure very well-controlled agree continue manage medication as follow BMP reviewed     Amlodipine 10 mg daily     Jardiance 25 mg daily     Lasix 40 mg daily     Metoprolol  mg daily low-salt diet BMP reviewed as follows    Lab Results   Component Value Date    SODIUM 141 02/03/2025    K 4.5 02/03/2025     02/03/2025    CO2 25 02/03/2025    BUN 20 02/03/2025    CREATININE 1.13 02/03/2025    CALCIUM 9.2 02/03/2025      Hypercholesteremia  Lab Results   Component Value Date    LDLCALC 44 02/03/2025      Lab Results   Component Value Date    ALT 13 02/03/2025    AST 16 02/03/2025    ALKPHOS 38 02/03/2025    BILITOT 0.5 06/06/2018   Very well-controlled no side effect LFT normal    Continue manage medication as follow  Lipitor 40 mg daily with low-fat low-cholesterol diet above reviewed       Numbness of left foot  Due to diabetes Nexium Tums controlled with gabapentin 100 mg 2 capsule at bedtime seems to be helping patient control the symptoms  Orders:  •  gabapentin (NEURONTIN) 100 mg capsule; Take 2 capsules (200 mg total) by mouth daily at bedtime    Edema of both legs  Agree continue Lasix 20 mg daily with potassium chloride seems to be helping patient control the swelling and edema  Orders:  •  potassium chloride (Klor-Con M20) 20 mEq tablet; Take 1 tablet (20 mEq total) by mouth daily           History of Present Illness   Came in for follow-up chronic medical condition list visit diagnoses no chest pain difficulty breathing no new complaint except persistent swelling both legs which is unchanged from the last visit and came in to review the labs all the labs reviewed discussed at length for detail refer to assessment plan visit diagnosis      Review of Systems   Constitutional:  Positive for  "activity change. Negative for appetite change, chills, diaphoresis, fatigue, fever and unexpected weight change.   HENT:  Negative for congestion, dental problem, drooling, ear discharge, ear pain, facial swelling, hearing loss, mouth sores, nosebleeds, postnasal drip, rhinorrhea, sinus pressure, sneezing, sore throat, tinnitus, trouble swallowing and voice change.    Eyes:  Negative for photophobia, pain, discharge, redness, itching and visual disturbance.   Respiratory:  Negative for apnea, cough, choking, chest tightness, shortness of breath, wheezing and stridor.    Cardiovascular:  Positive for leg swelling. Negative for chest pain and palpitations.   Gastrointestinal:  Negative for abdominal distention, abdominal pain, anal bleeding, blood in stool, constipation, diarrhea, nausea, rectal pain and vomiting.   Endocrine: Negative for cold intolerance, heat intolerance, polydipsia, polyphagia and polyuria.   Genitourinary:  Negative for decreased urine volume, difficulty urinating, dysuria, enuresis, flank pain, frequency, genital sores, hematuria and urgency.   Musculoskeletal:  Positive for arthralgias and back pain. Negative for gait problem, joint swelling, myalgias, neck pain and neck stiffness.   Skin:  Negative for color change, pallor, rash and wound.   Allergic/Immunologic: Negative.  Negative for environmental allergies, food allergies and immunocompromised state.   Neurological:  Negative for dizziness, tremors, seizures, syncope, facial asymmetry, speech difficulty, weakness, light-headedness, numbness and headaches.   Psychiatric/Behavioral:  Negative for agitation, behavioral problems, confusion, decreased concentration, dysphoric mood, hallucinations, self-injury, sleep disturbance and suicidal ideas. The patient is not nervous/anxious and is not hyperactive.        Objective   /68   Pulse 68   Ht 5' 11\" (1.803 m)   Wt (!) 140 kg (309 lb)   SpO2 95%   BMI 43.10 kg/m²      Physical " Exam  Vitals and nursing note reviewed.   Constitutional:       General: He is not in acute distress.     Appearance: He is well-developed. He is not ill-appearing, toxic-appearing or diaphoretic.   HENT:      Head: Normocephalic and atraumatic.      Right Ear: External ear normal.      Left Ear: External ear normal.      Nose: Nose normal.      Mouth/Throat:      Pharynx: No oropharyngeal exudate.   Eyes:      General: Lids are normal. Lids are everted, no foreign bodies appreciated. No scleral icterus.        Right eye: No discharge.         Left eye: No discharge.      Conjunctiva/sclera: Conjunctivae normal.      Pupils: Pupils are equal, round, and reactive to light.   Neck:      Thyroid: No thyromegaly.      Vascular: Normal carotid pulses. No carotid bruit, hepatojugular reflux or JVD.      Trachea: No tracheal tenderness or tracheal deviation.   Cardiovascular:      Rate and Rhythm: Normal rate and regular rhythm.      Pulses: Normal pulses.      Heart sounds: Normal heart sounds. No murmur heard.     No friction rub. No gallop.   Pulmonary:      Effort: Pulmonary effort is normal. No respiratory distress.      Breath sounds: Normal breath sounds. No stridor. No wheezing or rales.   Chest:      Chest wall: No tenderness.   Abdominal:      General: Bowel sounds are normal. There is no distension.      Palpations: Abdomen is soft. There is no mass.      Tenderness: There is no abdominal tenderness. There is no guarding or rebound.   Musculoskeletal:         General: No tenderness or deformity. Normal range of motion.      Cervical back: Normal range of motion and neck supple. No edema, erythema or rigidity. No spinous process tenderness or muscular tenderness. Normal range of motion.   Lymphadenopathy:      Head:      Right side of head: No submental, submandibular, tonsillar, preauricular or posterior auricular adenopathy.      Left side of head: No submental, submandibular, tonsillar, preauricular, posterior  auricular or occipital adenopathy.      Cervical: No cervical adenopathy.      Right cervical: No superficial, deep or posterior cervical adenopathy.     Left cervical: No superficial, deep or posterior cervical adenopathy.      Upper Body:      Right upper body: No pectoral adenopathy.      Left upper body: No pectoral adenopathy.   Skin:     General: Skin is warm and dry.      Coloration: Skin is not pale.      Findings: No erythema or rash.   Neurological:      General: No focal deficit present.      Mental Status: He is alert and oriented to person, place, and time.      Cranial Nerves: No cranial nerve deficit.      Sensory: No sensory deficit.      Motor: No tremor, abnormal muscle tone or seizure activity.      Coordination: Coordination normal.      Gait: Gait abnormal.      Deep Tendon Reflexes: Reflexes are normal and symmetric. Reflexes normal.   Psychiatric:         Behavior: Behavior normal.         Thought Content: Thought content normal.         Judgment: Judgment normal.

## 2025-02-07 NOTE — ASSESSMENT & PLAN NOTE
Lab Results   Component Value Date    EGFR 65 02/03/2025    EGFR 60 07/08/2024    EGFR 55 12/22/2023    CREATININE 1.13 02/03/2025    CREATININE 1.21 07/08/2024    CREATININE 1.31 (H) 12/22/2023     ESR improved 65 creatinine 1.13 stable at baseline avoid nephrotoxic blood pressure very well-controlled agree continue manage medication as follow BMP reviewed     Amlodipine 10 mg daily     Jardiance 25 mg daily     Lasix 40 mg daily     Metoprolol  mg daily low-salt diet BMP reviewed as follows    Lab Results   Component Value Date    SODIUM 141 02/03/2025    K 4.5 02/03/2025     02/03/2025    CO2 25 02/03/2025    BUN 20 02/03/2025    CREATININE 1.13 02/03/2025    CALCIUM 9.2 02/03/2025

## 2025-02-07 NOTE — ASSESSMENT & PLAN NOTE
Due to diabetes Nexium Tums controlled with gabapentin 100 mg 2 capsule at bedtime seems to be helping patient control the symptoms  Orders:  •  gabapentin (NEURONTIN) 100 mg capsule; Take 2 capsules (200 mg total) by mouth daily at bedtime

## 2025-02-15 DIAGNOSIS — E10.9 TYPE 1 DIABETES MELLITUS WITHOUT COMPLICATION (HCC): ICD-10-CM

## 2025-02-17 RX ORDER — PEN NEEDLE, DIABETIC 32GX 5/32"
NEEDLE, DISPOSABLE MISCELLANEOUS
Qty: 100 EACH | Refills: 1 | Status: SHIPPED | OUTPATIENT
Start: 2025-02-17

## 2025-02-23 DIAGNOSIS — I10 ESSENTIAL HYPERTENSION: ICD-10-CM

## 2025-02-24 RX ORDER — LOSARTAN POTASSIUM 100 MG/1
100 TABLET ORAL DAILY
Qty: 90 TABLET | Refills: 1 | Status: ON HOLD | OUTPATIENT
Start: 2025-02-24

## 2025-02-26 ENCOUNTER — HOSPITAL ENCOUNTER (INPATIENT)
Facility: HOSPITAL | Age: 70
LOS: 13 days | Discharge: NURSING, REHAB OR LONG-TERM CARE | DRG: 871 | End: 2025-03-11
Attending: ANESTHESIOLOGY | Admitting: ANESTHESIOLOGY
Payer: MEDICARE

## 2025-02-26 ENCOUNTER — APPOINTMENT (EMERGENCY)
Dept: RADIOLOGY | Facility: HOSPITAL | Age: 70
DRG: 871 | End: 2025-02-26
Payer: MEDICARE

## 2025-02-26 DIAGNOSIS — L03.119 CELLULITIS OF LOWER EXTREMITY: ICD-10-CM

## 2025-02-26 DIAGNOSIS — I48.91 ATRIAL FIBRILLATION WITH RVR (HCC): ICD-10-CM

## 2025-02-26 DIAGNOSIS — A41.9 SEPSIS (HCC): ICD-10-CM

## 2025-02-26 DIAGNOSIS — Z91.148 NON COMPLIANCE W MEDICATION REGIMEN: ICD-10-CM

## 2025-02-26 DIAGNOSIS — Z79.4 TYPE 2 DIABETES MELLITUS WITH HYPERGLYCEMIA, WITH LONG-TERM CURRENT USE OF INSULIN (HCC): ICD-10-CM

## 2025-02-26 DIAGNOSIS — E11.10 DKA (DIABETIC KETOACIDOSIS) (HCC): Primary | ICD-10-CM

## 2025-02-26 DIAGNOSIS — L03.116 CELLULITIS OF LEFT LOWER EXTREMITY: ICD-10-CM

## 2025-02-26 DIAGNOSIS — E11.65 TYPE 2 DIABETES MELLITUS WITH HYPERGLYCEMIA, WITH LONG-TERM CURRENT USE OF INSULIN (HCC): ICD-10-CM

## 2025-02-26 DIAGNOSIS — N17.9 AKI (ACUTE KIDNEY INJURY) (HCC): ICD-10-CM

## 2025-02-26 DIAGNOSIS — R60.0 BILATERAL LOWER EXTREMITY EDEMA: ICD-10-CM

## 2025-02-26 DIAGNOSIS — S81.802A WOUND OF LEFT LEG, INITIAL ENCOUNTER: ICD-10-CM

## 2025-02-26 DIAGNOSIS — R00.0 SINUS TACHYCARDIA: ICD-10-CM

## 2025-02-26 DIAGNOSIS — R78.81 BACTEREMIA: ICD-10-CM

## 2025-02-26 DIAGNOSIS — R33.9 URINARY RETENTION: ICD-10-CM

## 2025-02-26 DIAGNOSIS — G47.00 INSOMNIA: ICD-10-CM

## 2025-02-26 DIAGNOSIS — R78.81 STREPTOCOCCAL BACTEREMIA: ICD-10-CM

## 2025-02-26 DIAGNOSIS — B95.5 STREPTOCOCCAL BACTEREMIA: ICD-10-CM

## 2025-02-26 DIAGNOSIS — F10.10 ALCOHOL ABUSE: ICD-10-CM

## 2025-02-26 PROBLEM — F10.90 ALCOHOL USE: Status: ACTIVE | Noted: 2025-02-26

## 2025-02-26 PROBLEM — Z78.9 ALCOHOL USE: Status: ACTIVE | Noted: 2025-02-26

## 2025-02-26 LAB
2HR DELTA HS TROPONIN: -3 NG/L
4HR DELTA HS TROPONIN: -6 NG/L
ALBUMIN SERPL BCG-MCNC: 3.4 G/DL (ref 3.5–5)
ALP SERPL-CCNC: 54 U/L (ref 34–104)
ALT SERPL W P-5'-P-CCNC: 18 U/L (ref 7–52)
ANION GAP SERPL CALCULATED.3IONS-SCNC: 12 MMOL/L (ref 4–13)
ANION GAP SERPL CALCULATED.3IONS-SCNC: 15 MMOL/L (ref 4–13)
ANION GAP SERPL CALCULATED.3IONS-SCNC: 15 MMOL/L (ref 4–13)
ANION GAP SERPL CALCULATED.3IONS-SCNC: 17 MMOL/L (ref 4–13)
APTT PPP: 28 SECONDS (ref 23–34)
AST SERPL W P-5'-P-CCNC: 22 U/L (ref 13–39)
BACTERIA UR QL AUTO: NORMAL /HPF
BASE EX.OXY STD BLDV CALC-SCNC: 60 % (ref 60–80)
BASE EXCESS BLDV CALC-SCNC: -9.9 MMOL/L
BASOPHILS # BLD AUTO: 0.04 THOUSANDS/ÂΜL (ref 0–0.1)
BASOPHILS NFR BLD AUTO: 0 % (ref 0–1)
BILIRUB SERPL-MCNC: 0.87 MG/DL (ref 0.2–1)
BILIRUB UR QL STRIP: ABNORMAL
BUN SERPL-MCNC: 35 MG/DL (ref 5–25)
BUN SERPL-MCNC: 37 MG/DL (ref 5–25)
BUN SERPL-MCNC: 39 MG/DL (ref 5–25)
BUN SERPL-MCNC: 39 MG/DL (ref 5–25)
CALCIUM ALBUM COR SERPL-MCNC: 8.6 MG/DL (ref 8.3–10.1)
CALCIUM SERPL-MCNC: 7 MG/DL (ref 8.4–10.2)
CALCIUM SERPL-MCNC: 7.6 MG/DL (ref 8.4–10.2)
CALCIUM SERPL-MCNC: 7.6 MG/DL (ref 8.4–10.2)
CALCIUM SERPL-MCNC: 8.1 MG/DL (ref 8.4–10.2)
CARDIAC TROPONIN I PNL SERPL HS: 58 NG/L (ref ?–50)
CARDIAC TROPONIN I PNL SERPL HS: 61 NG/L (ref ?–50)
CARDIAC TROPONIN I PNL SERPL HS: 64 NG/L (ref ?–50)
CHLORIDE SERPL-SCNC: 88 MMOL/L (ref 96–108)
CHLORIDE SERPL-SCNC: 90 MMOL/L (ref 96–108)
CHLORIDE SERPL-SCNC: 90 MMOL/L (ref 96–108)
CHLORIDE SERPL-SCNC: 98 MMOL/L (ref 96–108)
CLARITY UR: CLEAR
CO2 SERPL-SCNC: 16 MMOL/L (ref 21–32)
CO2 SERPL-SCNC: 16 MMOL/L (ref 21–32)
CO2 SERPL-SCNC: 17 MMOL/L (ref 21–32)
CO2 SERPL-SCNC: 18 MMOL/L (ref 21–32)
COARSE GRAN CASTS URNS QL MICRO: NORMAL /LPF
COLOR UR: YELLOW
CREAT SERPL-MCNC: 2.42 MG/DL (ref 0.6–1.3)
CREAT SERPL-MCNC: 2.52 MG/DL (ref 0.6–1.3)
CREAT SERPL-MCNC: 2.6 MG/DL (ref 0.6–1.3)
CREAT SERPL-MCNC: 2.64 MG/DL (ref 0.6–1.3)
EOSINOPHIL # BLD AUTO: 0 THOUSAND/ÂΜL (ref 0–0.61)
EOSINOPHIL NFR BLD AUTO: 0 % (ref 0–6)
ERYTHROCYTE [DISTWIDTH] IN BLOOD BY AUTOMATED COUNT: 13.2 % (ref 11.6–15.1)
EST. AVERAGE GLUCOSE BLD GHB EST-MCNC: 209 MG/DL
FLUAV AG UPPER RESP QL IA.RAPID: NEGATIVE
FLUBV AG UPPER RESP QL IA.RAPID: NEGATIVE
GFR SERPL CREATININE-BSD FRML MDRD: 23 ML/MIN/1.73SQ M
GFR SERPL CREATININE-BSD FRML MDRD: 23 ML/MIN/1.73SQ M
GFR SERPL CREATININE-BSD FRML MDRD: 24 ML/MIN/1.73SQ M
GFR SERPL CREATININE-BSD FRML MDRD: 26 ML/MIN/1.73SQ M
GLUCOSE SERPL-MCNC: 111 MG/DL (ref 65–140)
GLUCOSE SERPL-MCNC: 120 MG/DL (ref 65–140)
GLUCOSE SERPL-MCNC: 144 MG/DL (ref 65–140)
GLUCOSE SERPL-MCNC: 148 MG/DL (ref 65–140)
GLUCOSE SERPL-MCNC: 191 MG/DL (ref 65–140)
GLUCOSE SERPL-MCNC: 219 MG/DL (ref 65–140)
GLUCOSE SERPL-MCNC: 255 MG/DL (ref 65–140)
GLUCOSE SERPL-MCNC: 306 MG/DL (ref 65–140)
GLUCOSE SERPL-MCNC: 378 MG/DL (ref 65–140)
GLUCOSE SERPL-MCNC: 395 MG/DL (ref 65–140)
GLUCOSE SERPL-MCNC: 432 MG/DL (ref 65–140)
GLUCOSE SERPL-MCNC: 446 MG/DL (ref 65–140)
GLUCOSE SERPL-MCNC: 448 MG/DL (ref 65–140)
GLUCOSE SERPL-MCNC: 462 MG/DL (ref 65–140)
GLUCOSE UR STRIP-MCNC: ABNORMAL MG/DL
HBA1C MFR BLD: 8.9 %
HCO3 BLDV-SCNC: 15.8 MMOL/L (ref 24–30)
HCT VFR BLD AUTO: 41.4 % (ref 36.5–49.3)
HGB BLD-MCNC: 13.9 G/DL (ref 12–17)
HGB UR QL STRIP.AUTO: ABNORMAL
IMM GRANULOCYTES # BLD AUTO: 0.08 THOUSAND/UL (ref 0–0.2)
IMM GRANULOCYTES NFR BLD AUTO: 1 % (ref 0–2)
INR PPP: 1.14 (ref 0.85–1.19)
KETONES UR STRIP-MCNC: ABNORMAL MG/DL
LACTATE SERPL-SCNC: 1.9 MMOL/L (ref 0.5–2)
LACTATE SERPL-SCNC: 2.4 MMOL/L (ref 0.5–2)
LEUKOCYTE ESTERASE UR QL STRIP: NEGATIVE
LIPASE SERPL-CCNC: 40 U/L (ref 11–82)
LYMPHOCYTES # BLD AUTO: 1.14 THOUSANDS/ÂΜL (ref 0.6–4.47)
LYMPHOCYTES NFR BLD AUTO: 7 % (ref 14–44)
MAGNESIUM SERPL-MCNC: 1.9 MG/DL (ref 1.9–2.7)
MAGNESIUM SERPL-MCNC: 2 MG/DL (ref 1.9–2.7)
MCH RBC QN AUTO: 31.3 PG (ref 26.8–34.3)
MCHC RBC AUTO-ENTMCNC: 33.6 G/DL (ref 31.4–37.4)
MCV RBC AUTO: 93 FL (ref 82–98)
MONOCYTES # BLD AUTO: 0.99 THOUSAND/ÂΜL (ref 0.17–1.22)
MONOCYTES NFR BLD AUTO: 6 % (ref 4–12)
NEUTROPHILS # BLD AUTO: 15.02 THOUSANDS/ÂΜL (ref 1.85–7.62)
NEUTS SEG NFR BLD AUTO: 86 % (ref 43–75)
NITRITE UR QL STRIP: NEGATIVE
NON-SQ EPI CELLS URNS QL MICRO: NORMAL /HPF
NRBC BLD AUTO-RTO: 0 /100 WBCS
O2 CT BLDV-SCNC: 12.4 ML/DL
PCO2 BLDV: 34.3 MM HG (ref 42–50)
PH BLDV: 7.28 [PH] (ref 7.3–7.4)
PH UR STRIP.AUTO: 6 [PH]
PHOSPHATE SERPL-MCNC: 1.2 MG/DL (ref 2.3–4.1)
PHOSPHATE SERPL-MCNC: 2.2 MG/DL (ref 2.3–4.1)
PHOSPHATE SERPL-MCNC: 2.5 MG/DL (ref 2.3–4.1)
PLATELET # BLD AUTO: 164 THOUSANDS/UL (ref 149–390)
PMV BLD AUTO: 8.9 FL (ref 8.9–12.7)
PO2 BLDV: 32.1 MM HG (ref 35–45)
POTASSIUM SERPL-SCNC: 3.3 MMOL/L (ref 3.5–5.3)
POTASSIUM SERPL-SCNC: 4.2 MMOL/L (ref 3.5–5.3)
POTASSIUM SERPL-SCNC: 4.2 MMOL/L (ref 3.5–5.3)
POTASSIUM SERPL-SCNC: 4.4 MMOL/L (ref 3.5–5.3)
PROCALCITONIN SERPL-MCNC: 9.27 NG/ML
PROT SERPL-MCNC: 7.6 G/DL (ref 6.4–8.4)
PROT UR STRIP-MCNC: ABNORMAL MG/DL
PROTHROMBIN TIME: 15.1 SECONDS (ref 12.3–15)
RBC # BLD AUTO: 4.44 MILLION/UL (ref 3.88–5.62)
RBC #/AREA URNS AUTO: NORMAL /HPF
SARS-COV+SARS-COV-2 AG RESP QL IA.RAPID: NEGATIVE
SODIUM SERPL-SCNC: 121 MMOL/L (ref 135–147)
SODIUM SERPL-SCNC: 121 MMOL/L (ref 135–147)
SODIUM SERPL-SCNC: 122 MMOL/L (ref 135–147)
SODIUM SERPL-SCNC: 128 MMOL/L (ref 135–147)
SP GR UR STRIP.AUTO: 1.02
UROBILINOGEN UR QL STRIP.AUTO: 0.2 E.U./DL
WBC # BLD AUTO: 17.27 THOUSAND/UL (ref 4.31–10.16)
WBC #/AREA URNS AUTO: NORMAL /HPF

## 2025-02-26 PROCEDURE — 84484 ASSAY OF TROPONIN QUANT: CPT | Performed by: PHYSICIAN ASSISTANT

## 2025-02-26 PROCEDURE — 81003 URINALYSIS AUTO W/O SCOPE: CPT | Performed by: NURSE PRACTITIONER

## 2025-02-26 PROCEDURE — 87181 SC STD AGAR DILUTION PER AGT: CPT | Performed by: PHYSICIAN ASSISTANT

## 2025-02-26 PROCEDURE — 80053 COMPREHEN METABOLIC PANEL: CPT | Performed by: PHYSICIAN ASSISTANT

## 2025-02-26 PROCEDURE — 83605 ASSAY OF LACTIC ACID: CPT | Performed by: NURSE PRACTITIONER

## 2025-02-26 PROCEDURE — 83036 HEMOGLOBIN GLYCOSYLATED A1C: CPT | Performed by: NURSE PRACTITIONER

## 2025-02-26 PROCEDURE — 85025 COMPLETE CBC W/AUTO DIFF WBC: CPT | Performed by: PHYSICIAN ASSISTANT

## 2025-02-26 PROCEDURE — 87811 SARS-COV-2 COVID19 W/OPTIC: CPT | Performed by: PHYSICIAN ASSISTANT

## 2025-02-26 PROCEDURE — 99285 EMERGENCY DEPT VISIT HI MDM: CPT

## 2025-02-26 PROCEDURE — 99291 CRITICAL CARE FIRST HOUR: CPT | Performed by: PHYSICIAN ASSISTANT

## 2025-02-26 PROCEDURE — 87804 INFLUENZA ASSAY W/OPTIC: CPT | Performed by: PHYSICIAN ASSISTANT

## 2025-02-26 PROCEDURE — 83605 ASSAY OF LACTIC ACID: CPT | Performed by: PHYSICIAN ASSISTANT

## 2025-02-26 PROCEDURE — 96361 HYDRATE IV INFUSION ADD-ON: CPT

## 2025-02-26 PROCEDURE — 82948 REAGENT STRIP/BLOOD GLUCOSE: CPT

## 2025-02-26 PROCEDURE — 87040 BLOOD CULTURE FOR BACTERIA: CPT | Performed by: PHYSICIAN ASSISTANT

## 2025-02-26 PROCEDURE — 96365 THER/PROPH/DIAG IV INF INIT: CPT

## 2025-02-26 PROCEDURE — 93005 ELECTROCARDIOGRAM TRACING: CPT

## 2025-02-26 PROCEDURE — 36415 COLL VENOUS BLD VENIPUNCTURE: CPT | Performed by: PHYSICIAN ASSISTANT

## 2025-02-26 PROCEDURE — 82805 BLOOD GASES W/O2 SATURATION: CPT | Performed by: PHYSICIAN ASSISTANT

## 2025-02-26 PROCEDURE — 96375 TX/PRO/DX INJ NEW DRUG ADDON: CPT

## 2025-02-26 PROCEDURE — 87154 CUL TYP ID BLD PTHGN 6+ TRGT: CPT | Performed by: PHYSICIAN ASSISTANT

## 2025-02-26 PROCEDURE — 85730 THROMBOPLASTIN TIME PARTIAL: CPT | Performed by: PHYSICIAN ASSISTANT

## 2025-02-26 PROCEDURE — 99223 1ST HOSP IP/OBS HIGH 75: CPT | Performed by: ANESTHESIOLOGY

## 2025-02-26 PROCEDURE — 84145 PROCALCITONIN (PCT): CPT | Performed by: PHYSICIAN ASSISTANT

## 2025-02-26 PROCEDURE — 80048 BASIC METABOLIC PNL TOTAL CA: CPT | Performed by: NURSE PRACTITIONER

## 2025-02-26 PROCEDURE — 83735 ASSAY OF MAGNESIUM: CPT | Performed by: NURSE PRACTITIONER

## 2025-02-26 PROCEDURE — 84100 ASSAY OF PHOSPHORUS: CPT | Performed by: NURSE PRACTITIONER

## 2025-02-26 PROCEDURE — 84100 ASSAY OF PHOSPHORUS: CPT | Performed by: PHYSICIAN ASSISTANT

## 2025-02-26 PROCEDURE — 81001 URINALYSIS AUTO W/SCOPE: CPT | Performed by: NURSE PRACTITIONER

## 2025-02-26 PROCEDURE — 84484 ASSAY OF TROPONIN QUANT: CPT | Performed by: NURSE PRACTITIONER

## 2025-02-26 PROCEDURE — 83690 ASSAY OF LIPASE: CPT | Performed by: PHYSICIAN ASSISTANT

## 2025-02-26 PROCEDURE — 85610 PROTHROMBIN TIME: CPT | Performed by: PHYSICIAN ASSISTANT

## 2025-02-26 PROCEDURE — 71045 X-RAY EXAM CHEST 1 VIEW: CPT

## 2025-02-26 RX ORDER — HEPARIN SODIUM 5000 [USP'U]/ML
7500 INJECTION, SOLUTION INTRAVENOUS; SUBCUTANEOUS EVERY 8 HOURS SCHEDULED
Status: DISCONTINUED | OUTPATIENT
Start: 2025-02-26 | End: 2025-03-01

## 2025-02-26 RX ORDER — FUROSEMIDE 10 MG/ML
40 INJECTION INTRAMUSCULAR; INTRAVENOUS ONCE
Status: DISCONTINUED | OUTPATIENT
Start: 2025-02-26 | End: 2025-02-26

## 2025-02-26 RX ORDER — DEXTROSE MONOHYDRATE AND SODIUM CHLORIDE 5; .45 G/100ML; G/100ML
125 INJECTION, SOLUTION INTRAVENOUS CONTINUOUS
Status: DISCONTINUED | OUTPATIENT
Start: 2025-02-26 | End: 2025-02-27

## 2025-02-26 RX ORDER — CALCIUM GLUCONATE 20 MG/ML
2 INJECTION, SOLUTION INTRAVENOUS ONCE
Status: COMPLETED | OUTPATIENT
Start: 2025-02-26 | End: 2025-02-26

## 2025-02-26 RX ORDER — METOPROLOL SUCCINATE 50 MG/1
100 TABLET, EXTENDED RELEASE ORAL DAILY
Status: DISCONTINUED | OUTPATIENT
Start: 2025-02-26 | End: 2025-02-27

## 2025-02-26 RX ORDER — CHLORHEXIDINE GLUCONATE ORAL RINSE 1.2 MG/ML
15 SOLUTION DENTAL EVERY 12 HOURS SCHEDULED
Status: DISCONTINUED | OUTPATIENT
Start: 2025-02-26 | End: 2025-03-11 | Stop reason: HOSPADM

## 2025-02-26 RX ORDER — AZELASTINE 1 MG/ML
1 SPRAY, METERED NASAL 2 TIMES DAILY
Status: DISCONTINUED | OUTPATIENT
Start: 2025-02-26 | End: 2025-03-11 | Stop reason: HOSPADM

## 2025-02-26 RX ORDER — SODIUM CHLORIDE 9 MG/ML
3 INJECTION INTRAVENOUS
Status: DISCONTINUED | OUTPATIENT
Start: 2025-02-26 | End: 2025-02-27

## 2025-02-26 RX ORDER — AMLODIPINE BESYLATE 10 MG/1
10 TABLET ORAL DAILY
Status: DISCONTINUED | OUTPATIENT
Start: 2025-02-26 | End: 2025-02-27

## 2025-02-26 RX ORDER — ASPIRIN 81 MG/1
81 TABLET ORAL DAILY
Status: DISCONTINUED | OUTPATIENT
Start: 2025-02-26 | End: 2025-03-11 | Stop reason: HOSPADM

## 2025-02-26 RX ORDER — FUROSEMIDE 10 MG/ML
20 INJECTION INTRAMUSCULAR; INTRAVENOUS ONCE
Status: COMPLETED | OUTPATIENT
Start: 2025-02-26 | End: 2025-02-26

## 2025-02-26 RX ORDER — AMLODIPINE BESYLATE 10 MG/1
10 TABLET ORAL DAILY
Status: DISCONTINUED | OUTPATIENT
Start: 2025-02-26 | End: 2025-02-26

## 2025-02-26 RX ORDER — POTASSIUM CHLORIDE 14.9 MG/ML
20 INJECTION INTRAVENOUS
Status: COMPLETED | OUTPATIENT
Start: 2025-02-26 | End: 2025-02-26

## 2025-02-26 RX ORDER — SODIUM CHLORIDE, SODIUM GLUCONATE, SODIUM ACETATE, POTASSIUM CHLORIDE, MAGNESIUM CHLORIDE, SODIUM PHOSPHATE, DIBASIC, AND POTASSIUM PHOSPHATE .53; .5; .37; .037; .03; .012; .00082 G/100ML; G/100ML; G/100ML; G/100ML; G/100ML; G/100ML; G/100ML
125 INJECTION, SOLUTION INTRAVENOUS CONTINUOUS
Status: DISCONTINUED | OUTPATIENT
Start: 2025-02-26 | End: 2025-02-26

## 2025-02-26 RX ORDER — ATORVASTATIN CALCIUM 40 MG/1
40 TABLET, FILM COATED ORAL
Status: DISCONTINUED | OUTPATIENT
Start: 2025-02-26 | End: 2025-02-27

## 2025-02-26 RX ORDER — VANCOMYCIN HYDROCHLORIDE 1 G/200ML
1000 INJECTION, SOLUTION INTRAVENOUS EVERY 24 HOURS
Status: DISCONTINUED | OUTPATIENT
Start: 2025-02-26 | End: 2025-02-27

## 2025-02-26 RX ORDER — ACETAMINOPHEN 325 MG/1
650 TABLET ORAL EVERY 6 HOURS PRN
Status: DISCONTINUED | OUTPATIENT
Start: 2025-02-26 | End: 2025-03-11 | Stop reason: HOSPADM

## 2025-02-26 RX ORDER — FUROSEMIDE 10 MG/ML
40 INJECTION INTRAMUSCULAR; INTRAVENOUS ONCE
Status: COMPLETED | OUTPATIENT
Start: 2025-02-26 | End: 2025-02-26

## 2025-02-26 RX ORDER — FUROSEMIDE 10 MG/ML
80 INJECTION INTRAMUSCULAR; INTRAVENOUS ONCE
Status: DISCONTINUED | OUTPATIENT
Start: 2025-02-26 | End: 2025-02-26

## 2025-02-26 RX ORDER — MAGNESIUM SULFATE HEPTAHYDRATE 40 MG/ML
2 INJECTION, SOLUTION INTRAVENOUS ONCE
Status: COMPLETED | OUTPATIENT
Start: 2025-02-26 | End: 2025-02-26

## 2025-02-26 RX ADMIN — SODIUM PHOSPHATE, MONOBASIC, MONOHYDRATE AND SODIUM PHOSPHATE, DIBASIC, ANHYDROUS 21 MMOL: 142; 276 INJECTION, SOLUTION INTRAVENOUS at 21:25

## 2025-02-26 RX ADMIN — POTASSIUM CHLORIDE 20 MEQ: 14.9 INJECTION, SOLUTION INTRAVENOUS at 20:41

## 2025-02-26 RX ADMIN — MAGNESIUM SULFATE HEPTAHYDRATE 2 G: 40 INJECTION, SOLUTION INTRAVENOUS at 19:45

## 2025-02-26 RX ADMIN — FUROSEMIDE 20 MG: 10 INJECTION, SOLUTION INTRAMUSCULAR; INTRAVENOUS at 21:07

## 2025-02-26 RX ADMIN — SODIUM CHLORIDE 1000 ML: 0.9 INJECTION, SOLUTION INTRAVENOUS at 15:38

## 2025-02-26 RX ADMIN — CEFEPIME 2000 MG: 2 INJECTION, POWDER, FOR SOLUTION INTRAVENOUS at 11:51

## 2025-02-26 RX ADMIN — CALCIUM GLUCONATE 2 G: 20 INJECTION, SOLUTION INTRAVENOUS at 16:43

## 2025-02-26 RX ADMIN — POTASSIUM CHLORIDE 20 MEQ: 14.9 INJECTION, SOLUTION INTRAVENOUS at 19:44

## 2025-02-26 RX ADMIN — CHLORHEXIDINE GLUCONATE 15 ML: 1.2 SOLUTION ORAL at 15:03

## 2025-02-26 RX ADMIN — POTASSIUM CHLORIDE 20 MEQ: 14.9 INJECTION, SOLUTION INTRAVENOUS at 21:39

## 2025-02-26 RX ADMIN — AMLODIPINE BESYLATE 10 MG: 10 TABLET ORAL at 15:03

## 2025-02-26 RX ADMIN — ASPIRIN 81 MG: 81 TABLET, COATED ORAL at 15:03

## 2025-02-26 RX ADMIN — SODIUM CHLORIDE 750 ML: 0.9 INJECTION, SOLUTION INTRAVENOUS at 14:32

## 2025-02-26 RX ADMIN — ATORVASTATIN CALCIUM 40 MG: 40 TABLET, FILM COATED ORAL at 16:43

## 2025-02-26 RX ADMIN — THIAMINE HYDROCHLORIDE: 100 INJECTION, SOLUTION INTRAMUSCULAR; INTRAVENOUS at 15:31

## 2025-02-26 RX ADMIN — HEPARIN SODIUM 7500 UNITS: 5000 INJECTION INTRAVENOUS; SUBCUTANEOUS at 21:07

## 2025-02-26 RX ADMIN — DEXTROSE AND SODIUM CHLORIDE 250 ML/HR: 5; .45 INJECTION, SOLUTION INTRAVENOUS at 20:03

## 2025-02-26 RX ADMIN — VANCOMYCIN HYDROCHLORIDE 1000 MG: 1 INJECTION, SOLUTION INTRAVENOUS at 15:32

## 2025-02-26 RX ADMIN — HEPARIN SODIUM 7500 UNITS: 5000 INJECTION INTRAVENOUS; SUBCUTANEOUS at 15:03

## 2025-02-26 RX ADMIN — METOPROLOL SUCCINATE 100 MG: 50 TABLET, EXTENDED RELEASE ORAL at 15:02

## 2025-02-26 RX ADMIN — SODIUM CHLORIDE 14 UNITS/HR: 9 INJECTION, SOLUTION INTRAVENOUS at 15:18

## 2025-02-26 RX ADMIN — SODIUM CHLORIDE 30 UNITS/HR: 9 INJECTION, SOLUTION INTRAVENOUS at 18:50

## 2025-02-26 RX ADMIN — FUROSEMIDE 40 MG: 10 INJECTION, SOLUTION INTRAMUSCULAR; INTRAVENOUS at 11:47

## 2025-02-26 RX ADMIN — CHLORHEXIDINE GLUCONATE 15 ML: 1.2 SOLUTION ORAL at 20:41

## 2025-02-26 RX ADMIN — SODIUM CHLORIDE, SODIUM GLUCONATE, SODIUM ACETATE, POTASSIUM CHLORIDE, MAGNESIUM CHLORIDE, SODIUM PHOSPHATE, DIBASIC, AND POTASSIUM PHOSPHATE 125 ML/HR: .53; .5; .37; .037; .03; .012; .00082 INJECTION, SOLUTION INTRAVENOUS at 16:42

## 2025-02-26 RX ADMIN — ACETAMINOPHEN 650 MG: 325 TABLET ORAL at 23:22

## 2025-02-26 RX ADMIN — SODIUM CHLORIDE 500 ML: 0.9 INJECTION, SOLUTION INTRAVENOUS at 12:53

## 2025-02-26 RX ADMIN — AZELASTINE HYDROCHLORIDE 1 SPRAY: 137 SPRAY, METERED NASAL at 18:10

## 2025-02-26 NOTE — ASSESSMENT & PLAN NOTE
"Lab Results   Component Value Date    HGBA1C 8.4 (H) 02/03/2025       No results for input(s): \"POCGLU\" in the last 72 hours.    Blood Sugar Average: Last 72 hrs:  Likely due to noncompliance-Reports has not taken any medications for 2 weeks   In DKA, PH 7.218, GAP 17, bicarb 15 and glucose 446  Start on regular insulin infusion per DKA protocol  Trend BMP/MG/Phos q 4 hrs and replete  HgA1C pending  Endocrine consult  "

## 2025-02-26 NOTE — ASSESSMENT & PLAN NOTE
Suspect secondary to DKA  Baseline creatinine 1.3, on admission 2.52  Status post 500 cc of NS  Will evaluate for further IV fluids  Trend renal indexes

## 2025-02-26 NOTE — PROGRESS NOTES
Nadeem Purdy Jr. is a 69 y.o. male who is currently ordered Vancomycin IV with management by the Pharmacy Consult service.  Relevant clinical data and objective / subjective history reviewed.  Vancomycin Assessment:  Indication and Goal AUC/Trough: Soft tissue (goal -600, trough >10)  Clinical Status: New  Micro:   pending  Renal Function:  SCr: 2.52 mg/dL  CrCl: 38.8 mL/min  Renal replacement: Not on dialysis  Days of Therapy: 1  Current Dose: 2000mg IV q12h  Vancomycin Plan:  New Dosinmg IV q24h  Estimated AUC: 469 mcg*hr/mL  Estimated Trough: 15.6 mcg/mL  Next Level:  AM labs  Renal Function Monitoring: Daily BMP and UOP  Pharmacy will continue to follow closely for s/sx of nephrotoxicity, infusion reactions and appropriateness of therapy.  BMP and CBC will be ordered per protocol. We will continue to follow the patient’s culture results and clinical progress daily.    Shaheen Godinez, Pharmacist

## 2025-02-26 NOTE — ED PROVIDER NOTES
Time reflects when diagnosis was documented in both MDM as applicable and the Disposition within this note       Time User Action Codes Description Comment    2025 12:30 PM Balbir Ramsey Add [E11.10] DKA (diabetic ketoacidosis) ([facility])     2025  1:44 PM Leti Ramseyk Add [N17.9] LUIS ALFREDO (acute kidney injury) (Formerly [facility])     2025  1:45 PM Balbir Ramsey Add [Z91.148] Non compliance w medication regimen     2025  1:45 PM Leti Ramseyk Add [R60.0] Bilateral lower extremity edema     2025  1:45 PM Leti Ramseyk Add [R00.0] Sinus tachycardia           ED Disposition       ED Disposition   Admit    Condition   Stable    Date/Time     1:44 PM    Comment   Case was discussed with Dr. Santos and the patient's admission status was agreed to be Admission Status: inpatient status to the service of Dr. Santos .                 Assessment & Plan       Medical Decision Making  Medical Decision Makin y.o.  male presents to ED via EMS for evaluation of BLE edema, polyuria, polydipsia, urinary incontinence, and non compliance with home medications for 2 weeks    .     Ddx: DKA, HHS, electrolyte abnormalities, respiratory acidosis,  ACS, chf exacerbation, PVD, cardiac arrhythmia,  PE, COPD exacerbation,  renal failure, or infection.      Patient has been medically screened for potential limb- or life-threatening conditions that may lead to permanent organ injury or dysfunctionan. Initial history and clinical exam findings raise concern for an acute emergent process.  Patient is considered candidate for advanced imaging.     Plan:  IV fluids, labs, EKG, CXR, hemodynamic monitoring and reasessment.         CXR ordered and interpreted by me.  My initial interpretation: no infiltrate or PTX.     EKG ordered and interpreted by me.  My initial interpretation: Abnormal, narrow complex sinus tachycardia at rate 118 with premature atrial complexes, left axis deviation, no ST elevation    Labs:  abnormal  results including elevated WBC 17k, bun/Cr 37/2.52 consistent with LUIS ALFREDO,  CO2 low at 16, AG elevated 17 and hyperglycemic at 446.   Lactic and Procalcitonin abnormal at 2.4 and 9.27 respectively, COVID/flu negative.   Troponin abnormal at 64, suspect elevated due to tachycardia.       Based on my history and clinical evaluation I will order the appropriate diagnostic testing to narrow my differential diagnosis and arrive at a final diagnosis. I have reviewed the patient's vital signs, nursing notes, and other relevant ancillary testing/information. I have had a detailed discussion with the patient regarding the historical points, examination findings, and any diagnostic results    On re-evaluation patient remains tachycardic after iv fluids, bp hypertensive, SPO2 97 % on 2 liters with occasional readings 89% that correlate with poor waveforms.  A&O x 3, tachycardic HR,  Bilateral CTA with decreased breath sounds at bases bilaterally.  Abdomen obese, non distended, non tender. M/S no gross deformity, 2+ bilateral lower extremity edema with erythema and chronic venous stasis changes and desquamation bilaterally.       Final Evaluation:  (see ED course for additional MDM)  Diabetic Ketoacidosis with polydipsia, polyuria   Acute Kidney Injury  Sinus Tachycardia   Bilateral lower extremity edema  Pulmonary vascular congestion  Medication non compliance.       Case discussed with ICU, Dr. PINA Santos regarding admission     The critical care time is separate of other billable procedures, treating other patients, and any teaching time.    Critical Care Time Statement: Upon my evaluation, this patient had a high probability of imminent or life-threatening deterioration due to diabetic ketoacidosis, Acute Kidney injury, Sinus tachycardia and metabolic acidosis, which required my direct attention, intervention, and personal management.  I spent a total of 60 minutes directly providing critical care services, including  interpretation of complex medical databases, evaluating for the presence of life-threatening injuries or illnesses, management of organ system failure(s) , complex medical decision making (to support/prevent further life-threatening deterioration)., interpretation of hemodynamic data, titration of vasoactive medications, and titration of continuous IV medications (drips). This time is exclusive of procedures, teaching, treating other patients, family meetings, and any prior time recorded by providers other than myself.                      Amount and/or Complexity of Data Reviewed  Independent Historian: EMS  External Data Reviewed: labs, radiology and ECG.  Labs: ordered. Decision-making details documented in ED Course.  Radiology: ordered and independent interpretation performed. Decision-making details documented in ED Course.  ECG/medicine tests: ordered and independent interpretation performed.    Risk  Prescription drug management.  Decision regarding hospitalization.        ED Course as of 02/26/25 1407   Wed Feb 26, 2025   1126 CBC and differential(!)  Abnormal, white blood cell count elevated at 17.2, nonspecific may represent volume contraction infection or stress leukocytosis.  No anemia or thrombocytopenia.   1127 Reevaluation: Initial vital signs are abnormal, heart rate 1 14-1 20 is tachycardic.  Will give 30 cc/kg IV fluids at ideal body weight with concern for DKA, HHS or infection.     1128 XR chest portable  CXR ordered and interpreted by me.  My initial interpretation: no infiltrate or PTX, mild bilateral pulmonary congestion, no pleural effusion.       1129 SPO2 hypoxic at 89% RA 0- 2 liters O2 via nasal cannula ordered.  CXR negative for pneumonia, concern for volume overload.   Initially ordered IV fluids held pending lab evaluation    1132 Re-evaluation:  tachycardic, mildy hypoxic and hypertensive, suspect CHF exacerbation/volume overload causing abnormal vital signs.  2 liter NC, will give  40 mg iv lasix, d/c iv fluids, and reassess.     1158 HS Troponin 0hr (reflex protocol)(!)  Abnormal, elevated at 64.  No acute ischemic EKG changes, likely rate related.    1158 Lactic acid(!)   1159 Lipase  Normal at 40. Doubt pancreatitis   1202 Re-evaluation.  SPO2 improved to 97% on 2 liters.  Continue to monitor.  HR remains tachycardic at 124.  Will give IV fluids cautiously given concern for HHS/DKA with heart failure and reassess.  CMP pending   1203 FLU/COVID Rapid Antigen (30 min. TAT) - Preferred screening test in ED  Normal, negative for covid/flu.    1210 O2 sat 97%,  CMP pending,  initial concern for CHF given 89% RA SPO2 however up ton 97% on 2l and mild pulm edema on CXR.  Will add iv fluid while CMP pending, anticipate insulin drip once K is back.     1220 Comprehensive metabolic panel(!!)  Abnormal,  new LUIS ALFREDO, creatinine 2.52, glucose 446, anion gap 17.  Insulin drip and iv fluids ordered.  Will need admission    1337 Presented for [facility] for [place] admit.  Requested ICU eval.     1337 ICU requested repeat BMP.    1348 Spoke with Dr. Santos, ICU, will admit        Medications   sodium chloride (PF) 0.9 % injection 3 mL (has no administration in time range)   sodium chloride 0.9 % bolus 1,000 mL (has no administration in time range)   sodium chloride 0.9 % bolus 750 mL (has no administration in time range)   insulin regular (HumuLIN R,NovoLIN R) 1 Units/mL in sodium chloride 0.9 % 100 mL infusion (has no administration in time range)   cefepime (MAXIPIME) 2 g/50 mL dextrose IVPB (0 mg Intravenous Stopped 2/26/25 1221)   furosemide (LASIX) injection 40 mg (40 mg Intravenous Given 2/26/25 1147)   sodium chloride 0.9 % bolus 500 mL (500 mL Intravenous New Bag 2/26/25 1253)       ED Risk Strat Scores   HEART Risk Score      Flowsheet Row Most Recent Value   Heart Score Risk Calculator    History 1 Filed at: 02/26/2025 1406   ECG 1 Filed at: 02/26/2025 1406   Age 2 Filed at: 02/26/2025 1406   Risk Factors 2  Filed at: 02/26/2025 1406   Troponin 2 Filed at: 02/26/2025 1406   HEART Score 8 Filed at: 02/26/2025 1406          HEART Risk Score      Flowsheet Row Most Recent Value   Heart Score Risk Calculator    History 1 Filed at: 02/26/2025 1406   ECG 1 Filed at: 02/26/2025 1406   Age 2 Filed at: 02/26/2025 1406   Risk Factors 2 Filed at: 02/26/2025 1406   Troponin 2 Filed at: 02/26/2025 1406   HEART Score 8 Filed at: 02/26/2025 1406                                                  History of Present Illness       Chief Complaint   Patient presents with    Hyperglycemia - Symptomatic    Medical Problem     NOT TAKING MEDICATIONS FOR 2 WEEKS      Wound Check     Bilateral legs         Past Medical History:   Diagnosis Date    Hypertension       Past Surgical History:   Procedure Laterality Date    CATARACT EXTRACTION, BILATERAL Bilateral       History reviewed. No pertinent family history.   Social History     Tobacco Use    Smoking status: Former    Smokeless tobacco: Never   Vaping Use    Vaping status: Never Used   Substance Use Topics    Alcohol use: Yes     Alcohol/week: 4.0 standard drinks of alcohol     Types: 4 Shots of liquor per week    Drug use: Never      E-Cigarette/Vaping    E-Cigarette Use Never User       E-Cigarette/Vaping Substances    Nicotine No     THC No     CBD No     Flavoring No     Other No     Unknown No       I have reviewed and agree with the history as documented.     69-year-old male with past medical history significant for hypertension and diabetes since emergency department via EMS for evaluation of multiple complaints including increasing bilateral lower extremity swelling, rapid heart rates, including fevers, polydipsia, polyuria, urinary frequency and incontinence and medication noncompliance x 2 weeks.  History obtained from EMS at bedside.  Initial vital signs abnormal.  Onset of symptoms reported as approximately 2 weeks ago.  Site is identified as multiple sites including bilateral  lower extremities, abdomen and chest.  Only described as intermittent rapid heart rates, tactile intermittent fevers, excessive thirst, excessive urination, urinary incontinence and increasing leg swelling with rash..  Severity is moderate to severe.  Associated symptoms: Denies difficulty swallowing, facial swelling, neck pain, wheezing, abdominal pain, unilateral extremity weakness, syncope, headache.  Modifying factors: Not taking prescribed water pills and other medications is likely source of symptoms.  Per EMS report at bedside initial glucose reading over 500, secondary reading was error - too high.   HR reports 80-120s and irregular per EMS      History provided by:  Patient   used: No    Hyperglycemia - Symptomatic  Associated symptoms: fatigue, fever, increased thirst, polyuria and shortness of breath    Associated symptoms: no abdominal pain, no chest pain, no diaphoresis, no dizziness, no nausea, no vomiting and no weakness    Medical Problem  Associated symptoms: fatigue, fever, myalgias and shortness of breath    Associated symptoms: no abdominal pain, no chest pain, no congestion, no diarrhea, no nausea, no rash and no vomiting    Wound Check       Review of Systems   Constitutional:  Positive for fatigue and fever. Negative for chills, diaphoresis and unexpected weight change.   HENT:  Negative for congestion, facial swelling, trouble swallowing and voice change.    Eyes:  Negative for visual disturbance.   Respiratory:  Positive for chest tightness and shortness of breath. Negative for stridor.    Cardiovascular:  Positive for leg swelling. Negative for chest pain.   Gastrointestinal:  Positive for abdominal distention. Negative for abdominal pain, constipation, diarrhea, nausea and vomiting.   Endocrine: Positive for polydipsia and polyuria.   Genitourinary:  Positive for frequency and urgency. Negative for decreased urine volume, difficulty urinating and hematuria.    Musculoskeletal:  Positive for myalgias. Negative for gait problem and neck pain.   Skin:  Negative for rash.   Neurological:  Negative for dizziness, seizures, syncope, facial asymmetry, weakness, light-headedness and numbness.   All other systems reviewed and are negative.          Objective       ED Triage Vitals [02/26/25 1038]   Temperature Pulse Blood Pressure Respirations SpO2 Patient Position - Orthostatic VS   100 °F (37.8 °C) (!) 114 (!) 174/81 16 97 % Sitting      Temp Source Heart Rate Source BP Location FiO2 (%) Pain Score    Tympanic Monitor Left arm -- No Pain      Vitals      Date and Time Temp Pulse SpO2 Resp BP Pain Score FACES Pain Rating User   02/26/25 1245 -- 123 96 % 18 197/88 -- --    02/26/25 1230 -- 124 97 % 18 180/81 -- --    02/26/25 1045 -- 120 89 % -- 184/100 -- --    02/26/25 1038 100 °F (37.8 °C) 114 97 % 16 174/81 No Pain -- NAD            Physical Exam  Vitals and nursing note reviewed.   Constitutional:       General: He is not in acute distress.     Appearance: Normal appearance.   HENT:      Head: Normocephalic and atraumatic.      Right Ear: External ear normal.      Left Ear: External ear normal.      Nose: Nose normal.      Mouth/Throat:      Mouth: Mucous membranes are dry.   Eyes:      General: No scleral icterus.        Right eye: No discharge.         Left eye: No discharge.      Conjunctiva/sclera: Conjunctivae normal.      Pupils: Pupils are equal, round, and reactive to light.   Cardiovascular:      Rate and Rhythm: Tachycardia present. Rhythm irregular.      Pulses: Normal pulses.   Pulmonary:      Effort: Tachypnea and respiratory distress present.      Breath sounds: Decreased air movement present. Examination of the right-lower field reveals decreased breath sounds. Examination of the left-lower field reveals decreased breath sounds. Decreased breath sounds and rhonchi present.   Abdominal:      General: There is distension.      Tenderness: There is no  abdominal tenderness. There is no guarding or rebound.   Musculoskeletal:         General: Swelling present. No deformity or signs of injury.      Cervical back: Normal range of motion and neck supple. No rigidity or tenderness.      Right lower leg: Edema present.      Left lower leg: Edema present.      Comments: 2+ pitting bilateral lower extremity edema with generalized erythema consistent with chronic venous stasis changes and areas of superficial skin tears.       Skin:     General: Skin is dry.      Capillary Refill: Capillary refill takes less than 2 seconds.      Coloration: Skin is not jaundiced.      Findings: Erythema present. No rash.   Neurological:      General: No focal deficit present.      Mental Status: He is alert and oriented to person, place, and time. Mental status is at baseline.      Motor: No weakness.   Psychiatric:         Mood and Affect: Mood normal.         Behavior: Behavior normal.         Thought Content: Thought content normal.       Results Reviewed       Procedure Component Value Units Date/Time    Basic metabolic panel [[id number]]     Lab Status: No result Specimen: Blood     Comprehensive metabolic panel [[id number]]  (Abnormal) Collected: 02/26/25 1114    Lab Status: Final result Specimen: Blood from Arm, Right Updated: 02/26/25 1210     Sodium 121 mmol/L      Potassium 4.2 mmol/L      Chloride 88 mmol/L      CO2 16 mmol/L      ANION GAP 17 mmol/L      BUN 37 mg/dL      Creatinine 2.52 mg/dL      Glucose 446 mg/dL      Calcium 8.1 mg/dL      Corrected Calcium 8.6 mg/dL      AST 22 U/L      ALT 18 U/L      Alkaline Phosphatase 54 U/L      Total Protein 7.6 g/dL      Albumin 3.4 g/dL      Total Bilirubin 0.87 mg/dL      eGFR 24 ml/min/1.73sq m     Narrative:      National Kidney Disease Foundation guidelines for Chronic Kidney Disease (CKD):     Stage 1 with normal or high GFR (GFR > 90 mL/min/1.73 square meters)    Stage 2 Mild CKD (GFR = 60-89 mL/min/1.73 square meters)     Stage 3A Moderate CKD (GFR = 45-59 mL/min/1.73 square meters)    Stage 3B Moderate CKD (GFR = 30-44 mL/min/1.73 square meters)    Stage 4 Severe CKD (GFR = 15-29 mL/min/1.73 square meters)    Stage 5 End Stage CKD (GFR <15 mL/min/1.73 square meters)  Note: GFR calculation is accurate only with a steady state creatinine    HS Troponin I 4hr [[id number]]     Lab Status: No result Specimen: Blood     Procalcitonin [[id number]]  (Abnormal) Collected: 02/26/25 1114    Lab Status: Final result Specimen: Blood from Arm, Right Updated: 02/26/25 1152     Procalcitonin 9.27 ng/ml     HS Troponin 0hr (reflex protocol) [[id number]]  (Abnormal) Collected: 02/26/25 1114    Lab Status: Final result Specimen: Blood from Arm, Right Updated: 02/26/25 1150     hs TnI 0hr 64 ng/L     HS Troponin I 2hr [[id number]]     Lab Status: No result Specimen: Blood     Lipase [[id number]]  (Normal) Collected: 02/26/25 1114    Lab Status: Final result Specimen: Blood from Arm, Right Updated: 02/26/25 1147     Lipase 40 u/L     Phosphorus [[id number]]  (Normal) Collected: 02/26/25 1114    Lab Status: Final result Specimen: Blood from Arm, Right Updated: 02/26/25 1147     Phosphorus 2.5 mg/dL     Lactic acid [[id number]]  (Abnormal) Collected: 02/26/25 1114    Lab Status: Final result Specimen: Blood from Arm, Right Updated: 02/26/25 1146     LACTIC ACID 2.4 mmol/L     Narrative:      Result may be elevated if tourniquet was used during collection.    Lactic acid 2 Hours [[id number]]     Lab Status: No result Specimen: Blood     FLU/COVID Rapid Antigen (30 min. TAT) - Preferred screening test in ED [[id number]]  (Normal) Collected: 02/26/25 1114    Lab Status: Final result Specimen: Nares from Nose Updated: 02/26/25 1142     SARS COV Rapid Antigen Negative     Influenza A Rapid Antigen Negative     Influenza B Rapid Antigen Negative    Narrative:      This test has been performed using the [employer] Le 2 FLU+SARS Antigen test under the Emergency Use  Authorization (EUA). This test has been validated by the  and verified by the performing laboratory. The Le uses lateral flow immunofluorescent sandwich assay to detect SARS-COV, Influenza A and Influenza B Antigen.     The [employer] Le 2 SARS Antigen test does not differentiate between SARS-CoV and SARS-CoV-2.     Negative results are presumptive and may be confirmed with a molecular assay, if necessary, for patient management. Negative results do not rule out SARS-CoV-2 or influenza infection and should not be used as the sole basis for treatment or patient management decisions. A negative test result may occur if the level of antigen in a sample is below the limit of detection of this test.     Positive results are indicative of the presence of viral antigens, but do not rule out bacterial infection or co-infection with other viruses.     All test results should be used as an adjunct to clinical observations and other information available to the provider.    FOR PEDIATRIC PATIENTS - copy/paste COVID Guidelines URL to browser: [email or web address]    Protime-INR [[id number]]  (Abnormal) Collected: 02/26/25 1114    Lab Status: Final result Specimen: Blood from Arm, Right Updated: 02/26/25 1139     Protime 15.1 seconds      INR 1.14    Narrative:      INR Therapeutic Range    Indication                                             INR Range      Atrial Fibrillation                                               2.0-3.0  Hypercoagulable State                                    2.0.2.3  Left Ventricular Asist Device                            2.0-3.0  Mechanical Heart Valve                                  -    Aortic(with afib, MI, embolism, HF, LA enlargement,    and/or coagulopathy)                                     2.0-3.0 (2.5-3.5)     Mitral                                                             2.5-3.5  Prosthetic/Bioprosthetic Heart  Valve               2.0-3.0  Venous thromboembolism (VTE: VT, PE        2.0-3.0    APTT [[id number]]  (Normal) Collected: 02/26/25 1114    Lab Status: Final result Specimen: Blood from Arm, Right Updated: 02/26/25 1139     PTT 28 seconds     Blood gas, Venous [[id number]]  (Abnormal) Collected: 02/26/25 1114    Lab Status: Final result Specimen: Blood from Arm, Right Updated: 02/26/25 1128     pH, Jose 7.281     pCO2, Jose 34.3 mm Hg      pO2, Jose 32.1 mm Hg      HCO3, Jose 15.8 mmol/L      Base Excess, Jose -9.9 mmol/L      O2 Content, Jose 12.4 ml/dL      O2 HGB, VENOUS 60.0 %     CBC and differential [[id number]]  (Abnormal) Collected: 02/26/25 1114    Lab Status: Final result Specimen: Blood from Arm, Right Updated: 02/26/25 1126     WBC 17.27 Thousand/uL      RBC 4.44 Million/uL      Hemoglobin 13.9 g/dL      Hematocrit 41.4 %      MCV 93 fL      MCH 31.3 pg      MCHC 33.6 g/dL      RDW 13.2 %      MPV 8.9 fL      Platelets 164 Thousands/uL      nRBC 0 /100 WBCs      Segmented % 86 %      Immature Grans % 1 %      Lymphocytes % 7 %      Monocytes % 6 %      Eosinophils Relative 0 %      Basophils Relative 0 %      Absolute Neutrophils 15.02 Thousands/µL      Absolute Immature Grans 0.08 Thousand/uL      Absolute Lymphocytes 1.14 Thousands/µL      Absolute Monocytes 0.99 Thousand/µL      Eosinophils Absolute 0.00 Thousand/µL      Basophils Absolute 0.04 Thousands/µL     Blood culture #2 [[id number]] Collected: 02/26/25 1114    Lab Status: In process Specimen: Blood from Arm, Right Updated: 02/26/25 1122    Blood culture #1 [[id number]] Collected: 02/26/25 1114    Lab Status: In process Specimen: Blood from Arm, Right Updated: 02/26/25 1122    UA w Reflex to Microscopic w Reflex to Culture [[id number]]     Lab Status: No result Specimen: Urine             XR chest portable   Final Interpretation by Shaheen Reyes MD (02/26 1103)      No focal airspace consolidation. Mild bilateral hilar prominence may be due to  prominent vasculature/congestion.            Workstation performed: [id number]             ECG 12 Lead Documentation Only    Date/Time: 2025 10:39 AM    Performed by: Balbir Ramsey PA-C  Authorized by: Balbir Ramsey PA-C    Indications / Diagnosis:  C/p  ECG reviewed by me, the ED Provider: yes    Patient location:  ED  Previous ECG:     Previous ECG:  Unavailable    Comparison to cardiac monitor: Yes    Interpretation:     Interpretation: normal    Rate:     ECG rate:  118    ECG rate assessment: tachycardic    Rhythm:     Rhythm: sinus tachycardia    Ectopy:     Ectopy: PAC    QRS:     QRS axis:  Left    QRS intervals:  Normal  Conduction:     Conduction: normal    ST segments:     ST segments:  Normal  T waves:     T waves: normal        ED Medication and Procedure Management   Prior to Admission Medications   Prescriptions Last Dose Informant Patient Reported? Taking?   Azelastine HCl 137 MCG/SPRAY SOLN   No No   Si puff each nostril twice a day   BD Pen Needle Katelyn 2nd Gen 32G X 4 MM MISC   No No   Sig: INJECT AS DIRECTED 2 (TWO) TIMES A DAY   Cholecalciferol (VITAMIN D3) 2000 units TABS  Self Yes No   Sig: Take 2,000 Units by mouth daily   Glucose Blood (ONETOUCH ULTRA BLUE VI)  Self Yes No   Si Units by In Vitro route 3 (three) times a day   Krill Oil 1000 MG CAPS  Self Yes No   Sig: Take 1 capsule by mouth daily   amLODIPine (NORVASC) 10 mg tablet   No No   Sig: Take 1 tablet (10 mg total) by mouth daily   aspirin (ECOTRIN LOW STRENGTH) 81 mg EC tablet  Self Yes No   Sig: Take 81 mg by mouth daily   atorvastatin (LIPITOR) 40 mg tablet   No No   Sig: Take 1 tablet (40 mg total) by mouth daily   furosemide (LASIX) 40 mg tablet   No No   Sig: Take 1 tablet (40 mg total) by mouth daily   gabapentin (NEURONTIN) 100 mg capsule   No No   Sig: Take 2 capsules (200 mg total) by mouth daily at bedtime   glucose blood (OneTouch Ultra) test strip  Self No No   Sig: Test 3 times daily    insulin isophane-insulin regular (HumuLIN 70/30 KwikPen) 100 units/mL injection pen   No No   Sig: Inject 20 Units under the skin 2 (two) times a day   losartan (COZAAR) 100 MG tablet   No No   Sig: TAKE 1 TABLET BY MOUTH EVERY DAY   metoprolol succinate (TOPROL-XL) 100 mg 24 hr tablet   No No   Sig: Take 1 tablet (100 mg total) by mouth daily   potassium chloride (Klor-Con M20) 20 mEq tablet   No No   Sig: Take 1 tablet (20 mEq total) by mouth daily   vitamin E, tocopherol, 400 units capsule  Self Yes No   Sig: Take 400 Units by mouth daily      Facility-Administered Medications: None     Patient's Medications   Discharge Prescriptions    No medications on file     No discharge procedures on file.  ED SEPSIS DOCUMENTATION   Time reflects when diagnosis was documented in both MDM as applicable and the Disposition within this note       Time User Action Codes Description Comment    2/26/2025 12:30 PM Balbir Ramsey [E11.10] DKA (diabetic ketoacidosis) (Formerly [facility])     2/26/2025  1:44 PM Balbir Ramsey [N17.9] LUIS ALFREDO (acute kidney injury) (Formerly [facility])     2/26/2025  1:45 PM Balbir Ramsey [Z91.148] Non compliance w medication regimen     2/26/2025  1:45 PM Balbir Ramsey [R60.0] Bilateral lower extremity edema     2/26/2025  1:45 PM Balbir Ramsey [R00.0] Sinus tachycardia                  Balbir Ramsey PA-C  02/26/25 1275

## 2025-02-26 NOTE — ASSESSMENT & PLAN NOTE
Alex chronic edema with open wounds POA  Takes lasix 40 mg as OP-will diurese prn  Wound care consult  Leave wounds open to air   ABX as above

## 2025-02-26 NOTE — H&P
"H&P - Critical Care/ICU   Name: Nadeem Purdy Jr. 69 y.o. male I MRN: [id number]  Unit/Bed#: ICU 03-01 I Date of Admission: 2/26/2025   Date of Service: 2/26/2025 I Hospital Day: 0       Assessment & Plan  Diabetic ketoacidosis without coma associated with type 2 diabetes mellitus (HCC)  Lab Results   Component Value Date    HGBA1C 8.4 (H) 02/03/2025       No results for input(s): \"POCGLU\" in the last 72 hours.    Blood Sugar Average: Last 72 hrs:  Likely due to noncompliance-Reports has not taken any medications for 2 weeks   In DKA, PH 7.218, GAP 17, bicarb 15 and glucose 446  Start on regular insulin infusion per [facility] protocol  Trend BMP/MG/Phos q 4 hrs and replete  HgA1C pending  Endocrine consult  Essential hypertension  Continue home amlodipine and metoprolol hold losartan in the setting of LUIS ALFREDO  Hypercholesteremia  Continue home statin  Class 3 severe obesity due to excess calories with serious comorbidity and body mass index (BMI) of 40.0 to 44.9 in adult ([facility])  Nutrition consult   Edema of both legs  Alex chronic edema with open wounds POA  Takes lasix 40 mg as OP-will diurese prn  Wound care consult  Leave wounds open to air   ABX as above  LUIS ALFREDO (acute kidney injury) ([facility])  Suspect secondary to DKA  Baseline creatinine 1.3, on admission 2.52  Status post 500 cc of NS  Will evaluate for further IV fluids  Trend renal indexes  Sepsis ([facility])  AEB tachycardia, WBC 17, LA 2.4, and Alex lower extremity cellulitis  CXR with Mild bilateral hilar prominence may be due to prominent vasculature/congestion.   BC x 2 pending  Procal 9.27  LA 2.6  In ED, was started on Cefepime -will continue and add vancomycin  Trend WBC, fever curve and LA  Alcohol use  Reports drinking 1-2 glasses of [place]/day  Last drank 4 weeks ago  Daily thiamine and folate  CIWA  Disposition: Stepdown Level 1    History of Present Illness   Nadeem Purdy Jr. is a 69 y.o. with PMH of IDDM, morbid obesity, CKD 3, HTN, HLD who presents to the ED with " "hyperglycemia. Patient reports he has been feeling \"bad\" for 2 weeks and has not taken any of his medications. Reports bilateral leg swelling, fever, polydipsia and polyuria. In the ED he was found to be hyperglycemic and met sepsis criteria. He was fluid resuscitated, started on regular insulin DKA infusion along with broad spectrum antibiotics. Patient admitted to the ICU for DKA and bilateral leg cellulitis.     History obtained from chart review and the patient.  Review of Systems: Review of Systems   Constitutional:  Positive for fatigue and fever. Negative for chills and diaphoresis.   HENT:  Negative for sore throat.    Respiratory:  Negative for cough and shortness of breath.    Cardiovascular:  Positive for leg swelling. Negative for chest pain and palpitations.   Gastrointestinal:  Negative for abdominal distention, abdominal pain, nausea and vomiting.   Endocrine: Positive for polydipsia and polyuria.   Genitourinary:  Negative for difficulty urinating.   Musculoskeletal:  Negative for arthralgias and myalgias.   Skin:  Positive for wound.   Neurological:  Negative for dizziness, weakness and light-headedness.   Psychiatric/Behavioral:  The patient is not nervous/anxious.    All other systems reviewed and are negative.      Historical Information   Past Medical History:  No date: Hypertension Past Surgical History:  No date: CATARACT EXTRACTION, BILATERAL; Bilateral   Current Outpatient Medications   Medication Instructions    amLODIPine (NORVASC) 10 mg, Oral, Daily    aspirin (ECOTRIN LOW STRENGTH) 81 mg, Daily    atorvastatin (LIPITOR) 40 mg, Oral, Daily    Azelastine HCl 137 MCG/SPRAY SOLN 1 puff each nostril twice a day    BD Pen Needle Katelyn 2nd Gen 32G X 4 MM MISC INJECT AS DIRECTED 2 (TWO) TIMES A DAY    furosemide (LASIX) 40 mg, Oral, Daily    gabapentin (NEURONTIN) 200 mg, Oral, Daily at bedtime    Glucose Blood (ONETOUCH ULTRA BLUE VI) 1 Units, 3 times daily    glucose blood (OneTouch Ultra) test " strip Test 3 times daily    insulin isophane-insulin regular (HumuLIN 70/30 KwikPen) 100 units/mL injection pen 20 Units, Subcutaneous, 2 times daily    Krill Oil 1000 MG CAPS 1 capsule, Daily    losartan (COZAAR) 100 mg, Oral, Daily    metoprolol succinate (TOPROL-XL) 100 mg, Oral, Daily    potassium chloride (Klor-Con M20) 20 mEq tablet 20 mEq, Oral, Daily    Vitamin D3 2,000 Units, Daily    vitamin E (tocopherol) 400 Units, Daily    No Known Allergies   Social History     Tobacco Use    Smoking status: Former    Smokeless tobacco: Never   Vaping Use    Vaping status: Never Used   Substance Use Topics    Alcohol use: Yes     Alcohol/week: 4.0 standard drinks of alcohol     Types: 4 Shots of liquor per week    Drug use: Never    History reviewed. No pertinent family history.       Objective :                   Vitals I/O      Most Recent Min/Max in 24hrs   Temp 100 °F (37.8 °C) Temp  Min: 100 °F (37.8 °C)  Max: 100 °F (37.8 °C)   Pulse (!) 123 Pulse  Min: 114  Max: 124   Resp 18 Resp  Min: 16  Max: 18   BP (!) 197/88 BP  Min: 174/81  Max: 197/88   O2 Sat 96 % SpO2  Min: 89 %  Max: 97 %      Intake/Output Summary (Last 24 hours) at 2/26/2025 1458  Last data filed at 2/26/2025 1353  Gross per 24 hour   Intake 550 ml   Output --   Net 550 ml       Diet NPO    Invasive Monitoring           Physical Exam   Physical Exam  Eyes:      Extraocular Movements: Extraocular movements intact.      Pupils: Pupils are equal, round, and reactive to light.   Skin:     General: Skin is warm and dry.      Capillary Refill: Capillary refill takes less than 2 seconds.      Findings: Erythema and wound present.      Comments: Sloughing of skin to jayy lower extremities with purulent drainage   HENT:      Head: Normocephalic and atraumatic.      Mouth/Throat:      Mouth: Mucous membranes are dry.   Cardiovascular:      Rate and Rhythm: Regular rhythm. Tachycardia present.   Musculoskeletal:         General: Normal range of motion.       Right lower le+ Edema present.      Left lower le+ Edema present.   Abdominal: General: Bowel sounds are normal.      Palpations: Abdomen is soft.      Tenderness: There is no abdominal tenderness.   Constitutional:       Appearance: He is ill-appearing.   Pulmonary:      Effort: Tachypnea present. No accessory muscle usage or accessory muscle usage.      Breath sounds: No wheezing or rhonchi.      Comments: Diminished throughout  Psychiatric:         Mood and Affect: Mood and affect normal.         Speech: Speech normal.   Neurological:      Mental Status: He is oriented to person, place and time.      GCS: GCS eye subscore is 4. GCS verbal subscore is 5. GCS motor subscore is 6.      Cranial Nerves: Cranial nerves 2-12 are intact.          Diagnostic Studies        Lab Results: I have reviewed the following results:     Medications:  Scheduled PRN   amLODIPine, 10 mg, Daily  aspirin, 81 mg, Daily  atorvastatin, 40 mg, Daily With Dinner  azelastine, 1 spray, BID  [START ON 2025] cefepime, 2,000 mg, Q12H  chlorhexidine, 15 mL, Q12H MARGARITA  folic acid 1 mg, thiamine (VITAMIN B1) 100 mg in sodium chloride 0.9 % 100 mL IV piggyback, , Daily  heparin (porcine), 7,500 Units, Q8H MARGARITA  metoprolol succinate, 100 mg, Daily  sodium chloride, 1,000 mL, Once  sodium chloride, 750 mL, Once  vancomycin, 1,000 mg, Q24H      sodium chloride (PF), 3 mL, Q1H PRN       Continuous    dextrose 5 % and sodium chloride 0.45 %, 250 mL/hr  insulin regular (HumuLIN R,NovoLIN R) 1 Units/mL in sodium chloride 0.9 % 100 mL infusion, 0.1-30 Units/hr         Labs:   CBC    Recent Labs     25  1114   WBC 17.27*   HGB 13.9   HCT 41.4        BMP    Recent Labs     25  1114 25  1412   SODIUM 121* 121*   K 4.2 4.4   CL 88* 90*   CO2 16* 16*   AGAP 17* 15*   BUN 37* 39*   CREATININE 2.52* 2.60*   CALCIUM 8.1* 7.6*       Coags    Recent Labs     25  1114   INR 1.14   PTT 28        Additional Electrolytes  Recent  Labs     02/26/25  1114   PHOS 2.5          Blood Gas    No recent results  Recent Labs     02/26/25  1114   PHVEN 7.281*   KJB6QCA 34.3*   PO2VEN 32.1*   ZMH9VHR 15.8*   BEVEN -9.9   A5GQTFU 60.0    LFTs  Recent Labs     02/26/25  1114   ALT 18   AST 22   ALKPHOS 54   ALB 3.4*   TBILI 0.87       Infectious  Recent Labs     02/26/25  1114   PROCALCITONI 9.27*     Glucose  Recent Labs     02/26/25  1114 02/26/25  1412   GLUC 446* 448*        Administrative Statements

## 2025-02-26 NOTE — ASSESSMENT & PLAN NOTE
Reports drinking 1-2 glasses of [place]/day  Last drank 4 weeks ago  Daily thiamine and folate  CIWA

## 2025-02-26 NOTE — NURSING NOTE
Report called by ER nurse Kenrick DENISE. Pt transported up to ICU by Magda Ramon RN. Pt slid into bed, Marcelo DENISE in room with patient.

## 2025-02-26 NOTE — ASSESSMENT & PLAN NOTE
Pt with b/l chronic edema with open wounds POA    Plan:  Takes lasix 40 mg as OP-will diurese prn  Wound care consult  Leave wounds open to air   Continue Cefepime and Vanco; now D2

## 2025-02-26 NOTE — ASSESSMENT & PLAN NOTE
Plan:  Continue home Amlodipine and Metoprolol   Hold home Losartan in the setting of LUIS ALFREDO

## 2025-02-26 NOTE — ASSESSMENT & PLAN NOTE
AEB tachycardia, WBC 17, LA 2.4, and Alex lower extremity cellulitis  CXR with Mild bilateral hilar prominence may be due to prominent vasculature/congestion.   BC x 2 pending  Procal 9.27  LA 2.6  In ED, was started on Cefepime -will continue and add vancomycin  Trend WBC, fever curve and LA

## 2025-02-26 NOTE — SEPSIS NOTE
"  Sepsis Note   Nadeem Purdy Jr. 69 y.o. male MRN: [id number]  Unit/Bed#: ED 14 Encounter: [id number]       Initial Sepsis Screening       Row Name 02/26/25 1408                Is the patient's history suggestive of a new or worsening infection? Yes (Proceed)  -SS        Suspected source of infection soft tissue  -SS        Indicate SIRS criteria Tachycardia > 90 bpm;Leukocytosis (WBC > 59174 IJL) OR Leukopenia (WBC <4000 IJL) OR Bandemia (WBC >10% bands)  -SS        Are two or more of the above signs & symptoms of infection both present and new to the patient? Yes (Proceed)  -SS        Assess for evidence of organ dysfunction: Are any of the below criteria present within 6 hours of suspected infection and SIRS criteria that are NOT considered to be chronic conditions? --        Date of presentation of severe sepsis 02/26/25  -        Time of presentation of severe sepsis 1411  -        Sepsis Note: Click \"NEXT\" below (NOT \"close\") to generate sepsis note based on above information. YES (proceed by clicking \"NEXT\")  -SS                  User Key  (r) = Recorded By, (t) = Taken By, (c) = Cosigned By      Initials Name Provider Type    SS JIMMY Nelson Nurse Practitioner                  Patient only given 500 cc of Nss in the setting of volume overload.      There is no height or weight on file to calculate BMI.  Wt Readings from Last 1 Encounters:   02/07/25 (!) 140 kg (309 lb)        Ideal body weight: 75.3 kg (166 lb 0.1 oz)  Adjusted ideal body weight: 101 kg (223 lb 3.3 oz)    "

## 2025-02-27 ENCOUNTER — APPOINTMENT (INPATIENT)
Dept: CT IMAGING | Facility: HOSPITAL | Age: 70
DRG: 871 | End: 2025-02-27
Payer: MEDICARE

## 2025-02-27 PROBLEM — F10.10 ALCOHOL ABUSE: Status: ACTIVE | Noted: 2025-02-26

## 2025-02-27 PROBLEM — M62.82 NON-TRAUMATIC RHABDOMYOLYSIS: Status: ACTIVE | Noted: 2025-02-27

## 2025-02-27 PROBLEM — E87.1 HYPONATREMIA: Status: ACTIVE | Noted: 2025-02-27

## 2025-02-27 PROBLEM — I89.0 LYMPHEDEMA OF BOTH LOWER EXTREMITIES: Status: ACTIVE | Noted: 2022-12-06

## 2025-02-27 PROBLEM — R79.89 ELEVATED TROPONIN: Status: ACTIVE | Noted: 2025-02-27

## 2025-02-27 PROBLEM — R78.81 BACTEREMIA: Status: ACTIVE | Noted: 2025-02-27

## 2025-02-27 PROBLEM — R19.7 DIARRHEA: Status: ACTIVE | Noted: 2025-02-27

## 2025-02-27 PROBLEM — N18.31 ACUTE RENAL FAILURE SUPERIMPOSED ON STAGE 3A CHRONIC KIDNEY DISEASE (HCC): Status: ACTIVE | Noted: 2025-02-26

## 2025-02-27 PROBLEM — L03.119 CELLULITIS OF LOWER EXTREMITY: Status: ACTIVE | Noted: 2024-01-02

## 2025-02-27 PROBLEM — R65.20 SEVERE SEPSIS (HCC): Status: ACTIVE | Noted: 2025-02-26

## 2025-02-27 PROBLEM — E87.20 METABOLIC ACIDOSIS: Status: ACTIVE | Noted: 2025-02-27

## 2025-02-27 LAB
ALBUMIN SERPL BCG-MCNC: 3 G/DL (ref 3.5–5)
ALP SERPL-CCNC: 56 U/L (ref 34–104)
ALT SERPL W P-5'-P-CCNC: 26 U/L (ref 7–52)
ANION GAP SERPL CALCULATED.3IONS-SCNC: 11 MMOL/L (ref 4–13)
ANION GAP SERPL CALCULATED.3IONS-SCNC: 13 MMOL/L (ref 4–13)
ANION GAP SERPL CALCULATED.3IONS-SCNC: 7 MMOL/L (ref 4–13)
ANION GAP SERPL CALCULATED.3IONS-SCNC: 8 MMOL/L (ref 4–13)
ANION GAP SERPL CALCULATED.3IONS-SCNC: 8 MMOL/L (ref 4–13)
APAP SERPL-MCNC: <2 UG/ML (ref 10–20)
AST SERPL W P-5'-P-CCNC: 52 U/L (ref 13–39)
BASE EX.OXY STD BLDV CALC-SCNC: 54 % (ref 60–80)
BASE EXCESS BLDV CALC-SCNC: -7 MMOL/L
BASOPHILS # BLD AUTO: 0.06 THOUSANDS/ÂΜL (ref 0–0.1)
BASOPHILS NFR BLD AUTO: 0 % (ref 0–1)
BILIRUB DIRECT SERPL-MCNC: 0.17 MG/DL (ref 0–0.2)
BILIRUB SERPL-MCNC: 0.47 MG/DL (ref 0.2–1)
BUN SERPL-MCNC: 41 MG/DL (ref 5–25)
BUN SERPL-MCNC: 44 MG/DL (ref 5–25)
BUN SERPL-MCNC: 44 MG/DL (ref 5–25)
BUN SERPL-MCNC: 47 MG/DL (ref 5–25)
BUN SERPL-MCNC: 49 MG/DL (ref 5–25)
C DIFF TOX A+B STL QL IA: NEGATIVE
C DIFF TOX GENS STL QL NAA+PROBE: POSITIVE
CA-I BLD-SCNC: 1.11 MMOL/L (ref 1.12–1.32)
CALCIUM SERPL-MCNC: 7.3 MG/DL (ref 8.4–10.2)
CALCIUM SERPL-MCNC: 7.4 MG/DL (ref 8.4–10.2)
CALCIUM SERPL-MCNC: 7.5 MG/DL (ref 8.4–10.2)
CALCIUM SERPL-MCNC: 8.2 MG/DL (ref 8.4–10.2)
CALCIUM SERPL-MCNC: 8.9 MG/DL (ref 8.4–10.2)
CHLORIDE SERPL-SCNC: 98 MMOL/L (ref 96–108)
CHLORIDE SERPL-SCNC: 99 MMOL/L (ref 96–108)
CHLORIDE SERPL-SCNC: 99 MMOL/L (ref 96–108)
CHLORIDE UR-SCNC: 21 MMOL/L
CK SERPL-CCNC: 945 U/L (ref 39–308)
CO2 SERPL-SCNC: 13 MMOL/L (ref 21–32)
CO2 SERPL-SCNC: 18 MMOL/L (ref 21–32)
CO2 SERPL-SCNC: 18 MMOL/L (ref 21–32)
CO2 SERPL-SCNC: 19 MMOL/L (ref 21–32)
CO2 SERPL-SCNC: 20 MMOL/L (ref 21–32)
CREAT SERPL-MCNC: 2.86 MG/DL (ref 0.6–1.3)
CREAT SERPL-MCNC: 3.03 MG/DL (ref 0.6–1.3)
CREAT SERPL-MCNC: 3.2 MG/DL (ref 0.6–1.3)
CREAT SERPL-MCNC: 3.52 MG/DL (ref 0.6–1.3)
CREAT SERPL-MCNC: 3.67 MG/DL (ref 0.6–1.3)
CREAT UR-MCNC: 116.5 MG/DL
EOSINOPHIL # BLD AUTO: 0.03 THOUSAND/ÂΜL (ref 0–0.61)
EOSINOPHIL NFR BLD AUTO: 0 % (ref 0–6)
ERYTHROCYTE [DISTWIDTH] IN BLOOD BY AUTOMATED COUNT: 13.4 % (ref 11.6–15.1)
GFR SERPL CREATININE-BSD FRML MDRD: 15 ML/MIN/1.73SQ M
GFR SERPL CREATININE-BSD FRML MDRD: 16 ML/MIN/1.73SQ M
GFR SERPL CREATININE-BSD FRML MDRD: 18 ML/MIN/1.73SQ M
GFR SERPL CREATININE-BSD FRML MDRD: 19 ML/MIN/1.73SQ M
GFR SERPL CREATININE-BSD FRML MDRD: 21 ML/MIN/1.73SQ M
GLUCOSE SERPL-MCNC: 104 MG/DL (ref 65–140)
GLUCOSE SERPL-MCNC: 137 MG/DL (ref 65–140)
GLUCOSE SERPL-MCNC: 144 MG/DL (ref 65–140)
GLUCOSE SERPL-MCNC: 154 MG/DL (ref 65–140)
GLUCOSE SERPL-MCNC: 156 MG/DL (ref 65–140)
GLUCOSE SERPL-MCNC: 160 MG/DL (ref 65–140)
GLUCOSE SERPL-MCNC: 162 MG/DL (ref 65–140)
GLUCOSE SERPL-MCNC: 167 MG/DL (ref 65–140)
GLUCOSE SERPL-MCNC: 178 MG/DL (ref 65–140)
GLUCOSE SERPL-MCNC: 181 MG/DL (ref 65–140)
GLUCOSE SERPL-MCNC: 185 MG/DL (ref 65–140)
GLUCOSE SERPL-MCNC: 185 MG/DL (ref 65–140)
GLUCOSE SERPL-MCNC: 197 MG/DL (ref 65–140)
GLUCOSE SERPL-MCNC: 208 MG/DL (ref 65–140)
GLUCOSE SERPL-MCNC: 229 MG/DL (ref 65–140)
GLUCOSE SERPL-MCNC: 239 MG/DL (ref 65–140)
GLUCOSE SERPL-MCNC: 240 MG/DL (ref 65–140)
GLUCOSE SERPL-MCNC: 84 MG/DL (ref 65–140)
HCO3 BLDV-SCNC: 17.8 MMOL/L (ref 24–30)
HCT VFR BLD AUTO: 41.6 % (ref 36.5–49.3)
HGB BLD-MCNC: 13.3 G/DL (ref 12–17)
IMM GRANULOCYTES # BLD AUTO: 0.35 THOUSAND/UL (ref 0–0.2)
IMM GRANULOCYTES NFR BLD AUTO: 2 % (ref 0–2)
LYMPHOCYTES # BLD AUTO: 1.43 THOUSANDS/ÂΜL (ref 0.6–4.47)
LYMPHOCYTES NFR BLD AUTO: 9 % (ref 14–44)
MAGNESIUM SERPL-MCNC: 2.2 MG/DL (ref 1.9–2.7)
MAGNESIUM SERPL-MCNC: 2.3 MG/DL (ref 1.9–2.7)
MAGNESIUM SERPL-MCNC: 2.3 MG/DL (ref 1.9–2.7)
MAGNESIUM SERPL-MCNC: 2.4 MG/DL (ref 1.9–2.7)
MAGNESIUM SERPL-MCNC: 2.4 MG/DL (ref 1.9–2.7)
MCH RBC QN AUTO: 31.1 PG (ref 26.8–34.3)
MCHC RBC AUTO-ENTMCNC: 32 G/DL (ref 31.4–37.4)
MCV RBC AUTO: 97 FL (ref 82–98)
MONOCYTES # BLD AUTO: 1.38 THOUSAND/ÂΜL (ref 0.17–1.22)
MONOCYTES NFR BLD AUTO: 9 % (ref 4–12)
NEUTROPHILS # BLD AUTO: 12.67 THOUSANDS/ÂΜL (ref 1.85–7.62)
NEUTS SEG NFR BLD AUTO: 80 % (ref 43–75)
NRBC BLD AUTO-RTO: 0 /100 WBCS
O2 CT BLDV-SCNC: 9.5 ML/DL
OSMOLALITY UR: 323 MMOL/KG (ref 250–900)
PCO2 BLDV: 33.6 MM HG (ref 42–50)
PH BLDV: 7.34 [PH] (ref 7.3–7.4)
PHOSPHATE SERPL-MCNC: 1.8 MG/DL (ref 2.3–4.1)
PHOSPHATE SERPL-MCNC: 2.2 MG/DL (ref 2.3–4.1)
PHOSPHATE SERPL-MCNC: 2.2 MG/DL (ref 2.3–4.1)
PHOSPHATE SERPL-MCNC: 2.5 MG/DL (ref 2.3–4.1)
PHOSPHATE SERPL-MCNC: 2.5 MG/DL (ref 2.3–4.1)
PLATELET # BLD AUTO: 142 THOUSANDS/UL (ref 149–390)
PMV BLD AUTO: 9.2 FL (ref 8.9–12.7)
PO2 BLDV: 27.5 MM HG (ref 35–45)
POTASSIUM SERPL-SCNC: 3.3 MMOL/L (ref 3.5–5.3)
POTASSIUM SERPL-SCNC: 3.5 MMOL/L (ref 3.5–5.3)
POTASSIUM SERPL-SCNC: 3.5 MMOL/L (ref 3.5–5.3)
POTASSIUM SERPL-SCNC: 3.9 MMOL/L (ref 3.5–5.3)
POTASSIUM SERPL-SCNC: 4.1 MMOL/L (ref 3.5–5.3)
PROCALCITONIN SERPL-MCNC: 18.89 NG/ML
PROT SERPL-MCNC: 7.1 G/DL (ref 6.4–8.4)
RBC # BLD AUTO: 4.27 MILLION/UL (ref 3.88–5.62)
S PYO DNA BLD POS QL NAA+NON-PROBE: DETECTED
SALICYLATES SERPL-MCNC: <5 MG/DL (ref 3–20)
SODIUM SERPL-SCNC: 124 MMOL/L (ref 135–147)
SODIUM SERPL-SCNC: 125 MMOL/L (ref 135–147)
SODIUM SERPL-SCNC: 125 MMOL/L (ref 135–147)
SODIUM SERPL-SCNC: 126 MMOL/L (ref 135–147)
SODIUM SERPL-SCNC: 127 MMOL/L (ref 135–147)
SODIUM UR-SCNC: 19 MMOL/L
SODIUM UR-SCNC: 20 MMOL/L
TSH SERPL DL<=0.05 MIU/L-ACNC: 1.56 UIU/ML (ref 0.45–4.5)
UUN 24H UR-MCNC: 467 MG/DL
VANCOMYCIN TROUGH SERPL-MCNC: 6.2 UG/ML (ref 10–20)
WBC # BLD AUTO: 15.92 THOUSAND/UL (ref 4.31–10.16)

## 2025-02-27 PROCEDURE — 80143 DRUG ASSAY ACETAMINOPHEN: CPT | Performed by: NURSE PRACTITIONER

## 2025-02-27 PROCEDURE — 87186 SC STD MICRODIL/AGAR DIL: CPT | Performed by: NURSE PRACTITIONER

## 2025-02-27 PROCEDURE — 80048 BASIC METABOLIC PNL TOTAL CA: CPT

## 2025-02-27 PROCEDURE — 83735 ASSAY OF MAGNESIUM: CPT | Performed by: NURSE PRACTITIONER

## 2025-02-27 PROCEDURE — 84540 ASSAY OF URINE/UREA-N: CPT | Performed by: NURSE PRACTITIONER

## 2025-02-27 PROCEDURE — 87205 SMEAR GRAM STAIN: CPT | Performed by: NURSE PRACTITIONER

## 2025-02-27 PROCEDURE — 74176 CT ABD & PELVIS W/O CONTRAST: CPT

## 2025-02-27 PROCEDURE — 84100 ASSAY OF PHOSPHORUS: CPT | Performed by: NURSE PRACTITIONER

## 2025-02-27 PROCEDURE — 87505 NFCT AGENT DETECTION GI: CPT

## 2025-02-27 PROCEDURE — 84300 ASSAY OF URINE SODIUM: CPT | Performed by: NURSE PRACTITIONER

## 2025-02-27 PROCEDURE — 80202 ASSAY OF VANCOMYCIN: CPT | Performed by: NURSE PRACTITIONER

## 2025-02-27 PROCEDURE — 84145 PROCALCITONIN (PCT): CPT | Performed by: NURSE PRACTITIONER

## 2025-02-27 PROCEDURE — 71250 CT THORAX DX C-: CPT

## 2025-02-27 PROCEDURE — 84100 ASSAY OF PHOSPHORUS: CPT

## 2025-02-27 PROCEDURE — 87070 CULTURE OTHR SPECIMN AEROBIC: CPT | Performed by: NURSE PRACTITIONER

## 2025-02-27 PROCEDURE — 82436 ASSAY OF URINE CHLORIDE: CPT | Performed by: NURSE PRACTITIONER

## 2025-02-27 PROCEDURE — 87077 CULTURE AEROBIC IDENTIFY: CPT | Performed by: NURSE PRACTITIONER

## 2025-02-27 PROCEDURE — 80048 BASIC METABOLIC PNL TOTAL CA: CPT | Performed by: NURSE PRACTITIONER

## 2025-02-27 PROCEDURE — 73700 CT LOWER EXTREMITY W/O DYE: CPT

## 2025-02-27 PROCEDURE — 80179 DRUG ASSAY SALICYLATE: CPT | Performed by: NURSE PRACTITIONER

## 2025-02-27 PROCEDURE — 83935 ASSAY OF URINE OSMOLALITY: CPT | Performed by: NURSE PRACTITIONER

## 2025-02-27 PROCEDURE — 82948 REAGENT STRIP/BLOOD GLUCOSE: CPT

## 2025-02-27 PROCEDURE — 82550 ASSAY OF CK (CPK): CPT | Performed by: NURSE PRACTITIONER

## 2025-02-27 PROCEDURE — 84443 ASSAY THYROID STIM HORMONE: CPT | Performed by: NURSE PRACTITIONER

## 2025-02-27 PROCEDURE — 99233 SBSQ HOSP IP/OBS HIGH 50: CPT | Performed by: ANESTHESIOLOGY

## 2025-02-27 PROCEDURE — 87147 CULTURE TYPE IMMUNOLOGIC: CPT | Performed by: NURSE PRACTITIONER

## 2025-02-27 PROCEDURE — 99222 1ST HOSP IP/OBS MODERATE 55: CPT | Performed by: STUDENT IN AN ORGANIZED HEALTH CARE EDUCATION/TRAINING PROGRAM

## 2025-02-27 PROCEDURE — 93005 ELECTROCARDIOGRAM TRACING: CPT

## 2025-02-27 PROCEDURE — 82330 ASSAY OF CALCIUM: CPT | Performed by: NURSE PRACTITIONER

## 2025-02-27 PROCEDURE — 83735 ASSAY OF MAGNESIUM: CPT

## 2025-02-27 PROCEDURE — 85025 COMPLETE CBC W/AUTO DIFF WBC: CPT | Performed by: NURSE PRACTITIONER

## 2025-02-27 PROCEDURE — 82570 ASSAY OF URINE CREATININE: CPT | Performed by: NURSE PRACTITIONER

## 2025-02-27 PROCEDURE — 80076 HEPATIC FUNCTION PANEL: CPT | Performed by: NURSE PRACTITIONER

## 2025-02-27 PROCEDURE — 87081 CULTURE SCREEN ONLY: CPT | Performed by: NURSE PRACTITIONER

## 2025-02-27 PROCEDURE — 82805 BLOOD GASES W/O2 SATURATION: CPT | Performed by: NURSE PRACTITIONER

## 2025-02-27 RX ORDER — INSULIN LISPRO 100 [IU]/ML
2-12 INJECTION, SOLUTION INTRAVENOUS; SUBCUTANEOUS
Status: DISCONTINUED | OUTPATIENT
Start: 2025-02-28 | End: 2025-02-27

## 2025-02-27 RX ORDER — INSULIN LISPRO 100 [IU]/ML
1-5 INJECTION, SOLUTION INTRAVENOUS; SUBCUTANEOUS
Status: DISCONTINUED | OUTPATIENT
Start: 2025-02-27 | End: 2025-02-27

## 2025-02-27 RX ORDER — CALCIUM GLUCONATE 20 MG/ML
2 INJECTION, SOLUTION INTRAVENOUS ONCE
Status: COMPLETED | OUTPATIENT
Start: 2025-02-27 | End: 2025-02-27

## 2025-02-27 RX ORDER — POTASSIUM CHLORIDE 1500 MG/1
20 TABLET, EXTENDED RELEASE ORAL ONCE
Status: COMPLETED | OUTPATIENT
Start: 2025-02-27 | End: 2025-02-27

## 2025-02-27 RX ORDER — INSULIN LISPRO 100 [IU]/ML
1-5 INJECTION, SOLUTION INTRAVENOUS; SUBCUTANEOUS
Status: DISCONTINUED | OUTPATIENT
Start: 2025-02-28 | End: 2025-02-27

## 2025-02-27 RX ORDER — POTASSIUM CHLORIDE 1500 MG/1
40 TABLET, EXTENDED RELEASE ORAL ONCE
Status: COMPLETED | OUTPATIENT
Start: 2025-02-27 | End: 2025-02-27

## 2025-02-27 RX ORDER — SODIUM CHLORIDE, SODIUM GLUCONATE, SODIUM ACETATE, POTASSIUM CHLORIDE, MAGNESIUM CHLORIDE, SODIUM PHOSPHATE, DIBASIC, AND POTASSIUM PHOSPHATE .53; .5; .37; .037; .03; .012; .00082 G/100ML; G/100ML; G/100ML; G/100ML; G/100ML; G/100ML; G/100ML
75 INJECTION, SOLUTION INTRAVENOUS CONTINUOUS
Status: DISCONTINUED | OUTPATIENT
Start: 2025-02-27 | End: 2025-02-28

## 2025-02-27 RX ORDER — VANCOMYCIN HYDROCHLORIDE 125 MG/1
125 CAPSULE ORAL EVERY 6 HOURS SCHEDULED
Status: DISCONTINUED | OUTPATIENT
Start: 2025-02-28 | End: 2025-03-03

## 2025-02-27 RX ORDER — FOLIC ACID 1 MG/1
1 TABLET ORAL DAILY
Status: DISCONTINUED | OUTPATIENT
Start: 2025-02-28 | End: 2025-03-11 | Stop reason: HOSPADM

## 2025-02-27 RX ORDER — INSULIN LISPRO 100 [IU]/ML
16 INJECTION, SOLUTION INTRAVENOUS; SUBCUTANEOUS
Status: DISCONTINUED | OUTPATIENT
Start: 2025-02-27 | End: 2025-02-27

## 2025-02-27 RX ORDER — INSULIN GLARGINE 100 [IU]/ML
48 INJECTION, SOLUTION SUBCUTANEOUS
Status: DISCONTINUED | OUTPATIENT
Start: 2025-02-27 | End: 2025-02-27

## 2025-02-27 RX ORDER — VANCOMYCIN HYDROCHLORIDE 750 MG/150ML
750 INJECTION, SOLUTION INTRAVENOUS EVERY 24 HOURS
Status: DISCONTINUED | OUTPATIENT
Start: 2025-02-27 | End: 2025-02-28

## 2025-02-27 RX ORDER — LANOLIN ALCOHOL/MO/W.PET/CERES
100 CREAM (GRAM) TOPICAL DAILY
Status: DISCONTINUED | OUTPATIENT
Start: 2025-02-28 | End: 2025-03-11 | Stop reason: HOSPADM

## 2025-02-27 RX ORDER — INSULIN LISPRO 100 [IU]/ML
2-12 INJECTION, SOLUTION INTRAVENOUS; SUBCUTANEOUS
Status: DISCONTINUED | OUTPATIENT
Start: 2025-02-27 | End: 2025-02-27

## 2025-02-27 RX ORDER — ALBUMIN HUMAN 50 G/1000ML
25 SOLUTION INTRAVENOUS ONCE
Status: COMPLETED | OUTPATIENT
Start: 2025-02-27 | End: 2025-02-27

## 2025-02-27 RX ORDER — INSULIN LISPRO 100 [IU]/ML
16 INJECTION, SOLUTION INTRAVENOUS; SUBCUTANEOUS
Status: DISCONTINUED | OUTPATIENT
Start: 2025-02-28 | End: 2025-02-27

## 2025-02-27 RX ADMIN — Medication 2 TABLET: at 01:36

## 2025-02-27 RX ADMIN — VANCOMYCIN HYDROCHLORIDE 750 MG: 750 INJECTION, SOLUTION INTRAVENOUS at 14:45

## 2025-02-27 RX ADMIN — ASPIRIN 81 MG: 81 TABLET, COATED ORAL at 09:29

## 2025-02-27 RX ADMIN — ALBUMIN (HUMAN) 25 G: 12.5 INJECTION, SOLUTION INTRAVENOUS at 20:33

## 2025-02-27 RX ADMIN — AZELASTINE HYDROCHLORIDE 1 SPRAY: 137 SPRAY, METERED NASAL at 17:01

## 2025-02-27 RX ADMIN — SODIUM CHLORIDE 4 UNITS/HR: 9 INJECTION, SOLUTION INTRAVENOUS at 20:07

## 2025-02-27 RX ADMIN — Medication 1 UNITS/HR: at 01:06

## 2025-02-27 RX ADMIN — CHLORHEXIDINE GLUCONATE 15 ML: 1.2 SOLUTION ORAL at 20:38

## 2025-02-27 RX ADMIN — HEPARIN SODIUM 7500 UNITS: 5000 INJECTION INTRAVENOUS; SUBCUTANEOUS at 05:58

## 2025-02-27 RX ADMIN — Medication 9 UNITS/HR: at 12:32

## 2025-02-27 RX ADMIN — CHLORHEXIDINE GLUCONATE 15 ML: 1.2 SOLUTION ORAL at 09:31

## 2025-02-27 RX ADMIN — CEFEPIME 2000 MG: 2 INJECTION, POWDER, FOR SOLUTION INTRAVENOUS at 12:27

## 2025-02-27 RX ADMIN — AMLODIPINE BESYLATE 10 MG: 10 TABLET ORAL at 09:29

## 2025-02-27 RX ADMIN — CALCIUM GLUCONATE 2 G: 20 INJECTION, SOLUTION INTRAVENOUS at 14:09

## 2025-02-27 RX ADMIN — HEPARIN SODIUM 7500 UNITS: 5000 INJECTION INTRAVENOUS; SUBCUTANEOUS at 14:01

## 2025-02-27 RX ADMIN — SODIUM CHLORIDE, SODIUM GLUCONATE, SODIUM ACETATE, POTASSIUM CHLORIDE, MAGNESIUM CHLORIDE, SODIUM PHOSPHATE, DIBASIC, AND POTASSIUM PHOSPHATE 75 ML/HR: .53; .5; .37; .037; .03; .012; .00082 INJECTION, SOLUTION INTRAVENOUS at 20:29

## 2025-02-27 RX ADMIN — ATORVASTATIN CALCIUM 40 MG: 40 TABLET, FILM COATED ORAL at 17:02

## 2025-02-27 RX ADMIN — HEPARIN SODIUM 7500 UNITS: 5000 INJECTION INTRAVENOUS; SUBCUTANEOUS at 21:34

## 2025-02-27 RX ADMIN — Medication 6 MG: at 01:45

## 2025-02-27 RX ADMIN — THIAMINE HYDROCHLORIDE: 100 INJECTION, SOLUTION INTRAMUSCULAR; INTRAVENOUS at 09:33

## 2025-02-27 RX ADMIN — AZELASTINE HYDROCHLORIDE 1 SPRAY: 137 SPRAY, METERED NASAL at 09:29

## 2025-02-27 RX ADMIN — Medication 1 UNITS/HR: at 18:24

## 2025-02-27 RX ADMIN — DEXTROSE AND SODIUM CHLORIDE 125 ML/HR: 5; .45 INJECTION, SOLUTION INTRAVENOUS at 02:59

## 2025-02-27 RX ADMIN — POTASSIUM CHLORIDE 40 MEQ: 1500 TABLET, EXTENDED RELEASE ORAL at 14:00

## 2025-02-27 RX ADMIN — CALCIUM GLUCONATE 2 G: 20 INJECTION, SOLUTION INTRAVENOUS at 07:47

## 2025-02-27 RX ADMIN — POTASSIUM CHLORIDE 20 MEQ: 1500 TABLET, EXTENDED RELEASE ORAL at 01:35

## 2025-02-27 RX ADMIN — CEFEPIME 2000 MG: 2 INJECTION, POWDER, FOR SOLUTION INTRAVENOUS at 00:19

## 2025-02-27 RX ADMIN — Medication 6 MG: at 21:34

## 2025-02-27 RX ADMIN — METOPROLOL SUCCINATE 100 MG: 50 TABLET, EXTENDED RELEASE ORAL at 09:29

## 2025-02-27 RX ADMIN — CALCIUM GLUCONATE 2 G: 20 INJECTION, SOLUTION INTRAVENOUS at 15:07

## 2025-02-27 RX ADMIN — DEXTROSE AND SODIUM CHLORIDE 125 ML/HR: 5; .45 INJECTION, SOLUTION INTRAVENOUS at 11:13

## 2025-02-27 NOTE — ASSESSMENT & PLAN NOTE
Lab Results   Component Value Date    HGBA1C 8.9 (H) 02/26/2025       Recent Labs     02/27/25  0953 02/27/25  1201 02/27/25  1407 02/27/25  1603   POCGLU 239* 185* 197* 156*       Blood Sugar Average: Last 72 hrs:  (P) 219.15    Uncontrolled type 2 diabetes with long-term current use of insulin, which is complicated by DKA.  Home regimen consist of Humulin N 70/30, 20 units before breakfast and 25 units before dinner although he was not taking his insulin and not performing self glucose monitoring lately.    Currently on insulin infusion, retired daily requirement,  I do recommend to transition him off insulin infusion with Lantus 48 units nightly and Humalog 16 units 3 times daily AC plus correctional insulin algorithm #4 during the day and #2 at bedtime.  Discontinue insulin infusion 1 hour after first dose of Lantus.  Continue to monitor blood sugar over time and make adjustments to the regimen if necessary.

## 2025-02-27 NOTE — ASSESSMENT & PLAN NOTE
Lab Results   Component Value Date    HGBA1C 8.9 (H) 02/26/2025       Recent Labs     02/26/25  2152 02/26/25  2213 02/26/25  2303 02/27/25  0011   POCGLU 144* 120 111 144*       Blood Sugar Average: Last 72 hrs:  (P) [id number]  Likely due to noncompliance-Reports has not taken any medications for 2 weeks   In DKA, PH 7.218, GAP 17, bicarb 15 and glucose 446    Plan:  AG closed x2; Non-DKA Insulin gtt started   Continue D5 1/2 NS  Trend BMP, Mg, Phos  HgA1C 8.9  Endocrine consult

## 2025-02-27 NOTE — PROGRESS NOTES
Nadeem Purdy Jr. is a 69 y.o. male who is currently ordered Vancomycin IV with management by the Pharmacy Consult service.  Relevant clinical data and objective / subjective history reviewed.  Vancomycin Assessment:  Indication and Goal AUC/Trough: Soft tissue (goal -600, trough >10)  Clinical Status: worsening  Micro:     Renal Function:  SCr: 3.03 mg/dL  CrCl: 32.7 mL/min  Renal replacement: Not on dialysis  Days of Therapy: 2  Current Dose: 1000mg IV q24h  Vancomycin Plan:  New Dosinmg IV q24h  Estimated AUC: 424 mcg*hr/mL  Estimated Trough: 14.6 mcg/mL  Next Level: 3/2 AM labs  Renal Function Monitoring: Daily BMP and UOP  Pharmacy will continue to follow closely for s/sx of nephrotoxicity, infusion reactions and appropriateness of therapy.  BMP and CBC will be ordered per protocol. We will continue to follow the patient’s culture results and clinical progress daily.    Shaheen Godinez, Pharmacist

## 2025-02-27 NOTE — CASE MANAGEMENT
Case Management Progress Note    Patient name Nadeem Purdy Jr.  Location ICU 03/ICU  MRN [id number]  : 1955 Date 2025       LOS (days): 1  Geometric Mean LOS (GMLOS) (days): 4.9  Days to GMLOS:3.8        OBJECTIVE:        Current admission status: Inpatient  Preferred Pharmacy:   [facility]/pharmacy #[id number] - [place] [address]  [address]  [place] [id number]  Phone: [phone] Fax: [phone]    Primary Care Provider: Jluis Glass MD    Primary Insurance: MEDICARE  Secondary Insurance: [facility]    PROGRESS NOTE:  Pt very letahrgic, unable to evaluate at this time.  Will assess when pt is more alert.

## 2025-02-27 NOTE — ASSESSMENT & PLAN NOTE
Reports drinking 1-2 glasses of [place]/day  Last drank 4 weeks ago    Plan:  Daily thiamine and folate  CIWA

## 2025-02-27 NOTE — ASSESSMENT & PLAN NOTE
AEB tachycardia, WBC 17, LA 2.4, and Alex lower extremity cellulitis  (2/26) CXR with Mild bilateral hilar prominence may be due to prominent vasculature/congestion    Plan:  BC x 2 pending  Stool sample for C-diff pending  Procal 9.27  LA 2.6; now cleared  Continue Cefepime and Vanco; now D2  Trend WBC and fever curve

## 2025-02-27 NOTE — WOUND OSTOMY CARE
Consult Note - Wound   Nadeem Purdy Jr. 69 y.o. male MRN: [id number]  Unit/Bed#: ICU 03-01 Encounter: [id number]      History and Present Illness:  Presented to [facility] ED via [facility]. Pt with BLE Edema polyuria polydipsia incontinence not taking meds x 2 weeks PMH complex medical history significant for Diabetic ketoacidosis associated with Type 2 DM, class 3 severe obesity, 44 BMI,.LUIS ALFREDO ,Sepsis.,alcohol use,edema or both legs,Lymphedema of right leg. Numbness of left foot.      Assessment Findings:   Wound Care consulted for bilateral lower extremity wounds  1)Left medial heel intact absorbing blister,purple , above blister mid medial left tibia is wound irregular shaped with ill defined irregular edges.     2)Left anterior mid tibia irregular shaped with ill defined irregular edges. Wound bed mix of weeping clear drainage and moist tan slough    3)Left Posterior knee   intact blister    4)Right anterior tibia beefy red partial thickness skin loss    5)Right Posterior tibia scattered irregular shaped partial thickness skin loss red wound beds    Skin care plans:  1-Hydraguard/Silicone Cream to bilateral sacrum, buttock, and heels BID and PRN  2-Elevate heels to offload pressure.  3-Ehob cushion in chair when out of bed.  4-Moisturize skin daily with skin nourishing cream.  5-Turn/reposition q2h or when medically stable for pressure re-distribution on skin.   6-Cleanse bilateral lower extremities with soap and water, apply skin nourishing cream to intact skin. Cleanse wounds with NSS. Apply adaptic non adherent then Melgisorb AG to wounds then cover with DSD ABD and wrap with Kerlix. Keep elevated with heels offloaded. Change every other day and prn soil or dislodgment     Recommend Wound Center as Outpatient upon discharge   Wound 02/26/25 Pretibial Right (Active)   Wound Image   02/27/25 1448   Wound Description Drainage;Beefy red 02/27/25 1448   Jodee-wound Assessment Edema;Erythema 02/27/25 1448   Wound  Length (cm) 18 cm 02/27/25 1448   Wound Width (cm) 12 cm 02/27/25 1448   Wound Depth (cm) 0.1 cm 02/27/25 1448   Wound Surface Area (cm^2) 216 cm^2 02/27/25 1448   Wound Volume (cm^3) 21.6 cm^3 02/27/25 1448   Calculated Wound Volume (cm^3) 21.6 cm^3 02/27/25 1448   Drainage Amount Moderate 02/27/25 1448   Drainage Description Green;Clear;Yellow 02/27/25 1448   Non-staged Wound Description Partial thickness 02/27/25 1448   Treatments Elevated;Site care;Cleansed 02/27/25 1448   Dressing Calcium Alginate with Silver;ABD;Dry dressing;Non adherent 02/27/25 1448   Dressing Changed New 02/27/25 1448   Patient Tolerance Tolerated well 02/27/25 1448   Dressing Status Clean;Dry;Intact 02/27/25 1448       Wound 02/26/25 Pretibial Distal;Left (Active)   Wound Image   02/27/25 1448   Wound Description Beefy red;Bleeding;Drainage;Brown 02/27/25 1448   Jodee-wound Assessment Edema;Erythema 02/27/25 1448   Wound Length (cm) 10 cm 02/27/25 1448   Wound Width (cm) 14 cm 02/27/25 1448   Wound Depth (cm) 0.2 cm 02/27/25 1448   Wound Surface Area (cm^2) 140 cm^2 02/27/25 1448   Wound Volume (cm^3) 28 cm^3 02/27/25 1448   Calculated Wound Volume (cm^3) 28 cm^3 02/27/25 1448   Drainage Amount Moderate 02/27/25 1448   Drainage Description Clear;Brown;Yellow 02/27/25 1448   Non-staged Wound Description Partial thickness 02/27/25 1448   Treatments Cleansed;Site care;Elevated 02/27/25 1448   Dressing ABD;Calcium Alginate with Silver;Dry dressing;Non adherent 02/27/25 1448   Dressing Changed New 02/27/25 1448   Patient Tolerance Tolerated well 02/27/25 1448   Dressing Status Clean;Dry;Intact 02/27/25 1448       Wound 02/27/25 Ankle Left;Medial (Active)   Wound Image   02/27/25 1448   Wound Description Beefy red;Brown;Light purple 02/27/25 1448   Jdoee-wound Assessment Erythema;Edema 02/27/25 1448   Wound Length (cm) 19 cm 02/27/25 1448   Wound Width (cm) 16 cm 02/27/25 1448   Wound Depth (cm) 0.2 cm 02/27/25 1448   Wound Surface Area (cm^2) 304  cm^2 02/27/25 1448   Wound Volume (cm^3) 60.8 cm^3 02/27/25 1448   Calculated Wound Volume (cm^3) 60.8 cm^3 02/27/25 1448   Drainage Amount Moderate 02/27/25 1448   Drainage Description Clear;MATEUS 02/27/25 1448   Non-staged Wound Description Full thickness 02/27/25 1448   Treatments Cleansed;Site care;Elevated 02/27/25 1448   Dressing ABD;Calcium Alginate with Silver;Dry dressing;Non adherent 02/27/25 1448   Dressing Changed New 02/27/25 1448   Patient Tolerance Tolerated well 02/27/25 1448   Dressing Status Clean;Dry;Intact 02/27/25 1448       Wound 02/27/25 Tibial Left;Posterior (Active)   Wound Image   02/27/25 1449   Wound Description Dry;Intact;Beefy red;Edema 02/27/25 1449   Jodee-wound Assessment Dry;Intact;Edema;Erythema 02/27/25 1449   Drainage Amount None 02/27/25 1449   Treatments Site care;Elevated 02/27/25 1449   Dressing ABD;Dry dressing 02/27/25 1449   Dressing Changed New 02/27/25 1449   Patient Tolerance Tolerated well 02/27/25 1449   Dressing Status Clean;Dry;Intact 02/27/25 1449       Wound 02/27/25 Tibial Posterior;Right (Active)   Wound Image   02/27/25 1450   Wound Description Beefy red 02/27/25 1450   Jodee-wound Assessment Dry;Erythema;Edema 02/27/25 1450   Wound Length (cm) 18 cm 02/27/25 1450   Wound Width (cm) 12 cm 02/27/25 1450   Wound Depth (cm) 0.1 cm 02/27/25 1450   Wound Surface Area (cm^2) 216 cm^2 02/27/25 1450   Wound Volume (cm^3) 21.6 cm^3 02/27/25 1450   Calculated Wound Volume (cm^3) 21.6 cm^3 02/27/25 1450   Drainage Amount None 02/27/25 1450   Non-staged Wound Description Partial thickness 02/27/25 1450   Treatments Cleansed;Site care;Elevated 02/27/25 1450   Dressing ABD;Dry dressing 02/27/25 1450   Dressing Changed New 02/27/25 1450   Patient Tolerance Tolerated well 02/27/25 1450   Dressing Status Clean;Dry;Intact 02/27/25 1450       Wound 02/27/25 Knee Left;Posterior (Active)   Wound Image   02/27/25 1451   Wound Description Dry;Intact 02/27/25 1451   Jodee-wound Assessment  Erythema;Edema 02/27/25 1451   Wound Length (cm) 3 cm 02/27/25 1451   Wound Width (cm) 4 cm 02/27/25 1451   Wound Surface Area (cm^2) 12 cm^2 02/27/25 1451   Drainage Amount None 02/27/25 1451   Non-staged Wound Description Partial thickness 02/27/25 1451   Treatments Cleansed;Site care;Elevated 02/27/25 1451   Dressing ABD;Non adherent 02/27/25 1451   Dressing Changed New 02/27/25 1451   Patient Tolerance Tolerated well 02/27/25 1451   Dressing Status Clean;Dry;Intact 02/27/25 1451     Call or Secure Chat with any questions  Wound Care will continue to follow weekly    Nata FERRARAN RN CWON

## 2025-02-27 NOTE — NUTRITION
02/27/25 1403   Biochemical Data,Medical Tests, and Procedures   Biochemical Data/Medical Tests/Procedures Lab values reviewed;Meds reviewed   Labs (Comment) 2/27/2025 sodium 126, potassium 3.3, BUN 44, creatinine 3.2, calcium 7.5, phosphorus 2.2   Meds (Comment) Norvasc, atorvastatin, Peridex, vitamin B1, heparin, melatonin, Toprol, insulin   Nutrition-Focused Physical Exam   Nutrition-Focused Physical Exam Findings RN skin assessment reviewed;Edema;Wound  (+3 BL LE edema. Wound at right pretibial, wound at left pretibial.)   Nutrition-Focused Physical Exam Findings Patient with obesity   Medical-Related Concerns HTN, obesity, alcohol use, type 2 diabetes, vitamin D deficiency, stage 3 CKD, lymphedema   Adequacy of Intake   Nutrition Modality PO   Feeding Route   PO Independent   Current PO Intake   Current Diet Order CCD 2 diet, thin liquids   Current Meal Intake %   Estimated calorie intake compared to estimated need Nutrient needs are met at present   PES Statement   Problem Clinical   Biochemical (2) Altered nutrition-related laboratory values (specify) NC-2.2  (Hemoglobin A1c)   Related to Other (Comment)  (Patient not managing type 2 diabetes)   As evidenced by: Per patient/family interview;Abnormal lab (Specify)  (Hemoglobin A1c 8.9)   Recommendations/Interventions   Malnutrition/BMI Present Yes   Provider already documenting morbid obesity Yes   Adult BMI Classifications Morbid Obesity 40-44.9   Summary Wound Care RN consulted; wound, High BMI; HgA1c 8.9. Presents with symptomatic hyperglycemia. Patient not taking his medication x 2 weeks. Past medical history significant for HTN, obesity, alcohol use, type 2 diabetes, vitamin D deficiency, stage 3 CKD, lymphedema. Weight history reviewed. No significant changes. +3 BL LE edema. Wound at right pretibial, wound at left pretibial. Prescribed a CCD2 diet, thin liquids. Meal completion 75% most recently.  Patient reports having a poor appetite X 3-4  "weeks.  Usually has 2 meals daily.  Follows no special diet.  NKFA.  Denies dysphagia.  Patient and his family members cook and grocery shop.  Uses salt \"sparingly.\"  Reports weight is stable.  Usual body weight 300#-310#.  Does not check blood sugars.  Was not taking medications or insulin prior to admission, \"I do not know why.\"  RD discussed diet as prescribed. RD provided and discussed \"Carbohydrate Counting for People with Diabetes,\" \"Diabetes Label Reading Tips,\" \"Plate Method for Diabetes\" handouts. Discussed carbohydrate counting as related to current diet prescription; 1 carbohydrate serving = 15 g carbohydrates. Discussed what 15 g carbohydrate looks like. Discussed nutrition label reading; serving size and total carbohydrate. Discussed meal planning; ½ plate non-starchy carbohydrate foods, ¼ plate carbohydrate-rich foods, ¼ protein foods. Discouraged intake of sugar-sweetened beverages. Encouraged intake of water or non-sugar sweetened beverage options. Pt verbalizes understanding to all. RD to follow and address education needs PRN.   Interventions/Recommendations Continue current diet order   Education Assessment   Education Education initiated/ completed   Education Notes RD provided and discussed \"Carbohydrate Counting for People with Diabetes,\" \"Diabetes Label Reading Tips,\" \"Plate Method for Diabetes\" handouts. Discussed carbohydrate counting as related to current diet prescription; 1 carbohydrate serving = 15 g carbohydrates. Discussed what 15 g carbohydrate looks like. Discussed nutrition label reading; serving size and total carbohydrate. Discussed meal planning; ½ plate non-starchy carbohydrate foods, ¼ plate carbohydrate-rich foods, ¼ protein foods. Discouraged intake of sugar-sweetened beverages. Encouraged intake of water or non-sugar sweetened beverage options.   Patient Nutrition Goals   Goal Comprehend education;Improve to healthful diet;Glucose WNL   Goal Status Initiated   Timeframe to " complete goal by next f/u   Nutrition Complexity Risk   Nutrition complexity level Low risk   Follow up date 03/06/25

## 2025-02-27 NOTE — PLAN OF CARE
Problem: Prexisting or High Potential for Compromised Skin Integrity  Goal: Skin integrity is maintained or improved  Description: INTERVENTIONS:  - Identify patients at risk for skin breakdown  - Assess and monitor skin integrity  - Assess and monitor nutrition and hydration status  - Monitor labs   - Assess for incontinence   - Turn and reposition patient  - Assist with mobility/ambulation  - Relieve pressure over bony prominences  - Avoid friction and shearing  - Provide appropriate hygiene as needed including keeping skin clean and dry  - Evaluate need for skin moisturizer/barrier cream  - Collaborate with interdisciplinary team   - Patient/family teaching  - Consider wound care consult   Outcome: Progressing     Problem: PAIN - ADULT  Goal: Verbalizes/displays adequate comfort level or baseline comfort level  Description: Interventions:  - Encourage patient to monitor pain and request assistance  - Assess pain using appropriate pain scale  - Administer analgesics based on type and severity of pain and evaluate response  - Implement non-pharmacological measures as appropriate and evaluate response  - Consider cultural and social influences on pain and pain management  - Notify physician/advanced practitioner if interventions unsuccessful or patient reports new pain  Outcome: Progressing     Problem: INFECTION - ADULT  Goal: Absence or prevention of progression during hospitalization  Description: INTERVENTIONS:  - Assess and monitor for signs and symptoms of infection  - Monitor lab/diagnostic results  - Monitor all insertion sites, i.e. indwelling lines, tubes, and drains  - Monitor endotracheal if appropriate and nasal secretions for changes in amount and color  - [place] appropriate cooling/warming therapies per order  - Administer medications as ordered  - Instruct and encourage patient and family to use good hand hygiene technique  - Identify and instruct in appropriate isolation precautions for  identified infection/condition  Outcome: Progressing  Goal: Absence of fever/infection during neutropenic period  Description: INTERVENTIONS:  - Monitor WBC    Outcome: Progressing     Problem: SAFETY ADULT  Goal: Patient will remain free of falls  Description: INTERVENTIONS:  - Educate patient/family on patient safety including physical limitations  - Instruct patient to call for assistance with activity   - Consult OT/PT to assist with strengthening/mobility   - Keep Call bell within reach  - Keep bed low and locked with side rails adjusted as appropriate  - Keep care items and personal belongings within reach  - Initiate and maintain comfort rounds  - Make Fall Risk Sign visible to staff  - Offer Toileting every 2 Hours, in advance of need  - Initiate/Maintain bed alarm  - Obtain necessary fall risk management equipment  - Apply yellow socks and bracelet for high fall risk patients  - Consider moving patient to room near nurses station  Outcome: Progressing  Goal: Maintain or return to baseline ADL function  Description: INTERVENTIONS:  -  Assess patient's ability to carry out ADLs; assess patient's baseline for ADL function and identify physical deficits which impact ability to perform ADLs (bathing, care of mouth/teeth, toileting, grooming, dressing, etc.)  - Assess/evaluate cause of self-care deficits   - Assess range of motion  - Assess patient's mobility; develop plan if impaired  - Assess patient's need for assistive devices and provide as appropriate  - Encourage maximum independence but intervene and supervise when necessary  - Involve family in performance of ADLs  - Assess for home care needs following discharge   - Consider OT consult to assist with ADL evaluation and planning for discharge  - Provide patient education as appropriate  Outcome: Progressing  Goal: Maintains/Returns to pre admission functional level  Description: INTERVENTIONS:  - Perform AM-PAC 6 Click Basic Mobility/ Daily Activity  assessment daily.  - Set and communicate daily mobility goal to care team and patient/family/caregiver.   - Collaborate with rehabilitation services on mobility goals if consulted  - Perform Range of Motion 12 times a day.  - Reposition patient every 2 hours.  - Dangle patient 3 times a day  - Stand patient 3 times a day  - Ambulate patient 3 times a day  - Out of bed to chair 3 times a day   - Out of bed for meals 3 times a day  - Out of bed for toileting  - Record patient progress and toleration of activity level   Outcome: Progressing     Problem: DISCHARGE PLANNING  Goal: Discharge to home or other facility with appropriate resources  Description: INTERVENTIONS:  - Identify barriers to discharge w/patient and caregiver  - Arrange for needed discharge resources and transportation as appropriate  - Identify discharge learning needs (meds, wound care, etc.)  - Arrange for interpretive services to assist at discharge as needed  - Refer to Case Management Department for coordinating discharge planning if the patient needs post-hospital services based on physician/advanced practitioner order or complex needs related to functional status, cognitive ability, or social support system  Outcome: Progressing     Problem: Knowledge Deficit  Goal: Patient/family/caregiver demonstrates understanding of disease process, treatment plan, medications, and discharge instructions  Description: Complete learning assessment and assess knowledge base.  Interventions:  - Provide teaching at level of understanding  - Provide teaching via preferred learning methods  Outcome: Progressing     Problem: CARDIOVASCULAR - ADULT  Goal: Maintains optimal cardiac output and hemodynamic stability  Description: INTERVENTIONS:  - Monitor I/O, vital signs and rhythm  - Monitor for S/S and trends of decreased cardiac output  - Administer and titrate ordered vasoactive medications to optimize hemodynamic stability  - Assess quality of pulses, skin color  and temperature  - Assess for signs of decreased coronary artery perfusion  - Instruct patient to report change in severity of symptoms  Outcome: Progressing  Goal: Absence of cardiac dysrhythmias or at baseline rhythm  Description: INTERVENTIONS:  - Continuous cardiac monitoring, vital signs, obtain 12 lead EKG if ordered  - Administer antiarrhythmic and heart rate control medications as ordered  - Monitor electrolytes and administer replacement therapy as ordered  Outcome: Progressing     Problem: GASTROINTESTINAL - ADULT  Goal: Minimal or absence of nausea and/or vomiting  Description: INTERVENTIONS:  - Administer IV fluids if ordered to ensure adequate hydration  - Maintain NPO status until nausea and vomiting are resolved  - Nasogastric tube if ordered  - Administer ordered antiemetic medications as needed  - Provide nonpharmacologic comfort measures as appropriate  - Advance diet as tolerated, if ordered  - Consider nutrition services referral to assist patient with adequate nutrition and appropriate food choices  Outcome: Progressing  Goal: Maintains adequate nutritional intake  Description: INTERVENTIONS:  - Monitor percentage of each meal consumed  - Identify factors contributing to decreased intake, treat as appropriate  - Assist with meals as needed  - Monitor I&O, weight, and lab values if indicated  - Obtain nutrition services referral as needed  Outcome: Progressing     Problem: GENITOURINARY - ADULT  Goal: Maintains or returns to baseline urinary function  Description: INTERVENTIONS:  - Assess urinary function  - Encourage oral fluids to ensure adequate hydration if ordered  - Administer IV fluids as ordered to ensure adequate hydration  - Administer ordered medications as needed  - Offer frequent toileting  - Follow urinary retention protocol if ordered  Outcome: Progressing     Problem: METABOLIC, FLUID AND ELECTROLYTES - ADULT  Goal: Electrolytes maintained within normal limits  Description:  INTERVENTIONS:  - Monitor labs and assess patient for signs and symptoms of electrolyte imbalances  - Administer electrolyte replacement as ordered  - Monitor response to electrolyte replacements, including repeat lab results as appropriate  - Instruct patient on fluid and nutrition as appropriate  Outcome: Progressing  Goal: Fluid balance maintained  Description: INTERVENTIONS:  - Monitor labs   - Monitor I/O and WT  - Instruct patient on fluid and nutrition as appropriate  - Assess for signs & symptoms of volume excess or deficit  Outcome: Progressing  Goal: Glucose maintained within target range  Description: INTERVENTIONS:  - Monitor Blood Glucose as ordered  - Assess for signs and symptoms of hyperglycemia and hypoglycemia  - Administer ordered medications to maintain glucose within target range  - Assess nutritional intake and initiate nutrition service referral as needed  Outcome: Progressing     Problem: Nutrition/Hydration-ADULT  Goal: Nutrient/Hydration intake appropriate for improving, restoring or maintaining nutritional needs  Description: Monitor and assess patient's nutrition/hydration status for malnutrition. Collaborate with interdisciplinary team and initiate plan and interventions as ordered.  Monitor patient's weight and dietary intake as ordered or per policy. Utilize nutrition screening tool and intervene as necessary. Determine patient's food preferences and provide high-protein, high-caloric foods as appropriate.     INTERVENTIONS:  - Monitor oral intake, urinary output, labs, and treatment plans  - Assess nutrition and hydration status and recommend course of action  - Evaluate amount of meals eaten  - Assist patient with eating if necessary   - Allow adequate time for meals  - Recommend/ encourage appropriate diets, oral nutritional supplements, and vitamin/mineral supplements  - Order, calculate, and assess calorie counts as needed  - Recommend, monitor, and adjust tube feedings and  TPN/PPN based on assessed needs  - Assess need for intravenous fluids  - Provide specific nutrition/hydration education as appropriate  - Include patient/family/caregiver in decisions related to nutrition  Outcome: Progressing

## 2025-02-27 NOTE — PROGRESS NOTES
Progress Note - Critical Care/ICU   Name: Nadeem Purdy Jr. 69 y.o. male I MRN: [id number]  Unit/Bed#: ICU 03-01 I Date of Admission: 2/26/2025   Date of Service: 2/27/2025 I Hospital Day: 1      Assessment & Plan  Diabetic ketoacidosis without coma associated with type 2 diabetes mellitus (HCC)  Lab Results   Component Value Date    HGBA1C 8.9 (H) 02/26/2025       Recent Labs     02/26/25  2152 02/26/25  2213 02/26/25  2303 02/27/25  0011   POCGLU 144* 120 111 144*       Blood Sugar Average: Last 72 hrs:  (P) [id number]  Likely due to noncompliance-Reports has not taken any medications for 2 weeks   In DKA, PH 7.218, GAP 17, bicarb 15 and glucose 446    Plan:  AG closed x2; Non-DKA Insulin gtt started   Continue D5 1/2 NS  Trend BMP, Mg, Phos  HgA1C 8.9  Endocrine consult  Essential hypertension  Plan:  Continue home Amlodipine and Metoprolol   Hold home Losartan in the setting of LUIS ALFREDO  Hypercholesteremia  Plan:  Continue home statin  Class 3 severe obesity due to excess calories with serious comorbidity and body mass index (BMI) of 40.0 to 44.9 in adult (HCC)  Plan:  Nutrition consult   Edema of both legs  Pt with b/l chronic edema with open wounds POA    Plan:  Takes lasix 40 mg as OP-will diurese prn  Wound care consult  Leave wounds open to air   Continue Cefepime and Vanco; now D2  LUIS ALFREDO (acute kidney injury) (HCC)  Suspect secondary to DKA  Baseline creatinine 1.3, on admission 2.52  Received about 2.5 L of IVF in ED    Plan:  Will evaluate for further IV fluids  Trend renal indices  Avoid nephrotoxins and hypotension  Sepsis (HCC)  AEB tachycardia, WBC 17, LA 2.4, and Alex lower extremity cellulitis  (2/26) CXR with Mild bilateral hilar prominence may be due to prominent vasculature/congestion    Plan:  BC x 2 pending  Stool sample for C-diff pending  Procal 9.27  LA 2.6; now cleared  Continue Cefepime and Vanco; now D2  Trend WBC and fever curve  Alcohol use  Reports drinking 1-2 glasses of  [place]/day  Last drank 4 weeks ago    Plan:  Daily thiamine and folate  [facility]  Disposition: Med Surg    ICU Core Measures     A: Assess, Prevent, and Manage Pain Has pain been assessed? Yes  Need for changes to pain regimen? No   B: Both SAT/SAT  N/A   C: Choice of Sedation RASS Goal: 0 Alert and Calm or N/A patient not on sedation  Need for changes to sedation or analgesia regimen? NA   D: Delirium CAM-ICU: Negative   E: Early Mobility  Plan for early mobility? Yes   F: Family Engagement Plan for family engagement today? Yes       Antibiotic Review: Continue broad spectrum secondary to severity of illness.       Prophylaxis:  VTE VTE covered by:  heparin (porcine), Subcutaneous, 7,500 Units at 02/26/25 2107       Stress Ulcer  not ordered         24 Hour Events : AG closed x2 overnight. Pt switched to Non-DKA Insulin gtt. Additional 20 mg IV Lasix given overnight for suspected overload. T-max 102.6 overnight; Tylenol given with appropriate response.    Subjective   Review of Systems: Review of Systems   Constitutional:  Positive for fatigue.   Respiratory:  Negative for shortness of breath.    Cardiovascular:  Positive for leg swelling. Negative for chest pain.   Gastrointestinal:  Positive for diarrhea. Negative for abdominal pain and nausea.   Neurological:  Negative for dizziness and headaches.   Psychiatric/Behavioral:  Negative for agitation and confusion.        Objective :                   Vitals I/O      Most Recent Min/Max in 24hrs   Temp 99.1 °F (37.3 °C) Temp  Min: 99.1 °F (37.3 °C)  Max: 102.6 °F (39.2 °C)   Pulse 102 Pulse  Min: 102  Max: 126   Resp (!) 31 Resp  Min: 16  Max: 50   /56 BP  Min: 115/56  Max: 197/88   O2 Sat 93 % SpO2  Min: 89 %  Max: 97 %      Intake/Output Summary (Last 24 hours) at 2/27/2025 0111  Last data filed at 2/27/2025 0001  Gross per 24 hour   Intake 3061.94 ml   Output 250 ml   Net 2811.94 ml       Diet [place]/CHO Controlled; Consistent Carbohydrate Diet Level 2 (5 carb  servings/75 grams CHO/meal)    Invasive Monitoring           Physical Exam   Physical Exam  Vitals and nursing note reviewed.   Eyes:      Pupils: Pupils are equal, round, and reactive to light.   Skin:     General: Skin is warm and dry.      Capillary Refill: Capillary refill takes less than 2 seconds.      Comments: B/l LE with erythema sloughing noted   HENT:      Head: Normocephalic.      Mouth/Throat:      Mouth: Mucous membranes are dry.   Cardiovascular:      Rate and Rhythm: Regular rhythm. Tachycardia present.      Heart sounds: Normal heart sounds.   Musculoskeletal:         General: Swelling present.      Right lower le+ Edema present.      Left lower le+ Edema present.   Abdominal: General: Bowel sounds are normal.      Palpations: Abdomen is soft.   Constitutional:       Appearance: He is morbidly obese. He is ill-appearing.   Pulmonary:      Breath sounds: Examination of the right-upper field reveals decreased breath sounds. Examination of the left-upper field reveals decreased breath sounds. Decreased breath sounds and wheezing present.   Psychiatric:         Behavior: Behavior is cooperative.   Neurological:      Mental Status: He is easily aroused and oriented to person, place and time. He is not agitated.      Motor: Strength full and intact in all extremities.          Diagnostic Studies        Lab Results: I have reviewed the following results:     Medications:  Scheduled PRN   amLODIPine, 10 mg, Daily  aspirin, 81 mg, Daily  atorvastatin, 40 mg, Daily With Dinner  azelastine, 1 spray, BID  cefepime, 2,000 mg, Q12H  chlorhexidine, 15 mL, Q12H MARGARITA  folic acid 1 mg, thiamine (VITAMIN B1) 100 mg in sodium chloride 0.9 % 100 mL IV piggyback, , Daily  heparin (porcine), 7,500 Units, Q8H MARGARITA  metoprolol succinate, 100 mg, Daily  potassium chloride, 20 mEq, Once  sodium phosphate, 21 mmol, Once   Followed by  potassium-sodium phosphateS, 2 tablet, Once  vancomycin, 1,000 mg, Q24H       acetaminophen, 650 mg, Q6H PRN  sodium chloride (PF), 3 mL, Q1H PRN       Continuous    dextrose 5 % and sodium chloride 0.45 %, 125 mL/hr, Last Rate: 125 mL/hr (02/27/25 0001)  insulin regular (HumuLIN R,NovoLIN R) 1 Units/mL in sodium chloride 0.9 % 100 mL infusion, 0.3-21 Units/hr, Last Rate: 1 Units/hr (02/27/25 0106)         Labs:   CBC    Recent Labs     02/26/25  1114   WBC 17.27*   HGB 13.9   HCT 41.4        BMP    Recent Labs     02/26/25  1854 02/27/25  0018   SODIUM 128* 125*   K 3.3* 4.1   CL 98 98   CO2 18* 20*   AGAP 12 7   BUN 35* 41*   CREATININE 2.42* 2.86*   CALCIUM 7.0* 7.4*       Coags    Recent Labs     02/26/25  1114   INR 1.14   PTT 28        Additional Electrolytes  Recent Labs     02/26/25  1854 02/27/25  0018   MG 1.9 2.3   PHOS 1.2* 1.8*          Blood Gas    No recent results  Recent Labs     02/26/25  1114   PHVEN 7.281*   [id number] 34.3*   PO2VEN 32.1*   VFP1ZSQ 15.8*   BEVEN -9.9   [id number] 60.0    LFTs  Recent Labs     02/26/25  1114   ALT 18   AST 22   ALKPHOS 54   ALB 3.4*   TBILI 0.87       Infectious  Recent Labs     02/26/25  1114   PROCALCITONI 9.27*     Glucose  Recent Labs     02/26/25  1412 02/26/25  1455 02/26/25  1854 02/27/25  0018   GLUC 448* 462* 219* 137

## 2025-02-27 NOTE — CONSULTS
Consultation - Nadeem Purdy Jr. 69 y.o. male MRN: [id number]    Unit/Bed#: ICU 03-01 Encounter: [id number]    Administrative Statements   VIRTUAL CARE DOCUMENTATION:     1. This service was provided via Telemedicine using amiando Kit     2. Parties in the room with patient during teleconsult RN    3. Confidentiality My office door was closed     4. Participants No one else was in the room    5. Patient acknowledged consent and understanding of privacy and security of the  Telemedicine consult. I informed the patient that I have reviewed their record in Epic and presented the opportunity for them to ask any questions regarding the visit today.  The patient agreed to participate.    6. I have spent a total time of 40 minutes in caring for this patient on the day of the visit/encounter including Diagnostic results, Prognosis, Risks and benefits of tx options, Instructions for management, Patient and family education, Importance of tx compliance, Risk factor reductions, Impressions, Counseling / Coordination of care, Documenting in the medical record, Reviewing/placing orders in the medical record (including tests, medications, and/or procedures), and Obtaining or reviewing history  , not including the time spent for establishing the audio/video connection.       Assessment & Plan     Type 2 diabetes mellitus with hyperglycemia, with long-term current use of insulin (HCC)  Assessment & Plan  Lab Results   Component Value Date    HGBA1C 8.9 (H) 02/26/2025       Recent Labs     02/27/25  0953 02/27/25  1201 02/27/25  1407 02/27/25  1603   POCGLU 239* 185* 197* 156*       Blood Sugar Average: Last 72 hrs:  (P) 219.15    Uncontrolled type 2 diabetes with long-term current use of insulin, which is complicated by DKA.  Home regimen consist of Humulin N 70/30, 20 units before breakfast and 25 units before dinner although he was not taking his insulin and not performing self glucose monitoring lately.    Currently on insulin  infusion, retired daily requirement,  I do recommend to transition him off insulin infusion with Lantus 48 units nightly and Humalog 16 units 3 times daily AC plus correctional insulin algorithm #4 during the day and #2 at bedtime.  Discontinue insulin infusion 1 hour after first dose of Lantus.  Continue to monitor blood sugar over time and make adjustments to the regimen if necessary.        * Diabetic ketoacidosis without coma associated with type 2 diabetes mellitus (HCC)  Assessment & Plan  Lab Results   Component Value Date    HGBA1C 8.9 (H) 02/26/2025       Recent Labs     02/27/25  0953 02/27/25  1201 02/27/25  1407 02/27/25  1603   POCGLU 239* 185* 197* 156*       Blood Sugar Average: Last 72 hrs:  (P) 219.15    High anion gap metabolic acidosis due to DKA versus lactic acidosis.  Anion gap has closed.         CC: Diabetes Consult    History of Present Illness     HPI: Nadeem Purdy Jr. is a 69 y.o. year old male with type 2 diabetes, hypertension and hyperlipidemia who presented to ED for not feeling well.    He was found to have hyperglycemia of 446 mg/dL, anion gap of 17 and bicarb of 16, BHB was not checked, patient is admitted for DKA.  Currently on continuous insulin infusion, hyperglycemia has improved, anion gap closed.      He was seen virtually, he reports he stopped taking his insulin and as well as checking his blood sugars, although he is not sure why and for how long.  His home regimen consist of Humulin N 20 units before breakfast and 25 units before dinner.        Inpatient consult to Endocrinology  Consult performed by: Naveed Clay MD  Consult ordered by: JIMMY Nelson          Review of Systems   Constitutional:  Positive for appetite change and fatigue.   Endocrine: Negative for polydipsia and polyuria.       Historical Information   Past Medical History:   Diagnosis Date    Hypertension      Past Surgical History:   Procedure Laterality Date    CATARACT EXTRACTION, BILATERAL  Bilateral      Social History   Social History     Substance and Sexual Activity   Alcohol Use Yes    Alcohol/week: 4.0 standard drinks of alcohol    Types: 4 Shots of liquor per week     Social History     Substance and Sexual Activity   Drug Use Never     Social History     Tobacco Use   Smoking Status Former   Smokeless Tobacco Never     Family History: History reviewed. No pertinent family history.    Meds/Allergies   Current Facility-Administered Medications   Medication Dose Route Frequency Provider Last Rate Last Admin    acetaminophen (TYLENOL) tablet 650 mg  650 mg Oral Q6H PRN JIMMY Waterman   650 mg at 02/26/25 2322    amLODIPine (NORVASC) tablet 10 mg  10 mg Oral Daily RE NelsonNP   10 mg at 02/27/25 0929    aspirin (ECOTRIN LOW STRENGTH) EC tablet 81 mg  81 mg Oral Daily RE NelsonNP   81 mg at 02/27/25 0929    atorvastatin (LIPITOR) tablet 40 mg  40 mg Oral Daily With Dinner RE NelsonNP   40 mg at 02/26/25 1643    azelastine (ASTELIN) 0.1 % nasal spray 1 spray  1 spray Each Nare BID RE NelsonNP   1 spray at 02/27/25 0929    ceFEPime (MAXIPIME) 2,000 mg in dextrose 5 % 50 mL IVPB  2,000 mg Intravenous Q12H RE NelsonNP 100 mL/hr at 02/27/25 1227 2,000 mg at 02/27/25 1227    chlorhexidine (PERIDEX) 0.12 % oral rinse 15 mL  15 mL Mouth/Throat Q12H [facility] RE NelsonNP   15 mL at 02/27/25 0931    folic acid 1 mg, thiamine (VITAMIN B1) 100 mg in sodium chloride 0.9 % 100 mL IV piggyback   Intravenous Daily RE NelsonNP   New Bag at 02/27/25 0933    heparin (porcine) subcutaneous injection 7,500 Units  7,500 Units Subcutaneous Q8H [facility] JIMMY Nelson   7,500 Units at 02/27/25 1401    insulin regular (HumuLIN R,NovoLIN R) 1 Units/mL in sodium chloride 0.9 % 100 mL infusion  0.3-21 Units/hr Intravenous Titrated JIMMY Waterman 6 mL/hr at 02/27/25 1604 6 Units/hr at 02/27/25 1604    melatonin tablet 6 mg   "6 mg Oral HS Chloé BALDERRAMA Izabel JIMMY   6 mg at 02/27/25 0145    metoprolol succinate (TOPROL-XL) 24 hr tablet 100 mg  100 mg Oral Daily Monse BUI TreyoraJIMMY   100 mg at 02/27/25 0929    sodium chloride (PF) 0.9 % injection 3 mL  3 mL Intravenous Q1H PRN Monse BUI Treygeno RENP        vancomycin (VANCOCIN) IVPB (premix in dextrose) 750 mg 150 mL  750 mg Intravenous Q24H Ethel Santos  mL/hr at 02/27/25 1445 750 mg at 02/27/25 1445     No Known Allergies    Objective   Vitals: Blood pressure 153/72, pulse 96, temperature (!) 97.3 °F (36.3 °C), temperature source Temporal, resp. rate 20, height 5' 11\" (1.803 m), weight (!) 143 kg (315 lb 7.7 oz), SpO2 95%.    Intake/Output Summary (Last 24 hours) at 2/27/2025 1646  Last data filed at 2/27/2025 1604  Gross per 24 hour   Intake 3857.98 ml   Output 1142 ml   Net 2715.98 ml     Invasive Devices       Peripheral Intravenous Line  Duration             Peripheral IV 02/26/25 Left Antecubital 1 day    Peripheral IV 02/26/25 Right Antecubital 1 day    Peripheral IV 02/26/25 Dorsal (posterior);Left Hand <1 day                    Physical Exam  Constitutional:       General: He is not in acute distress.     Appearance: He is not ill-appearing.   Pulmonary:      Effort: No respiratory distress.   Neurological:      Mental Status: He is oriented to person, place, and time.         The history was obtained from the review of the chart, patient.    Lab Results:   Results from last 7 days   Lab Units 02/26/25  1114   HEMOGLOBIN A1C % 8.9*     Lab Results   Component Value Date    WBC 15.92 (H) 02/27/2025    HGB 13.3 02/27/2025    HCT 41.6 02/27/2025    MCV 97 02/27/2025     (L) 02/27/2025     Lab Results   Component Value Date/Time    BUN 44 (H) 02/27/2025 12:06 PM    BUN 11 06/06/2018 06:46 AM     06/06/2018 06:46 AM    K 3.3 (L) 02/27/2025 12:06 PM    K 4.3 06/06/2018 06:46 AM    CL 99 02/27/2025 12:06 PM     06/06/2018 06:46 AM    CO2 19 (L) " "02/27/2025 12:06 PM    CO2 28 06/06/2018 06:46 AM    CREATININE 3.20 (H) 02/27/2025 12:06 PM    CREATININE 0.81 06/06/2018 06:46 AM    AST 22 02/26/2025 11:14 AM    AST 15 06/06/2018 06:46 AM    ALT 18 02/26/2025 11:14 AM    ALT 17 06/06/2018 06:46 AM    TP 7.6 02/26/2025 11:14 AM    ALB 3.4 (L) 02/26/2025 11:14 AM    ALB 4.2 06/06/2018 06:46 AM    ALB 69.7 06/06/2018 06:46 AM     No results for input(s): \"CHOL\", \"HDL\", \"LDL\", \"TRIG\", \"VLDL\" in the last 72 hours.  No results found for: \"MICROALBUR\", \"[id number]\"  POC Glucose (mg/dl)   Date Value   02/27/2025 156 (H)   02/27/2025 197 (H)   02/27/2025 185 (H)   02/27/2025 239 (H)   02/27/2025 240 (H)   02/27/2025 229 (H)   02/27/2025 181 (H)   02/27/2025 154 (H)   02/27/2025 178 (H)   02/27/2025 144 (H)       Imaging Studies: Results Review Statement: No pertinent imaging studies reviewed.    Portions of the record may have been created with voice recognition software.    "

## 2025-02-27 NOTE — ASSESSMENT & PLAN NOTE
Suspect secondary to DKA  Baseline creatinine 1.3, on admission 2.52  Received about 2.5 L of IVF in ED    Plan:  Will evaluate for further IV fluids  Trend renal indices  Avoid nephrotoxins and hypotension

## 2025-02-27 NOTE — ASSESSMENT & PLAN NOTE
Lab Results   Component Value Date    HGBA1C 8.9 (H) 02/26/2025       Recent Labs     02/27/25  0953 02/27/25  1201 02/27/25  1407 02/27/25  1603   POCGLU 239* 185* 197* 156*       Blood Sugar Average: Last 72 hrs:  (P) 219.15    High anion gap metabolic acidosis due to DKA versus lactic acidosis.  Anion gap has closed.

## 2025-02-28 ENCOUNTER — APPOINTMENT (INPATIENT)
Dept: RADIOLOGY | Facility: HOSPITAL | Age: 70
DRG: 871 | End: 2025-02-28
Payer: MEDICARE

## 2025-02-28 ENCOUNTER — APPOINTMENT (INPATIENT)
Dept: NON INVASIVE DIAGNOSTICS | Facility: HOSPITAL | Age: 70
DRG: 871 | End: 2025-02-28
Payer: MEDICARE

## 2025-02-28 PROBLEM — N18.9 ACUTE RENAL FAILURE SUPERIMPOSED ON CHRONIC KIDNEY DISEASE  (HCC): Status: ACTIVE | Noted: 2025-02-26

## 2025-02-28 PROBLEM — R33.9 URINARY RETENTION: Status: ACTIVE | Noted: 2025-02-28

## 2025-02-28 LAB
ALBUMIN SERPL BCG-MCNC: 2.6 G/DL (ref 3.5–5)
ALP SERPL-CCNC: 40 U/L (ref 34–104)
ALT SERPL W P-5'-P-CCNC: 22 U/L (ref 7–52)
ANION GAP SERPL CALCULATED.3IONS-SCNC: 10 MMOL/L (ref 4–13)
ANION GAP SERPL CALCULATED.3IONS-SCNC: 10 MMOL/L (ref 4–13)
ANION GAP SERPL CALCULATED.3IONS-SCNC: 8 MMOL/L (ref 4–13)
AORTIC ROOT: 3.7 CM
ASCENDING AORTA: 3.5 CM
AST SERPL W P-5'-P-CCNC: 41 U/L (ref 13–39)
ATRIAL RATE: 105 BPM
ATRIAL RATE: 118 BPM
ATRIAL RATE: 127 BPM
BASOPHILS # BLD MANUAL: 0 THOUSAND/UL (ref 0–0.1)
BASOPHILS NFR MAR MANUAL: 0 % (ref 0–1)
BILIRUB SERPL-MCNC: 0.47 MG/DL (ref 0.2–1)
BSA FOR ECHO PROCEDURE: 2.56 M2
BUN SERPL-MCNC: 51 MG/DL (ref 5–25)
BUN SERPL-MCNC: 52 MG/DL (ref 5–25)
BUN SERPL-MCNC: 59 MG/DL (ref 5–25)
BURR CELLS BLD QL SMEAR: PRESENT
C COLI+JEJUNI TUF STL QL NAA+PROBE: NEGATIVE
CA-I BLD-SCNC: 1.08 MMOL/L (ref 1.12–1.32)
CA-I BLD-SCNC: 1.16 MMOL/L (ref 1.12–1.32)
CALCIUM ALBUM COR SERPL-MCNC: 8.8 MG/DL (ref 8.3–10.1)
CALCIUM SERPL-MCNC: 7.7 MG/DL (ref 8.4–10.2)
CALCIUM SERPL-MCNC: 8.2 MG/DL (ref 8.4–10.2)
CALCIUM SERPL-MCNC: 8.3 MG/DL (ref 8.4–10.2)
CHLORIDE SERPL-SCNC: 101 MMOL/L (ref 96–108)
CHLORIDE SERPL-SCNC: 102 MMOL/L (ref 96–108)
CHLORIDE SERPL-SCNC: 99 MMOL/L (ref 96–108)
CK SERPL-CCNC: 391 U/L (ref 39–308)
CO2 SERPL-SCNC: 15 MMOL/L (ref 21–32)
CO2 SERPL-SCNC: 19 MMOL/L (ref 21–32)
CO2 SERPL-SCNC: 20 MMOL/L (ref 21–32)
CORTIS AM PEAK SERPL-MCNC: 19.6 UG/DL (ref 6.7–22.6)
CREAT SERPL-MCNC: 3.61 MG/DL (ref 0.6–1.3)
CREAT SERPL-MCNC: 3.63 MG/DL (ref 0.6–1.3)
CREAT SERPL-MCNC: 3.75 MG/DL (ref 0.6–1.3)
E WAVE DECELERATION TIME: 245 MS
E/A RATIO: 0.99
EC STX1+STX2 GENES STL QL NAA+PROBE: NEGATIVE
EOSINOPHIL # BLD MANUAL: 0.14 THOUSAND/UL (ref 0–0.4)
EOSINOPHIL NFR BLD MANUAL: 1 % (ref 0–6)
ERYTHROCYTE [DISTWIDTH] IN BLOOD BY AUTOMATED COUNT: 13.6 % (ref 11.6–15.1)
FRACTIONAL SHORTENING: 31 (ref 28–44)
GFR SERPL CREATININE-BSD FRML MDRD: 15 ML/MIN/1.73SQ M
GFR SERPL CREATININE-BSD FRML MDRD: 15 ML/MIN/1.73SQ M
GFR SERPL CREATININE-BSD FRML MDRD: 16 ML/MIN/1.73SQ M
GLUCOSE SERPL-MCNC: 125 MG/DL (ref 65–140)
GLUCOSE SERPL-MCNC: 131 MG/DL (ref 65–140)
GLUCOSE SERPL-MCNC: 134 MG/DL (ref 65–140)
GLUCOSE SERPL-MCNC: 137 MG/DL (ref 65–140)
GLUCOSE SERPL-MCNC: 141 MG/DL (ref 65–140)
GLUCOSE SERPL-MCNC: 141 MG/DL (ref 65–140)
GLUCOSE SERPL-MCNC: 143 MG/DL (ref 65–140)
GLUCOSE SERPL-MCNC: 145 MG/DL (ref 65–140)
GLUCOSE SERPL-MCNC: 157 MG/DL (ref 65–140)
GLUCOSE SERPL-MCNC: 167 MG/DL (ref 65–140)
GLUCOSE SERPL-MCNC: 176 MG/DL (ref 65–140)
GLUCOSE SERPL-MCNC: 194 MG/DL (ref 65–140)
GLUCOSE SERPL-MCNC: 209 MG/DL (ref 65–140)
GLUCOSE SERPL-MCNC: 223 MG/DL (ref 65–140)
HCT VFR BLD AUTO: 31.1 % (ref 36.5–49.3)
HGB BLD-MCNC: 10.3 G/DL (ref 12–17)
INTERVENTRICULAR SEPTUM IN DIASTOLE (PARASTERNAL SHORT AXIS VIEW): 1.4 CM
INTERVENTRICULAR SEPTUM: 1.4 CM (ref 0.6–1.1)
LAAS-AP2: 19.7 CM2
LAAS-AP4: 21.3 CM2
LEFT ATRIUM SIZE: 4.6 CM
LEFT ATRIUM VOLUME (MOD BIPLANE): 54 ML
LEFT ATRIUM VOLUME INDEX (MOD BIPLANE): 21 ML/M2
LEFT INTERNAL DIMENSION IN SYSTOLE: 3.4 CM (ref 2.1–4)
LEFT VENTRICULAR INTERNAL DIMENSION IN DIASTOLE: 4.9 CM (ref 3.5–6)
LEFT VENTRICULAR POSTERIOR WALL IN END DIASTOLE: 1.3 CM
LEFT VENTRICULAR STROKE VOLUME: 63 ML
LV EF US.2D.A4C+ESTIMATED: 59 %
LVSV (TEICH): 63 ML
LYMPHOCYTES # BLD AUTO: 1.95 THOUSAND/UL (ref 0.6–4.47)
LYMPHOCYTES # BLD AUTO: 14 % (ref 14–44)
MAGNESIUM SERPL-MCNC: 2.2 MG/DL (ref 1.9–2.7)
MAGNESIUM SERPL-MCNC: 2.3 MG/DL (ref 1.9–2.7)
MCH RBC QN AUTO: 31 PG (ref 26.8–34.3)
MCHC RBC AUTO-ENTMCNC: 33.1 G/DL (ref 31.4–37.4)
MCV RBC AUTO: 94 FL (ref 82–98)
METAMYELOCYTE ABSOLUTE CT: 0.14 THOUSAND/UL (ref 0–0.1)
METAMYELOCYTES NFR BLD MANUAL: 1 % (ref 0–1)
MONOCYTES # BLD AUTO: 0.42 THOUSAND/UL (ref 0–1.22)
MONOCYTES NFR BLD: 3 % (ref 4–12)
MV E'TISSUE VEL-LAT: 14 CM/S
MV E'TISSUE VEL-SEP: 10 CM/S
MV PEAK A VEL: 0.89 M/S
MV PEAK E VEL: 88 CM/S
MV STENOSIS PRESSURE HALF TIME: 71 MS
MV VALVE AREA P 1/2 METHOD: 3.1
NEUTROPHILS # BLD MANUAL: 11.31 THOUSAND/UL (ref 1.85–7.62)
NEUTS BAND NFR BLD MANUAL: 6 % (ref 0–8)
NEUTS SEG NFR BLD AUTO: 75 % (ref 43–75)
P AXIS: 41 DEGREES
P AXIS: 42 DEGREES
P AXIS: 51 DEGREES
PHOSPHATE SERPL-MCNC: 2.3 MG/DL (ref 2.3–4.1)
PHOSPHATE SERPL-MCNC: 2.6 MG/DL (ref 2.3–4.1)
PLATELET # BLD AUTO: 155 THOUSANDS/UL (ref 149–390)
PLATELET BLD QL SMEAR: ADEQUATE
PMV BLD AUTO: 9.3 FL (ref 8.9–12.7)
POLYCHROMASIA BLD QL SMEAR: PRESENT
POTASSIUM SERPL-SCNC: 3.5 MMOL/L (ref 3.5–5.3)
POTASSIUM SERPL-SCNC: 3.6 MMOL/L (ref 3.5–5.3)
POTASSIUM SERPL-SCNC: 3.7 MMOL/L (ref 3.5–5.3)
PR INTERVAL: 172 MS
PR INTERVAL: 176 MS
PR INTERVAL: 176 MS
PROCALCITONIN SERPL-MCNC: 13.97 NG/ML
PROT SERPL-MCNC: 5.5 G/DL (ref 6.4–8.4)
QRS AXIS: -22 DEGREES
QRS AXIS: -39 DEGREES
QRS AXIS: -43 DEGREES
QRSD INTERVAL: 102 MS
QRSD INTERVAL: 96 MS
QRSD INTERVAL: 96 MS
QT INTERVAL: 288 MS
QT INTERVAL: 322 MS
QT INTERVAL: 354 MS
QTC INTERVAL: 407 MS
QTC INTERVAL: 451 MS
QTC INTERVAL: 467 MS
RBC # BLD AUTO: 3.32 MILLION/UL (ref 3.88–5.62)
RBC MORPH BLD: PRESENT
RIGHT ATRIUM AREA SYSTOLE A4C: 19.9 CM2
RIGHT VENTRICLE ID DIMENSION: 3.9 CM
SALMONELLA SP SPAO STL QL NAA+PROBE: NEGATIVE
SHIGELLA SP+EIEC IPAH STL QL NAA+PROBE: NEGATIVE
SL CV LEFT ATRIUM LENGTH A2C: 6.3 CM
SL CV LV EF: 50
SL CV PED ECHO LEFT VENTRICLE DIASTOLIC VOLUME (MOD BIPLANE) 2D: 111 ML
SL CV PED ECHO LEFT VENTRICLE SYSTOLIC VOLUME (MOD BIPLANE) 2D: 48 ML
SODIUM SERPL-SCNC: 127 MMOL/L (ref 135–147)
SODIUM SERPL-SCNC: 128 MMOL/L (ref 135–147)
SODIUM SERPL-SCNC: 129 MMOL/L (ref 135–147)
T WAVE AXIS: 41 DEGREES
T WAVE AXIS: 46 DEGREES
T WAVE AXIS: 56 DEGREES
TOXIC GRANULES BLD QL SMEAR: PRESENT
TRICUSPID ANNULAR PLANE SYSTOLIC EXCURSION: 3.1 CM
VENTRICULAR RATE: 105 BPM
VENTRICULAR RATE: 118 BPM
VENTRICULAR RATE: 120 BPM
WBC # BLD AUTO: 13.96 THOUSAND/UL (ref 4.31–10.16)

## 2025-02-28 PROCEDURE — 84100 ASSAY OF PHOSPHORUS: CPT | Performed by: NURSE PRACTITIONER

## 2025-02-28 PROCEDURE — 93010 ELECTROCARDIOGRAM REPORT: CPT | Performed by: INTERNAL MEDICINE

## 2025-02-28 PROCEDURE — 71045 X-RAY EXAM CHEST 1 VIEW: CPT

## 2025-02-28 PROCEDURE — 99291 CRITICAL CARE FIRST HOUR: CPT | Performed by: ANESTHESIOLOGY

## 2025-02-28 PROCEDURE — 82533 TOTAL CORTISOL: CPT | Performed by: NURSE PRACTITIONER

## 2025-02-28 PROCEDURE — C8929 TTE W OR WO FOL WCON,DOPPLER: HCPCS

## 2025-02-28 PROCEDURE — 97110 THERAPEUTIC EXERCISES: CPT

## 2025-02-28 PROCEDURE — 83735 ASSAY OF MAGNESIUM: CPT | Performed by: NURSE PRACTITIONER

## 2025-02-28 PROCEDURE — 97167 OT EVAL HIGH COMPLEX 60 MIN: CPT

## 2025-02-28 PROCEDURE — 83735 ASSAY OF MAGNESIUM: CPT

## 2025-02-28 PROCEDURE — 84100 ASSAY OF PHOSPHORUS: CPT

## 2025-02-28 PROCEDURE — 85027 COMPLETE CBC AUTOMATED: CPT | Performed by: NURSE PRACTITIONER

## 2025-02-28 PROCEDURE — 84145 PROCALCITONIN (PCT): CPT | Performed by: NURSE PRACTITIONER

## 2025-02-28 PROCEDURE — 80053 COMPREHEN METABOLIC PANEL: CPT | Performed by: NURSE PRACTITIONER

## 2025-02-28 PROCEDURE — 82948 REAGENT STRIP/BLOOD GLUCOSE: CPT

## 2025-02-28 PROCEDURE — 97163 PT EVAL HIGH COMPLEX 45 MIN: CPT

## 2025-02-28 PROCEDURE — 82330 ASSAY OF CALCIUM: CPT

## 2025-02-28 PROCEDURE — 85007 BL SMEAR W/DIFF WBC COUNT: CPT | Performed by: NURSE PRACTITIONER

## 2025-02-28 PROCEDURE — 82330 ASSAY OF CALCIUM: CPT | Performed by: NURSE PRACTITIONER

## 2025-02-28 PROCEDURE — 82550 ASSAY OF CK (CPK): CPT | Performed by: NURSE PRACTITIONER

## 2025-02-28 PROCEDURE — 93306 TTE W/DOPPLER COMPLETE: CPT | Performed by: INTERNAL MEDICINE

## 2025-02-28 PROCEDURE — 80048 BASIC METABOLIC PNL TOTAL CA: CPT

## 2025-02-28 RX ORDER — POTASSIUM CHLORIDE 1500 MG/1
40 TABLET, EXTENDED RELEASE ORAL ONCE
Status: COMPLETED | OUTPATIENT
Start: 2025-02-28 | End: 2025-02-28

## 2025-02-28 RX ORDER — FUROSEMIDE 10 MG/ML
40 INJECTION INTRAMUSCULAR; INTRAVENOUS ONCE
Status: COMPLETED | OUTPATIENT
Start: 2025-02-28 | End: 2025-02-28

## 2025-02-28 RX ORDER — ALBUMIN HUMAN 50 G/1000ML
25 SOLUTION INTRAVENOUS ONCE
Status: COMPLETED | OUTPATIENT
Start: 2025-02-28 | End: 2025-02-28

## 2025-02-28 RX ORDER — GUAIFENESIN 600 MG/1
600 TABLET, EXTENDED RELEASE ORAL EVERY 12 HOURS SCHEDULED
Status: DISCONTINUED | OUTPATIENT
Start: 2025-02-28 | End: 2025-03-11 | Stop reason: HOSPADM

## 2025-02-28 RX ORDER — GABAPENTIN 100 MG/1
200 CAPSULE ORAL
Status: DISCONTINUED | OUTPATIENT
Start: 2025-02-28 | End: 2025-03-11 | Stop reason: HOSPADM

## 2025-02-28 RX ORDER — FUROSEMIDE 10 MG/ML
40 INJECTION INTRAMUSCULAR; INTRAVENOUS
Status: DISCONTINUED | OUTPATIENT
Start: 2025-02-28 | End: 2025-02-28

## 2025-02-28 RX ORDER — INSULIN LISPRO 100 [IU]/ML
2-12 INJECTION, SOLUTION INTRAVENOUS; SUBCUTANEOUS
Status: DISCONTINUED | OUTPATIENT
Start: 2025-03-01 | End: 2025-03-01

## 2025-02-28 RX ORDER — INSULIN GLARGINE 100 [IU]/ML
48 INJECTION, SOLUTION SUBCUTANEOUS
Status: DISCONTINUED | OUTPATIENT
Start: 2025-02-28 | End: 2025-03-01

## 2025-02-28 RX ORDER — INSULIN LISPRO 100 [IU]/ML
1-5 INJECTION, SOLUTION INTRAVENOUS; SUBCUTANEOUS
Status: DISCONTINUED | OUTPATIENT
Start: 2025-03-01 | End: 2025-03-01

## 2025-02-28 RX ORDER — METOPROLOL TARTRATE 50 MG
50 TABLET ORAL EVERY 12 HOURS SCHEDULED
Status: COMPLETED | OUTPATIENT
Start: 2025-02-28 | End: 2025-02-28

## 2025-02-28 RX ORDER — ATORVASTATIN CALCIUM 40 MG/1
40 TABLET, FILM COATED ORAL
Status: DISCONTINUED | OUTPATIENT
Start: 2025-02-28 | End: 2025-03-11 | Stop reason: HOSPADM

## 2025-02-28 RX ORDER — INSULIN LISPRO 100 [IU]/ML
2-12 INJECTION, SOLUTION INTRAVENOUS; SUBCUTANEOUS
Status: DISCONTINUED | OUTPATIENT
Start: 2025-03-01 | End: 2025-02-28

## 2025-02-28 RX ORDER — CALCIUM GLUCONATE 20 MG/ML
2 INJECTION, SOLUTION INTRAVENOUS ONCE
Status: COMPLETED | OUTPATIENT
Start: 2025-02-28 | End: 2025-02-28

## 2025-02-28 RX ORDER — AMLODIPINE BESYLATE 10 MG/1
10 TABLET ORAL DAILY
Status: DISCONTINUED | OUTPATIENT
Start: 2025-02-28 | End: 2025-03-01

## 2025-02-28 RX ORDER — INSULIN LISPRO 100 [IU]/ML
16 INJECTION, SOLUTION INTRAVENOUS; SUBCUTANEOUS
Status: DISCONTINUED | OUTPATIENT
Start: 2025-03-01 | End: 2025-03-01

## 2025-02-28 RX ORDER — METOPROLOL SUCCINATE 50 MG/1
100 TABLET, EXTENDED RELEASE ORAL DAILY
Status: DISCONTINUED | OUTPATIENT
Start: 2025-03-01 | End: 2025-03-01

## 2025-02-28 RX ORDER — SODIUM CHLORIDE, SODIUM GLUCONATE, SODIUM ACETATE, POTASSIUM CHLORIDE, MAGNESIUM CHLORIDE, SODIUM PHOSPHATE, DIBASIC, AND POTASSIUM PHOSPHATE .53; .5; .37; .037; .03; .012; .00082 G/100ML; G/100ML; G/100ML; G/100ML; G/100ML; G/100ML; G/100ML
100 INJECTION, SOLUTION INTRAVENOUS CONTINUOUS
Status: DISCONTINUED | OUTPATIENT
Start: 2025-02-28 | End: 2025-02-28

## 2025-02-28 RX ORDER — ALBUMIN (HUMAN) 12.5 G/50ML
12.5 SOLUTION INTRAVENOUS EVERY 6 HOURS
Status: COMPLETED | OUTPATIENT
Start: 2025-02-28 | End: 2025-03-01

## 2025-02-28 RX ADMIN — FUROSEMIDE 40 MG: 10 INJECTION, SOLUTION INTRAMUSCULAR; INTRAVENOUS at 16:22

## 2025-02-28 RX ADMIN — POTASSIUM CHLORIDE 40 MEQ: 1500 TABLET, EXTENDED RELEASE ORAL at 09:17

## 2025-02-28 RX ADMIN — POTASSIUM CHLORIDE 40 MEQ: 1500 TABLET, EXTENDED RELEASE ORAL at 16:22

## 2025-02-28 RX ADMIN — VANCOMYCIN HYDROCHLORIDE 125 MG: 125 CAPSULE ORAL at 00:23

## 2025-02-28 RX ADMIN — HEPARIN SODIUM 7500 UNITS: 5000 INJECTION INTRAVENOUS; SUBCUTANEOUS at 21:31

## 2025-02-28 RX ADMIN — CEFEPIME 2000 MG: 2 INJECTION, POWDER, FOR SOLUTION INTRAVENOUS at 23:36

## 2025-02-28 RX ADMIN — HEPARIN SODIUM 7500 UNITS: 5000 INJECTION INTRAVENOUS; SUBCUTANEOUS at 13:10

## 2025-02-28 RX ADMIN — FUROSEMIDE 40 MG: 10 INJECTION, SOLUTION INTRAMUSCULAR; INTRAVENOUS at 09:42

## 2025-02-28 RX ADMIN — Medication 6 MG: at 22:07

## 2025-02-28 RX ADMIN — SODIUM CHLORIDE 6 UNITS/HR: 9 INJECTION, SOLUTION INTRAVENOUS at 20:10

## 2025-02-28 RX ADMIN — ASPIRIN 81 MG: 81 TABLET, COATED ORAL at 09:18

## 2025-02-28 RX ADMIN — ALBUMIN (HUMAN) 25 G: 12.5 INJECTION, SOLUTION INTRAVENOUS at 09:18

## 2025-02-28 RX ADMIN — GUAIFENESIN 600 MG: 600 TABLET ORAL at 09:42

## 2025-02-28 RX ADMIN — HEPARIN SODIUM 7500 UNITS: 5000 INJECTION INTRAVENOUS; SUBCUTANEOUS at 05:21

## 2025-02-28 RX ADMIN — SODIUM BICARBONATE 50 MEQ: 84 INJECTION, SOLUTION INTRAVENOUS at 09:17

## 2025-02-28 RX ADMIN — GUAIFENESIN 600 MG: 600 TABLET ORAL at 01:11

## 2025-02-28 RX ADMIN — ALBUMIN (HUMAN) 12.5 G: 0.25 INJECTION, SOLUTION INTRAVENOUS at 21:30

## 2025-02-28 RX ADMIN — CEFEPIME 2000 MG: 2 INJECTION, POWDER, FOR SOLUTION INTRAVENOUS at 00:23

## 2025-02-28 RX ADMIN — CHLORHEXIDINE GLUCONATE 15 ML: 1.2 SOLUTION ORAL at 21:30

## 2025-02-28 RX ADMIN — FOLIC ACID 1 MG: 1 TABLET ORAL at 09:18

## 2025-02-28 RX ADMIN — AMLODIPINE BESYLATE 10 MG: 10 TABLET ORAL at 13:10

## 2025-02-28 RX ADMIN — GUAIFENESIN 600 MG: 600 TABLET ORAL at 21:30

## 2025-02-28 RX ADMIN — CHLORHEXIDINE GLUCONATE 15 ML: 1.2 SOLUTION ORAL at 09:17

## 2025-02-28 RX ADMIN — METOPROLOL TARTRATE 50 MG: 50 TABLET, FILM COATED ORAL at 21:38

## 2025-02-28 RX ADMIN — THIAMINE HCL TAB 100 MG 100 MG: 100 TAB at 09:18

## 2025-02-28 RX ADMIN — GABAPENTIN 200 MG: 100 CAPSULE ORAL at 21:31

## 2025-02-28 RX ADMIN — INSULIN GLARGINE 48 UNITS: 100 INJECTION, SOLUTION SUBCUTANEOUS at 22:07

## 2025-02-28 RX ADMIN — CEFEPIME 2000 MG: 2 INJECTION, POWDER, FOR SOLUTION INTRAVENOUS at 13:10

## 2025-02-28 RX ADMIN — CALCIUM GLUCONATE 2 G: 20 INJECTION, SOLUTION INTRAVENOUS at 16:22

## 2025-02-28 RX ADMIN — VANCOMYCIN HYDROCHLORIDE 125 MG: 125 CAPSULE ORAL at 13:10

## 2025-02-28 RX ADMIN — ATORVASTATIN CALCIUM 40 MG: 40 TABLET, FILM COATED ORAL at 19:33

## 2025-02-28 RX ADMIN — VANCOMYCIN HYDROCHLORIDE 125 MG: 125 CAPSULE ORAL at 19:33

## 2025-02-28 RX ADMIN — VANCOMYCIN HYDROCHLORIDE 125 MG: 125 CAPSULE ORAL at 23:30

## 2025-02-28 RX ADMIN — METOPROLOL TARTRATE 50 MG: 50 TABLET, FILM COATED ORAL at 09:17

## 2025-02-28 RX ADMIN — PERFLUTREN 0.8 ML/MIN: 6.52 INJECTION, SUSPENSION INTRAVENOUS at 08:30

## 2025-02-28 RX ADMIN — VANCOMYCIN HYDROCHLORIDE 125 MG: 125 CAPSULE ORAL at 05:21

## 2025-02-28 RX ADMIN — CALCIUM GLUCONATE 2 G: 20 INJECTION, SOLUTION INTRAVENOUS at 09:17

## 2025-02-28 NOTE — ASSESSMENT & PLAN NOTE
Holding home Losartan given LUIS ALFREDO  Continue home Toprol XL as Lopressor 50 mg BID given concern for severe sepsis/BP instability - if tolerates BB and remains hypertensive, would restart home Amlodipine  Continue to monitor hemodynamics closely

## 2025-02-28 NOTE — ASSESSMENT & PLAN NOTE
Creatinine 2.52 on admission - now up to 3.67   Baseline creatinine appears to be around 1.1 to 1.3  Suspect this is multifactorial in setting of DKA, intravascular hypovolemia, severe sepsis in setting of bacteremia/bilateral LE cellulitis, rhabdomyolysis  Urine lytes pending  Continue volume resuscitation w/PRN colloid as noted above  Avoid nephrotoxic agents and hypotension  Trend renal indices  Strict I/O  Daily weights

## 2025-02-28 NOTE — ASSESSMENT & PLAN NOTE
Mild, likely in setting of hypovolemia, severe sepsis/bacteremia, statin use  Is s/p aggressive volume resuscitation in setting of DKA  Received colloid and Isolyte infusion started overnight - continue for now  Continue to trend CK daily

## 2025-02-28 NOTE — ASSESSMENT & PLAN NOTE
Ramirez cath placed overnight for urinary retention   Likely in setting of severe sepsis/acute illness  Plan for voiding trial when status improves  Monitor I/O closely

## 2025-02-28 NOTE — CASE MANAGEMENT
Case Management Assessment & Discharge Planning Note    Patient name Nadeem Purdy Jr.  Location ICU 03/ICU  MRN [id number]  : 1955 Date 2025       Current Admission Date: 2025  Current Admission Diagnosis:Diabetic ketoacidosis without coma associated with type 2 diabetes mellitus ([facility])   Patient Active Problem List    Diagnosis Date Noted Date Diagnosed    Urinary retention 2025     Bacteremia 2025     Non-traumatic rhabdomyolysis 2025     Hyponatremia 2025     Metabolic acidosis 2025     Diarrhea 2025     Elevated troponin 2025     Acute renal failure superimposed on stage 3a chronic kidney disease ([facility]) 2025     Diabetic ketoacidosis without coma associated with type 2 diabetes mellitus ([facility]) 2025     Severe sepsis ([facility]) 2025     Alcohol abuse 2025     Hypertensive kidney disease with chronic kidney disease stage III ([facility]) 10/08/2024     Right knee injury, subsequent encounter 2024     Osteochondral lesion 2024     Primary osteoarthritis of one knee, right 2024     Lymphedema of right lower extremity 2024     Other tear of medial meniscus, current injury, right knee, initial encounter 2024     Type 2 diabetes mellitus with hyperglycemia, with long-term current use of insulin ([facility]) 2024     Cellulitis of lower extremity 2024     Cortical age-related cataract of both eyes 10/20/2023     Numbness of left foot 2023     Lymphedema of both lower extremities 2022     Rash 2022     Stage 3 chronic kidney disease, unspecified whether stage 3a or 3b CKD ([facility]) 2022     Tachycardia 2021     Essential hypertension 2019     Hypercholesteremia 2019     Class 3 severe obesity due to excess calories with serious comorbidity and body mass index (BMI) of 40.0 to 44.9 in adult ([facility]) 2019     Steatohepatitis 2019     Allergic rhinitis 2019      Vitamin D deficiency 09/26/2019     Persistent proteinuria 09/26/2019     Type 2 diabetes mellitus with microalbuminuria, with long-term current use of insulin (HCC)        LOS (days): 2  Geometric Mean LOS (GMLOS) (days): 4.9  Days to GMLOS:3     OBJECTIVE:    Risk of Unplanned Readmission Score: 20.17     Current admission status: Inpatient  Preferred Pharmacy:   [id number] - [place] - [address]  [address]  [facility] [place] [address]  Phone: [phone] Fax: [phone]    Primary Care Provider: Jluis Glass MD    Primary Insurance: MEDICARE  Secondary Insurance: [employer]    ASSESSMENT:  Active Health Care Proxies    There are no active Health Care Proxies on file.       Advance Directives  Does patient have Advance Directives?: Yes  Advance Directives: Living will  Primary Contact: James Purdy wife    Readmission Root Cause  30 Day Readmission: No    Patient Information  Admitted from:: Home  Mental Status: Alert  During Assessment patient was accompanied by: Not accompanied during assessment  Primary Caregiver: Self  Support Systems: Spouse/significant other  Home entry access options. Select all that apply.: No steps to enter home  Type of Current Residence: 2 story home  Upon entering residence, is there a bedroom on the main floor (no further steps)?: No  A bedroom is located on the following floor levels of residence (select all that apply):: 2nd Floor  Upon entering residence, is there a bathroom on the main floor (no further steps)?: Yes  Number of steps to 2nd floor from main floor: One Flight  Living Arrangements: Lives w/ Spouse/significant other    Activities of Daily Living Prior to Admission  Functional Status: Independent  Completes ADLs independently?: Yes  Ambulates independently?: Yes  Does patient use assisted devices?: No  Does patient currently own DME?: Yes  What DME does the patient currently own?: [employer]  Does patient  have a history of Outpatient Therapy (PT/OT)?: No  Does the patient have a history of Short-Term Rehab?: No  Does patient have a history of HHC?: No  Does patient currently have HHC?: No    Patient Information Continued  Income Source: Pension/group home  Does patient have prescription coverage?: Yes ([facility])  Does patient receive dialysis treatments?: No  Does patient have a history of substance abuse?: No  Does patient have a history of Mental Health Diagnosis?: No    Means of Transportation  Means of Transport to Appts:: Drives Self    DISCHARGE DETAILS:    Discharge planning discussed with:: Pt     Contacts  Patient Contacts: Jessica Purdy wife  Relationship to Patient:: Family  Contact Method: Phone  Phone Number: [phone]    Would you like to participate in our [facility] service program?  : No - Declined  Evaluated the pt at the bedside.  Still on insulin drip.  Pt more alert and able to answer questions.  Pt states he lives with his spouse in a 2 story home.  Pt bedroom is on the 2nd floor.  Pt has wounds noted to BLE.  Still under critical care services.  Under critical care serivces, noted to have bacteremia.  Will need PT/Ot eval.  Most likely will need STR.  Will follow for care needs.

## 2025-02-28 NOTE — OCCUPATIONAL THERAPY NOTE
Occupational Therapy Evaluation     Patient Name: Nadeem Purdy Jr.  Today's Date: 2/28/2025  Problem List  Principal Problem:    Diabetic ketoacidosis without coma associated with type 2 diabetes mellitus (HCC)  Active Problems:    Essential hypertension    Hypercholesteremia    Class 3 severe obesity due to excess calories with serious comorbidity and body mass index (BMI) of 40.0 to 44.9 in adult (HCC)    Lymphedema of both lower extremities    Cellulitis of lower extremity    Acute renal failure superimposed on stage 3a chronic kidney disease (HCC)    Severe sepsis (HCC)    Alcohol abuse    Bacteremia    Non-traumatic rhabdomyolysis    Hyponatremia    Metabolic acidosis    Diarrhea    Elevated troponin    Urinary retention    Past Medical History  Past Medical History:   Diagnosis Date    Hypertension      Past Surgical History  Past Surgical History:   Procedure Laterality Date    CATARACT EXTRACTION, BILATERAL Bilateral            02/28/25 0838   OT Last Visit   OT Visit Date 02/28/25   Note Type   Note type Evaluation   Additional Comments Pt seen as a co-eval with PT due to the patient's co-morbidities, clinically unstable presentation, and present impairments which are a regression from the patient's baseline.   Pain Assessment   Pain Assessment Tool 0-10   Pain Score 3   Pain Location/Orientation Orientation: Bilateral;Location: Foot   Pain Radiating Towards down legs   Pain Onset/Description Onset: Ongoing   Effect of Pain on Daily Activities n/a   Patient's Stated Pain Goal No pain   Hospital Pain Intervention(s) Repositioned;Elevated   Multiple Pain Sites No   Restrictions/Precautions   Weight Bearing Precautions Per Order No   Other Precautions Chair Alarm;Bed Alarm;Telemetry;Fall Risk;Pain;O2;Contact/isolation  (cates cath)   Home Living   Type of Home House   Home Layout Two level;Bed/bath upstairs;Access  (FOS to 2nd floor)   Bathroom Shower/Tub Walk-in shower   Bathroom Toilet Standard   Bathroom  "Equipment   (no DME reported)   Bathroom Accessibility Accessible   Home Equipment Cane  (Pt denies use of AD but availability of SPC)   Prior Function   Level of [place] Independent with ADLs;Independent with functional mobility;Needs assistance with IADLS   Lives With Spouse   Receives Help From Family   IADLs Independent with driving;Family/Friend/Other provides meals;Family/Friend/Other provides medication management  (shared responsibilities with spouse for IADLs)   Falls in the last 6 months 0  (pt denies)   Vocational Retired   Lifestyle   Autonomy Pt resides in two level house w/ spouse; I with ADLs and requires some A with IADLs; +    Reciprocal Relationships supportive family   Service to Others retired   Intrinsic Gratification watching tv   Subjective   Subjective \"I can get to the chair\"   ADL   Where Assessed Edge of bed   UB Bathing Assistance 4  Minimal Assistance   LB Bathing Assistance 3  Moderate Assistance   UB Dressing Assistance 4  Minimal Assistance   LB Dressing Assistance 2  Maximal Assistance   Toileting Assistance  2  Maximal Assistance   Additional Comments Given functional performance skills + medical complexity, therapist suspects via clinical judgement + skilled analysis; pt currently requires stated assist above to perform each area of ADLs d/t limitations including: functional mobility, functional activity tolerance, coordination, balance, pain, decreased sitting tolerance and overall problem-solving deficits   Bed Mobility   Supine to Sit 2  Maximal assistance   Additional items Assist x 2;HOB elevated;Bedrails;Increased time required;Verbal cues;LE management   Sit to Supine   (DNT; pt seated in recliner upon conclusion of session)   Additional Comments VC for bedrail utilization and proper body mechanics; + dizziness with transitional movements with sympomatic resolution with rest  (initially requiring min A for postural control and support with able to transition to S " level EOB sitting)   Transfers   Sit to Stand 2  Maximal assistance   Additional items Assist x 2;Increased time required;Verbal cues   Stand to Sit 2  Maximal assistance   Additional items Assist x 2;Increased time required;Verbal cues   Stand pivot 2  Maximal assistance   Additional items Assist x 2;Increased time required;Verbal cues  (w/ RW)   Additional Comments RW used; VC for safe hand placement, proper body mechanics and overall RW management during directional turns   Balance   Static Sitting Fair   Dynamic Sitting Fair -   Static Standing Poor +   Dynamic Standing Poor   Activity Tolerance   Activity Tolerance Patient limited by fatigue;Patient limited by pain   Medical Staff Made Aware Yes, CM made aware of d/c recs   Nurse Made Aware Yes, nursing staff made aware of session outcomes   RUE Assessment   RUE Assessment WFL   RUE Strength   RUE Overall Strength   (3+/5)   LUE Assessment   LUE Assessment WFL   LUE Strength   LUE Overall Strength   (3+/5)   Hand Function   Gross Motor Coordination Functional   Fine Motor Coordination Functional   Sensation   Additional Comments denies numbness/tingling   Vision-Basic Assessment   Current Vision Wears glasses only for reading   Cognition   Overall Cognitive Status WFL   Arousal/Participation Alert;Responsive;Cooperative   Attention Within functional limits   Orientation Level Oriented X4   Memory Decreased recall of precautions;Decreased recall of recent events   Following Commands Follows one step commands without difficulty   Comments Pt agreeable to OT evaluation, pleasant   Assessment   Limitation Decreased ADL status;Decreased UE strength;Decreased Safe judgement during ADL;Decreased endurance;Decreased self-care trans;Decreased high-level ADLs   Prognosis Good   Assessment Pt is a 69 y.o. male seen for OT evaluation s/p admit to [facility] on 2/26/2025 w/ Diabetic ketoacidosis without coma associated with type 2 diabetes mellitus (HCC).  Comorbidities  affecting pt's functional performance at time of assessment include: HTN, obesity, lymphedema, severe sepsis, metabolic acidosis, cellulitis of LE, type 2 diabetes, vit D deficiency. Personal factors affecting pt at time of IE include:steps to enter environment, difficulty performing ADLS, difficulty performing IADLS , limited insight into deficits, decreased initiation and engagement , and health management . Prior to admission, pt was I with ADLs and requires some A with IADLs. Upon evaluation: the following deficits impact occupational performance: weakness, decreased strength, decreased balance, decreased tolerance, impaired memory, impaired sequencing, impaired problem solving, decreased safety awareness, and increased pain. Pt to benefit from continued skilled OT tx while in the hospital to address deficits as defined above and maximize level of functional independence w ADL's and functional mobility. Occupational Performance areas to address include: grooming, bathing/shower, toilet hygiene, dressing, functional mobility, community mobility, and clothing management. From OT standpoint, recommendation at time of d/c would with moderate intensity OT resources.   Goals   Patient Goals to feel better   Plan   Treatment Interventions ADL retraining;Functional transfer training;UE strengthening/ROM;Endurance training;Patient/family training;Compensatory technique education;Activityengagement;Energy conservation   Goal Expiration Date 03/10/25   OT Treatment Day 0   OT Frequency 3-5x/wk   Discharge Recommendation   Rehab Resource Intensity Level, OT II (Moderate Resource Intensity)   Additional Comments  The patient's raw score on the AM-PAC Daily Activity inpatient short form is 12, standardized score is 30.6, less than 39.4. Patients at this level are likely to benefit from discharge with moderate intensity OT resources. Please refer to the recommendation of the Occupational Therapist for safe discharge planning.    AM-PAC Daily Activity Inpatient   Lower Body Dressing 1   Bathing 1   Toileting 2   Upper Body Dressing 2   Grooming 3   Eating 3   Daily Activity Raw Score 12   Daily Activity Standardized Score (Calc for Raw Score >=11) 30.6   AM-PAC Applied Cognition Inpatient   Following a Speech/Presentation 3   Understanding Ordinary Conversation 4   Taking Medications 4   Remembering Where Things Are Placed or Put Away 3   Remembering List of 4-5 Errands 3   Taking Care of Complicated Tasks 3   Applied Cognition Raw Score 20   Applied Cognition Standardized Score 41.76     GOALS:  Pt will achieve the following within specified time frame: LTG  Pt will achieve the following goals within 10 days    *ADL transfers with CGA for inc'd independence with ADLs/purposeful tasks    *UB ADL with (S) for inc'd independence with self cares    *LB ADL with Min (A) using AE prn for inc'd independence with self cares    *Toileting with Min (A) for clothing management and hygiene for return to PLOF with personal care    *Increase static stand balance  and dyn stand balance to F+ for inc'd safety with standing purposeful tasks    *Increase stand tolerance x6 m for inc'd tolerance with standing purposeful tasks    *Bed mobility- CGA for inc'd independence to manage own comfort and initiate EOB & OOB purposeful tasks    *Participate in 10-15m UE therex to increase overall stamina/activity tolerance for purposeful tasks      Magda Berrios MS, OTR/L

## 2025-02-28 NOTE — ASSESSMENT & PLAN NOTE
Notes some diarrhea prior to admission - has continued and worsened since admission  CDIF PCR positive shortly after admission, however EIA negative  Enteric stool and O&P pending  Given increasing bouts of liquid diarrhea and overall worsening clinical status, PO Vanco started for CDIF

## 2025-02-28 NOTE — ASSESSMENT & PLAN NOTE
Suspect source is bilateral LE cellulitis  1 out of 2 Blood cultures w/GPC in chains - BCID detecting Strep Pyogenes  Formal echo ordered  Please see plan as noted above

## 2025-02-28 NOTE — ASSESSMENT & PLAN NOTE
AG 17 w/bicarb 16 on admission   Suspect this is multifactorial in setting of DKA, renal failure, diarrhea  Currently receiving volume resuscitation as noted above - will start bicarb gtt if bicarb not improving by AM  Continue to trend acidosis closely

## 2025-02-28 NOTE — ASSESSMENT & PLAN NOTE
Lab Results   Component Value Date    HGBA1C 8.9 (H) 02/26/2025       Recent Labs     02/28/25  1827 02/28/25 2007 02/28/25 2206 02/28/25  2330   POCGLU 194* 157* 143* 125       Blood Sugar Average: Last 72 hrs:  (P) [id number]    POA AEB Glucose 446, pH 7.218, AG 17 w/bicarb 15, unfortunately no BHB done upon arrival, but urine w/1+ketones  Suspect this is 2/2 noncompliance as well as active infection as noted below  Prescribed 70/30 insulin 20 units BID as OP, however reports that he has not been taking for approximately 2 weeks  Is s/p aggressive volume resuscitation per DKA protocol  DKA insulin gtt transitioned to non-DKA gtt - transitioned to SQ regimen last evening  Endocrinology consulted - will need follow-up as OP  Continue mealtime/SSI w/ACHS fingersticks  Goal  - 180

## 2025-02-28 NOTE — PLAN OF CARE
Problem: PHYSICAL THERAPY ADULT  Goal: Performs mobility at highest level of function for planned discharge setting.  See evaluation for individualized goals.  Description: Treatment/Interventions: Functional transfer training, Therapeutic exercise, Endurance training, Gait training, Bed mobility, Equipment eval/education  Equipment Recommended: Walker       See flowsheet documentation for full assessment, interventions and recommendations.  Outcome: Progressing  Note: Prognosis: Fair     Assessment: Pt is 69 y.o. male seen for PT evaluation s/p admit to [facility] on 2/26/2025 w/ Diabetic ketoacidosis without coma associated with type 2 diabetes mellitus (HCC). PT consulted to assess pt's functional mobility and d/c needs. Order placed for PT eval and tx, w/ activity as tolerated order. Pt agreeable to PT  session upon arrival, pt found supine in bed.  PTA, pt was independent w/ all functional mobility w/ no AD, lives w/ spouse in 2 level home, and retired.  Pt to benefit from continued PT tx to address deficits and maximize level of functional independent mobility and consistency. Upon conclusion pt  seated in recliner. Complexity: Comorbidities affecting pt's physical performance at time of assessment include: DM and sepsis, cellulitis, rhabolmyelosis, and lymphoma . Personal factors affecting pt at time of IE include: limited mobility, lives in multistory home, inability to ambulate household distances, and inability to navigate without external assistance. Please find objective findings from PT assessment regarding body systems outlined above with impairments and limitations including impaired balance, decreased endurance, gait deviations, pain, decreased activity tolerance, decreased functional mobility tolerance, fall risk, and decreased skin integrity.  Pt's clinical presentation is currently unstable/unpredictable seen in pt's presentation of abnormal renal values, abnormal WBC count, abnormal  sodium levels, abnormal Co2 levels, abnormal calcium levels, new onset of O2 use, pain, wounds, on critical care  unit, and telemetry use. The patient's AM-PAC Basic Mobility Inpatient Short Form Raw Score is 6.  Based on patient presentations and impairments, pt would most appropriately benefit from Level 2 resource intensity upon discharge. A Raw score of less than or equal to 16 suggests the patient may benefit from discharge to post-acute rehabilitation services. Please also refer to the recommendation of the Physical Therapist for safe discharge planning. RN verbalized pt appropriate for PT session. Pt seen as a co-eval with OT due to the patient's co-morbidities, clinically unstable presentation, and present impairments which are a regression from the patient's baseline.  Barriers to Discharge:  (limited mobility)     Rehab Resource Intensity Level, PT: II (Moderate Resource Intensity)    See flowsheet documentation for full assessment.

## 2025-02-28 NOTE — PROGRESS NOTES
Progress Note - Critical Care/ICU   Name: Nadeem Purdy Jr. 69 y.o. male I MRN: [id number]  Unit/Bed#: ICU 03-01 I Date of Admission: 2/26/2025   Date of Service: 2/28/2025 I Hospital Day: 2      Assessment & Plan  Diabetic ketoacidosis without coma associated with type 2 diabetes mellitus (HCC)  Lab Results   Component Value Date    HGBA1C 8.9 (H) 02/26/2025       Recent Labs     02/27/25  1807 02/27/25 2006 02/27/25 2207 02/28/25  0027   POCGLU 104 162* 160* 141*       Blood Sugar Average: Last 72 hrs:  (P) 206.25    POA AEB Glucose 446, pH 7.218, AG 17 w/bicarb 15, unfortunately no BHB done upon arrival, but urine w/1+ketones  Suspect this is 2/2 noncompliance as well as active infection as noted below  Prescribed 70/30 insulin 20 units BID as OP, however reports that he has not been taking for approximately 2 weeks  Is s/p aggressive volume resuscitation per DKA protocol  DKA insulin gtt transitioned to non-DKA gtt - continue this until able to take in PO   Endocrinology consulted - recommended transition to SQ regimen while patient w/decreased PO intake  Serial labs   Continue Q2H fingersticks while on insulin gtt  Goal  - 180    Severe sepsis (HCC)  POA AEB fever, tachycardia, tachypnea, leukocytosis, elevated lactic acidosis  Suspect this is 2/2 bacteremia in setting of bilateral LE cellulitis vs diarrheal illness/? CDIF  Imaging revealed  COVID/FLU/RSV negative   1 out of 2 Blood cultures w/GPC in chains w/BCID w/Strep Pyogenes detected - speciation/sensitivities pending  UA w/out evidence of infection  MRSA swab pending  Bilateral LE CT revealed extensive circumferential skin thickening and subcutaneous edema involving bilateral lower legs compatible with cellulitis - no organized collection and soft tissue gas  CDIF PCR positive, but EIA negative  Enteric stool/O&P pending   Lactic acid 2.4  Procalcitonin 9.27 - 18.89 - repeat pending  Initially received volume per DKA protocol - received additional  colloid and started on Isolyte infusion given collapsible IVC/intravascular depletion per POC echo  Continue IV Cefepime/Vanco D#3 - PO Vanco added overnight for CDIF given worsening diarrhea and positive CDIF PCR and need for broad-spectrum antibiotics  Trend fever and WBC curve  Cellulitis of lower extremity  Please see plan as noted above  Bacteremia  Suspect source is bilateral LE cellulitis  1 out of 2 Blood cultures w/GPC in chains - BCID detecting Strep Pyogenes  Formal echo ordered  Please see plan as noted above  Diarrhea  Notes some diarrhea prior to admission - has continued and worsened since admission  CDIF PCR positive shortly after admission, however EIA negative  Enteric stool and O&P pending  Given increasing bouts of liquid diarrhea and overall worsening clinical status, PO Vanco started for CDIF  Acute renal failure superimposed on stage 3a chronic kidney disease (HCC)  Creatinine 2.52 on admission - now up to 3.67   Baseline creatinine appears to be around 1.1 to 1.3  Suspect this is multifactorial in setting of DKA, intravascular hypovolemia, severe sepsis in setting of bacteremia/bilateral LE cellulitis, rhabdomyolysis  Urine lytes pending  Continue volume resuscitation w/PRN colloid as noted above  Avoid nephrotoxic agents and hypotension  Trend renal indices  Strict I/O  Daily weights  Non-traumatic rhabdomyolysis  Mild, likely in setting of hypovolemia, severe sepsis/bacteremia, statin use  Is s/p aggressive volume resuscitation in setting of DKA  Received colloid and Isolyte infusion started overnight - continue for now  Continue to trend CK daily  Hyponatremia  Corrected Na 127 on admission  Likely in setting of severe sepsis and decreased PO intake w/underlying alcohol abuse  Continue w/volume resuscitation as noted above  Continue to trend Na - goal for no more than 6-8 mEq change over next 24H  Metabolic acidosis  AG 17 w/bicarb 16 on admission   Suspect this is multifactorial in  setting of DKA, renal failure, diarrhea  Currently receiving volume resuscitation as noted above - will start bicarb gtt if bicarb not improving by AM  Continue to trend acidosis closely  Elevated troponin  Troponin 64 >> 58  Suspect this is demand in setting of severe sepsis/bacteremia vs DKA/dehydration  ECG w/out ischemic changes  Denies CP  Continue cardiac monitoring - no need to further trend troponin  Urinary retention  Ramirez cath placed overnight for urinary retention   Likely in setting of severe sepsis/acute illness  Plan for voiding trial when status improves  Monitor I/O closely  Essential hypertension  Holding home Losartan given LUIS ALFREDO  Continue home Toprol XL as Lopressor 50 mg BID given concern for severe sepsis/BP instability - if tolerates BB and remains hypertensive, would restart home Amlodipine  Continue to monitor hemodynamics closely  Hypercholesteremia  Holding home statin given rhabdomyolysis  Class 3 severe obesity due to excess calories with serious comorbidity and body mass index (BMI) of 40.0 to 44.9 in adult (HCC)  Encourage healthy lifestyle modifications   Lymphedema of both lower extremities  Has bilateral lymphedema at baseline w/chronic open wounds   Holding home Lasix given intravascular depletion/hypovolemia  Wound care team following  Continue treatment for bilateral LE cellulitis as noted above  Alcohol abuse  Drinks 1-2 glasses of [place] per day  Last drank 4 weeks ago?  Continue folic acid/thiamine supplementation  CIWA protocol  Seizure precautions    Disposition: Stepdown Level 1    ICU Core Measures     A: Assess, Prevent, and Manage Pain Has pain been assessed? Yes  Need for changes to pain regimen? No   B: Both SAT/SAT  N/A   C: Choice of Sedation RASS Goal: 0 Alert and Calm  Need for changes to sedation or analgesia regimen? No   D: Delirium CAM-ICU: Negative   E: Early Mobility  Plan for early mobility? Yes   F: Family Engagement Plan for family engagement today? Yes        Antibiotic Review: Awaiting culture results.  and Continue broad spectrum secondary to severity of illness.       Prophylaxis:  VTE VTE covered by:  heparin (porcine), Subcutaneous, 7,500 Units at 02/27/25 2134       Stress Ulcer  not ordered         24 Hour Events :     Received 5% Albumin 25g x1 and started on Isolyte infusion given worsening renal function and hypovolemia/intravascular depletion on POC Echo  1 out of 2 blood cultures positive for Strep Pyogenes - bilateral LE wound cultures sent  Bilateral LE imaging completed, which did not reveal any soft tissue gas, collections, or evidence of osteomyelitis   PO Vanco started for CDIF given CDIF PCR positive, worsening liquid/watery diarrhea, and need for broad-spectrum antibiotics  Insulin gtt continued given decreased PO intake/metabolic derangements       Subjective   Review of Systems: Review of Systems   Constitutional:  Negative for chills and fever.   HENT:  Positive for congestion.    Eyes:  Negative for visual disturbance.   Respiratory:  Negative for shortness of breath.    Cardiovascular:  Positive for leg swelling. Negative for chest pain.   Gastrointestinal:  Positive for diarrhea. Negative for abdominal pain, nausea and vomiting.   Genitourinary:         Ramirez cath intact for urinary retention   Musculoskeletal:  Negative for back pain.   Neurological:  Negative for dizziness, light-headedness and headaches.   Psychiatric/Behavioral:  Negative for confusion.        Objective :                   Vitals I/O      Most Recent Min/Max in 24hrs   Temp (!) 97.3 °F (36.3 °C) Temp  Min: 97.3 °F (36.3 °C)  Max: 100 °F (37.8 °C)   Pulse 96 Pulse  Min: 87  Max: 105   Resp (!) 29 Resp  Min: 19  Max: 41   /70 BP  Min: 116/57  Max: 159/72   O2 Sat 96 % SpO2  Min: 91 %  Max: 96 %      Intake/Output Summary (Last 24 hours) at 2/28/2025 0111  Last data filed at 2/27/2025 1824  Gross per 24 hour   Intake 2394.54 ml   Output 1042 ml   Net 1352.54 ml        Diet Cardiovascular; Cardiac; Consistent Carbohydrate Diet Level 2 (5 carb servings/75 grams CHO/meal)    Invasive Monitoring           Physical Exam   Physical Exam  Vitals and nursing note reviewed.   Eyes:      Extraocular Movements: Extraocular movements intact.      Pupils: Pupils are equal, round, and reactive to light.   Skin:     General: Skin is warm and dry.      Capillary Refill: Capillary refill takes 2 to 3 seconds.      Findings: Wound present.      Comments: Bilateral LE cellulitic wounds (L>R)   Cardiovascular:      Rate and Rhythm: Normal rate and regular rhythm.      Pulses:           Radial pulses are 2+ on the right side and 2+ on the left side.        Dorsalis pedis pulses are 1+ on the right side and 1+ on the left side.      Heart sounds: Normal heart sounds.   Musculoskeletal:      Right lower le+ Edema present.      Left lower leg: 3+ Edema present.   Abdominal: General: Bowel sounds are increased.      Palpations: Abdomen is soft.      Tenderness: There is no abdominal tenderness.   Constitutional:       General: He is awake.      Appearance: He is obese. He is ill-appearing.   Pulmonary:      Effort: Tachypnea present.      Breath sounds: Decreased breath sounds and rhonchi present.   Psychiatric:         Behavior: Behavior is cooperative.   Neurological:      General: No focal deficit present.      Mental Status: He is alert and oriented to person, place and time.   Genitourinary/Anorectal:  Ramirez present.        Diagnostic Studies        Lab Results: I have reviewed the following results:     Medications:  Scheduled PRN   aspirin, 81 mg, Daily  azelastine, 1 spray, BID  cefepime, 2,000 mg, Q12H  chlorhexidine, 15 mL, Q12H MARGARITA  folic acid, 1 mg, Daily  guaiFENesin, 600 mg, Q12H MARGARITA  heparin (porcine), 7,500 Units, Q8H MARGARITA  melatonin, 6 mg, HS  metoprolol tartrate, 50 mg, Q12H MARGARITA  thiamine, 100 mg, Daily  vancomycin oral (capsules or solution), 125 mg, Q6H MARGARITA  vancomycin, 750  mg, Q24H      acetaminophen, 650 mg, Q6H PRN       Continuous    insulin regular (HumuLIN R,NovoLIN R) 1 Units/mL in sodium chloride 0.9 % 100 mL infusion, 0.3-21 Units/hr, Last Rate: 4 Units/hr (02/27/25 2007)  multi-electrolyte, 75 mL/hr, Last Rate: 75 mL/hr (02/27/25 2029)         Labs:   CBC    Recent Labs     02/26/25  1114 02/27/25  0552   WBC 17.27* 15.92*   HGB 13.9 13.3   HCT 41.4 41.6    142*     BMP    Recent Labs     02/27/25  1818 02/27/25  2214   SODIUM 127* 125*   K 3.5 3.5   CL 98 99   CO2 18* 18*   AGAP 11 8   BUN 47* 49*   CREATININE 3.52* 3.67*   CALCIUM 8.9 8.2*       Coags    Recent Labs     02/26/25  1114   INR 1.14   PTT 28        Additional Electrolytes  Recent Labs     02/27/25  1818 02/27/25  2214   MG 2.4 2.2   PHOS 2.2* 2.5   CAIONIZED  --  1.11*          Blood Gas    No recent results  Recent Labs     02/27/25 2000   PHVEN 7.341   [id number] 33.6*   PO2VEN 27.5*   FWA2CNK 17.8*   BEVEN -7.0   [id number] 54.0*    LFTs  Recent Labs     02/26/25  1114 02/27/25  1818   ALT 18 26   AST 22 52*   ALKPHOS 54 56   ALB 3.4* 3.0*   TBILI 0.87 0.47       Infectious  Recent Labs     02/26/25  1114 02/27/25  0739   PROCALCITONI 9.27* 18.89*     Glucose  Recent Labs     02/27/25  0552 02/27/25  1206 02/27/25  1818 02/27/25  2214   GLUC 208* 185* 84 167*

## 2025-02-28 NOTE — ASSESSMENT & PLAN NOTE
Drinks 1-2 glasses of [place] per day  Last drank 4 weeks ago?  Continue folic acid/thiamine supplementation  CIWA protocol  Seizure precautions

## 2025-02-28 NOTE — PLAN OF CARE
Problem: OCCUPATIONAL THERAPY ADULT  Goal: Performs self-care activities at highest level of function for planned discharge setting.  See evaluation for individualized goals.  Description: Treatment Interventions: ADL retraining, Functional transfer training, UE strengthening/ROM, Endurance training, Patient/family training, Compensatory technique education, Activityengagement, Energy conservation       See flowsheet documentation for full assessment, interventions and recommendations.   Note: Limitation: Decreased ADL status, Decreased UE strength, Decreased Safe judgement during ADL, Decreased endurance, Decreased self-care trans, Decreased high-level ADLs  Prognosis: Good  Assessment: Pt is a 69 y.o. male seen for OT evaluation s/p admit to [facility] on 2/26/2025 w/ Diabetic ketoacidosis without coma associated with type 2 diabetes mellitus (HCC).  Comorbidities affecting pt's functional performance at time of assessment include: HTN, obesity, lymphedema, severe sepsis, metabolic acidosis, cellulitis of LE, type 2 diabetes, vit D deficiency. Personal factors affecting pt at time of IE include:steps to enter environment, difficulty performing ADLS, difficulty performing IADLS , limited insight into deficits, decreased initiation and engagement , and health management . Prior to admission, pt was I with ADLs and requires some A with IADLs. Upon evaluation: the following deficits impact occupational performance: weakness, decreased strength, decreased balance, decreased tolerance, impaired memory, impaired sequencing, impaired problem solving, decreased safety awareness, and increased pain. Pt to benefit from continued skilled OT tx while in the hospital to address deficits as defined above and maximize level of functional independence w ADL's and functional mobility. Occupational Performance areas to address include: grooming, bathing/shower, toilet hygiene, dressing, functional mobility, community mobility, and  clothing management. From OT standpoint, recommendation at time of d/c would with moderate intensity OT resources.     Rehab Resource Intensity Level, OT: II (Moderate Resource Intensity)     Magda Berrios OTR/L

## 2025-02-28 NOTE — ASSESSMENT & PLAN NOTE
Lab Results   Component Value Date    HGBA1C 6 9 (H) 01/06/2022     - Well-controlled, although on several medications/duplicate medications which are prescribed by the [address] [place] [address], he believes an endocrinologist   - Recommend at his upcoming appointment to discuss this, decrease amount of medication he is on POA AEB fever, tachycardia, tachypnea, leukocytosis, elevated lactic acidosis  Suspect this is 2/2 bacteremia in setting of bilateral LE cellulitis vs diarrheal illness/? CDIF  Imaging revealed liquid stool within visualized colon concerning for diarrhea disease and extensive circumferential skin thickening and subcutaneous edema involving bilateral lower legs compatible with cellulitis   COVID/FLU/RSV negative   1 out of 2 Blood cultures w/Strep Pyogenes (Group A) - sensitivities pending  UA w/out evidence of infection  MRSA swab pending  Bilateral LE wound cultures pending  CDIF PCR positive, but EIA negative  Enteric stool panel negative   Lactic acid 2.4 - has since cleared  Procalcitonin 9.27 > 18.89 > 13.97 - repeat pending  Is sp aggressive volume resuscitation - continue w/colloid PRN given concern for intravascular depletion  Continue IV Cefepime D#4 for Strep Pyogenes bacteremia/bilateral LE cellulitis  Continue PO Vanco D#2 despite EIA negative as patient w/persistent diarrhea and need for broad-spectrum antibiotics  Trend fever and WBC curve

## 2025-02-28 NOTE — CONSULTS
Vancomycin IV Pharmacy-to-Dose Consultation     Vancomycin has been discontinued.  Pharmacy will sign off.  Please contact or re-consult with questions.    Shaheen Godinez, Pharmacist

## 2025-02-28 NOTE — ASSESSMENT & PLAN NOTE
Corrected Na 127 on admission  Likely in setting of severe sepsis and decreased PO intake w/underlying alcohol abuse  Continue w/volume resuscitation as noted above  Continue to trend Na - goal for no more than 6-8 mEq change over next 24H

## 2025-02-28 NOTE — ASSESSMENT & PLAN NOTE
Suspect source is bilateral LE cellulitis  1 out of 2 Blood cultures w/Strep Pyogenes (Group A) - sensitivities pending  Please see plan as noted above

## 2025-02-28 NOTE — PLAN OF CARE
Problem: Prexisting or High Potential for Compromised Skin Integrity  Goal: Skin integrity is maintained or improved  Description: INTERVENTIONS:  - Identify patients at risk for skin breakdown  - Assess and monitor skin integrity  - Assess and monitor nutrition and hydration status  - Monitor labs   - Assess for incontinence   - Turn and reposition patient  - Assist with mobility/ambulation  - Relieve pressure over bony prominences  - Avoid friction and shearing  - Provide appropriate hygiene as needed including keeping skin clean and dry  - Evaluate need for skin moisturizer/barrier cream  - Collaborate with interdisciplinary team   - Patient/family teaching  - Consider wound care consult   Outcome: Progressing     Problem: PAIN - ADULT  Goal: Verbalizes/displays adequate comfort level or baseline comfort level  Description: Interventions:  - Encourage patient to monitor pain and request assistance  - Assess pain using appropriate pain scale  - Administer analgesics based on type and severity of pain and evaluate response  - Implement non-pharmacological measures as appropriate and evaluate response  - Consider cultural and social influences on pain and pain management  - Notify physician/advanced practitioner if interventions unsuccessful or patient reports new pain  Outcome: Progressing     Problem: INFECTION - ADULT  Goal: Absence or prevention of progression during hospitalization  Description: INTERVENTIONS:  - Assess and monitor for signs and symptoms of infection  - Monitor lab/diagnostic results  - Monitor all insertion sites, i.e. indwelling lines, tubes, and drains  - Monitor endotracheal if appropriate and nasal secretions for changes in amount and color  - [place] appropriate cooling/warming therapies per order  - Administer medications as ordered  - Instruct and encourage patient and family to use good hand hygiene technique  - Identify and instruct in appropriate isolation precautions for  identified infection/condition  Outcome: Progressing  Goal: Absence of fever/infection during neutropenic period  Description: INTERVENTIONS:  - Monitor WBC    Outcome: Progressing     Problem: SAFETY ADULT  Goal: Patient will remain free of falls  Description: INTERVENTIONS:  - Educate patient/family on patient safety including physical limitations  - Instruct patient to call for assistance with activity   - Consult OT/PT to assist with strengthening/mobility   - Keep Call bell within reach  - Keep bed low and locked with side rails adjusted as appropriate  - Keep care items and personal belongings within reach  - Initiate and maintain comfort rounds  - Make Fall Risk Sign visible to staff  - Offer Toileting every 2 Hours, in advance of need  - Initiate/Maintain bed alarm  - Obtain necessary fall risk management equipment  - Apply yellow socks and bracelet for high fall risk patients  - Consider moving patient to room near nurses station  Outcome: Progressing  Goal: Maintain or return to baseline ADL function  Description: INTERVENTIONS:  -  Assess patient's ability to carry out ADLs; assess patient's baseline for ADL function and identify physical deficits which impact ability to perform ADLs (bathing, care of mouth/teeth, toileting, grooming, dressing, etc.)  - Assess/evaluate cause of self-care deficits   - Assess range of motion  - Assess patient's mobility; develop plan if impaired  - Assess patient's need for assistive devices and provide as appropriate  - Encourage maximum independence but intervene and supervise when necessary  - Involve family in performance of ADLs  - Assess for home care needs following discharge   - Consider OT consult to assist with ADL evaluation and planning for discharge  - Provide patient education as appropriate  Outcome: Progressing  Goal: Maintains/Returns to pre admission functional level  Description: INTERVENTIONS:  - Perform AM-PAC 6 Click Basic Mobility/ Daily Activity  assessment daily.  - Set and communicate daily mobility goal to care team and patient/family/caregiver.   - Collaborate with rehabilitation services on mobility goals if consulted  - Perform Range of Motion 3 times a day.  - Reposition patient every 2 hours.  - Dangle patient 3 times a day  - Stand patient 3 times a day  - Ambulate patient 3 times a day  - Out of bed to chair 3 times a day   - Out of bed for meals 3 times a day  - Out of bed for toileting  - Record patient progress and toleration of activity level   Outcome: Progressing     Problem: DISCHARGE PLANNING  Goal: Discharge to home or other facility with appropriate resources  Description: INTERVENTIONS:  - Identify barriers to discharge w/patient and caregiver  - Arrange for needed discharge resources and transportation as appropriate  - Identify discharge learning needs (meds, wound care, etc.)  - Arrange for interpretive services to assist at discharge as needed  - Refer to Case Management Department for coordinating discharge planning if the patient needs post-hospital services based on physician/advanced practitioner order or complex needs related to functional status, cognitive ability, or social support system  Outcome: Progressing     Problem: Knowledge Deficit  Goal: Patient/family/caregiver demonstrates understanding of disease process, treatment plan, medications, and discharge instructions  Description: Complete learning assessment and assess knowledge base.  Interventions:  - Provide teaching at level of understanding  - Provide teaching via preferred learning methods  Outcome: Progressing     Problem: CARDIOVASCULAR - ADULT  Goal: Maintains optimal cardiac output and hemodynamic stability  Description: INTERVENTIONS:  - Monitor I/O, vital signs and rhythm  - Monitor for S/S and trends of decreased cardiac output  - Administer and titrate ordered vasoactive medications to optimize hemodynamic stability  - Assess quality of pulses, skin color  and temperature  - Assess for signs of decreased coronary artery perfusion  - Instruct patient to report change in severity of symptoms  Outcome: Progressing  Goal: Absence of cardiac dysrhythmias or at baseline rhythm  Description: INTERVENTIONS:  - Continuous cardiac monitoring, vital signs, obtain 12 lead EKG if ordered  - Administer antiarrhythmic and heart rate control medications as ordered  - Monitor electrolytes and administer replacement therapy as ordered  Outcome: Progressing     Problem: GASTROINTESTINAL - ADULT  Goal: Minimal or absence of nausea and/or vomiting  Description: INTERVENTIONS:  - Administer IV fluids if ordered to ensure adequate hydration  - Maintain NPO status until nausea and vomiting are resolved  - Nasogastric tube if ordered  - Administer ordered antiemetic medications as needed  - Provide nonpharmacologic comfort measures as appropriate  - Advance diet as tolerated, if ordered  - Consider nutrition services referral to assist patient with adequate nutrition and appropriate food choices  Outcome: Progressing  Goal: Maintains adequate nutritional intake  Description: INTERVENTIONS:  - Monitor percentage of each meal consumed  - Identify factors contributing to decreased intake, treat as appropriate  - Assist with meals as needed  - Monitor I&O, weight, and lab values if indicated  - Obtain nutrition services referral as needed  Outcome: Progressing     Problem: GENITOURINARY - ADULT  Goal: Maintains or returns to baseline urinary function  Description: INTERVENTIONS:  - Assess urinary function  - Encourage oral fluids to ensure adequate hydration if ordered  - Administer IV fluids as ordered to ensure adequate hydration  - Administer ordered medications as needed  - Offer frequent toileting  - Follow urinary retention protocol if ordered  Outcome: Progressing     Problem: METABOLIC, FLUID AND ELECTROLYTES - ADULT  Goal: Electrolytes maintained within normal limits  Description:  INTERVENTIONS:  - Monitor labs and assess patient for signs and symptoms of electrolyte imbalances  - Administer electrolyte replacement as ordered  - Monitor response to electrolyte replacements, including repeat lab results as appropriate  - Instruct patient on fluid and nutrition as appropriate  Outcome: Progressing  Goal: Fluid balance maintained  Description: INTERVENTIONS:  - Monitor labs   - Monitor I/O and WT  - Instruct patient on fluid and nutrition as appropriate  - Assess for signs & symptoms of volume excess or deficit  Outcome: Progressing  Goal: Glucose maintained within target range  Description: INTERVENTIONS:  - Monitor Blood Glucose as ordered  - Assess for signs and symptoms of hyperglycemia and hypoglycemia  - Administer ordered medications to maintain glucose within target range  - Assess nutritional intake and initiate nutrition service referral as needed  Outcome: Progressing     Problem: SKIN/TISSUE INTEGRITY - ADULT  Goal: Incision(s), wounds(s) or drain site(s) healing without S/S of infection  Description: INTERVENTIONS  - Assess and document dressing, incision, wound bed, drain sites and surrounding tissue  - Provide patient and family education  - Perform skin care/dressing changes  Outcome: Progressing     Problem: MUSCULOSKELETAL - ADULT  Goal: Maintain or return mobility to safest level of function  Description: INTERVENTIONS:  - Assess patient's ability to carry out ADLs; assess patient's baseline for ADL function and identify physical deficits which impact ability to perform ADLs (bathing, care of mouth/teeth, toileting, grooming, dressing, etc.)  - Assess/evaluate cause of self-care deficits   - Assess range of motion  - Assess patient's mobility  - Assess patient's need for assistive devices and provide as appropriate  - Encourage maximum independence but intervene and supervise when necessary  - Involve family in performance of ADLs  - Assess for home care needs following  discharge   - Consider OT consult to assist with ADL evaluation and planning for discharge  - Provide patient education as appropriate  Outcome: Progressing  Goal: Maintain proper alignment of affected body part  Description: INTERVENTIONS:  - Support, maintain and protect limb and body alignment  - Provide patient/ family with appropriate education  Outcome: Progressing     Problem: Nutrition/Hydration-ADULT  Goal: Nutrient/Hydration intake appropriate for improving, restoring or maintaining nutritional needs  Description: Monitor and assess patient's nutrition/hydration status for malnutrition. Collaborate with interdisciplinary team and initiate plan and interventions as ordered.  Monitor patient's weight and dietary intake as ordered or per policy. Utilize nutrition screening tool and intervene as necessary. Determine patient's food preferences and provide high-protein, high-caloric foods as appropriate.     INTERVENTIONS:  - Monitor oral intake, urinary output, labs, and treatment plans  - Assess nutrition and hydration status and recommend course of action  - Evaluate amount of meals eaten  - Assist patient with eating if necessary   - Allow adequate time for meals  - Recommend/ encourage appropriate diets, oral nutritional supplements, and vitamin/mineral supplements  - Order, calculate, and assess calorie counts as needed  - Recommend, monitor, and adjust tube feedings and TPN/PPN based on assessed needs  - Assess need for intravenous fluids  - Provide specific nutrition/hydration education as appropriate  - Include patient/family/caregiver in decisions related to nutrition  Outcome: Progressing

## 2025-02-28 NOTE — PROGRESS NOTES
Nadeem Purdy Jr. is a 69 y.o. male who is currently ordered Vancomycin IV with management by the Pharmacy Consult service.  Relevant clinical data and objective / subjective history reviewed.  Vancomycin Assessment:  Indication and Goal AUC/Trough: Soft tissue (goal -600, trough >10)  Clinical Status: worsening  Micro:     Renal Function:  SCr: 3.63 mg/dL  CrCl: 15.8 mL/min  Renal replacement: Not on dialysis  Days of Therapy: 3  Current Dose: 750mg IV q24h  Vancomycin Plan:  New Dosinmg IV q24h  Estimated AUC: 508 mcg*hr/mL  Estimated Trough: 18 mcg/mL  Next Level: 3/2 AM labs  Renal Function Monitoring: Daily BMP and UOP  Pharmacy will continue to follow closely for s/sx of nephrotoxicity, infusion reactions and appropriateness of therapy.  BMP and CBC will be ordered per protocol. We will continue to follow the patient’s culture results and clinical progress daily.    Shaheen Godinez, Pharmacist

## 2025-02-28 NOTE — PHYSICAL THERAPY NOTE
PHYSICAL THERAPY EVALUATION  NAME:  Nadeem Purdy Jr.  DATE: 02/28/25    AGE:   69 y.o.  Mrn:   [id number]  ADMIT DX:  Sinus tachycardia [R00.0]  DKA (diabetic ketoacidosis) ([facility]) [E11.10]  LUIS ALFREDO (acute kidney injury) ([facility]) [N17.9]  Non compliance w medication regimen [Z91.148]  Visit for wound check [Z51.89]  Bilateral lower extremity edema [R60.0]  Problem List:   Patient Active Problem List   Diagnosis    Type 2 diabetes mellitus with microalbuminuria, with long-term current use of insulin (HCC)    Essential hypertension    Hypercholesteremia    Class 3 severe obesity due to excess calories with serious comorbidity and body mass index (BMI) of 40.0 to 44.9 in adult ([facility])    Steatohepatitis    Allergic rhinitis    Vitamin D deficiency    Persistent proteinuria    Tachycardia    Stage 3 chronic kidney disease, unspecified whether stage 3a or 3b CKD ([facility])    Lymphedema of both lower extremities    Rash    Numbness of left foot    Cortical age-related cataract of both eyes    Type 2 diabetes mellitus with hyperglycemia, with long-term current use of insulin ([facility])    Cellulitis of lower extremity    Right knee injury, subsequent encounter    Osteochondral lesion    Primary osteoarthritis of one knee, right    Lymphedema of right lower extremity    Other tear of medial meniscus, current injury, right knee, initial encounter    Hypertensive kidney disease with chronic kidney disease stage III (HCC)    Acute renal failure superimposed on stage 3a chronic kidney disease (HCC)    Diabetic ketoacidosis without coma associated with type 2 diabetes mellitus (HCC)    Severe sepsis (HCC)    Alcohol abuse    Bacteremia    Non-traumatic rhabdomyolysis    Hyponatremia    Metabolic acidosis    Diarrhea    Elevated troponin    Urinary retention       Past Medical History  Past Medical History:   Diagnosis Date    Hypertension        Past Surgical History  Past Surgical History:   Procedure Laterality Date    CATARACT EXTRACTION,  BILATERAL Bilateral        Length Of Stay: 2  Performed at least 2 patient identifiers during session: Name and ID claudia         02/28/25 0857   PT Last Visit   PT Visit Date 02/28/25   Note Type   Note type Evaluation   Pain Assessment   Pain Assessment Tool 0-10   Pain Score 3   Pain Location/Orientation Orientation: Bilateral;Location: [facility]   Hospital Pain Intervention(s) Repositioned;Elevated   Restrictions/Precautions   Weight Bearing Precautions Per Order No   Other Precautions Chair Alarm;Bed Alarm;Telemetry;Fall Risk;Pain;O2;Contact/isolation  (cates cath)   Home Living   Type of Home House   Home Layout Two level;Bed/bath upstairs;Access  (FOS to 2nd floor; 0 AGUILAR)   Bathroom Shower/Tub Walk-in shower   Bathroom Toilet Standard   Bathroom Equipment   (none reported)   Home Equipment Cane  (no AD used at baseline)   Prior Function   Level of [place] Independent with ADLs;Independent with functional mobility;Needs assistance with IADLS   Lives With Spouse   Receives Help From Family   IADLs Independent with driving;Family/Friend/Other provides meals;Family/Friend/Other provides medication management  (shared responsibilities with spouse for IADLs)   Falls in the last 6 months 0  (pt denies)   Vocational Retired   General   Family/Caregiver Present No   Cognition   Overall Cognitive Status WFL   Arousal/Participation Alert   Attention Within functional limits   Orientation Level Oriented X4   Memory Decreased recall of precautions;Decreased recall of recent events   Following Commands Follows one step commands without difficulty   RLE Assessment   RLE Assessment X   Strength RLE   RLE Overall Strength 4-/5   LLE Assessment   LLE Assessment X   Strength LLE   LLE Overall Strength 3-/5   Vision-Basic Assessment   Current Vision Wears glasses only for reading   Light Touch   RLE Light Touch Impaired   LLE Light Touch Impaired   Bed Mobility   Supine to Sit 2  Maximal assistance   Additional items Assist x  2;HOB elevated;Increased time required;Verbal cues;LE management   Additional Comments pt reported dizziness with transitional movement that resolved   Transfers   Sit to Stand 2  Maximal assistance   Additional items Assist x 2;Verbal cues;Increased time required   Stand to Sit 2  Maximal assistance   Additional items Assist x 2;Verbal cues;Increased time required   Stand pivot 2  Maximal assistance   Additional items Assist x 2;Verbal cues;Increased time required  (with RW)   Additional Comments pt required cues for proper hand  and foot placement   Ambulation/Elevation   Ambulation/Elevation Additional Comments fair safety awareness noted   Endurance Deficit   Endurance Deficit Yes   Endurance Deficit Description pt reported fatigue/weakness with activity   Activity Tolerance   Activity Tolerance Patient limited by fatigue;Patient limited by pain   Nurse Made Aware RN made aware of session results   Assessment   Prognosis Fair   Assessment Pt is 69 y.o. male seen for PT evaluation s/p admit to [facility]/26[facility] w/ Diabetic ketoacidosis without coma associated with type 2 diabetes mellitus (HCC). PT consulted to assess pt's functional mobility and d/c needs. Order placed for PT eval and tx, w/ activity as tolerated order. Pt agreeable to PT  session upon arrival, pt found supine in bed.  PTA, pt was independent w/ all functional mobility w/ no AD, lives w/ spouse in 2 level home, and retired.  Pt to benefit from continued PT tx to address deficits and maximize level of functional independent mobility and consistency. Upon conclusion pt  seated in recliner. Complexity: Comorbidities affecting pt's physical performance at time of assessment include: DM and sepsis, cellulitis, rhabolmyelosis, and lymphoma . Personal factors affecting pt at time of IE include: limited mobility, lives in multistory home, inability to ambulate household distances, and inability to navigate without external assistance.  Please find objective findings from PT assessment regarding body systems outlined above with impairments and limitations including impaired balance, decreased endurance, gait deviations, pain, decreased activity tolerance, decreased functional mobility tolerance, fall risk, and decreased skin integrity.  Pt's clinical presentation is currently unstable/unpredictable seen in pt's presentation of abnormal renal values, abnormal WBC count, abnormal sodium levels, abnormal Co2 levels, abnormal calcium levels, new onset of O2 use, pain, wounds, on critical care  unit, and telemetry use. The patient's AM-PAC Basic Mobility Inpatient Short Form Raw Score is 6.  Based on patient presentations and impairments, pt would most appropriately benefit from Level 2 resource intensity upon discharge. A Raw score of less than or equal to 16 suggests the patient may benefit from discharge to post-acute rehabilitation services. Please also refer to the recommendation of the Physical Therapist for safe discharge planning. RN verbalized pt appropriate for PT session. Pt seen as a co-eval with OT due to the patient's co-morbidities, clinically unstable presentation, and present impairments which are a regression from the patient's baseline.   Barriers to Discharge   (limited mobility)   Goals   Patient Goals to get stronger   LTG Expiration Date 03/10/25   Long Term Goal #1 Pt will: Perform bed mobility tasks to modified I to improve ease of bed mobility. Perform transfers to modified I to improve ease of transfers. Perform ambulation with MI and RW for 250 feet to increase Indep in home environment. Increase dynamic standing balance to F+ to decrease fall risk. Increase OOB activity tolerance to 10 minutes without s/s of exertion to decrease fall risk.   Plan   Treatment/Interventions Functional transfer training;Therapeutic exercise;Endurance training;Gait training;Bed mobility;Equipment eval/education   PT Frequency 3-5x/wk   Discharge  Recommendation   Rehab Resource Intensity Level, PT II (Moderate Resource Intensity)   Equipment Recommended Walker   Walker Package Recommended Wheeled walker   AM-PAC Basic Mobility Inpatient   Turning in Flat Bed Without Bedrails 1   Lying on Back to Sitting on Edge of Flat Bed Without Bedrails 1   Moving Bed to Chair 1   Standing Up From Chair Using Arms 1   Walk in Room 1   Climb 3-5 Stairs With Railing 1   Basic Mobility Inpatient Raw Score 6   Turning Head Towards Sound 4   Follow Simple Instructions 4   Low Function Basic Mobility Raw Score  14   Low Function Basic Mobility Standardized Score  22.01   [facility] Level Of Mobility   -[facility] Goal 2: Bed activities/Dependent transfer   -HL Achieved 4: Move to chair/commode   Additional Treatment Session   Start Time 1008   End Time 1021   Treatment Assessment Therex to increase strength to improve functional mobility   Exercises   Quad Sets Supine;10 reps;AROM;Bilateral   Heelslides Sitting;10 reps;AROM;Bilateral   Glute Sets Supine;10 reps;AROM;Bilateral   Hip Flexion Sitting;10 reps;AROM;Bilateral   Hip Adduction Sitting;10 reps;AROM;Bilateral   Knee AROM Long Arc Quad Sitting;10 reps;AROM;Bilateral   Ankle Pumps Supine;10 reps;AROM;Bilateral   Balance training  pt educated on self BLE HEP in the recliner   End of Consult   Patient Position at End of Consult Bedside chair;Bed/Chair alarm activated;All needs within reach     Time In: 0835  Time Out: 0857  Total Evaluation Minutes: 22    Tatyana Rodriguez, PT

## 2025-02-28 NOTE — ASSESSMENT & PLAN NOTE
Troponin 64 >> 58  Suspect this is demand in setting of severe sepsis/bacteremia vs DKA/dehydration  ECG w/out ischemic changes  Denies CP  Continue cardiac monitoring - no need to further trend troponin

## 2025-02-28 NOTE — ASSESSMENT & PLAN NOTE
Approvedtoday  CaseId:[id number];Status:Approved; Review Type:Prior Auth; Coverage Start Date:05/03/2020; Coverage End Date:08/31/2020; Pharmacy notified. Lab Results   Component Value Date    HGBA1C 8.9 (H) 02/26/2025       Recent Labs     02/27/25  1807 02/27/25 2006 02/27/25 2207 02/28/25  0027   POCGLU 104 162* 160* 141*       Blood Sugar Average: Last 72 hrs:  (P) 206.25    POA AEB Glucose 446, pH 7.218, AG 17 w/bicarb 15, unfortunately no BHB done upon arrival, but urine w/1+ketones  Suspect this is 2/2 noncompliance as well as active infection as noted below  Prescribed 70/30 insulin 20 units BID as OP, however reports that he has not been taking for approximately 2 weeks  Is s/p aggressive volume resuscitation per DKA protocol  DKA insulin gtt transitioned to non-DKA gtt - continue this until able to take in PO   Endocrinology consulted - recommended transition to SQ regimen while patient w/decreased PO intake  Serial labs   Continue Q2H fingersticks while on insulin gtt  Goal  - 180

## 2025-02-28 NOTE — PLAN OF CARE
Problem: Prexisting or High Potential for Compromised Skin Integrity  Goal: Skin integrity is maintained or improved  Description: INTERVENTIONS:  - Identify patients at risk for skin breakdown  - Assess and monitor skin integrity  - Assess and monitor nutrition and hydration status  - Monitor labs   - Assess for incontinence   - Turn and reposition patient  - Assist with mobility/ambulation  - Relieve pressure over bony prominences  - Avoid friction and shearing  - Provide appropriate hygiene as needed including keeping skin clean and dry  - Evaluate need for skin moisturizer/barrier cream  - Collaborate with interdisciplinary team   - Patient/family teaching  - Consider wound care consult   Outcome: Progressing     Problem: PAIN - ADULT  Goal: Verbalizes/displays adequate comfort level or baseline comfort level  Description: Interventions:  - Encourage patient to monitor pain and request assistance  - Assess pain using appropriate pain scale  - Administer analgesics based on type and severity of pain and evaluate response  - Implement non-pharmacological measures as appropriate and evaluate response  - Consider cultural and social influences on pain and pain management  - Notify physician/advanced practitioner if interventions unsuccessful or patient reports new pain  Outcome: Progressing     Problem: INFECTION - ADULT  Goal: Absence or prevention of progression during hospitalization  Description: INTERVENTIONS:  - Assess and monitor for signs and symptoms of infection  - Monitor lab/diagnostic results  - Monitor all insertion sites, i.e. indwelling lines, tubes, and drains  - Monitor endotracheal if appropriate and nasal secretions for changes in amount and color  - Paxton appropriate cooling/warming therapies per order  - Administer medications as ordered  - Instruct and encourage patient and family to use good hand hygiene technique  - Identify and instruct in appropriate isolation precautions for  identified infection/condition  Outcome: Progressing  Goal: Absence of fever/infection during neutropenic period  Description: INTERVENTIONS:  - Monitor WBC    Outcome: Progressing     Problem: SAFETY ADULT  Goal: Patient will remain free of falls  Description: INTERVENTIONS:  - Educate patient/family on patient safety including physical limitations  - Instruct patient to call for assistance with activity   - Consult OT/PT to assist with strengthening/mobility   - Keep Call bell within reach  - Keep bed low and locked with side rails adjusted as appropriate  - Keep care items and personal belongings within reach  - Initiate and maintain comfort rounds  - Make Fall Risk Sign visible to staff  - - Apply yellow socks and bracelet for high fall risk patients  - Consider moving patient to room near nurses station  Outcome: Progressing  Goal: Maintain or return to baseline ADL function  Description: INTERVENTIONS:  -  Assess patient's ability to carry out ADLs; assess patient's baseline for ADL function and identify physical deficits which impact ability to perform ADLs (bathing, care of mouth/teeth, toileting, grooming, dressing, etc.)  - Assess/evaluate cause of self-care deficits   - Assess range of motion  - Assess patient's mobility; develop plan if impaired  - Assess patient's need for assistive devices and provide as appropriate  - Encourage maximum independence but intervene and supervise when necessary  - Involve family in performance of ADLs  - Assess for home care needs following discharge   - Consider OT consult to assist with ADL evaluation and planning for discharge  - Provide patient education as appropriate  Outcome: Progressing  Goal: Maintains/Returns to pre admission functional level  Description: INTERVENTIONS:  - Perform AM-PAC 6 Click Basic Mobility/ Daily Activity assessment daily.  - Set and communicate daily mobility goal to care team and patient/family/caregiver.   - Collaborate with  rehabilitation services on mobility goals if consulted  - Out of bed for toileting  - Record patient progress and toleration of activity level   Outcome: Progressing     Problem: DISCHARGE PLANNING  Goal: Discharge to home or other facility with appropriate resources  Description: INTERVENTIONS:  - Identify barriers to discharge w/patient and caregiver  - Arrange for needed discharge resources and transportation as appropriate  - Identify discharge learning needs (meds, wound care, etc.)  - Arrange for interpretive services to assist at discharge as needed  - Refer to Case Management Department for coordinating discharge planning if the patient needs post-hospital services based on physician/advanced practitioner order or complex needs related to functional status, cognitive ability, or social support system  Outcome: Progressing     Problem: Knowledge Deficit  Goal: Patient/family/caregiver demonstrates understanding of disease process, treatment plan, medications, and discharge instructions  Description: Complete learning assessment and assess knowledge base.  Interventions:  - Provide teaching at level of understanding  - Provide teaching via preferred learning methods  Outcome: Progressing     Problem: CARDIOVASCULAR - ADULT  Goal: Maintains optimal cardiac output and hemodynamic stability  Description: INTERVENTIONS:  - Monitor I/O, vital signs and rhythm  - Monitor for S/S and trends of decreased cardiac output  - Administer and titrate ordered vasoactive medications to optimize hemodynamic stability  - Assess quality of pulses, skin color and temperature  - Assess for signs of decreased coronary artery perfusion  - Instruct patient to report change in severity of symptoms  Outcome: Progressing  Goal: Absence of cardiac dysrhythmias or at baseline rhythm  Description: INTERVENTIONS:  - Continuous cardiac monitoring, vital signs, obtain 12 lead EKG if ordered  - Administer antiarrhythmic and heart rate control  medications as ordered  - Monitor electrolytes and administer replacement therapy as ordered  Outcome: Progressing     Problem: GASTROINTESTINAL - ADULT  Goal: Minimal or absence of nausea and/or vomiting  Description: INTERVENTIONS:  - Administer IV fluids if ordered to ensure adequate hydration  - Maintain NPO status until nausea and vomiting are resolved  - Nasogastric tube if ordered  - Administer ordered antiemetic medications as needed  - Provide nonpharmacologic comfort measures as appropriate  - Advance diet as tolerated, if ordered  - Consider nutrition services referral to assist patient with adequate nutrition and appropriate food choices  Outcome: Progressing  Goal: Maintains adequate nutritional intake  Description: INTERVENTIONS:  - Monitor percentage of each meal consumed  - Identify factors contributing to decreased intake, treat as appropriate  - Assist with meals as needed  - Monitor I&O, weight, and lab values if indicated  - Obtain nutrition services referral as needed  Outcome: Progressing     Problem: GENITOURINARY - ADULT  Goal: Maintains or returns to baseline urinary function  Description: INTERVENTIONS:  - Assess urinary function  - Encourage oral fluids to ensure adequate hydration if ordered  - Administer IV fluids as ordered to ensure adequate hydration  - Administer ordered medications as needed  - Offer frequent toileting  - Follow urinary retention protocol if ordered  Outcome: Progressing     Problem: METABOLIC, FLUID AND ELECTROLYTES - ADULT  Goal: Electrolytes maintained within normal limits  Description: INTERVENTIONS:  - Monitor labs and assess patient for signs and symptoms of electrolyte imbalances  - Administer electrolyte replacement as ordered  - Monitor response to electrolyte replacements, including repeat lab results as appropriate  - Instruct patient on fluid and nutrition as appropriate  Outcome: Progressing  Goal: Fluid balance maintained  Description:  INTERVENTIONS:  - Monitor labs   - Monitor I/O and WT  - Instruct patient on fluid and nutrition as appropriate  - Assess for signs & symptoms of volume excess or deficit  Outcome: Progressing  Goal: Glucose maintained within target range  Description: INTERVENTIONS:  - Monitor Blood Glucose as ordered  - Assess for signs and symptoms of hyperglycemia and hypoglycemia  - Administer ordered medications to maintain glucose within target range  - Assess nutritional intake and initiate nutrition service referral as needed  Outcome: Progressing     Problem: SKIN/TISSUE INTEGRITY - ADULT  Goal: Incision(s), wounds(s) or drain site(s) healing without S/S of infection  Description: INTERVENTIONS  - Assess and document dressing, incision, wound bed, drain sites and surrounding tissue  - Provide patient and family education     Problem: MUSCULOSKELETAL - ADULT  Goal: Maintain or return mobility to safest level of function  Description: INTERVENTIONS:  - Assess patient's ability to carry out ADLs; assess patient's baseline for ADL function and identify physical deficits which impact ability to perform ADLs (bathing, care of mouth/teeth, toileting, grooming, dressing, etc.)  - Assess/evaluate cause of self-care deficits   - Assess range of motion  - Assess patient's mobility  - Assess patient's need for assistive devices and provide as appropriate  - Encourage maximum independence but intervene and supervise when necessary  - Involve family in performance of ADLs  - Assess for home care needs following discharge   - Consider OT consult to assist with ADL evaluation and planning for discharge  - Provide patient education as appropriate  Outcome: Progressing  Goal: Maintain proper alignment of affected body part  Description: INTERVENTIONS:  - Support, maintain and protect limb and body alignment  - Provide patient/ family with appropriate education  Outcome: Progressing     Problem: Nutrition/Hydration-ADULT  Goal:  Nutrient/Hydration intake appropriate for improving, restoring or maintaining nutritional needs  Description: Monitor and assess patient's nutrition/hydration status for malnutrition. Collaborate with interdisciplinary team and initiate plan and interventions as ordered.  Monitor patient's weight and dietary intake as ordered or per policy. Utilize nutrition screening tool and intervene as necessary. Determine patient's food preferences and provide high-protein, high-caloric foods as appropriate.     INTERVENTIONS:  - Monitor oral intake, urinary output, labs, and treatment plans  - Assess nutrition and hydration status and recommend course of action  - Evaluate amount of meals eaten  - Assist patient with eating if necessary   - Allow adequate time for meals  - Recommend/ encourage appropriate diets, oral nutritional supplements, and vitamin/mineral supplements  - Order, calculate, and assess calorie counts as needed  - Recommend, monitor, and adjust tube feedings and TPN/PPN based on assessed needs  - Assess need for intravenous fluids  - Provide specific nutrition/hydration education as appropriate  - Include patient/family/caregiver in decisions related to nutrition  Outcome: Progressing

## 2025-02-28 NOTE — PROGRESS NOTES
Pastoral Care Progress Note  CH attempted support visit to pt, pt occupied with providers.         02/28/25 1200   Clinical Encounter Type   Visited With Patient not available

## 2025-02-28 NOTE — ASSESSMENT & PLAN NOTE
Has bilateral lymphedema at baseline w/chronic open wounds   Holding home Lasix given intravascular depletion/hypovolemia  Wound care team following  Continue treatment for bilateral LE cellulitis as noted above

## 2025-02-28 NOTE — ASSESSMENT & PLAN NOTE
POA AEB fever, tachycardia, tachypnea, leukocytosis, elevated lactic acidosis  Suspect this is 2/2 bacteremia in setting of bilateral LE cellulitis vs diarrheal illness/? CDIF  Imaging revealed  COVID/FLU/RSV negative   1 out of 2 Blood cultures w/GPC in chains w/BCID w/Strep Pyogenes detected - speciation/sensitivities pending  UA w/out evidence of infection  MRSA swab pending  Bilateral LE CT revealed extensive circumferential skin thickening and subcutaneous edema involving bilateral lower legs compatible with cellulitis - no organized collection and soft tissue gas  CDIF PCR positive, but EIA negative  Enteric stool/O&P pending   Lactic acid 2.4  Procalcitonin 9.27 - 18.89 - repeat pending  Initially received volume per DKA protocol - received additional colloid and started on Isolyte infusion given collapsible IVC/intravascular depletion per POC echo  Continue IV Cefepime/Vanco D#3 - PO Vanco added overnight for CDIF given worsening diarrhea and positive CDIF PCR and need for broad-spectrum antibiotics  Trend fever and WBC curve

## 2025-03-01 PROBLEM — R06.89 ACUTE RESPIRATORY INSUFFICIENCY: Status: ACTIVE | Noted: 2025-03-01

## 2025-03-01 PROBLEM — A04.72 CLOSTRIDIUM DIFFICILE DIARRHEA: Status: ACTIVE | Noted: 2025-02-27

## 2025-03-01 PROBLEM — I48.91 ATRIAL FIBRILLATION WITH RVR (HCC): Status: ACTIVE | Noted: 2025-03-01

## 2025-03-01 PROBLEM — M62.82 NON-TRAUMATIC RHABDOMYOLYSIS: Status: RESOLVED | Noted: 2025-02-27 | Resolved: 2025-03-01

## 2025-03-01 LAB
ALBUMIN SERPL BCG-MCNC: 2.9 G/DL (ref 3.5–5)
ALP SERPL-CCNC: 45 U/L (ref 34–104)
ALT SERPL W P-5'-P-CCNC: 32 U/L (ref 7–52)
ANION GAP SERPL CALCULATED.3IONS-SCNC: 10 MMOL/L (ref 4–13)
ANION GAP SERPL CALCULATED.3IONS-SCNC: 12 MMOL/L (ref 4–13)
APTT PPP: 34 SECONDS (ref 23–34)
ARTERIAL PATENCY WRIST A: YES
AST SERPL W P-5'-P-CCNC: 54 U/L (ref 13–39)
BACTERIA BLD CULT: ABNORMAL
BASE EX.OXY STD BLDV CALC-SCNC: 72.1 % (ref 60–80)
BASE EXCESS BLDV CALC-SCNC: -6.8 MMOL/L
BILIRUB SERPL-MCNC: 0.54 MG/DL (ref 0.2–1)
BNP SERPL-MCNC: 385 PG/ML (ref 0–100)
BODY TEMPERATURE: 98.8 DEGREES FEHRENHEIT
BUN SERPL-MCNC: 64 MG/DL (ref 5–25)
BUN SERPL-MCNC: 64 MG/DL (ref 5–25)
CA-I BLD-SCNC: 1.14 MMOL/L (ref 1.12–1.32)
CALCIUM ALBUM COR SERPL-MCNC: 9.2 MG/DL (ref 8.3–10.1)
CALCIUM SERPL-MCNC: 8.3 MG/DL (ref 8.4–10.2)
CALCIUM SERPL-MCNC: 8.4 MG/DL (ref 8.4–10.2)
CHLORIDE SERPL-SCNC: 100 MMOL/L (ref 96–108)
CHLORIDE SERPL-SCNC: 103 MMOL/L (ref 96–108)
CK SERPL-CCNC: 173 U/L (ref 39–308)
CO2 SERPL-SCNC: 15 MMOL/L (ref 21–32)
CO2 SERPL-SCNC: 19 MMOL/L (ref 21–32)
CREAT SERPL-MCNC: 3.7 MG/DL (ref 0.6–1.3)
CREAT SERPL-MCNC: 3.93 MG/DL (ref 0.6–1.3)
CREAT UR-MCNC: 67.9 MG/DL
DOHLE BOD BLD QL SMEAR: PRESENT
EOSINOPHIL NFR BLD MANUAL: 2 % (ref 0–6)
ERYTHROCYTE [DISTWIDTH] IN BLOOD BY AUTOMATED COUNT: 13.8 % (ref 11.6–15.1)
GFR SERPL CREATININE-BSD FRML MDRD: 14 ML/MIN/1.73SQ M
GFR SERPL CREATININE-BSD FRML MDRD: 15 ML/MIN/1.73SQ M
GLUCOSE SERPL-MCNC: 196 MG/DL (ref 65–140)
GLUCOSE SERPL-MCNC: 225 MG/DL (ref 65–140)
GLUCOSE SERPL-MCNC: 249 MG/DL (ref 65–140)
GLUCOSE SERPL-MCNC: 257 MG/DL (ref 65–140)
GLUCOSE SERPL-MCNC: 258 MG/DL (ref 65–140)
GLUCOSE SERPL-MCNC: 273 MG/DL (ref 65–140)
GRAM STN SPEC: ABNORMAL
HCO3 BLDV-SCNC: 17.1 MMOL/L (ref 24–30)
HCT VFR BLD AUTO: 31.8 % (ref 36.5–49.3)
HGB BLD-MCNC: 10.7 G/DL (ref 12–17)
INR PPP: 1.3 (ref 0.85–1.19)
LACTATE SERPL-SCNC: 1.3 MMOL/L (ref 0.5–2)
LYMPHOCYTES # BLD AUTO: 8 %
MAGNESIUM SERPL-MCNC: 2.2 MG/DL (ref 1.9–2.7)
MCH RBC QN AUTO: 31.5 PG (ref 26.8–34.3)
MCHC RBC AUTO-ENTMCNC: 33.6 G/DL (ref 31.4–37.4)
MCV RBC AUTO: 94 FL (ref 82–98)
MONOCYTES NFR BLD AUTO: 6 % (ref 4–12)
MRSA NOSE QL CULT: NORMAL
NEUTS BAND NFR BLD MANUAL: 5 % (ref 0–8)
NEUTS SEG NFR BLD AUTO: 78 %
NON VENT ROOM AIR: ABNORMAL %
O2 CT BLDV-SCNC: 11.9 ML/DL
PCO2 BLDV: 29.7 MM HG (ref 42–50)
PH BLDV: 7.38 [PH] (ref 7.3–7.4)
PHOSPHATE SERPL-MCNC: 2.9 MG/DL (ref 2.3–4.1)
PLASMA CELLS NFR BLD: 2 % (ref 0–0)
PLATELET # BLD AUTO: 223 THOUSANDS/UL (ref 149–390)
PLATELET BLD QL SMEAR: ABNORMAL
PLATELET CLUMP BLD QL SMEAR: ABNORMAL
PMV BLD AUTO: 9.2 FL (ref 8.9–12.7)
PO2 BLDV: 38.8 MM HG (ref 35–45)
POTASSIUM SERPL-SCNC: 3.7 MMOL/L (ref 3.5–5.3)
POTASSIUM SERPL-SCNC: 4 MMOL/L (ref 3.5–5.3)
PROCALCITONIN SERPL-MCNC: 8.7 NG/ML
PROT SERPL-MCNC: 6 G/DL (ref 6.4–8.4)
PROTHROMBIN TIME: 16.7 SECONDS (ref 12.3–15)
RBC # BLD AUTO: 3.4 MILLION/UL (ref 3.88–5.62)
SODIUM SERPL-SCNC: 129 MMOL/L (ref 135–147)
SODIUM SERPL-SCNC: 130 MMOL/L (ref 135–147)
TOTAL CELLS COUNTED SPEC: 120
TOXIC GRANULES BLD QL SMEAR: PRESENT
WBC # BLD AUTO: 19.12 THOUSAND/UL (ref 4.31–10.16)

## 2025-03-01 PROCEDURE — 99223 1ST HOSP IP/OBS HIGH 75: CPT | Performed by: NURSE PRACTITIONER

## 2025-03-01 PROCEDURE — 85610 PROTHROMBIN TIME: CPT | Performed by: NURSE PRACTITIONER

## 2025-03-01 PROCEDURE — 85730 THROMBOPLASTIN TIME PARTIAL: CPT | Performed by: NURSE PRACTITIONER

## 2025-03-01 PROCEDURE — 94760 N-INVAS EAR/PLS OXIMETRY 1: CPT

## 2025-03-01 PROCEDURE — 83605 ASSAY OF LACTIC ACID: CPT | Performed by: NURSE PRACTITIONER

## 2025-03-01 PROCEDURE — 93005 ELECTROCARDIOGRAM TRACING: CPT

## 2025-03-01 PROCEDURE — 84540 ASSAY OF URINE/UREA-N: CPT | Performed by: NURSE PRACTITIONER

## 2025-03-01 PROCEDURE — 94664 DEMO&/EVAL PT USE INHALER: CPT

## 2025-03-01 PROCEDURE — 97530 THERAPEUTIC ACTIVITIES: CPT

## 2025-03-01 PROCEDURE — 82550 ASSAY OF CK (CPK): CPT | Performed by: NURSE PRACTITIONER

## 2025-03-01 PROCEDURE — 83735 ASSAY OF MAGNESIUM: CPT

## 2025-03-01 PROCEDURE — 82570 ASSAY OF URINE CREATININE: CPT | Performed by: NURSE PRACTITIONER

## 2025-03-01 PROCEDURE — 99232 SBSQ HOSP IP/OBS MODERATE 35: CPT | Performed by: ANESTHESIOLOGY

## 2025-03-01 PROCEDURE — 83880 ASSAY OF NATRIURETIC PEPTIDE: CPT

## 2025-03-01 PROCEDURE — 80048 BASIC METABOLIC PNL TOTAL CA: CPT

## 2025-03-01 PROCEDURE — 82948 REAGENT STRIP/BLOOD GLUCOSE: CPT

## 2025-03-01 PROCEDURE — 82330 ASSAY OF CALCIUM: CPT

## 2025-03-01 PROCEDURE — 82805 BLOOD GASES W/O2 SATURATION: CPT | Performed by: NURSE PRACTITIONER

## 2025-03-01 PROCEDURE — 84145 PROCALCITONIN (PCT): CPT

## 2025-03-01 PROCEDURE — 80053 COMPREHEN METABOLIC PANEL: CPT

## 2025-03-01 PROCEDURE — 94640 AIRWAY INHALATION TREATMENT: CPT

## 2025-03-01 PROCEDURE — 84100 ASSAY OF PHOSPHORUS: CPT

## 2025-03-01 PROCEDURE — 85007 BL SMEAR W/DIFF WBC COUNT: CPT | Performed by: NURSE PRACTITIONER

## 2025-03-01 PROCEDURE — 99291 CRITICAL CARE FIRST HOUR: CPT | Performed by: ANESTHESIOLOGY

## 2025-03-01 PROCEDURE — 85027 COMPLETE CBC AUTOMATED: CPT

## 2025-03-01 RX ORDER — INSULIN GLARGINE 100 [IU]/ML
55 INJECTION, SOLUTION SUBCUTANEOUS
Status: DISCONTINUED | OUTPATIENT
Start: 2025-03-01 | End: 2025-03-02

## 2025-03-01 RX ORDER — LEVALBUTEROL INHALATION SOLUTION 1.25 MG/3ML
1.25 SOLUTION RESPIRATORY (INHALATION)
Status: DISCONTINUED | OUTPATIENT
Start: 2025-03-01 | End: 2025-03-04

## 2025-03-01 RX ORDER — CALCIUM GLUCONATE 20 MG/ML
2 INJECTION, SOLUTION INTRAVENOUS ONCE
Status: COMPLETED | OUTPATIENT
Start: 2025-03-01 | End: 2025-03-01

## 2025-03-01 RX ORDER — METOPROLOL TARTRATE 1 MG/ML
5 INJECTION, SOLUTION INTRAVENOUS ONCE
Status: COMPLETED | OUTPATIENT
Start: 2025-03-01 | End: 2025-03-01

## 2025-03-01 RX ORDER — METOPROLOL SUCCINATE 50 MG/1
50 TABLET, EXTENDED RELEASE ORAL ONCE
Status: COMPLETED | OUTPATIENT
Start: 2025-03-01 | End: 2025-03-01

## 2025-03-01 RX ORDER — INSULIN LISPRO 100 [IU]/ML
18 INJECTION, SOLUTION INTRAVENOUS; SUBCUTANEOUS
Status: DISCONTINUED | OUTPATIENT
Start: 2025-03-02 | End: 2025-03-02

## 2025-03-01 RX ORDER — HEPARIN SODIUM 10000 [USP'U]/100ML
3-20 INJECTION, SOLUTION INTRAVENOUS
Status: DISPENSED | OUTPATIENT
Start: 2025-03-01 | End: 2025-03-04

## 2025-03-01 RX ORDER — FUROSEMIDE 10 MG/ML
40 INJECTION INTRAMUSCULAR; INTRAVENOUS ONCE
Status: COMPLETED | OUTPATIENT
Start: 2025-03-01 | End: 2025-03-01

## 2025-03-01 RX ORDER — ALBUTEROL SULFATE 0.83 MG/ML
2.5 SOLUTION RESPIRATORY (INHALATION) EVERY 6 HOURS PRN
Status: DISCONTINUED | OUTPATIENT
Start: 2025-03-01 | End: 2025-03-09

## 2025-03-01 RX ORDER — CEFTRIAXONE 1 G/50ML
1000 INJECTION, SOLUTION INTRAVENOUS EVERY 24 HOURS
Status: DISCONTINUED | OUTPATIENT
Start: 2025-03-01 | End: 2025-03-02

## 2025-03-01 RX ORDER — ALBUMIN HUMAN 50 G/1000ML
12.5 SOLUTION INTRAVENOUS ONCE
Status: COMPLETED | OUTPATIENT
Start: 2025-03-01 | End: 2025-03-01

## 2025-03-01 RX ORDER — INSULIN LISPRO 100 [IU]/ML
2-12 INJECTION, SOLUTION INTRAVENOUS; SUBCUTANEOUS
Status: DISCONTINUED | OUTPATIENT
Start: 2025-03-01 | End: 2025-03-02

## 2025-03-01 RX ORDER — HEPARIN SODIUM 1000 [USP'U]/ML
4000 INJECTION, SOLUTION INTRAVENOUS; SUBCUTANEOUS EVERY 6 HOURS PRN
Status: DISCONTINUED | OUTPATIENT
Start: 2025-03-01 | End: 2025-03-05

## 2025-03-01 RX ORDER — FUROSEMIDE 10 MG/ML
40 INJECTION INTRAMUSCULAR; INTRAVENOUS ONCE
Status: DISCONTINUED | OUTPATIENT
Start: 2025-03-01 | End: 2025-03-01

## 2025-03-01 RX ORDER — DILTIAZEM HYDROCHLORIDE 5 MG/ML
20 INJECTION INTRAVENOUS ONCE
Status: COMPLETED | OUTPATIENT
Start: 2025-03-01 | End: 2025-03-01

## 2025-03-01 RX ORDER — HEPARIN SODIUM 1000 [USP'U]/ML
2000 INJECTION, SOLUTION INTRAVENOUS; SUBCUTANEOUS EVERY 6 HOURS PRN
Status: DISCONTINUED | OUTPATIENT
Start: 2025-03-01 | End: 2025-03-05

## 2025-03-01 RX ORDER — METOPROLOL SUCCINATE 50 MG/1
150 TABLET, EXTENDED RELEASE ORAL DAILY
Status: DISCONTINUED | OUTPATIENT
Start: 2025-03-02 | End: 2025-03-03

## 2025-03-01 RX ADMIN — FUROSEMIDE 40 MG: 10 INJECTION, SOLUTION INTRAMUSCULAR; INTRAVENOUS at 12:12

## 2025-03-01 RX ADMIN — HEPARIN SODIUM 11.1 UNITS/KG/HR: 10000 INJECTION, SOLUTION INTRAVENOUS at 19:41

## 2025-03-01 RX ADMIN — INSULIN GLARGINE 55 UNITS: 100 INJECTION, SOLUTION SUBCUTANEOUS at 21:40

## 2025-03-01 RX ADMIN — VANCOMYCIN HYDROCHLORIDE 125 MG: 125 CAPSULE ORAL at 12:12

## 2025-03-01 RX ADMIN — HEPARIN SODIUM 7500 UNITS: 5000 INJECTION INTRAVENOUS; SUBCUTANEOUS at 15:45

## 2025-03-01 RX ADMIN — CHOLECALCIFEROL TAB 25 MCG (1000 UNIT) 2000 UNITS: 25 TAB at 08:14

## 2025-03-01 RX ADMIN — DILTIAZEM HYDROCHLORIDE 20 MG: 5 INJECTION, SOLUTION INTRAVENOUS at 19:23

## 2025-03-01 RX ADMIN — ALBUMIN (HUMAN) 12.5 G: 0.25 INJECTION, SOLUTION INTRAVENOUS at 08:18

## 2025-03-01 RX ADMIN — INSULIN LISPRO 6 UNITS: 100 INJECTION, SOLUTION INTRAVENOUS; SUBCUTANEOUS at 12:18

## 2025-03-01 RX ADMIN — GUAIFENESIN 600 MG: 600 TABLET ORAL at 08:14

## 2025-03-01 RX ADMIN — CEFTRIAXONE 1000 MG: 1 INJECTION, SOLUTION INTRAVENOUS at 08:40

## 2025-03-01 RX ADMIN — INSULIN LISPRO 6 UNITS: 100 INJECTION, SOLUTION INTRAVENOUS; SUBCUTANEOUS at 17:00

## 2025-03-01 RX ADMIN — FOLIC ACID 1 MG: 1 TABLET ORAL at 08:14

## 2025-03-01 RX ADMIN — Medication 6 MG: at 21:39

## 2025-03-01 RX ADMIN — ALBUMIN (HUMAN) 12.5 G: 12.5 INJECTION, SOLUTION INTRAVENOUS at 08:51

## 2025-03-01 RX ADMIN — AMLODIPINE BESYLATE 10 MG: 10 TABLET ORAL at 08:14

## 2025-03-01 RX ADMIN — ALBUTEROL SULFATE 2.5 MG: 2.5 SOLUTION RESPIRATORY (INHALATION) at 05:27

## 2025-03-01 RX ADMIN — METOPROLOL SUCCINATE 100 MG: 50 TABLET, EXTENDED RELEASE ORAL at 08:14

## 2025-03-01 RX ADMIN — ATORVASTATIN CALCIUM 40 MG: 40 TABLET, FILM COATED ORAL at 15:45

## 2025-03-01 RX ADMIN — VANCOMYCIN HYDROCHLORIDE 125 MG: 125 CAPSULE ORAL at 05:20

## 2025-03-01 RX ADMIN — THIAMINE HCL TAB 100 MG 100 MG: 100 TAB at 08:14

## 2025-03-01 RX ADMIN — Medication 400 UNITS: at 08:14

## 2025-03-01 RX ADMIN — GUAIFENESIN 600 MG: 600 TABLET ORAL at 21:04

## 2025-03-01 RX ADMIN — INSULIN LISPRO 4 UNITS: 100 INJECTION, SOLUTION INTRAVENOUS; SUBCUTANEOUS at 21:28

## 2025-03-01 RX ADMIN — ALBUMIN (HUMAN) 12.5 G: 0.25 INJECTION, SOLUTION INTRAVENOUS at 15:45

## 2025-03-01 RX ADMIN — CALCIUM GLUCONATE 2 G: 20 INJECTION, SOLUTION INTRAVENOUS at 08:18

## 2025-03-01 RX ADMIN — INSULIN LISPRO 6 UNITS: 100 INJECTION, SOLUTION INTRAVENOUS; SUBCUTANEOUS at 08:00

## 2025-03-01 RX ADMIN — METOPROLOL TARTRATE 5 MG: 5 INJECTION INTRAVENOUS at 09:29

## 2025-03-01 RX ADMIN — GABAPENTIN 200 MG: 100 CAPSULE ORAL at 21:39

## 2025-03-01 RX ADMIN — DILTIAZEM HYDROCHLORIDE 5 MG/HR: 5 INJECTION INTRAVENOUS at 19:38

## 2025-03-01 RX ADMIN — METOPROLOL SUCCINATE 50 MG: 50 TABLET, EXTENDED RELEASE ORAL at 12:46

## 2025-03-01 RX ADMIN — ALBUMIN (HUMAN) 12.5 G: 0.25 INJECTION, SOLUTION INTRAVENOUS at 02:16

## 2025-03-01 RX ADMIN — LEVALBUTEROL HYDROCHLORIDE 1.25 MG: 1.25 SOLUTION RESPIRATORY (INHALATION) at 20:56

## 2025-03-01 RX ADMIN — CHLORHEXIDINE GLUCONATE 15 ML: 1.2 SOLUTION ORAL at 21:04

## 2025-03-01 RX ADMIN — INSULIN LISPRO 16 UNITS: 100 INJECTION, SOLUTION INTRAVENOUS; SUBCUTANEOUS at 17:26

## 2025-03-01 RX ADMIN — VANCOMYCIN HYDROCHLORIDE 125 MG: 125 CAPSULE ORAL at 18:56

## 2025-03-01 RX ADMIN — ASPIRIN 81 MG: 81 TABLET, COATED ORAL at 08:14

## 2025-03-01 RX ADMIN — CHLORHEXIDINE GLUCONATE 15 ML: 1.2 SOLUTION ORAL at 08:19

## 2025-03-01 RX ADMIN — INSULIN LISPRO 16 UNITS: 100 INJECTION, SOLUTION INTRAVENOUS; SUBCUTANEOUS at 12:19

## 2025-03-01 RX ADMIN — INSULIN LISPRO 16 UNITS: 100 INJECTION, SOLUTION INTRAVENOUS; SUBCUTANEOUS at 08:16

## 2025-03-01 RX ADMIN — HEPARIN SODIUM 7500 UNITS: 5000 INJECTION INTRAVENOUS; SUBCUTANEOUS at 05:20

## 2025-03-01 RX ADMIN — METOPROLOL TARTRATE 5 MG: 5 INJECTION INTRAVENOUS at 12:46

## 2025-03-01 NOTE — ASSESSMENT & PLAN NOTE
Lab Results   Component Value Date    HGBA1C 8.9 (H) 02/26/2025       Recent Labs     02/28/25  2206 02/28/25  2330 03/01/25  0806 03/01/25  1110   POCGLU 143* 125 258* 257*       Blood Sugar Average: Last 72 hrs:  (P) [id number]  Status post aggressive volume resuscitation and DKA protocol per ICU team.  Endocrinology was consulted and he will require follow-up in the outpatient setting.  Patient was previously on an SGLT2 inhibitor-would not resume on discharge.  Unclear if he was still supposed to be taking this in the outpatient setting

## 2025-03-01 NOTE — PHYSICAL THERAPY NOTE
PT Treatment Note    NAME:  Nadeem Purdy Jr.  DATE: 03/01/25    AGE:   69 y.o.  Mrn:   [id number]  ADMIT DX:  Sinus tachycardia [R00.0]  DKA (diabetic ketoacidosis) (HCC) [E11.10]  LUIS ALFREDO (acute kidney injury) (HCC) [N17.9]  Non compliance w medication regimen [Z91.148]  Visit for wound check [Z51.89]  Bilateral lower extremity edema [R60.0]  Performed at least 2 patient identifiers during session: Name and ID bracelet       03/01/25 1338   PT Last Visit   PT Visit Date 03/01/25   Note Type   Note Type Treatment   Pain Assessment   Pain Assessment Tool 0-10   Pain Score No Pain   Restrictions/Precautions   Weight Bearing Precautions Per Order No   Other Precautions Bed Alarm;Chair Alarm;Contact/isolation;Telemetry;Fall Risk;Multiple lines   General   Chart Reviewed Yes   Family/Caregiver Present No   Cognition   Overall Cognitive Status WFL   Arousal/Participation Alert   Attention Within functional limits   Orientation Level Oriented X4   Memory Within functional limits   Following Commands Follows all commands and directions without difficulty   Bed Mobility   Rolling L 2  Maximal assistance   Additional items Assist x 1;Bedrails;Increased time required  (bedpan removed)   Supine to Sit 2  Maximal assistance   Additional items Assist x 2;HOB elevated;Bedrails;Increased time required;Verbal cues;LE management   Transfers   Sit to Stand 2  Maximal assistance   Additional items Assist x 2;Verbal cues;Increased time required   Stand to Sit 2  Maximal assistance   Additional items Assist x 2;Verbal cues;Increased time required   Stand pivot 3  Moderate assistance   Additional items Assist x 2;Verbal cues;Increased time required  (with RW)   Additional Comments pt denied dizziness with transitional movement   Ambulation/Elevation   Gait pattern Improper Weight shift;Decreased foot clearance;Short stride;Excessively slow   Gait Assistance 3  Moderate assist   Additional items Assist x 2;Verbal cues   Assistive Device  Rolling walker   Distance 4 ft   Ambulation/Elevation Additional Comments good safety awareness noted   Balance   Static Sitting Fair +   Dynamic Sitting Fair   Static Standing Fair -   Dynamic Standing Poor +   Ambulatory Poor +   Endurance Deficit   Endurance Deficit Yes   Endurance Deficit Description required rest breaks due to fatigue and PATEL noted   Activity Tolerance   Activity Tolerance Patient limited by fatigue   Assessment   Prognosis Fair   Assessment Pt seen for PT treatment session this date with interventions consisting of therapeutic activity to improve transfers and increase activity tolerance with functional mobility to decrease fall risk. Pt agreeable to PT treatment session upon arrival, pt found supine in bed, in no apparent distress. Since previous session, pt has made good progress as evidenced by decreased assistance required with mobility, ability to ambulate, and improved balance  Barriers during this session include SOB and fatigue.  Pt continues to be functioning below baseline level, and remains limited 2* factors listed above and including impaired activity tolerance, impaired  balance, decreased endurance, and decreased mobility.  Pt prognosis for achieving goals is fair, pending pt progress with hospitalization/medical status improvements, and indicated by continued required assistance. PT will continue to see pt during current hospitalization in order to address the deficits listed above and provide interventions consistent w/ POC in effort to achieve goals. Current goals and POC remain appropriate, pt continues to have rehab potential  Upon conclusion pt seated OOB in recliner. The patient's AM-PAC Basic Mobility Inpatient Short Form Raw Score is 7.  A Raw score of less than or equal to 16 suggests the patient may benefit from discharge to post-acute rehabilitation services. Based on patient presentations and impairments, pt would most appropriately benefit from Level 2 resource  intensity upon discharge.  Please also refer to the recommendation of the Physical Therapist for safe discharge planning. RN verbalized pt appropriate for PT session.   Barriers to Discharge   (limited mobility)   Goals   Patient Goals to get OOB   LTG Expiration Date 03/10/25   PT Treatment Day 1   Plan   Treatment/Interventions Functional transfer training;Bed mobility;Gait training;Equipment eval/education;Endurance training   Progress Progressing toward goals   PT Frequency 3-5x/wk   Discharge Recommendation   Rehab Resource Intensity Level, PT II (Moderate Resource Intensity)   Equipment Recommended Walker   Walker Package Recommended Wheeled walker   AM-PAC Basic Mobility Inpatient   Turning in Flat Bed Without Bedrails 2   Lying on Back to Sitting on Edge of Flat Bed Without Bedrails 1   Moving Bed to Chair 1   Standing Up From Chair Using Arms 1   Walk in Room 1   Climb 3-5 Stairs With Railing 1   Basic Mobility Inpatient Raw Score 7   Turning Head Towards Sound 4   Follow Simple Instructions 3   Low Function Basic Mobility Raw Score  14   Low Function Basic Mobility Standardized Score  22.01   Nadeem Austin Highest Level Of Mobility   JH-HLM Goal 2: Bed activities/Dependent transfer   JH-HLM Achieved 4: Move to chair/commode       Time In: 1320  Time Out: 1338  Total Treatment Minutes: 18    Tatyana Rodriguez, PT

## 2025-03-01 NOTE — PLAN OF CARE
Problem: Prexisting or High Potential for Compromised Skin Integrity  Goal: Skin integrity is maintained or improved  Description: INTERVENTIONS:  - Identify patients at risk for skin breakdown  - Assess and monitor skin integrity  - Assess and monitor nutrition and hydration status  - Monitor labs   - Assess for incontinence   - Turn and reposition patient  - Assist with mobility/ambulation  - Relieve pressure over bony prominences  - Avoid friction and shearing  - Provide appropriate hygiene as needed including keeping skin clean and dry  - Evaluate need for skin moisturizer/barrier cream  - Collaborate with interdisciplinary team   - Patient/family teaching  - Consider wound care consult   Outcome: Progressing     Problem: PAIN - ADULT  Goal: Verbalizes/displays adequate comfort level or baseline comfort level  Description: Interventions:  - Encourage patient to monitor pain and request assistance  - Assess pain using appropriate pain scale  - Administer analgesics based on type and severity of pain and evaluate response  - Implement non-pharmacological measures as appropriate and evaluate response  - Consider cultural and social influences on pain and pain management  - Notify physician/advanced practitioner if interventions unsuccessful or patient reports new pain  Outcome: Progressing     Problem: INFECTION - ADULT  Goal: Absence or prevention of progression during hospitalization  Description: INTERVENTIONS:  - Assess and monitor for signs and symptoms of infection  - Monitor lab/diagnostic results  - Monitor all insertion sites, i.e. indwelling lines, tubes, and drains  - Monitor endotracheal if appropriate and nasal secretions for changes in amount and color  - Beaman appropriate cooling/warming therapies per order  - Administer medications as ordered  - Instruct and encourage patient and family to use good hand hygiene technique  - Identify and instruct in appropriate isolation precautions for  identified infection/condition  Outcome: Progressing     Problem: SAFETY ADULT  Goal: Patient will remain free of falls  Description: INTERVENTIONS:  - Educate patient/family on patient safety including physical limitations  - Instruct patient to call for assistance with activity   - Consult OT/PT to assist with strengthening/mobility   - Keep Call bell within reach  - Keep bed low and locked with side rails adjusted as appropriate  - Keep care items and personal belongings within reach  - Initiate and maintain comfort rounds  - Make Fall Risk Sign visible to staff  - Offer Toileting every 2 Hours, in advance of need  - Initiate/Maintain bed alarm  - Obtain necessary fall risk management equipment  - Apply yellow socks and bracelet for high fall risk patients  - Consider moving patient to room near nurses station  Outcome: Progressing     Problem: GENITOURINARY - ADULT  Goal: Maintains or returns to baseline urinary function  Description: INTERVENTIONS:  - Assess urinary function  - Encourage oral fluids to ensure adequate hydration if ordered  - Administer IV fluids as ordered to ensure adequate hydration  - Administer ordered medications as needed  - Offer frequent toileting  - Follow urinary retention protocol if ordered  Outcome: Progressing     Problem: METABOLIC, FLUID AND ELECTROLYTES - ADULT  Goal: Electrolytes maintained within normal limits  Description: INTERVENTIONS:  - Monitor labs and assess patient for signs and symptoms of electrolyte imbalances  - Administer electrolyte replacement as ordered  - Monitor response to electrolyte replacements, including repeat lab results as appropriate  - Instruct patient on fluid and nutrition as appropriate  Outcome: Progressing  Goal: Fluid balance maintained  Description: INTERVENTIONS:  - Monitor labs   - Monitor I/O and WT  - Instruct patient on fluid and nutrition as appropriate  - Assess for signs & symptoms of volume excess or deficit  Outcome:  Progressing  Goal: Glucose maintained within target range  Description: INTERVENTIONS:  - Monitor Blood Glucose as ordered  - Assess for signs and symptoms of hyperglycemia and hypoglycemia  - Administer ordered medications to maintain glucose within target range  - Assess nutritional intake and initiate nutrition service referral as needed  Outcome: Progressing     Problem: SKIN/TISSUE INTEGRITY - ADULT  Goal: Incision(s), wounds(s) or drain site(s) healing without S/S of infection  Description: INTERVENTIONS  - Assess and document dressing, incision, wound bed, drain sites and surrounding tissue  - Provide patient and family education  - Perform skin care/dressing changes  Outcome: Progressing     Problem: Nutrition/Hydration-ADULT  Goal: Nutrient/Hydration intake appropriate for improving, restoring or maintaining nutritional needs  Description: Monitor and assess patient's nutrition/hydration status for malnutrition. Collaborate with interdisciplinary team and initiate plan and interventions as ordered.  Monitor patient's weight and dietary intake as ordered or per policy. Utilize nutrition screening tool and intervene as necessary. Determine patient's food preferences and provide high-protein, high-caloric foods as appropriate.     INTERVENTIONS:  - Monitor oral intake, urinary output, labs, and treatment plans  - Assess nutrition and hydration status and recommend course of action  - Evaluate amount of meals eaten  - Assist patient with eating if necessary   - Allow adequate time for meals  - Recommend/ encourage appropriate diets, oral nutritional supplements, and vitamin/mineral supplements  - Order, calculate, and assess calorie counts as needed  - Recommend, monitor, and adjust tube feedings and TPN/PPN based on assessed needs  - Assess need for intravenous fluids  - Provide specific nutrition/hydration education as appropriate  - Include patient/family/caregiver in decisions related to nutrition  Outcome:  Progressing

## 2025-03-01 NOTE — ASSESSMENT & PLAN NOTE
Has bilateral lymphedema at baseline w/chronic open wounds   TTE revealed EF 50% w/increased wall thickness; trace MR/TR/NY  Prescribed Lasix as OP - currently receiving gentle diuresis given extravascular overload  Wound care team following  Continue treatment for bilateral LE cellulitis as noted above

## 2025-03-01 NOTE — PLAN OF CARE
Problem: PHYSICAL THERAPY ADULT  Goal: Performs mobility at highest level of function for planned discharge setting.  See evaluation for individualized goals.  Description: Treatment/Interventions: Functional transfer training, Therapeutic exercise, Endurance training, Gait training, Bed mobility, Equipment eval/education  Equipment Recommended: Walker       See flowsheet documentation for full assessment, interventions and recommendations.  Outcome: Progressing  Note: Prognosis: Fair     Assessment: Pt seen for PT treatment session this date with interventions consisting of therapeutic activity to improve transfers and increase activity tolerance with functional mobility to decrease fall risk. Pt agreeable to PT treatment session upon arrival, pt found supine in bed, in no apparent distress. Since previous session, pt has made good progress as evidenced by decreased assistance required with mobility, ability to ambulate, and improved balance  Barriers during this session include SOB and fatigue.  Pt continues to be functioning below baseline level, and remains limited 2* factors listed above and including impaired activity tolerance, impaired  balance, decreased endurance, and decreased mobility.  Pt prognosis for achieving goals is fair, pending pt progress with hospitalization/medical status improvements, and indicated by continued required assistance. PT will continue to see pt during current hospitalization in order to address the deficits listed above and provide interventions consistent w/ POC in effort to achieve goals. Current goals and POC remain appropriate, pt continues to have rehab potential  Upon conclusion pt seated OOB in recliner. The patient's AM-PAC Basic Mobility Inpatient Short Form Raw Score is 7.  A Raw score of less than or equal to 16 suggests the patient may benefit from discharge to post-acute rehabilitation services. Based on patient presentations and impairments, pt would most  appropriately benefit from Level 2 resource intensity upon discharge.  Please also refer to the recommendation of the Physical Therapist for safe discharge planning. RN verbalized pt appropriate for PT session.  Barriers to Discharge:  (limited mobility)     Rehab Resource Intensity Level, PT: II (Moderate Resource Intensity)    See flowsheet documentation for full assessment.

## 2025-03-01 NOTE — CONSULTS
[facility]  Consultation   Nadeem Purdy Jr. 69 y.o. male MRN: [id number]  Unit/Bed#: ICU 03-01 Encounter: [id number]        Assessment & Plan  Acute renal failure superimposed on stage 3a chronic kidney disease (HCC)  Lab Results   Component Value Date    EGFR 15 03/01/2025    EGFR 15 02/28/2025    EGFR 16 02/28/2025    CREATININE 3.70 (H) 03/01/2025    CREATININE 3.75 (H) 02/28/2025    CREATININE 3.61 (H) 02/28/2025   Baseline creatinine appears to be 1.1-1.3 mg/dL since 2022.  Follows with PCP for management.  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  Grade A2 albuminuria, no RBCs/UA bland when checked in steady state.  Kidneys without obstruction on unenhanced imaging.  Now with acute kidney injury peak creatinine yesterday 3.75 mg/dL.  Suspect etiology ischemic ATN in setting of severe sepsis, volume and hemodynamic perturbations (s/p crystalloid/colloid/diuretic), vasomotor dysfunction due to ARB, urinary retention status post Ramirez.  CK minimally elevated upon presentation and likely noncontributory to LUIS ALFREDO.  Recommend ICU team obtain cardiac POCUS to guide volume management.  Agree that patient does examine significantly volume overloaded.  If ultrasound findings consistent-recommend diuresis.  Continue to hold losartan.  Will reevaluate urine studies.  Ramirez catheter was removed today-follow PVRs.   Diabetic ketoacidosis without coma associated with type 2 diabetes mellitus (HCC)  Lab Results   Component Value Date    HGBA1C 8.9 (H) 02/26/2025       Recent Labs     02/28/25  2206 02/28/25  2330 03/01/25  0806 03/01/25  1110   POCGLU 143* 125 258* 257*       Blood Sugar Average: Last 72 hrs:  (P) [id number]  Status post aggressive volume resuscitation and DKA protocol per ICU team.  Endocrinology was consulted and he will require follow-up in the outpatient setting.  Patient was previously on an SGLT2  inhibitor-would not resume on discharge.  Unclear if he was still supposed to be taking this in the outpatient setting  Non-traumatic rhabdomyolysis  CK level not significant elevated and likely not contributing to LUIS ALFREDO  Essential hypertension  Blood pressure controlled at present.  Continue to hold ARB.  Monitor response with diuretics  Hyponatremia  Examines hypervolemic.  Place patient on fluid restriction and follow response.  Repeat BMP due later today.  Urinary retention  Ramirez catheter was discontinued.  Check PVRs and follow urinary retention protocol  Metabolic acidosis  Likely due to renal failure plus diarrhea.  BMP to be repeated later today.  May require gentle sodium bicarb infusion versus p.o. supplementation if able to tolerate.  Will avoid now due to attempting diuresis-avoiding salt load.        HPI:    Nadeem Purdy Jr. is a 69 y.o. male with CKD 3a, T2DM > 20 years, BMI 42, hyperlipidemia, hypertension who presented to [facility] 2/26 with chief complaint bilateral lower extremity edema, polyuria, polydipsia, urinary incontinence and noncompliance with home medications for 2 weeks.  He was admitted to intensive care unit for DKA, hypertensive urgency, UR, LUIS ALFREDO and sepsis due to cellulitis of the lower extremity.  Today a nephrology consult was requested for acute kidney injury.    Upon discussion with the patient he relays HPI as above in addition: Patient states he had nausea, vomiting, diarrhea for 1 week prior to presentation.  Tells me he usually can walk but cannot walk now.  Denies nausea, vomiting, diarrhea at present.     From a nephrology perspective, patient denies seeing a nephrologist.  Has had T2DM greater than 20 years as well as hypertension.  PCP manages chronic diseases.  He denies NSAID use.  He states he was compliant with his medication up until admission.    Reason for Consult: Acute kidney injury    Review of Systems:  A complete 10-point review of systems was  performed. Aside from what was mentioned in the HPI, it is otherwise negative.      Historical Information   Past Medical History:   Diagnosis Date    Hypertension      Past Surgical History:   Procedure Laterality Date    CATARACT EXTRACTION, BILATERAL Bilateral      Social History   Social History     Substance and Sexual Activity   Alcohol Use Yes    Alcohol/week: 4.0 standard drinks of alcohol    Types: 4 Shots of liquor per week     Social History     Substance and Sexual Activity   Drug Use Never     Social History     Tobacco Use   Smoking Status Former   Smokeless Tobacco Never       Family History:   History reviewed. No pertinent family history.    Medications:  Pertinent medications were reviewed  Current Facility-Administered Medications   Medication Dose Route Frequency Provider Last Rate    acetaminophen  650 mg Oral Q6H PRN JIMMY Waterman      Albumin 25%  12.5 g Intravenous Q6H JIMMY Guzman Stopped (03/01/25 0828)    albuterol  2.5 mg Nebulization Q6H PRN JIMMY Guzman      amLODIPine  10 mg Oral Daily JIMMY Darnell      aspirin  81 mg Oral Daily JIMMY Nelson      atorvastatin  40 mg Oral Daily With Dinner JIMMY Guzman      azelastine  1 spray Each Nare BID JIMMY Nelson      cefTRIAXone  1,000 mg Intravenous Q24H JIMMY Darnell 1,000 mg (03/01/25 0840)    chlorhexidine  15 mL Mouth/Throat Q12H Count includes the [facility] JIMMY Nelson      Cholecalciferol  2,000 Units Oral Daily JIMMY Guzman      folic acid  1 mg Oral Daily JIMMY Guzman      furosemide  40 mg Intravenous Once JIMMY Darnell      gabapentin  200 mg Oral HS JIMMY Darnell      guaiFENesin  600 mg Oral Q12H Count includes the [facility] JIMMY Guzman      heparin (porcine)  7,500 Units Subcutaneous Q8H Count includes the [facility] JIMMY Nelson      insulin glargine  48 Units Subcutaneous HS JIMMY Darnell      insulin lispro  1-5 Units  "Subcutaneous HS Christiane JIMMY Mcdowell      insulin lispro  16 Units Subcutaneous TID With Meals Christiane BUI JIMMY Song      insulin lispro  2-12 Units Subcutaneous TID AC JIMMY Guzman      melatonin  6 mg Oral HS Chloé BALDERRAMA JIMMY Paiz      metoprolol succinate  100 mg Oral Daily JIMMY Guzman      thiamine  100 mg Oral Daily JIMMY Guzman      vancomycin oral (capsules or solution)  125 mg Oral Q6H [facility] JIMMY Guzman      vitamin E (tocopherol)  400 Units Oral Daily JIMMY Guzman           No Known Allergies      Vitals:   /63 (BP Location: Left arm)   Pulse (!) 108   Temp 99.4 °F (37.4 °C) (Temporal)   Resp (!) 27   Ht 5' 11\" (1.803 m)   Wt (!) 139 kg (306 lb 3.5 oz)   SpO2 95%   BMI 42.71 kg/m²   Body mass index is 42.71 kg/m².  SpO2: 95 %,   SpO2 Activity: At Rest,   O2 Device: None (Room air)      Intake/Output Summary (Last 24 hours) at 3/1/2025 1114  Last data filed at 3/1/2025 0730  Gross per 24 hour   Intake 1975.43 ml   Output 3490 ml   Net -1514.57 ml     Invasive Devices       Peripheral Intravenous Line  Duration             Peripheral IV 02/26/25 Right Antecubital 3 days    Peripheral IV 02/26/25 Dorsal (posterior);Left Hand 2 days    Peripheral IV 02/26/25 Left Antecubital 2 days                    Physical Exam:  General: conscious, cooperative, in no acute distress  Eyes: conjunctivae pink, anicteric sclerae  ENT: lips and mucous membranes moist  Neck: supple, no JVD, no masses  Chest: Diminished breath sounds bilateral, no crackles, ronchus or wheezings  CVS: S1 & S2, normal rate, regular rhythm  Abdomen: soft, non-tender, non-distended, normoactive bowel sounds obese  Extremities: Severe edema of both legs, left leg erythematous, both legs wrapped  Skin: no rash  Neuro: awake, alert, oriented      Diagnostic Data:  Lab: I have personally reviewed pertinent lab results.,   CBC:  Results from last 7 days   Lab Units " "03/01/25  0519   WBC Thousand/uL 19.12*   HEMOGLOBIN g/dL 10.7*   HEMATOCRIT % 31.8*   PLATELETS Thousands/uL 223      CMP:   Lab Results   Component Value Date    SODIUM 130 (L) 03/01/2025    K 4.0 03/01/2025     03/01/2025    CO2 15 (L) 03/01/2025    BUN 64 (H) 03/01/2025    CREATININE 3.70 (H) 03/01/2025    CALCIUM 8.3 (L) 03/01/2025    AST 54 (H) 03/01/2025    ALT 32 03/01/2025    ALKPHOS 45 03/01/2025    EGFR 15 03/01/2025   ,   PT/INR: No results found for: \"PT\", \"INR\",   Magnesium: No components found for: \"MAG\",  Phosphorous:   Lab Results   Component Value Date    PHOS 2.9 03/01/2025       Microbiology:  @[email or web address],(urinecx:7)@        JIMMY Lund    Portions of the record may have been created with voice recognition software. Occasional wrong word or \"sound a like\" substitutions may have occurred due to the inherent limitations of voice recognition software. Read the chart carefully and recognize, using context, where substitutions have occurred.      "

## 2025-03-01 NOTE — ASSESSMENT & PLAN NOTE
Drinks 1-2 glasses of [place] per day  Last drank 4 weeks ago?  Continue folic acid/thiamine supplementation  CIWA protocol  Seizure precautions

## 2025-03-01 NOTE — ASSESSMENT & PLAN NOTE
Notes some diarrhea prior to admission - has continued and worsened since admission  CDIF PCR positive shortly after admission, however EIA negative  Enteric stool panel negative  Continue PO Vanco D#2 as noted above

## 2025-03-01 NOTE — ASSESSMENT & PLAN NOTE
Holding home Losartan given LUIS ALFREDO  Continue home Toprol XL and Amlodipine  Continue to monitor hemodynamics closely

## 2025-03-01 NOTE — ASSESSMENT & PLAN NOTE
Corrected Na 127 on admission  Likely in setting of severe sepsis and decreased PO intake w/underlying alcohol abuse as well as intravascular depletion/extravascular overload  Continue to trend Na - goal for no more than 6-8 mEq change over next 24H

## 2025-03-01 NOTE — SEPSIS NOTE
[facility] Advisories       Sepsis Time Zero - SLIM       Date Triggers    03/01/25 1729 File Doc Flowsheets  Rule - CER: Hospitalist Service  [[id number]]  Rule - CER: Is Not Comfort Care [[id number]]  Flowsheet Row - NICK: Pulse [8] (Value: 109)  Flowsheet Row - NICK: Resp [9] (Value: 25)  Lab Component - LRR: CREATININE [[id number]] (Result 1: 3.93, Extension - LPP: GREATER THAN OR EQUAL TO (USE MOST RECENT RESULT) [16923], Result 1 Unit: mg/dL, Result 1 Order: Basic metabolic panel [[id number]])                          Sepsis time 0 alert:  Treatment is already in progress  Patient is on IV antibiotics  No new orders at this time

## 2025-03-01 NOTE — ASSESSMENT & PLAN NOTE
Ramirez cath placed on 02/28 for urinary retention   Likely in setting of severe sepsis/acute illness  Plan for voiding trial when status improves  Monitor I/O closely

## 2025-03-01 NOTE — ASSESSMENT & PLAN NOTE
Creatinine 2.52 on admission - up to 3.75  Baseline creatinine appears to be around 1.1 to 1.3  Suspect this is multifactorial in setting of DKA, intravascular hypovolemia, severe sepsis in setting of bacteremia/bilateral LE cellulitis, rhabdomyolysis  FeNa 0.5%/FeUrea 30% --> prerenal disease  Is s/p aggressive volume resuscitation - continue w/PRN colloid for intravascular depletion  IV diuresis stopped 2/2 worsening renal function   Avoid nephrotoxic agents and hypotension  Trend renal indices  Strict I/O  Daily weights

## 2025-03-01 NOTE — ASSESSMENT & PLAN NOTE
Examines hypervolemic.  Place patient on fluid restriction and follow response.  Repeat BMP due later today.

## 2025-03-01 NOTE — PROGRESS NOTES
Progress Note - Critical Care/ICU   Name: Nadeem Purdy Jr. 69 y.o. male I MRN: [id number]  Unit/Bed#: ICU 03-01 I Date of Admission: 2/26/2025   Date of Service: 3/1/2025 I Hospital Day: 3      Assessment & Plan  Diabetic ketoacidosis without coma associated with type 2 diabetes mellitus (HCC)  Lab Results   Component Value Date    HGBA1C 8.9 (H) 02/26/2025       Recent Labs     02/28/25  1827 02/28/25 2007 02/28/25 2206 02/28/25  2330   POCGLU 194* 157* 143* 125       Blood Sugar Average: Last 72 hrs:  (P) [id number]    POA AEB Glucose 446, pH 7.218, AG 17 w/bicarb 15, unfortunately no BHB done upon arrival, but urine w/1+ketones  Suspect this is 2/2 noncompliance as well as active infection as noted below  Prescribed 70/30 insulin 20 units BID as OP, however reports that he has not been taking for approximately 2 weeks  Is s/p aggressive volume resuscitation per DKA protocol  DKA insulin gtt transitioned to non-DKA gtt - transitioned to SQ regimen last evening  Endocrinology consulted - will need follow-up as OP  Continue mealtime/SSI w/ACHS fingersticks  Goal  - 180  Severe sepsis (HCC)  POA AEB fever, tachycardia, tachypnea, leukocytosis, elevated lactic acidosis  Suspect this is 2/2 bacteremia in setting of bilateral LE cellulitis vs diarrheal illness/? CDIF  Imaging revealed liquid stool within visualized colon concerning for diarrhea disease and extensive circumferential skin thickening and subcutaneous edema involving bilateral lower legs compatible with cellulitis   COVID/FLU/RSV negative   1 out of 2 Blood cultures w/Strep Pyogenes (Group A) - sensitivities pending  UA w/out evidence of infection  MRSA swab pending  Bilateral LE wound cultures pending  CDIF PCR positive, but EIA negative  Enteric stool panel negative   Lactic acid 2.4 - has since cleared  Procalcitonin 9.27 > 18.89 > 13.97 - repeat pending  Is sp aggressive volume resuscitation - continue w/colloid PRN given concern for  intravascular depletion  Continue IV Cefepime D#4 for Strep Pyogenes bacteremia/bilateral LE cellulitis  Continue PO Vanco D#2 despite EIA negative as patient w/persistent diarrhea and need for broad-spectrum antibiotics  Trend fever and WBC curve  Cellulitis of lower extremity  Please see plan as noted above  Bacteremia  Suspect source is bilateral LE cellulitis  1 out of 2 Blood cultures w/Strep Pyogenes (Group A) - sensitivities pending  Please see plan as noted above  Diarrhea  Notes some diarrhea prior to admission - has continued and worsened since admission  CDIF PCR positive shortly after admission, however EIA negative  Enteric stool panel negative  Continue PO Vanco D#2 as noted above  Acute renal failure superimposed on stage 3a chronic kidney disease (HCC)  Creatinine 2.52 on admission - up to 3.75  Baseline creatinine appears to be around 1.1 to 1.3  Suspect this is multifactorial in setting of DKA, intravascular hypovolemia, severe sepsis in setting of bacteremia/bilateral LE cellulitis, rhabdomyolysis  FeNa 0.5%/FeUrea 30% --> prerenal disease  Is s/p aggressive volume resuscitation - continue w/PRN colloid for intravascular depletion  IV diuresis stopped 2/2 worsening renal function   Avoid nephrotoxic agents and hypotension  Trend renal indices  Strict I/O  Daily weights  Non-traumatic rhabdomyolysis  Mild, likely in setting of hypovolemia, severe sepsis/bacteremia, statin use  Is s/p aggressive volume resuscitation  Continue to trend daily  Hyponatremia  Corrected Na 127 on admission  Likely in setting of severe sepsis and decreased PO intake w/underlying alcohol abuse as well as intravascular depletion/extravascular overload  Continue to trend Na - goal for no more than 6-8 mEq change over next 24H  Metabolic acidosis  AG 17 w/bicarb 16 on admission   Suspect this is multifactorial in setting of DKA, renal failure, diarrhea  Continue to trend acidosis closely  Elevated troponin  Troponin 64 >>  58  Suspect this is demand in setting of severe sepsis/bacteremia vs DKA/dehydration  ECG w/out ischemic changes  Denies CP  Continue cardiac monitoring - no need to further trend troponin  Urinary retention  Ramirez cath placed on 02/28 for urinary retention   Likely in setting of severe sepsis/acute illness  Plan for voiding trial when status improves  Monitor I/O closely  Essential hypertension  Holding home Losartan given LUIS ALFREDO  Continue home Toprol XL and Amlodipine  Continue to monitor hemodynamics closely  Hypercholesteremia  Continue home statin  Class 3 severe obesity due to excess calories with serious comorbidity and body mass index (BMI) of 40.0 to 44.9 in adult (HCC)  Encourage healthy lifestyle modifications   Lymphedema of both lower extremities  Has bilateral lymphedema at baseline w/chronic open wounds   TTE revealed EF 50% w/increased wall thickness; trace MR/TR/AR  Prescribed Lasix as OP - currently receiving gentle diuresis given extravascular overload  Wound care team following  Continue treatment for bilateral LE cellulitis as noted above  Alcohol abuse  Drinks 1-2 glasses of Duck per day  Last drank 4 weeks ago?  Continue folic acid/thiamine supplementation  CIWA protocol  Seizure precautions    Disposition: Stepdown Level 1    ICU Core Measures     A: Assess, Prevent, and Manage Pain Has pain been assessed? Yes  Need for changes to pain regimen? No   B: Both SAT/SAT  N/A   C: Choice of Sedation RASS Goal: 0 Alert and Calm  Need for changes to sedation or analgesia regimen? No   D: Delirium CAM-ICU: Negative   E: Early Mobility  Plan for early mobility? Yes   F: Family Engagement Plan for family engagement today? Yes       Antibiotic Review: Awaiting culture results.  and Continue broad spectrum secondary to severity of illness.     Review of Invasive Devices:    Ramirez Plan: Continue for accurate I/O monitoring for 48 hours and Voiding trial after improvement in ambulation          Prophylaxis:  VTE VTE covered by:  heparin (porcine), Subcutaneous, 7,500 Units at 02/28/25 2131       Stress Ulcer  not ordered         24 Hour Events :     IV diuresis stopped given worsening renal function/intravascular depletion  25% Albumin 12.5g Q6H x4 doses started  Insulin gtt transitioned to SQ regimen last evening      Subjective   Review of Systems: Review of Systems   Constitutional:  Negative for chills and fever.   HENT:  Positive for congestion.    Eyes:  Negative for visual disturbance.   Respiratory:  Negative for cough and shortness of breath.    Cardiovascular:  Negative for chest pain.   Gastrointestinal:  Negative for abdominal pain, nausea and vomiting.   Genitourinary:         Ramirez cath intact   Musculoskeletal:  Negative for back pain.   Neurological:  Negative for dizziness, light-headedness and headaches.   Psychiatric/Behavioral:  Negative for confusion.        Objective :                   Vitals I/O      Most Recent Min/Max in 24hrs   Temp 98.5 °F (36.9 °C) Temp  Min: 98.5 °F (36.9 °C)  Max: 100.6 °F (38.1 °C)   Pulse 80 Pulse  Min: 78  Max: 119   Resp 20 Resp  Min: 18  Max: 43   /62 BP  Min: 124/58  Max: 165/72   O2 Sat 95 % SpO2  Min: 89 %  Max: 100 %      Intake/Output Summary (Last 24 hours) at 3/1/2025 0137  Last data filed at 3/1/2025 0059  Gross per 24 hour   Intake 2368.68 ml   Output 3480 ml   Net -1111.32 ml       Diet Cardiovascular; Cardiac; Consistent Carbohydrate Diet Level 2 (5 carb servings/75 grams CHO/meal)    Invasive Monitoring           Physical Exam   Physical Exam  Vitals and nursing note reviewed.   Eyes:      Extraocular Movements: Extraocular movements intact.      Pupils: Pupils are equal, round, and reactive to light.   Skin:     General: Skin is warm and dry.      Capillary Refill: Capillary refill takes 2 to 3 seconds.   Cardiovascular:      Rate and Rhythm: Normal rate and regular rhythm.      Pulses:           Radial pulses are 2+ on the  right side and 2+ on the left side.        Dorsalis pedis pulses are 1+ on the right side and 1+ on the left side.      Heart sounds: Normal heart sounds.   Musculoskeletal:      Right lower le+ Edema present.      Left lower le+ Edema present.   Abdominal: General: Bowel sounds are normal.      Palpations: Abdomen is soft.      Tenderness: There is no abdominal tenderness.   Constitutional:       General: He is awake. He is not in acute distress.     Appearance: He is ill-appearing.      Interventions: Nasal cannula in place.   Pulmonary:      Effort: Pulmonary effort is normal.      Breath sounds: Decreased breath sounds, wheezing and rhonchi present.   Psychiatric:         Behavior: Behavior is cooperative.   Neurological:      General: No focal deficit present.      Mental Status: He is alert and oriented to person, place and time.   Genitourinary/Anorectal:  Ramirez present.        Diagnostic Studies        Lab Results: I have reviewed the following results:     Medications:  Scheduled PRN   Albumin 25%, 12.5 g, Q6H  amLODIPine, 10 mg, Daily  aspirin, 81 mg, Daily  atorvastatin, 40 mg, Daily With Dinner  azelastine, 1 spray, BID  cefepime, 2,000 mg, Q12H  chlorhexidine, 15 mL, Q12H MARGARITA  Cholecalciferol, 2,000 Units, Daily  folic acid, 1 mg, Daily  gabapentin, 200 mg, HS  guaiFENesin, 600 mg, Q12H MARGARITA  heparin (porcine), 7,500 Units, Q8H MARGARITA  insulin glargine, 48 Units, HS  insulin lispro, 1-5 Units, HS  insulin lispro, 16 Units, TID With Meals  insulin lispro, 2-12 Units, TID AC  melatonin, 6 mg, HS  metoprolol succinate, 100 mg, Daily  thiamine, 100 mg, Daily  vancomycin oral (capsules or solution), 125 mg, Q6H MARGARITA  vitamin E (tocopherol), 400 Units, Daily      acetaminophen, 650 mg, Q6H PRN       Continuous          Labs:   CBC    Recent Labs     25  0552 25  0518   WBC 15.92* 13.96*   HGB 13.3 10.3*   HCT 41.6 31.1*   * 155   BANDSPCT  --  6     BMP    Recent Labs     25  1225  02/28/25 2020   SODIUM 128* 129*   K 3.7 3.6   CL 99 101   CO2 19* 20*   AGAP 10 8   BUN 52* 59*   CREATININE 3.61* 3.75*   CALCIUM 8.2* 8.3*       Coags    No recent results     Additional Electrolytes  Recent Labs     02/28/25  0518 02/28/25 2020   MG 2.3 2.2   PHOS 2.6 2.3   CAIONIZED 1.08* 1.16          Blood Gas    No recent results  Recent Labs     02/27/25 2000   PHVEN 7.341   QRI4BCO 33.6*   PO2VEN 27.5*   VYW7IUO 17.8*   BEVEN -7.0   U6PAYTF 54.0*    LFTs  Recent Labs     02/27/25  1818 02/28/25  0518   ALT 26 22   AST 52* 41*   ALKPHOS 56 40   ALB 3.0* 2.6*   TBILI 0.47 0.47       Infectious  Recent Labs     02/27/25  0739 02/28/25  0518   PROCALCITONI 18.89* 13.97*     Glucose  Recent Labs     02/27/25  2214 02/28/25  0518 02/28/25  1225 02/28/25 2020   GLUC 167* 131 209* 176*

## 2025-03-01 NOTE — NURSING NOTE
Pt given lantus as ordered at 2200. Denies any complaints. Safety measures maintained. HOB elevated incontinent of stool pericare done. Protective ointment applied. Pt given melquiades crackers for snack

## 2025-03-01 NOTE — ASSESSMENT & PLAN NOTE
AG 17 w/bicarb 16 on admission   Suspect this is multifactorial in setting of DKA, renal failure, diarrhea  Continue to trend acidosis closely

## 2025-03-01 NOTE — ASSESSMENT & PLAN NOTE
Likely due to renal failure plus diarrhea.  BMP to be repeated later today.  May require gentle sodium bicarb infusion versus p.o. supplementation if able to tolerate.  Will avoid now due to attempting diuresis-avoiding salt load.

## 2025-03-01 NOTE — ASSESSMENT & PLAN NOTE
Lab Results   Component Value Date    EGFR 15 03/01/2025    EGFR 15 02/28/2025    EGFR 16 02/28/2025    CREATININE 3.70 (H) 03/01/2025    CREATININE 3.75 (H) 02/28/2025    CREATININE 3.61 (H) 02/28/2025   Baseline creatinine appears to be 1.1-1.3 mg/dL since 2022.  Follows with PCP for management.  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  Grade A2 albuminuria, no RBCs/UA bland when checked in steady state.  Kidneys without obstruction on unenhanced imaging.  Now with acute kidney injury peak creatinine yesterday 3.75 mg/dL.  Suspect etiology ischemic ATN in setting of severe sepsis, volume and hemodynamic perturbations (s/p crystalloid/colloid/diuretic), vasomotor dysfunction due to ARB, urinary retention status post Ramirez.  CK minimally elevated upon presentation and likely noncontributory to LUIS ALFREDO.  Recommend ICU team obtain cardiac POCUS to guide volume management.  Agree that patient does examine significantly volume overloaded.  If ultrasound findings consistent-recommend diuresis.  Continue to hold losartan.  Will reevaluate urine studies.  Ramirez catheter was removed today-follow PVRs.

## 2025-03-01 NOTE — PROGRESS NOTES
Progress Note - ICU Transfer to Step down/med. surg. - Christin Purdy Jr. 69 y.o. male MRN: [id number]    Unit/Bed#: ICU 03-01 Encounter: [id number]      Code Status: Level 1 - Full Code    Date of ICU admission: 2/26/2025    Reason for ICU adm: Sinus tachycardia [R00.0]  DKA (diabetic ketoacidosis) ([facility]) [E11.10]  LUIS ALFREDO (acute kidney injury) (HCC) [N17.9]  Non compliance w medication regimen [Z91.148]  Visit for wound check [Z51.89]  Bilateral lower extremity edema [R60.0]    Active problems:   Patient Active Problem List   Diagnosis    Type 2 diabetes mellitus with microalbuminuria, with long-term current use of insulin (HCC)    Essential hypertension    Hypercholesteremia    Class 3 severe obesity due to excess calories with serious comorbidity and body mass index (BMI) of 40.0 to 44.9 in adult (HCC)    Steatohepatitis    Allergic rhinitis    Vitamin D deficiency    Persistent proteinuria    Tachycardia    Stage 3 chronic kidney disease, unspecified whether stage 3a or 3b CKD (HCC)    Lymphedema of both lower extremities    Rash    Numbness of left foot    Cortical age-related cataract of both eyes    Type 2 diabetes mellitus with hyperglycemia, with long-term current use of insulin (HCC)    Cellulitis of lower extremity    Right knee injury, subsequent encounter    Osteochondral lesion    Primary osteoarthritis of one knee, right    Lymphedema of right lower extremity    Other tear of medial meniscus, current injury, right knee, initial encounter    Hypertensive kidney disease with chronic kidney disease stage III (HCC)    Acute renal failure superimposed on stage 3a chronic kidney disease (HCC)    Diabetic ketoacidosis without coma associated with type 2 diabetes mellitus (HCC)    Severe sepsis (HCC)    Alcohol abuse    Bacteremia    Non-traumatic rhabdomyolysis    Hyponatremia    Metabolic acidosis    Diarrhea    Elevated troponin    Urinary retention       Summary of clinical course:  ICU bed 3 christin Purdy, 69 yr  "old male.  Past medical history diabetes, morbid obesity, CKD 3, hypertension, hyperlipidemia.  Presented on 2/26 with 2 weeks of feeling \"bad\" and had not been taking his medications.  He had increasing leg swelling and redness found to be in DKA and treated with broad-spectrum antibiotics for lower extremity cellulitis..  Micro is positive for strep Pyogenes Bacteremia.  Antibiotics de-escalated today to ceftriaxone.  DKA resolved and he was transition to Lantus and Humalog per endocrine recommendations.  Additionally had ongoing diarrhea with positive C. difficile PCR negative EIA, which we have been treating with pkathy Rodriguez.  Worsening LUIS ALFREDO likely in the setting of dehydration from large volume GI loss, for which we have consulted nephrology..  We had restarted his diuretics yesterday and today went into A-fib with a heart rate in the 140s for which he received 5 mg of IV Lopressor x 2 and his Toprol-XL was increased from 100 mg 150 mg.  He does take 40 mg oral Lasix twice daily at home, we are currently intermittently dosing him with IV Lasix    Recent or scheduled procedures: 2/28 ECHO: 50% trace regurgitation MV/TV/PV    Recent diagnostics:   rocedure Component Value Units Date/Time   XR chest portable ICU [[id number]] Collected: 02/28/25 0900   Order Status: Completed Updated: 02/28/25 0910   Narrative:     XR CHEST PORTABLE ICU    INDICATION: Increased WOB.    COMPARISON: Chest radiograph 2/26/2025, CT chest, abdomen and pelvis 2/27/2025    FINDINGS:    Low lung volumes, which causes crowding of bronchovascular markings.  Within that limitation, there is no focal lung opacity.    No pneumothorax or pleural effusion.    Stable cardiomediastinal silhouette.    Paravertebral ossifications thoracic spine.    Normal upper abdomen.   Impression:       No definite acute cardiopulmonary disease on this examination which is limited by low lung volumes.    Consider follow-up PA and lateral views for persistent or " worsening symptoms.    Workstation performed: [id number]   CT lower extremity wo contrast right [[id number]] Collected: 02/28/25 0013   Order Status: Completed Updated: 02/28/25 0028   Narrative:     CT bilateral lower extremities (distal thigh to foot) without IV contrast    INDICATION: worsening sepsis. Clinical concern for bilateral lower extremity cellulitis.    COMPARISON: MRI right knee 6/5/2024. Left tibia and fibula radiographs 7/11/2022. Right knee radiographs 5/29/2024.    TECHNIQUE: CT examination of the above was performed. This examination, like all CT scans performed in the [facility], was performed utilizing techniques to minimize radiation dose exposure, including the use of iterative reconstruction  and automated exposure control software.  Multiplanar 2D reformatted images were created from the source data.    Rad dose  1580 mGy-cm , 1577 mGy-cm    FINDINGS:    RIGHT LOWER EXTREMITY:    OSSEOUS STRUCTURES:  No fracture, dislocation or destructive osseous lesion. No cortical erosive change to suggest osteomyelitis, noting MRI is more sensitive for the detection of early osteomyelitis. Tricompartmental osteoarthritis of the right knee  with moderate medial compartment joint space narrowing. Well-corticated ossific densities adjacent to the medial malleolus, likely sequelae of chronic injury. Patellar enthesopathy.    VISUALIZED MUSCULATURE: Dystrophic calcifications throughout the posterior compartment musculature.    SOFT TISSUES: Extensive circumferential skin thickening and subcutaneous edema involving the right lower leg compatible with cellulitis. No organized collection within the limits of this unenhanced examination. No soft tissue gas is identified.    OTHER PERTINENT FINDINGS: Moderate right knee joint effusion. Vascular calcifications. Note the vascular patency is not assessed on this unenhanced examination.    LEFT LOWER EXTREMITY:    OSSEOUS STRUCTURES:  No fracture,  dislocation or destructive osseous lesion. No cortical erosive change to suggest osteomyelitis, noting MRI is more sensitive for the detection of early osteomyelitis. Tricompartmental osteoarthritis of the left knee with   moderate medial compartment joint space narrowing. enthesopathy.    VISUALIZED MUSCULATURE: A few calcifications within the posterior compartment musculature.    SOFT TISSUES:  Extensive circumferential skin thickening and subcutaneous edema involving the left lower leg compatible with cellulitis. No organized collection within the limits of this unenhanced examination. No soft tissue gas is identified.    OTHER PERTINENT FINDINGS: Small to moderate knee joint effusion. Vascular calcifications. Note the vascular patency is not assessed on this unenhanced examination.   Impression:       1.  Extensive circumferential skin thickening and subcutaneous edema involving the bilateral lower legs compatible with cellulitis. No organized collection within the limits of this unenhanced examination. No soft tissue gas is identified.  2.  No cortical erosive changes to suggest osteomyelitis, noting MRI is more sensitive for the detection of early osteomyelitis.  3.  Moderate right and small to moderate left knee joint effusions.      Workstation performed: [id number]   CT lower extremity wo contrast left [[id number]] Collected: 02/28/25 0013   Order Status: Completed Updated: 02/28/25 0028   Narrative:     CT bilateral lower extremities (distal thigh to foot) without IV contrast    INDICATION: worsening sepsis. Clinical concern for bilateral lower extremity cellulitis.    COMPARISON: MRI right knee 6/5/2024. Left tibia and fibula radiographs 7/11/2022. Right knee radiographs 5/29/2024.    TECHNIQUE: CT examination of the above was performed. This examination, like all CT scans performed in the [facility], was performed utilizing techniques to minimize radiation dose exposure, including the use  of iterative reconstruction  and automated exposure control software.  Multiplanar 2D reformatted images were created from the source data.    Rad dose  1580 mGy-cm , 1577 mGy-cm    FINDINGS:    RIGHT LOWER EXTREMITY:    OSSEOUS STRUCTURES:  No fracture, dislocation or destructive osseous lesion. No cortical erosive change to suggest osteomyelitis, noting MRI is more sensitive for the detection of early osteomyelitis. Tricompartmental osteoarthritis of the right knee  with moderate medial compartment joint space narrowing. Well-corticated ossific densities adjacent to the medial malleolus, likely sequelae of chronic injury. Patellar enthesopathy.    VISUALIZED MUSCULATURE: Dystrophic calcifications throughout the posterior compartment musculature.    SOFT TISSUES: Extensive circumferential skin thickening and subcutaneous edema involving the right lower leg compatible with cellulitis. No organized collection within the limits of this unenhanced examination. No soft tissue gas is identified.    OTHER PERTINENT FINDINGS: Moderate right knee joint effusion. Vascular calcifications. Note the vascular patency is not assessed on this unenhanced examination.    LEFT LOWER EXTREMITY:    OSSEOUS STRUCTURES:  No fracture, dislocation or destructive osseous lesion. No cortical erosive change to suggest osteomyelitis, noting MRI is more sensitive for the detection of early osteomyelitis. Tricompartmental osteoarthritis of the left knee with   moderate medial compartment joint space narrowing. enthesopathy.    VISUALIZED MUSCULATURE: A few calcifications within the posterior compartment musculature.    SOFT TISSUES:  Extensive circumferential skin thickening and subcutaneous edema involving the left lower leg compatible with cellulitis. No organized collection within the limits of this unenhanced examination. No soft tissue gas is identified.    OTHER PERTINENT FINDINGS: Small to moderate knee joint effusion. Vascular  calcifications. Note the vascular patency is not assessed on this unenhanced examination.   Impression:       1.  Extensive circumferential skin thickening and subcutaneous edema involving the bilateral lower legs compatible with cellulitis. No organized collection within the limits of this unenhanced examination. No soft tissue gas is identified.  2.  No cortical erosive changes to suggest osteomyelitis, noting MRI is more sensitive for the detection of early osteomyelitis.  3.  Moderate right and small to moderate left knee joint effusions.      Workstation performed: [id number]   CT chest abdomen pelvis wo contrast [[id number]] Collected: 02/27/25 2319   Order Status: Completed Updated: 02/27/25 2336   Narrative:     CT CHEST, ABDOMEN AND PELVIS WITHOUT IV CONTRAST    INDICATION: worsening sepsis.    COMPARISON: Radiograph dated 1/26/2025    TECHNIQUE: CT examination of the chest, abdomen and pelvis was performed without intravenous contrast. Multiplanar 2D reformatted images were created from the source data.    This examination, like all CT scans performed in the [facility], was performed utilizing techniques to minimize radiation dose exposure, including the use of iterative reconstruction and automated exposure control. Radiation dose length  product (DLP) for this visit: 2226 mGy-cm    Enteric Contrast: Not administered.    FINDINGS:    CHEST    LUNGS: No consolidative airspace opacity.  No gross pulmonary parenchymal mass. Evaluation for small nodules is limited secondary to patient respiratory motion.    PLEURA: No pleural effusion.    HEART/GREAT VESSELS: Heart is enlarged. No significant pericardial effusion. No thoracic aortic aneurysm.    MEDIASTINUM AND SMITA: No mediastinal adenopathy. No hilar adenopathy.    CHEST WALL AND LOWER NECK: Unremarkable.    ABDOMEN    LIVER/BILIARY TREE: Unremarkable.    GALLBLADDER: No calcified gallstones. No pericholecystic inflammatory change.    SPLEEN:  Unremarkable.    PANCREAS: Moderate fatty replacement/parenchymal atrophy. No definite pancreatic mass.    ADRENAL GLANDS: Unremarkable.    KIDNEYS/URETERS: Simple renal cyst(s). Kidneys are normal for size. No hydronephrosis.    There is nonspecific perinephric stranding about both kidneys, which may be senescent in nature, although possible infectious/inflammatory process is not excluded, particularly in the absence of prior examination. Correlate clinically and with  urinalysis.    STOMACH AND BOWEL: Small hiatal hernia. No dilated loops of small bowel to suggest mechanical bowel obstruction. There is liquid stool within the visualized colon. Correlate clinically for diarrheal disease.    APPENDIX: No findings to suggest appendicitis.    ABDOMINOPELVIC CAVITY: No free air. No large volume ascites. No lymphadenopathy.    VESSELS: Atherosclerosis without abdominal aortic aneurysm.    PELVIS    REPRODUCTIVE ORGANS: Unremarkable for patient's age.    URINARY BLADDER: Unremarkable.    ABDOMINAL WALL/INGUINAL REGIONS: There is mild diffuse body wall edema.    BONES: No acute fracture. Multilevel degenerative changes of the spine.   Impression:       There is nonspecific perinephric stranding about both kidneys, which may be senescent in nature, although possible infectious/inflammatory process is not excluded, particularly in the absence of prior examination. Correlate clinically and with  urinalysis.    There is liquid stool within the visualized colon. Correlate clinically for diarrheal disease.          Workstation performed: [id number]   XR chest portable [[id number]] Collected: 02/26/25 1102   Order Status: Completed Updated: 02/26/25 1104   Narrative:     XR CHEST PORTABLE    INDICATION: SOB.    COMPARISON: None    FINDINGS:    Mild bilateral hilar prominence may be due to prominent vasculature/congestion. No focal airspace consolidation. No pneumothorax or pleural effusion.    Normal cardiomediastinal  silhouette.    Bones are unremarkable for age.    The right hemidiaphragm is mildly elevated.   Impression:       No focal airspace consolidation. Mild bilateral hilar prominence may be due to prominent vasculature/congestion.       Neuro: GCS . Pain   CV:RRR, normotensive  Pulm:   Encourage Incentive Spirometry. Titrate O2 to 92-94%.  GI:  Diet Last BM  :  Monitor Intake and Output.  Skin: Encourage turning every 2 hours.  F/E/N:   Activity: OOB/PT    Hospital discharge planning:  CM/Nephrology/PT/OT

## 2025-03-01 NOTE — ASSESSMENT & PLAN NOTE
Mild, likely in setting of hypovolemia, severe sepsis/bacteremia, statin use  Is s/p aggressive volume resuscitation  Continue to trend daily

## 2025-03-02 ENCOUNTER — APPOINTMENT (INPATIENT)
Dept: CT IMAGING | Facility: HOSPITAL | Age: 70
DRG: 871 | End: 2025-03-02
Payer: MEDICARE

## 2025-03-02 ENCOUNTER — APPOINTMENT (INPATIENT)
Dept: RADIOLOGY | Facility: HOSPITAL | Age: 70
DRG: 871 | End: 2025-03-02
Payer: MEDICARE

## 2025-03-02 LAB
ALBUMIN SERPL BCG-MCNC: 2.8 G/DL (ref 3.5–5)
ALP SERPL-CCNC: 47 U/L (ref 34–104)
ALT SERPL W P-5'-P-CCNC: 45 U/L (ref 7–52)
ANION GAP SERPL CALCULATED.3IONS-SCNC: 10 MMOL/L (ref 4–13)
ANION GAP SERPL CALCULATED.3IONS-SCNC: 12 MMOL/L (ref 4–13)
ANION GAP SERPL CALCULATED.3IONS-SCNC: 12 MMOL/L (ref 4–13)
ANION GAP SERPL CALCULATED.3IONS-SCNC: 9 MMOL/L (ref 4–13)
APTT PPP: 46 SECONDS (ref 23–34)
APTT PPP: 49 SECONDS (ref 23–34)
APTT PPP: 51 SECONDS (ref 23–34)
APTT PPP: 91 SECONDS (ref 23–34)
ARTERIAL PATENCY WRIST A: NO
AST SERPL W P-5'-P-CCNC: 61 U/L (ref 13–39)
BACTERIA WND AEROBE CULT: ABNORMAL
BASE EX.OXY STD BLDV CALC-SCNC: 40.1 % (ref 60–80)
BASE EX.OXY STD BLDV CALC-SCNC: 85.4 % (ref 60–80)
BASE EXCESS BLDV CALC-SCNC: -6.4 MMOL/L
BASE EXCESS BLDV CALC-SCNC: -7.3 MMOL/L
BILIRUB SERPL-MCNC: 0.46 MG/DL (ref 0.2–1)
BUN SERPL-MCNC: 68 MG/DL (ref 5–25)
BUN SERPL-MCNC: 71 MG/DL (ref 5–25)
BUN SERPL-MCNC: 72 MG/DL (ref 5–25)
BUN SERPL-MCNC: 74 MG/DL (ref 5–25)
CA-I BLD-SCNC: 1.09 MMOL/L (ref 1.12–1.32)
CA-I BLD-SCNC: 1.12 MMOL/L (ref 1.12–1.32)
CA-I BLD-SCNC: 1.18 MMOL/L (ref 1.12–1.32)
CALCIUM ALBUM COR SERPL-MCNC: 9 MG/DL (ref 8.3–10.1)
CALCIUM SERPL-MCNC: 8 MG/DL (ref 8.4–10.2)
CALCIUM SERPL-MCNC: 8.2 MG/DL (ref 8.4–10.2)
CALCIUM SERPL-MCNC: 8.2 MG/DL (ref 8.4–10.2)
CALCIUM SERPL-MCNC: 8.4 MG/DL (ref 8.4–10.2)
CHLORIDE SERPL-SCNC: 101 MMOL/L (ref 96–108)
CHLORIDE SERPL-SCNC: 102 MMOL/L (ref 96–108)
CHLORIDE SERPL-SCNC: 103 MMOL/L (ref 96–108)
CHLORIDE SERPL-SCNC: 103 MMOL/L (ref 96–108)
CK SERPL-CCNC: 76 U/L (ref 39–308)
CO2 SERPL-SCNC: 15 MMOL/L (ref 21–32)
CO2 SERPL-SCNC: 15 MMOL/L (ref 21–32)
CO2 SERPL-SCNC: 18 MMOL/L (ref 21–32)
CO2 SERPL-SCNC: 19 MMOL/L (ref 21–32)
CREAT SERPL-MCNC: 4.03 MG/DL (ref 0.6–1.3)
CREAT SERPL-MCNC: 4.05 MG/DL (ref 0.6–1.3)
CREAT SERPL-MCNC: 4.24 MG/DL (ref 0.6–1.3)
CREAT SERPL-MCNC: 4.34 MG/DL (ref 0.6–1.3)
EOSINOPHIL NFR BLD MANUAL: 3 % (ref 0–6)
ERYTHROCYTE [DISTWIDTH] IN BLOOD BY AUTOMATED COUNT: 14.1 % (ref 11.6–15.1)
GFR SERPL CREATININE-BSD FRML MDRD: 12 ML/MIN/1.73SQ M
GFR SERPL CREATININE-BSD FRML MDRD: 13 ML/MIN/1.73SQ M
GFR SERPL CREATININE-BSD FRML MDRD: 13 ML/MIN/1.73SQ M
GFR SERPL CREATININE-BSD FRML MDRD: 14 ML/MIN/1.73SQ M
GLUCOSE SERPL-MCNC: 139 MG/DL (ref 65–140)
GLUCOSE SERPL-MCNC: 146 MG/DL (ref 65–140)
GLUCOSE SERPL-MCNC: 210 MG/DL (ref 65–140)
GLUCOSE SERPL-MCNC: 216 MG/DL (ref 65–140)
GLUCOSE SERPL-MCNC: 220 MG/DL (ref 65–140)
GLUCOSE SERPL-MCNC: 258 MG/DL (ref 65–140)
GLUCOSE SERPL-MCNC: 265 MG/DL (ref 65–140)
GLUCOSE SERPL-MCNC: 294 MG/DL (ref 65–140)
GLUCOSE SERPL-MCNC: 357 MG/DL (ref 65–140)
GLUCOSE SERPL-MCNC: 363 MG/DL (ref 65–140)
GRAM STN SPEC: ABNORMAL
HCO3 BLDV-SCNC: 16.9 MMOL/L (ref 24–30)
HCO3 BLDV-SCNC: 18.5 MMOL/L (ref 24–30)
HCT VFR BLD AUTO: 32.9 % (ref 36.5–49.3)
HGB BLD-MCNC: 11 G/DL (ref 12–17)
LYMPHOCYTES # BLD AUTO: 13 %
MAGNESIUM SERPL-MCNC: 2.1 MG/DL (ref 1.9–2.7)
MAGNESIUM SERPL-MCNC: 2.1 MG/DL (ref 1.9–2.7)
MAGNESIUM SERPL-MCNC: 2.2 MG/DL (ref 1.9–2.7)
MCH RBC QN AUTO: 31.6 PG (ref 26.8–34.3)
MCHC RBC AUTO-ENTMCNC: 33.4 G/DL (ref 31.4–37.4)
MCV RBC AUTO: 95 FL (ref 82–98)
METAMYELOCYTES NFR BLD MANUAL: 3 % (ref 0–1)
MONOCYTES NFR BLD AUTO: 5 % (ref 4–12)
MYELOCYTES NFR BLD MANUAL: 3 % (ref 0–1)
NEUTS BAND NFR BLD MANUAL: 4 % (ref 0–8)
NEUTS SEG NFR BLD AUTO: 67 %
O2 CT BLDV-SCNC: 14.3 ML/DL
O2 CT BLDV-SCNC: 6.9 ML/DL
PCO2 BLDV: 30.2 MM HG (ref 42–50)
PCO2 BLDV: 34.9 MM HG (ref 42–50)
PH BLDV: 7.34 [PH] (ref 7.3–7.4)
PH BLDV: 7.37 [PH] (ref 7.3–7.4)
PHOSPHATE SERPL-MCNC: 2.4 MG/DL (ref 2.3–4.1)
PHOSPHATE SERPL-MCNC: 2.6 MG/DL (ref 2.3–4.1)
PHOSPHATE SERPL-MCNC: 3.4 MG/DL (ref 2.3–4.1)
PLATELET # BLD AUTO: 300 THOUSANDS/UL (ref 149–390)
PLATELET BLD QL SMEAR: ABNORMAL
PMV BLD AUTO: 9 FL (ref 8.9–12.7)
PO2 BLDV: 23.1 MM HG (ref 35–45)
PO2 BLDV: 53.2 MM HG (ref 35–45)
POTASSIUM SERPL-SCNC: 3.5 MMOL/L (ref 3.5–5.3)
POTASSIUM SERPL-SCNC: 3.6 MMOL/L (ref 3.5–5.3)
POTASSIUM SERPL-SCNC: 3.6 MMOL/L (ref 3.5–5.3)
POTASSIUM SERPL-SCNC: 4.5 MMOL/L (ref 3.5–5.3)
PROCALCITONIN SERPL-MCNC: 5.23 NG/ML
PROT SERPL-MCNC: 6 G/DL (ref 6.4–8.4)
RBC # BLD AUTO: 3.48 MILLION/UL (ref 3.88–5.62)
RBC MORPH BLD: NORMAL
SODIUM SERPL-SCNC: 129 MMOL/L (ref 135–147)
SODIUM SERPL-SCNC: 130 MMOL/L (ref 135–147)
TOTAL CELLS COUNTED SPEC: 100
TOXIC GRANULES BLD QL SMEAR: PRESENT
UUN 24H UR-MCNC: 447 MG/DL
VARIANT LYMPHS # BLD AUTO: 2 % (ref 0–0)
WBC # BLD AUTO: 24.47 THOUSAND/UL (ref 4.31–10.16)

## 2025-03-02 PROCEDURE — 84100 ASSAY OF PHOSPHORUS: CPT

## 2025-03-02 PROCEDURE — 82330 ASSAY OF CALCIUM: CPT

## 2025-03-02 PROCEDURE — 87040 BLOOD CULTURE FOR BACTERIA: CPT | Performed by: NURSE PRACTITIONER

## 2025-03-02 PROCEDURE — 80053 COMPREHEN METABOLIC PANEL: CPT

## 2025-03-02 PROCEDURE — 85730 THROMBOPLASTIN TIME PARTIAL: CPT | Performed by: ANESTHESIOLOGY

## 2025-03-02 PROCEDURE — 99233 SBSQ HOSP IP/OBS HIGH 50: CPT | Performed by: ANESTHESIOLOGY

## 2025-03-02 PROCEDURE — 80048 BASIC METABOLIC PNL TOTAL CA: CPT | Performed by: NURSE PRACTITIONER

## 2025-03-02 PROCEDURE — 94664 DEMO&/EVAL PT USE INHALER: CPT

## 2025-03-02 PROCEDURE — 73700 CT LOWER EXTREMITY W/O DYE: CPT

## 2025-03-02 PROCEDURE — 84145 PROCALCITONIN (PCT): CPT

## 2025-03-02 PROCEDURE — 71045 X-RAY EXAM CHEST 1 VIEW: CPT

## 2025-03-02 PROCEDURE — 94640 AIRWAY INHALATION TREATMENT: CPT

## 2025-03-02 PROCEDURE — 83735 ASSAY OF MAGNESIUM: CPT | Performed by: NURSE PRACTITIONER

## 2025-03-02 PROCEDURE — 82550 ASSAY OF CK (CPK): CPT | Performed by: NURSE PRACTITIONER

## 2025-03-02 PROCEDURE — 82948 REAGENT STRIP/BLOOD GLUCOSE: CPT

## 2025-03-02 PROCEDURE — 82805 BLOOD GASES W/O2 SATURATION: CPT | Performed by: NURSE PRACTITIONER

## 2025-03-02 PROCEDURE — 94760 N-INVAS EAR/PLS OXIMETRY 1: CPT

## 2025-03-02 PROCEDURE — 83735 ASSAY OF MAGNESIUM: CPT

## 2025-03-02 PROCEDURE — 84100 ASSAY OF PHOSPHORUS: CPT | Performed by: NURSE PRACTITIONER

## 2025-03-02 PROCEDURE — 82805 BLOOD GASES W/O2 SATURATION: CPT

## 2025-03-02 PROCEDURE — 85730 THROMBOPLASTIN TIME PARTIAL: CPT | Performed by: NURSE PRACTITIONER

## 2025-03-02 PROCEDURE — 82330 ASSAY OF CALCIUM: CPT | Performed by: NURSE PRACTITIONER

## 2025-03-02 PROCEDURE — 85027 COMPLETE CBC AUTOMATED: CPT

## 2025-03-02 PROCEDURE — 99233 SBSQ HOSP IP/OBS HIGH 50: CPT | Performed by: NURSE PRACTITIONER

## 2025-03-02 PROCEDURE — 85007 BL SMEAR W/DIFF WBC COUNT: CPT | Performed by: NURSE PRACTITIONER

## 2025-03-02 PROCEDURE — 80048 BASIC METABOLIC PNL TOTAL CA: CPT

## 2025-03-02 RX ORDER — INSULIN LISPRO 100 [IU]/ML
20 INJECTION, SOLUTION INTRAVENOUS; SUBCUTANEOUS
Status: DISCONTINUED | OUTPATIENT
Start: 2025-03-02 | End: 2025-03-02

## 2025-03-02 RX ORDER — DILTIAZEM HYDROCHLORIDE 30 MG/1
30 TABLET, FILM COATED ORAL EVERY 6 HOURS SCHEDULED
Status: DISCONTINUED | OUTPATIENT
Start: 2025-03-02 | End: 2025-03-03

## 2025-03-02 RX ORDER — INSULIN GLARGINE 100 [IU]/ML
10 INJECTION, SOLUTION SUBCUTANEOUS EVERY MORNING
Status: DISCONTINUED | OUTPATIENT
Start: 2025-03-02 | End: 2025-03-02

## 2025-03-02 RX ORDER — INSULIN LISPRO 100 [IU]/ML
1-5 INJECTION, SOLUTION INTRAVENOUS; SUBCUTANEOUS
Status: DISCONTINUED | OUTPATIENT
Start: 2025-03-02 | End: 2025-03-02

## 2025-03-02 RX ORDER — CALCIUM GLUCONATE 20 MG/ML
2 INJECTION, SOLUTION INTRAVENOUS ONCE
Status: COMPLETED | OUTPATIENT
Start: 2025-03-02 | End: 2025-03-02

## 2025-03-02 RX ORDER — POTASSIUM CHLORIDE 1500 MG/1
40 TABLET, EXTENDED RELEASE ORAL ONCE
Status: COMPLETED | OUTPATIENT
Start: 2025-03-02 | End: 2025-03-02

## 2025-03-02 RX ORDER — INSULIN LISPRO 100 [IU]/ML
1-6 INJECTION, SOLUTION INTRAVENOUS; SUBCUTANEOUS
Status: DISCONTINUED | OUTPATIENT
Start: 2025-03-02 | End: 2025-03-02

## 2025-03-02 RX ORDER — INSULIN GLARGINE 100 [IU]/ML
60 INJECTION, SOLUTION SUBCUTANEOUS
Status: DISCONTINUED | OUTPATIENT
Start: 2025-03-02 | End: 2025-03-02

## 2025-03-02 RX ORDER — METRONIDAZOLE 500 MG/100ML
500 INJECTION, SOLUTION INTRAVENOUS EVERY 8 HOURS
Status: DISCONTINUED | OUTPATIENT
Start: 2025-03-02 | End: 2025-03-03

## 2025-03-02 RX ORDER — INSULIN LISPRO 100 [IU]/ML
4-20 INJECTION, SOLUTION INTRAVENOUS; SUBCUTANEOUS
Status: DISCONTINUED | OUTPATIENT
Start: 2025-03-02 | End: 2025-03-02

## 2025-03-02 RX ORDER — INSULIN LISPRO 100 [IU]/ML
5-25 INJECTION, SOLUTION INTRAVENOUS; SUBCUTANEOUS
Status: DISCONTINUED | OUTPATIENT
Start: 2025-03-02 | End: 2025-03-02

## 2025-03-02 RX ORDER — CLINDAMYCIN PHOSPHATE 900 MG/50ML
900 INJECTION, SOLUTION INTRAVENOUS EVERY 8 HOURS
Status: DISCONTINUED | OUTPATIENT
Start: 2025-03-02 | End: 2025-03-03

## 2025-03-02 RX ADMIN — Medication 6 MG: at 22:02

## 2025-03-02 RX ADMIN — INSULIN GLARGINE 10 UNITS: 100 INJECTION, SOLUTION SUBCUTANEOUS at 12:14

## 2025-03-02 RX ADMIN — DILTIAZEM HYDROCHLORIDE 5 MG/HR: 5 INJECTION INTRAVENOUS at 13:31

## 2025-03-02 RX ADMIN — POTASSIUM CHLORIDE 40 MEQ: 1500 TABLET, EXTENDED RELEASE ORAL at 17:51

## 2025-03-02 RX ADMIN — INSULIN LISPRO 25 UNITS: 100 INJECTION, SOLUTION INTRAVENOUS; SUBCUTANEOUS at 12:14

## 2025-03-02 RX ADMIN — FOLIC ACID 1 MG: 1 TABLET ORAL at 08:50

## 2025-03-02 RX ADMIN — CHOLECALCIFEROL TAB 25 MCG (1000 UNIT) 2000 UNITS: 25 TAB at 08:50

## 2025-03-02 RX ADMIN — ATORVASTATIN CALCIUM 40 MG: 40 TABLET, FILM COATED ORAL at 16:26

## 2025-03-02 RX ADMIN — VANCOMYCIN HYDROCHLORIDE 125 MG: 125 CAPSULE ORAL at 05:39

## 2025-03-02 RX ADMIN — VANCOMYCIN HYDROCHLORIDE 125 MG: 125 CAPSULE ORAL at 00:17

## 2025-03-02 RX ADMIN — CHLORHEXIDINE GLUCONATE 15 ML: 1.2 SOLUTION ORAL at 08:50

## 2025-03-02 RX ADMIN — INSULIN LISPRO 20 UNITS: 100 INJECTION, SOLUTION INTRAVENOUS; SUBCUTANEOUS at 12:14

## 2025-03-02 RX ADMIN — THIAMINE HCL TAB 100 MG 100 MG: 100 TAB at 08:50

## 2025-03-02 RX ADMIN — DILTIAZEM HYDROCHLORIDE 12.5 MG/HR: 5 INJECTION INTRAVENOUS at 03:45

## 2025-03-02 RX ADMIN — VANCOMYCIN HYDROCHLORIDE 125 MG: 125 CAPSULE ORAL at 12:15

## 2025-03-02 RX ADMIN — CALCIUM GLUCONATE 2 G: 20 INJECTION, SOLUTION INTRAVENOUS at 08:47

## 2025-03-02 RX ADMIN — VANCOMYCIN HYDROCHLORIDE 125 MG: 125 CAPSULE ORAL at 17:51

## 2025-03-02 RX ADMIN — LEVALBUTEROL HYDROCHLORIDE 1.25 MG: 1.25 SOLUTION RESPIRATORY (INHALATION) at 06:09

## 2025-03-02 RX ADMIN — METOPROLOL SUCCINATE 150 MG: 50 TABLET, EXTENDED RELEASE ORAL at 08:50

## 2025-03-02 RX ADMIN — GUAIFENESIN 600 MG: 600 TABLET ORAL at 22:03

## 2025-03-02 RX ADMIN — INSULIN LISPRO 18 UNITS: 100 INJECTION, SOLUTION INTRAVENOUS; SUBCUTANEOUS at 08:49

## 2025-03-02 RX ADMIN — INSULIN LISPRO 16 UNITS: 100 INJECTION, SOLUTION INTRAVENOUS; SUBCUTANEOUS at 08:48

## 2025-03-02 RX ADMIN — CEFTRIAXONE 1000 MG: 1 INJECTION, SOLUTION INTRAVENOUS at 08:47

## 2025-03-02 RX ADMIN — HEPARIN SODIUM 2000 UNITS: 1000 INJECTION INTRAVENOUS; SUBCUTANEOUS at 10:15

## 2025-03-02 RX ADMIN — ASPIRIN 81 MG: 81 TABLET, COATED ORAL at 08:50

## 2025-03-02 RX ADMIN — POTASSIUM CHLORIDE 40 MEQ: 1500 TABLET, EXTENDED RELEASE ORAL at 07:39

## 2025-03-02 RX ADMIN — CHLORHEXIDINE GLUCONATE 15 ML: 1.2 SOLUTION ORAL at 22:01

## 2025-03-02 RX ADMIN — CLINDAMYCIN PHOSPHATE 900 MG: 900 INJECTION, SOLUTION INTRAVENOUS at 21:57

## 2025-03-02 RX ADMIN — SODIUM CHLORIDE 10 UNITS/HR: 9 INJECTION, SOLUTION INTRAVENOUS at 16:26

## 2025-03-02 RX ADMIN — HEPARIN SODIUM 15.1 UNITS/KG/HR: 10000 INJECTION, SOLUTION INTRAVENOUS at 16:14

## 2025-03-02 RX ADMIN — Medication 400 UNITS: at 08:50

## 2025-03-02 RX ADMIN — CEFEPIME 2000 MG: 2 INJECTION, POWDER, FOR SOLUTION INTRAVENOUS at 22:04

## 2025-03-02 RX ADMIN — HEPARIN SODIUM 2000 UNITS: 1000 INJECTION INTRAVENOUS; SUBCUTANEOUS at 17:47

## 2025-03-02 RX ADMIN — DILTIAZEM HYDROCHLORIDE 30 MG: 30 TABLET, FILM COATED ORAL at 22:02

## 2025-03-02 RX ADMIN — GABAPENTIN 200 MG: 100 CAPSULE ORAL at 22:02

## 2025-03-02 RX ADMIN — LEVALBUTEROL HYDROCHLORIDE 1.25 MG: 1.25 SOLUTION RESPIRATORY (INHALATION) at 13:15

## 2025-03-02 RX ADMIN — GUAIFENESIN 600 MG: 600 TABLET ORAL at 08:50

## 2025-03-02 RX ADMIN — HEPARIN SODIUM 2000 UNITS: 1000 INJECTION INTRAVENOUS; SUBCUTANEOUS at 03:40

## 2025-03-02 NOTE — ASSESSMENT & PLAN NOTE
Holding home Losartan given LUIS ALFREDO  Continue home Toprol XL (dose increased due to AF w/RVR) and Amlodipine  Continue to monitor hemodynamics closely

## 2025-03-02 NOTE — ASSESSMENT & PLAN NOTE
Lab Results   Component Value Date    HGBA1C 8.9 (H) 02/26/2025       Recent Labs     03/01/25  1110 03/01/25  1556 03/01/25  2103 03/02/25  0845   POCGLU 257* 273* 225* 357*       Blood Sugar Average: Last 72 hrs:  (P) [id number]    POA AEB Glucose 446, pH 7.218, AG 17 w/bicarb 15, unfortunately no BHB done upon arrival, but urine w/1+ketones  Suspect this is 2/2 noncompliance as well as active infection as noted below  Prescribed 70/30 insulin 20 units BID as OP, however reports that he has not been taking for approximately 2 weeks  Is s/p aggressive volume resuscitation per DKA protocol  DKA insulin gtt transitioned to non-DKA gtt - transitioned to SQ regimen on 02/28  Lantus uptitrated to 55 units qHS  Mealtime insulin increased to 20 units TID  SSI alg increased to 4  Endocrinology consulted - will need follow-up as OP  Continue mealtime/SSI w/ACHS fingersticks  Diabetic diet  Goal  - 180

## 2025-03-02 NOTE — PLAN OF CARE
Problem: Prexisting or High Potential for Compromised Skin Integrity  Goal: Skin integrity is maintained or improved  Description: INTERVENTIONS:  - Identify patients at risk for skin breakdown  - Assess and monitor skin integrity  - Assess and monitor nutrition and hydration status  - Monitor labs   - Assess for incontinence   - Turn and reposition patient  - Assist with mobility/ambulation  - Relieve pressure over bony prominences  - Avoid friction and shearing  - Provide appropriate hygiene as needed including keeping skin clean and dry  - Evaluate need for skin moisturizer/barrier cream  - Collaborate with interdisciplinary team   - Patient/family teaching  - Consider wound care consult   Outcome: Progressing     Problem: PAIN - ADULT  Goal: Verbalizes/displays adequate comfort level or baseline comfort level  Description: Interventions:  - Encourage patient to monitor pain and request assistance  - Assess pain using appropriate pain scale  - Administer analgesics based on type and severity of pain and evaluate response  - Implement non-pharmacological measures as appropriate and evaluate response  - Consider cultural and social influences on pain and pain management  - Notify physician/advanced practitioner if interventions unsuccessful or patient reports new pain  Outcome: Progressing     Problem: INFECTION - ADULT  Goal: Absence or prevention of progression during hospitalization  Description: INTERVENTIONS:  - Assess and monitor for signs and symptoms of infection  - Monitor lab/diagnostic results  - Monitor all insertion sites, i.e. indwelling lines, tubes, and drains  - Monitor endotracheal if appropriate and nasal secretions for changes in amount and color  - [place] appropriate cooling/warming therapies per order  - Administer medications as ordered  - Instruct and encourage patient and family to use good hand hygiene technique  - Identify and instruct in appropriate isolation precautions for  identified infection/condition  Outcome: Progressing     Problem: SAFETY ADULT  Goal: Patient will remain free of falls  Description: INTERVENTIONS:  - Educate patient/family on patient safety including physical limitations  - Instruct patient to call for assistance with activity   - Consult OT/PT to assist with strengthening/mobility   - Keep Call bell within reach  - Keep bed low and locked with side rails adjusted as appropriate  - Keep care items and personal belongings within reach  - Initiate and maintain comfort rounds  - Make Fall Risk Sign visible to staff  - Offer Toileting every 2 Hours, in advance of need  - Initiate/Maintain bed alarm  - Obtain necessary fall risk management equipment  - Apply yellow socks and bracelet for high fall risk patients  - Consider moving patient to room near nurses station  Outcome: Progressing     Problem: CARDIOVASCULAR - ADULT  Goal: Maintains optimal cardiac output and hemodynamic stability  Description: INTERVENTIONS:  - Monitor I/O, vital signs and rhythm  - Monitor for S/S and trends of decreased cardiac output  - Administer and titrate ordered vasoactive medications to optimize hemodynamic stability  - Assess quality of pulses, skin color and temperature  - Assess for signs of decreased coronary artery perfusion  - Instruct patient to report change in severity of symptoms  Outcome: Progressing     Problem: METABOLIC, FLUID AND ELECTROLYTES - ADULT  Goal: Electrolytes maintained within normal limits  Description: INTERVENTIONS:  - Monitor labs and assess patient for signs and symptoms of electrolyte imbalances  - Administer electrolyte replacement as ordered  - Monitor response to electrolyte replacements, including repeat lab results as appropriate  - Instruct patient on fluid and nutrition as appropriate  Outcome: Progressing  Goal: Fluid balance maintained  Description: INTERVENTIONS:  - Monitor labs   - Monitor I/O and WT  - Instruct patient on fluid and nutrition  as appropriate  - Assess for signs & symptoms of volume excess or deficit  Outcome: Progressing  Goal: Glucose maintained within target range  Description: INTERVENTIONS:  - Monitor Blood Glucose as ordered  - Assess for signs and symptoms of hyperglycemia and hypoglycemia  - Administer ordered medications to maintain glucose within target range  - Assess nutritional intake and initiate nutrition service referral as needed  Outcome: Progressing     Problem: MUSCULOSKELETAL - ADULT  Goal: Maintain or return mobility to safest level of function  Description: INTERVENTIONS:  - Assess patient's ability to carry out ADLs; assess patient's baseline for ADL function and identify physical deficits which impact ability to perform ADLs (bathing, care of mouth/teeth, toileting, grooming, dressing, etc.)  - Assess/evaluate cause of self-care deficits   - Assess range of motion  - Assess patient's mobility  - Assess patient's need for assistive devices and provide as appropriate  - Encourage maximum independence but intervene and supervise when necessary  - Involve family in performance of ADLs  - Assess for home care needs following discharge   - Consider OT consult to assist with ADL evaluation and planning for discharge  - Provide patient education as appropriate  Outcome: Progressing     Problem: Nutrition/Hydration-ADULT  Goal: Nutrient/Hydration intake appropriate for improving, restoring or maintaining nutritional needs  Description: Monitor and assess patient's nutrition/hydration status for malnutrition. Collaborate with interdisciplinary team and initiate plan and interventions as ordered.  Monitor patient's weight and dietary intake as ordered or per policy. Utilize nutrition screening tool and intervene as necessary. Determine patient's food preferences and provide high-protein, high-caloric foods as appropriate.     INTERVENTIONS:  - Monitor oral intake, urinary output, labs, and treatment plans  - Assess nutrition  and hydration status and recommend course of action  - Evaluate amount of meals eaten  - Assist patient with eating if necessary   - Allow adequate time for meals  - Recommend/ encourage appropriate diets, oral nutritional supplements, and vitamin/mineral supplements  - Order, calculate, and assess calorie counts as needed  - Recommend, monitor, and adjust tube feedings and TPN/PPN based on assessed needs  - Assess need for intravenous fluids  - Provide specific nutrition/hydration education as appropriate  - Include patient/family/caregiver in decisions related to nutrition  Outcome: Progressing

## 2025-03-02 NOTE — ASSESSMENT & PLAN NOTE
Suspect this is multifactorial in setting of ? vascular congestion vs ? underlying COPD given 20 year smoking history vs atelectasis/deconditioning  Attempt volume removal if able   Continue scheduled Xopenex nebs   Aggressive pulmonary hygiene  Wean O2 for SpO2 > 88%

## 2025-03-02 NOTE — ASSESSMENT & PLAN NOTE
Notes some diarrhea prior to admission, however had worsened shortly after admission - now improved  CDIF PCR positive, however EIA negative  Enteric stool panel negative  Continue PO Vanco D#3/14 as noted above

## 2025-03-02 NOTE — ASSESSMENT & PLAN NOTE
Ramirez cath placed on 02/28 for urinary retention - removed on 03/01, however required reinsertion  Likely in setting of severe sepsis/acute illness  Plan for voiding trial when status improves  Monitor I/O closely

## 2025-03-02 NOTE — ASSESSMENT & PLAN NOTE
Lab Results   Component Value Date    EGFR 13 03/02/2025    EGFR 13 03/02/2025    EGFR 14 03/02/2025    CREATININE 4.24 (H) 03/02/2025    CREATININE 4.05 (H) 03/02/2025    CREATININE 4.03 (H) 03/02/2025   Baseline creatinine appears to be 1.1-1.3 mg/dL since 2022.  Follows with PCP for management.  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  Grade A2 albuminuria, no RBCs/UA bland when checked in steady state.  Kidneys without obstruction on unenhanced imaging.  Now with acute kidney injury creatinine unchanged high threes, low fours.      Suspect etiology ischemic ATN in setting of severe sepsis, volume and hemodynamic perturbations (s/p crystalloid/colloid/diuretic), vasomotor dysfunction due to ARB, urinary retention status post Ramirez.  CK minimally elevated upon presentation and likely noncontributory to LUIS ALFREDO.  UA significant for glucosuria, 2+ protein, 1-2 RBC, 1-2 coarse granular casts.    Ramirez catheter reinserted last evening.  Agree with ICU team-hold diuretics/volume expansion and allow equilibration.  Patient in no extremis.  Patient is nonoliguric with large amount of urine in Ramirez catheter bag.  No objection to voiding trial at this point.  Continue to hold Jardiance.  Continue to hold ARB.

## 2025-03-02 NOTE — ASSESSMENT & PLAN NOTE
New onset - likely brought on by acute illness, sepsis, renal failure  Developed AF w/RVR during day on 03/01  Home Toprol XL increased from 100 mg daily to 150 mg w/out improvement in HR  Is s/p Cardizem bolus and gtt initiation on 03/01 - continue for goal HR < 100  Heparin gtt started for systemic AC  Continue cardiac monitoring

## 2025-03-02 NOTE — ASSESSMENT & PLAN NOTE
Creatinine 2.52 on admission - now up to 3.9  Baseline creatinine appears to be around 1.1 to 1.3  Suspect this is multifactorial in setting of DKA, intravascular hypovolemia, severe sepsis in setting of bacteremia/bilateral LE cellulitis, rhabdomyolysis, now w/likely component of ATN  FeNa 0.5%/FeUrea 30% from 02/27 --> prerenal disease  FeUrea from 03/01 pending  Is s/p aggressive volume resuscitation w/both crystalloid and colloid for intravascular depletion as well as IV diuresis in setting of extravascular overload  Nephrology consulted for worsening renal function despite treatment as noted above  Avoid nephrotoxic agents and hypotension  Trend renal indices  Strict I/O  Daily weights

## 2025-03-02 NOTE — ASSESSMENT & PLAN NOTE
Serum sodium corrects around 133 millimoles per liter for glucose.  Continue gentle fluid restriction.

## 2025-03-02 NOTE — ASSESSMENT & PLAN NOTE
POA AEB fever, tachycardia, tachypnea, leukocytosis, elevated lactic acidosis  Suspect this is 2/2 bacteremia in setting of bilateral LE cellulitis vs diarrheal illness/? CDIF  Imaging revealed liquid stool within visualized colon concerning for diarrhea disease and extensive circumferential skin thickening and subcutaneous edema involving bilateral lower legs compatible with cellulitis   COVID/FLU/RSV negative   1 out of 2 Blood cultures w/Strep Pyogenes (Group A)  UA w/out evidence of infection  MRSA swab negative  Bilateral LE wound cultures w/Staph aureus and Strep Pyogenes - sensitivities pending  CDIF PCR positive, but EIA negative  Enteric stool panel negative   Lactic acid 2.4 - has since cleared  Procalcitonin 9.27 > 18.89 >> 8.70 - repeat pending   Is s/p aggressive volume resuscitation w/both crystalloid and colloid for intravascular depletion as well as IV diuresis in setting of extravascular overload  Continue IV Ceftriaxone D#1 (total abx D#5) for Strep Pyogenes bacteremia/bilateral LE cellulitis  Continue PO Vanco D#3/14 despite EIA negative as patient initially had persistent diarrhea and continues on broad-spectrum antibiotics  Trend fever and WBC curve

## 2025-03-02 NOTE — PLAN OF CARE
Problem: PAIN - ADULT  Goal: Verbalizes/displays adequate comfort level or baseline comfort level  Description: Interventions:  - Encourage patient to monitor pain and request assistance  - Assess pain using appropriate pain scale  - Administer analgesics based on type and severity of pain and evaluate response  - Implement non-pharmacological measures as appropriate and evaluate response  - Consider cultural and social influences on pain and pain management  - Notify physician/advanced practitioner if interventions unsuccessful or patient reports new pain  Outcome: Progressing     Problem: INFECTION - ADULT  Goal: Absence or prevention of progression during hospitalization  Description: INTERVENTIONS:  - Assess and monitor for signs and symptoms of infection  - Monitor lab/diagnostic results  - Monitor all insertion sites, i.e. indwelling lines, tubes, and drains  - Monitor endotracheal if appropriate and nasal secretions for changes in amount and color  - [place] appropriate cooling/warming therapies per order  - Administer medications as ordered  - Instruct and encourage patient and family to use good hand hygiene technique  - Identify and instruct in appropriate isolation precautions for identified infection/condition  Outcome: Progressing

## 2025-03-02 NOTE — ASSESSMENT & PLAN NOTE
Drinks 1-2 glasses of [place] per day  Last drank 4 weeks ago?  Continue folic acid/thiamine supplementation  CIWA protocol  Seizure precautions

## 2025-03-02 NOTE — PROGRESS NOTES
[facility] [place]   Nadeem Purdy Jr. 69 y.o. male MRN: [id number]  Unit/Bed#: ICU 03-01 Encounter: [id number]      Brief summary of hospitalization: 68 yo man with CKD 3B, T2DM, HTN, obesity presented 2/26 with lower extremity edema treated for DKA, HTN urgency, UR, sepsis due to 1/2 blood culture strep pyogenes in setting of lower extremity.  Nephrology following for LUIS ALFREDO    Assessment & Plan  Acute renal failure superimposed on stage 3a chronic kidney disease (HCC)  Lab Results   Component Value Date    EGFR 13 03/02/2025    EGFR 13 03/02/2025    EGFR 14 03/02/2025    CREATININE 4.24 (H) 03/02/2025    CREATININE 4.05 (H) 03/02/2025    CREATININE 4.03 (H) 03/02/2025   Baseline creatinine appears to be 1.1-1.3 mg/dL since 2022.  Follows with PCP for management.  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  Grade A2 albuminuria, no RBCs/UA bland when checked in steady state.  Kidneys without obstruction on unenhanced imaging.  Now with acute kidney injury creatinine unchanged high threes, low fours.      Suspect etiology ischemic ATN in setting of severe sepsis, volume and hemodynamic perturbations (s/p crystalloid/colloid/diuretic), vasomotor dysfunction due to ARB, urinary retention status post Ramirez.  CK minimally elevated upon presentation and likely noncontributory to LUIS ALFREDO.  UA significant for glucosuria, 2+ protein, 1-2 RBC, 1-2 coarse granular casts.    Ramirez catheter reinserted last evening.  Agree with ICU team-hold diuretics/volume expansion and allow equilibration.  Patient in no extremis.  Patient is nonoliguric with large amount of urine in Ramirez catheter bag.  No objection to voiding trial at this point.  Continue to hold Jardiance.  Continue to hold ARB.  Diabetic ketoacidosis without coma associated with type 2 diabetes mellitus (HCC)  Lab Results   Component Value Date    HGBA1C 8.9 (H) 02/26/2025        Recent Labs     03/01/25  2103 03/02/25  0845 03/02/25  1116 03/02/25  1613   POCGLU 225* 357* 363* 294*       Blood Sugar Average: Last 72 hrs:  (P) [id number]  Status post aggressive volume resuscitation and DKA protocol per ICU team.  Endocrinology was consulted and he will require follow-up in the outpatient setting.  Patient was previously on an SGLT2 inhibitor-would not resume on discharge.  Unclear if he was still supposed to be taking this in the outpatient setting  Essential hypertension  Blood pressure appropriate.  Continue to hold ARB.  Hyponatremia  Serum sodium corrects around 133 millimoles per liter for glucose.  Continue gentle fluid restriction.  Urinary retention  Ramirez catheter was discontinued subsequently reinserted last evening.    Metabolic acidosis  Repeat BMP shows improvement in acidosis.  No indication for exogenous bicarb at this point.  Severe sepsis (HCC)  1/2 blood culture positive strep pyogenes group A.  Receiving ceftriaxone and p.o. vancomycin per ICU team.  Bacteremia    Atrial fibrillation with RVR (HCC)  Rate controlled on exam      I have reviewed the nephrology recommendations including diuresis and volume expansion and allow equal liberation, with ICU team, and we are in agreement with renal plan including the information outlined above.    SUBJECTIVE / 24H INTERVAL HISTORY:  Examined sitting upright in chair.  Denies dizziness, chest pain, shortness of breath, diarrhea.  Ramirez catheter intact draining clear yellow urine.    Historical Information   Past Medical History:   Diagnosis Date    Hypertension      Past Surgical History:   Procedure Laterality Date    CATARACT EXTRACTION, BILATERAL Bilateral      Social History   Social History     Substance and Sexual Activity   Alcohol Use Yes    Alcohol/week: 4.0 standard drinks of alcohol    Types: 4 Shots of liquor per week     Social History     Substance and Sexual Activity   Drug Use Never     Social History      Tobacco Use   Smoking Status Former   Smokeless Tobacco Never       Family History:   History reviewed. No pertinent family history.    Medications:  Pertinent medications were reviewed  Current Facility-Administered Medications   Medication Dose Route Frequency Provider Last Rate    acetaminophen  650 mg Oral Q6H PRN JIMMY Darnell      albuterol  2.5 mg Nebulization Q6H PRN JIMMY Darnell      aspirin  81 mg Oral Daily JIMMY Darnell      atorvastatin  40 mg Oral Daily With Dinner JIMMY Darnell      azelastine  1 spray Each Nare BID JIMMY Darnell      cefTRIAXone  1,000 mg Intravenous Q24H JIMMY Darnell Stopped (03/02/25 0917)    chlorhexidine  15 mL Mouth/Throat Q12H [facility] JIMMY Darnell      Cholecalciferol  2,000 Units Oral Daily JIMMY Darnell      diltiazem  1-15 mg/hr Intravenous Titrated JIMMY Guzman 5 mg/hr (03/02/25 1331)    folic acid  1 mg Oral Daily JIMMY Darnell      gabapentin  200 mg Oral HS JIMMY Darnell      guaiFENesin  600 mg Oral Q12H [facility] JIMMY Darnell      heparin (porcine)  3-20 Units/kg/hr (Order-Specific) Intravenous Titrated JIMMY Guzman 15.1 Units/kg/hr (03/02/25 1614)    heparin (porcine)  2,000 Units Intravenous Q6H PRN JIMMY Guzman      heparin (porcine)  4,000 Units Intravenous Q6H PRN JIMMY Guzman      insulin regular (HumuLIN R,NovoLIN R) 1 Units/mL in sodium chloride 0.9 % 100 mL infusion  0.3-21 Units/hr Intravenous Titrated JIMMY Darnell 10 Units/hr (03/02/25 1626)    levalbuterol  1.25 mg Nebulization TID JIMMY Guzman      melatonin  6 mg Oral HS JIMMY Darnell      metoprolol succinate  150 mg Oral Daily JIMMY Darnell      thiamine  100 mg Oral Daily JIMMY Darnell      vancomycin oral (capsules or solution)  125 mg Oral Q6H [facility] JIMMY Darnell      vitamin E  "(tocopherol)  400 Units Oral Daily JIMMY Darnell           No Known Allergies      Vitals:   /70   Pulse 95   Temp 97.8 °F (36.6 °C) (Temporal)   Resp 22   Ht 5' 11\" (1.803 m)   Wt (!) 141 kg (310 lb 6.5 oz)   SpO2 96%   BMI 43.29 kg/m²   Body mass index is 43.29 kg/m².  SpO2: 96 %,   SpO2 Activity: At Rest,   O2 Device: None (Room air)      Intake/Output Summary (Last 24 hours) at 3/2/2025 1714  Last data filed at 3/2/2025 1626  Gross per 24 hour   Intake 1933.44 ml   Output 1450 ml   Net 483.44 ml     Invasive Devices       Peripheral Intravenous Line  Duration             Peripheral IV 02/26/25 Right Antecubital 4 days    Peripheral IV 03/01/25 Left;Ventral (anterior) Forearm <1 day    Peripheral IV 03/02/25 Left Antecubital <1 day              Drain  Duration             Urethral Catheter Latex 18 Fr. <1 day                    Physical Exam  General: conscious, cooperative, in no acute distress  Eyes: conjunctivae pink, anicteric sclerae  ENT: lips and mucous membranes moist  Neck: supple, no JVD, no masses  Chest: Diminished breath sounds bilaterally, no crackles, ronchus or wheezings  CVS: S1 & S2, normal rate, irregular rhythm  Abdomen: soft, non-tender, non-distended, normoactive bowel sounds obese  Extremities: Moderate to severe edema of both legs  Skin: no rash  Neuro: awake, alert, oriented    Diagnostic Data:  Lab: I have personally reviewed pertinent lab results.  CBC:  Results from last 7 days   Lab Units 03/02/25  0539   WBC Thousand/uL 24.47*   HEMOGLOBIN g/dL 11.0*   HEMATOCRIT % 32.9*   PLATELETS Thousands/uL 300      CMP:   Lab Results   Component Value Date    SODIUM 130 (L) 03/02/2025    K 3.6 03/02/2025     03/02/2025    CO2 19 (L) 03/02/2025    BUN 72 (H) 03/02/2025    CREATININE 4.24 (H) 03/02/2025    CALCIUM 8.4 03/02/2025    AST 61 (H) 03/02/2025    ALT 45 03/02/2025    ALKPHOS 47 03/02/2025    EGFR 13 03/02/2025   ,   PT/INR:   Lab Results   Component Value " "Date    INR 1.30 (H) 03/01/2025   ,   Magnesium: No components found for: \"MAG\",  Phosphorous:   Lab Results   Component Value Date    PHOS 2.4 03/02/2025       Microbiology:  @[email or web address],(urinecx:7)@        JIMMY Lund    Portions of the record may have been created with voice recognition software. Occasional wrong word or \"sound a like\" substitutions may have occurred due to the inherent limitations of voice recognition software. Read the chart carefully and recognize, using context, where substitutions have occurred.  "

## 2025-03-02 NOTE — ASSESSMENT & PLAN NOTE
AG 17 w/bicarb 16 on admission   Suspect this is multifactorial in setting of DKA, renal failure, diarrhea, intravascular depletion in setting of sepsis  APAP/ASA negative  Continue to trend acidosis closely

## 2025-03-02 NOTE — ASSESSMENT & PLAN NOTE
Has bilateral lymphedema at baseline w/chronic open wounds   TTE revealed EF 50% w/increased wall thickness; trace MR/TR/CO  Prescribed Lasix as OP  Wound care team following  Continue treatment for bilateral LE cellulitis as noted above

## 2025-03-02 NOTE — ASSESSMENT & PLAN NOTE
Lab Results   Component Value Date    HGBA1C 8.9 (H) 02/26/2025       Recent Labs     03/01/25  2103 03/02/25  0845 03/02/25  1116 03/02/25  1613   POCGLU 225* 357* 363* 294*       Blood Sugar Average: Last 72 hrs:  (P) [id number]  Status post aggressive volume resuscitation and DKA protocol per ICU team.  Endocrinology was consulted and he will require follow-up in the outpatient setting.  Patient was previously on an SGLT2 inhibitor-would not resume on discharge.  Unclear if he was still supposed to be taking this in the outpatient setting

## 2025-03-02 NOTE — PROGRESS NOTES
Progress Note - Critical Care/ICU   Name: Nadeem Purdy Jr. 69 y.o. male I MRN: [id number]  Unit/Bed#: ICU 03-01 I Date of Admission: 2/26/2025   Date of Service: 3/2/2025 I Hospital Day: 4      Assessment & Plan  Diabetic ketoacidosis without coma associated with type 2 diabetes mellitus (HCC)  Lab Results   Component Value Date    HGBA1C 8.9 (H) 02/26/2025       Recent Labs     03/01/25  1110 03/01/25  1556 03/01/25  2103 03/02/25  0845   POCGLU 257* 273* 225* 357*       Blood Sugar Average: Last 72 hrs:  (P) [id number]    POA AEB Glucose 446, pH 7.218, AG 17 w/bicarb 15, unfortunately no BHB done upon arrival, but urine w/1+ketones  Suspect this is 2/2 noncompliance as well as active infection as noted below  Prescribed 70/30 insulin 20 units BID as OP, however reports that he has not been taking for approximately 2 weeks  Is s/p aggressive volume resuscitation per DKA protocol  DKA insulin gtt transitioned to non-DKA gtt - transitioned to SQ regimen on 02/28  Lantus uptitrated to 55 units qHS  Mealtime insulin increased to 20 units TID  SSI alg increased to 4  Endocrinology consulted - will need follow-up as OP  Continue mealtime/SSI w/ACHS fingersticks  Diabetic diet  Goal  - 180  Severe sepsis (HCC)  POA AEB fever, tachycardia, tachypnea, leukocytosis, elevated lactic acidosis  Suspect this is 2/2 bacteremia in setting of bilateral LE cellulitis vs diarrheal illness/? CDIF  Imaging revealed liquid stool within visualized colon concerning for diarrhea disease and extensive circumferential skin thickening and subcutaneous edema involving bilateral lower legs compatible with cellulitis   COVID/FLU/RSV negative   1 out of 2 Blood cultures w/Strep Pyogenes (Group A)  UA w/out evidence of infection  MRSA swab negative  Bilateral LE wound cultures w/Staph aureus and Strep Pyogenes - sensitivities pending  CDIF PCR positive, but EIA negative  Enteric stool panel negative   Lactic acid 2.4 - has since  cleared  Procalcitonin 9.27 > 18.89 >> 8.70 - repeat pending   Is s/p aggressive volume resuscitation w/both crystalloid and colloid for intravascular depletion as well as IV diuresis in setting of extravascular overload  Continue IV Ceftriaxone D#1 (total abx D#5) for Strep Pyogenes bacteremia/bilateral LE cellulitis  Continue PO Vanco D#3/14 despite EIA negative as patient initially had persistent diarrhea and continues on broad-spectrum antibiotics  Trend fever and WBC curve  Cellulitis of lower extremity  Bilateral LE wound culture w/Staph aureus and Strep Pyogenes  Please see plan as noted above  Bacteremia  Suspect source is bilateral LE cellulitis  1 out of 2 Blood cultures w/Strep Pyogenes (Group A)  Please see plan as noted above  Clostridium difficile diarrhea  Notes some diarrhea prior to admission, however had worsened shortly after admission - now improved  CDIF PCR positive, however EIA negative  Enteric stool panel negative  Continue PO Vanco D#3/14 as noted above  Acute renal failure superimposed on stage 3a chronic kidney disease (HCC)  Creatinine 2.52 on admission - now up to 3.9  Baseline creatinine appears to be around 1.1 to 1.3  Suspect this is multifactorial in setting of DKA, intravascular hypovolemia, severe sepsis in setting of bacteremia/bilateral LE cellulitis, rhabdomyolysis, now w/likely component of ATN  FeNa 0.5%/FeUrea 30% from 02/27 --> prerenal disease  FeUrea from 03/01 pending  Is s/p aggressive volume resuscitation w/both crystalloid and colloid for intravascular depletion as well as IV diuresis in setting of extravascular overload  Nephrology consulted for worsening renal function despite treatment as noted above  Avoid nephrotoxic agents and hypotension  Trend renal indices  Strict I/O  Daily weights  Non-traumatic rhabdomyolysis (Resolved: 3/1/2025)  Mild, likely in setting of hypovolemia, severe sepsis/bacteremia, statin use  Is s/p aggressive volume resuscitation  Continue  to trend daily  Hyponatremia  Corrected Na 127 on admission  Likely in setting of severe sepsis and decreased PO intake w/underlying alcohol abuse as well as intravascular depletion/extravascular overload  Continue to trend Na - goal for no more than 6-8 mEq change over next 24H  Metabolic acidosis  AG 17 w/bicarb 16 on admission   Suspect this is multifactorial in setting of DKA, renal failure, diarrhea, intravascular depletion in setting of sepsis  APAP/ASA negative  Continue to trend acidosis closely  Elevated troponin  Troponin 64 >> 58  Suspect this is demand in setting of severe sepsis/bacteremia vs DKA/dehydration  ECG w/out ischemic changes  Denies CP  Continue cardiac monitoring - no need to further trend troponin  Urinary retention  Ramirez cath placed on 02/28 for urinary retention - removed on 03/01, however required reinsertion  Likely in setting of severe sepsis/acute illness  Plan for voiding trial when status improves  Monitor I/O closely  Essential hypertension  Holding home Losartan given LUIS ALFREDO  Continue home Toprol XL (dose increased due to AF w/RVR) and Amlodipine  Continue to monitor hemodynamics closely  Hypercholesteremia  Continue home statin  Class 3 severe obesity due to excess calories with serious comorbidity and body mass index (BMI) of 40.0 to 44.9 in adult ([facility])  Encourage healthy lifestyle modifications   Lymphedema of both lower extremities  Has bilateral lymphedema at baseline w/chronic open wounds   TTE revealed EF 50% w/increased wall thickness; trace MR/TR/IA  Prescribed Lasix as OP  Wound care team following  Continue treatment for bilateral LE cellulitis as noted above  Alcohol abuse  Drinks 1-2 glasses of [place] per day  Last drank 4 weeks ago?  Continue folic acid/thiamine supplementation  [facility] protocol  Seizure precautions  Atrial fibrillation with RVR ([facility])  New onset - likely brought on by acute illness, sepsis, renal failure  Developed AF w/RVR during day on 03/01  Home  Toprol XL increased from 100 mg daily to 150 mg w/out improvement in HR  Is s/p Cardizem bolus and gtt initiation on 03/01 - continue for goal HR < 100  Heparin gtt started for systemic AC  Continue cardiac monitoring  Acute respiratory insufficiency  Suspect this is multifactorial in setting of ? vascular congestion vs ? underlying COPD given 20 year smoking history vs atelectasis/deconditioning  Attempt volume removal if able   Continue scheduled Xopenex nebs   Aggressive pulmonary hygiene  Wean O2 for SpO2 > 88%  Disposition: Stepdown Level 1    ICU Core Measures     A: Assess, Prevent, and Manage Pain Has pain been assessed? Yes  Need for changes to pain regimen? No   B: Both SAT/SAT  N/A   C: Choice of Sedation RASS Goal: N/A patient not on sedation  Need for changes to sedation or analgesia regimen? NA   D: Delirium CAM-ICU: Negative   E: Early Mobility  Plan for early mobility? Yes   F: Family Engagement Plan for family engagement today? Yes       Antibiotic Review: Continue broad spectrum secondary to severity of illness.     Review of Invasive Devices:    Ramirez Plan: Continue for accurate I/O monitoring for 48 hours        Prophylaxis:  VTE VTE covered by:  heparin (porcine), Intravenous, 15.1 Units/kg/hr at 03/02/25 1015  heparin (porcine), Intravenous, 2,000 Units at 03/02/25 1015  heparin (porcine), Intravenous       Stress Ulcer  not ordered         24 Hour Events : Yesterday, new A-fib, beta-blocker increased.  Nephrology consulted for LUIS ALFREDO management. signed out to [facility] service.  Overnight transferred back to critical care service for A-fib with RVR and placed on a Cardizem and heparin drip.  Diuretics held in the setting of worsening renal function.  Nebulizers added for wheezing.       Subjective   Review of Systems: Review of Systems   Respiratory: Negative.     Cardiovascular: Negative.    Gastrointestinal: Negative.  Negative for diarrhea.   Genitourinary:  Positive for difficulty urinating.    Musculoskeletal: Negative.    Skin:  Positive for wound.   Neurological: Negative.    Psychiatric/Behavioral: Negative.         Objective :                   Vitals I/O      Most Recent Min/Max in 24hrs   Temp 98.5 °F (36.9 °C) Temp  Min: 97.9 °F (36.6 °C)  Max: 98.5 °F (36.9 °C)   Pulse (!) 106 Pulse  Min: 87  Max: 125   Resp (!) 24 Resp  Min: 21  Max: 31   /69 BP  Min: 120/69  Max: 162/74   O2 Sat 95 % SpO2  Min: 93 %  Max: 98 %      Intake/Output Summary (Last 24 hours) at 3/2/2025 1110  Last data filed at 3/2/2025 0901  Gross per 24 hour   Intake 1198.42 ml   Output 1095 ml   Net 103.42 ml       Diet Cardiovascular; Cardiac; Consistent Carbohydrate Diet Level 2 (5 carb servings/75 grams CHO/meal), Fluid Restriction 1800 ML    Invasive Monitoring           Physical Exam   Physical Exam  Vitals and nursing note reviewed.   Eyes:      Pupils: Pupils are equal, round, and reactive to light.   Skin:     General: Skin is warm and dry.      Capillary Refill: Capillary refill takes less than 2 seconds.   HENT:      Head: Normocephalic.      Mouth/Throat:      Mouth: Mucous membranes are moist.   Cardiovascular:      Rate and Rhythm: Normal rate. Rhythm irregular.      Pulses: Normal pulses.      Heart sounds: Normal heart sounds.   Abdominal:      Palpations: Abdomen is soft.   Constitutional:       General: He is not in acute distress.  Pulmonary:      Effort: Tachypnea present.      Breath sounds: Wheezing present.   Psychiatric:         Mood and Affect: Mood and affect normal.   Neurological:      Mental Status: He is alert and oriented to person, place and time. He is calm. He is CAM ICU negative.      Motor: Strength full and intact in all extremities.   Genitourinary/Anorectal:  Ramirez present.        Diagnostic Studies        Lab Results: I have reviewed the following results:     Medications:  Scheduled PRN   aspirin, 81 mg, Daily  atorvastatin, 40 mg, Daily With Dinner  azelastine, 1 spray,  BID  cefTRIAXone, 1,000 mg, Q24H  chlorhexidine, 15 mL, Q12H MARGARITA  Cholecalciferol, 2,000 Units, Daily  folic acid, 1 mg, Daily  gabapentin, 200 mg, HS  guaiFENesin, 600 mg, Q12H MARGARITA  insulin glargine, 60 Units, HS  insulin lispro, 1-5 Units, HS  insulin lispro, 18 Units, TID With Meals  insulin lispro, 4-20 Units, TID AC  levalbuterol, 1.25 mg, TID  melatonin, 6 mg, HS  metoprolol succinate, 150 mg, Daily  thiamine, 100 mg, Daily  vancomycin oral (capsules or solution), 125 mg, Q6H MARGARITA  vitamin E (tocopherol), 400 Units, Daily      acetaminophen, 650 mg, Q6H PRN  albuterol, 2.5 mg, Q6H PRN  heparin (porcine), 2,000 Units, Q6H PRN  heparin (porcine), 4,000 Units, Q6H PRN       Continuous    diltiazem, 1-15 mg/hr, Last Rate: 5 mg/hr (03/02/25 1100)  heparin (porcine), 3-20 Units/kg/hr (Order-Specific), Last Rate: 15.1 Units/kg/hr (03/02/25 1015)         Labs:   CBC    Recent Labs     03/01/25  0519 03/01/25  2100 03/02/25  0539   WBC 19.12*  --  24.47*   HGB 10.7*  --  11.0*   HCT 31.8*  --  32.9*     --  300   BANDSPCT  --  5  --      BMP    Recent Labs     03/02/25  0013 03/02/25  0539   SODIUM 130* 129*   K 3.5 3.6    102   CO2 15* 15*   AGAP 12 12   BUN 68* 71*   CREATININE 4.03* 4.05*   CALCIUM 8.2* 8.0*       Coags    Recent Labs     03/01/25  1936 03/02/25  0258 03/02/25  0909   INR 1.30*  --   --    PTT 34 46* 49*        Additional Electrolytes  Recent Labs     03/02/25  0013 03/02/25  0539   MG 2.1 2.1   PHOS 2.6 3.4   CAIONIZED 1.12 1.09*          Blood Gas    No recent results  Recent Labs     03/02/25  0539   PHVEN 7.365   DWH5BRB 30.2*   PO2VEN 53.2*   [id number] 16.9*   BEVEN -7.3   [id number] 85.4*    LFTs  Recent Labs     03/01/25  0519 03/02/25  0539   ALT 32 45   AST 54* 61*   ALKPHOS 45 47   ALB 2.9* 2.8*   TBILI 0.54 0.46       Infectious  Recent Labs     03/01/25  0519 03/02/25  0539   PROCALCITONI 8.70* 5.23*     Glucose  Recent Labs     03/01/25  0519 03/01/25  1549 03/02/25  0013  03/02/25  0539   GLUC 196* 249* 210* 258*

## 2025-03-02 NOTE — ASSESSMENT & PLAN NOTE
Suspect source is bilateral LE cellulitis  1 out of 2 Blood cultures w/Strep Pyogenes (Group A)  Please see plan as noted above

## 2025-03-02 NOTE — ASSESSMENT & PLAN NOTE
1/2 blood culture positive strep pyogenes group A.  Receiving ceftriaxone and p.o. vancomycin per ICU team.

## 2025-03-03 PROBLEM — R19.7 DIARRHEA: Status: ACTIVE | Noted: 2025-03-03

## 2025-03-03 LAB
ALBUMIN SERPL BCG-MCNC: 2.5 G/DL (ref 3.5–5)
ALP SERPL-CCNC: 45 U/L (ref 34–104)
ALT SERPL W P-5'-P-CCNC: 70 U/L (ref 7–52)
ANION GAP SERPL CALCULATED.3IONS-SCNC: 10 MMOL/L (ref 4–13)
ANION GAP SERPL CALCULATED.3IONS-SCNC: 9 MMOL/L (ref 4–13)
ANION GAP SERPL CALCULATED.3IONS-SCNC: 9 MMOL/L (ref 4–13)
APTT PPP: 44 SECONDS (ref 23–34)
APTT PPP: 46 SECONDS (ref 23–34)
APTT PPP: 55 SECONDS (ref 23–34)
APTT PPP: >210 SECONDS (ref 23–34)
ARTERIAL PATENCY WRIST A: YES
AST SERPL W P-5'-P-CCNC: 100 U/L (ref 13–39)
BACTERIA BLD CULT: NORMAL
BASE EX.OXY STD BLDV CALC-SCNC: 81.7 % (ref 60–80)
BASE EXCESS BLDA CALC-SCNC: -7.5 MMOL/L
BASE EXCESS BLDV CALC-SCNC: -14.9 MMOL/L
BILIRUB DIRECT SERPL-MCNC: 0.06 MG/DL (ref 0–0.2)
BILIRUB SERPL-MCNC: 0.4 MG/DL (ref 0.2–1)
BUN SERPL-MCNC: 71 MG/DL (ref 5–25)
BUN SERPL-MCNC: 72 MG/DL (ref 5–25)
BUN SERPL-MCNC: 74 MG/DL (ref 5–25)
CA-I BLD-SCNC: 0.98 MMOL/L (ref 1.12–1.32)
CALCIUM SERPL-MCNC: 7.8 MG/DL (ref 8.4–10.2)
CALCIUM SERPL-MCNC: 8.2 MG/DL (ref 8.4–10.2)
CALCIUM SERPL-MCNC: 8.3 MG/DL (ref 8.4–10.2)
CHLORIDE SERPL-SCNC: 103 MMOL/L (ref 96–108)
CHLORIDE SERPL-SCNC: 104 MMOL/L (ref 96–108)
CHLORIDE SERPL-SCNC: 105 MMOL/L (ref 96–108)
CO2 SERPL-SCNC: 15 MMOL/L (ref 21–32)
CO2 SERPL-SCNC: 16 MMOL/L (ref 21–32)
CO2 SERPL-SCNC: 16 MMOL/L (ref 21–32)
CREAT SERPL-MCNC: 4.07 MG/DL (ref 0.6–1.3)
CREAT SERPL-MCNC: 4.2 MG/DL (ref 0.6–1.3)
CREAT SERPL-MCNC: 4.32 MG/DL (ref 0.6–1.3)
ERYTHROCYTE [DISTWIDTH] IN BLOOD BY AUTOMATED COUNT: 14.4 % (ref 11.6–15.1)
GFR SERPL CREATININE-BSD FRML MDRD: 12 ML/MIN/1.73SQ M
GFR SERPL CREATININE-BSD FRML MDRD: 13 ML/MIN/1.73SQ M
GFR SERPL CREATININE-BSD FRML MDRD: 13 ML/MIN/1.73SQ M
GLUCOSE SERPL-MCNC: 102 MG/DL (ref 65–140)
GLUCOSE SERPL-MCNC: 108 MG/DL (ref 65–140)
GLUCOSE SERPL-MCNC: 150 MG/DL (ref 65–140)
GLUCOSE SERPL-MCNC: 161 MG/DL (ref 65–140)
GLUCOSE SERPL-MCNC: 167 MG/DL (ref 65–140)
GLUCOSE SERPL-MCNC: 171 MG/DL (ref 65–140)
GLUCOSE SERPL-MCNC: 180 MG/DL (ref 65–140)
GLUCOSE SERPL-MCNC: 202 MG/DL (ref 65–140)
GLUCOSE SERPL-MCNC: 203 MG/DL (ref 65–140)
GLUCOSE SERPL-MCNC: 246 MG/DL (ref 65–140)
GLUCOSE SERPL-MCNC: 266 MG/DL (ref 65–140)
GLUCOSE SERPL-MCNC: 300 MG/DL (ref 65–140)
GLUCOSE SERPL-MCNC: 301 MG/DL (ref 65–140)
GLUCOSE SERPL-MCNC: 301 MG/DL (ref 65–140)
GLUCOSE SERPL-MCNC: 362 MG/DL (ref 65–140)
HCO3 BLDA-SCNC: 15.7 MMOL/L (ref 22–28)
HCO3 BLDV-SCNC: 13.3 MMOL/L (ref 24–30)
HCT VFR BLD AUTO: 30.7 % (ref 36.5–49.3)
HGB BLD-MCNC: 10.3 G/DL (ref 12–17)
INR PPP: 1.3 (ref 0.85–1.19)
LACTATE SERPL-SCNC: 1.3 MMOL/L (ref 0.5–2)
MAGNESIUM SERPL-MCNC: 2.1 MG/DL (ref 1.9–2.7)
MAGNESIUM SERPL-MCNC: 2.1 MG/DL (ref 1.9–2.7)
MCH RBC QN AUTO: 31.8 PG (ref 26.8–34.3)
MCHC RBC AUTO-ENTMCNC: 33.6 G/DL (ref 31.4–37.4)
MCV RBC AUTO: 95 FL (ref 82–98)
NON VENT ROOM AIR: 21 %
O2 CT BLDA-SCNC: 16.1 ML/DL (ref 16–23)
O2 CT BLDV-SCNC: 13.3 ML/DL
OXYHGB MFR BLDA: 94 % (ref 94–97)
PCO2 BLDA: 25.5 MM HG (ref 36–44)
PCO2 BLDV: 39.4 MM HG (ref 42–50)
PH BLDA: 7.41 [PH] (ref 7.35–7.45)
PH BLDV: 7.14 [PH] (ref 7.3–7.4)
PHOSPHATE SERPL-MCNC: 2.4 MG/DL (ref 2.3–4.1)
PHOSPHATE SERPL-MCNC: 3.1 MG/DL (ref 2.3–4.1)
PLATELET # BLD AUTO: 386 THOUSANDS/UL (ref 149–390)
PMV BLD AUTO: 9.3 FL (ref 8.9–12.7)
PO2 BLDA: 76.8 MM HG (ref 75–129)
PO2 BLDV: 55.6 MM HG (ref 35–45)
POTASSIUM SERPL-SCNC: 3.9 MMOL/L (ref 3.5–5.3)
POTASSIUM SERPL-SCNC: 4 MMOL/L (ref 3.5–5.3)
POTASSIUM SERPL-SCNC: 4.3 MMOL/L (ref 3.5–5.3)
PROCALCITONIN SERPL-MCNC: 2.99 NG/ML
PROT SERPL-MCNC: 6.1 G/DL (ref 6.4–8.4)
PROTHROMBIN TIME: 16.7 SECONDS (ref 12.3–15)
RBC # BLD AUTO: 3.24 MILLION/UL (ref 3.88–5.62)
SODIUM SERPL-SCNC: 128 MMOL/L (ref 135–147)
SODIUM SERPL-SCNC: 129 MMOL/L (ref 135–147)
SODIUM SERPL-SCNC: 130 MMOL/L (ref 135–147)
SPECIMEN SOURCE: ABNORMAL
WBC # BLD AUTO: 28.19 THOUSAND/UL (ref 4.31–10.16)

## 2025-03-03 PROCEDURE — 97530 THERAPEUTIC ACTIVITIES: CPT

## 2025-03-03 PROCEDURE — 80076 HEPATIC FUNCTION PANEL: CPT | Performed by: NURSE PRACTITIONER

## 2025-03-03 PROCEDURE — 94664 DEMO&/EVAL PT USE INHALER: CPT

## 2025-03-03 PROCEDURE — 84100 ASSAY OF PHOSPHORUS: CPT | Performed by: NURSE PRACTITIONER

## 2025-03-03 PROCEDURE — 82948 REAGENT STRIP/BLOOD GLUCOSE: CPT

## 2025-03-03 PROCEDURE — 85730 THROMBOPLASTIN TIME PARTIAL: CPT | Performed by: NURSE PRACTITIONER

## 2025-03-03 PROCEDURE — 83735 ASSAY OF MAGNESIUM: CPT | Performed by: NURSE PRACTITIONER

## 2025-03-03 PROCEDURE — 80048 BASIC METABOLIC PNL TOTAL CA: CPT | Performed by: NURSE PRACTITIONER

## 2025-03-03 PROCEDURE — 87077 CULTURE AEROBIC IDENTIFY: CPT | Performed by: NURSE PRACTITIONER

## 2025-03-03 PROCEDURE — 94640 AIRWAY INHALATION TREATMENT: CPT

## 2025-03-03 PROCEDURE — 94760 N-INVAS EAR/PLS OXIMETRY 1: CPT

## 2025-03-03 PROCEDURE — 87205 SMEAR GRAM STAIN: CPT | Performed by: NURSE PRACTITIONER

## 2025-03-03 PROCEDURE — 82805 BLOOD GASES W/O2 SATURATION: CPT | Performed by: NURSE PRACTITIONER

## 2025-03-03 PROCEDURE — 36600 WITHDRAWAL OF ARTERIAL BLOOD: CPT

## 2025-03-03 PROCEDURE — 99233 SBSQ HOSP IP/OBS HIGH 50: CPT | Performed by: INTERNAL MEDICINE

## 2025-03-03 PROCEDURE — 99222 1ST HOSP IP/OBS MODERATE 55: CPT | Performed by: SURGERY

## 2025-03-03 PROCEDURE — 83605 ASSAY OF LACTIC ACID: CPT | Performed by: NURSE PRACTITIONER

## 2025-03-03 PROCEDURE — 82330 ASSAY OF CALCIUM: CPT | Performed by: NURSE PRACTITIONER

## 2025-03-03 PROCEDURE — G0545 PR INHERENT VISIT TO INPT: HCPCS | Performed by: INTERNAL MEDICINE

## 2025-03-03 PROCEDURE — 85610 PROTHROMBIN TIME: CPT | Performed by: NURSE PRACTITIONER

## 2025-03-03 PROCEDURE — 85027 COMPLETE CBC AUTOMATED: CPT | Performed by: NURSE PRACTITIONER

## 2025-03-03 PROCEDURE — 85730 THROMBOPLASTIN TIME PARTIAL: CPT | Performed by: INTERNAL MEDICINE

## 2025-03-03 PROCEDURE — 87186 SC STD MICRODIL/AGAR DIL: CPT | Performed by: NURSE PRACTITIONER

## 2025-03-03 PROCEDURE — 84145 PROCALCITONIN (PCT): CPT | Performed by: NURSE PRACTITIONER

## 2025-03-03 PROCEDURE — 87070 CULTURE OTHR SPECIMN AEROBIC: CPT | Performed by: NURSE PRACTITIONER

## 2025-03-03 PROCEDURE — 99223 1ST HOSP IP/OBS HIGH 75: CPT | Performed by: INTERNAL MEDICINE

## 2025-03-03 PROCEDURE — 97110 THERAPEUTIC EXERCISES: CPT

## 2025-03-03 RX ORDER — CEFAZOLIN SODIUM 1 G/50ML
1000 SOLUTION INTRAVENOUS EVERY 8 HOURS
Status: DISCONTINUED | OUTPATIENT
Start: 2025-03-04 | End: 2025-03-03

## 2025-03-03 RX ORDER — CEFAZOLIN SODIUM 2 G/50ML
2000 SOLUTION INTRAVENOUS EVERY 8 HOURS
Status: DISCONTINUED | OUTPATIENT
Start: 2025-03-03 | End: 2025-03-03

## 2025-03-03 RX ORDER — ALBUMIN (HUMAN) 12.5 G/50ML
12.5 SOLUTION INTRAVENOUS EVERY 6 HOURS
Status: COMPLETED | OUTPATIENT
Start: 2025-03-03 | End: 2025-03-04

## 2025-03-03 RX ORDER — CEFAZOLIN SODIUM 1 G/50ML
1000 SOLUTION INTRAVENOUS EVERY 8 HOURS
Status: DISCONTINUED | OUTPATIENT
Start: 2025-03-03 | End: 2025-03-06

## 2025-03-03 RX ORDER — METOPROLOL TARTRATE 50 MG
50 TABLET ORAL EVERY 12 HOURS SCHEDULED
Status: DISCONTINUED | OUTPATIENT
Start: 2025-03-03 | End: 2025-03-11 | Stop reason: HOSPADM

## 2025-03-03 RX ORDER — CALCIUM GLUCONATE 20 MG/ML
2 INJECTION, SOLUTION INTRAVENOUS ONCE
Status: COMPLETED | OUTPATIENT
Start: 2025-03-03 | End: 2025-03-03

## 2025-03-03 RX ADMIN — ALBUMIN (HUMAN) 12.5 G: 0.25 INJECTION, SOLUTION INTRAVENOUS at 06:48

## 2025-03-03 RX ADMIN — Medication 400 UNITS: at 08:19

## 2025-03-03 RX ADMIN — METRONIDAZOLE 500 MG: 500 INJECTION, SOLUTION INTRAVENOUS at 00:04

## 2025-03-03 RX ADMIN — GUAIFENESIN 600 MG: 600 TABLET ORAL at 21:30

## 2025-03-03 RX ADMIN — CEFEPIME 1000 MG: 1 INJECTION, POWDER, FOR SOLUTION INTRAMUSCULAR; INTRAVENOUS at 10:49

## 2025-03-03 RX ADMIN — FOLIC ACID 1 MG: 1 TABLET ORAL at 08:19

## 2025-03-03 RX ADMIN — SODIUM BICARBONATE 50 MEQ: 84 INJECTION, SOLUTION INTRAVENOUS at 05:55

## 2025-03-03 RX ADMIN — METOPROLOL SUCCINATE 150 MG: 50 TABLET, EXTENDED RELEASE ORAL at 08:19

## 2025-03-03 RX ADMIN — CHLORHEXIDINE GLUCONATE 15 ML: 1.2 SOLUTION ORAL at 08:20

## 2025-03-03 RX ADMIN — LEVALBUTEROL HYDROCHLORIDE 1.25 MG: 1.25 SOLUTION RESPIRATORY (INHALATION) at 20:35

## 2025-03-03 RX ADMIN — CEFAZOLIN SODIUM 1000 MG: 1 SOLUTION INTRAVENOUS at 18:51

## 2025-03-03 RX ADMIN — SODIUM BICARBONATE 35 ML/HR: 84 INJECTION, SOLUTION INTRAVENOUS at 14:30

## 2025-03-03 RX ADMIN — GABAPENTIN 200 MG: 100 CAPSULE ORAL at 21:30

## 2025-03-03 RX ADMIN — SODIUM BICARBONATE 35 ML/HR: 84 INJECTION, SOLUTION INTRAVENOUS at 05:56

## 2025-03-03 RX ADMIN — HEPARIN SODIUM 15.1 UNITS/KG/HR: 10000 INJECTION, SOLUTION INTRAVENOUS at 05:39

## 2025-03-03 RX ADMIN — METOPROLOL TARTRATE 50 MG: 50 TABLET, FILM COATED ORAL at 21:30

## 2025-03-03 RX ADMIN — CLINDAMYCIN PHOSPHATE 900 MG: 900 INJECTION, SOLUTION INTRAVENOUS at 05:55

## 2025-03-03 RX ADMIN — ASPIRIN 81 MG: 81 TABLET, COATED ORAL at 08:19

## 2025-03-03 RX ADMIN — VANCOMYCIN HYDROCHLORIDE 125 MG: 125 CAPSULE ORAL at 05:55

## 2025-03-03 RX ADMIN — CHLORHEXIDINE GLUCONATE 15 ML: 1.2 SOLUTION ORAL at 21:30

## 2025-03-03 RX ADMIN — HEPARIN SODIUM 20 UNITS/KG/HR: 10000 INJECTION, SOLUTION INTRAVENOUS at 21:31

## 2025-03-03 RX ADMIN — VANCOMYCIN HYDROCHLORIDE 2000 MG: 1 INJECTION, POWDER, LYOPHILIZED, FOR SOLUTION INTRAVENOUS at 00:03

## 2025-03-03 RX ADMIN — HEPARIN SODIUM 2000 UNITS: 1000 INJECTION INTRAVENOUS; SUBCUTANEOUS at 18:50

## 2025-03-03 RX ADMIN — CALCIUM GLUCONATE 2 G: 20 INJECTION, SOLUTION INTRAVENOUS at 08:23

## 2025-03-03 RX ADMIN — METRONIDAZOLE 500 MG: 500 INJECTION, SOLUTION INTRAVENOUS at 06:00

## 2025-03-03 RX ADMIN — LEVALBUTEROL HYDROCHLORIDE 1.25 MG: 1.25 SOLUTION RESPIRATORY (INHALATION) at 07:24

## 2025-03-03 RX ADMIN — ALBUMIN (HUMAN) 12.5 G: 0.25 INJECTION, SOLUTION INTRAVENOUS at 13:49

## 2025-03-03 RX ADMIN — THIAMINE HCL TAB 100 MG 100 MG: 100 TAB at 08:19

## 2025-03-03 RX ADMIN — ALBUMIN (HUMAN) 12.5 G: 0.25 INJECTION, SOLUTION INTRAVENOUS at 18:29

## 2025-03-03 RX ADMIN — AMIODARONE HYDROCHLORIDE 1 MG/MIN: 50 INJECTION, SOLUTION INTRAVENOUS at 11:47

## 2025-03-03 RX ADMIN — SODIUM CHLORIDE 8 UNITS/HR: 9 INJECTION, SOLUTION INTRAVENOUS at 10:57

## 2025-03-03 RX ADMIN — HEPARIN SODIUM 2000 UNITS: 1000 INJECTION INTRAVENOUS; SUBCUTANEOUS at 14:02

## 2025-03-03 RX ADMIN — DILTIAZEM HYDROCHLORIDE 30 MG: 30 TABLET, FILM COATED ORAL at 05:55

## 2025-03-03 RX ADMIN — DEXTROSE 150 MG: 50 INJECTION, SOLUTION INTRAVENOUS at 11:36

## 2025-03-03 RX ADMIN — SODIUM CHLORIDE 6 UNITS/HR: 9 INJECTION, SOLUTION INTRAVENOUS at 21:30

## 2025-03-03 RX ADMIN — HEPARIN SODIUM 2000 UNITS: 1000 INJECTION INTRAVENOUS; SUBCUTANEOUS at 06:49

## 2025-03-03 RX ADMIN — GUAIFENESIN 600 MG: 600 TABLET ORAL at 08:19

## 2025-03-03 RX ADMIN — LEVALBUTEROL HYDROCHLORIDE 1.25 MG: 1.25 SOLUTION RESPIRATORY (INHALATION) at 13:57

## 2025-03-03 RX ADMIN — ALBUMIN (HUMAN) 12.5 G: 0.25 INJECTION, SOLUTION INTRAVENOUS at 23:57

## 2025-03-03 RX ADMIN — CHOLECALCIFEROL TAB 25 MCG (1000 UNIT) 2000 UNITS: 25 TAB at 08:19

## 2025-03-03 RX ADMIN — ATORVASTATIN CALCIUM 40 MG: 40 TABLET, FILM COATED ORAL at 17:20

## 2025-03-03 RX ADMIN — Medication 6 MG: at 21:30

## 2025-03-03 RX ADMIN — VANCOMYCIN HYDROCHLORIDE 125 MG: 125 CAPSULE ORAL at 00:18

## 2025-03-03 NOTE — ASSESSMENT & PLAN NOTE
Drinks 1-2 glasses of [place] per day  Last drank 4 weeks ago?  Continue folic acid/thiamine supplementation  CIWA protocol  Seizure precautions

## 2025-03-03 NOTE — ASSESSMENT & PLAN NOTE
Creatinine 2.52 on admission - now up to 3.9  Baseline creatinine appears to be around 1.1 to 1.3  Suspect this is multifactorial in setting of DKA, intravascular hypovolemia, severe sepsis in setting of bacteremia/bilateral LE cellulitis, rhabdomyolysis, now w/component of ischemic ATN  FeNa 0.5%/FeUrea 30% from 02/27 --> prerenal disease  FeUrea from 03/01 40.4% --> intrinsic disease  Is s/p aggressive volume resuscitation w/both crystalloid and colloid for intravascular depletion as well as IV diuresis in setting of extravascular overload - low rate bicarb gtt started for worsening metabolic acidosis  Nephrology consulted for worsening renal function despite treatment as noted above  Avoid nephrotoxic agents and hypotension  Trend renal indices  Strict I/O  Daily weights

## 2025-03-03 NOTE — ASSESSMENT & PLAN NOTE
Notes some diarrhea prior to admission, however had worsened shortly after admission - now improved  CDIF PCR positive, however EIA negative  Enteric stool panel negative  Continue PO Vanco D#4/14 as noted above

## 2025-03-03 NOTE — ASSESSMENT & PLAN NOTE
Has bilateral lymphedema at baseline w/chronic open wounds   TTE revealed EF 50% w/increased wall thickness; trace MR/TR/SC  Prescribed Lasix as OP  Wound care team following  Continue treatment for bilateral LE cellulitis as noted above

## 2025-03-03 NOTE — ASSESSMENT & PLAN NOTE
Lab Results   Component Value Date    EGFR 13 03/03/2025    EGFR 12 03/03/2025    EGFR 12 03/02/2025    CREATININE 4.07 (H) 03/03/2025    CREATININE 4.32 (H) 03/03/2025    CREATININE 4.34 (H) 03/02/2025

## 2025-03-03 NOTE — ASSESSMENT & PLAN NOTE
Has bilateral lymphedema at baseline w/chronic open wounds   TTE revealed EF 50% w/increased wall thickness; trace MR/TR/[place]  Prescribed Lasix as OP  Wound care team following  Continue treatment for bilateral LE cellulitis as noted above

## 2025-03-03 NOTE — ASSESSMENT & PLAN NOTE
Ha1c 8.9 percent.  Presented in DKA.  Status post aggressive volume resuscitation and DKA protocol.  Endocrinology is following.  Recommend tight glycemic control

## 2025-03-03 NOTE — ASSESSMENT & PLAN NOTE
Lab Results   Component Value Date    HGBA1C 8.9 (H) 02/26/2025       Recent Labs     03/03/25  0548 03/03/25  0818 03/03/25  1048 03/03/25  1149   POCGLU 161* 202* 301* 300*       Blood Sugar Average: Last 72 hrs:  (P) [id number]

## 2025-03-03 NOTE — ASSESSMENT & PLAN NOTE
Suspect this is multifactorial in setting of ? vascular congestion vs ? underlying COPD given 20 year smoking history vs atelectasis/deconditioning  Continue scheduled Xopenex nebs   Aggressive pulmonary hygiene  Wean O2 for SpO2 > 88%

## 2025-03-03 NOTE — ASSESSMENT & PLAN NOTE
New onset - likely brought on by acute illness, sepsis, renal failure  Developed AF w/RVR during day on 03/01  Home Toprol XL held-metoprolol 50 mg PO BID started  Cardizem gtt and PO discontinued and amiodarone bolus and infusion started with successful conversion to NSR 3/3  Continue Heparin gtt for systemic AC  Consider cardiology consult  Continue cardiac monitoring

## 2025-03-03 NOTE — ASSESSMENT & PLAN NOTE
AG 17 w/bicarb 16 on admission   Suspect this is multifactorial in setting of DKA, renal failure, diarrhea, intravascular depletion in setting of sepsis  APAP/ASA negative  Low rate bicarb gtt started as noted above  Continue to trend acidosis closely

## 2025-03-03 NOTE — CONSULTS
Vancomycin IV Pharmacy-to-Dose Consultation    Vancomycin has been discontinued.  Pharmacy will sign off.  Please contact or re-consult with questions.    Asif Angulo, PharmD, [facility]  Infectious Diseases Clinical Pharmacy Specialist  [phone]

## 2025-03-03 NOTE — ASSESSMENT & PLAN NOTE
In setting of bilateral LE cellulitis (L>R)  1 out of 2 Blood cultures w/Strep Pyogenes (Group A) - repeat blood cultures pending  Please see plan as noted above

## 2025-03-03 NOTE — ASSESSMENT & PLAN NOTE
Drinks 1-2 glasses of [place] per day  Last drank 4 weeks ago?  Continue folic acid/thiamine supplementation  CIWA protocol  Seizure precautions

## 2025-03-03 NOTE — PROGRESS NOTES
Progress Note - Critical Care/ICU   Name: Nadeem Purdy Jr. 69 y.o. male I MRN: [id number]  Unit/Bed#: ICU 03-01 I Date of Admission: 2/26/2025   Date of Service: 3/3/2025 I Hospital Day: 5      Assessment & Plan  Diabetic ketoacidosis without coma associated with type 2 diabetes mellitus (HCC)  Lab Results   Component Value Date    HGBA1C 8.9 (H) 02/26/2025       Recent Labs     03/02/25  2214 03/03/25  0006 03/03/25  0159 03/03/25  0358   POCGLU 139 108 102 150*       Blood Sugar Average: Last 72 hrs:  (P) [id number]    POA AEB Glucose 446, pH 7.218, AG 17 w/bicarb 15, unfortunately no BHB done upon arrival, but urine w/1+ketones  Suspect this is 2/2 noncompliance as well as active infection as noted below  Prescribed 70/30 insulin 20 units BID as OP, however reports that he has not been taking for approximately 2 weeks  Is s/p aggressive volume resuscitation per DKA protocol  DKA insulin gtt transitioned to non-DKA gtt, however did not tolerate SQ regimen even at uptitrated dosing - insulin gtt restarted on 03/02  Continue Q2H fingersticks  Endocrinology consulted - will need follow-up as OP  Diabetic diet  Goal  - 180  Severe sepsis (HCC)  POA AEB fever, tachycardia, tachypnea, leukocytosis, elevated lactic acidosis  Suspect this is 2/2 bacteremia in setting of bilateral LE cellulitis vs diarrheal illness/? CDIF  Imaging revealed liquid stool within visualized colon concerning for diarrhea disease and extensive circumferential skin thickening and subcutaneous edema involving bilateral lower legs compatible with cellulitis   Repeat imaging on 03/02 completed due to extension of erythema, swelling, blistering from L shin to L upper thigh - again no evidence of soft tissue gas  COVID/FLU/RSV negative   1 out of 2 Blood cultures w/Strep Pyogenes (Group A) - repeat blood cultures pending  UA w/out evidence of infection  MRSA swab negative  Bilateral LE wound cultures w/Staph aureus and Strep Pyogenes -  repeat LLE wound culture pending  CDIF PCR positive, but EIA negative  Enteric stool panel negative   Lactic acid 2.4 - has since cleared  Procalcitonin 9.27 > 18.89 >> 2.99   Is s/p aggressive volume resuscitation w/both crystalloid and colloid for intravascular depletion as well as IV diuresis in setting of extravascular overload  IV antibiotics rebroadened last evening given wound extension to IV Clindamycin/Cefepime/Flagyl/Vancomycin, now D#2  Continue PO Vanco D#4/14 despite EIA negative as patient initially had persistent diarrhea and continues on broad-spectrum antibiotics  Trend fever and WBC curve  Cellulitis of lower extremity  Please see plan as noted above  Monitor for worsening extension of cellulitis  Bacteremia  In setting of bilateral LE cellulitis (L>R)  1 out of 2 Blood cultures w/Strep Pyogenes (Group A) - repeat blood cultures pending  Please see plan as noted above  Clostridium difficile diarrhea  Notes some diarrhea prior to admission, however had worsened shortly after admission - now improved  CDIF PCR positive, however EIA negative  Enteric stool panel negative  Continue PO Vanco D#4/14 as noted above  Acute renal failure superimposed on stage 3a chronic kidney disease (HCC)  Creatinine 2.52 on admission - now up to 3.9  Baseline creatinine appears to be around 1.1 to 1.3  Suspect this is multifactorial in setting of DKA, intravascular hypovolemia, severe sepsis in setting of bacteremia/bilateral LE cellulitis, rhabdomyolysis, now w/component of ischemic ATN  FeNa 0.5%/FeUrea 30% from 02/27 --> prerenal disease  FeUrea from 03/01 40.4% --> intrinsic disease  Is s/p aggressive volume resuscitation w/both crystalloid and colloid for intravascular depletion as well as IV diuresis in setting of extravascular overload - low rate bicarb gtt started for worsening metabolic acidosis  Nephrology consulted for worsening renal function despite treatment as noted above  Avoid nephrotoxic agents and  hypotension  Trend renal indices  Strict I/O  Daily weights  Metabolic acidosis  AG 17 w/bicarb 16 on admission   Suspect this is multifactorial in setting of DKA, renal failure, diarrhea, intravascular depletion in setting of sepsis  APAP/ASA negative  Low rate bicarb gtt started as noted above  Continue to trend acidosis closely  Atrial fibrillation with RVR (HCC)  New onset - likely brought on by acute illness, sepsis, renal failure  Developed AF w/RVR during day on 03/01  Home Toprol XL increased from 100 mg daily to 150 mg w/out improvement in HR  Is s/p Cardizem bolus and gtt initiation on 03/01 - continue for goal HR < 100  PO Cardizem 30 mg Q6H started last evening   Continue Heparin gtt for systemic AC  Consider cardiology consult  Continue cardiac monitoring  Hyponatremia  Corrected Na 127 on admission  Likely in setting of severe sepsis and decreased PO intake w/underlying alcohol abuse as well as intravascular depletion/extravascular overload  Continue to trend Na - goal for no more than 6-8 mEq change over next 24H  Elevated troponin  Troponin 64 >> 58  Suspect this is demand in setting of severe sepsis/bacteremia vs DKA/dehydration  ECG w/out ischemic changes  Denies CP  Continue cardiac monitoring - no need to further trend troponin  Acute respiratory insufficiency  Suspect this is multifactorial in setting of ? vascular congestion vs ? underlying COPD given 20 year smoking history vs atelectasis/deconditioning  Continue scheduled Xopenex nebs   Aggressive pulmonary hygiene  Wean O2 for SpO2 > 88%  Urinary retention  Ramirez cath placed on 02/28 for urinary retention - removed on 03/01, however required reinsertion  Likely in setting of severe sepsis/acute illness  Plan for voiding trial when status improves  Monitor I/O closely  Essential hypertension  Holding home Losartan given LUIS ALFREDO  Continue home Toprol XL (dose increased due to AF w/RVR) and Amlodipine - now on Cardizem for AF w/RVR  Continue to  monitor hemodynamics closely  Hypercholesteremia  Continue home statin  Class 3 severe obesity due to excess calories with serious comorbidity and body mass index (BMI) of 40.0 to 44.9 in adult ([facility])  Encourage healthy lifestyle modifications   Lymphedema of both lower extremities  Has bilateral lymphedema at baseline w/chronic open wounds   TTE revealed EF 50% w/increased wall thickness; trace MR/TR/NM  Prescribed Lasix as OP  Wound care team following  Continue treatment for bilateral LE cellulitis as noted above  Alcohol abuse  Drinks 1-2 glasses of [place] per day  Last drank 4 weeks ago?  Continue folic acid/thiamine supplementation  CIWA protocol  Seizure precautions    Disposition: Stepdown Level 1    ICU Core Measures     A: Assess, Prevent, and Manage Pain Has pain been assessed? Yes  Need for changes to pain regimen? No   B: Both SAT/SAT  N/A   C: Choice of Sedation RASS Goal: 0 Alert and Calm  Need for changes to sedation or analgesia regimen? No   D: Delirium CAM-ICU: Negative   E: Early Mobility  Plan for early mobility? Yes   F: Family Engagement Plan for family engagement today? Yes       Antibiotic Review: Awaiting culture results.  and Continue broad spectrum secondary to severity of illness.     Review of Invasive Devices:    Ramirez Plan: Continue for accurate I/O monitoring for 48 hours and Voiding trial after improvement in ambulation         Prophylaxis:  VTE VTE covered by:  heparin (porcine), Intravenous, 15.5 Units/kg/hr at 03/02/25 2200  heparin (porcine), Intravenous, 2,000 Units at 03/02/25 1747  heparin (porcine), Intravenous       Stress Ulcer  not ordered         24 Hour Events :     Antibiotics re-expanded given extension of cellulitis from L shin/knee to L thigh   Imaging repeated of LLE given above - no evidence of soft tissue gas  Repeat blood cultures and repeat LLE wound culture sent  PO Cardizem started - remains on low dose Cardizem gtt  Low rate bicarb gtt started this AM for  worsening acidosis      Subjective   Review of Systems: Review of Systems   Constitutional:  Positive for diaphoresis. Negative for chills and fever.   HENT:  Positive for congestion.    Eyes:  Negative for visual disturbance.   Respiratory:  Negative for shortness of breath.    Cardiovascular:  Positive for leg swelling. Negative for chest pain.   Gastrointestinal:  Negative for abdominal pain, diarrhea, nausea and vomiting.   Genitourinary:         Ramirez cath intact for urinary retention   Musculoskeletal:  Negative for back pain.   Neurological:  Negative for dizziness, light-headedness and headaches.   Psychiatric/Behavioral:  Negative for confusion.        Objective :                   Vitals I/O      Most Recent Min/Max in 24hrs   Temp (!) 96.5 °F (35.8 °C) Temp  Min: 96.5 °F (35.8 °C)  Max: 99.7 °F (37.6 °C)   Pulse 92 Pulse  Min: 87  Max: 124   Resp (!) 24 Resp  Min: 19  Max: 39   /61 BP  Min: 118/56  Max: 159/80   O2 Sat 96 % SpO2  Min: 91 %  Max: 98 %      Intake/Output Summary (Last 24 hours) at 3/3/2025 0417  Last data filed at 3/3/2025 0000  Gross per 24 hour   Intake 2807.96 ml   Output 1175 ml   Net 1632.96 ml       Diet Cardiovascular; Cardiac; Consistent Carbohydrate Diet Level 2 (5 carb servings/75 grams CHO/meal), Fluid Restriction 1800 ML    Invasive Monitoring           Physical Exam   Physical Exam  Vitals and nursing note reviewed.   Eyes:      Extraocular Movements: Extraocular movements intact.      Pupils: Pupils are equal, round, and reactive to light.   Skin:     General: Skin is warm.      Capillary Refill: Capillary refill takes 2 to 3 seconds.      Comments: LLE erythema, swelling, blistering now w/extension into L upper thigh   Cardiovascular:      Rate and Rhythm: Normal rate. Rhythm irregular.      Pulses:           Radial pulses are 2+ on the right side and 2+ on the left side.        Dorsalis pedis pulses are detected w/ Doppler on the right side and detected w/ Doppler on  the left side.      Heart sounds: Normal heart sounds.      Comments: AF  Musculoskeletal:      Right lower le+ Edema present.      Left lower leg: 3+ Edema present.   Abdominal: General: Bowel sounds are normal. There is no distension.      Palpations: Abdomen is soft.      Tenderness: There is no abdominal tenderness.   Constitutional:       General: He is awake.      Appearance: He is ill-appearing and diaphoretic.   Pulmonary:      Effort: Tachypnea present.      Breath sounds: Decreased breath sounds and wheezing present. No rales.      Comments: Expiratory wheezing  Psychiatric:         Behavior: Behavior is cooperative.   Neurological:      General: No focal deficit present.      Mental Status: He is alert and oriented to person, place and time.   Genitourinary/Anorectal:  Ramirez present.        Diagnostic Studies        Lab Results: I have reviewed the following results:     Medications:  Scheduled PRN   aspirin, 81 mg, Daily  atorvastatin, 40 mg, Daily With Dinner  azelastine, 1 spray, BID  cefepime, 1,000 mg, Q12H  chlorhexidine, 15 mL, Q12H MARGARITA  Cholecalciferol, 2,000 Units, Daily  clindamycin, 900 mg, Q8H  diltiazem, 30 mg, Q6H MARGARITA  folic acid, 1 mg, Daily  gabapentin, 200 mg, HS  guaiFENesin, 600 mg, Q12H MARGARITA  levalbuterol, 1.25 mg, TID  melatonin, 6 mg, HS  metoprolol succinate, 150 mg, Daily  metroNIDAZOLE, 500 mg, Q8H  thiamine, 100 mg, Daily  vancomycin oral (capsules or solution), 125 mg, Q6H MARGARITA  vitamin E (tocopherol), 400 Units, Daily      acetaminophen, 650 mg, Q6H PRN  albuterol, 2.5 mg, Q6H PRN  heparin (porcine), 2,000 Units, Q6H PRN  heparin (porcine), 4,000 Units, Q6H PRN  vancomycin, 12.5 mg/kg (Adjusted), Daily PRN       Continuous    diltiazem, 1-15 mg/hr, Last Rate: 2.5 mg/hr (25 1744)  heparin (porcine), 3-20 Units/kg/hr (Order-Specific), Last Rate: 15.5 Units/kg/hr (25 2200)  insulin regular (HumuLIN R,NovoLIN R) 1 Units/mL in sodium chloride 0.9 % 100 mL infusion,  0.3-21 Units/hr, Last Rate: Stopped (03/03/25 0201)         Labs:   CBC    Recent Labs     03/01/25  0519 03/01/25  2100 03/02/25  0539 03/02/25  2149   WBC 19.12*  --  24.47*  --    HGB 10.7*  --  11.0*  --    HCT 31.8*  --  32.9*  --      --  300  --    BANDSPCT  --  5  --  4     BMP    Recent Labs     03/02/25  1636 03/02/25  2149   SODIUM 130* 130*   K 3.6 4.5    103   CO2 19* 18*   AGAP 10 9   BUN 72* 74*   CREATININE 4.24* 4.34*   CALCIUM 8.4 8.2*       Coags    Recent Labs     03/01/25  1936 03/02/25  0258 03/02/25  1636 03/02/25 2149   INR 1.30*  --   --   --    PTT 34   < > 51* 91*    < > = values in this interval not displayed.        Additional Electrolytes  Recent Labs     03/02/25  0539 03/02/25  1636   MG 2.1 2.2   PHOS 3.4 2.4   CAIONIZED 1.09* 1.18          Blood Gas    No recent results  Recent Labs     03/02/25  1832   PHVEN 7.342   ENR9OMP 34.9*   PO2VEN 23.1*   XZD7ILG 18.5*   BEVEN -6.4   T4XUWCL 40.1*    LFTs  Recent Labs     03/01/25  0519 03/02/25  0539   ALT 32 45   AST 54* 61*   ALKPHOS 45 47   ALB 2.9* 2.8*   TBILI 0.54 0.46       Infectious  Recent Labs     03/01/25  0519 03/02/25  0539   PROCALCITONI 8.70* 5.23*     Glucose  Recent Labs     03/02/25  0013 03/02/25  0539 03/02/25  1636 03/02/25  2149   GLUC 210* 258* 265* 146*

## 2025-03-03 NOTE — ASSESSMENT & PLAN NOTE
Reported diarrhea prehospital and briefly following admission.  Diarrhea now resolved.  C. difficile PCR positive, EIA negative.  Patient denies prior history of C. difficile.  No antibiotic exposures prior to admission.  Primary prophylaxis has not been shown to be beneficial.  Discontinue p.o. vancomycin  Monitor stool output

## 2025-03-03 NOTE — ASSESSMENT & PLAN NOTE
History of bilateral lower extremity lymphedema with chronic wounds.  Remains a risk factor for infection, poor wound healing, and recurrent admissions for cellulitis.    Encourage compliance with lower extremity elevation, compression, diuresis.

## 2025-03-03 NOTE — PLAN OF CARE
Problem: Prexisting or High Potential for Compromised Skin Integrity  Goal: Skin integrity is maintained or improved  Description: INTERVENTIONS:  - Identify patients at risk for skin breakdown  - Assess and monitor skin integrity  - Assess and monitor nutrition and hydration status  - Monitor labs   - Assess for incontinence   - Turn and reposition patient  - Assist with mobility/ambulation  - Relieve pressure over bony prominences  - Avoid friction and shearing  - Provide appropriate hygiene as needed including keeping skin clean and dry  - Evaluate need for skin moisturizer/barrier cream  - Collaborate with interdisciplinary team   - Patient/family teaching  - Consider wound care consult   Outcome: Not Progressing     Problem: PAIN - ADULT  Goal: Verbalizes/displays adequate comfort level or baseline comfort level  Description: Interventions:  - Encourage patient to monitor pain and request assistance  - Assess pain using appropriate pain scale  - Administer analgesics based on type and severity of pain and evaluate response  - Implement non-pharmacological measures as appropriate and evaluate response  - Consider cultural and social influences on pain and pain management  - Notify physician/advanced practitioner if interventions unsuccessful or patient reports new pain  Outcome: Not Progressing     Problem: INFECTION - ADULT  Goal: Absence or prevention of progression during hospitalization  Description: INTERVENTIONS:  - Assess and monitor for signs and symptoms of infection  - Monitor lab/diagnostic results  - Monitor all insertion sites, i.e. indwelling lines, tubes, and drains  - Monitor endotracheal if appropriate and nasal secretions for changes in amount and color  - [place] appropriate cooling/warming therapies per order  - Administer medications as ordered  - Instruct and encourage patient and family to use good hand hygiene technique  - Identify and instruct in appropriate isolation precautions for  identified infection/condition  Outcome: Not Progressing  Goal: Absence of fever/infection during neutropenic period  Description: INTERVENTIONS:  - Monitor WBC    Outcome: Not Progressing     Problem: SAFETY ADULT  Goal: Patient will remain free of falls  Description: INTERVENTIONS:  - Educate patient/family on patient safety including physical limitations  - Instruct patient to call for assistance with activity   - Consult OT/PT to assist with strengthening/mobility   - Keep Call bell within reach  - Keep bed low and locked with side rails adjusted as appropriate  - Keep care items and personal belongings within reach  - Initiate and maintain comfort rounds  - Make Fall Risk Sign visible to staff  - Offer Toileting every 2 Hours, in advance of need  - Initiate/Maintain bed alarm  - Obtain necessary fall risk management equipment  - Apply yellow socks and bracelet for high fall risk patients  - Consider moving patient to room near nurses station  Outcome: Not Progressing  Goal: Maintain or return to baseline ADL function  Description: INTERVENTIONS:  -  Assess patient's ability to carry out ADLs; assess patient's baseline for ADL function and identify physical deficits which impact ability to perform ADLs (bathing, care of mouth/teeth, toileting, grooming, dressing, etc.)  - Assess/evaluate cause of self-care deficits   - Assess range of motion  - Assess patient's mobility; develop plan if impaired  - Assess patient's need for assistive devices and provide as appropriate  - Encourage maximum independence but intervene and supervise when necessary  - Involve family in performance of ADLs  - Assess for home care needs following discharge   - Consider OT consult to assist with ADL evaluation and planning for discharge  - Provide patient education as appropriate  Outcome: Not Progressing  Goal: Maintains/Returns to pre admission functional level  Description: INTERVENTIONS:  - Perform AM-PAC 6 Click Basic Mobility/  Daily Activity assessment daily.  - Set and communicate daily mobility goal to care team and patient/family/caregiver.   - Collaborate with rehabilitation services on mobility goals if consulted  - Perform Range of Motion 3 times a day.  - Reposition patient every 2 hours.  - Dangle patient 3 times a day  - Stand patient 3 times a day  - Ambulate patient 3 times a day  - Out of bed to chair 3 times a day   - Out of bed for meals 3 times a day  - Out of bed for toileting  - Record patient progress and toleration of activity level   Outcome: Not Progressing     Problem: DISCHARGE PLANNING  Goal: Discharge to home or other facility with appropriate resources  Description: INTERVENTIONS:  - Identify barriers to discharge w/patient and caregiver  - Arrange for needed discharge resources and transportation as appropriate  - Identify discharge learning needs (meds, wound care, etc.)  - Arrange for interpretive services to assist at discharge as needed  - Refer to Case Management Department for coordinating discharge planning if the patient needs post-hospital services based on physician/advanced practitioner order or complex needs related to functional status, cognitive ability, or social support system  Outcome: Not Progressing     Problem: Knowledge Deficit  Goal: Patient/family/caregiver demonstrates understanding of disease process, treatment plan, medications, and discharge instructions  Description: Complete learning assessment and assess knowledge base.  Interventions:  - Provide teaching at level of understanding  - Provide teaching via preferred learning methods  Outcome: Not Progressing     Problem: CARDIOVASCULAR - ADULT  Goal: Maintains optimal cardiac output and hemodynamic stability  Description: INTERVENTIONS:  - Monitor I/O, vital signs and rhythm  - Monitor for S/S and trends of decreased cardiac output  - Administer and titrate ordered vasoactive medications to optimize hemodynamic stability  - Assess  quality of pulses, skin color and temperature  - Assess for signs of decreased coronary artery perfusion  - Instruct patient to report change in severity of symptoms  Outcome: Not Progressing  Goal: Absence of cardiac dysrhythmias or at baseline rhythm  Description: INTERVENTIONS:  - Continuous cardiac monitoring, vital signs, obtain 12 lead EKG if ordered  - Administer antiarrhythmic and heart rate control medications as ordered  - Monitor electrolytes and administer replacement therapy as ordered  Outcome: Not Progressing     Problem: GASTROINTESTINAL - ADULT  Goal: Minimal or absence of nausea and/or vomiting  Description: INTERVENTIONS:  - Administer IV fluids if ordered to ensure adequate hydration  - Maintain NPO status until nausea and vomiting are resolved  - Nasogastric tube if ordered  - Administer ordered antiemetic medications as needed  - Provide nonpharmacologic comfort measures as appropriate  - Advance diet as tolerated, if ordered  - Consider nutrition services referral to assist patient with adequate nutrition and appropriate food choices  Outcome: Not Progressing  Goal: Maintains adequate nutritional intake  Description: INTERVENTIONS:  - Monitor percentage of each meal consumed  - Identify factors contributing to decreased intake, treat as appropriate  - Assist with meals as needed  - Monitor I&O, weight, and lab values if indicated  - Obtain nutrition services referral as needed  Outcome: Not Progressing     Problem: GENITOURINARY - ADULT  Goal: Maintains or returns to baseline urinary function  Description: INTERVENTIONS:  - Assess urinary function  - Encourage oral fluids to ensure adequate hydration if ordered  - Administer IV fluids as ordered to ensure adequate hydration  - Administer ordered medications as needed  - Offer frequent toileting  - Follow urinary retention protocol if ordered  Outcome: Not Progressing     Problem: METABOLIC, FLUID AND ELECTROLYTES - ADULT  Goal: Electrolytes  maintained within normal limits  Description: INTERVENTIONS:  - Monitor labs and assess patient for signs and symptoms of electrolyte imbalances  - Administer electrolyte replacement as ordered  - Monitor response to electrolyte replacements, including repeat lab results as appropriate  - Instruct patient on fluid and nutrition as appropriate  Outcome: Not Progressing  Goal: Fluid balance maintained  Description: INTERVENTIONS:  - Monitor labs   - Monitor I/O and WT  - Instruct patient on fluid and nutrition as appropriate  - Assess for signs & symptoms of volume excess or deficit  Outcome: Not Progressing  Goal: Glucose maintained within target range  Description: INTERVENTIONS:  - Monitor Blood Glucose as ordered  - Assess for signs and symptoms of hyperglycemia and hypoglycemia  - Administer ordered medications to maintain glucose within target range  - Assess nutritional intake and initiate nutrition service referral as needed  Outcome: Not Progressing     Problem: SKIN/TISSUE INTEGRITY - ADULT  Goal: Incision(s), wounds(s) or drain site(s) healing without S/S of infection  Description: INTERVENTIONS  - Assess and document dressing, incision, wound bed, drain sites and surrounding tissue  - Provide patient and family education  - Perform skin care/dressing changes  Outcome: Not Progressing     Problem: MUSCULOSKELETAL - ADULT  Goal: Maintain or return mobility to safest level of function  Description: INTERVENTIONS:  - Assess patient's ability to carry out ADLs; assess patient's baseline for ADL function and identify physical deficits which impact ability to perform ADLs (bathing, care of mouth/teeth, toileting, grooming, dressing, etc.)  - Assess/evaluate cause of self-care deficits   - Assess range of motion  - Assess patient's mobility  - Assess patient's need for assistive devices and provide as appropriate  - Encourage maximum independence but intervene and supervise when necessary  - Involve family in  performance of ADLs  - Assess for home care needs following discharge   - Consider OT consult to assist with ADL evaluation and planning for discharge  - Provide patient education as appropriate  Outcome: Not Progressing  Goal: Maintain proper alignment of affected body part  Description: INTERVENTIONS:  - Support, maintain and protect limb and body alignment  - Provide patient/ family with appropriate education  Outcome: Not Progressing     Problem: Nutrition/Hydration-ADULT  Goal: Nutrient/Hydration intake appropriate for improving, restoring or maintaining nutritional needs  Description: Monitor and assess patient's nutrition/hydration status for malnutrition. Collaborate with interdisciplinary team and initiate plan and interventions as ordered.  Monitor patient's weight and dietary intake as ordered or per policy. Utilize nutrition screening tool and intervene as necessary. Determine patient's food preferences and provide high-protein, high-caloric foods as appropriate.     INTERVENTIONS:  - Monitor oral intake, urinary output, labs, and treatment plans  - Assess nutrition and hydration status and recommend course of action  - Evaluate amount of meals eaten  - Assist patient with eating if necessary   - Allow adequate time for meals  - Recommend/ encourage appropriate diets, oral nutritional supplements, and vitamin/mineral supplements  - Order, calculate, and assess calorie counts as needed  - Recommend, monitor, and adjust tube feedings and TPN/PPN based on assessed needs  - Assess need for intravenous fluids  - Provide specific nutrition/hydration education as appropriate  - Include patient/family/caregiver in decisions related to nutrition  Outcome: Not Progressing

## 2025-03-03 NOTE — ASSESSMENT & PLAN NOTE
Lab Results   Component Value Date    HGBA1C 8.9 (H) 02/26/2025       Recent Labs     03/03/25  0548 03/03/25  0818 03/03/25  1048 03/03/25  1149   POCGLU 161* 202* 301* 300*       Blood Sugar Average: Last 72 hrs:  (P) [id number]  Status post aggressive volume resuscitation and DKA protocol per ICU team.  Endocrinology was consulted and he will require follow-up in the outpatient setting.  Patient was previously on an SGLT2 inhibitor-would not resume on discharge.  Unclear if he was still supposed to be taking this in the outpatient setting

## 2025-03-03 NOTE — PROGRESS NOTES
Progress Note - Nephrology   Name: Nadeem Purdy Jr. 69 y.o. male I MRN: [id number]  Unit/Bed#: ICU 03-01 I Date of Admission: 2/26/2025   Date of Service: 3/3/2025 I Hospital Day: 5     Assessment & Plan  Acute renal failure superimposed on stage 3a chronic kidney disease (HCC)  Lab Results   Component Value Date    EGFR 13 03/03/2025    EGFR 12 03/03/2025    EGFR 12 03/02/2025    CREATININE 4.07 (H) 03/03/2025    CREATININE 4.32 (H) 03/03/2025    CREATININE 4.34 (H) 03/02/2025   Baseline creatinine appears to be 1.1-1.3 mg/dL since 2022.  Follows with PCP for management.  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  Grade A2 albuminuria, no RBCs/UA bland when checked in steady state.  Kidneys without obstruction on unenhanced imaging.  Now with acute kidney injury creatinine unchanged high threes, low fours.      Suspect etiology ischemic ATN in setting of severe sepsis, volume and hemodynamic perturbations (s/p crystalloid/colloid/diuretic), vasomotor dysfunction due to ARB, urinary retention status post Ramirez.  CK minimally elevated upon presentation and likely noncontributory to LUIS ALFREDO.  UA significant for glucosuria, 2+ protein, 1-2 RBC, 1-2 coarse granular casts.      3/3  Cr trended up to 4.3 and marginal improvement on recent chem to 4.0.  Persistent, slightly worsened, NAGMA noted with TCO2 15. ABG this AM showed compensation. Patient tachypnea on my evaluation and did endorse some dyspnea. Advise to place back on bicarbonate gtt and reassess chemistry later this evening.   Not felt to have acidosis from DKA, currently on insulin gtt and gap closed.   Lactate negative at 1.3.   Suspect patient in ATN and is nonologuric. Can diurese PRN if UOP slows down, but 425 ml so far.   Diabetic ketoacidosis without coma associated with type 2 diabetes mellitus (HCC)  Lab Results   Component Value Date    HGBA1C 8.9 (H) 02/26/2025       Recent Labs     03/03/25  0548  03/03/25  0818 03/03/25  1048 03/03/25  1149   POCGLU 161* 202* 301* 300*       Blood Sugar Average: Last 72 hrs:  (P) [id number]  Status post aggressive volume resuscitation and DKA protocol per ICU team.  Endocrinology was consulted and he will require follow-up in the outpatient setting.  Patient was previously on an SGLT2 inhibitor-would not resume on discharge.  Unclear if he was still supposed to be taking this in the outpatient setting  Essential hypertension  Blood pressure appropriate.  Continue to hold ARB.  Hyponatremia  Serum sodium corrects around 133 millimoles per liter for glucose.  Continue gentle fluid restriction.  Urinary retention  Ramirez catheter was discontinued subsequently reinserted last evening.    Metabolic acidosis  Repeat BMP shows improvement in acidosis.  No indication for exogenous bicarb at this point.  Severe sepsis (HCC)  Antibiotics per prmioary team, currently on Cefazolin.   Bacteremia  Continue management per primary team.   Atrial fibrillation with RVR (HCC)  Rate controlled on exam  Acute respiratory insufficiency      I have reviewed the nephrology recommendations including bicarbonate gtt/LUIS ALFREDO management, with primary, and we are in agreement with renal plan including the information outlined above.     Subjective   Patient seen and examined today. Reports intermittent dyspnea , worsened with lying flat.   Denies chest pain,nausea,vomiting,diarrhea, itchiness, poor appetite.  A complete 10 point review of systems was performed and is otherwise negative.    Objective :  Temp:  [96.5 °F (35.8 °C)-99.7 °F (37.6 °C)] 97.5 °F (36.4 °C)  HR:  [] 101  BP: (113-158)/(56-82) 138/67  Resp:  [19-39] 22  SpO2:  [91 %-96 %] 96 %  O2 Device: None (Room air)  Nasal Cannula O2 Flow Rate (L/min):  [2 L/min] 2 L/min    Current Weight: Weight - Scale: (!) 140 kg (309 lb 8.4 oz)  First Weight: Weight - Scale: (!) 143 kg (315 lb 0.6 oz)  I/O         03/01 0701 03/02 0700 03/02  0701  03/03 0700 03/03 0701  03/04 0700    P.O. 905 1440 320    I.V. (mL/kg) 233.4 (1.7) 419.4 (3) 264.1 (1.9)    IV Piggyback 400 900 50    Total Intake(mL/kg) 1538.4 (10.9) 2759.4 (19.7) 634.1 (4.5)    Urine (mL/kg/hr) 1120 (0.3) 1500 (0.4) 425 (0.5)    Stool 0 0     Total Output 1120 1500 425    Net +418.4 +1259.4 +209.1           Unmeasured Urine Occurrence 1 x      Unmeasured Stool Occurrence 1 x 1 x           Physical Exam  Vitals reviewed.   Constitutional:       General: He is not in acute distress.     Appearance: He is not ill-appearing.   HENT:      Head: Normocephalic and atraumatic.      Nose: Nose normal.      Mouth/Throat:      Mouth: Mucous membranes are moist.      Pharynx: Oropharynx is clear.   Cardiovascular:      Rate and Rhythm: Tachycardia present. Rhythm irregular.      Heart sounds:      No friction rub.   Pulmonary:      Breath sounds: No wheezing or rhonchi.      Comments: Tachypnea     Chest:      Chest wall: No tenderness.   Abdominal:      Tenderness: There is no abdominal tenderness. There is no guarding.   Musculoskeletal:      Right lower leg: Edema present.      Left lower leg: Edema present.   Skin:     Findings: No bruising.      Comments: BL leg dressing   Neurological:      General: No focal deficit present.      Mental Status: He is alert and oriented to person, place, and time. Mental status is at baseline.      Cranial Nerves: No cranial nerve deficit.   Psychiatric:         Mood and Affect: Mood normal.         Behavior: Behavior normal.         Medications:    Current Facility-Administered Medications:     acetaminophen (TYLENOL) tablet 650 mg, 650 mg, Oral, Q6H PRN, JIMMY Darnell, 650 mg at 02/26/25 2322    albumin human (FLEXBUMIN) 25 % injection 12.5 g, 12.5 g, Intravenous, Q6H, JIMMY Guzman, 12.5 g at 03/03/25 0648    albuterol inhalation solution 2.5 mg, 2.5 mg, Nebulization, Q6H PRN, JIMMY Darnell, 2.5 mg at 03/01/25 0527    amiodarone  (CORDARONE) 900 mg in dextrose 5 % 500 mL infusion, 1 mg/min, Intravenous, Continuous, Last Rate: 33.3 mL/hr at 03/03/25 1147, 1 mg/min at 03/03/25 1147 **FOLLOWED BY** amiodarone (CORDARONE) 900 mg in dextrose 5 % 500 mL infusion, 0.5 mg/min, Intravenous, Continuous, JIMMY Rider    aspirin (ECOTRIN LOW STRENGTH) EC tablet 81 mg, 81 mg, Oral, Daily, JIMMY Darnell, 81 mg at 03/03/25 0819    atorvastatin (LIPITOR) tablet 40 mg, 40 mg, Oral, Daily With Dinner, JIMMY Darnell, 40 mg at 03/02/25 1626    azelastine (ASTELIN) 0.1 % nasal spray 1 spray, 1 spray, Each Nare, BID, JIMMY Darnell, 1 spray at 02/27/25 1701    [START ON 3/4/2025] ceFAZolin (ANCEF) IVPB (premix in dextrose) 1,000 mg 50 mL, 1,000 mg, Intravenous, Q8H, Lupillo Alicea PA-C    ceFAZolin (ANCEF) IVPB (premix in dextrose) 2,000 mg 50 mL, 2,000 mg, Intravenous, Q8H, Lupillo Alicea PA-C    chlorhexidine (PERIDEX) 0.12 % oral rinse 15 mL, 15 mL, Mouth/Throat, Q12H MARGARITA, JIMMY Darnell, 15 mL at 03/03/25 0820    Cholecalciferol (VITAMIN D3) tablet 2,000 Units, 2,000 Units, Oral, Daily, JIMMY Darnell, 2,000 Units at 03/03/25 0819    folic acid (FOLVITE) tablet 1 mg, 1 mg, Oral, Daily, JIMMY Darnell, 1 mg at 03/03/25 0819    gabapentin (NEURONTIN) capsule 200 mg, 200 mg, Oral, HS, JIMMY Darnell, 200 mg at 03/02/25 2202    guaiFENesin (MUCINEX) 12 hr tablet 600 mg, 600 mg, Oral, Q12H MARGARITA, JIMMY Darnell, 600 mg at 03/03/25 0819    heparin (porcine) 25,000 units in 0.45% NaCl 250 mL infusion (premix), 3-20 Units/kg/hr (Order-Specific), Intravenous, Titrated, JIMMY Guzman, Last Rate: 15.4 mL/hr at 03/03/25 0621, 17.1 Units/kg/hr at 03/03/25 0621    heparin (porcine) injection 2,000 Units, 2,000 Units, Intravenous, Q6H PRN, JIMMY Guzman, 2,000 Units at 03/03/25 0649    heparin (porcine) injection 4,000 Units, 4,000 Units, Intravenous, Q6H  PRN, JIMMY Guzman    insulin regular (HumuLIN R,NovoLIN R) 1 Units/mL in sodium chloride 0.9 % 100 mL infusion, 0.3-21 Units/hr, Intravenous, Titrated, JIMMY Darnell, Last Rate: 8 mL/hr at 03/03/25 1057, 8 Units/hr at 03/03/25 1057    levalbuterol (XOPENEX) inhalation solution 1.25 mg, 1.25 mg, Nebulization, TID, JIMMY Guzman, 1.25 mg at 03/03/25 0724    melatonin tablet 6 mg, 6 mg, Oral, HS, JIMMY Darnell, 6 mg at 03/02/25 2202    metoprolol tartrate (LOPRESSOR) tablet 50 mg, 50 mg, Oral, Q12H MARGARITA, JIMMY Rider    thiamine tablet 100 mg, 100 mg, Oral, Daily, JIMMY Darnell, 100 mg at 03/03/25 0819    vitamin E (TOCOPHEROL) capsule 400 Units, 400 Units, Oral, Daily, JIMMY Darnell, 400 Units at 03/03/25 0819      Lab Results: I have reviewed the following results:  Results from last 7 days   Lab Units 03/03/25  1021 03/03/25  0546 03/03/25  0435 03/02/25  2149 03/02/25  1636 03/02/25  0539 03/02/25  0013 03/01/25  1549 03/01/25  0519 02/28/25  2020 02/28/25  1225 02/28/25  0518 02/27/25  2214 02/27/25  1818 02/27/25  1206 02/27/25  0552 02/26/25  1412 02/26/25  1114   WBC Thousand/uL  --   --  28.19*  --   --  24.47*  --   --  19.12*  --   --  13.96*  --   --   --  15.92*  --  17.27*   HEMOGLOBIN g/dL  --   --  10.3*  --   --  11.0*  --   --  10.7*  --   --  10.3*  --   --   --  13.3  --  13.9   HEMATOCRIT %  --   --  30.7*  --   --  32.9*  --   --  31.8*  --   --  31.1*  --   --   --  41.6  --  41.4   PLATELETS Thousands/uL  --   --  386  --   --  300  --   --  223  --   --  155  --   --   --  142*  --  164   POTASSIUM mmol/L 3.9 4.3  --  4.5 3.6 3.6 3.5 3.7 4.0 3.6   < > 3.5   < > 3.5   < > 3.9   < > 4.2   CHLORIDE mmol/L 104 105  --  103 101 102 103 100 103 101   < > 102   < > 98   < > 98   < > 88*   CO2 mmol/L 15* 16*  --  18* 19* 15* 15* 19* 15* 20*   < > 15*   < > 18*   < > 13*   < > 16*   BUN mg/dL 72* 74*  --  74* 72* 71* 68* 64* 64* 59*  "  < > 51*   < > 47*   < > 44*   < > 37*   CREATININE mg/dL 4.07* 4.32*  --  4.34* 4.24* 4.05* 4.03* 3.93* 3.70* 3.75*   < > 3.63*   < > 3.52*   < > 3.03*   < > 2.52*   CALCIUM mg/dL 8.2* 7.8*  --  8.2* 8.4 8.0* 8.2* 8.4 8.3* 8.3*   < > 7.7*   < > 8.9   < > 7.3*   < > 8.1*   MAGNESIUM mg/dL  --  2.1  --   --  2.2 2.1 2.1  --  2.2 2.2  --  2.3   < > 2.4   < > 2.4   < >  --    PHOSPHORUS mg/dL  --  3.1  --   --  2.4 3.4 2.6  --  2.9 2.3  --  2.6   < > 2.2*   < > 2.5   < > 2.5   ALBUMIN g/dL  --  2.5*  --   --   --  2.8*  --   --  2.9*  --   --  2.6*  --  3.0*  --   --   --  3.4*    < > = values in this interval not displayed.       Administrative Statements     Portions of the record may have been created with voice recognition software. Occasional wrong word or \"sound a like\" substitutions may have occurred due to the inherent limitations of voice recognition software. Read the chart carefully and recognize, using context, where substitutions have occurred.If you have any questions, please contact the dictating provider.  "

## 2025-03-03 NOTE — ASSESSMENT & PLAN NOTE
LUIS ALFREDO superimposed on CKD.  Baseline creatinine 1.1-1.3.  Likely eat ischemic ATN in the setting of severe sepsis and hemodynamic instability, complicated by vasomotor dysfunction from ARB and urinary retention status post Ramirez placement.  Nephrology is following.    Will renally dose antibiotics.

## 2025-03-03 NOTE — ASSESSMENT & PLAN NOTE
Patient presented with DKA and chronic bilateral lower extremity wounds, nonhealing with concern for bilateral cellulitis.  This is in the setting of uncontrolled bilateral lower extremity lymphedema.  On exam and on exam and after review of clinical media suspect unilateral cellulitis of the left lower extremity.  This has become quite extensive over the course of his hospitalization so far.  Wound culture isolated strep pyogenes and MSSA.  Clinical findings typical of streptococcal cellulitis.  Surgery and wound care following.  No evidence of soft tissue gas or abscess on CT of the left lower extremity.  Continue IV antibiotics as above  Close wound care and surgery follow-up ongoing  Serial skin exams and local wound care  Encourage lower extremity elevation, compression

## 2025-03-03 NOTE — ASSESSMENT & PLAN NOTE
Holding home Losartan given LUIS ALFREDO  Hold home Toprol XL (dose increased due to AF w/RVR) and Amlodipine - now on Metoprolol 50 mg BID  Continue to monitor hemodynamics closely

## 2025-03-03 NOTE — ASSESSMENT & PLAN NOTE
Lab Results   Component Value Date    EGFR 13 03/03/2025    EGFR 12 03/03/2025    EGFR 12 03/02/2025    CREATININE 4.07 (H) 03/03/2025    CREATININE 4.32 (H) 03/03/2025    CREATININE 4.34 (H) 03/02/2025   Baseline creatinine appears to be 1.1-1.3 mg/dL since 2022.  Follows with PCP for management.  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  Grade A2 albuminuria, no RBCs/UA bland when checked in steady state.  Kidneys without obstruction on unenhanced imaging.  Now with acute kidney injury creatinine unchanged high threes, low fours.      Suspect etiology ischemic ATN in setting of severe sepsis, volume and hemodynamic perturbations (s/p crystalloid/colloid/diuretic), vasomotor dysfunction due to ARB, urinary retention status post Ramirez.  CK minimally elevated upon presentation and likely noncontributory to LUIS ALFREDO.  UA significant for glucosuria, 2+ protein, 1-2 RBC, 1-2 coarse granular casts.      3/3  Cr trended up to 4.3 and marginal improvement on recent chem to 4.0.  Persistent, slightly worsened, NAGMA noted with TCO2 15. ABG this AM showed compensation. Patient tachypnea on my evaluation and did endorse some dyspnea. Advise to place back on bicarbonate gtt and reassess chemistry later this evening.   Not felt to have acidosis from DKA, currently on insulin gtt and gap closed.   Lactate negative at 1.3.   Suspect patient in ATN and is nonologuric. Can diurese PRN if UOP slows down, but 425 ml so far.

## 2025-03-03 NOTE — PLAN OF CARE
Problem: PHYSICAL THERAPY ADULT  Goal: Performs mobility at highest level of function for planned discharge setting.  See evaluation for individualized goals.  Description: Treatment/Interventions: Functional transfer training, Therapeutic exercise, Endurance training, Gait training, Bed mobility, Equipment eval/education  Equipment Recommended: Walker       See flowsheet documentation for full assessment, interventions and recommendations.  Outcome: Progressing  Note: Prognosis: Fair     Assessment: Pt seen for PT treatment session this date with interventions consisting of gait training to normalize gait pattern to decrease fall risk and therapeutic exercise to improve endurance to improve functional mobility. Pt agreeable to PT treatment session upon arrival, pt found supine in bed, in no apparent distress. Since previous session, pt has made fair progress as evidenced by requiring increased amount of assistance with mobility  Barriers during this session include SOB and fatigue.  Pt continues to be functioning below baseline level, and remains limited 2* factors listed above and including impaired activity tolerance, impaired  balance, decreased endurance, decreased mobility, and obesity.  Pt prognosis for achieving goals is fair, pending pt progress with hospitalization/medical status improvements, and indicated by motivated to participate in therapy and continued required assistance. PT will continue to see pt during current hospitalization in order to address the deficits listed above and provide interventions consistent w/ POC in effort to achieve goals. Current goals and POC remain appropriate, pt continues to have rehab potential  Upon conclusion pt seated OOB in recliner. The patient's AM-PAC Basic Mobility Inpatient Short Form Raw Score is 7.  A Raw score of less than or equal to 16 suggests the patient may benefit from discharge to post-acute rehabilitation services. Based on patient presentations and  impairments, pt would most appropriately benefit from Level 2 resource intensity upon discharge.  Please also refer to the recommendation of the Physical Therapist for safe discharge planning. RN verbalized pt appropriate for PT session.  Barriers to Discharge:  (limited mobility)     Rehab Resource Intensity Level, PT: II (Moderate Resource Intensity)    See flowsheet documentation for full assessment.

## 2025-03-03 NOTE — ASSESSMENT & PLAN NOTE
Lab Results   Component Value Date    HGBA1C 8.9 (H) 02/26/2025       Recent Labs     03/03/25 2002 03/03/25 2203 03/04/25  0002 03/04/25  0207   POCGLU 167* 180* 138 108       Blood Sugar Average: Last 72 hrs:  (P) [id number]    POA AEB Glucose 446, pH 7.218, AG 17 w/bicarb 15, unfortunately no BHB done upon arrival, but urine w/1+ketones  Suspect this is 2/2 noncompliance as well as active infection as noted below  Prescribed 70/30 insulin 20 units BID as OP, however reports that he has not been taking for approximately 2 weeks  Is s/p aggressive volume resuscitation per DKA protocol  DKA insulin gtt transitioned to non-DKA gtt, however did not tolerate SQ regimen even at uptitrated dosing - insulin gtt restarted on 03/02  Continue Q2H fingersticks  Endocrinology consulted - will need follow-up as OP  Diabetic diet  Goal  - 180

## 2025-03-03 NOTE — ASSESSMENT & PLAN NOTE
1 of 2 sets of admission blood cultures isolated strep pyogenes.  This is most likely due to extensive left lower extremity cellulitis with chronic bilateral lower extremity nonhealing wounds.  Wound culture isolated group A strep and MSSA.  Fortunately patient has no intravascular or prosthetic devices.  Transthoracic echo, adequate study, without significant valvular disease or obvious vegetations.  Discontinue IV cefepime, clindamycin, metronidazole  Start IV cefazolin   Follow-up repeat blood cultures

## 2025-03-03 NOTE — ASSESSMENT & PLAN NOTE
New onset likely precipitated by acute illness, sepsis and renal failure.  Ongoing cardiac management per ICU.

## 2025-03-03 NOTE — ASSESSMENT & PLAN NOTE
Lab Results   Component Value Date    HGBA1C 8.9 (H) 02/26/2025       Recent Labs     03/02/25  2214 03/03/25  0006 03/03/25  0159 03/03/25  0358   POCGLU 139 108 102 150*       Blood Sugar Average: Last 72 hrs:  (P) [id number]    POA AEB Glucose 446, pH 7.218, AG 17 w/bicarb 15, unfortunately no BHB done upon arrival, but urine w/1+ketones  Suspect this is 2/2 noncompliance as well as active infection as noted below  Prescribed 70/30 insulin 20 units BID as OP, however reports that he has not been taking for approximately 2 weeks  Is s/p aggressive volume resuscitation per DKA protocol  DKA insulin gtt transitioned to non-DKA gtt, however did not tolerate SQ regimen even at uptitrated dosing - insulin gtt restarted on 03/02  Continue Q2H fingersticks  Endocrinology consulted - will need follow-up as OP  Diabetic diet  Goal  - 180

## 2025-03-03 NOTE — ASSESSMENT & PLAN NOTE
E/b fever, tachycardia, tachypnea, leukocytosis with lactic acidosis.  Patient presented with DKA.  Sepsis most likely due to bacteremia in the setting of left lower extremity cellulitis.  Imaging on admission also showed concern for diarrheal illness however unlikely primary etiology for septic presentation.  1 of 2 blood cultures on admission positive for group A strep.  Lower extremity cellulitis clinical findings suspicious of streptococcal infection and this is confirmed with wound culture.  Patient has been on various antibiotics since admission.  Fortunately he remains hemodynamically stable off pressors.  Continue antibiotics as adjusted below  Follow-up repeat blood cultures for clearance  Trend temps and hemodynamics  Daily CBC DIF and chemistry to monitor response to treatment

## 2025-03-03 NOTE — ASSESSMENT & PLAN NOTE
New onset - likely brought on by acute illness, sepsis, renal failure  Developed AF w/RVR during day on 03/01  Home Toprol XL increased from 100 mg daily to 150 mg w/out improvement in HR  Is s/p Cardizem bolus and gtt initiation on 03/01 - continue for goal HR < 100  PO Cardizem 30 mg Q6H started last evening   Continue Heparin gtt for systemic AC  Consider cardiology consult  Continue cardiac monitoring

## 2025-03-03 NOTE — PHYSICAL THERAPY NOTE
PT Treatment Note    NAME:  Nadeem Purdy Jr.  DATE: 03/03/25    AGE:   69 y.o.  Mrn:   [id number]  ADMIT DX:  Sinus tachycardia [R00.0]  DKA (diabetic ketoacidosis) (HCC) [E11.10]  LUIS ALFREDO (acute kidney injury) (HCC) [N17.9]  Non compliance w medication regimen [Z91.148]  Visit for wound check [Z51.89]  Bilateral lower extremity edema [R60.0]  Performed at least 2 patient identifiers during session: Name and ID bracelet       03/03/25 0938   PT Last Visit   PT Visit Date 03/03/25   Note Type   Note Type Treatment   Pain Assessment   Pain Assessment Tool 0-10   Pain Score No Pain   Restrictions/Precautions   Weight Bearing Precautions Per Order No   Other Precautions Chair Alarm;Bed Alarm;Contact/isolation;Telemetry;Fall Risk;Multiple lines   General   Chart Reviewed Yes   Family/Caregiver Present No   Cognition   Overall Cognitive Status WFL   Arousal/Participation Alert   Attention Within functional limits   Orientation Level Oriented X4   Memory Within functional limits   Following Commands Follows all commands and directions without difficulty   Bed Mobility   Supine to Sit 2  Maximal assistance   Additional items Assist x 2;Increased time required;LE management;Verbal cues;HOB elevated   Additional Comments pt required increased time to scoot EOB due to fatigue   Transfers   Sit to Stand 2  Maximal assistance   Additional items Assist x 2;Verbal cues;Increased time required   Stand to Sit 2  Maximal assistance   Additional items Assist x 2;Increased time required;Verbal cues;Armrests   Additional Comments pt denied dizziness with transitional movement   Ambulation/Elevation   Gait pattern Improper Weight shift;Decreased foot clearance;Forward Flexion;Short stride;Excessively slow   Gait Assistance 2  Maximal assist   Additional items Assist x 2;Verbal cues   Assistive Device Rolling walker   Distance 5 ft   Ambulation/Elevation Additional Comments pt reproting unsteadiness with ambulation; good safety awareness  noted   Balance   Static Sitting Fair +   Dynamic Sitting Fair   Static Standing Poor +   Dynamic Standing Poor   Ambulatory Poor   Endurance Deficit   Endurance Deficit Yes   Endurance Deficit Description SOB noted; required frequent rest breaks however SpO2 remained above 90%   Activity Tolerance   Activity Tolerance Patient limited by fatigue   Nurse Made Aware RN assisted with session   Exercises   Heelslides Sitting;10 reps;AROM;Bilateral   Glute Sets Sitting;10 reps;AROM;Bilateral   Hip Flexion Sitting;10 reps;AROM;Bilateral   Hip Abduction Sitting;10 reps;AROM;Bilateral   Hip Adduction Sitting;10 reps;AROM;Bilateral   Knee AROM Long Arc Quad Sitting;10 reps;AROM;Bilateral   Assessment   Prognosis Fair   Assessment Pt seen for PT treatment session this date with interventions consisting of gait training to normalize gait pattern to decrease fall risk and therapeutic exercise to improve endurance to improve functional mobility. Pt agreeable to PT treatment session upon arrival, pt found supine in bed, in no apparent distress. Since previous session, pt has made fair progress as evidenced by requiring increased amount of assistance with mobility  Barriers during this session include SOB and fatigue.  Pt continues to be functioning below baseline level, and remains limited 2* factors listed above and including impaired activity tolerance, impaired  balance, decreased endurance, decreased mobility, and obesity.  Pt prognosis for achieving goals is fair, pending pt progress with hospitalization/medical status improvements, and indicated by motivated to participate in therapy and continued required assistance. PT will continue to see pt during current hospitalization in order to address the deficits listed above and provide interventions consistent w/ POC in effort to achieve goals. Current goals and POC remain appropriate, pt continues to have rehab potential  Upon conclusion pt seated OOB in recliner. The  patient's AM-PAC Basic Mobility Inpatient Short Form Raw Score is 7.  A Raw score of less than or equal to 16 suggests the patient may benefit from discharge to post-acute rehabilitation services. Based on patient presentations and impairments, pt would most appropriately benefit from Level 2 resource intensity upon discharge.  Please also refer to the recommendation of the Physical Therapist for safe discharge planning. RN verbalized pt appropriate for PT session.   Barriers to Discharge   (limited mobility)   Goals   Patient Goals to get OOB   LTG Expiration Date 03/10/25   PT Treatment Day 2   Plan   Treatment/Interventions Functional transfer training;Endurance training;Therapeutic exercise;Gait training;Bed mobility;Equipment eval/education   Progress Progressing toward goals   PT Frequency 3-5x/wk   Discharge Recommendation   Rehab Resource Intensity Level, PT II (Moderate Resource Intensity)   Equipment Recommended Walker   Walker Package Recommended Wheeled walker   AM-PAC Basic Mobility Inpatient   Turning in Flat Bed Without Bedrails 2   Lying on Back to Sitting on Edge of Flat Bed Without Bedrails 1   Moving Bed to Chair 1   Standing Up From Chair Using Arms 1   Walk in Room 1   Climb 3-5 Stairs With Railing 1   Basic Mobility Inpatient Raw Score 7   Turning Head Towards Sound 4   Follow Simple Instructions 4   Low Function Basic Mobility Raw Score  15   Low Function Basic Mobility Standardized Score  23.9   [facility] Highest Level Of Mobility   -HLM Goal 2: Bed activities/Dependent transfer   -HLM Achieved 4: Move to chair/commode       Time In: 0915  Time Out: 0938  Total Treatment Minutes: 23    Tatyana Rodriguez, PT

## 2025-03-03 NOTE — ASSESSMENT & PLAN NOTE
POA AEB fever, tachycardia, tachypnea, leukocytosis, elevated lactic acidosis  Suspect this is 2/2 bacteremia in setting of bilateral LE cellulitis vs diarrheal illness/? CDIF  Imaging revealed liquid stool within visualized colon concerning for diarrhea disease and extensive circumferential skin thickening and subcutaneous edema involving bilateral lower legs compatible with cellulitis   Repeat imaging on 03/02 completed due to extension of erythema, swelling, blistering from L shin to L upper thigh - again no evidence of soft tissue gas  COVID/FLU/RSV negative   1 out of 2 Blood cultures w/Strep Pyogenes (Group A) - repeat blood cultures pending  UA w/out evidence of infection  MRSA swab negative  Bilateral LE wound cultures w/Staph aureus and Strep Pyogenes - repeat LLE wound culture pending  CDIF PCR positive, but EIA negative  Enteric stool panel negative   Lactic acid 2.4 - has since cleared  Procalcitonin 9.27 > 18.89 >> 2.99   Is s/p aggressive volume resuscitation w/both crystalloid and colloid for intravascular depletion as well as IV diuresis in setting of extravascular overload  ID consulted   IV antibiotics deescalated antibiotics to Cefazolin  Discontinue PO vanco  Trend fever and WBC curve

## 2025-03-03 NOTE — CONSULTS
Consultation - Infectious Disease   Name: Nadeem Purdy Jr. 69 y.o. male I MRN: [id number]  Unit/Bed#: ICU 03-01 I Date of Admission: 2/26/2025   Date of Service: 3/3/2025 I Hospital Day: 5     Administrative Statements   VIRTUAL CARE DOCUMENTATION:     1. This service was provided via Telemedicine using [email or web address] Kit     2. Parties in the room with patient during teleconsult Patient only    3. Confidentiality My office door was closed     4. Participants No one else was in the room    5. Patient acknowledged consent and understanding of privacy and security of the  Telemedicine consult. I informed the patient that I have reviewed their record in Epic and presented the opportunity for them to ask any questions regarding the visit today.  The patient agreed to participate.    6. I have spent a total time of 45 minutes in caring for this patient on the day of the visit/encounter including Impressions, Counseling / Coordination of care, Documenting in the medical record, Reviewing/placing orders in the medical record (including tests, medications, and/or procedures), Obtaining or reviewing history  , and Communicating with other healthcare professionals , not including the time spent for establishing the audio/video connection.        Inpatient consult to Infectious Diseases  Consult performed by: Lupillo Alicea PA-C  Consult ordered by: JIMMY Rider      Physician Requesting Evaluation: aSntos Mays*   Reason for Evaluation / Principal Problem: Right lower extremity cellulitis    Assessment & Plan  Severe sepsis (HCC)  E/b fever, tachycardia, tachypnea, leukocytosis with lactic acidosis.  Patient presented with DKA.  Sepsis most likely due to bacteremia in the setting of left lower extremity cellulitis.  Imaging on admission also showed concern for diarrheal illness however unlikely primary etiology for septic presentation.  1 of 2 blood cultures on admission positive for group A strep.   Lower extremity cellulitis clinical findings suspicious of streptococcal infection and this is confirmed with wound culture.  Patient has been on various antibiotics since admission.  Fortunately he remains hemodynamically stable off pressors.  Continue antibiotics as adjusted below  Follow-up repeat blood cultures for clearance  Trend temps and hemodynamics  Daily CBC DIF and chemistry to monitor response to treatment  Group A strep bacteremia  1 of 2 sets of admission blood cultures isolated strep pyogenes.  This is most likely due to extensive left lower extremity cellulitis with chronic bilateral lower extremity nonhealing wounds.  Wound culture isolated group A strep and MSSA.  Fortunately patient has no intravascular or prosthetic devices.  Transthoracic echo, adequate study, without significant valvular disease or obvious vegetations.  Discontinue IV cefepime, clindamycin, metronidazole  Start IV cefazolin   Follow-up repeat blood cultures  Cellulitis of lower extremity  Patient presented with DKA and chronic bilateral lower extremity wounds, nonhealing with concern for bilateral cellulitis.  This is in the setting of uncontrolled bilateral lower extremity lymphedema.  On exam and on exam and after review of clinical media suspect unilateral cellulitis of the left lower extremity.  This has become quite extensive over the course of his hospitalization so far.  Wound culture isolated strep pyogenes and MSSA.  Clinical findings typical of streptococcal cellulitis.  Surgery and wound care following.  No evidence of soft tissue gas or abscess on CT of the left lower extremity.  Continue IV antibiotics as above  Close wound care and surgery follow-up ongoing  Serial skin exams and local wound care  Encourage lower extremity elevation, compression  Diarrhea  Reported diarrhea prehospital and briefly following admission.  Diarrhea now resolved.  C. difficile PCR positive, EIA negative.  Patient denies prior history  of C. difficile.  No antibiotic exposures prior to admission.  Primary prophylaxis has not been shown to be beneficial.  Discontinue p.o. vancomycin  Monitor stool output  Diabetic ketoacidosis without coma associated with type 2 diabetes mellitus ([facility])  Ha1c 8.9 percent.  Presented in DKA.  Status post aggressive volume resuscitation and DKA protocol.  Endocrinology is following.  Recommend tight glycemic control  Acute renal failure superimposed on stage 3a chronic kidney disease (HCC)  LUIS ALFREDO superimposed on CKD.  Baseline creatinine 1.1-1.3.  Likely eat ischemic ATN in the setting of severe sepsis and hemodynamic instability, complicated by vasomotor dysfunction from ARB and urinary retention status post Ramirez placement.  Nephrology is following.    Will renally dose antibiotics.  Lymphedema of both lower extremities  History of bilateral lower extremity lymphedema with chronic wounds.  Remains a risk factor for infection, poor wound healing, and recurrent admissions for cellulitis.    Encourage compliance with lower extremity elevation, compression, diuresis.  Class 3 severe obesity due to excess calories with serious comorbidity and body mass index (BMI) of 40.0 to 44.9 in adult ([facility])  BMI greater than 40.  Recommend lifestyle modifications.  Remains a risk factor for infection.  Alcohol abuse  Admits to consumption of 1 to 2 glasses of bourbon a day.  Reports his last beverage was about 4 weeks ago.  Remains on CIWA protocol and supplemented with folic acid and thiamine.  Urinary retention  Ramirez catheter was discontinued however reinserted due to urinary retention.  Atrial fibrillation with RVR ([facility])  New onset likely precipitated by acute illness, sepsis and renal failure.  Ongoing cardiac management per ICU.    I have discussed the above management plan in detail with the primary service.   Infectious Disease service will follow.    Antibiotics:  IV vancomycin  P.o. vancomycin day 4  IV Flagyl Day 1  IV  clindamycin Day 2  IV cefepime day 4    History of Present Illness   Nadeem Purdy Jr. is a 69 y.o. year old male who initially presented on 2/26 with worsening bilateral lower extremity swelling, with polyuria/polydipsia and urinary incontinence for about 2 weeks.  Reported noncompliance with home medications.  Patient met sepsis criteria on admission and ICU was asked to evaluate.  Patient was admitted to ICU for DKA and concern for severe sepsis due to bilateral lower extremity cellulitis.  Imaging initially showed concern for possible diarrheal illness and he reported episodes of diarrhea prior to admission.  This quickly resolved.  Patient also was found to have several metabolic derangements including LUIS ALFREDO on CKD.  Nephrology was involved.  Ramirez catheter was placed due to urinary retention.  Patient was started on IV and p.o. vancomycin and IV cefepime for lower extremity cellulitis.  Patient was started on insulin drip and underwent IV fluid resuscitation.  1 of 2 admission blood cultures isolated group A strep.  Patient's cellulitis continued to worsen over the last 2 days and IV antibiotics were broadened on 3/2-2 3/3.  Infectious disease consultation was requested.  Patient has not required pressors for hemodynamic instability.  On exam today patient is falling asleep sitting in bedside recliner and unable to provide great history.  States he has had lower extremity swelling and wounds for quite some time.  States he is noncompliant with medications but would assume this includes wound care and lower extremity elevation, diuretics as well.  Patient currently denies any fevers or chills.  States his left leg cellulitis seems worse but overall pain is slightly improving.  Reportedly drinks 2 glasses of bourbon a day but has not had an alcoholic beverage in at least 4 weeks per chart review.  Thanks he has had similar episode of cellulitis in the past but is unsure.  Denies intravascular or prosthetic  devices.    A complete review of systems is negative other than that noted in the HPI.    Medical History Review: I have reviewed the patient's PMH, PSH, Social History, Family History, Meds, and Allergies     Objective :  Temp:  [96.5 °F (35.8 °C)-99.7 °F (37.6 °C)] 97.5 °F (36.4 °C)  HR:  [] 101  BP: (113-158)/(56-82) 138/67  Resp:  [19-39] 22  SpO2:  [91 %-96 %] 96 %  O2 Device: None (Room air)  Nasal Cannula O2 Flow Rate (L/min):  [2 L/min] 2 L/min    General:  No acute distress, sitting in bedside recliner, chronically ill-appearing, falling asleep during conversation  Psychiatric:  Awake and alert though falling asleep during conversation, answers questions minimally, affect is flat  HEENT: Mucous membranes dry, neck appears supple, head normal cephalic  Pulmonary: On room air, no dyspnea  Cardiovascular: Irregularly irregular rhythm, tachycardic  Abdomen:  Soft, nontender, obese  Extremities: Bilateral lower extremity lymphedema  Skin:  No rashes though extensive cellulitis with blistering noted to the left lower extremity from ankle to upper thigh.  Clinical media reviewed.  : Ashley patent      Lab Results: I have reviewed the following results:  Results from last 7 days   Lab Units 03/03/25  0435 03/02/25  0539 03/01/25  0519   WBC Thousand/uL 28.19* 24.47* 19.12*   HEMOGLOBIN g/dL 10.3* 11.0* 10.7*   PLATELETS Thousands/uL 386 300 223     Results from last 7 days   Lab Units 03/03/25  1021 03/03/25  0546 03/02/25  2149 03/02/25  1636 03/02/25  0539 03/01/25  1549 03/01/25  0519   SODIUM mmol/L 129* 130* 130*   < > 129*   < > 130*   POTASSIUM mmol/L 3.9 4.3 4.5   < > 3.6   < > 4.0   CHLORIDE mmol/L 104 105 103   < > 102   < > 103   CO2 mmol/L 15* 16* 18*   < > 15*   < > 15*   BUN mg/dL 72* 74* 74*   < > 71*   < > 64*   CREATININE mg/dL 4.07* 4.32* 4.34*   < > 4.05*   < > 3.70*   EGFR ml/min/1.73sq m 13 12 12   < > 13   < > 15   CALCIUM mg/dL 8.2* 7.8* 8.2*   < > 8.0*   < > 8.3*   AST U/L  --  100*   --   --  61*  --  54*   ALT U/L  --  70*  --   --  45  --  32   ALK PHOS U/L  --  45  --   --  47  --  45   ALBUMIN g/dL  --  2.5*  --   --  2.8*  --  2.9*    < > = values in this interval not displayed.     Results from last 7 days   Lab Units 03/03/25  0319 03/02/25  2144 02/27/25 2013 02/27/25  1942 02/27/25  0537 02/26/25  1114   BLOOD CULTURE   --  Received in Microbiology Lab. Culture in Progress.  Received in Microbiology Lab. Culture in Progress.  --   --   --  No Growth After 4 Days.  Streptococcus pyogenes (Group A)*   GRAM STAIN RESULT  No Polys or Bacteria seen  --  No polys seen*  Rare Gram positive cocci in pairs*  No Polys or Bacteria seen  --   --  Gram positive cocci in chains*   WOUND CULTURE   --   --  2+ Growth of Staphylococcus aureus*  2+ Growth of Streptococcus pyogenes (Group A)*  3+ Growth of  2+ Growth of Staphylococcus aureus*  2+ Growth of Streptococcus pyogenes (Group A)*  --   --   --    MRSA CULTURE ONLY   --   --   --  No Methicillin Resistant Staphlyococcus aureus (MRSA) isolated  --   --    C DIFF TOXIN B BY PCR   --   --   --   --  Positive*  --      Results from last 7 days   Lab Units 03/03/25  0546 03/02/25  0539 03/01/25  0519 02/28/25  0518 02/27/25  0739 02/26/25  1114   PROCALCITONIN ng/ml 2.99* 5.23* 8.70* 13.97* 18.89* 9.27*                   Imaging Results Review: I reviewed radiology reports from this admission including: CT lower extremity, echocardiogram.  Other Study Results Review: Other studies reviewed include: Micro

## 2025-03-03 NOTE — ASSESSMENT & PLAN NOTE
69 M with complex PMH admitted with DKA and LLE cellulitis.    On exam there is patchy redness to LLE including up to proximal thigh, but not extending to groin or abdomen. Significant edema. Ruptured blisters to the lower leg and intact blisters to the thigh. No signs of abscess, necrotizing infection, or compartment syndrome however exam is limited by body habitus. Neurovascularly intact distally.     Advise twice daily and PRN dressing changes to now include full leg wash with chlorhexidine and light compression with cling on top of kerlix. Continue abx per primary. CT report reviewed. Trend temp/WBC. Discussed with Dr. Blood.

## 2025-03-03 NOTE — ASSESSMENT & PLAN NOTE
Holding home Losartan given LUIS ALFREDO  Continue home Toprol XL (dose increased due to AF w/RVR) and Amlodipine - now on Cardizem for AF w/RVR  Continue to monitor hemodynamics closely

## 2025-03-03 NOTE — ASSESSMENT & PLAN NOTE
POA AEB fever, tachycardia, tachypnea, leukocytosis, elevated lactic acidosis  Suspect this is 2/2 bacteremia in setting of bilateral LE cellulitis vs diarrheal illness/? CDIF  Imaging revealed liquid stool within visualized colon concerning for diarrhea disease and extensive circumferential skin thickening and subcutaneous edema involving bilateral lower legs compatible with cellulitis   Repeat imaging on 03/02 completed due to extension of erythema, swelling, blistering from L shin to L upper thigh - again no evidence of soft tissue gas  COVID/FLU/RSV negative   1 out of 2 Blood cultures w/Strep Pyogenes (Group A) - repeat blood cultures pending  UA w/out evidence of infection  MRSA swab negative  Bilateral LE wound cultures w/Staph aureus and Strep Pyogenes - repeat LLE wound culture pending  CDIF PCR positive, but EIA negative  Enteric stool panel negative   Lactic acid 2.4 - has since cleared  Procalcitonin 9.27 > 18.89 >> 2.99   Is s/p aggressive volume resuscitation w/both crystalloid and colloid for intravascular depletion as well as IV diuresis in setting of extravascular overload  IV antibiotics rebroadened last evening given wound extension to IV Clindamycin/Cefepime/Flagyl/Vancomycin, now D#2  Continue PO Vanco D#4/14 despite EIA negative as patient initially had persistent diarrhea and continues on broad-spectrum antibiotics  Trend fever and WBC curve

## 2025-03-03 NOTE — PROGRESS NOTES
Nadeem Purdy Jr. is a 69 y.o. male who is currently ordered Vancomycin IV with management by the Pharmacy Consult service.  Relevant clinical data and objective / subjective history reviewed.  Vancomycin Assessment:  Indication and Goal AUC/Trough: Soft tissue (goal -600, trough >10)  Clinical Status: worsening  Micro:     Renal Function:  SCr: 4.24 mg/dL  CrCl: 23.4 mL/min  Renal replacement: Not on dialysis  Days of Therapy: 1  Current Dose: new start  Vancomycin Plan:  New Dosin mg LD then 12.5 mg/kg pulse  Next Level: 3/3 @ 1500  Renal Function Monitoring: Daily BMP and UOP  Pharmacy will continue to follow closely for s/sx of nephrotoxicity, infusion reactions and appropriateness of therapy.  BMP and CBC will be ordered per protocol. We will continue to follow the patient’s culture results and clinical progress daily.    Richard Corona, Pharmacist

## 2025-03-03 NOTE — ASSESSMENT & PLAN NOTE
Admits to consumption of 1 to 2 glasses of bourbon a day.  Reports his last beverage was about 4 weeks ago.  Remains on CIWA protocol and supplemented with folic acid and thiamine.

## 2025-03-03 NOTE — ASSESSMENT & PLAN NOTE
Notes some diarrhea prior to admission, however had worsened shortly after admission - now improved  CDIF PCR positive, however EIA negative  Enteric stool panel negative  PO vanco discontinued per ID

## 2025-03-03 NOTE — CONSULTS
Consultation - Surgery-General   Name: Nadeem Purdy Jr. 69 y.o. male I MRN: [id number]  Unit/Bed#: ICU 03-01 I Date of Admission: 2/26/2025   Date of Service: 3/3/2025 I Hospital Day: 5   Inpatient consult to Acute Care Surgery  Consult performed by: Hakan Moyer PA-C  Consult ordered by: JIMMY Guzman        Physician Requesting Evaluation: Santos Mays*   Reason for Evaluation / Principal Problem: cellulitis    Assessment & Plan  Diabetic ketoacidosis without coma associated with type 2 diabetes mellitus (HCC)  Lab Results   Component Value Date    HGBA1C 8.9 (H) 02/26/2025       Recent Labs     03/03/25  0548 03/03/25  0818 03/03/25  1048 03/03/25  1149   POCGLU 161* 202* 301* 300*       Blood Sugar Average: Last 72 hrs:  (P) 201.8268817308339463    Cellulitis of lower extremity  69 M with complex PMH admitted with DKA and LLE cellulitis.    On exam there is patchy redness to LLE including up to proximal thigh, but not extending to groin or abdomen. Significant edema. Ruptured blisters to the lower leg and intact blisters to the thigh. No signs of abscess, necrotizing infection, or compartment syndrome however exam is limited by body habitus. Neurovascularly intact distally.     Advise twice daily and PRN dressing changes to now include full leg wash with chlorhexidine and light compression with cling on top of kerlix. Continue abx per primary. CT report reviewed. Trend temp/WBC. Discussed with Dr. Blood.  Essential hypertension    Hypercholesteremia    Class 3 severe obesity due to excess calories with serious comorbidity and body mass index (BMI) of 40.0 to 44.9 in adult (HCC)    Lymphedema of both lower extremities    Acute renal failure superimposed on stage 3a chronic kidney disease (HCC)  Lab Results   Component Value Date    EGFR 13 03/03/2025    EGFR 12 03/03/2025    EGFR 12 03/02/2025    CREATININE 4.07 (H) 03/03/2025    CREATININE 4.32 (H) 03/03/2025    CREATININE 4.34  (H) 03/02/2025     Severe sepsis (HCC)    Alcohol abuse    Bacteremia    Hyponatremia    Metabolic acidosis    Clostridium difficile diarrhea    Elevated troponin    Urinary retention    Atrial fibrillation with RVR (HCC)    Acute respiratory insufficiency        History of Present Illness   Nadeem Purdy Jr. is a 69 y.o. male who presents with cellulitis and DKA, found to have bacteremia.    Review of Systems   Skin:  Positive for rash and wound.     Historical Information   Past Medical History:   Diagnosis Date    Hypertension      Past Surgical History:   Procedure Laterality Date    CATARACT EXTRACTION, BILATERAL Bilateral      Social History     Tobacco Use    Smoking status: Former    Smokeless tobacco: Never   Vaping Use    Vaping status: Never Used   Substance and Sexual Activity    Alcohol use: Yes     Alcohol/week: 4.0 standard drinks of alcohol     Types: 4 Shots of liquor per week    Drug use: Never    Sexual activity: Not on file     E-Cigarette/Vaping    E-Cigarette Use Never User      E-Cigarette/Vaping Substances    Nicotine No     THC No     CBD No     Flavoring No     Other No     Unknown No      History reviewed. No pertinent family history.    Objective :  Temp:  [96.5 °F (35.8 °C)-99.7 °F (37.6 °C)] 97.5 °F (36.4 °C)  HR:  [] 101  BP: (113-158)/(56-82) 138/67  Resp:  [19-39] 22  SpO2:  [91 %-96 %] 96 %  O2 Device: None (Room air)  Nasal Cannula O2 Flow Rate (L/min):  [2 L/min] 2 L/min    Lines/Drains/Airways       Active Status       Name Placement date Placement time Site Days    Urethral Catheter Latex 18 Fr. 03/01/25 2130  Latex  1                  Physical Exam  Vitals and nursing note reviewed.   Constitutional:       General: He is not in acute distress.     Appearance: He is well-developed. He is not diaphoretic.   HENT:      Head: Normocephalic and atraumatic.   Eyes:      Conjunctiva/sclera: Conjunctivae normal.      Pupils: Pupils are equal, round, and reactive to light.    Pulmonary:      Effort: No respiratory distress.   Skin:     General: Skin is warm and dry.      Capillary Refill: Capillary refill takes less than 2 seconds.      Comments: LLE cellulitis from proximal thigh to ankle. Irregular pattern of redness. Significant pitting edema. Ruptured blisters to lower leg with some macerated skin changes and intact blisters to upper leg. Compartments soft. Neurovascularly intact distally with good sensation, motor function, and color/cap refill. No visible/palpable abscess. No full thickness wounds or purulent drainage. No crepitus.   Neurological:      Mental Status: He is alert and oriented to person, place, and time.   Psychiatric:         Behavior: Behavior normal.         Lab Results: I have reviewed the following results:  Recent Labs     03/01/25  1549 03/01/25  1936 03/02/25  2149 03/03/25  0435 03/03/25  0546 03/03/25  1021   WBC  --    < >  --  28.19*  --   --    HGB  --    < >  --  10.3*  --   --    HCT  --    < >  --  30.7*  --   --    PLT  --    < >  --  386  --   --    BANDSPCT  --    < > 4  --   --   --    SODIUM 129*   < > 130*  --  130* 129*   K 3.7   < > 4.5  --  4.3 3.9      < > 103  --  105 104   CO2 19*   < > 18*  --  16* 15*   BUN 64*   < > 74*  --  74* 72*   CREATININE 3.93*   < > 4.34*  --  4.32* 4.07*   GLUC 249*   < > 146*  --  171* 301*   CAIONIZED  --    < >  --  0.98*  --   --    MG  --    < >  --   --  2.1  --    PHOS  --    < >  --   --  3.1  --    AST  --    < >  --   --  100*  --    ALT  --    < >  --   --  70*  --    ALB  --    < >  --   --  2.5*  --    TBILI  --    < >  --   --  0.40  --    ALKPHOS  --    < >  --   --  45  --    PTT  --    < > 91* >210* 44*  --    INR  --    < >  --   --  1.30*  --    *  --   --   --   --   --    LACTICACID  --    < >  --   --   --  1.3    < > = values in this interval not displayed.       Imaging Results Review: I reviewed radiology reports from this admission including: CT leg.  Other Study Results  Review: No additional pertinent studies reviewed.    VTE Pharmacologic Prophylaxis: Reason for no pharmacologic prophylaxis per primary  VTE Mechanical Prophylaxis: reason for no mechanical VTE prophylaxis cellulitis/wounds

## 2025-03-04 PROBLEM — R79.89 ELEVATED TROPONIN: Status: RESOLVED | Noted: 2025-02-27 | Resolved: 2025-03-04

## 2025-03-04 PROBLEM — A04.72 CLOSTRIDIUM DIFFICILE DIARRHEA: Status: RESOLVED | Noted: 2025-02-27 | Resolved: 2025-03-04

## 2025-03-04 LAB
ALBUMIN SERPL BCG-MCNC: 2.8 G/DL (ref 3.5–5)
ALP SERPL-CCNC: 40 U/L (ref 34–104)
ALT SERPL W P-5'-P-CCNC: 56 U/L (ref 7–52)
ANION GAP SERPL CALCULATED.3IONS-SCNC: 10 MMOL/L (ref 4–13)
ANION GAP SERPL CALCULATED.3IONS-SCNC: 10 MMOL/L (ref 4–13)
ANION GAP SERPL CALCULATED.3IONS-SCNC: 9 MMOL/L (ref 4–13)
ANISOCYTOSIS BLD QL SMEAR: PRESENT
APTT PPP: 47 SECONDS (ref 23–34)
APTT PPP: 67 SECONDS (ref 23–34)
APTT PPP: 78 SECONDS (ref 23–34)
AST SERPL W P-5'-P-CCNC: 54 U/L (ref 13–39)
ATRIAL RATE: 132 BPM
ATRIAL RATE: 150 BPM
ATRIAL RATE: 192 BPM
BASOPHILS # BLD MANUAL: 0 THOUSAND/UL (ref 0–0.1)
BASOPHILS NFR MAR MANUAL: 0 % (ref 0–1)
BILIRUB DIRECT SERPL-MCNC: 0.12 MG/DL (ref 0–0.2)
BILIRUB SERPL-MCNC: 0.45 MG/DL (ref 0.2–1)
BUN SERPL-MCNC: 63 MG/DL (ref 5–25)
BUN SERPL-MCNC: 70 MG/DL (ref 5–25)
BUN SERPL-MCNC: 70 MG/DL (ref 5–25)
BURR CELLS BLD QL SMEAR: PRESENT
CALCIUM SERPL-MCNC: 7.8 MG/DL (ref 8.4–10.2)
CALCIUM SERPL-MCNC: 7.9 MG/DL (ref 8.4–10.2)
CALCIUM SERPL-MCNC: 8 MG/DL (ref 8.4–10.2)
CHLORIDE SERPL-SCNC: 103 MMOL/L (ref 96–108)
CHLORIDE SERPL-SCNC: 103 MMOL/L (ref 96–108)
CHLORIDE SERPL-SCNC: 106 MMOL/L (ref 96–108)
CO2 SERPL-SCNC: 16 MMOL/L (ref 21–32)
CO2 SERPL-SCNC: 16 MMOL/L (ref 21–32)
CO2 SERPL-SCNC: 17 MMOL/L (ref 21–32)
CREAT SERPL-MCNC: 3.77 MG/DL (ref 0.6–1.3)
CREAT SERPL-MCNC: 4.04 MG/DL (ref 0.6–1.3)
CREAT SERPL-MCNC: 4.17 MG/DL (ref 0.6–1.3)
EOSINOPHIL # BLD MANUAL: 0.26 THOUSAND/UL (ref 0–0.4)
EOSINOPHIL NFR BLD MANUAL: 1 % (ref 0–6)
ERYTHROCYTE [DISTWIDTH] IN BLOOD BY AUTOMATED COUNT: 14.5 % (ref 11.6–15.1)
GFR SERPL CREATININE-BSD FRML MDRD: 13 ML/MIN/1.73SQ M
GFR SERPL CREATININE-BSD FRML MDRD: 14 ML/MIN/1.73SQ M
GFR SERPL CREATININE-BSD FRML MDRD: 15 ML/MIN/1.73SQ M
GLUCOSE SERPL-MCNC: 108 MG/DL (ref 65–140)
GLUCOSE SERPL-MCNC: 133 MG/DL (ref 65–140)
GLUCOSE SERPL-MCNC: 138 MG/DL (ref 65–140)
GLUCOSE SERPL-MCNC: 139 MG/DL (ref 65–140)
GLUCOSE SERPL-MCNC: 141 MG/DL (ref 65–140)
GLUCOSE SERPL-MCNC: 159 MG/DL (ref 65–140)
GLUCOSE SERPL-MCNC: 164 MG/DL (ref 65–140)
GLUCOSE SERPL-MCNC: 171 MG/DL (ref 65–140)
GLUCOSE SERPL-MCNC: 182 MG/DL (ref 65–140)
GLUCOSE SERPL-MCNC: 183 MG/DL (ref 65–140)
GLUCOSE SERPL-MCNC: 191 MG/DL (ref 65–140)
GLUCOSE SERPL-MCNC: 196 MG/DL (ref 65–140)
GLUCOSE SERPL-MCNC: 198 MG/DL (ref 65–140)
GLUCOSE SERPL-MCNC: 204 MG/DL (ref 65–140)
GLUCOSE SERPL-MCNC: 226 MG/DL (ref 65–140)
GLUCOSE SERPL-MCNC: 235 MG/DL (ref 65–140)
HCT VFR BLD AUTO: 29.1 % (ref 36.5–49.3)
HELMET CELLS BLD QL SMEAR: PRESENT
HGB BLD-MCNC: 9.8 G/DL (ref 12–17)
LYMPHOCYTES # BLD AUTO: 15 % (ref 14–44)
LYMPHOCYTES # BLD AUTO: 3.96 THOUSAND/UL (ref 0.6–4.47)
MAGNESIUM SERPL-MCNC: 2 MG/DL (ref 1.9–2.7)
MAGNESIUM SERPL-MCNC: 2 MG/DL (ref 1.9–2.7)
MCH RBC QN AUTO: 31.5 PG (ref 26.8–34.3)
MCHC RBC AUTO-ENTMCNC: 33.7 G/DL (ref 31.4–37.4)
MCV RBC AUTO: 94 FL (ref 82–98)
METAMYELOCYTE ABSOLUTE CT: 0.53 THOUSAND/UL (ref 0–0.1)
METAMYELOCYTES NFR BLD MANUAL: 2 % (ref 0–1)
MONOCYTES # BLD AUTO: 1.05 THOUSAND/UL (ref 0–1.22)
MONOCYTES NFR BLD: 4 % (ref 4–12)
MYELOCYTE ABSOLUTE CT: 1.32 THOUSAND/UL (ref 0–0.1)
MYELOCYTES NFR BLD MANUAL: 5 % (ref 0–1)
NEUTROPHILS # BLD MANUAL: 19.25 THOUSAND/UL (ref 1.85–7.62)
NEUTS BAND NFR BLD MANUAL: 7 % (ref 0–8)
NEUTS SEG NFR BLD AUTO: 66 % (ref 43–75)
PHOSPHATE SERPL-MCNC: 2.5 MG/DL (ref 2.3–4.1)
PHOSPHATE SERPL-MCNC: 3.5 MG/DL (ref 2.3–4.1)
PLATELET # BLD AUTO: 412 THOUSANDS/UL (ref 149–390)
PLATELET BLD QL SMEAR: ABNORMAL
PMV BLD AUTO: 8.6 FL (ref 8.9–12.7)
POLYCHROMASIA BLD QL SMEAR: PRESENT
POTASSIUM SERPL-SCNC: 3.6 MMOL/L (ref 3.5–5.3)
POTASSIUM SERPL-SCNC: 3.9 MMOL/L (ref 3.5–5.3)
POTASSIUM SERPL-SCNC: 4.3 MMOL/L (ref 3.5–5.3)
PR INTERVAL: 176 MS
PROCALCITONIN SERPL-MCNC: 1.86 NG/ML
PROT SERPL-MCNC: 6.2 G/DL (ref 6.4–8.4)
QRS AXIS: -16 DEGREES
QRS AXIS: -17 DEGREES
QRS AXIS: -26 DEGREES
QRSD INTERVAL: 86 MS
QRSD INTERVAL: 92 MS
QRSD INTERVAL: 92 MS
QT INTERVAL: 264 MS
QT INTERVAL: 308 MS
QT INTERVAL: 314 MS
QTC INTERVAL: 401 MS
QTC INTERVAL: 443 MS
QTC INTERVAL: 456 MS
RBC # BLD AUTO: 3.11 MILLION/UL (ref 3.88–5.62)
RBC MORPH BLD: PRESENT
SODIUM SERPL-SCNC: 129 MMOL/L (ref 135–147)
SODIUM SERPL-SCNC: 130 MMOL/L (ref 135–147)
SODIUM SERPL-SCNC: 131 MMOL/L (ref 135–147)
T WAVE AXIS: 56 DEGREES
T WAVE AXIS: 59 DEGREES
T WAVE AXIS: 70 DEGREES
VENTRICULAR RATE: 120 BPM
VENTRICULAR RATE: 132 BPM
VENTRICULAR RATE: 139 BPM
WBC # BLD AUTO: 26.37 THOUSAND/UL (ref 4.31–10.16)

## 2025-03-04 PROCEDURE — 80048 BASIC METABOLIC PNL TOTAL CA: CPT | Performed by: INTERNAL MEDICINE

## 2025-03-04 PROCEDURE — 85007 BL SMEAR W/DIFF WBC COUNT: CPT | Performed by: INTERNAL MEDICINE

## 2025-03-04 PROCEDURE — 94760 N-INVAS EAR/PLS OXIMETRY 1: CPT

## 2025-03-04 PROCEDURE — 99233 SBSQ HOSP IP/OBS HIGH 50: CPT | Performed by: INTERNAL MEDICINE

## 2025-03-04 PROCEDURE — 84100 ASSAY OF PHOSPHORUS: CPT | Performed by: INTERNAL MEDICINE

## 2025-03-04 PROCEDURE — 83735 ASSAY OF MAGNESIUM: CPT | Performed by: INTERNAL MEDICINE

## 2025-03-04 PROCEDURE — 94640 AIRWAY INHALATION TREATMENT: CPT

## 2025-03-04 PROCEDURE — 84145 PROCALCITONIN (PCT): CPT | Performed by: INTERNAL MEDICINE

## 2025-03-04 PROCEDURE — 85730 THROMBOPLASTIN TIME PARTIAL: CPT | Performed by: INTERNAL MEDICINE

## 2025-03-04 PROCEDURE — G0545 PR INHERENT VISIT TO INPT: HCPCS | Performed by: INTERNAL MEDICINE

## 2025-03-04 PROCEDURE — 97110 THERAPEUTIC EXERCISES: CPT

## 2025-03-04 PROCEDURE — 93010 ELECTROCARDIOGRAM REPORT: CPT | Performed by: INTERNAL MEDICINE

## 2025-03-04 PROCEDURE — 85027 COMPLETE CBC AUTOMATED: CPT | Performed by: INTERNAL MEDICINE

## 2025-03-04 PROCEDURE — 80076 HEPATIC FUNCTION PANEL: CPT | Performed by: INTERNAL MEDICINE

## 2025-03-04 PROCEDURE — 82948 REAGENT STRIP/BLOOD GLUCOSE: CPT

## 2025-03-04 PROCEDURE — 97530 THERAPEUTIC ACTIVITIES: CPT

## 2025-03-04 PROCEDURE — 94664 DEMO&/EVAL PT USE INHALER: CPT

## 2025-03-04 RX ORDER — CALCIUM GLUCONATE 20 MG/ML
2 INJECTION, SOLUTION INTRAVENOUS ONCE
Status: COMPLETED | OUTPATIENT
Start: 2025-03-04 | End: 2025-03-04

## 2025-03-04 RX ORDER — CITRIC ACID/SODIUM CITRATE 334-500MG
30 SOLUTION, ORAL ORAL 2 TIMES DAILY
Status: DISCONTINUED | OUTPATIENT
Start: 2025-03-04 | End: 2025-03-11

## 2025-03-04 RX ADMIN — METOPROLOL TARTRATE 50 MG: 50 TABLET, FILM COATED ORAL at 20:21

## 2025-03-04 RX ADMIN — HEPARIN SODIUM 20 UNITS/KG/HR: 10000 INJECTION, SOLUTION INTRAVENOUS at 11:54

## 2025-03-04 RX ADMIN — ASPIRIN 81 MG: 81 TABLET, COATED ORAL at 08:16

## 2025-03-04 RX ADMIN — METOPROLOL TARTRATE 50 MG: 50 TABLET, FILM COATED ORAL at 08:15

## 2025-03-04 RX ADMIN — THIAMINE HCL TAB 100 MG 100 MG: 100 TAB at 08:15

## 2025-03-04 RX ADMIN — LEVALBUTEROL HYDROCHLORIDE 1.25 MG: 1.25 SOLUTION RESPIRATORY (INHALATION) at 13:06

## 2025-03-04 RX ADMIN — Medication 400 UNITS: at 08:15

## 2025-03-04 RX ADMIN — AMIODARONE HYDROCHLORIDE 0.5 MG/MIN: 50 INJECTION, SOLUTION INTRAVENOUS at 11:56

## 2025-03-04 RX ADMIN — CEFAZOLIN SODIUM 1000 MG: 1 SOLUTION INTRAVENOUS at 18:10

## 2025-03-04 RX ADMIN — LEVALBUTEROL HYDROCHLORIDE 1.25 MG: 1.25 SOLUTION RESPIRATORY (INHALATION) at 20:33

## 2025-03-04 RX ADMIN — ATORVASTATIN CALCIUM 40 MG: 40 TABLET, FILM COATED ORAL at 16:13

## 2025-03-04 RX ADMIN — CHLORHEXIDINE GLUCONATE 15 ML: 1.2 SOLUTION ORAL at 08:15

## 2025-03-04 RX ADMIN — Medication 6 MG: at 21:02

## 2025-03-04 RX ADMIN — LEVALBUTEROL HYDROCHLORIDE 1.25 MG: 1.25 SOLUTION RESPIRATORY (INHALATION) at 07:12

## 2025-03-04 RX ADMIN — GABAPENTIN 200 MG: 100 CAPSULE ORAL at 21:01

## 2025-03-04 RX ADMIN — CALCIUM GLUCONATE 2 G: 20 INJECTION, SOLUTION INTRAVENOUS at 02:14

## 2025-03-04 RX ADMIN — CEFAZOLIN SODIUM 1000 MG: 1 SOLUTION INTRAVENOUS at 11:20

## 2025-03-04 RX ADMIN — GUAIFENESIN 600 MG: 600 TABLET ORAL at 08:15

## 2025-03-04 RX ADMIN — AZELASTINE HYDROCHLORIDE 1 SPRAY: 137 SPRAY, METERED NASAL at 08:25

## 2025-03-04 RX ADMIN — GUAIFENESIN 600 MG: 600 TABLET ORAL at 20:21

## 2025-03-04 RX ADMIN — HEPARIN SODIUM 2000 UNITS: 1000 INJECTION INTRAVENOUS; SUBCUTANEOUS at 08:13

## 2025-03-04 RX ADMIN — SODIUM BICARBONATE 50 ML/HR: 84 INJECTION, SOLUTION INTRAVENOUS at 14:06

## 2025-03-04 RX ADMIN — TRISODIUM CITRATE DIHYDRATE AND CITRIC ACID MONOHYDRATE 30 ML: 500; 334 SOLUTION ORAL at 11:34

## 2025-03-04 RX ADMIN — CHLORHEXIDINE GLUCONATE 15 ML: 1.2 SOLUTION ORAL at 20:20

## 2025-03-04 RX ADMIN — SODIUM CHLORIDE 12 UNITS/HR: 9 INJECTION, SOLUTION INTRAVENOUS at 18:07

## 2025-03-04 RX ADMIN — TRISODIUM CITRATE DIHYDRATE AND CITRIC ACID MONOHYDRATE 30 ML: 500; 334 SOLUTION ORAL at 20:21

## 2025-03-04 RX ADMIN — CHOLECALCIFEROL TAB 25 MCG (1000 UNIT) 2000 UNITS: 25 TAB at 08:15

## 2025-03-04 RX ADMIN — CEFAZOLIN SODIUM 1000 MG: 1 SOLUTION INTRAVENOUS at 02:14

## 2025-03-04 RX ADMIN — FOLIC ACID 1 MG: 1 TABLET ORAL at 08:16

## 2025-03-04 RX ADMIN — AZELASTINE HYDROCHLORIDE 1 SPRAY: 137 SPRAY, METERED NASAL at 18:04

## 2025-03-04 NOTE — ASSESSMENT & PLAN NOTE
1 of 2 sets of admission blood cultures isolated strep pyogenes.  This is most likely due to extensive left lower extremity cellulitis with chronic bilateral lower extremity nonhealing wounds.  Wound culture isolated group A strep and MSSA.  Fortunately patient has no intravascular or prosthetic devices.  Transthoracic echo, adequate study, without significant valvular disease or obvious vegetations. Repeat blood cultures so far negative.   Continue renally dosed IV cefazolin   Follow-up repeat blood cultures  Anticipate transition to PO abx to complete 14 day course

## 2025-03-04 NOTE — ASSESSMENT & PLAN NOTE
Patient presented with DKA and chronic bilateral lower extremity wounds, nonhealing with concern for bilateral cellulitis.  This is in the setting of uncontrolled bilateral lower extremity lymphedema.  On exam and on exam and after review of clinical media suspect unilateral cellulitis of the left lower extremity.  This has become quite extensive over the course of his hospitalization so far.  Wound culture polymicrobial with strep pyogenes and MSSA.  Clinical findings typical of streptococcal cellulitis.  Surgery and wound care following.  No evidence of soft tissue gas or abscess on CT of the left lower extremity.  Continue IV antibiotics as above  Close wound care and surgery follow-up ongoing  Serial skin exams and local wound care  Encourage lower extremity elevation, compression

## 2025-03-04 NOTE — ASSESSMENT & PLAN NOTE
Lab Results   Component Value Date    HGBA1C 8.9 (H) 02/26/2025       Recent Labs     03/04/25  0207 03/04/25  0405 03/04/25  0624 03/04/25  0812   POCGLU 108 159* 182* 141*       Blood Sugar Average: Last 72 hrs:  (P) 216  Status post aggressive volume resuscitation and DKA protocol per ICU team.  Endocrinology was consulted and he will require follow-up in the outpatient setting.  Patient was previously on an SGLT2 inhibitor-would not resume on discharge.  Unclear if he was still supposed to be taking this in the outpatient setting

## 2025-03-04 NOTE — PROGRESS NOTES
Progress Note - Nephrology   Name: Nadeem Purdy Jr. 69 y.o. male I MRN: [id number]  Unit/Bed#: ICU 03-01 I Date of Admission: 2/26/2025   Date of Service: 3/4/2025 I Hospital Day: 6     Assessment & Plan  Acute renal failure superimposed on stage 3a chronic kidney disease (HCC)  Lab Results   Component Value Date    EGFR 13 03/04/2025    EGFR 14 03/03/2025    EGFR 13 03/03/2025    CREATININE 4.17 (H) 03/04/2025    CREATININE 4.04 (H) 03/03/2025    CREATININE 4.20 (H) 03/03/2025   Baseline creatinine appears to be 1.1-1.3 mg/dL since 2022; not under care of nephrology. 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  Grade A2 albuminuria, no RBCs/UA bland when checked in steady state.  Kidneys without obstruction on unenhanced imaging.  Now with acute kidney injury creatinine unchanged high threes, low fours.      Suspect etiology ischemic ATN in setting of severe sepsis, volume and hemodynamic perturbations (s/p crystalloid/colloid/diuretic), vasomotor dysfunction due to ARB, urinary retention status post Ramirez.  CK minimally elevated upon presentation and likely noncontributory to LUIS ALFREDO.  UA significant for glucosuria, 2+ protein, 1-2 RBC, 1-2 coarse granular casts.        3/4  Cr trend overall holding. UOP 2.5 L /24 hr. Suspected to have ischemic ATN in setting of infection/DKA.   Volume status appears relatively compensated.  Persistent metabolic acidosis attributed to Severe LUIS ALFREDO. Lactate negative and no longer in DKA.   Bicarbonate trends unchanged but patient has significant deficit and gtt at relatively low rate-- gtt rate 35->50 ml/hr in past 24 hr. If gtt rate to remain gentle, advise PO sodium bicarb vs sodium citrate to help with deficit--30 ml BID to start. If cannot tolerate citrate solution, can add PO bicarb --1300mg TID.   No emergent indication for HD yet--volume status reasonable, no uremic concerns. Bicarbonate remains sole issue but again he has a rather  large deficit.   Diabetic ketoacidosis without coma associated with type 2 diabetes mellitus (HCC)  Lab Results   Component Value Date    HGBA1C 8.9 (H) 02/26/2025       Recent Labs     03/04/25  0207 03/04/25  0405 03/04/25  0624 03/04/25  0812   POCGLU 108 159* 182* 141*       Blood Sugar Average: Last 72 hrs:  (P) 216  Status post aggressive volume resuscitation and DKA protocol per ICU team.  Endocrinology was consulted and he will require follow-up in the outpatient setting.  Patient was previously on an SGLT2 inhibitor-would not resume on discharge.  Unclear if he was still supposed to be taking this in the outpatient setting  Essential hypertension  Blood pressure appropriate.  Continue to hold ARB.  Hyponatremia  Serum sodium corrects around 133 millimoles per liter for glucose.  Continue gentle fluid restriction.  Urinary retention  Ramirez catheter was discontinued subsequently reinserted last evening.    Metabolic acidosis  Repeat BMP shows improvement in acidosis.  No indication for exogenous bicarb at this point.  Severe sepsis (HCC)  Antibiotics per prmioary team, currently on Cefazolin.   Group A strep bacteremia  Continue management per primary team.   Atrial fibrillation with RVR (HCC)  Rate controlled on exam  Acute respiratory insufficiency    Diarrhea          Subjective   Patient seen and examined today. Denies complaints, no chest pain,dyspnea,nausea,vomiting,diarrhea,pruritus, twitching. Appetite great.  Uop 2.5 L/24 hr.  A complete 10 point review of systems was performed and is otherwise negative.     Objective :  Temp:  [98.1 °F (36.7 °C)-100 °F (37.8 °C)] 99.2 °F (37.3 °C)  HR:  [] 89  BP: (127-173)/(60-78) 153/71  Resp:  [19-26] 23  SpO2:  [89 %-99 %] 96 %  O2 Device: None (Room air)    Current Weight: Weight - Scale: (!) 140 kg (309 lb 8.4 oz)  First Weight: Weight - Scale: (!) 143 kg (315 lb 0.6 oz)  I/O         03/02 0701  03/03 0700 03/03 0701  03/04 0700 03/04 0701 03/05 0700     P.O. 1440 1320 240    I.V. (mL/kg) 419.4 (3) 1687.3 (12.1) 284.9 (2)    IV Piggyback 900 460     Total Intake(mL/kg) 2759.4 (19.7) 3467.3 (24.8) 524.9 (3.7)    Urine (mL/kg/hr) 1500 (0.4) 2550 (0.8) 425 (0.9)    Stool 0      Total Output 1500 2550 425    Net +1259.4 +917.3 +99.9           Unmeasured Stool Occurrence 1 x            Physical Exam  Vitals reviewed.   Constitutional:       General: He is not in acute distress.     Appearance: He is not ill-appearing.   HENT:      Head: Normocephalic and atraumatic.      Nose: Nose normal.      Mouth/Throat:      Mouth: Mucous membranes are moist.      Pharynx: Oropharynx is clear.   Cardiovascular:      Rate and Rhythm: Normal rate and regular rhythm.   Pulmonary:      Breath sounds: Normal breath sounds. No wheezing or rales.   Abdominal:      Palpations: Abdomen is soft.      Tenderness: There is no abdominal tenderness. There is no guarding.   Musculoskeletal:      Right lower leg: No edema.      Left lower leg: No edema.   Skin:     General: Skin is warm.      Comments: BL LE wrapped   Wound images reviewed    Neurological:      General: No focal deficit present.      Mental Status: He is alert and oriented to person, place, and time. Mental status is at baseline.      Cranial Nerves: No cranial nerve deficit.   Psychiatric:         Mood and Affect: Mood normal.         Behavior: Behavior normal.         Medications:    Current Facility-Administered Medications:     acetaminophen (TYLENOL) tablet 650 mg, 650 mg, Oral, Q6H PRN, JIMMY Darnell, 650 mg at 25 2322    albuterol inhalation solution 2.5 mg, 2.5 mg, Nebulization, Q6H PRN, JIMMY Darnell, 2.5 mg at 25 0527    [] amiodarone (CORDARONE) 900 mg in dextrose 5 % 500 mL infusion, 1 mg/min, Intravenous, Continuous, Stopped at 25 1747 **FOLLOWED BY** amiodarone (CORDARONE) 900 mg in dextrose 5 % 500 mL infusion, 0.5 mg/min, Intravenous, Continuous, JIMMY Rider,  Last Rate: 16.7 mL/hr at 03/03/25 1818, 0.5 mg/min at 03/03/25 1818    aspirin (ECOTRIN LOW STRENGTH) EC tablet 81 mg, 81 mg, Oral, Daily, JIMMY Darnell, 81 mg at 03/04/25 0816    atorvastatin (LIPITOR) tablet 40 mg, 40 mg, Oral, Daily With Dinner, JIMMY Darnell, 40 mg at 03/03/25 1720    azelastine (ASTELIN) 0.1 % nasal spray 1 spray, 1 spray, Each Nare, BID, JIMMY Darnell, 1 spray at 03/04/25 0825    ceFAZolin (ANCEF) IVPB (premix in dextrose) 1,000 mg 50 mL, 1,000 mg, Intravenous, Q8H, Promise Christian MD, Last Rate: 100 mL/hr at 03/04/25 0214, 1,000 mg at 03/04/25 0214    chlorhexidine (PERIDEX) 0.12 % oral rinse 15 mL, 15 mL, Mouth/Throat, Q12H MARGARITA, JIMMY Darnell, 15 mL at 03/04/25 0815    Cholecalciferol (VITAMIN D3) tablet 2,000 Units, 2,000 Units, Oral, Daily, JIMMY Darnell, 2,000 Units at 03/04/25 0815    folic acid (FOLVITE) tablet 1 mg, 1 mg, Oral, Daily, JIMMY Darnell, 1 mg at 03/04/25 0816    gabapentin (NEURONTIN) capsule 200 mg, 200 mg, Oral, HS, JIMMY Darnell, 200 mg at 03/03/25 2130    guaiFENesin (MUCINEX) 12 hr tablet 600 mg, 600 mg, Oral, Q12H MARGARITA, JIMMY Darnell, 600 mg at 03/04/25 0815    heparin (porcine) 25,000 units in 0.45% NaCl 250 mL infusion (premix), 3-20 Units/kg/hr (Order-Specific), Intravenous, Titrated, JIMMY Guzman, Last Rate: 18 mL/hr at 03/03/25 2131, 20 Units/kg/hr at 03/03/25 2131    heparin (porcine) injection 2,000 Units, 2,000 Units, Intravenous, Q6H PRN, JIMMY Guzman, 2,000 Units at 03/04/25 0813    heparin (porcine) injection 4,000 Units, 4,000 Units, Intravenous, Q6H PRN, JIMMY Guzman    insulin regular (HumuLIN R,NovoLIN R) 1 Units/mL in sodium chloride 0.9 % 100 mL infusion, 0.3-21 Units/hr, Intravenous, Titrated, JIMMY Darnell, Last Rate: 8 mL/hr at 03/04/25 1015, 8 Units/hr at 03/04/25 1015    levalbuterol (XOPENEX) inhalation solution 1.25 mg,  1.25 mg, Nebulization, TID, JIMMY Guzman, 1.25 mg at 03/04/25 0712    melatonin tablet 6 mg, 6 mg, Oral, HS, JIMMY Darnell, 6 mg at 03/03/25 2130    metoprolol tartrate (LOPRESSOR) tablet 50 mg, 50 mg, Oral, Q12H MARGARITA, Rebecca Larsen, RENP, 50 mg at 03/04/25 0815    sodium bicarbonate 150 mEq in dextrose 5 % 1,000 mL infusion, 50 mL/hr, Intravenous, Continuous, JIMMY Nelson, Last Rate: 50 mL/hr at 03/04/25 0616, 50 mL/hr at 03/04/25 0616    thiamine tablet 100 mg, 100 mg, Oral, Daily, JIMMY Darnell, 100 mg at 03/04/25 0815    vitamin E (TOCOPHEROL) capsule 400 Units, 400 Units, Oral, Daily, JIMMY Darnell, 400 Units at 03/04/25 0815      Lab Results: I have reviewed the following results:  Results from last 7 days   Lab Units 03/04/25  0410 03/03/25  2357 03/03/25  1625 03/03/25  1021 03/03/25  0546 03/03/25  0435 03/02/25  2149 03/02/25  1636 03/02/25  0539 03/02/25  0013 03/01/25  1549 03/01/25  0519 02/28/25  1225 02/28/25  0518 02/27/25  2214 02/27/25  1818 02/27/25  1206 02/27/25  0552 02/26/25  1412 02/26/25  1114   WBC Thousand/uL 26.37*  --   --   --   --  28.19*  --   --  24.47*  --   --  19.12*  --  13.96*  --   --   --  15.92*  --  17.27*   HEMOGLOBIN g/dL 9.8*  --   --   --   --  10.3*  --   --  11.0*  --   --  10.7*  --  10.3*  --   --   --  13.3  --  13.9   HEMATOCRIT % 29.1*  --   --   --   --  30.7*  --   --  32.9*  --   --  31.8*  --  31.1*  --   --   --  41.6  --  41.4   PLATELETS Thousands/uL 412*  --   --   --   --  386  --   --  300  --   --  223  --  155  --   --   --  142*  --  164   POTASSIUM mmol/L 3.9 3.6 4.0 3.9 4.3  --  4.5 3.6 3.6 3.5   < > 4.0   < > 3.5   < > 3.5   < > 3.9   < > 4.2   CHLORIDE mmol/L 103 106 103 104 105  --  103 101 102 103   < > 103   < > 102   < > 98   < > 98   < > 88*   CO2 mmol/L 16* 16* 16* 15* 16*  --  18* 19* 15* 15*   < > 15*   < > 15*   < > 18*   < > 13*   < > 16*   BUN mg/dL 70* 70* 71* 72* 74*  --  74* 72*  "71* 68*   < > 64*   < > 51*   < > 47*   < > 44*   < > 37*   CREATININE mg/dL 4.17* 4.04* 4.20* 4.07* 4.32*  --  4.34* 4.24* 4.05* 4.03*   < > 3.70*   < > 3.63*   < > 3.52*   < > 3.03*   < > 2.52*   CALCIUM mg/dL 8.0* 7.8* 8.3* 8.2* 7.8*  --  8.2* 8.4 8.0* 8.2*   < > 8.3*   < > 7.7*   < > 8.9   < > 7.3*   < > 8.1*   MAGNESIUM mg/dL 2.0 2.0 2.1  --  2.1  --   --  2.2 2.1 2.1  --  2.2   < > 2.3   < > 2.4   < > 2.4   < >  --    PHOSPHORUS mg/dL 3.5 2.5 2.4  --  3.1  --   --  2.4 3.4 2.6  --  2.9   < > 2.6   < > 2.2*   < > 2.5   < > 2.5   ALBUMIN g/dL 2.8*  --   --   --  2.5*  --   --   --  2.8*  --   --  2.9*  --  2.6*  --  3.0*  --   --   --  3.4*    < > = values in this interval not displayed.       Administrative Statements     Portions of the record may have been created with voice recognition software. Occasional wrong word or \"sound a like\" substitutions may have occurred due to the inherent limitations of voice recognition software. Read the chart carefully and recognize, using context, where substitutions have occurred.If you have any questions, please contact the dictating provider.  "

## 2025-03-04 NOTE — ASSESSMENT & PLAN NOTE
Drinks 1-2 glasses of Peck per day  Last drank 4 weeks ago?  Continue folic acid/thiamine supplementation  CIWA protocol/Seizure precautions discontinued 2/2 out of withdrawal window and no symptoms of W/D experienced by patient

## 2025-03-04 NOTE — WOUND OSTOMY CARE
Progress Note - Wound   Nadeem Purdy Jr. 69 y.o. male MRN: [id number]  Unit/Bed#: ICU 03-01 Encounter: [id number]        Assessment:   Patient admitted to [facility] due to DKA. History of HTN, diabetes. Wound care nurse follow-up for bilateral lower extremity wounds. Patient is agreeable to assessment, alert and oriented x4, cates catheter in place for urine management, continent of bowel, assist of 2 with walker to stand for assessment, is OOB to chair, is an assist with care. Recommend patient follow-up with out-patient wound center- referral placed. Nutrition is following.     1. Bilateral sacrum, buttock, hips, elbows, and right heel- Skin is dry, intact, blanchable.     2. Left medial/posterior thigh and knee- Wound is irregular in shape, full-thickness, large fluid filled blister with moderate amount of serous drainage noted, there is yellow and pink tissue beneath the blister roof. Jodee-wound is dry, intact, scattered areas of purple discoloration. Critical care team at bedside for assessment and able to assess wound.    3. Left posterior tibia- Wounds are scattered, irregular in shape, full-thickness, 100% pink tissue, with moderate amount of serous drainage noted. Jodee-wounds are fragile, macerated skin, intact.     4. Left anterior tibia- Wound is irregular in shape, partial thickness, 100% pink tissue, with moderate amount of serous drainage noted. Jodee-wound is dry, intact, scaly.     5. Left medial heel- Wound is irregular/oval in shape, full-thickness, 100% pink tissue, with moderate amount of serous drainage noted. Jodee-wound is macerated, intact, fragile.    Educated patient on importance of frequent offloading of pressure via turning, repositioning, and weight-shifting. Verbalized understanding of plan of care.    No induration, fluctuance, odor, warmth, redness, or purulence noted to the above noted wound. New dressings applied. Patient tolerated well, denies pain to the wound. Primary nurse aware of  plan of care. See flow sheets for more detailed assessment findings. Will follow along.    Skin care plans:  1-Hydraguard/Silicone Cream to bilateral sacrum, buttock, right tibia scaly skin, and heels BID and PRN  2-Elevate heels to offload pressure.  3-Ehob cushion in chair when out of bed.  4-Moisturize skin daily with skin nourishing cream.  5-Turn/reposition q2h or when medically stable for pressure re-distribution on skin.   6-Cleanse left lower extremity wounds with NSS. Apply adaptic non adherent then Melgisorb AG to wounds then cover with heavy drainage ABD pack and wrap with Kerlix. Keep elevated with heels offloaded. Change 2 times a day and as needed for soilage/displacement.       Wound 02/26/25 Pretibial Right (Active)   Wound Image   03/04/25 1110   Wound Description Dry; intact 03/04/25 1100   Jodee-wound Assessment Dry; intact 03/04/25 1100   Wound Length (cm) 0 cm 03/04/25 1110   Wound Width (cm) 0 cm 03/04/25 1110   Wound Depth (cm) 0 cm 03/04/25 1110   Wound Surface Area (cm^2) 0 cm^2 03/04/25 1110   Wound Volume (cm^3) 0 cm^3 03/04/25 1110   Calculated Wound Volume (cm^3) 0 cm^3 03/04/25 1110   Change in Wound Size % 100 03/04/25 1110       Wound 02/26/25 Pretibial Distal;Left (Active)   Wound Image   03/04/25 1113   Wound Description pink 03/04/25 1113   Jodee-wound Assessment Dry;Intact;Scaly 03/04/25 1113   Wound Length (cm) 12 cm 03/04/25 1113   Wound Width (cm) 10 cm 03/04/25 1113   Wound Depth (cm) 0.1 cm 03/04/25 1113   Wound Surface Area (cm^2) 120 cm^2 03/04/25 1113   Wound Volume (cm^3) 12 cm^3 03/04/25 1113   Calculated Wound Volume (cm^3) 12 cm^3 03/04/25 1113   Change in Wound Size % 57.14 03/04/25 1113   Drainage Amount Moderate 03/04/25 1113   Drainage Description Serous 03/04/25 1113   Non-staged Wound Description Partial thickness 03/04/25 1113   Treatments Cleansed;Irrigation with NSS;Site care 03/04/25 1113   Dressing ABD;alginate 03/04/25 1113   Wound packed? No 03/04/25 1113    Dressing Changed Changed 03/04/25 1113   Patient Tolerance Tolerated well 03/04/25 1113   Dressing Status Clean;Dry;Intact 03/04/25 1113       Wound 02/27/25 Heel Left;Medial (Active)   Wound Image   03/04/25 1114   Wound Description pink 03/04/25 1114   Jodee-wound Assessment Maceration 03/04/25 1114   Wound Length (cm) 7 cm 03/04/25 1114   Wound Width (cm) 8.5 cm 03/04/25 1114   Wound Depth (cm) 0.2 cm 03/04/25 1114   Wound Surface Area (cm^2) 59.5 cm^2 03/04/25 1114   Wound Volume (cm^3) 11.9 cm^3 03/04/25 1114   Calculated Wound Volume (cm^3) 11.9 cm^3 03/04/25 1114   Change in Wound Size % 80.43 03/04/25 1114   Drainage Amount Moderate 03/04/25 1114   Drainage Description Serous 03/04/25 1114   Non-staged Wound Description Full thickness 03/04/25 1114   Treatments Cleansed;Irrigation with NSS;Site care 03/04/25 1114   Dressing Calcium Alginate with Silver;ABD;Dry dressing 03/04/25 1114   Wound packed? No 03/04/25 1114   Dressing Changed Changed 03/04/25 1114   Patient Tolerance Tolerated well 03/04/25 1114   Dressing Status Clean;Dry;Intact 03/04/25 1114       Wound 02/27/25 Tibial Left;Posterior (Active)   Wound Image   03/04/25 1115   Wound Description pink 03/04/25 1115   Jodee-wound Assessment Fragile, macerated, intact 03/04/25 1115   Wound Length (cm) 30 cm 03/04/25 1115   Wound Width (cm) 14 cm 03/04/25 1115   Wound Depth (cm) 0.2 cm 03/04/25 1115   Wound Surface Area (cm^2) 420 cm^2 03/04/25 1115   Wound Volume (cm^3) 84 cm^3 03/04/25 1115   Calculated Wound Volume (cm^3) 84 cm^3 03/04/25 1115   Drainage Amount Moderate 03/04/25 1115   Drainage Description Serous 03/04/25 1115   Non-staged Wound Description Full thickness 03/04/25 1115   Treatments Cleansed;Irrigation with NSS;Site care 03/04/25 1115   Dressing Calcium Alginate with Silver;ABD;Dry dressing 03/04/25 1115   Wound packed? No 03/04/25 1115   Dressing Changed Changed 03/04/25 1115   Patient Tolerance Tolerated well 03/04/25 1115   Dressing  Status Clean;Dry;Intact 03/04/25 1115       Wound 02/27/25 Tibial Posterior;Right (Active)   Wound Image   03/04/25 1111   Wound Description Dry; intact 03/04/25 1111   Jodee-wound Assessment Dry;Intact 03/04/25 1111   Wound Length (cm) 0 cm 03/04/25 1111   Wound Width (cm) 0 cm 03/04/25 1111   Wound Depth (cm) 0 cm 03/04/25 1111   Wound Surface Area (cm^2) 0 cm^2 03/04/25 1111   Wound Volume (cm^3) 0 cm^3 03/04/25 1111   Calculated Wound Volume (cm^3) 0 cm^3 03/04/25 1111   Change in Wound Size % 100 03/04/25 1111       Wound 02/27/25 Knee Left;Posterior (Active)   Wound Image   03/04/25 1115   Wound Description Pink; yellow, other 03/04/25 1115   Jodee-wound Assessment Dry;Intact;Purple;Swelling;Edema 03/04/25 1115   Wound Length (cm) 18 cm 03/04/25 1115   Wound Width (cm) 14 cm 03/04/25 1115   Wound Depth (cm) 0.2 cm 03/04/25 1115   Wound Surface Area (cm^2) 252 cm^2 03/04/25 1115   Wound Volume (cm^3) 50.4 cm^3 03/04/25 1115   Calculated Wound Volume (cm^3) 50.4 cm^3 03/04/25 1115   Drainage Amount Moderate 03/04/25 1115   Drainage Description Serous 03/04/25 1115   Non-staged Wound Description Full thickness 03/04/25 1115   Treatments Cleansed;Irrigation with NSS;Site care 03/04/25 1115   Dressing Calcium Alginate;ABD;Dry dressing 03/04/25 1115   Wound packed? No 03/04/25 1115   Dressing Changed Changed 03/04/25 1115   Patient Tolerance Tolerated well 03/04/25 1115   Dressing Status Clean;Dry;Intact 03/04/25 1115       Wound 03/01/25 Buttocks (Active)   Wound Image   03/04/25 1122   Wound Description Dry; intact 03/04/25 1122   Jodee-wound Assessment Dry;Intact 03/04/25 1122   Wound Length (cm) 0 cm 03/04/25 1122   Wound Width (cm) 0 cm 03/04/25 1122   Wound Depth (cm) 0 cm 03/04/25 1122   Wound Surface Area (cm^2) 0 cm^2 03/04/25 1122   Wound Volume (cm^3) 0 cm^3 03/04/25 1122   Calculated Wound Volume (cm^3) 0 cm^3 03/04/25 1122     Contact through [employer] Secure Chat with any questions  Wound Care will continue to  follow while inpatient    Joan FERRARAN RN CWON  Wound and Ostomy care

## 2025-03-04 NOTE — ASSESSMENT & PLAN NOTE
LUIS ALFREDO superimposed on CKD.  Baseline creatinine 1.1-1.3.  Likely ischemic ATN in the setting of severe sepsis and hemodynamic instability, complicated by vasomotor dysfunction from ARB and urinary retention status post Ramirez placement.  Nephrology is following.  Minimal improvement since admission.No sxs of uremia and he appears euvolemic.  Will renally dose antibiotics  Ongoing nephrology follow up

## 2025-03-04 NOTE — CASE MANAGEMENT
Case Management Discharge Planning Note    Patient name Nadeem Purdy Jr.  Location ICU 03/ICU  MRN [id number]  : 1955 Date 3/4/2025       Current Admission Date: 2025  Current Admission Diagnosis:Diabetic ketoacidosis without coma associated with type 2 diabetes mellitus (HCC)   Patient Active Problem List    Diagnosis Date Noted Date Diagnosed    Diarrhea 2025     Atrial fibrillation with RVR ([facility]) 2025     Acute respiratory insufficiency 2025     Urinary retention 2025     Group A strep bacteremia 2025     Hyponatremia 2025     Metabolic acidosis 2025     Clostridium difficile diarrhea 2025     Elevated troponin 2025     Acute renal failure superimposed on stage 3a chronic kidney disease ([facility]) 2025     Diabetic ketoacidosis without coma associated with type 2 diabetes mellitus ([facility]) 2025     Severe sepsis ([facility]) 2025     Alcohol abuse 2025     Hypertensive kidney disease with chronic kidney disease stage III ([facility]) 10/08/2024     Right knee injury, subsequent encounter 2024     Osteochondral lesion 2024     Primary osteoarthritis of one knee, right 2024     Lymphedema of right lower extremity 2024     Other tear of medial meniscus, current injury, right knee, initial encounter 2024     Type 2 diabetes mellitus with hyperglycemia, with long-term current use of insulin ([facility]) 2024     Cellulitis of lower extremity 2024     Cortical age-related cataract of both eyes 10/20/2023     Numbness of left foot 2023     Lymphedema of both lower extremities 2022     Rash 2022     Stage 3 chronic kidney disease, unspecified whether stage 3a or 3b CKD ([facility]) 2022     Tachycardia 2021     Essential hypertension 2019     Hypercholesteremia 2019     Class 3 severe obesity due to excess calories with serious comorbidity and body mass index (BMI) of 40.0 to  44.9 in adult (HCC) 09/26/2019     Steatohepatitis 09/26/2019     Allergic rhinitis 09/26/2019     Vitamin D deficiency 09/26/2019     Persistent proteinuria 09/26/2019     Type 2 diabetes mellitus with microalbuminuria, with long-term current use of insulin ([facility])        LOS (days): 6  Geometric Mean LOS (GMLOS) (days): 4.9  Days to GMLOS:-1.1     OBJECTIVE:  Risk of Unplanned Readmission Score: 24.12     Current admission status: Inpatient   Preferred Pharmacy:   [facility]/pharm[facility] #[id number] - [address]  [address]  [facility] [address]  Phone: [phone] Fax: [phone]    Primary Care Provider: Jluis Glass MD    Primary Insurance: MEDICARE  Secondary Insurance: [facility]    DISCHARGE DETAILS:  PT is recommending STR.  Spoke to the pt at the bedside who is agreeable to same.  Referrals made via AIDIN.  Pt is not yet medically cleared for same.  Pt is still under critical care services.  TC to to pt wife James ( [phone]) left a  for a return call.

## 2025-03-04 NOTE — ASSESSMENT & PLAN NOTE
E/b fever, tachycardia, tachypnea, leukocytosis with lactic acidosis.  Patient presented with DKA.  Sepsis most likely due to bacteremia in the setting of left lower extremity cellulitis.  Imaging on admission also showed concern for diarrheal illness however unlikely primary etiology for septic presentation.  1 of 2 blood cultures on admission positive for group A strep.  Lower extremity cellulitis clinical findings suspicious of streptococcal infection and this is confirmed with wound culture.  Patient has been on various antibiotics since admission.  Fortunately he remains hemodynamically stable off pressors.  Continue antibiotics as below  Follow-up repeat blood cultures for clearance  Trend temps and hemodynamics  Daily CBC DIF and chemistry to monitor response to treatment

## 2025-03-04 NOTE — PLAN OF CARE
Problem: PHYSICAL THERAPY ADULT  Goal: Performs mobility at highest level of function for planned discharge setting.  See evaluation for individualized goals.  Description: Treatment/Interventions: Functional transfer training, Therapeutic exercise, Endurance training, Gait training, Bed mobility, Equipment eval/education  Equipment Recommended: Walker       See flowsheet documentation for full assessment, interventions and recommendations.  Outcome: Progressing  Note: Prognosis: Fair     Assessment: Pt seen for PT treatment session this date with interventions consisting of therapeutic exercise to improve endurance to improve functional mobility and therapeutic activity to improve transfers and increase activity tolerance with functional mobility to decrease fall risk. Pt agreeable to PT treatment session upon arrival, pt found on commode, in no apparent distress. Since previous session, pt has made good progress as evidenced by decreased assistance required with mobility  Barriers during this session include fatigue.  Pt continues to be functioning below baseline level, and remains limited 2* factors listed above and including decreased strength, impaired activity tolerance, impaired  balance, decreased endurance, and decreased mobility.  Pt prognosis for achieving goals is fair, pending pt progress with hospitalization/medical status improvements, and indicated by continued required assistance. PT will continue to see pt during current hospitalization in order to address the deficits listed above and provide interventions consistent w/ POC in effort to achieve goals. Current goals and POC remain appropriate, pt continues to have rehab potential  Upon conclusion pt seated OOB in recliner. The patient's AM-PAC Basic Mobility Inpatient Short Form Raw Score is 7.  A Raw score of less than or equal to 16 suggests the patient may benefit from discharge to post-acute rehabilitation services. Based on patient  presentations and impairments, pt would most appropriately benefit from Level 2 resource intensity upon discharge.  Please also refer to the recommendation of the Physical Therapist for safe discharge planning. RN verbalized pt appropriate for PT session.  Barriers to Discharge:  (limited mobility)     Rehab Resource Intensity Level, PT: II (Moderate Resource Intensity)    See flowsheet documentation for full assessment.

## 2025-03-04 NOTE — ASSESSMENT & PLAN NOTE
Lab Results   Component Value Date    HGBA1C 8.9 (H) 02/26/2025       Recent Labs     03/04/25  2204 03/04/25  2351 03/05/25  0156 03/05/25  0407   POCGLU 198* 133 123 103       Blood Sugar Average: Last 72 hrs:  (P) [id number]    POA AEB Glucose 446, pH 7.218, AG 17 w/bicarb 15, unfortunately no BHB done upon arrival, but urine w/1+ketones  Suspect this is 2/2 noncompliance as well as active infection as noted below  Prescribed 70/30 insulin 20 units BID as OP, however reports that he has not been taking for approximately 2 weeks  Is s/p aggressive volume resuscitation per DKA protocol  DKA insulin gtt transitioned to non-DKA gtt, however did not tolerate SQ regimen even at uptitrated dosing - insulin gtt restarted on 03/02  Continue Q2H fingersticks  Endocrinology consulted - will need follow-up as OP  Diabetic diet  Goal  - 180

## 2025-03-04 NOTE — PLAN OF CARE
Problem: Prexisting or High Potential for Compromised Skin Integrity  Goal: Skin integrity is maintained or improved  Description: INTERVENTIONS:  - Identify patients at risk for skin breakdown  - Assess and monitor skin integrity  - Assess and monitor nutrition and hydration status  - Monitor labs   - Assess for incontinence   - Turn and reposition patient  - Assist with mobility/ambulation  - Relieve pressure over bony prominences  - Avoid friction and shearing  - Provide appropriate hygiene as needed including keeping skin clean and dry  - Evaluate need for skin moisturizer/barrier cream  - Collaborate with interdisciplinary team   - Patient/family teaching  - Consider wound care consult   Outcome: Progressing     Problem: PAIN - ADULT  Goal: Verbalizes/displays adequate comfort level or baseline comfort level  Description: Interventions:  - Encourage patient to monitor pain and request assistance  - Assess pain using appropriate pain scale  - Administer analgesics based on type and severity of pain and evaluate response  - Implement non-pharmacological measures as appropriate and evaluate response  - Consider cultural and social influences on pain and pain management  - Notify physician/advanced practitioner if interventions unsuccessful or patient reports new pain  Outcome: Progressing     Problem: INFECTION - ADULT  Goal: Absence or prevention of progression during hospitalization  Description: INTERVENTIONS:  - Assess and monitor for signs and symptoms of infection  - Monitor lab/diagnostic results  - Monitor all insertion sites, i.e. indwelling lines, tubes, and drains  - Monitor endotracheal if appropriate and nasal secretions for changes in amount and color  - [place] appropriate cooling/warming therapies per order  - Administer medications as ordered  - Instruct and encourage patient and family to use good hand hygiene technique  - Identify and instruct in appropriate isolation precautions for  identified infection/condition  Outcome: Progressing  Goal: Absence of fever/infection during neutropenic period  Description: INTERVENTIONS:  - Monitor WBC    Outcome: Progressing     Problem: SAFETY ADULT  Goal: Patient will remain free of falls  Description: INTERVENTIONS:  - Educate patient/family on patient safety including physical limitations  - Instruct patient to call for assistance with activity   - Consult OT/PT to assist with strengthening/mobility   - Keep Call bell within reach  - Keep bed low and locked with side rails adjusted as appropriate  - Keep care items and personal belongings within reach  - Initiate and maintain comfort rounds  - Make Fall Risk Sign visible to staff  - Offer Toileting every 2 Hours, in advance of need  - Initiate/Maintain bed alarm  - Obtain necessary fall risk management equipment  - Apply yellow socks and bracelet for high fall risk patients  - Consider moving patient to room near nurses station  Outcome: Progressing  Goal: Maintain or return to baseline ADL function  Description: INTERVENTIONS:  -  Assess patient's ability to carry out ADLs; assess patient's baseline for ADL function and identify physical deficits which impact ability to perform ADLs (bathing, care of mouth/teeth, toileting, grooming, dressing, etc.)  - Assess/evaluate cause of self-care deficits   - Assess range of motion  - Assess patient's mobility; develop plan if impaired  - Assess patient's need for assistive devices and provide as appropriate  - Encourage maximum independence but intervene and supervise when necessary  - Involve family in performance of ADLs  - Assess for home care needs following discharge   - Consider OT consult to assist with ADL evaluation and planning for discharge  - Provide patient education as appropriate  Outcome: Progressing  Goal: Maintains/Returns to pre admission functional level  Description: INTERVENTIONS:  - Perform AM-PAC 6 Click Basic Mobility/ Daily Activity  assessment daily.  - Set and communicate daily mobility goal to care team and patient/family/caregiver.   - Collaborate with rehabilitation services on mobility goals if consulted  - Perform Range of Motion 3 times a day.  - Reposition patient every 2 hours.  - Dangle patient 3 times a day  - Stand patient 3 times a day  - Ambulate patient 3 times a day  - Out of bed to chair 3 times a day   - Out of bed for meals 3 times a day  - Out of bed for toileting  - Record patient progress and toleration of activity level   Outcome: Progressing     Problem: DISCHARGE PLANNING  Goal: Discharge to home or other facility with appropriate resources  Description: INTERVENTIONS:  - Identify barriers to discharge w/patient and caregiver  - Arrange for needed discharge resources and transportation as appropriate  - Identify discharge learning needs (meds, wound care, etc.)  - Arrange for interpretive services to assist at discharge as needed  - Refer to Case Management Department for coordinating discharge planning if the patient needs post-hospital services based on physician/advanced practitioner order or complex needs related to functional status, cognitive ability, or social support system  Outcome: Progressing     Problem: Knowledge Deficit  Goal: Patient/family/caregiver demonstrates understanding of disease process, treatment plan, medications, and discharge instructions  Description: Complete learning assessment and assess knowledge base.  Interventions:  - Provide teaching at level of understanding  - Provide teaching via preferred learning methods  Outcome: Progressing     Problem: CARDIOVASCULAR - ADULT  Goal: Maintains optimal cardiac output and hemodynamic stability  Description: INTERVENTIONS:  - Monitor I/O, vital signs and rhythm  - Monitor for S/S and trends of decreased cardiac output  - Administer and titrate ordered vasoactive medications to optimize hemodynamic stability  - Assess quality of pulses, skin color  and temperature  - Assess for signs of decreased coronary artery perfusion  - Instruct patient to report change in severity of symptoms  Outcome: Progressing  Goal: Absence of cardiac dysrhythmias or at baseline rhythm  Description: INTERVENTIONS:  - Continuous cardiac monitoring, vital signs, obtain 12 lead EKG if ordered  - Administer antiarrhythmic and heart rate control medications as ordered  - Monitor electrolytes and administer replacement therapy as ordered  Outcome: Progressing     Problem: GASTROINTESTINAL - ADULT  Goal: Minimal or absence of nausea and/or vomiting  Description: INTERVENTIONS:  - Administer IV fluids if ordered to ensure adequate hydration  - Maintain NPO status until nausea and vomiting are resolved  - Nasogastric tube if ordered  - Administer ordered antiemetic medications as needed  - Provide nonpharmacologic comfort measures as appropriate  - Advance diet as tolerated, if ordered  - Consider nutrition services referral to assist patient with adequate nutrition and appropriate food choices  Outcome: Progressing  Goal: Maintains adequate nutritional intake  Description: INTERVENTIONS:  - Monitor percentage of each meal consumed  - Identify factors contributing to decreased intake, treat as appropriate  - Assist with meals as needed  - Monitor I&O, weight, and lab values if indicated  - Obtain nutrition services referral as needed  Outcome: Progressing     Problem: GENITOURINARY - ADULT  Goal: Maintains or returns to baseline urinary function  Description: INTERVENTIONS:  - Assess urinary function  - Encourage oral fluids to ensure adequate hydration if ordered  - Administer IV fluids as ordered to ensure adequate hydration  - Administer ordered medications as needed  - Offer frequent toileting  - Follow urinary retention protocol if ordered  Outcome: Progressing     Problem: METABOLIC, FLUID AND ELECTROLYTES - ADULT  Goal: Electrolytes maintained within normal limits  Description:  INTERVENTIONS:  - Monitor labs and assess patient for signs and symptoms of electrolyte imbalances  - Administer electrolyte replacement as ordered  - Monitor response to electrolyte replacements, including repeat lab results as appropriate  - Instruct patient on fluid and nutrition as appropriate  Outcome: Progressing  Goal: Fluid balance maintained  Description: INTERVENTIONS:  - Monitor labs   - Monitor I/O and WT  - Instruct patient on fluid and nutrition as appropriate  - Assess for signs & symptoms of volume excess or deficit  Outcome: Progressing  Goal: Glucose maintained within target range  Description: INTERVENTIONS:  - Monitor Blood Glucose as ordered  - Assess for signs and symptoms of hyperglycemia and hypoglycemia  - Administer ordered medications to maintain glucose within target range  - Assess nutritional intake and initiate nutrition service referral as needed  Outcome: Progressing     Problem: SKIN/TISSUE INTEGRITY - ADULT  Goal: Incision(s), wounds(s) or drain site(s) healing without S/S of infection  Description: INTERVENTIONS  - Assess and document dressing, incision, wound bed, drain sites and surrounding tissue  - Provide patient and family education  - Perform skin care/dressing changes  Outcome: Progressing     Problem: MUSCULOSKELETAL - ADULT  Goal: Maintain or return mobility to safest level of function  Description: INTERVENTIONS:  - Assess patient's ability to carry out ADLs; assess patient's baseline for ADL function and identify physical deficits which impact ability to perform ADLs (bathing, care of mouth/teeth, toileting, grooming, dressing, etc.)  - Assess/evaluate cause of self-care deficits   - Assess range of motion  - Assess patient's mobility  - Assess patient's need for assistive devices and provide as appropriate  - Encourage maximum independence but intervene and supervise when necessary  - Involve family in performance of ADLs  - Assess for home care needs following  discharge   - Consider OT consult to assist with ADL evaluation and planning for discharge  - Provide patient education as appropriate  Outcome: Progressing  Goal: Maintain proper alignment of affected body part  Description: INTERVENTIONS:  - Support, maintain and protect limb and body alignment  - Provide patient/ family with appropriate education  Outcome: Progressing     Problem: Nutrition/Hydration-ADULT  Goal: Nutrient/Hydration intake appropriate for improving, restoring or maintaining nutritional needs  Description: Monitor and assess patient's nutrition/hydration status for malnutrition. Collaborate with interdisciplinary team and initiate plan and interventions as ordered.  Monitor patient's weight and dietary intake as ordered or per policy. Utilize nutrition screening tool and intervene as necessary. Determine patient's food preferences and provide high-protein, high-caloric foods as appropriate.     INTERVENTIONS:  - Monitor oral intake, urinary output, labs, and treatment plans  - Assess nutrition and hydration status and recommend course of action  - Evaluate amount of meals eaten  - Assist patient with eating if necessary   - Allow adequate time for meals  - Recommend/ encourage appropriate diets, oral nutritional supplements, and vitamin/mineral supplements  - Order, calculate, and assess calorie counts as needed  - Recommend, monitor, and adjust tube feedings and TPN/PPN based on assessed needs  - Assess need for intravenous fluids  - Provide specific nutrition/hydration education as appropriate  - Include patient/family/caregiver in decisions related to nutrition  Outcome: Progressing

## 2025-03-04 NOTE — ASSESSMENT & PLAN NOTE
In setting of bilateral LE cellulitis (L>R)  1 out of 2 Blood cultures w/Strep Pyogenes (Group A) - repeat blood cultures NGTD-will require 2 weeks abx for clearance of bloodstream infection  Please see plan as noted above

## 2025-03-04 NOTE — ASSESSMENT & PLAN NOTE
Ha1c 8.9 percent.  Presented in DKA.  Status post aggressive volume resuscitation and DKA protocol.  Remains on insulin drip. Endocrinology is following.  Recommend tight glycemic control

## 2025-03-04 NOTE — PROGRESS NOTES
Progress Note - Infectious Disease   Name: Nadeem Purdy Jr. 69 y.o. male I MRN: [id number]  Unit/Bed#: ICU 03-01 I Date of Admission: 2/26/2025   Date of Service: 3/4/2025 I Hospital Day: 6    Assessment & Plan  Severe sepsis (HCC)  E/b fever, tachycardia, tachypnea, leukocytosis with lactic acidosis.  Patient presented with DKA.  Sepsis most likely due to bacteremia in the setting of left lower extremity cellulitis.  Imaging on admission also showed concern for diarrheal illness however unlikely primary etiology for septic presentation.  1 of 2 blood cultures on admission positive for group A strep.  Lower extremity cellulitis clinical findings suspicious of streptococcal infection and this is confirmed with wound culture.  Patient has been on various antibiotics since admission.  Fortunately he remains hemodynamically stable off pressors.  Continue antibiotics as below  Follow-up repeat blood cultures for clearance  Trend temps and hemodynamics  Daily CBC DIF and chemistry to monitor response to treatment  Group A strep bacteremia  1 of 2 sets of admission blood cultures isolated strep pyogenes.  This is most likely due to extensive left lower extremity cellulitis with chronic bilateral lower extremity nonhealing wounds.  Wound culture isolated group A strep and MSSA.  Fortunately patient has no intravascular or prosthetic devices.  Transthoracic echo, adequate study, without significant valvular disease or obvious vegetations. Repeat blood cultures so far negative.   Continue renally dosed IV cefazolin   Follow-up repeat blood cultures  Anticipate transition to PO abx to complete 14 day course   Cellulitis of lower extremity  Patient presented with DKA and chronic bilateral lower extremity wounds, nonhealing with concern for bilateral cellulitis.  This is in the setting of uncontrolled bilateral lower extremity lymphedema.  On exam and on exam and after review of clinical media suspect unilateral cellulitis of  the left lower extremity.  This has become quite extensive over the course of his hospitalization so far.  Wound culture polymicrobial with strep pyogenes and MSSA.  Clinical findings typical of streptococcal cellulitis.  Surgery and wound care following.  No evidence of soft tissue gas or abscess on CT of the left lower extremity.  Continue IV antibiotics as above  Close wound care and surgery follow-up ongoing  Serial skin exams and local wound care  Encourage lower extremity elevation, compression  Diarrhea  Reported diarrhea prehospital and briefly following admission.  Diarrhea now resolved.  C. difficile PCR positive, EIA negative.  Patient denies prior history of C. difficile.  No antibiotic exposures prior to admission.  Primary prophylaxis has not been shown to be beneficial.  Discontinue p.o. vancomycin  Monitor stool output  Diabetic ketoacidosis without coma associated with type 2 diabetes mellitus (HCC)  Ha1c 8.9 percent.  Presented in DKA.  Status post aggressive volume resuscitation and DKA protocol.  Remains on insulin drip. Endocrinology is following.  Recommend tight glycemic control  Acute renal failure superimposed on stage 3a chronic kidney disease (HCC)  LUIS ALFREDO superimposed on CKD.  Baseline creatinine 1.1-1.3.  Likely ischemic ATN in the setting of severe sepsis and hemodynamic instability, complicated by vasomotor dysfunction from ARB and urinary retention status post Ramirez placement.  Nephrology is following.  Minimal improvement since admission.No sxs of uremia and he appears euvolemic.  Will renally dose antibiotics  Ongoing nephrology follow up   Lymphedema of both lower extremities  History of bilateral lower extremity lymphedema with chronic wounds.  Remains a risk factor for infection, poor wound healing, and recurrent admissions for cellulitis.    Encourage compliance with lower extremity elevation, compression, diuresis.  Class 3 severe obesity due to excess calories with serious  comorbidity and body mass index (BMI) of 40.0 to 44.9 in adult (HCC)  BMI greater than 40.  Recommend lifestyle modifications.  Remains a risk factor for infection.  Alcohol abuse  Admits to consumption of 1 to 2 glasses of bourbon a day.  Reports his last beverage was about 4 weeks ago.  Remains on CIWA protocol and supplemented with folic acid and thiamine.  Urinary retention  Ramirez catheter was discontinued however reinserted due to urinary retention.  Atrial fibrillation with RVR ([facility])  New onset likely precipitated by acute illness, sepsis and renal failure.  Ongoing cardiac management per ICU. Converted to NSR with amiodarone infusion.     I have discussed the above management plan in detail with the primary service.     Antibiotics:  D2 IV cefazolin   D7 IV abx     Subjective   Patient has no fever, chills, sweats; no nausea, vomiting, diarrhea; no cough, shortness of breath; minimal LE pain. No new symptoms. Tolerating the abx.     Objective :  Temp:  [98.1 °F (36.7 °C)-100 °F (37.8 °C)] 100 °F (37.8 °C)  HR:  [] 95  BP: (127-168)/(60-74) 168/74  Resp:  [19-26] 20  SpO2:  [89 %-99 %] 96 %  O2 Device: None (Room air)    General:  No acute distress, sitting in bedside recliner, chronically ill-appearing, falling asleep during conversation  Psychiatric:  Awake and alert though falling asleep during conversation, answers questions minimally, affect is flat  HEENT: Mucous membranes dry, neck appears supple, head normal cephalic  Pulmonary: On room air, no dyspnea  Cardiovascular: RRR  Abdomen:  Soft, nontender, obese  Extremities: Bilateral lower extremity lymphedema  Skin:  No rashes though extensive cellulitis with blistering noted to the left lower extremity from ankle to upper thigh.  Clinical media reviewed. Dry dressings intact with mild streaking yellowish drainage noted medially LLE.   : Ramirez patent      Lab Results: I have reviewed the following results:  Results from last 7 days   Lab Units  03/04/25  0410 03/03/25  0435 03/02/25  0539   WBC Thousand/uL 26.37* 28.19* 24.47*   HEMOGLOBIN g/dL 9.8* 10.3* 11.0*   PLATELETS Thousands/uL 412* 386 300     Results from last 7 days   Lab Units 03/04/25  0410 03/03/25  2357 03/03/25  1625 03/03/25  1021 03/03/25  0546 03/02/25  1636 03/02/25  0539   SODIUM mmol/L 129* 131* 128*   < > 130*   < > 129*   POTASSIUM mmol/L 3.9 3.6 4.0   < > 4.3   < > 3.6   CHLORIDE mmol/L 103 106 103   < > 105   < > 102   CO2 mmol/L 16* 16* 16*   < > 16*   < > 15*   BUN mg/dL 70* 70* 71*   < > 74*   < > 71*   CREATININE mg/dL 4.17* 4.04* 4.20*   < > 4.32*   < > 4.05*   EGFR ml/min/1.73sq m 13 14 13   < > 12   < > 13   CALCIUM mg/dL 8.0* 7.8* 8.3*   < > 7.8*   < > 8.0*   AST U/L 54*  --   --   --  100*  --  61*   ALT U/L 56*  --   --   --  70*  --  45   ALK PHOS U/L 40  --   --   --  45  --  47   ALBUMIN g/dL 2.8*  --   --   --  2.5*  --  2.8*    < > = values in this interval not displayed.     Results from last 7 days   Lab Units 03/03/25  0319 03/02/25  2144 02/27/25  2013 02/27/25  1942 02/27/25  0537 02/26/25  1114   BLOOD CULTURE   --  Received in Microbiology Lab. Culture in Progress.  Received in Microbiology Lab. Culture in Progress.  --   --   --  No Growth After 5 Days.  Streptococcus pyogenes (Group A)*   GRAM STAIN RESULT  No Polys or Bacteria seen  --  No polys seen*  Rare Gram positive cocci in pairs*  No Polys or Bacteria seen  --   --  Gram positive cocci in chains*   WOUND CULTURE   --   --  2+ Growth of Staphylococcus aureus*  2+ Growth of Streptococcus pyogenes (Group A)*  3+ Growth of  2+ Growth of Staphylococcus aureus*  2+ Growth of Streptococcus pyogenes (Group A)*  --   --   --    MRSA CULTURE ONLY   --   --   --  No Methicillin Resistant Staphlyococcus aureus (MRSA) isolated  --   --    C DIFF TOXIN B BY PCR   --   --   --   --  Positive*  --      Results from last 7 days   Lab Units 03/04/25  0410 03/03/25  0546 03/02/25  0539 03/01/25  0519  02/28/25  0518 02/27/25  0739 02/26/25  1114   PROCALCITONIN ng/ml 1.86* 2.99* 5.23* 8.70* 13.97* 18.89* 9.27*                 Imaging Results Review: No pertinent imaging studies reviewed.  Other Study Results Review: Other studies reviewed include: micro

## 2025-03-04 NOTE — ASSESSMENT & PLAN NOTE
AG 17 w/bicarb 16 on admission   Suspect this is multifactorial in setting of DKA, renal failure, diarrhea, intravascular depletion in setting of sepsis  APAP/ASA negative  Continue low rate bicarb gtt started 3/2 and PO sodium bicarb added per nephrology  Continue to trend acidosis closely

## 2025-03-04 NOTE — DISCHARGE INSTR - OTHER ORDERS
Schedule Follow-up Outpatient Wound Appointment.  Call Outpatient Wound and Ostomy Care Team @ [phone]   Option 1: [place], [place], [place], [place]  Option 2: Remy Ayala, [place]      Skin care Plan: March 11, 2025,  1-Mid Sacro-Buttocks Wound: Cleanse sacro-buttocks with soap and water. Apply a Silicone Bordered Foam Dressing(Mepilex) to area. Shaheen with T for Treatment. Change every other day or PRN. Peel back and inspect skin at least Q-shift .  2-Turn/reposition q2h or when medically stable for pressure re-distribution on skin .  3-Elevate heels to offload pressure  4-Moisturize skin daily with skin nourishing cream  5-Ehob cushion in chair when out of bed.  6-Hydraguard to bilateral heels BID and PRN.   7-Cleanse left leg wounds from hip to foot with hibiclens, apply Eucerin to intact skin. Apply Silicone bordered foam dressing to cover wounds on left inner posterior thigh , posterior tibia and knee, inner outer and posterior heel.

## 2025-03-04 NOTE — ASSESSMENT & PLAN NOTE
New onset likely precipitated by acute illness, sepsis and renal failure.  Ongoing cardiac management per ICU. Converted to NSR with amiodarone infusion.

## 2025-03-04 NOTE — ASSESSMENT & PLAN NOTE
New onset - likely brought on by acute illness, sepsis, renal failure  Developed AF w/RVR during day on 03/01  Home Toprol XL held-metoprolol 50 mg PO BID started  Cardizem gtt and PO discontinued and amiodarone bolus and infusion started with successful conversion to NSR 3/3  Amiodarone infusion and heparin infusion dc'd 3/4-low threshold to restart if patient goes back into Afib  Consider cardiology consult  Continue cardiac monitoring

## 2025-03-04 NOTE — ASSESSMENT & PLAN NOTE
Has bilateral lymphedema at baseline w/chronic open wounds   TTE revealed EF 50% w/increased wall thickness; trace MR/TR/ND  Prescribed Lasix as OP  Wound care team following  Continue treatment for bilateral LE cellulitis as noted above

## 2025-03-04 NOTE — ASSESSMENT & PLAN NOTE
POA AEB fever, tachycardia, tachypnea, leukocytosis, elevated lactic acidosis  Suspect this is 2/2 bacteremia in setting of bilateral LE cellulitis vs diarrheal illness/? CDIF  Imaging revealed liquid stool within visualized colon concerning for diarrhea disease and extensive circumferential skin thickening and subcutaneous edema involving bilateral lower legs compatible with cellulitis   Repeat imaging on 03/02 completed due to extension of erythema, swelling, blistering from L shin to L upper thigh - again no evidence of soft tissue gas  COVID/FLU/RSV negative   1 out of 2 Blood cultures w/Strep Pyogenes (Group A) - repeat blood cultures NGTD  Patient will require total of 2 wks of ABX for bloodstream infection with source as LE cellulitis  UA w/out evidence of infection  MRSA swab negative  Bilateral LE wound cultures w/Staph aureus and Strep Pyogenes - repeat LLE wound culture pending  CDIF PCR positive, but EIA negative  Enteric stool panel negative   Lactic acid 2.4 - has since cleared  Procalcitonin 9.27 > 18.89 >> 2.99   Is s/p aggressive volume resuscitation w/both crystalloid and colloid for intravascular depletion as well as IV diuresis in setting of extravascular overload  ID consulted   IV antibiotics deescalated to Cefazolin  Discontinued PO vanco 3/3  Trend fever and WBC curve

## 2025-03-04 NOTE — PROGRESS NOTES
Progress Note - Critical Care/ICU   Name: Nadeem Purdy Jr. 69 y.o. male I MRN: [id number]  Unit/Bed#: ICU 03-01 I Date of Admission: 2/26/2025   Date of Service: 3/4/2025 I Hospital Day: 6      Assessment & Plan  Diabetic ketoacidosis without coma associated with type 2 diabetes mellitus (HCC)  Lab Results   Component Value Date    HGBA1C 8.9 (H) 02/26/2025       Recent Labs     03/03/25 2002 03/03/25 2203 03/04/25  0002 03/04/25  0207   POCGLU 167* 180* 138 108       Blood Sugar Average: Last 72 hrs:  (P) [id number]    POA AEB Glucose 446, pH 7.218, AG 17 w/bicarb 15, unfortunately no BHB done upon arrival, but urine w/1+ketones  Suspect this is 2/2 noncompliance as well as active infection as noted below  Prescribed 70/30 insulin 20 units BID as OP, however reports that he has not been taking for approximately 2 weeks  Is s/p aggressive volume resuscitation per DKA protocol  DKA insulin gtt transitioned to non-DKA gtt, however did not tolerate SQ regimen even at uptitrated dosing - insulin gtt restarted on 03/02  Continue Q2H fingersticks  Endocrinology consulted - will need follow-up as OP  Diabetic diet  Goal  - 180  Severe sepsis (HCC)  POA AEB fever, tachycardia, tachypnea, leukocytosis, elevated lactic acidosis  Suspect this is 2/2 bacteremia in setting of bilateral LE cellulitis vs diarrheal illness/? CDIF  Imaging revealed liquid stool within visualized colon concerning for diarrhea disease and extensive circumferential skin thickening and subcutaneous edema involving bilateral lower legs compatible with cellulitis   Repeat imaging on 03/02 completed due to extension of erythema, swelling, blistering from L shin to L upper thigh - again no evidence of soft tissue gas  COVID/FLU/RSV negative   1 out of 2 Blood cultures w/Strep Pyogenes (Group A) - repeat blood cultures pending  UA w/out evidence of infection  MRSA swab negative  Bilateral LE wound cultures w/Staph aureus and Strep Pyogenes  - repeat LLE wound culture pending  CDIF PCR positive, but EIA negative  Enteric stool panel negative   Lactic acid 2.4 - has since cleared  Procalcitonin 9.27 > 18.89 >> 2.99   Is s/p aggressive volume resuscitation w/both crystalloid and colloid for intravascular depletion as well as IV diuresis in setting of extravascular overload  ID consulted   IV antibiotics deescalated antibiotics to Cefazolin  Discontinue PO vanco  Trend fever and WBC curve  Cellulitis of lower extremity  Please see plan as noted above  Monitor for worsening extension of cellulitis  Group A strep bacteremia  In setting of bilateral LE cellulitis (L>R)  1 out of 2 Blood cultures w/Strep Pyogenes (Group A) - repeat blood cultures pending  Please see plan as noted above  Clostridium difficile diarrhea  Notes some diarrhea prior to admission, however had worsened shortly after admission - now improved  CDIF PCR positive, however EIA negative  Enteric stool panel negative  PO vanco discontinued per ID  Acute renal failure superimposed on stage 3a chronic kidney disease (HCC)  Creatinine 2.52 on admission - now up to 3.9  Baseline creatinine appears to be around 1.1 to 1.3  Suspect this is multifactorial in setting of DKA, intravascular hypovolemia, severe sepsis in setting of bacteremia/bilateral LE cellulitis, rhabdomyolysis, now w/component of ischemic ATN  FeNa 0.5%/FeUrea 30% from 02/27 --> prerenal disease  FeUrea from 03/01 40.4% --> intrinsic disease  Is s/p aggressive volume resuscitation w/both crystalloid and colloid for intravascular depletion as well as IV diuresis in setting of extravascular overload - low rate bicarb gtt started for worsening metabolic acidosis  Nephrology consulted for worsening renal function despite treatment as noted above  Avoid nephrotoxic agents and hypotension  Trend renal indices  Strict I/O  Daily weights  Metabolic acidosis  AG 17 w/bicarb 16 on admission   Suspect this is multifactorial in setting of  DKA, renal failure, diarrhea, intravascular depletion in setting of sepsis  APAP/ASA negative  Low rate bicarb gtt started as noted above  Continue to trend acidosis closely  Atrial fibrillation with RVR (HCC)  New onset - likely brought on by acute illness, sepsis, renal failure  Developed AF w/RVR during day on 03/01  Home Toprol XL held-metoprolol 50 mg PO BID started  Cardizem gtt and PO discontinued and amiodarone bolus and infusion started with successful conversion to NSR 3/3  Continue Heparin gtt for systemic AC  Consider cardiology consult  Continue cardiac monitoring  Hyponatremia  Corrected Na 127 on admission  Likely in setting of severe sepsis and decreased PO intake w/underlying alcohol abuse as well as intravascular depletion/extravascular overload  Continue to trend Na - goal for no more than 6-8 mEq change over next 24H  Elevated troponin  Troponin 64 >> 58  Suspect this is demand in setting of severe sepsis/bacteremia vs DKA/dehydration  ECG w/out ischemic changes  Denies CP  Continue cardiac monitoring - no need to further trend troponin  Acute respiratory insufficiency  Suspect this is multifactorial in setting of ? vascular congestion vs ? underlying COPD given 20 year smoking history vs atelectasis/deconditioning  Continue scheduled Xopenex nebs   Aggressive pulmonary hygiene  Wean O2 for SpO2 > 88%  Urinary retention  Ramirez cath placed on 02/28 for urinary retention - removed on 03/01, however required reinsertion  Likely in setting of severe sepsis/acute illness  Plan for voiding trial when status improves  Monitor I/O closely  Essential hypertension  Holding home Losartan given LUIS ALFREDO  Hold home Toprol XL (dose increased due to AF w/RVR) and Amlodipine - now on Metoprolol 50 mg BID  Continue to monitor hemodynamics closely  Hypercholesteremia  Continue home statin  Class 3 severe obesity due to excess calories with serious comorbidity and body mass index (BMI) of 40.0 to 44.9 in adult  ([facility])  Encourage healthy lifestyle modifications   Lymphedema of both lower extremities  Has bilateral lymphedema at baseline w/chronic open wounds   TTE revealed EF 50% w/increased wall thickness; trace MR/TR/MD  Prescribed Lasix as OP  Wound care team following  Continue treatment for bilateral LE cellulitis as noted above  Alcohol abuse  Drinks 1-2 glasses of [place] per day  Last drank 4 weeks ago?  Continue folic acid/thiamine supplementation  CIWA protocol  Seizure precautions  Diarrhea    Disposition: Stepdown Level 1    ICU Core Measures     A: Assess, Prevent, and Manage Pain Has pain been assessed? Yes  Need for changes to pain regimen? NA   B: Both SAT/SAT  N/A   C: Choice of Sedation RASS Goal: N/A patient not on sedation  Need for changes to sedation or analgesia regimen? NA   D: Delirium CAM-ICU: Negative   E: Early Mobility  Plan for early mobility? NA   F: Family Engagement Plan for family engagement today? NA       Antibiotic Review: Infectious disease consulted    Review of Invasive Devices:    Ramirez Plan: Continue for accurate I/O monitoring for 48 hours        Prophylaxis:  VTE VTE covered by:  heparin (porcine), Intravenous, 20 Units/kg/hr at 03/03/25 2131  heparin (porcine), Intravenous, 2,000 Units at 03/03/25 1850  heparin (porcine), Intravenous       Stress Ulcer  not ordered         24 Hour Events : ID consulted and antibiotics deescalated to cefazolin. Cardizem infusion discontinued. Bolused with amiodarone and started on infusion with conversion to Sinus rhythm.     Subjective   Review of Systems: Review of Systems   Constitutional:  Negative for chills and fatigue.   HENT:  Negative for sore throat.    Respiratory:  Negative for cough and shortness of breath.    Cardiovascular:  Positive for leg swelling. Negative for chest pain.   Gastrointestinal:  Negative for abdominal distention, abdominal pain, diarrhea, nausea and vomiting.   Genitourinary:         Ramirez for urinary retention    Skin:  Positive for wound. Negative for color change.   Neurological:  Negative for dizziness and seizures.   Psychiatric/Behavioral:  The patient is not nervous/anxious.    All other systems reviewed and are negative.      Objective :                   Vitals I/O      Most Recent Min/Max in 24hrs   Temp 98.1 °F (36.7 °C) Temp  Min: 97.5 °F (36.4 °C)  Max: 98.1 °F (36.7 °C)   Pulse 92 Pulse  Min: 83  Max: 101   Resp 21 Resp  Min: 19  Max: 27   /66 BP  Min: 113/61  Max: 165/72   O2 Sat 95 % SpO2  Min: 89 %  Max: 99 %      Intake/Output Summary (Last 24 hours) at 3/4/2025 0355  Last data filed at 3/4/2025 0000  Gross per 24 hour   Intake 2755.02 ml   Output 2300 ml   Net 455.02 ml       Diet Cardiovascular; Cardiac; Consistent Carbohydrate Diet Level 2 (5 carb servings/75 grams CHO/meal), Fluid Restriction 1800 ML    Invasive Monitoring           Physical Exam   Physical Exam  Eyes:      Extraocular Movements: Extraocular movements intact.      Conjunctiva/sclera: Conjunctivae normal.      Pupils: Pupils are equal, round, and reactive to light.   Skin:     General: Skin is warm and dry.      Comments: Skin sloughing off, with blisters-see media tab   HENT:      Mouth/Throat:      Mouth: Mucous membranes are moist.   Cardiovascular:      Rate and Rhythm: Normal rate and regular rhythm.      Comments: Doppler pulses  Musculoskeletal:      Right lower le+ Edema present.      Left lower leg: 3+ Edema present.   Abdominal: General: Bowel sounds are normal. There is no distension.      Palpations: Abdomen is soft.      Tenderness: There is no abdominal tenderness.   Constitutional:       Appearance: He is ill-appearing.   Pulmonary:      Effort: Pulmonary effort is normal.      Comments: Diminished with occasional exp wheeze  Neurological:      Mental Status: He is oriented to person, place and time.      Motor: Strength full and intact in all extremities.   Genitourinary/Anorectal:  Ramirez present.         Diagnostic Studies        Lab Results: I have reviewed the following results:     Medications:  Scheduled PRN   aspirin, 81 mg, Daily  atorvastatin, 40 mg, Daily With Dinner  azelastine, 1 spray, BID  cefazolin, 1,000 mg, Q8H  chlorhexidine, 15 mL, Q12H MARGARITA  Cholecalciferol, 2,000 Units, Daily  folic acid, 1 mg, Daily  gabapentin, 200 mg, HS  guaiFENesin, 600 mg, Q12H MARGARITA  levalbuterol, 1.25 mg, TID  melatonin, 6 mg, HS  metoprolol tartrate, 50 mg, Q12H MARGARITA  thiamine, 100 mg, Daily  vitamin E (tocopherol), 400 Units, Daily      acetaminophen, 650 mg, Q6H PRN  albuterol, 2.5 mg, Q6H PRN  heparin (porcine), 2,000 Units, Q6H PRN  heparin (porcine), 4,000 Units, Q6H PRN       Continuous    amiodarone (CORDARONE) 900 mg in dextrose 5 % 500 mL infusion, 0.5 mg/min, Last Rate: 0.5 mg/min (03/03/25 1818)  heparin (porcine), 3-20 Units/kg/hr (Order-Specific), Last Rate: 20 Units/kg/hr (03/03/25 2131)  insulin regular (HumuLIN R,NovoLIN R) 1 Units/mL in sodium chloride 0.9 % 100 mL infusion, 0.3-21 Units/hr, Last Rate: 1 Units/hr (03/04/25 0208)  sodium bicarbonate 150 mEq in dextrose 5 % 1,000 mL infusion, 35 mL/hr, Last Rate: 35 mL/hr (03/03/25 1430)         Labs:   CBC    Recent Labs     03/02/25  0539 03/02/25  2149 03/03/25  0435   WBC 24.47*  --  28.19*   HGB 11.0*  --  10.3*   HCT 32.9*  --  30.7*     --  386   BANDSPCT  --  4  --      BMP    Recent Labs     03/03/25  1625 03/03/25  2357   SODIUM 128* 131*   K 4.0 3.6    106   CO2 16* 16*   AGAP 9 9   BUN 71* 70*   CREATININE 4.20* 4.04*   CALCIUM 8.3* 7.8*       Coags    Recent Labs     03/03/25  0546 03/03/25  1158 03/03/25  1814 03/03/25  2357   INR 1.30*  --   --   --    PTT 44*   < > 55* 78*    < > = values in this interval not displayed.        Additional Electrolytes  Recent Labs     03/02/25  1636 03/03/25  0435 03/03/25  0546 03/03/25  1625 03/03/25  2357   MG 2.2  --    < > 2.1 2.0   PHOS 2.4  --    < > 2.4 2.5   CAIONIZED 1.18 0.98*  --   --    --     < > = values in this interval not displayed.          Blood Gas    Recent Labs     03/03/25  0744   PHART 7.406   ETG7OSW 25.5*   PO2ART 76.8   CNA0RJF 15.7*   BEART -7.5   SOURCE Radial, Right     Recent Labs     03/03/25  0435 03/03/25  0744   PHVEN 7.145*  --    PXC3IAV 39.4*  --    PO2VEN 55.6*  --    WOL1ZSE 13.3*  --    BEVEN -14.9  --    A0CLDHY 81.7*  --    SOURCE  --  Radial, Right    LFTs  Recent Labs     03/02/25  0539 03/03/25  0546   ALT 45 70*   AST 61* 100*   ALKPHOS 47 45   ALB 2.8* 2.5*   TBILI 0.46 0.40       Infectious  Recent Labs     03/02/25  0539 03/03/25  0546   PROCALCITONI 5.23* 2.99*     Glucose  Recent Labs     03/03/25  0546 03/03/25  1021 03/03/25  1625 03/03/25  2357   GLUC 171* 301* 246* 139

## 2025-03-04 NOTE — PLAN OF CARE
Problem: Prexisting or High Potential for Compromised Skin Integrity  Goal: Skin integrity is maintained or improved  Description: INTERVENTIONS:  - Identify patients at risk for skin breakdown  - Assess and monitor skin integrity  - Assess and monitor nutrition and hydration status  - Monitor labs   - Assess for incontinence   - Turn and reposition patient  - Assist with mobility/ambulation  - Relieve pressure over bony prominences  - Avoid friction and shearing  - Provide appropriate hygiene as needed including keeping skin clean and dry  - Evaluate need for skin moisturizer/barrier cream  - Collaborate with interdisciplinary team   - Patient/family teaching  - Consider wound care consult   Outcome: Progressing     Problem: PAIN - ADULT  Goal: Verbalizes/displays adequate comfort level or baseline comfort level  Description: Interventions:  - Encourage patient to monitor pain and request assistance  - Assess pain using appropriate pain scale  - Administer analgesics based on type and severity of pain and evaluate response  - Implement non-pharmacological measures as appropriate and evaluate response  - Consider cultural and social influences on pain and pain management  - Notify physician/advanced practitioner if interventions unsuccessful or patient reports new pain  Outcome: Progressing     Problem: INFECTION - ADULT  Goal: Absence or prevention of progression during hospitalization  Description: INTERVENTIONS:  - Assess and monitor for signs and symptoms of infection  - Monitor lab/diagnostic results  - Monitor all insertion sites, i.e. indwelling lines, tubes, and drains  - Monitor endotracheal if appropriate and nasal secretions for changes in amount and color  - [place] appropriate cooling/warming therapies per order  - Administer medications as ordered  - Instruct and encourage patient and family to use good hand hygiene technique  - Identify and instruct in appropriate isolation precautions for  identified infection/condition  Outcome: Progressing  Goal: Absence of fever/infection during neutropenic period  Description: INTERVENTIONS:  - Monitor WBC    Outcome: Progressing     Problem: SAFETY ADULT  Goal: Patient will remain free of falls  Description: INTERVENTIONS:  - Educate patient/family on patient safety including physical limitations  - Instruct patient to call for assistance with activity   - Consult OT/PT to assist with strengthening/mobility   - Keep Call bell within reach  - Keep bed low and locked with side rails adjusted as appropriate  - Keep care items and personal belongings within reach  - Initiate and maintain comfort rounds  - Make Fall Risk Sign visible to staff  - Offer Toileting every 2 Hours, in advance of need  - Initiate/Maintain bed alarm  - Obtain necessary fall risk management equipment  - Apply yellow socks and bracelet for high fall risk patients  - Consider moving patient to room near nurses station  Outcome: Progressing  Goal: Maintain or return to baseline ADL function  Description: INTERVENTIONS:  -  Assess patient's ability to carry out ADLs; assess patient's baseline for ADL function and identify physical deficits which impact ability to perform ADLs (bathing, care of mouth/teeth, toileting, grooming, dressing, etc.)  - Assess/evaluate cause of self-care deficits   - Assess range of motion  - Assess patient's mobility; develop plan if impaired  - Assess patient's need for assistive devices and provide as appropriate  - Encourage maximum independence but intervene and supervise when necessary  - Involve family in performance of ADLs  - Assess for home care needs following discharge   - Consider OT consult to assist with ADL evaluation and planning for discharge  - Provide patient education as appropriate  Outcome: Progressing  Goal: Maintains/Returns to pre admission functional level  Description: INTERVENTIONS:  - Perform AM-PAC 6 Click Basic Mobility/ Daily Activity  assessment daily.  - Set and communicate daily mobility goal to care team and patient/family/caregiver.   - Collaborate with rehabilitation services on mobility goals if consulted  - Perform Range of Motion 3 times a day.  - Reposition patient every 2 hours.  - Dangle patient 3 times a day  - Stand patient 3 times a day  - Ambulate patient 3 times a day  - Out of bed to chair 3 times a day   - Out of bed for meals 3 times a day  - Out of bed for toileting  - Record patient progress and toleration of activity level   Outcome: Progressing     Problem: DISCHARGE PLANNING  Goal: Discharge to home or other facility with appropriate resources  Description: INTERVENTIONS:  - Identify barriers to discharge w/patient and caregiver  - Arrange for needed discharge resources and transportation as appropriate  - Identify discharge learning needs (meds, wound care, etc.)  - Arrange for interpretive services to assist at discharge as needed  - Refer to Case Management Department for coordinating discharge planning if the patient needs post-hospital services based on physician/advanced practitioner order or complex needs related to functional status, cognitive ability, or social support system  Outcome: Progressing     Problem: Knowledge Deficit  Goal: Patient/family/caregiver demonstrates understanding of disease process, treatment plan, medications, and discharge instructions  Description: Complete learning assessment and assess knowledge base.  Interventions:  - Provide teaching at level of understanding  - Provide teaching via preferred learning methods  Outcome: Progressing     Problem: CARDIOVASCULAR - ADULT  Goal: Maintains optimal cardiac output and hemodynamic stability  Description: INTERVENTIONS:  - Monitor I/O, vital signs and rhythm  - Monitor for S/S and trends of decreased cardiac output  - Administer and titrate ordered vasoactive medications to optimize hemodynamic stability  - Assess quality of pulses, skin color  and temperature  - Assess for signs of decreased coronary artery perfusion  - Instruct patient to report change in severity of symptoms  Outcome: Progressing  Goal: Absence of cardiac dysrhythmias or at baseline rhythm  Description: INTERVENTIONS:  - Continuous cardiac monitoring, vital signs, obtain 12 lead EKG if ordered  - Administer antiarrhythmic and heart rate control medications as ordered  - Monitor electrolytes and administer replacement therapy as ordered  Outcome: Progressing     Problem: GASTROINTESTINAL - ADULT  Goal: Minimal or absence of nausea and/or vomiting  Description: INTERVENTIONS:  - Administer IV fluids if ordered to ensure adequate hydration  - Maintain NPO status until nausea and vomiting are resolved  - Nasogastric tube if ordered  - Administer ordered antiemetic medications as needed  - Provide nonpharmacologic comfort measures as appropriate  - Advance diet as tolerated, if ordered  - Consider nutrition services referral to assist patient with adequate nutrition and appropriate food choices  Outcome: Progressing  Goal: Maintains adequate nutritional intake  Description: INTERVENTIONS:  - Monitor percentage of each meal consumed  - Identify factors contributing to decreased intake, treat as appropriate  - Assist with meals as needed  - Monitor I&O, weight, and lab values if indicated  - Obtain nutrition services referral as needed  Outcome: Progressing     Problem: GENITOURINARY - ADULT  Goal: Maintains or returns to baseline urinary function  Description: INTERVENTIONS:  - Assess urinary function  - Encourage oral fluids to ensure adequate hydration if ordered  - Administer IV fluids as ordered to ensure adequate hydration  - Administer ordered medications as needed  - Offer frequent toileting  - Follow urinary retention protocol if ordered  Outcome: Progressing     Problem: METABOLIC, FLUID AND ELECTROLYTES - ADULT  Goal: Electrolytes maintained within normal limits  Description:  INTERVENTIONS:  - Monitor labs and assess patient for signs and symptoms of electrolyte imbalances  - Administer electrolyte replacement as ordered  - Monitor response to electrolyte replacements, including repeat lab results as appropriate  - Instruct patient on fluid and nutrition as appropriate  Outcome: Progressing  Goal: Fluid balance maintained  Description: INTERVENTIONS:  - Monitor labs   - Monitor I/O and WT  - Instruct patient on fluid and nutrition as appropriate  - Assess for signs & symptoms of volume excess or deficit  Outcome: Progressing  Goal: Glucose maintained within target range  Description: INTERVENTIONS:  - Monitor Blood Glucose as ordered  - Assess for signs and symptoms of hyperglycemia and hypoglycemia  - Administer ordered medications to maintain glucose within target range  - Assess nutritional intake and initiate nutrition service referral as needed  Outcome: Progressing     Problem: SKIN/TISSUE INTEGRITY - ADULT  Goal: Incision(s), wounds(s) or drain site(s) healing without S/S of infection  Description: INTERVENTIONS  - Assess and document dressing, incision, wound bed, drain sites and surrounding tissue  - Provide patient and family education  - Perform skin care/dressing changes  Outcome: Progressing     Problem: MUSCULOSKELETAL - ADULT  Goal: Maintain or return mobility to safest level of function  Description: INTERVENTIONS:  - Assess patient's ability to carry out ADLs; assess patient's baseline for ADL function and identify physical deficits which impact ability to perform ADLs (bathing, care of mouth/teeth, toileting, grooming, dressing, etc.)  - Assess/evaluate cause of self-care deficits   - Assess range of motion  - Assess patient's mobility  - Assess patient's need for assistive devices and provide as appropriate  - Encourage maximum independence but intervene and supervise when necessary  - Involve family in performance of ADLs  - Assess for home care needs following  discharge   - Consider OT consult to assist with ADL evaluation and planning for discharge  - Provide patient education as appropriate  Outcome: Progressing  Goal: Maintain proper alignment of affected body part  Description: INTERVENTIONS:  - Support, maintain and protect limb and body alignment  - Provide patient/ family with appropriate education  Outcome: Progressing     Problem: Nutrition/Hydration-ADULT  Goal: Nutrient/Hydration intake appropriate for improving, restoring or maintaining nutritional needs  Description: Monitor and assess patient's nutrition/hydration status for malnutrition. Collaborate with interdisciplinary team and initiate plan and interventions as ordered.  Monitor patient's weight and dietary intake as ordered or per policy. Utilize nutrition screening tool and intervene as necessary. Determine patient's food preferences and provide high-protein, high-caloric foods as appropriate.     INTERVENTIONS:  - Monitor oral intake, urinary output, labs, and treatment plans  - Assess nutrition and hydration status and recommend course of action  - Evaluate amount of meals eaten  - Assist patient with eating if necessary   - Allow adequate time for meals  - Recommend/ encourage appropriate diets, oral nutritional supplements, and vitamin/mineral supplements  - Order, calculate, and assess calorie counts as needed  - Recommend, monitor, and adjust tube feedings and TPN/PPN based on assessed needs  - Assess need for intravenous fluids  - Provide specific nutrition/hydration education as appropriate  - Include patient/family/caregiver in decisions related to nutrition  Outcome: Progressing

## 2025-03-04 NOTE — PHYSICAL THERAPY NOTE
PT Treatment Note    NAME:  Nadeem Purdy Jr.  DATE: 03/04/25    AGE:   69 y.o.  Mrn:   [id number]  ADMIT DX:  Sinus tachycardia [R00.0]  DKA (diabetic ketoacidosis) ([facility]) [E11.10]  LUIS ALFREDO (acute kidney injury) ([facility]) [N17.9]  Non compliance w medication regimen [Z91.148]  Visit for wound check [Z51.89]  Bilateral lower extremity edema [R60.0]  Performed at least 2 patient identifiers during session: Name and ID bracelet       03/04/25 1200   PT Last Visit   PT Visit Date 03/04/25   Note Type   Note Type Treatment   Pain Assessment   Pain Assessment Tool 0-10   Pain Score No Pain   Wound 02/26/25 Pretibial Right   Date First Assessed/Time First Assessed: 02/26/25 1451   Location: Pretibial  Wound Location Orientation: Right   Wound Description MATEUS   Jodee-wound Assessment MATEUS   Dressing Status Clean;Dry;Intact   Wound 02/26/25 Pretibial Distal;Left   Date First Assessed/Time First Assessed: 02/26/25 1452   Location: Pretibial  Wound Location Orientation: Distal;Left   Wound Description MATEUS   Dressing ABD;Xeroform   Dressing Status Clean;Dry;Intact   Wound 02/27/25 Heel Left;Medial   Date First Assessed/Time First Assessed: 02/27/25 1448   Location: Heel  Wound Location Orientation: Left;Medial   Wound Description MATEUS   Dressing Status Clean;Dry;Intact   Wound 02/27/25 Tibial Left;Posterior   Date First Assessed/Time First Assessed: 02/27/25 1449   Location: Tibial  Wound Location Orientation: Left;Posterior   Wound Description MATEUS   Jodee-wound Assessment Erythema   Dressing Status Clean;Dry;Intact   Wound 02/27/25 Tibial Posterior;Right   Date First Assessed/Time First Assessed: 02/27/25 1450   Location: Tibial  Wound Location Orientation: Posterior;Right   Wound Description MATEUS   Dressing Status Clean;Dry;Intact   Wound 02/27/25 Knee Left;Posterior   Date First Assessed/Time First Assessed: 02/27/25 1451   Location: Knee  Wound Location Orientation: Left;Posterior   Wound Description MATEUS   Dressing Status  Clean;Dry;Intact   Wound 03/01/25 Buttocks   Date First Assessed/Time First Assessed: 03/01/25 2119   Location: Buttocks   Wound Description MATEUS   Dressing Foam, Silicon (eg. Allevyn, etc)   Dressing Status Clean;Dry;Intact   Wound 03/02/25 Thigh Anterior;Left   Date First Assessed/Time First Assessed: 03/02/25 2021   Location: Thigh  Wound Location Orientation: Anterior;Left   Wound Description MATEUS   Dressing Status Clean;Intact;Dry   Restrictions/Precautions   Weight Bearing Precautions Per Order No   Other Precautions Chair Alarm;Contact/isolation;Multiple lines;Telemetry;Fall Risk   General   Chart Reviewed Yes   Family/Caregiver Present No   Cognition   Overall Cognitive Status WFL   Arousal/Participation Alert   Attention Within functional limits   Orientation Level Oriented X4   Memory Within functional limits   Following Commands Follows all commands and directions without difficulty   Transfers   Sit to Stand 2  Maximal assistance   Additional items Assist x 2;Increased time required;Verbal cues;Armrests   Stand to Sit 3  Moderate assistance   Additional items Assist x 2;Verbal cues;Increased time required   Stand pivot 3  Moderate assistance   Additional items Assist x 2;Increased time required;Verbal cues   Toilet transfer 3  Moderate assistance   Additional items Assist x 2;Commode;Verbal cues;Increased time required  (cues to push off armrests however pt pulled from RW)   Additional Comments pt denied dizziness with transitional movement  (assistance to shift weight forward upon standing)   Exercises   Heelslides Sitting;15 reps;AROM;Bilateral   Glute Sets Sitting;15 reps;AROM;Bilateral   Hip Flexion Sitting;15 reps;AROM;Bilateral   Hip Abduction Sitting;15 reps;AROM;Bilateral   Hip Adduction Sitting;15 reps;AROM;Bilateral   Knee AROM Long Arc Quad Sitting;15 reps;AROM;Bilateral   Ankle Pumps Sitting;15 reps;AROM;Bilateral   Assessment   Prognosis Fair   Assessment Pt seen for PT treatment session this  date with interventions consisting of therapeutic exercise to improve endurance to improve functional mobility and therapeutic activity to improve transfers and increase activity tolerance with functional mobility to decrease fall risk. Pt agreeable to PT treatment session upon arrival, pt found on commode, in no apparent distress. Since previous session, pt has made good progress as evidenced by decreased assistance required with mobility  Barriers during this session include fatigue.  Pt continues to be functioning below baseline level, and remains limited 2* factors listed above and including decreased strength, impaired activity tolerance, impaired  balance, decreased endurance, and decreased mobility.  Pt prognosis for achieving goals is fair, pending pt progress with hospitalization/medical status improvements, and indicated by continued required assistance. PT will continue to see pt during current hospitalization in order to address the deficits listed above and provide interventions consistent w/ POC in effort to achieve goals. Current goals and POC remain appropriate, pt continues to have rehab potential  Upon conclusion pt seated OOB in recliner. The patient's AM-PAC Basic Mobility Inpatient Short Form Raw Score is 7.  A Raw score of less than or equal to 16 suggests the patient may benefit from discharge to post-acute rehabilitation services. Based on patient presentations and impairments, pt would most appropriately benefit from Level 2 resource intensity upon discharge.  Please also refer to the recommendation of the Physical Therapist for safe discharge planning. RN verbalized pt appropriate for PT session.   Barriers to Discharge   (limited mobility)   Goals   Patient Goals to go to the bathroom   LTG Expiration Date 03/14/25   PT Treatment Day 3   Plan   Treatment/Interventions Functional transfer training;Equipment eval/education;Endurance training;Therapeutic exercise   Progress Progressing toward  goals   PT Frequency 3-5x/wk   Discharge Recommendation   Rehab Resource Intensity Level, PT II (Moderate Resource Intensity)   Equipment Recommended Walker   Walker Package Recommended Wheeled walker   AM-PAC Basic Mobility Inpatient   Turning in Flat Bed Without Bedrails 2   Lying on Back to Sitting on Edge of Flat Bed Without Bedrails 1   Moving Bed to Chair 1   Standing Up From Chair Using Arms 1   Walk in Room 1   Climb 3-5 Stairs With Railing 1   Basic Mobility Inpatient Raw Score 7   Turning Head Towards Sound 4   Follow Simple Instructions 4   Low Function Basic Mobility Raw Score  15   Low Function Basic Mobility Standardized Score  23.9   [place] Highest Level Of Mobility   -HLM Goal 2: Bed activities/Dependent transfer   -HLM Achieved 4: Move to chair/commode       Time In: 1005  Time Out: 1030  Total Treatment Minutes: 25    Tatyana Rodriguez, PT

## 2025-03-04 NOTE — ASSESSMENT & PLAN NOTE
Lab Results   Component Value Date    EGFR 13 03/04/2025    EGFR 14 03/03/2025    EGFR 13 03/03/2025    CREATININE 4.17 (H) 03/04/2025    CREATININE 4.04 (H) 03/03/2025    CREATININE 4.20 (H) 03/03/2025   Baseline creatinine appears to be 1.1-1.3 mg/dL since 2022; not under care of nephrology. 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  Grade A2 albuminuria, no RBCs/UA bland when checked in steady state.  Kidneys without obstruction on unenhanced imaging.  Now with acute kidney injury creatinine unchanged high threes, low fours.      Suspect etiology ischemic ATN in setting of severe sepsis, volume and hemodynamic perturbations (s/p crystalloid/colloid/diuretic), vasomotor dysfunction due to ARB, urinary retention status post Ramirez.  CK minimally elevated upon presentation and likely noncontributory to LUIS ALFREDO.  UA significant for glucosuria, 2+ protein, 1-2 RBC, 1-2 coarse granular casts.        3/4  Cr trend overall holding. UOP 2.5 L /24 hr. Suspected to have ischemic ATN in setting of infection/DKA.   Volume status appears relatively compensated.  Persistent metabolic acidosis attributed to Severe LUIS ALFREDO. Lactate negative and no longer in DKA.   Bicarbonate trends unchanged but patient has significant deficit and gtt at relatively low rate-- gtt rate 35->50 ml/hr in past 24 hr. If gtt rate to remain gentle, advise PO sodium bicarb vs sodium citrate to help with deficit--30 ml BID to start. If cannot tolerate citrate solution, can add PO bicarb --1300mg TID.   No emergent indication for HD yet--volume status reasonable, no uremic concerns. Bicarbonate remains sole issue but again he has a rather large deficit.

## 2025-03-04 NOTE — ASSESSMENT & PLAN NOTE
Corrected Na 127 on admission  Likely in setting of severe sepsis and decreased PO intake w/underlying alcohol abuse as well as intravascular depletion/extravascular overload  Continue to trend Na

## 2025-03-05 LAB
ALBUMIN SERPL BCG-MCNC: 2.5 G/DL (ref 3.5–5)
ALP SERPL-CCNC: 38 U/L (ref 34–104)
ALT SERPL W P-5'-P-CCNC: 43 U/L (ref 7–52)
ANION GAP SERPL CALCULATED.3IONS-SCNC: 9 MMOL/L (ref 4–13)
ANION GAP SERPL CALCULATED.3IONS-SCNC: 9 MMOL/L (ref 4–13)
AST SERPL W P-5'-P-CCNC: 60 U/L (ref 13–39)
BACTERIA WND AEROBE CULT: ABNORMAL
BILIRUB SERPL-MCNC: 0.38 MG/DL (ref 0.2–1)
BUN SERPL-MCNC: 59 MG/DL (ref 5–25)
BUN SERPL-MCNC: 60 MG/DL (ref 5–25)
CALCIUM ALBUM COR SERPL-MCNC: 9.1 MG/DL (ref 8.3–10.1)
CALCIUM SERPL-MCNC: 7.7 MG/DL (ref 8.4–10.2)
CALCIUM SERPL-MCNC: 7.9 MG/DL (ref 8.4–10.2)
CHLORIDE SERPL-SCNC: 101 MMOL/L (ref 96–108)
CHLORIDE SERPL-SCNC: 103 MMOL/L (ref 96–108)
CO2 SERPL-SCNC: 19 MMOL/L (ref 21–32)
CO2 SERPL-SCNC: 20 MMOL/L (ref 21–32)
CREAT SERPL-MCNC: 3.6 MG/DL (ref 0.6–1.3)
CREAT SERPL-MCNC: 3.69 MG/DL (ref 0.6–1.3)
ERYTHROCYTE [DISTWIDTH] IN BLOOD BY AUTOMATED COUNT: 14.4 % (ref 11.6–15.1)
GFR SERPL CREATININE-BSD FRML MDRD: 15 ML/MIN/1.73SQ M
GFR SERPL CREATININE-BSD FRML MDRD: 16 ML/MIN/1.73SQ M
GLUCOSE SERPL-MCNC: 103 MG/DL (ref 65–140)
GLUCOSE SERPL-MCNC: 110 MG/DL (ref 65–140)
GLUCOSE SERPL-MCNC: 123 MG/DL (ref 65–140)
GLUCOSE SERPL-MCNC: 127 MG/DL (ref 65–140)
GLUCOSE SERPL-MCNC: 140 MG/DL (ref 65–140)
GLUCOSE SERPL-MCNC: 174 MG/DL (ref 65–140)
GLUCOSE SERPL-MCNC: 209 MG/DL (ref 65–140)
GLUCOSE SERPL-MCNC: 270 MG/DL (ref 65–140)
GLUCOSE SERPL-MCNC: 278 MG/DL (ref 65–140)
GLUCOSE SERPL-MCNC: 281 MG/DL (ref 65–140)
GRAM STN SPEC: ABNORMAL
HCT VFR BLD AUTO: 29.9 % (ref 36.5–49.3)
HGB BLD-MCNC: 10 G/DL (ref 12–17)
MAGNESIUM SERPL-MCNC: 1.9 MG/DL (ref 1.9–2.7)
MCH RBC QN AUTO: 31.2 PG (ref 26.8–34.3)
MCHC RBC AUTO-ENTMCNC: 33.4 G/DL (ref 31.4–37.4)
MCV RBC AUTO: 93 FL (ref 82–98)
PHOSPHATE SERPL-MCNC: 3.7 MG/DL (ref 2.3–4.1)
PLATELET # BLD AUTO: 444 THOUSANDS/UL (ref 149–390)
PMV BLD AUTO: 8.5 FL (ref 8.9–12.7)
POTASSIUM SERPL-SCNC: 3.6 MMOL/L (ref 3.5–5.3)
POTASSIUM SERPL-SCNC: 4.1 MMOL/L (ref 3.5–5.3)
PROT SERPL-MCNC: 6.3 G/DL (ref 6.4–8.4)
RBC # BLD AUTO: 3.21 MILLION/UL (ref 3.88–5.62)
SODIUM SERPL-SCNC: 129 MMOL/L (ref 135–147)
SODIUM SERPL-SCNC: 132 MMOL/L (ref 135–147)
WBC # BLD AUTO: 25.47 THOUSAND/UL (ref 4.31–10.16)

## 2025-03-05 PROCEDURE — 99233 SBSQ HOSP IP/OBS HIGH 50: CPT | Performed by: INTERNAL MEDICINE

## 2025-03-05 PROCEDURE — 83735 ASSAY OF MAGNESIUM: CPT | Performed by: NURSE PRACTITIONER

## 2025-03-05 PROCEDURE — NC001 PR NO CHARGE: Performed by: NURSE PRACTITIONER

## 2025-03-05 PROCEDURE — 99232 SBSQ HOSP IP/OBS MODERATE 35: CPT | Performed by: INTERNAL MEDICINE

## 2025-03-05 PROCEDURE — 84100 ASSAY OF PHOSPHORUS: CPT | Performed by: NURSE PRACTITIONER

## 2025-03-05 PROCEDURE — 80048 BASIC METABOLIC PNL TOTAL CA: CPT | Performed by: INTERNAL MEDICINE

## 2025-03-05 PROCEDURE — 82948 REAGENT STRIP/BLOOD GLUCOSE: CPT

## 2025-03-05 PROCEDURE — G0545 PR INHERENT VISIT TO INPT: HCPCS | Performed by: INTERNAL MEDICINE

## 2025-03-05 PROCEDURE — 85027 COMPLETE CBC AUTOMATED: CPT | Performed by: NURSE PRACTITIONER

## 2025-03-05 PROCEDURE — 80053 COMPREHEN METABOLIC PANEL: CPT | Performed by: NURSE PRACTITIONER

## 2025-03-05 RX ORDER — HYDRALAZINE HYDROCHLORIDE 20 MG/ML
10 INJECTION INTRAMUSCULAR; INTRAVENOUS EVERY 6 HOURS PRN
Status: DISCONTINUED | OUTPATIENT
Start: 2025-03-05 | End: 2025-03-11 | Stop reason: HOSPADM

## 2025-03-05 RX ORDER — HEPARIN SODIUM 5000 [USP'U]/ML
5000 INJECTION, SOLUTION INTRAVENOUS; SUBCUTANEOUS EVERY 8 HOURS SCHEDULED
Status: DISCONTINUED | OUTPATIENT
Start: 2025-03-05 | End: 2025-03-05

## 2025-03-05 RX ORDER — HYDRALAZINE HYDROCHLORIDE 20 MG/ML
5 INJECTION INTRAMUSCULAR; INTRAVENOUS EVERY 6 HOURS PRN
Status: DISCONTINUED | OUTPATIENT
Start: 2025-03-05 | End: 2025-03-05

## 2025-03-05 RX ORDER — POTASSIUM CHLORIDE 1500 MG/1
40 TABLET, EXTENDED RELEASE ORAL ONCE
Status: COMPLETED | OUTPATIENT
Start: 2025-03-05 | End: 2025-03-05

## 2025-03-05 RX ORDER — FUROSEMIDE 10 MG/ML
40 INJECTION INTRAMUSCULAR; INTRAVENOUS ONCE
Status: COMPLETED | OUTPATIENT
Start: 2025-03-05 | End: 2025-03-05

## 2025-03-05 RX ORDER — INSULIN ASPART 100 [IU]/ML
20 INJECTION, SUSPENSION SUBCUTANEOUS
Status: DISCONTINUED | OUTPATIENT
Start: 2025-03-05 | End: 2025-03-07

## 2025-03-05 RX ORDER — HEPARIN SODIUM 5000 [USP'U]/ML
7500 INJECTION, SOLUTION INTRAVENOUS; SUBCUTANEOUS EVERY 8 HOURS SCHEDULED
Status: DISCONTINUED | OUTPATIENT
Start: 2025-03-05 | End: 2025-03-11 | Stop reason: HOSPADM

## 2025-03-05 RX ORDER — INSULIN LISPRO 100 [IU]/ML
1-5 INJECTION, SOLUTION INTRAVENOUS; SUBCUTANEOUS
Status: DISCONTINUED | OUTPATIENT
Start: 2025-03-05 | End: 2025-03-07

## 2025-03-05 RX ADMIN — GUAIFENESIN 600 MG: 600 TABLET ORAL at 21:31

## 2025-03-05 RX ADMIN — Medication 6 MG: at 21:31

## 2025-03-05 RX ADMIN — Medication 400 UNITS: at 08:02

## 2025-03-05 RX ADMIN — INSULIN ASPART 20 UNITS: 100 INJECTION, SUSPENSION SUBCUTANEOUS at 07:50

## 2025-03-05 RX ADMIN — THIAMINE HCL TAB 100 MG 100 MG: 100 TAB at 08:02

## 2025-03-05 RX ADMIN — GUAIFENESIN 600 MG: 600 TABLET ORAL at 08:02

## 2025-03-05 RX ADMIN — HYDRALAZINE HYDROCHLORIDE 10 MG: 20 INJECTION INTRAMUSCULAR; INTRAVENOUS at 11:20

## 2025-03-05 RX ADMIN — TRISODIUM CITRATE DIHYDRATE AND CITRIC ACID MONOHYDRATE 30 ML: 500; 334 SOLUTION ORAL at 08:04

## 2025-03-05 RX ADMIN — HEPARIN SODIUM 7500 UNITS: 5000 INJECTION INTRAVENOUS; SUBCUTANEOUS at 15:43

## 2025-03-05 RX ADMIN — POTASSIUM CHLORIDE 40 MEQ: 1500 TABLET, EXTENDED RELEASE ORAL at 06:00

## 2025-03-05 RX ADMIN — AZELASTINE HYDROCHLORIDE 1 SPRAY: 137 SPRAY, METERED NASAL at 08:01

## 2025-03-05 RX ADMIN — METOPROLOL TARTRATE 50 MG: 50 TABLET, FILM COATED ORAL at 08:02

## 2025-03-05 RX ADMIN — CHLORHEXIDINE GLUCONATE 15 ML: 1.2 SOLUTION ORAL at 21:30

## 2025-03-05 RX ADMIN — CHOLECALCIFEROL TAB 25 MCG (1000 UNIT) 2000 UNITS: 25 TAB at 08:02

## 2025-03-05 RX ADMIN — CEFAZOLIN SODIUM 1000 MG: 1 SOLUTION INTRAVENOUS at 02:26

## 2025-03-05 RX ADMIN — FOLIC ACID 1 MG: 1 TABLET ORAL at 08:02

## 2025-03-05 RX ADMIN — METOPROLOL TARTRATE 50 MG: 50 TABLET, FILM COATED ORAL at 21:31

## 2025-03-05 RX ADMIN — FUROSEMIDE 40 MG: 10 INJECTION, SOLUTION INTRAMUSCULAR; INTRAVENOUS at 08:50

## 2025-03-05 RX ADMIN — CHLORHEXIDINE GLUCONATE 15 ML: 1.2 SOLUTION ORAL at 08:01

## 2025-03-05 RX ADMIN — TRISODIUM CITRATE DIHYDRATE AND CITRIC ACID MONOHYDRATE 30 ML: 500; 334 SOLUTION ORAL at 21:30

## 2025-03-05 RX ADMIN — INSULIN LISPRO 3 UNITS: 100 INJECTION, SOLUTION INTRAVENOUS; SUBCUTANEOUS at 16:53

## 2025-03-05 RX ADMIN — GABAPENTIN 200 MG: 100 CAPSULE ORAL at 21:31

## 2025-03-05 RX ADMIN — CEFAZOLIN SODIUM 1000 MG: 1 SOLUTION INTRAVENOUS at 19:26

## 2025-03-05 RX ADMIN — ATORVASTATIN CALCIUM 40 MG: 40 TABLET, FILM COATED ORAL at 16:53

## 2025-03-05 RX ADMIN — ASPIRIN 81 MG: 81 TABLET, COATED ORAL at 08:02

## 2025-03-05 RX ADMIN — INSULIN ASPART 20 UNITS: 100 INJECTION, SUSPENSION SUBCUTANEOUS at 16:53

## 2025-03-05 RX ADMIN — AZELASTINE HYDROCHLORIDE 1 SPRAY: 137 SPRAY, METERED NASAL at 19:26

## 2025-03-05 RX ADMIN — CEFAZOLIN SODIUM 1000 MG: 1 SOLUTION INTRAVENOUS at 11:27

## 2025-03-05 RX ADMIN — HEPARIN SODIUM 7500 UNITS: 5000 INJECTION INTRAVENOUS; SUBCUTANEOUS at 21:35

## 2025-03-05 RX ADMIN — INSULIN LISPRO 1 UNITS: 100 INJECTION, SOLUTION INTRAVENOUS; SUBCUTANEOUS at 11:22

## 2025-03-05 RX ADMIN — INSULIN LISPRO 3 UNITS: 100 INJECTION, SOLUTION INTRAVENOUS; SUBCUTANEOUS at 21:31

## 2025-03-05 RX ADMIN — SODIUM CHLORIDE 4 UNITS/HR: 9 INJECTION, SOLUTION INTRAVENOUS at 02:26

## 2025-03-05 NOTE — PROGRESS NOTES
Progress Note - Critical Care/ICU   Name: Nadeem Purdy Jr. 69 y.o. male I MRN: [id number]  Unit/Bed#: ICU 03-01 I Date of Admission: 2/26/2025   Date of Service: 3/5/2025 I Hospital Day: 7      Critical Care Interval Transfer Note:    Brief Hospital Summary: 69 y.o. with PMH of IDDM, morbid obesity, CKD 3, HTN, HLD with worsening bilateral lower extremity swelling, polyuria/polydipsia and urinary incontinence for about 2 weeks and reportedly was having noncompliance with home medication. He met sepsis criteria and was admitted to the ICU initially for DKA. He clinically improved rapidly and was noted to have also acute kidney injury on his underlying chronic kidney disease. Ramirez catheter also in place. His initially started on IV antibiotics and blood cultures later returned positive with group A strep. Cellulitis reportedly was worsening and so antibiotics broadened. He developed LUIS ALFREDO and required diuresis. His creatinine is improving and making urine. He is having urinary retention and urology is consulted.     Barriers to discharge:        Consults: IP CONSULT TO ENDOCRINOLOGY  IP CONSULT TO PHARMACY  IP CONSULT TO NEPHROLOGY  IP CONSULT TO PHARMACY  IP CONSULT TO ACUTE CARE SURGERY  IP CONSULT TO INFECTIOUS DISEASES    Recommended to review admission imaging for incidental findings and document in discharge navigator: Chart reviewed, no known incidental findings noted at this time.      Discharge Plan: Anticipate discharge in 48 hrs to home.  Ramirez Plan: Continue for accurate I/O monitoring for 48 hours and Previous voiding trial failed, urology consulted.        Patient seen and evaluated by Critical Care today and deemed to be appropriate for transfer to Med Surg. Spoke to Dr. Garcia from [facility] to accept transfer. Critical care can be contacted via SecureChat with any questions or concerns. Please use the Critical Care AP Role in Secure Chat for any provider inquires until the patient is transferred out of the  ICU or until tomorrow at 0600.

## 2025-03-05 NOTE — PROGRESS NOTES
tProgress Note - Critical Care/ICU   Name: Nadeem Purdy Jr. 69 y.o. male I MRN: [id number]  Unit/Bed#: ICU 03-01 I Date of Admission: 2/26/2025   Date of Service: 3/5/2025 I Hospital Day: 7      Assessment & Plan  Diabetic ketoacidosis without coma associated with type 2 diabetes mellitus (HCC)  Lab Results   Component Value Date    HGBA1C 8.9 (H) 02/26/2025       Recent Labs     03/04/25  2204 03/04/25  2351 03/05/25  0156 03/05/25  0407   POCGLU 198* 133 123 103       Blood Sugar Average: Last 72 hrs:  (P) 199.7807134872334160    POA AEB Glucose 446, pH 7.218, AG 17 w/bicarb 15, unfortunately no BHB done upon arrival, but urine w/1+ketones  Suspect this is 2/2 noncompliance as well as active infection as noted below  Prescribed 70/30 insulin 20 units BID as OP, however reports that he has not been taking for approximately 2 weeks  Is s/p aggressive volume resuscitation per DKA protocol  DKA insulin gtt transitioned to non-DKA gtt, however did not tolerate SQ regimen even at uptitrated dosing - insulin gtt restarted on 03/02  Continue Q2H fingersticks  Endocrinology consulted - will need follow-up as OP  Diabetic diet  Goal  - 180  Severe sepsis (HCC)  POA AEB fever, tachycardia, tachypnea, leukocytosis, elevated lactic acidosis  Suspect this is 2/2 bacteremia in setting of bilateral LE cellulitis vs diarrheal illness/? CDIF  Imaging revealed liquid stool within visualized colon concerning for diarrhea disease and extensive circumferential skin thickening and subcutaneous edema involving bilateral lower legs compatible with cellulitis   Repeat imaging on 03/02 completed due to extension of erythema, swelling, blistering from L shin to L upper thigh - again no evidence of soft tissue gas  COVID/FLU/RSV negative   1 out of 2 Blood cultures w/Strep Pyogenes (Group A) - repeat blood cultures NGTD  Patient will require total of 2 wks of ABX for bloodstream infection with source as LE cellulitis  UA w/out evidence  of infection  MRSA swab negative  Bilateral LE wound cultures w/Staph aureus and Strep Pyogenes - repeat LLE wound culture pending  CDIF PCR positive, but EIA negative  Enteric stool panel negative   Lactic acid 2.4 - has since cleared  Procalcitonin 9.27 > 18.89 >> 2.99   Is s/p aggressive volume resuscitation w/both crystalloid and colloid for intravascular depletion as well as IV diuresis in setting of extravascular overload  ID consulted   IV antibiotics deescalated to Cefazolin  Discontinued PO vanco 3/3  Trend fever and WBC curve  Cellulitis of lower extremity  Please see plan as noted above  Monitor for worsening extension of cellulitis  Group A strep bacteremia  In setting of bilateral LE cellulitis (L>R)  1 out of 2 Blood cultures w/Strep Pyogenes (Group A) - repeat blood cultures NGTD-will require 2 weeks abx for clearance of bloodstream infection  Please see plan as noted above  Acute renal failure superimposed on stage 3a chronic kidney disease (HCC)  Creatinine 2.52 on admission - now up to 3.9  Baseline creatinine appears to be around 1.1 to 1.3  Suspect this is multifactorial in setting of DKA, intravascular hypovolemia, severe sepsis in setting of bacteremia/bilateral LE cellulitis, rhabdomyolysis, now w/component of ischemic ATN  FeNa 0.5%/FeUrea 30% from 02/27 --> prerenal disease  FeUrea from 03/01 40.4% --> intrinsic disease  Is s/p aggressive volume resuscitation w/both crystalloid and colloid for intravascular depletion as well as IV diuresis in setting of extravascular overload - low rate bicarb gtt started for worsening metabolic acidosis  Nephrology consulted for worsening renal function despite treatment as noted above  Avoid nephrotoxic agents and hypotension  Trend renal indices  Strict I/O  Daily weights  Metabolic acidosis  AG 17 w/bicarb 16 on admission   Suspect this is multifactorial in setting of DKA, renal failure, diarrhea, intravascular depletion in setting of sepsis  APAP/ASA  negative  Continue low rate bicarb gtt started 3/2 and PO sodium bicarb added per nephrology  Continue to trend acidosis closely  Atrial fibrillation with RVR ([facility])  New onset - likely brought on by acute illness, sepsis, renal failure  Developed AF w/RVR during day on 03/01  Home Toprol XL held-metoprolol 50 mg PO BID started  Cardizem gtt and PO discontinued and amiodarone bolus and infusion started with successful conversion to NSR 3/3  Amiodarone infusion and heparin infusion dc'd 3/4-low threshold to restart if patient goes back into Afib  Consider cardiology consult  Continue cardiac monitoring  Hyponatremia  Corrected Na 127 on admission  Likely in setting of severe sepsis and decreased PO intake w/underlying alcohol abuse as well as intravascular depletion/extravascular overload  Continue to trend Na   Acute respiratory insufficiency  Suspect this is multifactorial in setting of ? vascular congestion vs ? underlying COPD given 20 year smoking history vs atelectasis/deconditioning  Continue scheduled Xopenex nebs   Aggressive pulmonary hygiene  Wean O2 for SpO2 > 88%  Urinary retention  Ramirez cath placed on 02/28 for urinary retention - removed on 03/01, however required reinsertion  Likely in setting of severe sepsis/acute illness  Plan for voiding trial when status improves  Monitor I/O closely  Essential hypertension  Holding home Losartan given LUIS ALFREDO  Hold home Toprol XL (dose increased due to AF w/RVR) and Amlodipine - now on Metoprolol 50 mg BID  Continue to monitor hemodynamics closely  Hypercholesteremia  Continue home statin  Class 3 severe obesity due to excess calories with serious comorbidity and body mass index (BMI) of 40.0 to 44.9 in adult ([facility])  Encourage healthy lifestyle modifications   Lymphedema of both lower extremities  Has bilateral lymphedema at baseline w/chronic open wounds   TTE revealed EF 50% w/increased wall thickness; trace MR/TR/DE  Prescribed Lasix as OP  Wound care team  following  Continue treatment for bilateral LE cellulitis as noted above  Alcohol abuse  Drinks 1-2 glasses of [place] per day  Last drank 4 weeks ago?  Continue folic acid/thiamine supplementation  CIWA protocol/Seizure precautions discontinued 2/2 out of withdrawal window and no symptoms of W/D experienced by patient  Disposition: Stepdown Level 2    ICU Core Measures     A: Assess, Prevent, and Manage Pain Has pain been assessed? Yes  Need for changes to pain regimen? NA   B: Both SAT/SAT  N/A   C: Choice of Sedation RASS Goal: N/A patient not on sedation  Need for changes to sedation or analgesia regimen? NA   D: Delirium CAM-ICU: Negative   E: Early Mobility  Plan for early mobility? Yes   F: Family Engagement Plan for family engagement today? Yes       Antibiotic Review: Patient on appropriate coverage based on culture data.     Review of Invasive Devices:    Ramirez Plan: Voiding trial after improvement in ambulation         Prophylaxis:  VTE VTE covered by:  heparin (porcine), Intravenous, 2,000 Units at 03/04/25 0813  heparin (porcine), Intravenous       Stress Ulcer  not ordered         24 Hour Events : Bicarb slowly improving with bicarb drip and p.o. bicarb.  No acute events overnight  Subjective   Review of Systems: Review of Systems   Constitutional:  Positive for fatigue. Negative for activity change and chills.   HENT:  Negative for sore throat and trouble swallowing.    Respiratory:  Negative for shortness of breath.    Cardiovascular:  Positive for leg swelling. Negative for chest pain.   Gastrointestinal:  Negative for abdominal distention, abdominal pain, diarrhea, nausea and vomiting.   Musculoskeletal:  Negative for arthralgias.   Neurological:  Negative for dizziness, weakness and light-headedness.   Psychiatric/Behavioral:  The patient is not nervous/anxious.    All other systems reviewed and are negative.      Objective :                   Vitals I/O      Most Recent Min/Max in 24hrs   Temp  (!) 97.1 °F (36.2 °C) Temp  Min: 97.1 °F (36.2 °C)  Max: 99.6 °F (37.6 °C)   Pulse 101 Pulse  Min: 89  Max: 101   Resp (!) 34 Resp  Min: 12  Max: 34   /73 BP  Min: 144/66  Max: 178/79   O2 Sat 96 % SpO2  Min: 93 %  Max: 100 %      Intake/Output Summary (Last 24 hours) at 3/5/2025 0432  Last data filed at 3/5/2025 0400  Gross per 24 hour   Intake 2822.83 ml   Output 3225 ml   Net -402.17 ml       Diet Cardiovascular; Cardiac; Consistent Carbohydrate Diet Level 2 (5 carb servings/75 grams CHO/meal), Fluid Restriction 1800 ML    Invasive Monitoring           Physical Exam   Physical Exam  Eyes:      Extraocular Movements: Extraocular movements intact.      Conjunctiva/sclera: Conjunctivae normal.      Pupils: Pupils are equal, round, and reactive to light.   Skin:     General: Skin is warm and dry.      Comments: Left lower extremity with dressings.  See media tab for pictures of wounds   Cardiovascular:      Rate and Rhythm: Normal rate and regular rhythm.   Musculoskeletal:         General: Normal range of motion.      Right lower le+ Edema present.      Left lower leg: 3+ Edema present.   Abdominal: General: Bowel sounds are normal. There is no distension.      Palpations: Abdomen is soft.      Tenderness: There is no abdominal tenderness.   Pulmonary:      Effort: Pulmonary effort is normal. No respiratory distress.   Psychiatric:         Mood and Affect: Affect is flat.   Neurological:      Mental Status: He is oriented to person, place and time.      GCS: GCS eye subscore is 4. GCS verbal subscore is 5. GCS motor subscore is 6.      Motor: Strength full and intact in all extremities.   Genitourinary/Anorectal:  Ramirez present.        Diagnostic Studies        Lab Results: I have reviewed the following results:     Medications:  Scheduled PRN   aspirin, 81 mg, Daily  atorvastatin, 40 mg, Daily With Dinner  azelastine, 1 spray, BID  cefazolin, 1,000 mg, Q8H  chlorhexidine, 15 mL, Q12H MARGARITA  Cholecalciferol,  2,000 Units, Daily  folic acid, 1 mg, Daily  gabapentin, 200 mg, HS  guaiFENesin, 600 mg, Q12H MARGARITA  melatonin, 6 mg, HS  metoprolol tartrate, 50 mg, Q12H MARGARITA  sod citrate-citric acid, 30 mL, BID  thiamine, 100 mg, Daily  vitamin E (tocopherol), 400 Units, Daily      acetaminophen, 650 mg, Q6H PRN  albuterol, 2.5 mg, Q6H PRN  heparin (porcine), 2,000 Units, Q6H PRN  heparin (porcine), 4,000 Units, Q6H PRN       Continuous    insulin regular (HumuLIN R,NovoLIN R) 1 Units/mL in sodium chloride 0.9 % 100 mL infusion, 0.3-21 Units/hr, Last Rate: 4 Units/hr (03/05/25 0226)  sodium bicarbonate 150 mEq in dextrose 5 % 1,000 mL infusion, 50 mL/hr, Last Rate: 50 mL/hr (03/04/25 1406)         Labs:   CBC    Recent Labs     03/03/25  0435 03/04/25  0410   WBC 28.19* 26.37*   HGB 10.3* 9.8*   HCT 30.7* 29.1*    412*   BANDSPCT  --  7     BMP    Recent Labs     03/04/25  0410 03/04/25  1818   SODIUM 129* 130*   K 3.9 4.3    103   CO2 16* 17*   AGAP 10 10   BUN 70* 63*   CREATININE 4.17* 3.77*   CALCIUM 8.0* 7.9*       Coags    Recent Labs     03/03/25  0546 03/03/25  1158 03/04/25  0621 03/04/25  1214   INR 1.30*  --   --   --    PTT 44*   < > 47* 67*    < > = values in this interval not displayed.        Additional Electrolytes  Recent Labs     03/03/25  0435 03/03/25  0546 03/03/25  2357 03/04/25  0410   MG  --    < > 2.0 2.0   PHOS  --    < > 2.5 3.5   CAIONIZED 0.98*  --   --   --     < > = values in this interval not displayed.          Blood Gas    Recent Labs     03/03/25  0744   PHART 7.406   DGS2FMY 25.5*   PO2ART 76.8   BBL1PWA 15.7*   BEART -7.5   SOURCE Radial, Right     Recent Labs     03/03/25  0435 03/03/25  0744   PHVEN 7.145*  --    LDO5XHX 39.4*  --    PO2VEN 55.6*  --    KSA7EKV 13.3*  --    BEVEN -14.9  --    G7UGTBS 81.7*  --    SOURCE  --  Radial, Right    LFTs  Recent Labs     03/03/25  0546 03/04/25  0410   ALT 70* 56*   * 54*   ALKPHOS 45 40   ALB 2.5* 2.8*   TBILI 0.40 0.45        Infectious  Recent Labs     03/03/25  0546 03/04/25  0410   PROCALCITONI 2.99* 1.86*     Glucose  Recent Labs     03/03/25  1625 03/03/25  2357 03/04/25  0410 03/04/25  1818   GLUC 246* 139 171* 196*

## 2025-03-05 NOTE — ASSESSMENT & PLAN NOTE
Lab Results   Component Value Date    HGBA1C 8.9 (H) 02/26/2025       Recent Labs     03/05/25  0407 03/05/25  0600 03/05/25  0746 03/05/25  0815   POCGLU 103 127 140 174*       Blood Sugar Average: Last 72 hrs:  (P) 195  Off insulin gtt, management per primary team.

## 2025-03-05 NOTE — CASE MANAGEMENT
Case Management Discharge Planning Note    Patient name Nadeem Purdy Jr.  Location [facility] /[facility]  MRN [id number]  : 1955 Date 3/5/2025       Current Admission Date: 2025  Current Admission Diagnosis:Diabetic ketoacidosis without coma associated with type 2 diabetes mellitus (HCC)   Patient Active Problem List    Diagnosis Date Noted Date Diagnosed    Diarrhea 2025     Atrial fibrillation with RVR ([facility]) 2025     Acute respiratory insufficiency 2025     Urinary retention 2025     Group A strep bacteremia 2025     Hyponatremia 2025     Metabolic acidosis 2025     Acute renal failure superimposed on stage 3a chronic kidney disease ([facility]) 2025     Diabetic ketoacidosis without coma associated with type 2 diabetes mellitus ([facility]) 2025     Severe sepsis ([facility]) 2025     Alcohol abuse 2025     Hypertensive kidney disease with chronic kidney disease stage III ([facility]) 10/08/2024     Right knee injury, subsequent encounter 2024     Osteochondral lesion 2024     Primary osteoarthritis of one knee, right 2024     Lymphedema of right lower extremity 2024     Other tear of medial meniscus, current injury, right knee, initial encounter 2024     Type 2 diabetes mellitus with hyperglycemia, with long-term current use of insulin ([facility]) 2024     Cellulitis of lower extremity 2024     Cortical age-related cataract of both eyes 10/20/2023     Numbness of left foot 2023     Lymphedema of both lower extremities 2022     Rash 2022     Stage 3 chronic kidney disease, unspecified whether stage 3a or 3b CKD ([facility]) 2022     Tachycardia 2021     Essential hypertension 2019     Hypercholesteremia 2019     Class 3 severe obesity due to excess calories with serious comorbidity and body mass index (BMI) of 40.0 to 44.9 in adult ([facility]) 2019     Steatohepatitis 2019     Allergic  rhinitis 09/26/2019     Vitamin D deficiency 09/26/2019     Persistent proteinuria 09/26/2019     Type 2 diabetes mellitus with microalbuminuria, with long-term current use of insulin (HCC)        LOS (days): 7  Geometric Mean LOS (GMLOS) (days): 4.9  Days to GMLOS:-2.1     OBJECTIVE:  Risk of Unplanned Readmission Score: 24.91         Current admission status: Inpatient   Preferred Pharmacy:   [id number] - [place] - [address]  [place] [id number]  Phone: [phone] Fax: [phone]    Primary Care Provider: Jluis Glass MD    Primary Insurance: MEDICARE  Secondary Insurance: [facility]    DISCHARGE DETAILS:  Printed the choice list from [employer] for the pt for STR.  TC to pt wife James who states she is coming in today and will review the list with the pt.

## 2025-03-05 NOTE — PROGRESS NOTES
Progress Note - Infectious Disease   Name: Nadeem Purdy Jr. 69 y.o. male I MRN: [id number]  Unit/Bed#: ICU 03-01 I Date of Admission: 2/26/2025   Date of Service: 3/5/2025 I Hospital Day: 7    Assessment & Plan  Severe sepsis (HCC)  E/b fever, tachycardia, tachypnea, leukocytosis with lactic acidosis.  Patient presented with DKA.  Sepsis most likely due to bacteremia in the setting of left lower extremity cellulitis.  Imaging on admission also showed concern for diarrheal illness however unlikely primary etiology for septic presentation.  1 of 2 blood cultures on admission positive for group A strep.  Lower extremity cellulitis clinical findings suspicious of streptococcal infection and this is confirmed with wound culture.  Patient has been on various antibiotics since admission.  Fortunately he remains hemodynamically stable off pressors.  Continue antibiotics as below  Follow-up repeat blood cultures for clearance  Trend temps and hemodynamics  Daily CBC DIF and chemistry to monitor response to treatment  Group A strep bacteremia  1 of 2 sets of admission blood cultures isolated strep pyogenes.  This is most likely due to extensive left lower extremity cellulitis with chronic bilateral lower extremity nonhealing wounds.  Wound culture isolated group A strep and MSSA.  Fortunately patient has no intravascular or prosthetic devices.  Transthoracic echo, adequate study, without significant valvular disease or obvious vegetations. Repeat blood cultures so far negative.   Continue renally dosed IV cefazolin   Continue to follow-up repeat blood cultures  Anticipate transition to PO abx to complete 14 day course   Cellulitis of lower extremity  Patient presented with DKA and chronic bilateral lower extremity wounds, nonhealing with concern for bilateral cellulitis.  This is in the setting of uncontrolled bilateral lower extremity lymphedema.  On exam and on exam and after review of clinical media suspect unilateral  cellulitis of the left lower extremity.  This has become quite extensive over the course of his hospitalization so far.  Wound culture polymicrobial with strep pyogenes and MSSA.  Clinical findings typical of streptococcal cellulitis.  Surgery and wound care following.  No evidence of soft tissue gas or abscess on CT of the left lower extremity.  Continue IV antibiotics as above  Close wound care and surgery follow-up ongoing  Serial skin exams and local wound care  Encourage lower extremity elevation, compression  Diarrhea  Reported diarrhea prehospital and briefly following admission.  Diarrhea now resolved.  C. difficile PCR positive, EIA negative.  Patient denies prior history of C. difficile.  No antibiotic exposures prior to admission.  Primary prophylaxis has not been shown to be beneficial.   Monitor stool output  Diabetic ketoacidosis without coma associated with type 2 diabetes mellitus (HCC)  Ha1c 8.9 percent.  Presented in DKA.  Status post aggressive volume resuscitation and DKA protocol.  Remains on insulin drip. Endocrinology is following.  Recommend tight glycemic control  Acute renal failure superimposed on stage 3a chronic kidney disease (HCC)  LUIS ALFREDO superimposed on CKD.  Baseline creatinine 1.1-1.3.  Likely ischemic ATN in the setting of severe sepsis and hemodynamic instability, complicated by vasomotor dysfunction from ARB and urinary retention status post Ramirez placement.  Nephrology is following.  Minimal improvement since admission.No sxs of uremia and he appears euvolemic.  Will renally dose antibiotics  Ongoing nephrology follow up   Lymphedema of both lower extremities  History of bilateral lower extremity lymphedema with chronic wounds.  Remains a risk factor for infection, poor wound healing, and recurrent admissions for cellulitis.    Encourage compliance with lower extremity elevation, compression, diuresis.  Class 3 severe obesity due to excess calories with serious comorbidity and body  mass index (BMI) of 40.0 to 44.9 in adult (HCC)  BMI greater than 40.  Recommend lifestyle modifications.  Remains a risk factor for infection.  Alcohol abuse  Admits to consumption of 1 to 2 glasses of bourbon a day.  Reports his last beverage was about 4 weeks ago.  Remains on CIWA protocol and supplemented with folic acid and thiamine.  Urinary retention  Ramirez catheter was discontinued however reinserted due to urinary retention.  Atrial fibrillation with RVR ([facility])  New onset likely precipitated by acute illness, sepsis and renal failure.  Ongoing cardiac management per ICU. Converted to NSR with amiodarone infusion.     I have discussed the above management plan in detail with the primary service.     Antibiotics:  IV cefazolin D3   IV abx D8    Subjective   Patient has no fever, chills, sweats; no nausea, vomiting, diarrhea; no cough, shortness of breath; no pain. No new symptoms. Tolerating abx.     Objective :  Temp:  [97.1 °F (36.2 °C)-99.7 °F (37.6 °C)] 99.7 °F (37.6 °C)  HR:  [] 109  BP: (144-186)/(66-84) 146/66  Resp:  [12-34] 22  SpO2:  [93 %-100 %] 97 %  O2 Device: None (Room air)    General:  No acute distress, sitting in bedside recliner, chronically ill-appearing   Psychiatric:  Awake and alert, answers questions minimally, affect is flat  HEENT: Mucous membranes dry, neck appears supple, head normal cephalic  Pulmonary: On room air, no dyspnea  Cardiovascular: RRR  Abdomen:  Soft, nontender, obese  Extremities: Bilateral lower extremity lymphedema  Skin:  No rashes though extensive cellulitis with blistering noted to the left lower extremity from ankle to upper thigh.  Clinical media reviewed. Dry dressings intact with mild streaking yellowish drainage noted medially LLE.   : Ramirez patent         Lab Results: I have reviewed the following results:  Results from last 7 days   Lab Units 03/05/25  0459 03/04/25  0410 03/03/25  0435   WBC Thousand/uL 25.47* 26.37* 28.19*   HEMOGLOBIN g/dL 10.0*  9.8* 10.3*   PLATELETS Thousands/uL 444* 412* 386     Results from last 7 days   Lab Units 03/05/25  0459 03/04/25  1818 03/04/25  0410 03/03/25  1021 03/03/25  0546   SODIUM mmol/L 132* 130* 129*   < > 130*   POTASSIUM mmol/L 3.6 4.3 3.9   < > 4.3   CHLORIDE mmol/L 103 103 103   < > 105   CO2 mmol/L 20* 17* 16*   < > 16*   BUN mg/dL 60* 63* 70*   < > 74*   CREATININE mg/dL 3.69* 3.77* 4.17*   < > 4.32*   EGFR ml/min/1.73sq m 15 15 13   < > 12   CALCIUM mg/dL 7.9* 7.9* 8.0*   < > 7.8*   AST U/L 60*  --  54*  --  100*   ALT U/L 43  --  56*  --  70*   ALK PHOS U/L 38  --  40  --  45   ALBUMIN g/dL 2.5*  --  2.8*  --  2.5*    < > = values in this interval not displayed.     Results from last 7 days   Lab Units 03/03/25  0319 03/02/25  2144 02/27/25 2013 02/27/25  1942 02/27/25  0537 02/26/25  1114   BLOOD CULTURE   --  No Growth at 24 hrs.  No Growth at 24 hrs.  --   --   --  No Growth After 5 Days.  Streptococcus pyogenes (Group A)*   GRAM STAIN RESULT  No Polys or Bacteria seen  --  No polys seen*  Rare Gram positive cocci in pairs*  No Polys or Bacteria seen  --   --  Gram positive cocci in chains*   WOUND CULTURE  Few Colonies of Staphylococcus aureus*  --  2+ Growth of Staphylococcus aureus*  2+ Growth of Streptococcus pyogenes (Group A)*  3+ Growth of  2+ Growth of Staphylococcus aureus*  2+ Growth of Streptococcus pyogenes (Group A)*  --   --   --    MRSA CULTURE ONLY   --   --   --  No Methicillin Resistant Staphlyococcus aureus (MRSA) isolated  --   --    C DIFF TOXIN B BY PCR   --   --   --   --  Positive*  --      Results from last 7 days   Lab Units 03/04/25  0410 03/03/25  0546 03/02/25  0539 03/01/25  0519 02/28/25  0518 02/27/25  0739 02/26/25  1114   PROCALCITONIN ng/ml 1.86* 2.99* 5.23* 8.70* 13.97* 18.89* 9.27*                 Imaging Results Review: No pertinent imaging studies reviewed.  Other Study Results Review: Other studies reviewed include: micro

## 2025-03-05 NOTE — ASSESSMENT & PLAN NOTE
TCO2 trend improving to 20--- sodium bicarbonate gtt DC, agree. Continue sodium citrate today and follow trends.

## 2025-03-05 NOTE — PLAN OF CARE
Problem: Prexisting or High Potential for Compromised Skin Integrity  Goal: Skin integrity is maintained or improved  Description: INTERVENTIONS:  - Identify patients at risk for skin breakdown  - Assess and monitor skin integrity  - Assess and monitor nutrition and hydration status  - Monitor labs   - Assess for incontinence   - Turn and reposition patient  - Assist with mobility/ambulation  - Relieve pressure over bony prominences  - Avoid friction and shearing  - Provide appropriate hygiene as needed including keeping skin clean and dry  - Evaluate need for skin moisturizer/barrier cream  - Collaborate with interdisciplinary team   - Patient/family teaching  - Consider wound care consult   Outcome: Progressing     Problem: PAIN - ADULT  Goal: Verbalizes/displays adequate comfort level or baseline comfort level  Description: Interventions:  - Encourage patient to monitor pain and request assistance  - Assess pain using appropriate pain scale  - Administer analgesics based on type and severity of pain and evaluate response  - Implement non-pharmacological measures as appropriate and evaluate response  - Consider cultural and social influences on pain and pain management  - Notify physician/advanced practitioner if interventions unsuccessful or patient reports new pain  Outcome: Progressing     Problem: INFECTION - ADULT  Goal: Absence or prevention of progression during hospitalization  Description: INTERVENTIONS:  - Assess and monitor for signs and symptoms of infection  - Monitor lab/diagnostic results  - Monitor all insertion sites, i.e. indwelling lines, tubes, and drains  - Monitor endotracheal if appropriate and nasal secretions for changes in amount and color  - [place] appropriate cooling/warming therapies per order  - Administer medications as ordered  - Instruct and encourage patient and family to use good hand hygiene technique  - Identify and instruct in appropriate isolation precautions for  identified infection/condition  Outcome: Progressing  Goal: Absence of fever/infection during neutropenic period  Description: INTERVENTIONS:  - Monitor WBC    Outcome: Progressing     Problem: SAFETY ADULT  Goal: Patient will remain free of falls  Description: INTERVENTIONS:  - Educate patient/family on patient safety including physical limitations  - Instruct patient to call for assistance with activity   - Consult OT/PT to assist with strengthening/mobility   - Keep Call bell within reach  - Keep bed low and locked with side rails adjusted as appropriate  - Keep care items and personal belongings within reach  - Initiate and maintain comfort rounds  - Make Fall Risk Sign visible to staff  - Offer Toileting every 2 Hours, in advance of need  - Initiate/Maintain bed alarm  - Obtain necessary fall risk management equipment  - Apply yellow socks and bracelet for high fall risk patients  - Consider moving patient to room near nurses station  Outcome: Progressing  Goal: Maintain or return to baseline ADL function  Description: INTERVENTIONS:  -  Assess patient's ability to carry out ADLs; assess patient's baseline for ADL function and identify physical deficits which impact ability to perform ADLs (bathing, care of mouth/teeth, toileting, grooming, dressing, etc.)  - Assess/evaluate cause of self-care deficits   - Assess range of motion  - Assess patient's mobility; develop plan if impaired  - Assess patient's need for assistive devices and provide as appropriate  - Encourage maximum independence but intervene and supervise when necessary  - Involve family in performance of ADLs  - Assess for home care needs following discharge   - Consider OT consult to assist with ADL evaluation and planning for discharge  - Provide patient education as appropriate  Outcome: Progressing  Goal: Maintains/Returns to pre admission functional level  Description: INTERVENTIONS:  - Perform AM-PAC 6 Click Basic Mobility/ Daily Activity  assessment daily.  - Set and communicate daily mobility goal to care team and patient/family/caregiver.   - Collaborate with rehabilitation services on mobility goals if consulted  - Perform Range of Motion 3 times a day.  - Reposition patient every 2 hours.  - Dangle patient 3 times a day  - Stand patient 3 times a day  - Ambulate patient 3 times a day  - Out of bed to chair 3 times a day   - Out of bed for meals 3 times a day  - Out of bed for toileting  - Record patient progress and toleration of activity level   Outcome: Progressing     Problem: DISCHARGE PLANNING  Goal: Discharge to home or other facility with appropriate resources  Description: INTERVENTIONS:  - Identify barriers to discharge w/patient and caregiver  - Arrange for needed discharge resources and transportation as appropriate  - Identify discharge learning needs (meds, wound care, etc.)  - Arrange for interpretive services to assist at discharge as needed  - Refer to Case Management Department for coordinating discharge planning if the patient needs post-hospital services based on physician/advanced practitioner order or complex needs related to functional status, cognitive ability, or social support system  Outcome: Progressing     Problem: Knowledge Deficit  Goal: Patient/family/caregiver demonstrates understanding of disease process, treatment plan, medications, and discharge instructions  Description: Complete learning assessment and assess knowledge base.  Interventions:  - Provide teaching at level of understanding  - Provide teaching via preferred learning methods  Outcome: Progressing     Problem: CARDIOVASCULAR - ADULT  Goal: Maintains optimal cardiac output and hemodynamic stability  Description: INTERVENTIONS:  - Monitor I/O, vital signs and rhythm  - Monitor for S/S and trends of decreased cardiac output  - Administer and titrate ordered vasoactive medications to optimize hemodynamic stability  - Assess quality of pulses, skin color  and temperature  - Assess for signs of decreased coronary artery perfusion  - Instruct patient to report change in severity of symptoms  Outcome: Progressing  Goal: Absence of cardiac dysrhythmias or at baseline rhythm  Description: INTERVENTIONS:  - Continuous cardiac monitoring, vital signs, obtain 12 lead EKG if ordered  - Administer antiarrhythmic and heart rate control medications as ordered  - Monitor electrolytes and administer replacement therapy as ordered  Outcome: Progressing     Problem: GASTROINTESTINAL - ADULT  Goal: Minimal or absence of nausea and/or vomiting  Description: INTERVENTIONS:  - Administer IV fluids if ordered to ensure adequate hydration  - Maintain NPO status until nausea and vomiting are resolved  - Nasogastric tube if ordered  - Administer ordered antiemetic medications as needed  - Provide nonpharmacologic comfort measures as appropriate  - Advance diet as tolerated, if ordered  - Consider nutrition services referral to assist patient with adequate nutrition and appropriate food choices  Outcome: Progressing  Goal: Maintains adequate nutritional intake  Description: INTERVENTIONS:  - Monitor percentage of each meal consumed  - Identify factors contributing to decreased intake, treat as appropriate  - Assist with meals as needed  - Monitor I&O, weight, and lab values if indicated  - Obtain nutrition services referral as needed  Outcome: Progressing     Problem: GENITOURINARY - ADULT  Goal: Maintains or returns to baseline urinary function  Description: INTERVENTIONS:  - Assess urinary function  - Encourage oral fluids to ensure adequate hydration if ordered  - Administer IV fluids as ordered to ensure adequate hydration  - Administer ordered medications as needed  - Offer frequent toileting  - Follow urinary retention protocol if ordered  Outcome: Progressing     Problem: METABOLIC, FLUID AND ELECTROLYTES - ADULT  Goal: Electrolytes maintained within normal limits  Description:  INTERVENTIONS:  - Monitor labs and assess patient for signs and symptoms of electrolyte imbalances  - Administer electrolyte replacement as ordered  - Monitor response to electrolyte replacements, including repeat lab results as appropriate  - Instruct patient on fluid and nutrition as appropriate  Outcome: Progressing  Goal: Fluid balance maintained  Description: INTERVENTIONS:  - Monitor labs   - Monitor I/O and WT  - Instruct patient on fluid and nutrition as appropriate  - Assess for signs & symptoms of volume excess or deficit  Outcome: Progressing  Goal: Glucose maintained within target range  Description: INTERVENTIONS:  - Monitor Blood Glucose as ordered  - Assess for signs and symptoms of hyperglycemia and hypoglycemia  - Administer ordered medications to maintain glucose within target range  - Assess nutritional intake and initiate nutrition service referral as needed  Outcome: Progressing     Problem: SKIN/TISSUE INTEGRITY - ADULT  Goal: Incision(s), wounds(s) or drain site(s) healing without S/S of infection  Description: INTERVENTIONS  - Assess and document dressing, incision, wound bed, drain sites and surrounding tissue  - Provide patient and family education  - Perform skin care/dressing changes  Outcome: Progressing     Problem: MUSCULOSKELETAL - ADULT  Goal: Maintain or return mobility to safest level of function  Description: INTERVENTIONS:  - Assess patient's ability to carry out ADLs; assess patient's baseline for ADL function and identify physical deficits which impact ability to perform ADLs (bathing, care of mouth/teeth, toileting, grooming, dressing, etc.)  - Assess/evaluate cause of self-care deficits   - Assess range of motion  - Assess patient's mobility  - Assess patient's need for assistive devices and provide as appropriate  - Encourage maximum independence but intervene and supervise when necessary  - Involve family in performance of ADLs  - Assess for home care needs following  discharge   - Consider OT consult to assist with ADL evaluation and planning for discharge  - Provide patient education as appropriate  Outcome: Progressing  Goal: Maintain proper alignment of affected body part  Description: INTERVENTIONS:  - Support, maintain and protect limb and body alignment  - Provide patient/ family with appropriate education  Outcome: Progressing     Problem: Nutrition/Hydration-ADULT  Goal: Nutrient/Hydration intake appropriate for improving, restoring or maintaining nutritional needs  Description: Monitor and assess patient's nutrition/hydration status for malnutrition. Collaborate with interdisciplinary team and initiate plan and interventions as ordered.  Monitor patient's weight and dietary intake as ordered or per policy. Utilize nutrition screening tool and intervene as necessary. Determine patient's food preferences and provide high-protein, high-caloric foods as appropriate.     INTERVENTIONS:  - Monitor oral intake, urinary output, labs, and treatment plans  - Assess nutrition and hydration status and recommend course of action  - Evaluate amount of meals eaten  - Assist patient with eating if necessary   - Allow adequate time for meals  - Recommend/ encourage appropriate diets, oral nutritional supplements, and vitamin/mineral supplements  - Order, calculate, and assess calorie counts as needed  - Recommend, monitor, and adjust tube feedings and TPN/PPN based on assessed needs  - Assess need for intravenous fluids  - Provide specific nutrition/hydration education as appropriate  - Include patient/family/caregiver in decisions related to nutrition  Outcome: Progressing

## 2025-03-05 NOTE — ASSESSMENT & PLAN NOTE
1 of 2 sets of admission blood cultures isolated strep pyogenes.  This is most likely due to extensive left lower extremity cellulitis with chronic bilateral lower extremity nonhealing wounds.  Wound culture isolated group A strep and MSSA.  Fortunately patient has no intravascular or prosthetic devices.  Transthoracic echo, adequate study, without significant valvular disease or obvious vegetations. Repeat blood cultures so far negative.   Continue renally dosed IV cefazolin   Continue to follow-up repeat blood cultures  Anticipate transition to PO abx to complete 14 day course

## 2025-03-05 NOTE — ASSESSMENT & PLAN NOTE
Lab Results   Component Value Date    EGFR 15 03/05/2025    EGFR 15 03/04/2025    EGFR 13 03/04/2025    CREATININE 3.69 (H) 03/05/2025    CREATININE 3.77 (H) 03/04/2025    CREATININE 4.17 (H) 03/04/2025   Baseline creatinine appears to be 1.1-1.3 mg/dL since 2022; not under care of nephrology. 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  Grade A2 albuminuria, no RBCs/UA bland when checked in steady state.  Kidneys without obstruction on unenhanced imaging.  Now with acute kidney injury creatinine unchanged high threes, low fours.      Suspect etiology ischemic ATN in setting of severe sepsis, volume and hemodynamic perturbations (s/p crystalloid/colloid/diuretic), vasomotor dysfunction due to ARB, urinary retention status post Ramirez.  CK minimally elevated upon presentation and likely noncontributory to LUIS ALFREDO.  UA significant for glucosuria, 2+ protein, 1-2 RBC, 1-2 coarse granular casts.      3/5  Cr improving to 3.69 this AM.Examines hypervolemic with lower extremity edema.    Metabolic parameters improving as well, TCO2 20. Agree with discontinuation of bicarbonate gtt this AM. Continue sodium citrate 30ml BID.   Edema noted on lower extremities which is multifactorial in nature--cellulitis /hypoalbuminemia. UOP 3.1 L in 24 hr but remain  + 4.8 L. Net negative 676 ml/24 hr. Given improvement in objective parameters, advise Lasix 40mg Iv x 1 and assess response. Inputs decreased off insulin and bicarbonate gtt.   Follow I/O and daily weights.

## 2025-03-05 NOTE — PROGRESS NOTES
Progress Note - Nephrology   Name: Nadeem Purdy Jr. 69 y.o. male I MRN: [id number]  Unit/Bed#: ICU 03-01 I Date of Admission: 2/26/2025   Date of Service: 3/5/2025 I Hospital Day: 7     Assessment & Plan  Acute renal failure superimposed on stage 3a chronic kidney disease (HCC)  Lab Results   Component Value Date    EGFR 15 03/05/2025    EGFR 15 03/04/2025    EGFR 13 03/04/2025    CREATININE 3.69 (H) 03/05/2025    CREATININE 3.77 (H) 03/04/2025    CREATININE 4.17 (H) 03/04/2025   Baseline creatinine appears to be 1.1-1.3 mg/dL since 2022; not under care of nephrology. 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  Grade A2 albuminuria, no RBCs/UA bland when checked in steady state.  Kidneys without obstruction on unenhanced imaging.  Now with acute kidney injury creatinine unchanged high threes, low fours.      Suspect etiology ischemic ATN in setting of severe sepsis, volume and hemodynamic perturbations (s/p crystalloid/colloid/diuretic), vasomotor dysfunction due to ARB, urinary retention status post Ramirez.  CK minimally elevated upon presentation and likely noncontributory to LUIS ALFREDO.  UA significant for glucosuria, 2+ protein, 1-2 RBC, 1-2 coarse granular casts.      3/5  Cr improving to 3.69 this AM.Examines hypervolemic with lower extremity edema.    Metabolic parameters improving as well, TCO2 20. Agree with discontinuation of bicarbonate gtt this AM. Continue sodium citrate 30ml BID.   Edema noted on lower extremities which is multifactorial in nature--cellulitis /hypoalbuminemia. UOP 3.1 L in 24 hr but remain  + 4.8 L. Net negative 676 ml/24 hr. Given improvement in objective parameters, advise Lasix 40mg Iv x 1 and assess response. Inputs decreased off insulin and bicarbonate gtt.   Follow I/O and daily weights.    Diabetic ketoacidosis without coma associated with type 2 diabetes mellitus (HCC)  Lab Results   Component Value Date    HGBA1C 8.9 (H) 02/26/2025        Recent Labs     03/05/25  0407 03/05/25  0600 03/05/25  0746 03/05/25  0815   POCGLU 103 127 140 174*       Blood Sugar Average: Last 72 hrs:  (P) 195  Off insulin gtt, management per primary team.  Essential hypertension  BP trends elevated in the past 24 hr, follow trends. Continue management per CC. Avoid RAASI.   Hyponatremia  Na 132, stable, continue to follow. Appears hypervolemic.   Urinary retention  Ramirez catheter was discontinued subsequently reinserted last evening.    Metabolic acidosis  TCO2 trend improving to 20--- sodium bicarbonate gtt DC, agree. Continue sodium citrate today and follow trends.   Severe sepsis (HCC)  Continue management per ID colleagues   Group A strep bacteremia  Continue management per ID colleagues    Atrial fibrillation with RVR (HCC)  Continue management per primary team   Acute respiratory insufficiency    Diarrhea        Subjective   Patient seen and examined today. Reports dry cough and intermittent SOB.No other complaints noted.  A complete 10 point review of systems was performed and is otherwise negative.     Objective :  Temp:  [97.1 °F (36.2 °C)-99.7 °F (37.6 °C)] 99.7 °F (37.6 °C)  HR:  [] 103  BP: (143-186)/(66-84) 143/66  Resp:  [12-34] 26  SpO2:  [93 %-100 %] 95 %  O2 Device: None (Room air)    Current Weight: Weight - Scale: (!) 141 kg (310 lb 6.5 oz)  First Weight: Weight - Scale: (!) 143 kg (315 lb 0.6 oz)  I/O         03/03 0701  03/04 0700 03/04 0701  03/05 0700 03/05 0701  03/06 0700    P.O. 1320 720 240    I.V. (mL/kg) 1687.3 (12.1) 1633.7 (11.6) 19.2 (0.1)    IV Piggyback 460 120     Total Intake(mL/kg) 3467.3 (24.8) 2473.7 (17.5) 259.2 (1.8)    Urine (mL/kg/hr) 2550 (0.8) 3150 (0.9) 875 (2.8)    Stool  0     Total Output 2550 3150 875    Net +917.3 -676.3 -615.8           Unmeasured Stool Occurrence  1 x           Physical Exam  Vitals reviewed.   Constitutional:       General: He is not in acute distress.     Appearance: He is obese. He is  ill-appearing.   HENT:      Head: Normocephalic and atraumatic.      Nose: Nose normal.      Mouth/Throat:      Mouth: Mucous membranes are moist.      Pharynx: Oropharynx is clear.   Eyes:      Extraocular Movements: Extraocular movements intact.      Conjunctiva/sclera: Conjunctivae normal.   Cardiovascular:      Rate and Rhythm: Tachycardia present. Rhythm irregular.   Pulmonary:      Breath sounds: No wheezing, rhonchi or rales.      Comments: Tachypnea   Abdominal:      Palpations: Abdomen is soft.      Tenderness: There is no abdominal tenderness. There is no guarding.   Musculoskeletal:      Right lower leg: Edema present.      Left lower leg: Edema present.      Comments: Left leg wrapped    Neurological:      Mental Status: He is alert and oriented to person, place, and time. Mental status is at baseline.   Psychiatric:         Mood and Affect: Mood normal.         Behavior: Behavior normal.         Medications:    Current Facility-Administered Medications:     acetaminophen (TYLENOL) tablet 650 mg, 650 mg, Oral, Q6H PRN, JIMMY Darnell, 650 mg at 02/26/25 2322    albuterol inhalation solution 2.5 mg, 2.5 mg, Nebulization, Q6H PRN, Christiane Song, RENP, 2.5 mg at 03/01/25 0527    aspirin (ECOTRIN LOW STRENGTH) EC tablet 81 mg, 81 mg, Oral, Daily, RE DarnellNP, 81 mg at 03/05/25 0802    atorvastatin (LIPITOR) tablet 40 mg, 40 mg, Oral, Daily With Dinner, RE DarnellNP, 40 mg at 03/04/25 1613    azelastine (ASTELIN) 0.1 % nasal spray 1 spray, 1 spray, Each Nare, BID, JIMMY Darnell, 1 spray at 03/05/25 0801    ceFAZolin (ANCEF) IVPB (premix in dextrose) 1,000 mg 50 mL, 1,000 mg, Intravenous, Q8H, Promise Christian MD, Last Rate: 100 mL/hr at 03/05/25 0226, 1,000 mg at 03/05/25 0226    chlorhexidine (PERIDEX) 0.12 % oral rinse 15 mL, 15 mL, Mouth/Throat, Q12H MARGARITA, JIMMY Darnell, 15 mL at 03/05/25 0801    Cholecalciferol (VITAMIN D3) tablet 2,000 Units, 2,000  Units, Oral, Daily, JIMMY Darnell, 2,000 Units at 03/05/25 0802    folic acid (FOLVITE) tablet 1 mg, 1 mg, Oral, Daily, JIMMY Darnell, 1 mg at 03/05/25 0802    gabapentin (NEURONTIN) capsule 200 mg, 200 mg, Oral, HS, JIMMY Darnell, 200 mg at 03/04/25 2101    guaiFENesin (MUCINEX) 12 hr tablet 600 mg, 600 mg, Oral, Q12H MARGARITA, JIMMY Darnell, 600 mg at 03/05/25 0802    heparin (porcine) subcutaneous injection 7,500 Units, 7,500 Units, Subcutaneous, Q8H MARGARITA, JIMMY Huff    hydrALAZINE (APRESOLINE) injection 10 mg, 10 mg, Intravenous, Q6H PRN, JIMMY Huff    insulin aspart protamine-insulin aspart (NovoLOG 70/30) 100 units/mL subcutaneous injection 20 Units, 20 Units, Subcutaneous, BID AC, JIMMY Huff, 20 Units at 03/05/25 0750    insulin lispro (HumALOG/ADMELOG) 100 units/mL subcutaneous injection 1-5 Units, 1-5 Units, Subcutaneous, TID AC **AND** Fingerstick Glucose (POCT), , , TID AC, JIMMY Huff    insulin lispro (HumALOG/ADMELOG) 100 units/mL subcutaneous injection 1-5 Units, 1-5 Units, Subcutaneous, HS, JIMMY Huff    melatonin tablet 6 mg, 6 mg, Oral, HS, JIMMY Darnell, 6 mg at 03/04/25 2102    metoprolol tartrate (LOPRESSOR) tablet 50 mg, 50 mg, Oral, Q12H MARGARITA, JIMMY Rider, 50 mg at 03/05/25 0802    sod citrate-citric acid (BICITRA) oral solution 30 mL, 30 mL, Oral, BID, Coty Whittaker, DO, 30 mL at 03/05/25 0804    thiamine tablet 100 mg, 100 mg, Oral, Daily, JIMMY Darnell, 100 mg at 03/05/25 0802    vitamin E (TOCOPHEROL) capsule 400 Units, 400 Units, Oral, Daily, JIMMY Darnell, 400 Units at 03/05/25 0802      Lab Results: I have reviewed the following results:  Results from last 7 days   Lab Units 03/05/25  0459 03/04/25  1818 03/04/25  0410 03/03/25  2357 03/03/25  1625 03/03/25  1021 03/03/25  0546 03/03/25  0435 03/02/25  2149 03/02/25  1636  "03/02/25  0539 03/01/25  1549 03/01/25  0519 02/28/25  1225 02/28/25  0518 02/27/25  2214 02/27/25  1818 02/27/25  1206 02/27/25  0552   WBC Thousand/uL 25.47*  --  26.37*  --   --   --   --  28.19*  --   --  24.47*  --  19.12*  --  13.96*  --   --   --  15.92*   HEMOGLOBIN g/dL 10.0*  --  9.8*  --   --   --   --  10.3*  --   --  11.0*  --  10.7*  --  10.3*  --   --   --  13.3   HEMATOCRIT % 29.9*  --  29.1*  --   --   --   --  30.7*  --   --  32.9*  --  31.8*  --  31.1*  --   --   --  41.6   PLATELETS Thousands/uL 444*  --  412*  --   --   --   --  386  --   --  300  --  223  --  155  --   --   --  142*   POTASSIUM mmol/L 3.6 4.3 3.9 3.6 4.0 3.9 4.3  --    < > 3.6 3.6   < > 4.0   < > 3.5   < > 3.5   < > 3.9   CHLORIDE mmol/L 103 103 103 106 103 104 105  --    < > 101 102   < > 103   < > 102   < > 98   < > 98   CO2 mmol/L 20* 17* 16* 16* 16* 15* 16*  --    < > 19* 15*   < > 15*   < > 15*   < > 18*   < > 13*   BUN mg/dL 60* 63* 70* 70* 71* 72* 74*  --    < > 72* 71*   < > 64*   < > 51*   < > 47*   < > 44*   CREATININE mg/dL 3.69* 3.77* 4.17* 4.04* 4.20* 4.07* 4.32*  --    < > 4.24* 4.05*   < > 3.70*   < > 3.63*   < > 3.52*   < > 3.03*   CALCIUM mg/dL 7.9* 7.9* 8.0* 7.8* 8.3* 8.2* 7.8*  --    < > 8.4 8.0*   < > 8.3*   < > 7.7*   < > 8.9   < > 7.3*   MAGNESIUM mg/dL 1.9  --  2.0 2.0 2.1  --  2.1  --   --  2.2 2.1   < > 2.2   < > 2.3   < > 2.4   < > 2.4   PHOSPHORUS mg/dL 3.7  --  3.5 2.5 2.4  --  3.1  --   --  2.4 3.4   < > 2.9   < > 2.6   < > 2.2*   < > 2.5   ALBUMIN g/dL 2.5*  --  2.8*  --   --   --  2.5*  --   --   --  2.8*  --  2.9*  --  2.6*  --  3.0*  --   --     < > = values in this interval not displayed.       Administrative Statements     Portions of the record may have been created with voice recognition software. Occasional wrong word or \"sound a like\" substitutions may have occurred due to the inherent limitations of voice recognition software. Read the chart carefully and recognize, using context, where " substitutions have occurred.If you have any questions, please contact the dictating provider.

## 2025-03-05 NOTE — PLAN OF CARE
Problem: Prexisting or High Potential for Compromised Skin Integrity  Goal: Skin integrity is maintained or improved  Description: INTERVENTIONS:  - Identify patients at risk for skin breakdown  - Assess and monitor skin integrity  - Assess and monitor nutrition and hydration status  - Monitor labs   - Assess for incontinence   - Turn and reposition patient  - Assist with mobility/ambulation  - Relieve pressure over bony prominences  - Avoid friction and shearing  - Provide appropriate hygiene as needed including keeping skin clean and dry  - Evaluate need for skin moisturizer/barrier cream  - Collaborate with interdisciplinary team   - Patient/family teaching  - Consider wound care consult   Outcome: Progressing     Problem: PAIN - ADULT  Goal: Verbalizes/displays adequate comfort level or baseline comfort level  Description: Interventions:  - Encourage patient to monitor pain and request assistance  - Assess pain using appropriate pain scale  - Administer analgesics based on type and severity of pain and evaluate response  - Implement non-pharmacological measures as appropriate and evaluate response  - Consider cultural and social influences on pain and pain management  - Notify physician/advanced practitioner if interventions unsuccessful or patient reports new pain  Outcome: Progressing     Problem: INFECTION - ADULT  Goal: Absence or prevention of progression during hospitalization  Description: INTERVENTIONS:  - Assess and monitor for signs and symptoms of infection  - Monitor lab/diagnostic results  - Monitor all insertion sites, i.e. indwelling lines, tubes, and drains  - Monitor endotracheal if appropriate and nasal secretions for changes in amount and color  - [place] appropriate cooling/warming therapies per order  - Administer medications as ordered  - Instruct and encourage patient and family to use good hand hygiene technique  - Identify and instruct in appropriate isolation precautions for  identified infection/condition  Outcome: Progressing  Goal: Absence of fever/infection during neutropenic period  Description: INTERVENTIONS:  - Monitor WBC    Outcome: Progressing     Problem: SAFETY ADULT  Goal: Patient will remain free of falls  Description: INTERVENTIONS:  - Educate patient/family on patient safety including physical limitations  - Instruct patient to call for assistance with activity   - Consult OT/PT to assist with strengthening/mobility   - Keep Call bell within reach  - Keep bed low and locked with side rails adjusted as appropriate  - Keep care items and personal belongings within reach  - Initiate and maintain comfort rounds  - Make Fall Risk Sign visible to staff  - Offer Toileting every 2 Hours, in advance of need  - Initiate/Maintain bed alarm  - Obtain necessary fall risk management equipment  - Apply yellow socks and bracelet for high fall risk patients  - Consider moving patient to room near nurses station  Outcome: Progressing  Goal: Maintain or return to baseline ADL function  Description: INTERVENTIONS:  -  Assess patient's ability to carry out ADLs; assess patient's baseline for ADL function and identify physical deficits which impact ability to perform ADLs (bathing, care of mouth/teeth, toileting, grooming, dressing, etc.)  - Assess/evaluate cause of self-care deficits   - Assess range of motion  - Assess patient's mobility; develop plan if impaired  - Assess patient's need for assistive devices and provide as appropriate  - Encourage maximum independence but intervene and supervise when necessary  - Involve family in performance of ADLs  - Assess for home care needs following discharge   - Consider OT consult to assist with ADL evaluation and planning for discharge  - Provide patient education as appropriate  Outcome: Progressing  Goal: Maintains/Returns to pre admission functional level  Description: INTERVENTIONS:  - Perform AM-PAC 6 Click Basic Mobility/ Daily Activity  assessment daily.  - Set and communicate daily mobility goal to care team and patient/family/caregiver.   - Collaborate with rehabilitation services on mobility goals if consulted  - Perform Range of Motion 3 times a day.  - Reposition patient every 2 hours.  - Dangle patient 3 times a day  - Stand patient 3 times a day  - Ambulate patient 3 times a day  - Out of bed to chair 3 times a day   - Out of bed for meals 3 times a day  - Out of bed for toileting  - Record patient progress and toleration of activity level   Outcome: Progressing     Problem: DISCHARGE PLANNING  Goal: Discharge to home or other facility with appropriate resources  Description: INTERVENTIONS:  - Identify barriers to discharge w/patient and caregiver  - Arrange for needed discharge resources and transportation as appropriate  - Identify discharge learning needs (meds, wound care, etc.)  - Arrange for interpretive services to assist at discharge as needed  - Refer to Case Management Department for coordinating discharge planning if the patient needs post-hospital services based on physician/advanced practitioner order or complex needs related to functional status, cognitive ability, or social support system  Outcome: Progressing     Problem: Knowledge Deficit  Goal: Patient/family/caregiver demonstrates understanding of disease process, treatment plan, medications, and discharge instructions  Description: Complete learning assessment and assess knowledge base.  Interventions:  - Provide teaching at level of understanding  - Provide teaching via preferred learning methods  Outcome: Progressing     Problem: CARDIOVASCULAR - ADULT  Goal: Maintains optimal cardiac output and hemodynamic stability  Description: INTERVENTIONS:  - Monitor I/O, vital signs and rhythm  - Monitor for S/S and trends of decreased cardiac output  - Administer and titrate ordered vasoactive medications to optimize hemodynamic stability  - Assess quality of pulses, skin color  and temperature  - Assess for signs of decreased coronary artery perfusion  - Instruct patient to report change in severity of symptoms  Outcome: Progressing  Goal: Absence of cardiac dysrhythmias or at baseline rhythm  Description: INTERVENTIONS:  - Continuous cardiac monitoring, vital signs, obtain 12 lead EKG if ordered  - Administer antiarrhythmic and heart rate control medications as ordered  - Monitor electrolytes and administer replacement therapy as ordered  Outcome: Progressing     Problem: GASTROINTESTINAL - ADULT  Goal: Minimal or absence of nausea and/or vomiting  Description: INTERVENTIONS:  - Administer IV fluids if ordered to ensure adequate hydration  - Maintain NPO status until nausea and vomiting are resolved  - Nasogastric tube if ordered  - Administer ordered antiemetic medications as needed  - Provide nonpharmacologic comfort measures as appropriate  - Advance diet as tolerated, if ordered  - Consider nutrition services referral to assist patient with adequate nutrition and appropriate food choices  Outcome: Progressing  Goal: Maintains adequate nutritional intake  Description: INTERVENTIONS:  - Monitor percentage of each meal consumed  - Identify factors contributing to decreased intake, treat as appropriate  - Assist with meals as needed  - Monitor I&O, weight, and lab values if indicated  - Obtain nutrition services referral as needed  Outcome: Progressing     Problem: GENITOURINARY - ADULT  Goal: Maintains or returns to baseline urinary function  Description: INTERVENTIONS:  - Assess urinary function  - Encourage oral fluids to ensure adequate hydration if ordered  - Administer IV fluids as ordered to ensure adequate hydration  - Administer ordered medications as needed  - Offer frequent toileting  - Follow urinary retention protocol if ordered  Outcome: Progressing     Problem: METABOLIC, FLUID AND ELECTROLYTES - ADULT  Goal: Electrolytes maintained within normal limits  Description:  INTERVENTIONS:  - Monitor labs and assess patient for signs and symptoms of electrolyte imbalances  - Administer electrolyte replacement as ordered  - Monitor response to electrolyte replacements, including repeat lab results as appropriate  - Instruct patient on fluid and nutrition as appropriate  Outcome: Progressing  Goal: Fluid balance maintained  Description: INTERVENTIONS:  - Monitor labs   - Monitor I/O and WT  - Instruct patient on fluid and nutrition as appropriate  - Assess for signs & symptoms of volume excess or deficit  Outcome: Progressing  Goal: Glucose maintained within target range  Description: INTERVENTIONS:  - Monitor Blood Glucose as ordered  - Assess for signs and symptoms of hyperglycemia and hypoglycemia  - Administer ordered medications to maintain glucose within target range  - Assess nutritional intake and initiate nutrition service referral as needed  Outcome: Progressing     Problem: SKIN/TISSUE INTEGRITY - ADULT  Goal: Incision(s), wounds(s) or drain site(s) healing without S/S of infection  Description: INTERVENTIONS  - Assess and document dressing, incision, wound bed, drain sites and surrounding tissue  - Provide patient and family education  - Perform skin care/dressing changes  Outcome: Progressing     Problem: MUSCULOSKELETAL - ADULT  Goal: Maintain or return mobility to safest level of function  Description: INTERVENTIONS:  - Assess patient's ability to carry out ADLs; assess patient's baseline for ADL function and identify physical deficits which impact ability to perform ADLs (bathing, care of mouth/teeth, toileting, grooming, dressing, etc.)  - Assess/evaluate cause of self-care deficits   - Assess range of motion  - Assess patient's mobility  - Assess patient's need for assistive devices and provide as appropriate  - Encourage maximum independence but intervene and supervise when necessary  - Involve family in performance of ADLs  - Assess for home care needs following  discharge   - Consider OT consult to assist with ADL evaluation and planning for discharge  - Provide patient education as appropriate  Outcome: Progressing  Goal: Maintain proper alignment of affected body part  Description: INTERVENTIONS:  - Support, maintain and protect limb and body alignment  - Provide patient/ family with appropriate education  Outcome: Progressing     Problem: Nutrition/Hydration-ADULT  Goal: Nutrient/Hydration intake appropriate for improving, restoring or maintaining nutritional needs  Description: Monitor and assess patient's nutrition/hydration status for malnutrition. Collaborate with interdisciplinary team and initiate plan and interventions as ordered.  Monitor patient's weight and dietary intake as ordered or per policy. Utilize nutrition screening tool and intervene as necessary. Determine patient's food preferences and provide high-protein, high-caloric foods as appropriate.     INTERVENTIONS:  - Monitor oral intake, urinary output, labs, and treatment plans  - Assess nutrition and hydration status and recommend course of action  - Evaluate amount of meals eaten  - Assist patient with eating if necessary   - Allow adequate time for meals  - Recommend/ encourage appropriate diets, oral nutritional supplements, and vitamin/mineral supplements  - Order, calculate, and assess calorie counts as needed  - Recommend, monitor, and adjust tube feedings and TPN/PPN based on assessed needs  - Assess need for intravenous fluids  - Provide specific nutrition/hydration education as appropriate  - Include patient/family/caregiver in decisions related to nutrition  Outcome: Progressing

## 2025-03-05 NOTE — RESPIRATORY THERAPY NOTE
RT Protocol Note  Nadeem Purdy Jr. 69 y.o. male MRN: [id number]  Unit/Bed#: ICU 03-01 Encounter: [id number]    Assessment    Principal Problem:    Diabetic ketoacidosis without coma associated with type 2 diabetes mellitus (HCC)  Active Problems:    Essential hypertension    Hypercholesteremia    Class 3 severe obesity due to excess calories with serious comorbidity and body mass index (BMI) of 40.0 to 44.9 in adult (HCC)    Lymphedema of both lower extremities    Cellulitis of lower extremity    Acute renal failure superimposed on stage 3a chronic kidney disease (HCC)    Severe sepsis ([facility])    Alcohol abuse    Group A strep bacteremia    Hyponatremia    Metabolic acidosis    Urinary retention    Atrial fibrillation with RVR ([facility])    Acute respiratory insufficiency    Diarrhea      Home Pulmonary Medications:    Home Devices/Therapy: Other (Comment) (None)    Past Medical History:   Diagnosis Date    Clostridium difficile diarrhea 02/27/2025    Elevated troponin 02/27/2025    Hypertension      Social History     Socioeconomic History    Marital status: /Civil Union     Spouse name: None    Number of children: None    Years of education: None    Highest education level: None   Occupational History    None   Tobacco Use    Smoking status: Former    Smokeless tobacco: Never   Vaping Use    Vaping status: Never Used   Substance and Sexual Activity    Alcohol use: Yes     Alcohol/week: 4.0 standard drinks of alcohol     Types: 4 Shots of liquor per week    Drug use: Never    Sexual activity: None   Other Topics Concern    None   Social History Narrative    None     Social Drivers of Health     Financial Resource Strain: Low Risk  (9/28/2023)    Overall Financial Resource Strain (CARDIA)     Difficulty of Paying Living Expenses: Not hard at all   Food Insecurity: No Food Insecurity (2/26/2025)    Nursing - Inadequate Food Risk Classification     Worried About Running Out of Food in the Last Year: Never true     Ran  "Out of Food in the Last Year: Never true     Ran Out of Food in the Last Year: Never true   Transportation Needs: No Transportation Needs (2025)    Nursing - Transportation Risk Classification     Lack of Transportation: Not on file     Lack of Transportation: No   Physical Activity: Not on file   Stress: Not on file   Social Connections: Not on file   Intimate Partner Violence: Unknown (2025)    Nursing IPS     Feels Physically and Emotionally Safe: Not on file     Physically Hurt by Someone: Not on file     Humiliated or Emotionally Abused by Someone: Not on file     Physically Hurt by Someone: No     Hurt or Threatened by Someone: No   Housing Stability: Unknown (2025)    Nursing: Inadequate Housing Risk Classification     Has Housing: Not on file     Worried About Losing Housing: Not on file     Unable to Get Utilities: Not on file     Unable to Pay for Housing in the Last Year: No     Has Housin       Subjective         Objective    Physical Exam:   Assessment Type: Pre-treatment  General Appearance: Awake, Drowsy  Respiratory Pattern: Normal  Chest Assessment: Chest expansion symmetrical  Bilateral Breath Sounds: Clear  O2 Device: RA    Vitals:  Blood pressure 162/75, pulse 96, temperature 99.6 °F (37.6 °C), temperature source Tympanic, resp. rate 22, height 5' 11\" (1.803 m), weight (!) 140 kg (309 lb 8.4 oz), SpO2 98%.    Results from last 7 days   Lab Units 25  0744   PH ART  7.406   PCO2 ART mm Hg 25.5*   PO2 ART mm Hg 76.8   HCO3 ART mmol/L 15.7*   BASE EXC ART mmol/L -7.5   O2 CONTENT ART mL/dL 16.1   O2 HGB, ARTERIAL % 94.0   ABG SOURCE  Radial, Right   MERCEDEZ TEST  Yes   NON VENT ROOM AIR % 21       Imaging and other studies: Results Review Statement: I personally reviewed the following image studies/reports in PACS and discussed pertinent findings with Radiology: chest xray. My interpretation of the radiology images/reports is: SEE REPORT .    O2 Device: " RA     Plan    Respiratory Plan: (P) Discontinue Protocol        Resp Comments: (P) WOKE PT FOR TXC WILLDC TXS AFTER NO INDICATION FOR, PT HJAS NO PULM  HX NO PULM MEDS AT HOME WILL DC

## 2025-03-05 NOTE — ASSESSMENT & PLAN NOTE
Reported diarrhea prehospital and briefly following admission.  Diarrhea now resolved.  C. difficile PCR positive, EIA negative.  Patient denies prior history of C. difficile.  No antibiotic exposures prior to admission.  Primary prophylaxis has not been shown to be beneficial.   Monitor stool output

## 2025-03-06 PROBLEM — D64.9 CHRONIC ANEMIA: Status: ACTIVE | Noted: 2025-03-06

## 2025-03-06 PROBLEM — B95.5 STREPTOCOCCAL BACTEREMIA: Status: ACTIVE | Noted: 2025-03-06

## 2025-03-06 PROBLEM — E83.42 HYPOMAGNESEMIA: Status: ACTIVE | Noted: 2025-03-01

## 2025-03-06 PROBLEM — R78.81 BACTEREMIA: Status: RESOLVED | Noted: 2025-02-27 | Resolved: 2025-03-06

## 2025-03-06 PROBLEM — R78.81 STREPTOCOCCAL BACTEREMIA: Status: ACTIVE | Noted: 2025-03-06

## 2025-03-06 LAB
ANION GAP SERPL CALCULATED.3IONS-SCNC: 9 MMOL/L (ref 4–13)
BASOPHILS # BLD AUTO: 0.16 THOUSANDS/ÂΜL (ref 0–0.1)
BASOPHILS NFR BLD AUTO: 1 % (ref 0–1)
BUN SERPL-MCNC: 57 MG/DL (ref 5–25)
CALCIUM SERPL-MCNC: 7.8 MG/DL (ref 8.4–10.2)
CHLORIDE SERPL-SCNC: 100 MMOL/L (ref 96–108)
CO2 SERPL-SCNC: 20 MMOL/L (ref 21–32)
CREAT SERPL-MCNC: 3.49 MG/DL (ref 0.6–1.3)
EOSINOPHIL # BLD AUTO: 0.34 THOUSAND/ÂΜL (ref 0–0.61)
EOSINOPHIL NFR BLD AUTO: 2 % (ref 0–6)
ERYTHROCYTE [DISTWIDTH] IN BLOOD BY AUTOMATED COUNT: 14.5 % (ref 11.6–15.1)
GFR SERPL CREATININE-BSD FRML MDRD: 16 ML/MIN/1.73SQ M
GLUCOSE SERPL-MCNC: 249 MG/DL (ref 65–140)
GLUCOSE SERPL-MCNC: 252 MG/DL (ref 65–140)
GLUCOSE SERPL-MCNC: 261 MG/DL (ref 65–140)
GLUCOSE SERPL-MCNC: 267 MG/DL (ref 65–140)
GLUCOSE SERPL-MCNC: 299 MG/DL (ref 65–140)
HCT VFR BLD AUTO: 31.3 % (ref 36.5–49.3)
HGB BLD-MCNC: 10.6 G/DL (ref 12–17)
IMM GRANULOCYTES # BLD AUTO: >0.5 THOUSAND/UL (ref 0–0.2)
IMM GRANULOCYTES NFR BLD AUTO: 9 % (ref 0–2)
LYMPHOCYTES # BLD AUTO: 2.83 THOUSANDS/ÂΜL (ref 0.6–4.47)
LYMPHOCYTES NFR BLD AUTO: 13 % (ref 14–44)
MAGNESIUM SERPL-MCNC: 1.8 MG/DL (ref 1.9–2.7)
MCH RBC QN AUTO: 31.8 PG (ref 26.8–34.3)
MCHC RBC AUTO-ENTMCNC: 33.9 G/DL (ref 31.4–37.4)
MCV RBC AUTO: 94 FL (ref 82–98)
MONOCYTES # BLD AUTO: 1.61 THOUSAND/ÂΜL (ref 0.17–1.22)
MONOCYTES NFR BLD AUTO: 7 % (ref 4–12)
NEUTROPHILS # BLD AUTO: 15.36 THOUSANDS/ÂΜL (ref 1.85–7.62)
NEUTS SEG NFR BLD AUTO: 68 % (ref 43–75)
NRBC BLD AUTO-RTO: 0 /100 WBCS
PLATELET # BLD AUTO: 431 THOUSANDS/UL (ref 149–390)
PMV BLD AUTO: 8.4 FL (ref 8.9–12.7)
POTASSIUM SERPL-SCNC: 4.1 MMOL/L (ref 3.5–5.3)
RBC # BLD AUTO: 3.33 MILLION/UL (ref 3.88–5.62)
SODIUM SERPL-SCNC: 129 MMOL/L (ref 135–147)
WBC # BLD AUTO: 22.38 THOUSAND/UL (ref 4.31–10.16)

## 2025-03-06 PROCEDURE — 99232 SBSQ HOSP IP/OBS MODERATE 35: CPT | Performed by: INTERNAL MEDICINE

## 2025-03-06 PROCEDURE — 82948 REAGENT STRIP/BLOOD GLUCOSE: CPT

## 2025-03-06 PROCEDURE — 83735 ASSAY OF MAGNESIUM: CPT | Performed by: NURSE PRACTITIONER

## 2025-03-06 PROCEDURE — 85027 COMPLETE CBC AUTOMATED: CPT | Performed by: NURSE PRACTITIONER

## 2025-03-06 PROCEDURE — 97116 GAIT TRAINING THERAPY: CPT

## 2025-03-06 PROCEDURE — 80048 BASIC METABOLIC PNL TOTAL CA: CPT | Performed by: NURSE PRACTITIONER

## 2025-03-06 PROCEDURE — 97110 THERAPEUTIC EXERCISES: CPT

## 2025-03-06 PROCEDURE — G0545 PR INHERENT VISIT TO INPT: HCPCS | Performed by: INTERNAL MEDICINE

## 2025-03-06 RX ORDER — CEFADROXIL 1000 MG/1
1000 TABLET ORAL EVERY 12 HOURS SCHEDULED
Status: DISCONTINUED | OUTPATIENT
Start: 2025-03-06 | End: 2025-03-06

## 2025-03-06 RX ORDER — MAGNESIUM SULFATE HEPTAHYDRATE 40 MG/ML
2 INJECTION, SOLUTION INTRAVENOUS ONCE
Status: COMPLETED | OUTPATIENT
Start: 2025-03-06 | End: 2025-03-06

## 2025-03-06 RX ORDER — TAMSULOSIN HYDROCHLORIDE 0.4 MG/1
0.4 CAPSULE ORAL
Status: DISCONTINUED | OUTPATIENT
Start: 2025-03-06 | End: 2025-03-11 | Stop reason: HOSPADM

## 2025-03-06 RX ORDER — FUROSEMIDE 10 MG/ML
40 INJECTION INTRAMUSCULAR; INTRAVENOUS ONCE
Status: COMPLETED | OUTPATIENT
Start: 2025-03-06 | End: 2025-03-06

## 2025-03-06 RX ORDER — LABETALOL HYDROCHLORIDE 5 MG/ML
10 INJECTION, SOLUTION INTRAVENOUS EVERY 4 HOURS PRN
Status: DISCONTINUED | OUTPATIENT
Start: 2025-03-06 | End: 2025-03-11 | Stop reason: HOSPADM

## 2025-03-06 RX ORDER — LOSARTAN POTASSIUM 50 MG/1
100 TABLET ORAL DAILY
Status: DISCONTINUED | OUTPATIENT
Start: 2025-03-07 | End: 2025-03-06

## 2025-03-06 RX ORDER — CEFADROXIL 500 MG/1
1000 CAPSULE ORAL EVERY 12 HOURS SCHEDULED
Status: DISCONTINUED | OUTPATIENT
Start: 2025-03-06 | End: 2025-03-11 | Stop reason: HOSPADM

## 2025-03-06 RX ORDER — AMLODIPINE BESYLATE 10 MG/1
10 TABLET ORAL DAILY
Status: DISCONTINUED | OUTPATIENT
Start: 2025-03-07 | End: 2025-03-11 | Stop reason: HOSPADM

## 2025-03-06 RX ADMIN — HEPARIN SODIUM 7500 UNITS: 5000 INJECTION INTRAVENOUS; SUBCUTANEOUS at 21:14

## 2025-03-06 RX ADMIN — GUAIFENESIN 600 MG: 600 TABLET ORAL at 10:04

## 2025-03-06 RX ADMIN — CEFAZOLIN SODIUM 1000 MG: 1 SOLUTION INTRAVENOUS at 02:47

## 2025-03-06 RX ADMIN — HEPARIN SODIUM 7500 UNITS: 5000 INJECTION INTRAVENOUS; SUBCUTANEOUS at 15:39

## 2025-03-06 RX ADMIN — TRISODIUM CITRATE DIHYDRATE AND CITRIC ACID MONOHYDRATE 30 ML: 500; 334 SOLUTION ORAL at 21:15

## 2025-03-06 RX ADMIN — GABAPENTIN 200 MG: 100 CAPSULE ORAL at 21:14

## 2025-03-06 RX ADMIN — CHLORHEXIDINE GLUCONATE 15 ML: 1.2 SOLUTION ORAL at 10:09

## 2025-03-06 RX ADMIN — Medication 6 MG: at 21:14

## 2025-03-06 RX ADMIN — AZELASTINE HYDROCHLORIDE 1 SPRAY: 137 SPRAY, METERED NASAL at 17:38

## 2025-03-06 RX ADMIN — HEPARIN SODIUM 7500 UNITS: 5000 INJECTION INTRAVENOUS; SUBCUTANEOUS at 05:43

## 2025-03-06 RX ADMIN — AZELASTINE HYDROCHLORIDE 1 SPRAY: 137 SPRAY, METERED NASAL at 10:03

## 2025-03-06 RX ADMIN — TAMSULOSIN HYDROCHLORIDE 0.4 MG: 0.4 CAPSULE ORAL at 17:31

## 2025-03-06 RX ADMIN — INSULIN ASPART 20 UNITS: 100 INJECTION, SUSPENSION SUBCUTANEOUS at 10:00

## 2025-03-06 RX ADMIN — INSULIN LISPRO 2 UNITS: 100 INJECTION, SOLUTION INTRAVENOUS; SUBCUTANEOUS at 08:33

## 2025-03-06 RX ADMIN — INSULIN ASPART 20 UNITS: 100 INJECTION, SUSPENSION SUBCUTANEOUS at 17:36

## 2025-03-06 RX ADMIN — METOPROLOL TARTRATE 50 MG: 50 TABLET, FILM COATED ORAL at 21:14

## 2025-03-06 RX ADMIN — MAGNESIUM SULFATE HEPTAHYDRATE 2 G: 40 INJECTION, SOLUTION INTRAVENOUS at 10:10

## 2025-03-06 RX ADMIN — THIAMINE HCL TAB 100 MG 100 MG: 100 TAB at 10:04

## 2025-03-06 RX ADMIN — FUROSEMIDE 40 MG: 10 INJECTION, SOLUTION INTRAMUSCULAR; INTRAVENOUS at 11:59

## 2025-03-06 RX ADMIN — ATORVASTATIN CALCIUM 40 MG: 40 TABLET, FILM COATED ORAL at 17:31

## 2025-03-06 RX ADMIN — FOLIC ACID 1 MG: 1 TABLET ORAL at 10:04

## 2025-03-06 RX ADMIN — INSULIN LISPRO 3 UNITS: 100 INJECTION, SOLUTION INTRAVENOUS; SUBCUTANEOUS at 21:28

## 2025-03-06 RX ADMIN — TRISODIUM CITRATE DIHYDRATE AND CITRIC ACID MONOHYDRATE 30 ML: 500; 334 SOLUTION ORAL at 10:07

## 2025-03-06 RX ADMIN — CHOLECALCIFEROL TAB 25 MCG (1000 UNIT) 2000 UNITS: 25 TAB at 10:04

## 2025-03-06 RX ADMIN — INSULIN LISPRO 2 UNITS: 100 INJECTION, SOLUTION INTRAVENOUS; SUBCUTANEOUS at 12:01

## 2025-03-06 RX ADMIN — CHLORHEXIDINE GLUCONATE 15 ML: 1.2 SOLUTION ORAL at 21:15

## 2025-03-06 RX ADMIN — CEFAZOLIN SODIUM 1000 MG: 1 SOLUTION INTRAVENOUS at 10:14

## 2025-03-06 RX ADMIN — GUAIFENESIN 600 MG: 600 TABLET ORAL at 21:14

## 2025-03-06 RX ADMIN — Medication 400 UNITS: at 10:04

## 2025-03-06 RX ADMIN — INSULIN LISPRO 2 UNITS: 100 INJECTION, SOLUTION INTRAVENOUS; SUBCUTANEOUS at 17:35

## 2025-03-06 RX ADMIN — CEFADROXIL 1000 MG: 500 CAPSULE ORAL at 19:47

## 2025-03-06 RX ADMIN — ASPIRIN 81 MG: 81 TABLET, COATED ORAL at 10:04

## 2025-03-06 RX ADMIN — METOPROLOL TARTRATE 50 MG: 50 TABLET, FILM COATED ORAL at 10:04

## 2025-03-06 NOTE — ASSESSMENT & PLAN NOTE
Lab Results   Component Value Date    HGBA1C 8.9 (H) 02/26/2025     Transferred out of ICU with transition off insulin drip to a basal 70/30 BID regimen with additional SSI coverage per Accu-Cheks  Encourage compliance to home insulin regimen  Hypoglycemia protocol  Carbohydrate restriction  Monitor/replete any potassium/phosphate derangements, as necessary

## 2025-03-06 NOTE — ASSESSMENT & PLAN NOTE
Lab Results   Component Value Date    EGFR 16 03/06/2025    EGFR 16 03/05/2025    EGFR 15 03/05/2025    CREATININE 3.49 (H) 03/06/2025    CREATININE 3.60 (H) 03/05/2025    CREATININE 3.69 (H) 03/05/2025

## 2025-03-06 NOTE — ASSESSMENT & PLAN NOTE
Lab Results   Component Value Date    HGBA1C 8.9 (H) 02/26/2025       Recent Labs     03/05/25  1116 03/05/25  1640 03/05/25  2109 03/06/25  0709   POCGLU 209* 270* 281* 249*       Blood Sugar Average: Last 72 hrs:  (P) [id number]  Off insulin gtt, management per primary team.

## 2025-03-06 NOTE — ASSESSMENT & PLAN NOTE
Initially presented with fever, leukocytosis, tachypnea, with lactic acidosis in the setting of streptococcal bacteremia secondary to lower extremity cellulitis  Hemodynamically stabilized and transitioned to the medical floor  See plan for bacteremia/cellulitis below

## 2025-03-06 NOTE — ASSESSMENT & PLAN NOTE
Na dropped to 129 today, will tighten FR to 1.5 L and give another dose of IV lasix for hypervolemic hyponatremia.

## 2025-03-06 NOTE — ASSESSMENT & PLAN NOTE
Baseline creatinine of approximately 1.1-1.3 -> elevated but improved at 3.49 from a prior 4.34 peak  Nephrology following -> plan for diuretic (Lasix) trial today  Monitor renal function and urine output  Limit/avoid nephrotoxins and hypotension as possible - holding ARB (Cozaar)

## 2025-03-06 NOTE — ASSESSMENT & PLAN NOTE
Assessment:  Urinary retention secondary to BPH and current acute illness with decreased mobility    Plan:  Start tamsulosin.  Continue Ramirez catheter until medically improved and mobility increases.  I will continue to follow along with you for timing of voiding trial.

## 2025-03-06 NOTE — CONSULTS
Consultation - Urology   Name: Nadeem Purdy Jr. 69 y.o. male I MRN: [id number]  Unit/Bed#: -01 I Date of Admission: 2/26/2025   Date of Service: 3/6/2025 I Hospital Day: 8   Inpatient consult to Urology  Consult performed by: Jonny Miller MD  Consult ordered by: JIMMY Huff        Physician Requesting Evaluation: Kiki Villarreal MD   Reason for Evaluation / Principal Problem: Urinary retention    Assessment & Plan  Diabetic ketoacidosis without coma associated with type 2 diabetes mellitus (HCC)  Lab Results   Component Value Date    HGBA1C 8.9 (H) 02/26/2025       Recent Labs     03/05/25  1116 03/05/25  1640 03/05/25  2109 03/06/25  0709   POCGLU 209* 270* 281* 249*       Blood Sugar Average: Last 72 hrs:  (P) [id number]    Essential hypertension    Hypercholesteremia    Class 3 severe obesity due to excess calories with serious comorbidity and body mass index (BMI) of 40.0 to 44.9 in adult ([facility])    Lymphedema of both lower extremities    Cellulitis of lower extremity    Acute renal failure superimposed on stage 3a chronic kidney disease ([facility])  Lab Results   Component Value Date    EGFR 16 03/06/2025    EGFR 16 03/05/2025    EGFR 15 03/05/2025    CREATININE 3.49 (H) 03/06/2025    CREATININE 3.60 (H) 03/05/2025    CREATININE 3.69 (H) 03/05/2025     Severe sepsis ([facility])    Alcohol abuse    Group A strep bacteremia    Hyponatremia    Metabolic acidosis    Urinary retention  Assessment:  Urinary retention secondary to BPH and current acute illness with decreased mobility    Plan:  Start tamsulosin.  Continue Ramirez catheter until medically improved and mobility increases.  I will continue to follow along with you for timing of voiding trial.      Atrial fibrillation with RVR ([facility])    Acute respiratory insufficiency    Diarrhea    I have discussed the above management plan in detail with the primary service.     History of Present Illness   Nadeem Purdy Jr. is a 69 y.o. male who  presents with DKA and sepsis from left lower extremity cellulitis, initially presented to ICU.  Transferred to [facility] yesterday.  Initial Ramirez catheter was removed, but he failed voiding trial and Ramirez was reinserted.  The patient denies any difficulty voiding prior to admission.  He has baseline nocturia x 1.  Good urinary flow.  No history of urinary tract infection or kidney stones.  CT scan without contrast showed no significant urinary tract abnormality.    Review of Systems  Historical Information   Past Medical History:   Diagnosis Date    Clostridium difficile diarrhea 02/27/2025    Elevated troponin 02/27/2025    Hypertension      Past Surgical History:   Procedure Laterality Date    CATARACT EXTRACTION, BILATERAL Bilateral      Social History     Tobacco Use    Smoking status: Former    Smokeless tobacco: Never   Vaping Use    Vaping status: Never Used   Substance and Sexual Activity    Alcohol use: Yes     Alcohol/week: 4.0 standard drinks of alcohol     Types: 4 Shots of liquor per week    Drug use: Never    Sexual activity: Not on file     E-Cigarette/Vaping    E-Cigarette Use Never User      E-Cigarette/Vaping Substances    Nicotine No     THC No     CBD No     Flavoring No     Other No     Unknown No      Family history non-contributory  Social History     Tobacco Use    Smoking status: Former    Smokeless tobacco: Never   Vaping Use    Vaping status: Never Used   Substance and Sexual Activity    Alcohol use: Yes     Alcohol/week: 4.0 standard drinks of alcohol     Types: 4 Shots of liquor per week    Drug use: Never    Sexual activity: Not on file       Current Facility-Administered Medications:     acetaminophen (TYLENOL) tablet 650 mg, Q6H PRN    albuterol inhalation solution 2.5 mg, Q6H PRN    aspirin (ECOTRIN LOW STRENGTH) EC tablet 81 mg, Daily    atorvastatin (LIPITOR) tablet 40 mg, Daily With Dinner    azelastine (ASTELIN) 0.1 % nasal spray 1 spray, BID    ceFAZolin (ANCEF) IVPB (premix in  dextrose) 1,000 mg 50 mL, Q8H, Last Rate: 1,000 mg (25 0247)    chlorhexidine (PERIDEX) 0.12 % oral rinse 15 mL, Q12H MARGARITA    Cholecalciferol (VITAMIN D3) tablet 2,000 Units, Daily    folic acid (FOLVITE) tablet 1 mg, Daily    gabapentin (NEURONTIN) capsule 200 mg, HS    guaiFENesin (MUCINEX) 12 hr tablet 600 mg, Q12H MARGARITA    heparin (porcine) subcutaneous injection 7,500 Units, Q8H MARGARITA    hydrALAZINE (APRESOLINE) injection 10 mg, Q6H PRN    insulin aspart protamine-insulin aspart (NovoLOG 70/30) 100 units/mL subcutaneous injection 20 Units, BID AC    insulin lispro (HumALOG/ADMELOG) 100 units/mL subcutaneous injection 1-5 Units, TID AC **AND** Fingerstick Glucose (POCT), TID AC    insulin lispro (HumALOG/ADMELOG) 100 units/mL subcutaneous injection 1-5 Units, HS    magnesium sulfate 2 g/50 mL IVPB (premix) 2 g, Once    melatonin tablet 6 mg, HS    metoprolol tartrate (LOPRESSOR) tablet 50 mg, Q12H MARGARITA    sod citrate-citric acid (BICITRA) oral solution 30 mL, BID    tamsulosin (FLOMAX) capsule 0.4 mg, Daily With Dinner    thiamine tablet 100 mg, Daily    vitamin E (TOCOPHEROL) capsule 400 Units, Daily  Prior to Admission Medications   Prescriptions Last Dose Informant Patient Reported? Taking?   Azelastine HCl 137 MCG/SPRAY SOLN   No No   Si puff each nostril twice a day   BD Pen Needle Katelyn 2nd Gen 32G X 4 MM MISC   No No   Sig: INJECT AS DIRECTED 2 (TWO) TIMES A DAY   Cholecalciferol (VITAMIN D3) 2000 units TABS 2025 Self Yes Yes   Sig: Take 2,000 Units by mouth daily   Glucose Blood (ONETOUCH ULTRA BLUE VI)  Self Yes No   Si Units by In Vitro route 3 (three) times a day   Krill Oil 1000 MG CAPS 2025 Self Yes Yes   Sig: Take 1 capsule by mouth daily   amLODIPine (NORVASC) 10 mg tablet 2025  No Yes   Sig: Take 1 tablet (10 mg total) by mouth daily   aspirin (ECOTRIN LOW STRENGTH) 81 mg EC tablet 2025 Self Yes Yes   Sig: Take 81 mg by mouth daily   atorvastatin (LIPITOR) 40 mg tablet  2/26/2025  No Yes   Sig: Take 1 tablet (40 mg total) by mouth daily   furosemide (LASIX) 40 mg tablet 2/26/2025  No Yes   Sig: Take 1 tablet (40 mg total) by mouth daily   gabapentin (NEURONTIN) 100 mg capsule 2/25/2025  No Yes   Sig: Take 2 capsules (200 mg total) by mouth daily at bedtime   glucose blood (OneTouch Ultra) test strip  Self No No   Sig: Test 3 times daily   insulin isophane-insulin regular (HumuLIN 70/30 KwikPen) 100 units/mL injection pen 2/26/2025  No Yes   Sig: Inject 20 Units under the skin 2 (two) times a day   losartan (COZAAR) 100 MG tablet 2/26/2025  No Yes   Sig: TAKE 1 TABLET BY MOUTH EVERY DAY   metoprolol succinate (TOPROL-XL) 100 mg 24 hr tablet 2/26/2025  No Yes   Sig: Take 1 tablet (100 mg total) by mouth daily   potassium chloride (Klor-Con M20) 20 mEq tablet 2/26/2025  No Yes   Sig: Take 1 tablet (20 mEq total) by mouth daily   vitamin E, tocopherol, 400 units capsule 2/26/2025 Self Yes Yes   Sig: Take 400 Units by mouth daily      Facility-Administered Medications: None     Patient has no known allergies.    Objective :  Temp:  [97.7 °F (36.5 °C)-99.7 °F (37.6 °C)] 98.9 °F (37.2 °C)  HR:  [] 95  BP: (138-171)/(74-83) 161/80  Resp:  [19-32] 32  SpO2:  [93 %-99 %] 95 %  O2 Device: None (Room air)    I/O         03/04 0701  03/05 0700 03/05 0701  03/06 0700 03/06 0701  03/07 0700    P.O. 720 680 420    I.V. (mL/kg) 1633.7 (11.6) 19.2 (0.1)     IV Piggyback 120      Total Intake(mL/kg) 2473.7 (17.5) 699.2 (5) 420 (3)    Urine (mL/kg/hr) 3150 (0.9) 3575 (1.1)     Stool 0 0     Total Output 3150 3575     Net -676.3 -2875.8 +420           Unmeasured Stool Occurrence 1 x 2 x           Lines/Drains/Airways       Active Status       Name Placement date Placement time Site Days    Urethral Catheter Latex 18 Fr. 03/01/25 2130  Latex  4                  Physical Exam normal penis with mild foreskin and scrotal edema with normal testicles bilaterally.  Ramirez catheter in place draining  "clear yellow urine.  ДМИТРИЙ deferred as patient is having trouble turning from side-to-side due to lower extremity cellulitis.      Lab Results: I have reviewed the following results:CBC/BMP:   .     03/06/25  0456   WBC 22.38*   HGB 10.6*   HCT 31.3*   *   SODIUM 129*   K 4.1      CO2 20*   BUN 57*   CREATININE 3.49*   GLUC 252*   MG 1.8*    , Urinalysis: No results found for: \"COLORU\", \"CLARITYU\", \"SPECGRAV\", \"PHUR\", \"LEUKOCYTESUR\", \"NITRITE\", \"PROTEINUA\", \"GLUCOSEU\", \"KETONESU\", \"BILIRUBINUR\", \"BLOODU\"  Recent Labs     03/04/25  0410 03/04/25  0621 03/04/25  1214 03/04/25  1818 03/05/25  0459 03/05/25 2042 03/06/25  0456   WBC 26.37*  --   --   --  25.47*  --  22.38*   HGB 9.8*  --   --   --  10.0*  --  10.6*   HCT 29.1*  --   --   --  29.9*  --  31.3*   *  --   --   --  444*  --  431*   BANDSPCT 7  --   --   --   --   --   --    SODIUM 129*  --   --    < > 132*   < > 129*   K 3.9  --   --    < > 3.6   < > 4.1     --   --    < > 103   < > 100   CO2 16*  --   --    < > 20*   < > 20*   BUN 70*  --   --    < > 60*   < > 57*   CREATININE 4.17*  --   --    < > 3.69*   < > 3.49*   GLUC 171*  --   --    < > 110   < > 252*   MG 2.0  --   --   --  1.9  --  1.8*   PHOS 3.5  --   --   --  3.7  --   --    AST 54*  --   --   --  60*  --   --    ALT 56*  --   --   --  43  --   --    ALB 2.8*  --   --   --  2.5*  --   --    TBILI 0.45  --   --   --  0.38  --   --    ALKPHOS 40  --   --   --  38  --   --    PTT  --    < > 67*  --   --   --   --     < > = values in this interval not displayed.     Lab Results   Component Value Date    COLORU Yellow 02/26/2025    CLARITYU Clear 02/26/2025    SPECGRAV 1.025 02/26/2025    PHUR 6.0 02/26/2025    LEUKOCYTESUR Negative 02/26/2025    NITRITE Negative 02/26/2025    GLUCOSEU 3+ (A) 02/26/2025    KETONESU 15 (1+) (A) 02/26/2025    BILIRUBINUR 1+ (A) 02/26/2025    BLOODU 3+ (A) 02/26/2025   ,   No results found for: \"URINECX\"    Imaging Results Review: I reviewed " radiology reports from this admission including: CT abdomen/pelvis.  Other Study Results Review: No additional pertinent studies reviewed.    VTE Prophylaxis: Sequential compression device (Venodyne)     Administrative Statements   I have spent a total time of 60 minutes in caring for this patient on the day of the visit/encounter including Prognosis, Risks and benefits of tx options, Instructions for management, Patient and family education, Importance of tx compliance, Impressions, Counseling / Coordination of care, Documenting in the medical record, Reviewing/placing orders in the medical record (including tests, medications, and/or procedures), Obtaining or reviewing history  , and Communicating with other healthcare professionals .

## 2025-03-06 NOTE — ASSESSMENT & PLAN NOTE
Lab Results   Component Value Date    EGFR 16 03/06/2025    EGFR 16 03/05/2025    EGFR 15 03/05/2025    CREATININE 3.49 (H) 03/06/2025    CREATININE 3.60 (H) 03/05/2025    CREATININE 3.69 (H) 03/05/2025   Baseline creatinine appears to be 1.1-1.3 mg/dL since 2022; not under care of nephrology. 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  Grade A2 albuminuria, no RBCs/UA bland when checked in steady state.  Kidneys without obstruction on unenhanced imaging.  Now with acute kidney injury creatinine unchanged high threes, low fours.      Suspect etiology ATN in setting of severe sepsis, volume and hemodynamic perturbations (s/p crystalloid/colloid/diuretic), vasomotor dysfunction due to ARB, urinary retention status post Ramirez.  CK minimally elevated upon presentation and likely noncontributory to LUIS ALFREDO.  UA significant for glucosuria, 2+ protein, 1-2 RBC, 1-2 coarse granular casts.    3/6  Peak creatinine was 4.3 mg/dL on 3/2 and now improving to 3.5 mg/dL. Was given 40 mg IV lasix yesterday. Appears volume overloaded still based off leg swelling. UOP was 3.5 L in 24 hours and overall net negative 2.8 L yesterday but net positive since admission. Will plan to give another dose of IV lasix 40 mg today.

## 2025-03-06 NOTE — PROGRESS NOTES
Progress Note - Infectious Disease   Name: Nadeem Purdy Jr. 69 y.o. male I MRN: [id number]  Unit/Bed#: -01 I Date of Admission: 2/26/2025   Date of Service: 3/6/2025 I Hospital Day: 8    Assessment & Plan  Severe sepsis (HCC)  E/b fever, tachycardia, tachypnea, leukocytosis with lactic acidosis.  Patient presented with DKA.  Sepsis most likely due to bacteremia in the setting of left lower extremity cellulitis.  Imaging on admission also showed concern for diarrheal illness however unlikely primary etiology for septic presentation.  1 of 2 blood cultures on admission positive for group A strep.  Lower extremity cellulitis clinical findings suspicious of streptococcal infection and this is confirmed with wound culture.  Patient has been on various antibiotics since admission.  Fortunately he remains hemodynamically stable off pressors.  Continue antibiotics as below  Follow-up repeat blood cultures for clearance  Trend temps and hemodynamics  Daily CBC DIF and chemistry to monitor response to treatment  Group A strep bacteremia  1 of 2 sets of admission blood cultures isolated strep pyogenes.  This is most likely due to extensive left lower extremity cellulitis with chronic bilateral lower extremity nonhealing wounds.  Wound culture isolated group A strep and MSSA.  Fortunately patient has no intravascular or prosthetic devices.  Transthoracic echo, adequate study, without significant valvular disease or obvious vegetations. Repeat blood cultures so far negative.   Transition renally dosed IV cefazolin to PO cefadroxil 1g q12h to complete 2 week course for clearance through 3/15/25   Continue to follow-up repeat blood cultures  Cellulitis of lower extremity  Patient presented with DKA and chronic bilateral lower extremity wounds, nonhealing with concern for bilateral cellulitis.  This is in the setting of uncontrolled bilateral lower extremity lymphedema.  On exam and on exam and after review of clinical media  suspect unilateral cellulitis of the left lower extremity.  This has become quite extensive over the course of his hospitalization so far.  Wound culture polymicrobial with strep pyogenes and MSSA.  Clinical findings typical of streptococcal cellulitis.  Surgery and wound care following.  No evidence of soft tissue gas or abscess on CT of the left lower extremity.  Continue antibiotics as above  Close wound care and surgery follow-up ongoing  Serial skin exams and local wound care  Encourage lower extremity elevation, compression  Diarrhea  Reported diarrhea prehospital and briefly following admission.  Diarrhea now resolved.  C. difficile PCR positive, EIA negative.  Patient denies prior history of C. difficile.  No antibiotic exposures prior to admission.  Primary prophylaxis has not been shown to be beneficial.   Monitor stool output  Diabetic ketoacidosis without coma associated with type 2 diabetes mellitus (HCC)  Ha1c 8.9 percent.  Presented in DKA.  Status post aggressive volume resuscitation and DKA protocol.  Remains on insulin drip. Endocrinology is following.  Recommend tight glycemic control  Acute renal failure superimposed on stage 3a chronic kidney disease (HCC)  LUIS ALFREDO superimposed on CKD.  Baseline creatinine 1.1-1.3.  Likely ischemic ATN in the setting of severe sepsis and hemodynamic instability, complicated by vasomotor dysfunction from ARB and urinary retention status post Ramirez placement.  Nephrology is following.  Minimal improvement since admission.No sxs of uremia and he appears euvolemic.  Will renally dose antibiotics  Ongoing nephrology follow up   Lymphedema of both lower extremities  History of bilateral lower extremity lymphedema with chronic wounds.  Remains a risk factor for infection, poor wound healing, and recurrent admissions for cellulitis.    Encourage compliance with lower extremity elevation, compression, diuresis.  Class 3 severe obesity due to excess calories with serious  comorbidity and body mass index (BMI) of 40.0 to 44.9 in adult (HCC)  BMI greater than 40.  Recommend lifestyle modifications.  Remains a risk factor for infection.  Alcohol abuse  Admits to consumption of 1 to 2 glasses of bourbon a day.  Reports his last beverage was about 4 weeks ago.  Remains on CIWA protocol and supplemented with folic acid and thiamine.  Urinary retention  Ramirez catheter was discontinued however reinserted due to urinary retention.  Atrial fibrillation with RVR ([facility])  New onset likely precipitated by acute illness, sepsis and renal failure.  Ongoing cardiac management per ICU. Converted to NSR with amiodarone infusion.     I have discussed the above management plan in detail with the primary service.     Infectious Disease service signing off.    Antibiotics:  D1 PO cefadroxil     Subjective   Patient has no fever, chills, sweats; no nausea, vomiting, diarrhea; no cough, shortness of breath; some LLE pain. No new symptoms.    Objective :  Temp:  [97.7 °F (36.5 °C)-99.7 °F (37.6 °C)] 98.9 °F (37.2 °C)  HR:  [] 95  BP: (138-171)/(68-83) 161/80  Resp:  [19-32] 32  SpO2:  [93 %-99 %] 95 %  O2 Device: None (Room air)    General:  No acute distress, sitting in bedside recliner, chronically ill-appearing   Psychiatric:  Awake and alert, answers questions minimally, affect is flat  HEENT: Mucous membranes dry, neck appears supple, head normal cephalic  Pulmonary: On room air, no dyspnea  Cardiovascular: RRR  Abdomen:  Soft, nontender, obese  Extremities: Bilateral lower extremity lymphedema  Skin:  No rashes though extensive cellulitis with blistering noted to the left lower extremity from ankle to upper thigh.  Clinical media reviewed. Dry dressings intact with mild streaking yellowish drainage noted medially LLE.   : Ramirez patent      Lab Results: I have reviewed the following results:  Results from last 7 days   Lab Units 03/06/25  0456 03/05/25  0459 03/04/25  0410   WBC Thousand/uL 22.38*  25.47* 26.37*   HEMOGLOBIN g/dL 10.6* 10.0* 9.8*   PLATELETS Thousands/uL 431* 444* 412*     Results from last 7 days   Lab Units 03/06/25  0456 03/05/25  2042 03/05/25  0459 03/04/25  1818 03/04/25  0410 03/03/25  1021 03/03/25  0546   SODIUM mmol/L 129* 129* 132*   < > 129*   < > 130*   POTASSIUM mmol/L 4.1 4.1 3.6   < > 3.9   < > 4.3   CHLORIDE mmol/L 100 101 103   < > 103   < > 105   CO2 mmol/L 20* 19* 20*   < > 16*   < > 16*   BUN mg/dL 57* 59* 60*   < > 70*   < > 74*   CREATININE mg/dL 3.49* 3.60* 3.69*   < > 4.17*   < > 4.32*   EGFR ml/min/1.73sq m 16 16 15   < > 13   < > 12   CALCIUM mg/dL 7.8* 7.7* 7.9*   < > 8.0*   < > 7.8*   AST U/L  --   --  60*  --  54*  --  100*   ALT U/L  --   --  43  --  56*  --  70*   ALK PHOS U/L  --   --  38  --  40  --  45   ALBUMIN g/dL  --   --  2.5*  --  2.8*  --  2.5*    < > = values in this interval not displayed.     Results from last 7 days   Lab Units 03/03/25 0319 03/02/25  2144 02/27/25 2013 02/27/25  1942   BLOOD CULTURE   --  No Growth at 48 hrs.  No Growth at 48 hrs.  --   --    GRAM STAIN RESULT  No Polys or Bacteria seen  --  No polys seen*  Rare Gram positive cocci in pairs*  No Polys or Bacteria seen  --    WOUND CULTURE  Few Colonies of Staphylococcus aureus*  --  2+ Growth of Staphylococcus aureus*  2+ Growth of Streptococcus pyogenes (Group A)*  3+ Growth of  2+ Growth of Staphylococcus aureus*  2+ Growth of Streptococcus pyogenes (Group A)*  --    MRSA CULTURE ONLY   --   --   --  No Methicillin Resistant Staphlyococcus aureus (MRSA) isolated     Results from last 7 days   Lab Units 03/04/25  0410 03/03/25  0546 03/02/25  0539 03/01/25  0519 02/28/25  0518   PROCALCITONIN ng/ml 1.86* 2.99* 5.23* 8.70* 13.97*                 Imaging Results Review: No pertinent imaging studies reviewed.  Other Study Results Review: Other studies reviewed include: micro

## 2025-03-06 NOTE — ASSESSMENT & PLAN NOTE
BP trends elevated in the past 24 hr. Currently on rate control strategy with metoprolol for A fib and losartan remains on hold due to LUIS ALFREDO.

## 2025-03-06 NOTE — CASE MANAGEMENT
Case Management Progress Note    Patient name Nadeem Purdy Jr.  Location [facility] /[facility] - MRN [id number]  : 1955 Date 3/6/2025       LOS (days): 8  Geometric Mean LOS (GMLOS) (days): 4.9  Days to GMLOS:-3.3        OBJECTIVE:        Current admission status: Inpatient  Preferred Pharmacy:   [facility]/pharmacy #[id number] - [place] [address]  [address]  [facility] [address]  Phone: [phone] Fax: [phone]    Primary Care Provider: Jluis Glass MD    Primary Insurance: MEDICARE  Secondary Insurance: [facility]    PROGRESS NOTE:  CM reviewed chart and discussed case in interdisciplinary rounds.  ELOS-48  Getting IV Lasix  Will transition to oral antibiotics  Ashley MEADOWS met with patient , who is alert and oriented x4.  He stated his wife was visiting earlier today and they are choosing [facility] as the disposition plan from the hospital.    Mary sent updated chart notes via [facility] and reserved for disposition

## 2025-03-06 NOTE — PHYSICAL THERAPY NOTE
PT Treatment Note    NAME:  Nadeem Purdy Jr.  DATE: 03/06/25    AGE:   69 y.o.  Mrn:   [id number]  ADMIT DX:  Sinus tachycardia [R00.0]  DKA (diabetic ketoacidosis) (HCC) [E11.10]  LUIS ALFREDO (acute kidney injury) (HCC) [N17.9]  Non compliance w medication regimen [Z91.148]  Visit for wound check [Z51.89]  Bilateral lower extremity edema [R60.0]  Performed at least 2 patient identifiers during session: Name and Epic photo       03/06/25 1405   PT Last Visit   PT Visit Date 03/06/25   Note Type   Note Type Treatment   Pain Assessment   Pain Assessment Tool 0-10   Pain Score No Pain   Restrictions/Precautions   Weight Bearing Precautions Per Order No   Other Precautions Chair Alarm;Bed Alarm;Contact/isolation;Fall Risk  (cates)   General   Chart Reviewed Yes   Family/Caregiver Present No   Cognition   Overall Cognitive Status WFL   Arousal/Participation Alert   Attention Within functional limits   Orientation Level Oriented X4   Memory Within functional limits   Following Commands Follows all commands and directions without difficulty   Subjective   Subjective i want to get up   Bed Mobility   Supine to Sit 3  Moderate assistance   Additional items Assist x 2;Increased time required;LE management;Verbal cues;Bedrails   Additional Comments vc for bedrail utilization, pt able to sit edge of bed for ~ 5 minutes with no lob or assistance needed to maintain posture   Transfers   Sit to Stand 2  Maximal assistance   Additional items Assist x 2;Armrests;Increased time required;Verbal cues;Impulsive   Stand to Sit 3  Moderate assistance   Additional items Assist x 2;Bedrails;Armrests;Increased time required;Impulsive;Verbal cues   Stand pivot 3  Moderate assistance   Additional items Assist x 2;Increased time required;Verbal cues;Impulsive;Armrests  (rw used, pt required verbal cues for proper)   Additional Comments pt denied dizziness with functional mobility   Balance   Static Sitting Fair +   Dynamic Sitting Fair +   Static  Standing Poor +   Dynamic Standing Poor +   Ambulatory Poor   Endurance Deficit   Endurance Deficit Yes   Activity Tolerance   Activity Tolerance Patient limited by fatigue   Medical Staff Made Aware yes cm made aware   Exercises   Glute Sets Sitting;20 reps;Bilateral   Knee AROM Long Arc Quad Sitting;20 reps;Bilateral   Ankle Pumps Sitting;20 reps;Bilateral   Balance training  pt very fatigued at end of session   Assessment   Prognosis Fair   Problem List Decreased strength;Decreased range of motion;Decreased endurance;Impaired balance;Decreased mobility   Assessment Pt seen for PT treatment session this date with interventions consisting of gait training to normalize gait pattern to decrease fall risk and therapeutic exercise to improve strength to improve functional mobility. Pt agreeable to PT treatment session upon arrival, pt found supine in bed, in no apparent distress, A&O x 4, and responsive. Since previous session, pt has made fair progress as evidenced by decreased assistance required with mobility  Barriers during this session include fatigue.  Pt continues to be functioning below baseline level, and remains limited 2* factors listed above and including decreased strength, impaired activity tolerance, impaired  balance, and decreased endurance.  Pt prognosis for achieving goals is fair, pending pt progress with hospitalization/medical status improvements, and indicated by motivated to participate in therapy and ability to follow cues. PT will continue to see pt during current hospitalization in order to address the deficits listed above and provide interventions consistent w/ POC in effort to achieve goals. Current goals and POC remain appropriate, pt continues to have rehab potential  Upon conclusion pt seated OOB in recliner. The patient's AM-PAC Basic Mobility Inpatient Short Form Raw Score is 7.  A Raw score of less than or equal to 16 suggests the patient may benefit from discharge to post-acute  rehabilitation services. Based on patient presentations and impairments, pt would most appropriately benefit from Level 2 resource intensity upon discharge.  Please also refer to the recommendation of the Physical Therapist for safe discharge planning. RN verbalized pt appropriate for PT session.   Goals   Patient Goals to get to the chair   LTG Expiration Date 03/14/25   PT Treatment Day 4   Plan   Treatment/Interventions Functional transfer training;LE strengthening/ROM;Elevations;Therapeutic exercise;Endurance training   Progress Progressing toward goals   PT Frequency 3-5x/wk   Discharge Recommendation   Rehab Resource Intensity Level, PT II (Moderate Resource Intensity)   Equipment Recommended Walker   Walker Package Recommended Wheeled walker   AM-PAC Basic Mobility Inpatient   Turning in Flat Bed Without Bedrails 1   Lying on Back to Sitting on Edge of Flat Bed Without Bedrails 1   Moving Bed to Chair 1   Standing Up From Chair Using Arms 1   Walk in Room 1   Climb 3-5 Stairs With Railing 1   Basic Mobility Inpatient Raw Score 6   [facility] Highest Level Of Mobility   JH-HLM Goal 2: Bed activities/Dependent transfer   JH-HLM Achieved 4: Move to chair/commode          Time In: 1405  Time Out: 1430  Total Treatment Minutes: 25    Heriberto Javed, PT

## 2025-03-06 NOTE — ASSESSMENT & PLAN NOTE
Urology is following for UR continue with management per their recommendations such as initiation of flomax

## 2025-03-06 NOTE — ASSESSMENT & PLAN NOTE
Lab Results   Component Value Date    HGBA1C 8.9 (H) 02/26/2025       Recent Labs     03/05/25  1116 03/05/25  1640 03/05/25  2109 03/06/25  0709   POCGLU 209* 270* 281* 249*       Blood Sugar Average: Last 72 hrs:  (P) [id number]

## 2025-03-06 NOTE — ASSESSMENT & PLAN NOTE
Patient presented with DKA and chronic bilateral lower extremity wounds, nonhealing with concern for bilateral cellulitis.  This is in the setting of uncontrolled bilateral lower extremity lymphedema.  On exam and on exam and after review of clinical media suspect unilateral cellulitis of the left lower extremity.  This has become quite extensive over the course of his hospitalization so far.  Wound culture polymicrobial with strep pyogenes and MSSA.  Clinical findings typical of streptococcal cellulitis.  Surgery and wound care following.  No evidence of soft tissue gas or abscess on CT of the left lower extremity.  Continue antibiotics as above  Close wound care and surgery follow-up ongoing  Serial skin exams and local wound care  Encourage lower extremity elevation, compression

## 2025-03-06 NOTE — PROGRESS NOTES
Patient:    MRN:  [id number]    Kassidy Request ID:  [id number]    Level of care reserved:  Skilled Nursing Facility    Partner Reserved:  [facility] [place], [place] [id number] [phone]    Clinical needs requested:    Geography searched:  20 miles around [id number]    Start of Service:    Request sent:  1:40pm EST on 3/4/2025 by Mary Lofton    Partner reserved:  6:08pm EST on 3/6/2025 by Jackie Villa    Choice list shared:  2:42pm EST on 3/5/2025 by Mary Lofton

## 2025-03-06 NOTE — PLAN OF CARE
Problem: Prexisting or High Potential for Compromised Skin Integrity  Goal: Skin integrity is maintained or improved  Description: INTERVENTIONS:  - Identify patients at risk for skin breakdown  - Assess and monitor skin integrity  - Assess and monitor nutrition and hydration status  - Monitor labs   - Assess for incontinence   - Turn and reposition patient  - Assist with mobility/ambulation  - Relieve pressure over bony prominences  - Avoid friction and shearing  - Provide appropriate hygiene as needed including keeping skin clean and dry  - Evaluate need for skin moisturizer/barrier cream  - Collaborate with interdisciplinary team   - Patient/family teaching  - Consider wound care consult   Outcome: Progressing     Problem: INFECTION - ADULT  Goal: Absence or prevention of progression during hospitalization  Description: INTERVENTIONS:  - Assess and monitor for signs and symptoms of infection  - Monitor lab/diagnostic results  - Monitor all insertion sites, i.e. indwelling lines, tubes, and drains  - Monitor endotracheal if appropriate and nasal secretions for changes in amount and color  - [place] appropriate cooling/warming therapies per order  - Administer medications as ordered  - Instruct and encourage patient and family to use good hand hygiene technique  - Identify and instruct in appropriate isolation precautions for identified infection/condition  Outcome: Progressing     Problem: GENITOURINARY - ADULT  Goal: Maintains or returns to baseline urinary function  Description: INTERVENTIONS:  - Assess urinary function  - Encourage oral fluids to ensure adequate hydration if ordered  - Administer IV fluids as ordered to ensure adequate hydration  - Administer ordered medications as needed  - Offer frequent toileting  - Follow urinary retention protocol if ordered  Outcome: Progressing

## 2025-03-06 NOTE — PLAN OF CARE
Problem: PHYSICAL THERAPY ADULT  Goal: Performs mobility at highest level of function for planned discharge setting.  See evaluation for individualized goals.  Description: Treatment/Interventions: Functional transfer training, Therapeutic exercise, Endurance training, Gait training, Bed mobility, Equipment eval/education  Equipment Recommended: Walker       See flowsheet documentation for full assessment, interventions and recommendations.  Outcome: Progressing  Note: Prognosis: Fair  Problem List: Decreased strength, Decreased range of motion, Decreased endurance, Impaired balance, Decreased mobility  Assessment: Pt seen for PT treatment session this date with interventions consisting of gait training to normalize gait pattern to decrease fall risk and therapeutic exercise to improve strength to improve functional mobility. Pt agreeable to PT treatment session upon arrival, pt found supine in bed, in no apparent distress, A&O x 4, and responsive. Since previous session, pt has made fair progress as evidenced by decreased assistance required with mobility  Barriers during this session include fatigue.  Pt continues to be functioning below baseline level, and remains limited 2* factors listed above and including decreased strength, impaired activity tolerance, impaired  balance, and decreased endurance.  Pt prognosis for achieving goals is fair, pending pt progress with hospitalization/medical status improvements, and indicated by motivated to participate in therapy and ability to follow cues. PT will continue to see pt during current hospitalization in order to address the deficits listed above and provide interventions consistent w/ POC in effort to achieve goals. Current goals and POC remain appropriate, pt continues to have rehab potential  Upon conclusion pt seated OOB in recliner. The patient's AM-PAC Basic Mobility Inpatient Short Form Raw Score is 7.  A Raw score of less than or equal to 16 suggests the  patient may benefit from discharge to post-acute rehabilitation services. Based on patient presentations and impairments, pt would most appropriately benefit from Level 2 resource intensity upon discharge.  Please also refer to the recommendation of the Physical Therapist for safe discharge planning. RN verbalized pt appropriate for PT session.  Barriers to Discharge:  (limited mobility)     Rehab Resource Intensity Level, PT: II (Moderate Resource Intensity)    See flowsheet documentation for full assessment.

## 2025-03-06 NOTE — PLAN OF CARE
Problem: Prexisting or High Potential for Compromised Skin Integrity  Goal: Skin integrity is maintained or improved  Description: INTERVENTIONS:  - Identify patients at risk for skin breakdown  - Assess and monitor skin integrity  - Assess and monitor nutrition and hydration status  - Monitor labs   - Assess for incontinence   - Turn and reposition patient  - Assist with mobility/ambulation  - Relieve pressure over bony prominences  - Avoid friction and shearing  - Provide appropriate hygiene as needed including keeping skin clean and dry  - Evaluate need for skin moisturizer/barrier cream  - Collaborate with interdisciplinary team   - Patient/family teaching  - Consider wound care consult   Outcome: Progressing    Problem: SAFETY ADULT  Goal: Patient will remain free of falls  Description: INTERVENTIONS:  - Educate patient/family on patient safety including physical limitations  - Instruct patient to call for assistance with activity   - Consult OT/PT to assist with strengthening/mobility   - Keep Call bell within reach  - Keep bed low and locked with side rails adjusted as appropriate  - Keep care items and personal belongings within reach  - Initiate and maintain comfort rounds  - Make Fall Risk Sign visible to staff  - Offer Toileting every 2 Hours, in advance of need  - Initiate/Maintain bed alarm  - Obtain necessary fall risk management equipment  - Apply yellow socks and bracelet for high fall risk patients  - Consider moving patient to room near nurses station  Outcome: Progressing    Problem: GASTROINTESTINAL - ADULT  Goal: Minimal or absence of nausea and/or vomiting  Description: INTERVENTIONS:  - Administer IV fluids if ordered to ensure adequate hydration  - Maintain NPO status until nausea and vomiting are resolved  - Nasogastric tube if ordered  - Administer ordered antiemetic medications as needed  - Provide nonpharmacologic comfort measures as appropriate  - Advance diet as tolerated, if ordered  -  Consider nutrition services referral to assist patient with adequate nutrition and appropriate food choices  Outcome: Progressing  Goal: Maintains adequate nutritional intake  Description: INTERVENTIONS:  - Monitor percentage of each meal consumed  - Identify factors contributing to decreased intake, treat as appropriate  - Assist with meals as needed  - Monitor I&O, weight, and lab values if indicated  - Obtain nutrition services referral as needed  Outcome: Progressing  Goal: Maintains or returns to baseline bowel function  Description: INTERVENTIONS:  - Assess bowel function  - Encourage oral fluids to ensure adequate hydration  - Administer IV fluids if ordered to ensure adequate hydration  - Administer ordered medications as needed  - Encourage mobilization and activity  - Consider nutritional services referral to assist patient with adequate nutrition and appropriate food choices  Outcome: Progressing     Problem: GENITOURINARY - ADULT  Goal: Maintains or returns to baseline urinary function  Description: INTERVENTIONS:  - Assess urinary function  - Encourage oral fluids to ensure adequate hydration if ordered  - Administer IV fluids as ordered to ensure adequate hydration  - Administer ordered medications as needed  - Offer frequent toileting  - Follow urinary retention protocol if ordered  Outcome: Progressing  Goal: Urinary catheter remains patent  Description: INTERVENTIONS:  - Assess patency of urinary catheter  - If patient has a chronic cates, consider changing catheter if non-functioning  - Follow guidelines for intermittent irrigation of non-functioning urinary catheter  Outcome: Progressing    Problem: METABOLIC, FLUID AND ELECTROLYTES - ADULT  Goal: Electrolytes maintained within normal limits  Description: INTERVENTIONS:  - Monitor labs and assess patient for signs and symptoms of electrolyte imbalances  - Administer electrolyte replacement as ordered  - Monitor response to electrolyte  replacements, including repeat lab results as appropriate  - Instruct patient on fluid and nutrition as appropriate  Outcome: Progressing  Goal: Fluid balance maintained  Description: INTERVENTIONS:  - Monitor labs   - Monitor I/O and WT  - Instruct patient on fluid and nutrition as appropriate  - Assess for signs & symptoms of volume excess or deficit  Outcome: Progressing  Goal: Glucose maintained within target range  Description: INTERVENTIONS:  - Monitor Blood Glucose as ordered  - Assess for signs and symptoms of hyperglycemia and hypoglycemia  - Administer ordered medications to maintain glucose within target range  - Assess nutritional intake and initiate nutrition service referral as needed  Outcome: Progressing

## 2025-03-06 NOTE — ASSESSMENT & PLAN NOTE
1 of 2 bottles from 2/26 growing Streptococcus pyogenes  IV Ancef now transitioned to oral Duricef through 3/15

## 2025-03-06 NOTE — ASSESSMENT & PLAN NOTE
1 of 2 sets of admission blood cultures isolated strep pyogenes.  This is most likely due to extensive left lower extremity cellulitis with chronic bilateral lower extremity nonhealing wounds.  Wound culture isolated group A strep and MSSA.  Fortunately patient has no intravascular or prosthetic devices.  Transthoracic echo, adequate study, without significant valvular disease or obvious vegetations. Repeat blood cultures so far negative.   Transition renally dosed IV cefazolin to PO cefadroxil 1g q12h to complete 2 week course for clearance through 3/15/25   Continue to follow-up repeat blood cultures

## 2025-03-06 NOTE — PROGRESS NOTES
Progress Note - Hospitalist   Name: Nadeem Purdy Jr. 69 y.o. male I MRN: [id number]  Unit/Bed#: -01 I Date of Admission: 2/26/2025   Date of Service: 3/6/2025 I Hospital Day: 8    Assessment & Plan  Diabetic ketoacidosis without coma associated with type 2 diabetes mellitus (HCC)  Lab Results   Component Value Date    HGBA1C 8.9 (H) 02/26/2025     Transferred out of ICU with transition off insulin drip to a basal 70/30 BID regimen with additional SSI coverage per Accu-Cheks  Encourage compliance to home insulin regimen  Hypoglycemia protocol  Carbohydrate restriction  Monitor/replete any potassium/phosphate derangements, as necessary  Severe sepsis ([facility])  Initially presented with fever, leukocytosis, tachypnea, with lactic acidosis in the setting of streptococcal bacteremia secondary to lower extremity cellulitis  Hemodynamically stabilized and transitioned to the medical floor  See plan for bacteremia/cellulitis below  Streptococcal bacteremia  1 of 2 bottles from 2/26 growing Streptococcus pyogenes  IV Ancef now transitioned to oral Duricef through 3/15  Cellulitis of lower extremity  As stated above, IV Ancef now transitioned to oral Duricef  Likely source of bacteremia  Acute renal failure superimposed on stage 3a chronic kidney disease ([facility])  Baseline creatinine of approximately 1.1-1.3 -> elevated but improved at 3.49 from a prior 4.34 peak  Nephrology following -> plan for diuretic (Lasix) trial today  Monitor renal function and urine output  Limit/avoid nephrotoxins and hypotension as possible - holding ARB (Cozaar)  Hypercholesteremia  Continue statin  Class 3 severe obesity due to excess calories with serious comorbidity and body mass index (BMI) of 40.0 to 44.9 in adult ([facility])  BMI of 43.29  Lifestyle/diet modifications  Lymphedema of both lower extremities  Encourage leg elevation  Plan for Lasix trial by nephrology today  Atrial fibrillation with RVR ([facility])  Rate controlled on Lopressor  Not on  chronic anticoagulation  Urinary retention  Post Ramirez catheter placement  Appreciate urology input ->   Flomax initiated  Alcohol abuse  Cessation counseling  Continue thiamine/folic acid and multivitamin supplementation  Hyponatremia  Serum sodium 129 currently  On fluid restriction  Plan for diuretic trial by nephrology today  Hypomagnesemia  Monitor/replete serum magnesium  Chronic anemia  H/H stable      VTE Pharmacologic Prophylaxis: VTE Score: 5 High Risk (Score >/= 5) - Pharmacological DVT Prophylaxis Ordered: Heparin.     AM-PAC Basic Mobility:  Basic Mobility Inpatient Raw Score: 12  -HLM Goal: 4: Move to chair/commode  JH-HLM Achieved: 2: Bed activities/Dependent transfer  JH-HLM Goal NOT achieved. Continue with multidisciplinary rounding and encourage appropriate mobility to improve upon -HLM goals.    Patient Centered Rounds:  I have performed bedside rounds and discussed plan of care with nursing today.  Discussions with Specialists or Other Care Team Provider:  see above assessments if applicable    Education and Discussions with Family / Patient:  At bedside    Time Spent for Care:  35 minutes. More than 50% of total time spent on counseling and coordination of care, on one or more of the following: performing physical exam; counseling and coordination of care, obtaining or reviewing history, documenting in the medical record, reviewing/ordering tests/medications/procedures, and communicating with other healthcare professionals.    Current Length of Stay: 8 day(s)  Current Patient Status: Inpatient   Certification Statement:  Patient will continue to require additional hospital stay due to assessments as noted above.    Code Status: Level 3 - DNAR and DNI      Subjective     Encountered earlier in the day.  Other than some residual weakness/fatigue, denies new complaints at this time.  States appetite has slightly improved.      Objective     Vitals:   Temp (24hrs), Av.2 °F (37.3 °C),  Min:98.9 °F (37.2 °C), Max:99.7 °F (37.6 °C)    Temp:  [98.9 °F (37.2 °C)-99.7 °F (37.6 °C)] 98.9 °F (37.2 °C)  HR:  [] 101  Resp:  [19-32] 32  BP: (138-171)/(78-82) 171/82  SpO2:  [94 %-97 %] 97 %  Body mass index is 43.29 kg/m².     Input and Output Summary (last 24 hours):       Intake/Output Summary (Last 24 hours) at 3/6/2025 1402  Last data filed at 3/6/2025 0831  Gross per 24 hour   Intake 860 ml   Output 1550 ml   Net -690 ml       Physical Exam:     GENERAL Obese - weak/fatigued   HEAD   Normocephalic - atraumatic   EYES Nonicteric   MOUTH   Mucosa moist   NECK   Supple - full range of motion   CARDIAC Intermittently tachycardic   PULMONARY Fairly clear to auscultation   ABDOMEN Nontender/nondistended   MUSCULOSKELETAL   Motor strength/range of motion mildly deconditioned   NEUROLOGIC   Alert/oriented at baseline   SKIN   Chronic wrinkles/blemishes - distal LLE erythema with areas of blistering   PSYCHIATRIC   Mood/affect stable         Labs & Recent Cultures:  Results from last 7 days   Lab Units 03/06/25  0456 03/05/25 0459 03/04/25  0410   WBC Thousand/uL 22.38*   < > 26.37*   HEMOGLOBIN g/dL 10.6*   < > 9.8*   HEMATOCRIT % 31.3*   < > 29.1*   PLATELETS Thousands/uL 431*   < > 412*   BANDS PCT %  --   --  7   SEGS PCT % 68  --   --    LYMPHO PCT % 13*  --  15   MONO PCT % 7  --  4   EOS PCT % 2  --  1    < > = values in this interval not displayed.     Results from last 7 days   Lab Units 03/06/25  0456 03/05/25 2042 03/05/25  0459   POTASSIUM mmol/L 4.1   < > 3.6   CHLORIDE mmol/L 100   < > 103   CO2 mmol/L 20*   < > 20*   BUN mg/dL 57*   < > 60*   CREATININE mg/dL 3.49*   < > 3.69*   CALCIUM mg/dL 7.8*   < > 7.9*   ALK PHOS U/L  --   --  38   ALT U/L  --   --  43   AST U/L  --   --  60*    < > = values in this interval not displayed.     Results from last 7 days   Lab Units 03/03/25  0546   INR  1.30*     Results from last 7 days   Lab Units 03/06/25  1128 03/06/25  0709 03/05/25  9827  03/05/25  1640 03/05/25  1116 03/05/25  0815 03/05/25  0746 03/05/25  0600 03/05/25  0407 03/05/25  0156 03/04/25  2351 03/04/25  2204   POC GLUCOSE mg/dl 261* 249* 281* 270* 209* 174* 140 127 103 123 133 198*         Results from last 7 days   Lab Units 03/04/25  0410 03/03/25  1021 03/03/25  0546 03/02/25  0539 03/01/25  2100 03/01/25  0519 02/28/25  0518   LACTIC ACID mmol/L  --  1.3  --   --  1.3  --   --    PROCALCITONIN ng/ml 1.86*  --  2.99* 5.23*  --  8.70* 13.97*         Results from last 7 days   Lab Units 03/03/25  0319 03/02/25  2144 02/27/25 2013   BLOOD CULTURE   --  No Growth at 72 hrs.  No Growth at 72 hrs.  --    GRAM STAIN RESULT  No Polys or Bacteria seen  --  No polys seen*  Rare Gram positive cocci in pairs*  No Polys or Bacteria seen   WOUND CULTURE  Few Colonies of Staphylococcus aureus*  --  2+ Growth of Staphylococcus aureus*  2+ Growth of Streptococcus pyogenes (Group A)*  3+ Growth of  2+ Growth of Staphylococcus aureus*  2+ Growth of Streptococcus pyogenes (Group A)*         Lines/Drains/Telemetry:  Invasive Devices       Peripheral Intravenous Line  Duration             Peripheral IV 03/04/25 Dorsal (posterior);Right Forearm 2 days    Peripheral IV 03/04/25 Dorsal (posterior);Right Hand 2 days              Drain  Duration             Urethral Catheter Latex 18 Fr. 4 days                    Last 24 Hours Medication List:   Current Facility-Administered Medications   Medication Dose Route Frequency Provider Last Rate    acetaminophen  650 mg Oral Q6H PRN JIMMY Huff      albuterol  2.5 mg Nebulization Q6H PRN JIMMY Huff      [START ON 3/7/2025] amLODIPine  10 mg Oral Daily Kiki Villarreal MD      aspirin  81 mg Oral Daily JIMMY Huff      atorvastatin  40 mg Oral Daily With Dinner JIMMY Huff      azelastine  1 spray Each Nare BID JIMMY Huff      cefadroxil  1,000 mg Oral Q12H MARGARITA Lupillo Alicea,  PA-C      chlorhexidine  15 mL Mouth/Throat Q12H MARGARITA Brittany Tianna Bilofskwanda, CRBISHOP      Cholecalciferol  2,000 Units Oral Daily Brittany Tianna Bilofsky, CRBISHOP      folic acid  1 mg Oral Daily Brittany Tianna Bilofsky, CRBISHOP      gabapentin  200 mg Oral HS Brittany Tianna Bilofsky, CRBISHOP      guaiFENesin  600 mg Oral Q12H MARGARITA Rbittany Tianna Bilofsky, JIMMY      heparin (porcine)  7,500 Units Subcutaneous Q8H MARGARITA Brittany Tianna Bilofsky, JIMMY      hydrALAZINE  10 mg Intravenous Q6H PRN Brittany Tianna BilofJIMMY higgins      insulin aspart protamine-insulin aspart  20 Units Subcutaneous BID AC Brittany Tianna Bilofronaldo, JIMMY      insulin lispro  1-5 Units Subcutaneous TID AC Brittany Tianna Bilofronaldo, CRNP      insulin lispro  1-5 Units Subcutaneous HS Brittany Tiannaroyal Smith, JIMMY      labetalol  10 mg Intravenous Q4H PRN Kiki Villarreal MD      melatonin  6 mg Oral HS Brittany Tiannaroyal Johnsonofronaldo, JIMMY      metoprolol tartrate  50 mg Oral Q12H MARGARITA Brittany TiannaJIMMY Turcios      [START ON 3/7/2025] multivitamin-minerals  1 tablet Oral Daily Kiki Villarreal MD      sod citrate-citric acid  30 mL Oral BID Brittany JIMMY Herrera      tamsulosin  0.4 mg Oral Daily With Dinner Jonny Miller MD      thiamine  100 mg Oral Daily Brittany TiannaJIMMY Turcios      vitamin E (tocopherol)  400 Units Oral Daily Brittany TiannaJIMMY Turcios MD   Hospitalist - [facility]        ** Please Note:  Documentation is constructed using a voice recognition dictation system.  An occasional wrong word/phrase or “sound-a-like” substitution may have been picked up by dictation device due to the inherent limitations of voice recognition software.  Read the chart carefully and recognize, using reasonable context, where substitutions may have occurred.**

## 2025-03-06 NOTE — PROGRESS NOTES
Progress Note - Nephrology   Name: Nadeem Purdy Jr. 69 y.o. male I MRN: [id number]  Unit/Bed#: -01 I Date of Admission: 2/26/2025   Date of Service: 3/6/2025 I Hospital Day: 8     Assessment & Plan  Acute renal failure superimposed on stage 3a chronic kidney disease (HCC)  Lab Results   Component Value Date    EGFR 16 03/06/2025    EGFR 16 03/05/2025    EGFR 15 03/05/2025    CREATININE 3.49 (H) 03/06/2025    CREATININE 3.60 (H) 03/05/2025    CREATININE 3.69 (H) 03/05/2025   Baseline creatinine appears to be 1.1-1.3 mg/dL since 2022; not under care of nephrology. 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  Grade A2 albuminuria, no RBCs/UA bland when checked in steady state.  Kidneys without obstruction on unenhanced imaging.  Now with acute kidney injury creatinine unchanged high threes, low fours.      Suspect etiology ATN in setting of severe sepsis, volume and hemodynamic perturbations (s/p crystalloid/colloid/diuretic), vasomotor dysfunction due to ARB, urinary retention status post Ramirez.  CK minimally elevated upon presentation and likely noncontributory to LUIS ALFREDO.  UA significant for glucosuria, 2+ protein, 1-2 RBC, 1-2 coarse granular casts.    3/6  Peak creatinine was 4.3 mg/dL on 3/2 and now improving to 3.5 mg/dL. Was given 40 mg IV lasix yesterday. Appears volume overloaded still based off leg swelling. UOP was 3.5 L in 24 hours and overall net negative 2.8 L yesterday but net positive since admission. Will plan to give another dose of IV lasix 40 mg today.     Diabetic ketoacidosis without coma associated with type 2 diabetes mellitus (HCC)  Lab Results   Component Value Date    HGBA1C 8.9 (H) 02/26/2025       Recent Labs     03/05/25  1116 03/05/25  1640 03/05/25  2109 03/06/25  0709   POCGLU 209* 270* 281* 249*       Blood Sugar Average: Last 72 hrs:  (P) [id number]  Off insulin gtt, management per primary team.  Essential hypertension  BP  trends elevated in the past 24 hr. Currently on rate control strategy with metoprolol for A fib and losartan remains on hold due to LUIS ALFREDO.   Hyponatremia  Na dropped to 129 today, will tighten FR to 1.5 L and give another dose of IV lasix for hypervolemic hyponatremia.   Urinary retention  Urology is following for UR continue with management per their recommendations such as initiation of flomax   Metabolic acidosis  TCO2 trend improving to 20. Continue with bicitra   Severe sepsis (HCC)  Continue management per ID colleagues   Group A strep bacteremia  Continue management per ID colleagues    Atrial fibrillation with RVR ([facility])  Continue management per primary team   Acute respiratory insufficiency    Diarrhea  resolved      Subjective   Brief History of Admission - 70 yo M with h/o obesity, DM2, CKD stage 3a, HTN, b/l LE edema p/w hyperglycemia, polyuria, polydipsia. He was found to have DKA, cellulitis, sepsis, GAS bacteremia, and diarrhea. Nephrology consulted for LUIS ALFREDO. Patient no longer have diarrhea. He reports to me he was taking his losartan and insulin before presentation. He is no longer taking jardiance. He states he is feeling well today and overall without complaints         Objective :  Temp:  [97.7 °F (36.5 °C)-99.7 °F (37.6 °C)] 98.9 °F (37.2 °C)  HR:  [] 101  BP: (138-171)/(74-83) 171/82  Resp:  [19-32] 32  SpO2:  [93 %-99 %] 97 %  O2 Device: None (Room air)    Current Weight: Weight - Scale: (!) 141 kg (310 lb 6.5 oz)  First Weight: Weight - Scale: (!) 143 kg (315 lb 0.6 oz)  I/O         03/04 0701  03/05 0700 03/05 0701  03/06 0700 03/06 0701  03/07 0700    P.O. 720 680 420    I.V. (mL/kg) 1633.7 (11.6) 19.2 (0.1)     IV Piggyback 120      Total Intake(mL/kg) 2473.7 (17.5) 699.2 (5) 420 (3)    Urine (mL/kg/hr) 3150 (0.9) 3575 (1.1)     Stool 0 0     Total Output 3150 3575     Net -676.3 -2875.8 +420           Unmeasured Stool Occurrence 1 x 2 x           Physical Exam  Vitals and nursing note  reviewed.   Constitutional:       General: He is not in acute distress.     Appearance: He is well-developed.   HENT:      Head: Normocephalic and atraumatic.   Cardiovascular:      Rate and Rhythm: Normal rate and regular rhythm.      Heart sounds: No murmur heard.  Pulmonary:      Effort: Pulmonary effort is normal. No respiratory distress.      Breath sounds: Normal breath sounds.   Abdominal:      Palpations: Abdomen is soft.      Tenderness: There is no abdominal tenderness.   Musculoskeletal:      Right lower leg: Edema present.      Left lower leg: Edema present.   Skin:     General: Skin is warm and dry.      Capillary Refill: Capillary refill takes less than 2 seconds.   Neurological:      Mental Status: He is alert.   Psychiatric:         Mood and Affect: Mood normal.         Medications:    Current Facility-Administered Medications:     acetaminophen (TYLENOL) tablet 650 mg, 650 mg, Oral, Q6H PRN, Brittany Smith, RENP, 650 mg at 02/26/25 2322    albuterol inhalation solution 2.5 mg, 2.5 mg, Nebulization, Q6H PRN, RE HuffNP, 2.5 mg at 03/01/25 0527    aspirin (ECOTRIN LOW STRENGTH) EC tablet 81 mg, 81 mg, Oral, Daily, RE HuffNP, 81 mg at 03/06/25 1004    atorvastatin (LIPITOR) tablet 40 mg, 40 mg, Oral, Daily With Dinner, Brittany Smith, RENP, 40 mg at 03/05/25 1653    azelastine (ASTELIN) 0.1 % nasal spray 1 spray, 1 spray, Each Nare, BID, RE HuffNP, 1 spray at 03/06/25 1003    ceFAZolin (ANCEF) IVPB (premix in dextrose) 1,000 mg 50 mL, 1,000 mg, Intravenous, Q8H, JIMMY Huff, Last Rate: 100 mL/hr at 03/06/25 1014, 1,000 mg at 03/06/25 1014    chlorhexidine (PERIDEX) 0.12 % oral rinse 15 mL, 15 mL, Mouth/Throat, Q12H MARGARITA, JIMMY Huff, 15 mL at 03/06/25 1009    Cholecalciferol (VITAMIN D3) tablet 2,000 Units, 2,000 Units, Oral, Daily, JIMMY Huff, 2,000 Units at 03/06/25 1004    folic acid  (FOLVITE) tablet 1 mg, 1 mg, Oral, Daily, Brittanyrebeca Smith, CRNP, 1 mg at 03/06/25 1004    gabapentin (NEURONTIN) capsule 200 mg, 200 mg, Oral, HS, Brittany Tianna Bilofsky, CRNP, 200 mg at 03/05/25 2131    guaiFENesin (MUCINEX) 12 hr tablet 600 mg, 600 mg, Oral, Q12H MARGARITA, Brittany Tianna Bilofsky, CRNP, 600 mg at 03/06/25 1004    heparin (porcine) subcutaneous injection 7,500 Units, 7,500 Units, Subcutaneous, Q8H MARGARITA, Brittany Tianna Johnsonofskwanda, CRNP, 7,500 Units at 03/06/25 0543    hydrALAZINE (APRESOLINE) injection 10 mg, 10 mg, Intravenous, Q6H PRN, Brittany Tianna Bilofskwanda, CRNP, 10 mg at 03/05/25 1120    insulin aspart protamine-insulin aspart (NovoLOG 70/30) 100 units/mL subcutaneous injection 20 Units, 20 Units, Subcutaneous, BID AC, Brittany Tianna Smith, CRNP, 20 Units at 03/06/25 1000    insulin lispro (HumALOG/ADMELOG) 100 units/mL subcutaneous injection 1-5 Units, 1-5 Units, Subcutaneous, TID AC, 2 Units at 03/06/25 0833 **AND** Fingerstick Glucose (POCT), , , TID AC, Brittany Tianna Bilofsky, CRNP    insulin lispro (HumALOG/ADMELOG) 100 units/mL subcutaneous injection 1-5 Units, 1-5 Units, Subcutaneous, HS, Brittany Tianna Johnsonofsky, CRNP, 3 Units at 03/05/25 2131    magnesium sulfate 2 g/50 mL IVPB (premix) 2 g, 2 g, Intravenous, Once, Kiki Villarreal MD, Last Rate: 25 mL/hr at 03/06/25 1010, 2 g at 03/06/25 1010    melatonin tablet 6 mg, 6 mg, Oral, HS, Brittany Tianna Bilofsky, CRNP, 6 mg at 03/05/25 2131    metoprolol tartrate (LOPRESSOR) tablet 50 mg, 50 mg, Oral, Q12H MARGARITA, Brittany Tianna Bilofsky, CRNP, 50 mg at 03/06/25 1004    sod citrate-citric acid (BICITRA) oral solution 30 mL, 30 mL, Oral, BID, JIMMY Huff, 30 mL at 03/06/25 1007    tamsulosin (FLOMAX) capsule 0.4 mg, 0.4 mg, Oral, Daily With Dinner, Jonny Miller MD    thiamine tablet 100 mg, 100 mg, Oral, Daily, JIMMY Huff, 100 mg at 03/06/25 1004    vitamin E (TOCOPHEROL) capsule 400 Units, 400 Units, Oral, Daily, Brittany  JIMMY Herrera, 400 Units at 03/06/25 1004      Lab Results: I have reviewed the following results:  Results from last 7 days   Lab Units 03/06/25  0456 03/05/25  2042 03/05/25  0459 03/04/25  1818 03/04/25  0410 03/03/25  2357 03/03/25  1625 03/03/25  1021 03/03/25  0546 03/03/25  0435 03/02/25  2149 03/02/25  1636 03/02/25  0539 03/01/25  1549 03/01/25  0519 02/28/25  1225 02/28/25  0518 02/27/25  2214 02/27/25  1818   WBC Thousand/uL 22.38*  --  25.47*  --  26.37*  --   --   --   --  28.19*  --   --  24.47*  --  19.12*  --  13.96*  --   --    HEMOGLOBIN g/dL 10.6*  --  10.0*  --  9.8*  --   --   --   --  10.3*  --   --  11.0*  --  10.7*  --  10.3*  --   --    HEMATOCRIT % 31.3*  --  29.9*  --  29.1*  --   --   --   --  30.7*  --   --  32.9*  --  31.8*  --  31.1*  --   --    PLATELETS Thousands/uL 431*  --  444*  --  412*  --   --   --   --  386  --   --  300  --  223  --  155  --   --    POTASSIUM mmol/L 4.1 4.1 3.6 4.3 3.9 3.6 4.0   < > 4.3  --    < > 3.6 3.6   < > 4.0   < > 3.5   < > 3.5   CHLORIDE mmol/L 100 101 103 103 103 106 103   < > 105  --    < > 101 102   < > 103   < > 102   < > 98   CO2 mmol/L 20* 19* 20* 17* 16* 16* 16*   < > 16*  --    < > 19* 15*   < > 15*   < > 15*   < > 18*   BUN mg/dL 57* 59* 60* 63* 70* 70* 71*   < > 74*  --    < > 72* 71*   < > 64*   < > 51*   < > 47*   CREATININE mg/dL 3.49* 3.60* 3.69* 3.77* 4.17* 4.04* 4.20*   < > 4.32*  --    < > 4.24* 4.05*   < > 3.70*   < > 3.63*   < > 3.52*   CALCIUM mg/dL 7.8* 7.7* 7.9* 7.9* 8.0* 7.8* 8.3*   < > 7.8*  --    < > 8.4 8.0*   < > 8.3*   < > 7.7*   < > 8.9   MAGNESIUM mg/dL 1.8*  --  1.9  --  2.0 2.0 2.1  --  2.1  --   --  2.2 2.1   < > 2.2   < > 2.3   < > 2.4   PHOSPHORUS mg/dL  --   --  3.7  --  3.5 2.5 2.4  --  3.1  --   --  2.4 3.4   < > 2.9   < > 2.6   < > 2.2*   ALBUMIN g/dL  --   --  2.5*  --  2.8*  --   --   --  2.5*  --   --   --  2.8*  --  2.9*  --  2.6*  --  3.0*    < > = values in this interval not displayed.  "      Administrative Statements     Portions of the record may have been created with voice recognition software. Occasional wrong word or \"sound a like\" substitutions may have occurred due to the inherent limitations of voice recognition software. Read the chart carefully and recognize, using context, where substitutions have occurred.If you have any questions, please contact the dictating provider.  "

## 2025-03-06 NOTE — QUICK NOTE
Discussed with RN that BMP ordered earlier today. RN unable to find a vein and passed on to PM shift.

## 2025-03-07 ENCOUNTER — APPOINTMENT (INPATIENT)
Dept: NON INVASIVE DIAGNOSTICS | Facility: HOSPITAL | Age: 70
DRG: 871 | End: 2025-03-07
Payer: MEDICARE

## 2025-03-07 LAB
ANION GAP SERPL CALCULATED.3IONS-SCNC: 10 MMOL/L (ref 4–13)
ANISOCYTOSIS BLD QL SMEAR: PRESENT
BASOPHILS # BLD MANUAL: 0 THOUSAND/UL (ref 0–0.1)
BASOPHILS NFR MAR MANUAL: 0 % (ref 0–1)
BUN SERPL-MCNC: 56 MG/DL (ref 5–25)
CALCIUM SERPL-MCNC: 8 MG/DL (ref 8.4–10.2)
CHLORIDE SERPL-SCNC: 101 MMOL/L (ref 96–108)
CO2 SERPL-SCNC: 22 MMOL/L (ref 21–32)
CREAT SERPL-MCNC: 3.37 MG/DL (ref 0.6–1.3)
EOSINOPHIL # BLD MANUAL: 0.59 THOUSAND/UL (ref 0–0.4)
EOSINOPHIL NFR BLD MANUAL: 3 % (ref 0–6)
ERYTHROCYTE [DISTWIDTH] IN BLOOD BY AUTOMATED COUNT: 14.4 % (ref 11.6–15.1)
GFR SERPL CREATININE-BSD FRML MDRD: 17 ML/MIN/1.73SQ M
GLUCOSE SERPL-MCNC: 189 MG/DL (ref 65–140)
GLUCOSE SERPL-MCNC: 246 MG/DL (ref 65–140)
GLUCOSE SERPL-MCNC: 249 MG/DL (ref 65–140)
GLUCOSE SERPL-MCNC: 259 MG/DL (ref 65–140)
GLUCOSE SERPL-MCNC: 290 MG/DL (ref 65–140)
HCT VFR BLD AUTO: 32.9 % (ref 36.5–49.3)
HGB BLD-MCNC: 10.7 G/DL (ref 12–17)
LG PLATELETS BLD QL SMEAR: PRESENT
LYMPHOCYTES # BLD AUTO: 13 % (ref 14–44)
LYMPHOCYTES # BLD AUTO: 2.57 THOUSAND/UL (ref 0.6–4.47)
MAGNESIUM SERPL-MCNC: 2.1 MG/DL (ref 1.9–2.7)
MCH RBC QN AUTO: 31 PG (ref 26.8–34.3)
MCHC RBC AUTO-ENTMCNC: 32.5 G/DL (ref 31.4–37.4)
MCV RBC AUTO: 95 FL (ref 82–98)
METAMYELOCYTE ABSOLUTE CT: 0.4 THOUSAND/UL (ref 0–0.1)
METAMYELOCYTES NFR BLD MANUAL: 2 % (ref 0–1)
MONOCYTES # BLD AUTO: 1.38 THOUSAND/UL (ref 0–1.22)
MONOCYTES NFR BLD: 7 % (ref 4–12)
MYELOCYTE ABSOLUTE CT: 0.4 THOUSAND/UL (ref 0–0.1)
MYELOCYTES NFR BLD MANUAL: 2 % (ref 0–1)
NEUTROPHILS # BLD MANUAL: 14.43 THOUSAND/UL (ref 1.85–7.62)
NEUTS BAND NFR BLD MANUAL: 4 % (ref 0–8)
NEUTS SEG NFR BLD AUTO: 69 % (ref 43–75)
PHOSPHATE SERPL-MCNC: 4.2 MG/DL (ref 2.3–4.1)
PLATELET # BLD AUTO: 399 THOUSANDS/UL (ref 149–390)
PLATELET BLD QL SMEAR: ABNORMAL
PMV BLD AUTO: 8.2 FL (ref 8.9–12.7)
POIKILOCYTOSIS BLD QL SMEAR: PRESENT
POTASSIUM SERPL-SCNC: 3.9 MMOL/L (ref 3.5–5.3)
RBC # BLD AUTO: 3.45 MILLION/UL (ref 3.88–5.62)
RBC MORPH BLD: PRESENT
SODIUM SERPL-SCNC: 133 MMOL/L (ref 135–147)
WBC # BLD AUTO: 19.77 THOUSAND/UL (ref 4.31–10.16)

## 2025-03-07 PROCEDURE — 93971 EXTREMITY STUDY: CPT

## 2025-03-07 PROCEDURE — 85027 COMPLETE CBC AUTOMATED: CPT | Performed by: INTERNAL MEDICINE

## 2025-03-07 PROCEDURE — 99232 SBSQ HOSP IP/OBS MODERATE 35: CPT | Performed by: INTERNAL MEDICINE

## 2025-03-07 PROCEDURE — 84100 ASSAY OF PHOSPHORUS: CPT | Performed by: INTERNAL MEDICINE

## 2025-03-07 PROCEDURE — 82948 REAGENT STRIP/BLOOD GLUCOSE: CPT

## 2025-03-07 PROCEDURE — 83735 ASSAY OF MAGNESIUM: CPT | Performed by: INTERNAL MEDICINE

## 2025-03-07 PROCEDURE — 80048 BASIC METABOLIC PNL TOTAL CA: CPT | Performed by: INTERNAL MEDICINE

## 2025-03-07 PROCEDURE — 85007 BL SMEAR W/DIFF WBC COUNT: CPT | Performed by: INTERNAL MEDICINE

## 2025-03-07 PROCEDURE — 93971 EXTREMITY STUDY: CPT | Performed by: SURGERY

## 2025-03-07 PROCEDURE — 99232 SBSQ HOSP IP/OBS MODERATE 35: CPT

## 2025-03-07 RX ORDER — INSULIN LISPRO 100 [IU]/ML
1-5 INJECTION, SOLUTION INTRAVENOUS; SUBCUTANEOUS
Status: DISCONTINUED | OUTPATIENT
Start: 2025-03-07 | End: 2025-03-07

## 2025-03-07 RX ORDER — INSULIN ASPART 100 [IU]/ML
25 INJECTION, SUSPENSION SUBCUTANEOUS
Status: DISCONTINUED | OUTPATIENT
Start: 2025-03-07 | End: 2025-03-08

## 2025-03-07 RX ORDER — INSULIN LISPRO 100 [IU]/ML
2-12 INJECTION, SOLUTION INTRAVENOUS; SUBCUTANEOUS
Status: DISCONTINUED | OUTPATIENT
Start: 2025-03-07 | End: 2025-03-11 | Stop reason: HOSPADM

## 2025-03-07 RX ORDER — FUROSEMIDE 40 MG/1
40 TABLET ORAL
Status: DISCONTINUED | OUTPATIENT
Start: 2025-03-07 | End: 2025-03-11 | Stop reason: HOSPADM

## 2025-03-07 RX ADMIN — GUAIFENESIN 600 MG: 600 TABLET ORAL at 21:32

## 2025-03-07 RX ADMIN — ATORVASTATIN CALCIUM 40 MG: 40 TABLET, FILM COATED ORAL at 17:06

## 2025-03-07 RX ADMIN — Medication 1 TABLET: at 08:02

## 2025-03-07 RX ADMIN — FUROSEMIDE 40 MG: 40 TABLET ORAL at 15:27

## 2025-03-07 RX ADMIN — AMLODIPINE BESYLATE 10 MG: 10 TABLET ORAL at 08:02

## 2025-03-07 RX ADMIN — ASPIRIN 81 MG: 81 TABLET, COATED ORAL at 08:02

## 2025-03-07 RX ADMIN — THIAMINE HCL TAB 100 MG 100 MG: 100 TAB at 08:02

## 2025-03-07 RX ADMIN — Medication 6 MG: at 21:32

## 2025-03-07 RX ADMIN — CHOLECALCIFEROL TAB 25 MCG (1000 UNIT) 2000 UNITS: 25 TAB at 08:02

## 2025-03-07 RX ADMIN — HEPARIN SODIUM 7500 UNITS: 5000 INJECTION INTRAVENOUS; SUBCUTANEOUS at 05:25

## 2025-03-07 RX ADMIN — AZELASTINE HYDROCHLORIDE 1 SPRAY: 137 SPRAY, METERED NASAL at 08:00

## 2025-03-07 RX ADMIN — CHLORHEXIDINE GLUCONATE 15 ML: 1.2 SOLUTION ORAL at 21:31

## 2025-03-07 RX ADMIN — INSULIN LISPRO 6 UNITS: 100 INJECTION, SOLUTION INTRAVENOUS; SUBCUTANEOUS at 17:08

## 2025-03-07 RX ADMIN — CHLORHEXIDINE GLUCONATE 15 ML: 1.2 SOLUTION ORAL at 08:02

## 2025-03-07 RX ADMIN — CEFADROXIL 1000 MG: 500 CAPSULE ORAL at 21:55

## 2025-03-07 RX ADMIN — INSULIN LISPRO 4 UNITS: 100 INJECTION, SOLUTION INTRAVENOUS; SUBCUTANEOUS at 21:33

## 2025-03-07 RX ADMIN — GABAPENTIN 200 MG: 100 CAPSULE ORAL at 21:32

## 2025-03-07 RX ADMIN — HEPARIN SODIUM 7500 UNITS: 5000 INJECTION INTRAVENOUS; SUBCUTANEOUS at 15:26

## 2025-03-07 RX ADMIN — INSULIN ASPART 20 UNITS: 100 INJECTION, SUSPENSION SUBCUTANEOUS at 07:55

## 2025-03-07 RX ADMIN — METOPROLOL TARTRATE 50 MG: 50 TABLET, FILM COATED ORAL at 08:02

## 2025-03-07 RX ADMIN — AZELASTINE HYDROCHLORIDE 1 SPRAY: 137 SPRAY, METERED NASAL at 17:06

## 2025-03-07 RX ADMIN — CEFADROXIL 1000 MG: 500 CAPSULE ORAL at 09:47

## 2025-03-07 RX ADMIN — TRISODIUM CITRATE DIHYDRATE AND CITRIC ACID MONOHYDRATE 30 ML: 500; 334 SOLUTION ORAL at 08:00

## 2025-03-07 RX ADMIN — HEPARIN SODIUM 7500 UNITS: 5000 INJECTION INTRAVENOUS; SUBCUTANEOUS at 21:31

## 2025-03-07 RX ADMIN — Medication 400 UNITS: at 08:02

## 2025-03-07 RX ADMIN — TAMSULOSIN HYDROCHLORIDE 0.4 MG: 0.4 CAPSULE ORAL at 17:06

## 2025-03-07 RX ADMIN — INSULIN LISPRO 1 UNITS: 100 INJECTION, SOLUTION INTRAVENOUS; SUBCUTANEOUS at 07:57

## 2025-03-07 RX ADMIN — INSULIN ASPART 25 UNITS: 100 INJECTION, SUSPENSION SUBCUTANEOUS at 17:08

## 2025-03-07 RX ADMIN — TRISODIUM CITRATE DIHYDRATE AND CITRIC ACID MONOHYDRATE 30 ML: 500; 334 SOLUTION ORAL at 21:33

## 2025-03-07 RX ADMIN — INSULIN LISPRO 6 UNITS: 100 INJECTION, SOLUTION INTRAVENOUS; SUBCUTANEOUS at 12:06

## 2025-03-07 RX ADMIN — METOPROLOL TARTRATE 50 MG: 50 TABLET, FILM COATED ORAL at 21:38

## 2025-03-07 RX ADMIN — FOLIC ACID 1 MG: 1 TABLET ORAL at 08:02

## 2025-03-07 RX ADMIN — GUAIFENESIN 600 MG: 600 TABLET ORAL at 08:02

## 2025-03-07 NOTE — ASSESSMENT & PLAN NOTE
Encourage leg elevation  Status post IV Lasix dosing by nephrology yesterday, with plan to transition to twice daily oral dosing today

## 2025-03-07 NOTE — ASSESSMENT & PLAN NOTE
Lab Results   Component Value Date    HGBA1C 8.9 (H) 02/26/2025       Recent Labs     03/06/25  1558 03/06/25  2126 03/07/25  0733 03/07/25  1116   POCGLU 267* 299* 189* 259*       Blood Sugar Average: Last 72 hrs:  (P) [id number]    Sodium corrects to normal can discontinue fluid restriction

## 2025-03-07 NOTE — ASSESSMENT & PLAN NOTE
Lab Results   Component Value Date    HGBA1C 8.9 (H) 02/26/2025     Transferred out of ICU with transition off insulin drip to a basal 70/30 BID regimen with additional SSI coverage per Accu-Cheks -> increase 70/30 dosing to 25 units BID and increase SSI algorithm today  Encourage compliance to home insulin regimen  Hypoglycemia protocol  Carbohydrate restriction  Monitor/replete any potassium/phosphate derangements, as necessary

## 2025-03-07 NOTE — ASSESSMENT & PLAN NOTE
Lab Results   Component Value Date    EGFR 17 03/07/2025    EGFR 16 03/06/2025    EGFR 16 03/05/2025    CREATININE 3.37 (H) 03/07/2025    CREATININE 3.49 (H) 03/06/2025    CREATININE 3.60 (H) 03/05/2025   Baseline creatinine appears to be 1.1-1.3 mg/dL since 2022; not under care of nephrology. 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  Grade A2 albuminuria, no RBCs/UA bland when checked in steady state.  Kidneys without obstruction on unenhanced imaging.  Now with acute kidney injury creatinine unchanged high threes, low fours.      Suspect etiology ATN in setting of severe sepsis, volume and hemodynamic perturbations (s/p crystalloid/colloid/diuretic), vasomotor dysfunction due to ARB, urinary retention status post Ramirez.  CK minimally elevated upon presentation and likely noncontributory to LUIS ALFREDO.  UA significant for glucosuria, 2+ protein, 1-2 RBC, 1-2 coarse granular casts.    3/7  Peak creatinine was 4.3 mg/dL on 3/2 and now slowly improving to 3.4 mg/dL.  Given 2 doses of IV Lasix 40 mg past 3 days.  Urine output 2.7 L, net -2 L in 24 hours, now reaching equilibration point recommend starting back on outpatient regimen with increase to Lasix 40 mg twice daily.

## 2025-03-07 NOTE — ASSESSMENT & PLAN NOTE
Baseline creatinine of approximately 1.1-1.3 -> elevated but improved at 3.37 from a prior 4.34 peak  Nephrology following -> plan for diuretic (Lasix) trial today  Monitor renal function and urine output  Limit/avoid nephrotoxins and hypotension as possible - holding ARB (Cozaar)

## 2025-03-07 NOTE — PROGRESS NOTES
Progress Note - Nephrology   Name: Nadeem Purdy Jr. 69 y.o. male I MRN: [id number]  Unit/Bed#: -01 I Date of Admission: 2/26/2025   Date of Service: 3/7/2025 I Hospital Day: 9     Assessment & Plan  Acute renal failure superimposed on stage 3a chronic kidney disease (HCC)  Lab Results   Component Value Date    EGFR 17 03/07/2025    EGFR 16 03/06/2025    EGFR 16 03/05/2025    CREATININE 3.37 (H) 03/07/2025    CREATININE 3.49 (H) 03/06/2025    CREATININE 3.60 (H) 03/05/2025   Baseline creatinine appears to be 1.1-1.3 mg/dL since 2022; not under care of nephrology. 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  Grade A2 albuminuria, no RBCs/UA bland when checked in steady state.  Kidneys without obstruction on unenhanced imaging.  Now with acute kidney injury creatinine unchanged high threes, low fours.      Suspect etiology ATN in setting of severe sepsis, volume and hemodynamic perturbations (s/p crystalloid/colloid/diuretic), vasomotor dysfunction due to ARB, urinary retention status post Ramirez.  CK minimally elevated upon presentation and likely noncontributory to LUIS ALFREDO.  UA significant for glucosuria, 2+ protein, 1-2 RBC, 1-2 coarse granular casts.    3/7  Peak creatinine was 4.3 mg/dL on 3/2 and now slowly improving to 3.4 mg/dL.  Given 2 doses of IV Lasix 40 mg past 3 days.  Urine output 2.7 L, net -2 L in 24 hours, now reaching equilibration point recommend starting back on outpatient regimen with increase to Lasix 40 mg twice daily.    Diabetic ketoacidosis without coma associated with type 2 diabetes mellitus (HCC)  Lab Results   Component Value Date    HGBA1C 8.9 (H) 02/26/2025       Recent Labs     03/06/25  1558 03/06/25  2126 03/07/25  0733 03/07/25  1116   POCGLU 267* 299* 189* 259*       Blood Sugar Average: Last 72 hrs:  (P) [id number]    Sodium corrects to normal can discontinue fluid restriction  Essential hypertension  Continue to hold  losartan, continue with metoprolol, start Lasix 40 mg twice daily  Hyponatremia  Corrects to normal, DC fluid restriction  Urinary retention  Management per urology  Metabolic acidosis  Bicarb improving to 22, discontinue Bicitra as able  Severe sepsis (HCC)  Continue management per ID colleagues   Atrial fibrillation with RVR ([facility])  Continue management per primary team   Hypomagnesemia    Diarrhea  resolved  Streptococcal bacteremia    Chronic anemia          Subjective   Brief History of Admission - 68 yo M with h/o obesity, DM2, CKD stage 3a, HTN, b/l LE edema p/w hyperglycemia, polyuria, polydipsia. He was found to have DKA, cellulitis, sepsis, GAS bacteremia, and diarrhea. Nephrology consulted for LUIS ALFREDO.  Patient sleeping during examination.  He was allowed to rest.        Objective :  Temp:  [97.6 °F (36.4 °C)-99.9 °F (37.7 °C)] 97.6 °F (36.4 °C)  HR:  [] 100  BP: (137-154)/(70-82) 152/80  Resp:  [18-20] 18  SpO2:  [94 %-96 %] 94 %  O2 Device: None (Room air)    Current Weight: Weight - Scale: (!) 141 kg (310 lb 6.5 oz)  First Weight: Weight - Scale: (!) 143 kg (315 lb 0.6 oz)  I/O         03/05 0701  03/06 0700 03/06 0701  03/07 0700 03/07 0701  03/08 0700    P.O. 680 780 600    I.V. (mL/kg) 19.2 (0.1)      IV Piggyback       Total Intake(mL/kg) 699.2 (5) 780 (5.5) 600 (4.3)    Urine (mL/kg/hr) 3575 (1.1) 2775 (0.8) 300 (0.3)    Stool 0 0     Total Output 3575 2775 300    Net -2875.8 -1995 +300           Unmeasured Stool Occurrence 2 x 1 x           Physical Exam  Vitals and nursing note reviewed.   Constitutional:       General: He is not in acute distress.     Appearance: He is well-developed.   HENT:      Head: Normocephalic and atraumatic.   Cardiovascular:      Rate and Rhythm: Normal rate and regular rhythm.      Heart sounds: No murmur heard.  Pulmonary:      Effort: Pulmonary effort is normal. No respiratory distress.      Breath sounds: Normal breath sounds.   Abdominal:      Palpations: Abdomen  is soft.      Tenderness: There is no abdominal tenderness.   Musculoskeletal:      Right lower leg: Edema present.      Left lower leg: Edema present.   Skin:     General: Skin is warm and dry.      Capillary Refill: Capillary refill takes less than 2 seconds.   Neurological:      Mental Status: He is alert.   Psychiatric:         Mood and Affect: Mood normal.         Medications:    Current Facility-Administered Medications:     acetaminophen (TYLENOL) tablet 650 mg, 650 mg, Oral, Q6H PRN, Brittany Tianna Smith, CRNP, 650 mg at 02/26/25 2322    albuterol inhalation solution 2.5 mg, 2.5 mg, Nebulization, Q6H PRN, Brittany Tianna Bilofskwanda, CRNP, 2.5 mg at 03/01/25 0527    amLODIPine (NORVASC) tablet 10 mg, 10 mg, Oral, Daily, Kiki Villarreal MD, 10 mg at 03/07/25 0802    aspirin (ECOTRIN LOW STRENGTH) EC tablet 81 mg, 81 mg, Oral, Daily, Brittanyrebeca Smith, CRNP, 81 mg at 03/07/25 0802    atorvastatin (LIPITOR) tablet 40 mg, 40 mg, Oral, Daily With Dinner, Brittany Tianna Smith, CRNP, 40 mg at 03/06/25 1731    azelastine (ASTELIN) 0.1 % nasal spray 1 spray, 1 spray, Each Nare, BID, Brittanyrebeca Smith, CRNP, 1 spray at 03/07/25 0800    cefadroxil (DURICEF) capsule 1,000 mg, 1,000 mg, Oral, Q12H MARGARITA, Lupillo Alicea PA-C, 1,000 mg at 03/07/25 0947    chlorhexidine (PERIDEX) 0.12 % oral rinse 15 mL, 15 mL, Mouth/Throat, Q12H MARGARITA, Brittanyrebeca Smith CRNP, 15 mL at 03/07/25 0802    Cholecalciferol (VITAMIN D3) tablet 2,000 Units, 2,000 Units, Oral, Daily, Brittany Tianna Bilofronaldo, CRNP, 2,000 Units at 03/07/25 0802    folic acid (FOLVITE) tablet 1 mg, 1 mg, Oral, Daily, Brittany JIMMY Herrera, 1 mg at 03/07/25 0802    furosemide (LASIX) tablet 40 mg, 40 mg, Oral, BID (diuretic), Brian Quan PA-C    gabapentin (NEURONTIN) capsule 200 mg, 200 mg, Oral, HS, JIMMY Huff, 200 mg at 03/06/25 2114    guaiFENesin (MUCINEX) 12 hr tablet 600 mg, 600 mg, Oral, Q12H MARGARITA, JIMMY Huff,  600 mg at 03/07/25 0802    heparin (porcine) subcutaneous injection 7,500 Units, 7,500 Units, Subcutaneous, Q8H MARGARITA, JIMMY Huff, 7,500 Units at 03/07/25 0525    hydrALAZINE (APRESOLINE) injection 10 mg, 10 mg, Intravenous, Q6H PRN, Brittany Smith CRNP, 10 mg at 03/05/25 1120    insulin aspart protamine-insulin aspart (NovoLOG 70/30) 100 units/mL subcutaneous injection 25 Units, 25 Units, Subcutaneous, BID AC, Kiki Villarreal MD    insulin lispro (HumALOG/ADMELOG) 100 units/mL subcutaneous injection 2-12 Units, 2-12 Units, Subcutaneous, 4x Daily (AC & HS), 6 Units at 03/07/25 1206 **AND** Fingerstick Glucose (POCT), , , 4x Daily AC and at bedtime, Kkii Villarreal MD    labetalol (NORMODYNE) injection 10 mg, 10 mg, Intravenous, Q4H PRN, Kiki Villarreal MD    melatonin tablet 6 mg, 6 mg, Oral, HS, RE HuffNP, 6 mg at 03/06/25 2114    metoprolol tartrate (LOPRESSOR) tablet 50 mg, 50 mg, Oral, Q12H MARGARITA, Brittany Smith CRNP, 50 mg at 03/07/25 0802    multivitamin-minerals (CENTRUM) tablet 1 tablet, 1 tablet, Oral, Daily, Kiki Villarreal MD, 1 tablet at 03/07/25 0802    sod citrate-citric acid (BICITRA) oral solution 30 mL, 30 mL, Oral, BID, RE HuffNP, 30 mL at 03/07/25 0800    tamsulosin (FLOMAX) capsule 0.4 mg, 0.4 mg, Oral, Daily With Dinner, Jonny Miller MD, 0.4 mg at 03/06/25 1731    thiamine tablet 100 mg, 100 mg, Oral, Daily, RE HuffNP, 100 mg at 03/07/25 0802    vitamin E (TOCOPHEROL) capsule 400 Units, 400 Units, Oral, Daily, JIMMY Huff, 400 Units at 03/07/25 0802      Lab Results: I have reviewed the following results:  Results from last 7 days   Lab Units 03/07/25  0523 03/06/25  0456 03/05/25  2042 03/05/25  0459 03/04/25  1818 03/04/25  0410 03/03/25  2357 03/03/25  1625 03/03/25  1021 03/03/25  0546 03/03/25  0435 03/02/25  2149 03/02/25  1636 03/02/25  0539 03/01/25  1549 03/01/25  0519   WBC Thousand/uL 19.77*  "22.38*  --  25.47*  --  26.37*  --   --   --   --  28.19*  --   --  24.47*  --  19.12*   HEMOGLOBIN g/dL 10.7* 10.6*  --  10.0*  --  9.8*  --   --   --   --  10.3*  --   --  11.0*  --  10.7*   HEMATOCRIT % 32.9* 31.3*  --  29.9*  --  29.1*  --   --   --   --  30.7*  --   --  32.9*  --  31.8*   PLATELETS Thousands/uL 399* 431*  --  444*  --  412*  --   --   --   --  386  --   --  300  --  223   POTASSIUM mmol/L 3.9 4.1 4.1 3.6 4.3 3.9 3.6 4.0   < > 4.3  --    < > 3.6 3.6   < > 4.0   CHLORIDE mmol/L 101 100 101 103 103 103 106 103   < > 105  --    < > 101 102   < > 103   CO2 mmol/L 22 20* 19* 20* 17* 16* 16* 16*   < > 16*  --    < > 19* 15*   < > 15*   BUN mg/dL 56* 57* 59* 60* 63* 70* 70* 71*   < > 74*  --    < > 72* 71*   < > 64*   CREATININE mg/dL 3.37* 3.49* 3.60* 3.69* 3.77* 4.17* 4.04* 4.20*   < > 4.32*  --    < > 4.24* 4.05*   < > 3.70*   CALCIUM mg/dL 8.0* 7.8* 7.7* 7.9* 7.9* 8.0* 7.8* 8.3*   < > 7.8*  --    < > 8.4 8.0*   < > 8.3*   MAGNESIUM mg/dL 2.1 1.8*  --  1.9  --  2.0 2.0 2.1  --  2.1  --   --  2.2 2.1   < > 2.2   PHOSPHORUS mg/dL 4.2*  --   --  3.7  --  3.5 2.5 2.4  --  3.1  --   --  2.4 3.4   < > 2.9   ALBUMIN g/dL  --   --   --  2.5*  --  2.8*  --   --   --  2.5*  --   --   --  2.8*  --  2.9*    < > = values in this interval not displayed.       Administrative Statements     Portions of the record may have been created with voice recognition software. Occasional wrong word or \"sound a like\" substitutions may have occurred due to the inherent limitations of voice recognition software. Read the chart carefully and recognize, using context, where substitutions have occurred.If you have any questions, please contact the dictating provider.  "

## 2025-03-07 NOTE — PLAN OF CARE
Problem: Prexisting or High Potential for Compromised Skin Integrity  Goal: Skin integrity is maintained or improved  Description: INTERVENTIONS:  - Identify patients at risk for skin breakdown  - Assess and monitor skin integrity  - Assess and monitor nutrition and hydration status  - Monitor labs   - Assess for incontinence   - Turn and reposition patient  - Assist with mobility/ambulation  - Relieve pressure over bony prominences  - Avoid friction and shearing  - Provide appropriate hygiene as needed including keeping skin clean and dry  - Evaluate need for skin moisturizer/barrier cream  - Collaborate with interdisciplinary team   - Patient/family teaching  - Consider wound care consult   Outcome: Progressing     Problem: INFECTION - ADULT  Goal: Absence or prevention of progression during hospitalization  Description: INTERVENTIONS:  - Assess and monitor for signs and symptoms of infection  - Monitor lab/diagnostic results  - Monitor all insertion sites, i.e. indwelling lines, tubes, and drains  - Monitor endotracheal if appropriate and nasal secretions for changes in amount and color  - [place] appropriate cooling/warming therapies per order  - Administer medications as ordered  - Instruct and encourage patient and family to use good hand hygiene technique  - Identify and instruct in appropriate isolation precautions for identified infection/condition  Outcome: Progressing

## 2025-03-07 NOTE — ASSESSMENT & PLAN NOTE
Status post Ramirez catheter placement -> consider voiding trial  Appreciate urology input ->   Flomax initiated

## 2025-03-07 NOTE — PROGRESS NOTES
Progress Note - Hospitalist   Name: Nadeem Purdy Jr. 69 y.o. male I MRN: [id number]  Unit/Bed#: -01 I Date of Admission: 2/26/2025   Date of Service: 3/7/2025 I Hospital Day: 9    Assessment & Plan  Diabetic ketoacidosis without coma associated with type 2 diabetes mellitus (HCC)  Lab Results   Component Value Date    HGBA1C 8.9 (H) 02/26/2025     Transferred out of ICU with transition off insulin drip to a basal 70/30 BID regimen with additional SSI coverage per Accu-Cheks -> increase 70/30 dosing to 25 units BID and increase SSI algorithm today  Encourage compliance to home insulin regimen  Hypoglycemia protocol  Carbohydrate restriction  Monitor/replete any potassium/phosphate derangements, as necessary  Severe sepsis ([facility])  Initially presented with fever, leukocytosis, tachypnea, with lactic acidosis in the setting of streptococcal bacteremia secondary to lower extremity cellulitis  Hemodynamically stabilized and transitioned to the medical floor  See plan for bacteremia/cellulitis below  Streptococcal bacteremia  1 of 2 bottles from 2/26 growing Streptococcus pyogenes  IV Ancef now transitioned to oral Duricef through 3/15  Cellulitis of lower extremity  As stated above, IV Ancef now transitioned to oral Duricef  Likely source of bacteremia  Acute renal failure superimposed on stage 3a chronic kidney disease (HCC)  Baseline creatinine of approximately 1.1-1.3 -> elevated but improved at 3.37 from a prior 4.34 peak  Nephrology following -> plan for diuretic (Lasix) trial today  Monitor renal function and urine output  Limit/avoid nephrotoxins and hypotension as possible - holding ARB (Cozaar)  Hypercholesteremia  Continue statin  Class 3 severe obesity due to excess calories with serious comorbidity and body mass index (BMI) of 40.0 to 44.9 in adult ([facility])  BMI of 43.29 currently  Lifestyle/diet modifications  Lymphedema of both lower extremities  Encourage leg elevation  Status post IV Lasix dosing by  nephrology yesterday, with plan to transition to twice daily oral dosing today  Atrial fibrillation with RVR (HCC)  Rate controlled on Lopressor  Not on chronic anticoagulation  Urinary retention  Status post Ramirez catheter placement -> consider voiding trial  Appreciate urology input ->   Flomax initiated  Alcohol abuse  Cessation counseling  Continue thiamine/folic acid and multivitamin supplementation  Hyponatremia  Serum sodium improved at 133 today with better blood sugar control and diuretic trial  On fluid restriction  Plan for diuretic trial by nephrology today  Hypomagnesemia  Monitor/replete serum magnesium  Chronic anemia  H/H stable      VTE Pharmacologic Prophylaxis: VTE Score: 5 High Risk (Score >/= 5) - Pharmacological DVT Prophylaxis Ordered: Heparin.     AM-PAC Basic Mobility:  Basic Mobility Inpatient Raw Score: 6  -HLM Goal: 2: Bed activities/Dependent transfer  JH-HLM Achieved: 5: Stand (1 or more minutes)  JH-HLM Goal NOT achieved. Continue with multidisciplinary rounding and encourage appropriate mobility to improve upon -HLM goals.    Patient Centered Rounds:  I have performed bedside rounds and discussed plan of care with nursing today.  Discussions with Specialists or Other Care Team Provider:  see above assessments if applicable    Education and Discussions with Family / Patient:  Patient at bedside, who will self update wife today    Time Spent for Care:  35 minutes. More than 50% of total time spent on counseling and coordination of care, on one or more of the following: performing physical exam; counseling and coordination of care, obtaining or reviewing history, documenting in the medical record, reviewing/ordering tests/medications/procedures, and communicating with other healthcare professionals.    Current Length of Stay: 9 day(s)  Current Patient Status: Inpatient   Certification Statement:  Patient will continue to require additional hospital stay due to assessments as noted  above.    Code Status: Level 3 - DNAR and DNI      Subjective     Encountered earlier in the day.  More alert and awake currently.  Appetite is white/waning but starting to slowly improve.      Objective     Vitals:   Temp (24hrs), Av.7 °F (37.1 °C), Min:97.6 °F (36.4 °C), Max:99.9 °F (37.7 °C)    Temp:  [97.6 °F (36.4 °C)-99.9 °F (37.7 °C)] 98.7 °F (37.1 °C)  HR:  [] 92  Resp:  [18-20] 18  BP: (130-154)/(70-82) 130/71  SpO2:  [94 %-96 %] 96 %  Body mass index is 43.29 kg/m².     Input and Output Summary (last 24 hours):       Intake/Output Summary (Last 24 hours) at 3/7/2025 1503  Last data filed at 3/7/2025 1238  Gross per 24 hour   Intake 600 ml   Output 1725 ml   Net -1125 ml       Physical Exam:     GENERAL Weak/fatigued - obese    HEAD   Normocephalic - atraumatic   EYES Nonicteric   MOUTH   Mucosa moist   NECK   Supple - full range of motion   CARDIAC Mildly tachycardic   PULMONARY Clear bilateral breath sounds   ABDOMEN Nontender/nondistended   MUSCULOSKELETAL Motor strength/range of motion deconditioned   NEUROLOGIC   Alert/oriented at baseline   SKIN   Chronic wrinkles/blemishes - distal LLE erythema with areas of blistering   PSYCHIATRIC   Mood/affect stable         Labs & Recent Cultures:  Results from last 7 days   Lab Units 25  0456   WBC Thousand/uL 19.77* 22.38*   HEMOGLOBIN g/dL 10.7* 10.6*   HEMATOCRIT % 32.9* 31.3*   PLATELETS Thousands/uL 399* 431*   BANDS PCT % 4  --    SEGS PCT %  --  68   LYMPHO PCT % 13* 13*   MONO PCT % 7 7   EOS PCT % 3 2     Results from last 7 days   Lab Units 25  0525  0459   POTASSIUM mmol/L 3.9   < > 3.6   CHLORIDE mmol/L 101   < > 103   CO2 mmol/L 22   < > 20*   BUN mg/dL 56*   < > 60*   CREATININE mg/dL 3.37*   < > 3.69*   CALCIUM mg/dL 8.0*   < > 7.9*   ALK PHOS U/L  --   --  38   ALT U/L  --   --  43   AST U/L  --   --  60*    < > = values in this interval not displayed.     Results from last 7 days   Lab  Units 03/03/25  0546   INR  1.30*     Results from last 7 days   Lab Units 03/07/25  1116 03/07/25  0733 03/06/25  2126 03/06/25  1558 03/06/25  1128 03/06/25  0709 03/05/25  2109 03/05/25  1640 03/05/25  1116 03/05/25  0815 03/05/25  0746 03/05/25  0600   POC GLUCOSE mg/dl 259* 189* 299* 267* 261* 249* 281* 270* 209* 174* 140 127         Results from last 7 days   Lab Units 03/04/25  0410 03/03/25  1021 03/03/25  0546 03/02/25  0539 03/01/25  2100 03/01/25  0519   LACTIC ACID mmol/L  --  1.3  --   --  1.3  --    PROCALCITONIN ng/ml 1.86*  --  2.99* 5.23*  --  8.70*         Results from last 7 days   Lab Units 03/03/25  0319 03/02/25  2144   BLOOD CULTURE   --  No Growth After 4 Days.  No Growth After 4 Days.   GRAM STAIN RESULT  No Polys or Bacteria seen  --    WOUND CULTURE  Few Colonies of Staphylococcus aureus*  --          Lines/Drains/Telemetry:  Invasive Devices       Peripheral Intravenous Line  Duration             Peripheral IV 03/04/25 Dorsal (posterior);Right Forearm 3 days                    Last 24 Hours Medication List:   Current Facility-Administered Medications   Medication Dose Route Frequency Provider Last Rate    acetaminophen  650 mg Oral Q6H PRN JIMMY Huff      albuterol  2.5 mg Nebulization Q6H PRN JIMMY Huff      amLODIPine  10 mg Oral Daily Kiki Villarreal MD      aspirin  81 mg Oral Daily JIMMY Huff      atorvastatin  40 mg Oral Daily With Dinner JIMMY Huff      azelastine  1 spray Each Nare BID JIMMY Huff      cefadroxil  1,000 mg Oral Q12H MARGARITA Lupillo Alicea PA-C      chlorhexidine  15 mL Mouth/Throat Q12H [facility] JIMMY Huff      Cholecalciferol  2,000 Units Oral Daily JIMMY Huff      folic acid  1 mg Oral Daily JIMMY Huff      furosemide  40 mg Oral BID (diuretic) Brian Quan PA-C      gabapentin  200 mg Oral HS JIMMY Huff       guaiFENesin  600 mg Oral Q12H MARGARITA JIMMY Huff      heparin (porcine)  7,500 Units Subcutaneous Q8H MARGARITA JIMMY Huff      hydrALAZINE  10 mg Intravenous Q6H PRN JIMMY Huff      insulin aspart protamine-insulin aspart  25 Units Subcutaneous BID AC Kiki Villarreal MD      insulin lispro  2-12 Units Subcutaneous 4x Daily (AC & HS) Kiki Villarreal MD      labetalol  10 mg Intravenous Q4H PRN Kiki Villarreal MD      melatonin  6 mg Oral HS JIMMY Huff      metoprolol tartrate  50 mg Oral Q12H MARGARITA JIMMY Huff      multivitamin-minerals  1 tablet Oral Daily Kiki Villarreal MD      sod citrate-citric acid  30 mL Oral BID JIMMY Huff      tamsulosin  0.4 mg Oral Daily With Dinner Jonny Miller MD      thiamine  100 mg Oral Daily JIMMY Huff      vitamin E (tocopherol)  400 Units Oral Daily JIMMY Huff MD   Hospitalist - [facility] Internal Medicine        ** Please Note:  Documentation is constructed using a voice recognition dictation system.  An occasional wrong word/phrase or “sound-a-like” substitution may have been picked up by dictation device due to the inherent limitations of voice recognition software.  Read the chart carefully and recognize, using reasonable context, where substitutions may have occurred.**

## 2025-03-07 NOTE — ASSESSMENT & PLAN NOTE
Serum sodium improved at 133 today with better blood sugar control and diuretic trial  On fluid restriction  Plan for diuretic trial by nephrology today

## 2025-03-08 PROBLEM — R19.7 DIARRHEA: Status: RESOLVED | Noted: 2025-03-03 | Resolved: 2025-03-08

## 2025-03-08 LAB
ANION GAP SERPL CALCULATED.3IONS-SCNC: 7 MMOL/L (ref 4–13)
BACTERIA BLD CULT: NORMAL
BACTERIA BLD CULT: NORMAL
BUN SERPL-MCNC: 57 MG/DL (ref 5–25)
CALCIUM SERPL-MCNC: 7.9 MG/DL (ref 8.4–10.2)
CHLORIDE SERPL-SCNC: 101 MMOL/L (ref 96–108)
CO2 SERPL-SCNC: 24 MMOL/L (ref 21–32)
CREAT SERPL-MCNC: 3.14 MG/DL (ref 0.6–1.3)
ERYTHROCYTE [DISTWIDTH] IN BLOOD BY AUTOMATED COUNT: 14 % (ref 11.6–15.1)
GFR SERPL CREATININE-BSD FRML MDRD: 19 ML/MIN/1.73SQ M
GLUCOSE SERPL-MCNC: 173 MG/DL (ref 65–140)
GLUCOSE SERPL-MCNC: 182 MG/DL (ref 65–140)
GLUCOSE SERPL-MCNC: 220 MG/DL (ref 65–140)
GLUCOSE SERPL-MCNC: 232 MG/DL (ref 65–140)
GLUCOSE SERPL-MCNC: 285 MG/DL (ref 65–140)
HCT VFR BLD AUTO: 29.7 % (ref 36.5–49.3)
HGB BLD-MCNC: 9.5 G/DL (ref 12–17)
MAGNESIUM SERPL-MCNC: 2 MG/DL (ref 1.9–2.7)
MCH RBC QN AUTO: 30.5 PG (ref 26.8–34.3)
MCHC RBC AUTO-ENTMCNC: 32 G/DL (ref 31.4–37.4)
MCV RBC AUTO: 96 FL (ref 82–98)
PLATELET # BLD AUTO: 375 THOUSANDS/UL (ref 149–390)
PMV BLD AUTO: 8.5 FL (ref 8.9–12.7)
POTASSIUM SERPL-SCNC: 4.3 MMOL/L (ref 3.5–5.3)
RBC # BLD AUTO: 3.11 MILLION/UL (ref 3.88–5.62)
SODIUM SERPL-SCNC: 132 MMOL/L (ref 135–147)
WBC # BLD AUTO: 17.46 THOUSAND/UL (ref 4.31–10.16)

## 2025-03-08 PROCEDURE — 99232 SBSQ HOSP IP/OBS MODERATE 35: CPT | Performed by: INTERNAL MEDICINE

## 2025-03-08 PROCEDURE — 83735 ASSAY OF MAGNESIUM: CPT | Performed by: INTERNAL MEDICINE

## 2025-03-08 PROCEDURE — 80048 BASIC METABOLIC PNL TOTAL CA: CPT | Performed by: INTERNAL MEDICINE

## 2025-03-08 PROCEDURE — 99232 SBSQ HOSP IP/OBS MODERATE 35: CPT

## 2025-03-08 PROCEDURE — 85027 COMPLETE CBC AUTOMATED: CPT | Performed by: INTERNAL MEDICINE

## 2025-03-08 PROCEDURE — 82948 REAGENT STRIP/BLOOD GLUCOSE: CPT

## 2025-03-08 RX ORDER — INSULIN ASPART 100 [IU]/ML
28 INJECTION, SUSPENSION SUBCUTANEOUS
Status: DISCONTINUED | OUTPATIENT
Start: 2025-03-08 | End: 2025-03-09

## 2025-03-08 RX ADMIN — INSULIN ASPART 28 UNITS: 100 INJECTION, SUSPENSION SUBCUTANEOUS at 16:50

## 2025-03-08 RX ADMIN — AZELASTINE HYDROCHLORIDE 1 SPRAY: 137 SPRAY, METERED NASAL at 09:49

## 2025-03-08 RX ADMIN — METOPROLOL TARTRATE 50 MG: 50 TABLET, FILM COATED ORAL at 09:47

## 2025-03-08 RX ADMIN — CHOLECALCIFEROL TAB 25 MCG (1000 UNIT) 2000 UNITS: 25 TAB at 09:45

## 2025-03-08 RX ADMIN — GABAPENTIN 200 MG: 100 CAPSULE ORAL at 21:06

## 2025-03-08 RX ADMIN — AMLODIPINE BESYLATE 10 MG: 10 TABLET ORAL at 09:47

## 2025-03-08 RX ADMIN — Medication 1 TABLET: at 09:45

## 2025-03-08 RX ADMIN — INSULIN LISPRO 4 UNITS: 100 INJECTION, SOLUTION INTRAVENOUS; SUBCUTANEOUS at 12:05

## 2025-03-08 RX ADMIN — ATORVASTATIN CALCIUM 40 MG: 40 TABLET, FILM COATED ORAL at 16:51

## 2025-03-08 RX ADMIN — CEFADROXIL 1000 MG: 500 CAPSULE ORAL at 10:13

## 2025-03-08 RX ADMIN — AZELASTINE HYDROCHLORIDE 1 SPRAY: 137 SPRAY, METERED NASAL at 17:26

## 2025-03-08 RX ADMIN — TAMSULOSIN HYDROCHLORIDE 0.4 MG: 0.4 CAPSULE ORAL at 16:51

## 2025-03-08 RX ADMIN — GUAIFENESIN 600 MG: 600 TABLET ORAL at 09:45

## 2025-03-08 RX ADMIN — INSULIN ASPART 25 UNITS: 100 INJECTION, SUSPENSION SUBCUTANEOUS at 08:17

## 2025-03-08 RX ADMIN — INSULIN LISPRO 4 UNITS: 100 INJECTION, SOLUTION INTRAVENOUS; SUBCUTANEOUS at 16:50

## 2025-03-08 RX ADMIN — FUROSEMIDE 40 MG: 40 TABLET ORAL at 08:15

## 2025-03-08 RX ADMIN — INSULIN LISPRO 6 UNITS: 100 INJECTION, SOLUTION INTRAVENOUS; SUBCUTANEOUS at 21:10

## 2025-03-08 RX ADMIN — FOLIC ACID 1 MG: 1 TABLET ORAL at 09:45

## 2025-03-08 RX ADMIN — CHLORHEXIDINE GLUCONATE 15 ML: 1.2 SOLUTION ORAL at 09:45

## 2025-03-08 RX ADMIN — GUAIFENESIN 600 MG: 600 TABLET ORAL at 21:06

## 2025-03-08 RX ADMIN — CEFADROXIL 1000 MG: 500 CAPSULE ORAL at 21:05

## 2025-03-08 RX ADMIN — THIAMINE HCL TAB 100 MG 100 MG: 100 TAB at 09:45

## 2025-03-08 RX ADMIN — CHLORHEXIDINE GLUCONATE 15 ML: 1.2 SOLUTION ORAL at 21:07

## 2025-03-08 RX ADMIN — TRISODIUM CITRATE DIHYDRATE AND CITRIC ACID MONOHYDRATE 30 ML: 500; 334 SOLUTION ORAL at 21:08

## 2025-03-08 RX ADMIN — Medication 6 MG: at 21:05

## 2025-03-08 RX ADMIN — HEPARIN SODIUM 7500 UNITS: 5000 INJECTION INTRAVENOUS; SUBCUTANEOUS at 21:09

## 2025-03-08 RX ADMIN — FUROSEMIDE 40 MG: 40 TABLET ORAL at 16:51

## 2025-03-08 RX ADMIN — INSULIN LISPRO 2 UNITS: 100 INJECTION, SOLUTION INTRAVENOUS; SUBCUTANEOUS at 08:17

## 2025-03-08 RX ADMIN — TRISODIUM CITRATE DIHYDRATE AND CITRIC ACID MONOHYDRATE 30 ML: 500; 334 SOLUTION ORAL at 09:51

## 2025-03-08 RX ADMIN — ASPIRIN 81 MG: 81 TABLET, COATED ORAL at 09:45

## 2025-03-08 RX ADMIN — HEPARIN SODIUM 7500 UNITS: 5000 INJECTION INTRAVENOUS; SUBCUTANEOUS at 04:49

## 2025-03-08 RX ADMIN — Medication 400 UNITS: at 09:45

## 2025-03-08 RX ADMIN — HEPARIN SODIUM 7500 UNITS: 5000 INJECTION INTRAVENOUS; SUBCUTANEOUS at 14:16

## 2025-03-08 RX ADMIN — METOPROLOL TARTRATE 50 MG: 50 TABLET, FILM COATED ORAL at 21:06

## 2025-03-08 NOTE — PROGRESS NOTES
Progress Note - Nephrology   Name: Nadeem Purdy Jr. 69 y.o. male I MRN: [id number]  Unit/Bed#: -01 I Date of Admission: 2/26/2025   Date of Service: 3/8/2025 I Hospital Day: 10     Assessment & Plan  Acute renal failure superimposed on stage 3a chronic kidney disease (HCC)  Lab Results   Component Value Date    EGFR 19 03/08/2025    EGFR 17 03/07/2025    EGFR 16 03/06/2025    CREATININE 3.14 (H) 03/08/2025    CREATININE 3.37 (H) 03/07/2025    CREATININE 3.49 (H) 03/06/2025   Baseline creatinine appears to be 1.1-1.3 mg/dL since 2022; not under care of nephrology. 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  Grade A2 albuminuria, no RBCs/UA bland when checked in steady state.  Kidneys without obstruction on unenhanced imaging.  Now with acute kidney injury creatinine unchanged high threes, low fours.      Suspect etiology ATN in setting of severe sepsis, volume and hemodynamic perturbations (s/p crystalloid/colloid/diuretic), vasomotor dysfunction due to ARB, urinary retention status post Ramirez.  CK minimally elevated upon presentation and likely noncontributory to LUIS ALFREDO.  UA significant for glucosuria, 2+ protein, 1-2 RBC, 1-2 coarse granular casts.    Plan as of Monday, March 8  The ATN is improving, he is in the diuretic/recovery phase.  Creatinine is improving from 3.4-3.1.  He was started on Lasix 40 mg twice daily yesterday.  Continue with current therapy  Diabetic ketoacidosis without coma associated with type 2 diabetes mellitus (HCC)  Lab Results   Component Value Date    HGBA1C 8.9 (H) 02/26/2025       Recent Labs     03/07/25  1637 03/07/25  2051 03/08/25  0709 03/08/25  1140   POCGLU 290* 246* 173* 220*       Blood Sugar Average: Last 72 hrs:  (P) [id number]      Essential hypertension  Continue to hold losartan, continue with metoprolol, start Lasix 40 mg twice daily  Hyponatremia  2 L fluid restriction  Urinary retention  Management per  urology  Metabolic acidosis  Bicarb improving to 22, discontinue Bicitra as able  Severe sepsis (HCC)  Continue management per ID colleagues   Atrial fibrillation with RVR (HCC)  Continue management per primary team   Diarrhea (Resolved: 3/8/2025)  resolved  Streptococcal bacteremia    Chronic anemia          Subjective   Brief History of Admission - 68 yo M with h/o obesity, DM2, CKD stage 3a, HTN, b/l LE edema p/w hyperglycemia, polyuria, polydipsia. He was found to have DKA, cellulitis, sepsis, GAS bacteremia, and diarrhea. Nephrology consulted for LUIS ALFREDO.  He feels well today no complaints        Objective :  Temp:  [98.3 °F (36.8 °C)-98.7 °F (37.1 °C)] 98.5 °F (36.9 °C)  HR:  [] 96  BP: (130-151)/(62-79) 151/78  Resp:  [18] 18  SpO2:  [93 %-96 %] 93 %  O2 Device: None (Room air)    Current Weight: Weight - Scale: (!) 141 kg (310 lb 6.5 oz)  First Weight: Weight - Scale: (!) 143 kg (315 lb 0.6 oz)  I/O         03/06 0701  03/07 0700 03/07 0701  03/08 0700 03/08 0701  03/09 0700    P.O. 780 1620 780    I.V. (mL/kg)       Total Intake(mL/kg) 780 (5.5) 1620 (11.5) 780 (5.5)    Urine (mL/kg/hr) 2775 (0.8) 1336 (0.4) 900 (0.9)    Stool 0      Total Output 2775 1336 900    Net -1995 +284 -120           Unmeasured Stool Occurrence 1 x            Physical Exam  Vitals and nursing note reviewed.   Constitutional:       General: He is not in acute distress.     Appearance: He is well-developed.   HENT:      Head: Normocephalic and atraumatic.   Eyes:      Conjunctiva/sclera: Conjunctivae normal.   Cardiovascular:      Rate and Rhythm: Normal rate and regular rhythm.      Heart sounds: No murmur heard.  Pulmonary:      Effort: Pulmonary effort is normal. No respiratory distress.      Breath sounds: Normal breath sounds.   Abdominal:      Palpations: Abdomen is soft.      Tenderness: There is no abdominal tenderness.   Musculoskeletal:      Cervical back: Neck supple.      Right lower leg: Edema present.      Left lower  leg: Edema present.   Skin:     General: Skin is warm and dry.      Capillary Refill: Capillary refill takes less than 2 seconds.   Neurological:      Mental Status: He is alert.   Psychiatric:         Mood and Affect: Mood normal.         Medications:    Current Facility-Administered Medications:     acetaminophen (TYLENOL) tablet 650 mg, 650 mg, Oral, Q6H PRN, Brittany Tianna Bilofsky, CRNP, 650 mg at 02/26/25 2322    albuterol inhalation solution 2.5 mg, 2.5 mg, Nebulization, Q6H PRN, Brittany Tianna Bilofsky, CRNP, 2.5 mg at 03/01/25 0527    amLODIPine (NORVASC) tablet 10 mg, 10 mg, Oral, Daily, Kiki Villarreal MD, 10 mg at 03/08/25 0947    aspirin (ECOTRIN LOW STRENGTH) EC tablet 81 mg, 81 mg, Oral, Daily, Brittany Tianna Bilofronaldo, CRNP, 81 mg at 03/08/25 0945    atorvastatin (LIPITOR) tablet 40 mg, 40 mg, Oral, Daily With Dinner, Brittany Tianna Johnsonofronaldo, CRNP, 40 mg at 03/07/25 1706    azelastine (ASTELIN) 0.1 % nasal spray 1 spray, 1 spray, Each Nare, BID, Brittany Tianna Smith, CRNP, 1 spray at 03/08/25 0949    cefadroxil (DURICEF) capsule 1,000 mg, 1,000 mg, Oral, Q12H MARGARITA, Lupillo Alicea PA-C, 1,000 mg at 03/08/25 1013    chlorhexidine (PERIDEX) 0.12 % oral rinse 15 mL, 15 mL, Mouth/Throat, Q12H MARGARITA, Brittanyrebeca Johnsonofronaldo, CRNP, 15 mL at 03/08/25 0945    Cholecalciferol (VITAMIN D3) tablet 2,000 Units, 2,000 Units, Oral, Daily, Brittany Tianna Bilofsky, CRNP, 2,000 Units at 03/08/25 0945    folic acid (FOLVITE) tablet 1 mg, 1 mg, Oral, Daily, Brittany Tianna Bilofronaldo, CRNP, 1 mg at 03/08/25 0945    furosemide (LASIX) tablet 40 mg, 40 mg, Oral, BID (diuretic), Brian Quan PA-C, 40 mg at 03/08/25 0815    gabapentin (NEURONTIN) capsule 200 mg, 200 mg, Oral, HS, JIMMY Huff, 200 mg at 03/07/25 2132    guaiFENesin (MUCINEX) 12 hr tablet 600 mg, 600 mg, Oral, Q12H MARGARITA, JIMMY Huff, 600 mg at 03/08/25 0945    heparin (porcine) subcutaneous injection 7,500 Units, 7,500 Units, Subcutaneous,  Q8H MARGARITA, JIMMY Huff, 7,500 Units at 03/08/25 0449    hydrALAZINE (APRESOLINE) injection 10 mg, 10 mg, Intravenous, Q6H PRN, JIMMY Huff, 10 mg at 03/05/25 1120    insulin aspart protamine-insulin aspart (NovoLOG 70/30) 100 units/mL subcutaneous injection 28 Units, 28 Units, Subcutaneous, BID AC, Kiki Villarreal MD    insulin lispro (HumALOG/ADMELOG) 100 units/mL subcutaneous injection 2-12 Units, 2-12 Units, Subcutaneous, 4x Daily (AC & HS), 4 Units at 03/08/25 1205 **AND** Fingerstick Glucose (POCT), , , 4x Daily AC and at bedtime, Kiki Villarreal MD    labetalol (NORMODYNE) injection 10 mg, 10 mg, Intravenous, Q4H PRN, Kiki Villarreal MD    melatonin tablet 6 mg, 6 mg, Oral, HS, JIMMY Huff, 6 mg at 03/07/25 2132    metoprolol tartrate (LOPRESSOR) tablet 50 mg, 50 mg, Oral, Q12H MARGARITA, JIMMY Huff, 50 mg at 03/08/25 0947    multivitamin-minerals (CENTRUM) tablet 1 tablet, 1 tablet, Oral, Daily, Kiki Villarreal MD, 1 tablet at 03/08/25 0945    sod citrate-citric acid (BICITRA) oral solution 30 mL, 30 mL, Oral, BID, JIMMY Huff, 30 mL at 03/08/25 0951    tamsulosin (FLOMAX) capsule 0.4 mg, 0.4 mg, Oral, Daily With Dinner, Jonny Miller MD, 0.4 mg at 03/07/25 1706    thiamine tablet 100 mg, 100 mg, Oral, Daily, JIMMY Huff, 100 mg at 03/08/25 0945    vitamin E (TOCOPHEROL) capsule 400 Units, 400 Units, Oral, Daily, JIMMY Huff, 400 Units at 03/08/25 0945      Lab Results: I have reviewed the following results:  Results from last 7 days   Lab Units 03/08/25  0443 03/07/25  0523 03/06/25  0456 03/05/25  2042 03/05/25  0459 03/04/25  1818 03/04/25  0410 03/03/25  2357 03/03/25  1625 03/03/25  1021 03/03/25  0546 03/03/25  0435 03/02/25  2149 03/02/25  1636 03/02/25  0539   WBC Thousand/uL 17.46* 19.77* 22.38*  --  25.47*  --  26.37*  --   --   --   --  28.19*  --   --  24.47*   HEMOGLOBIN g/dL 9.5* 10.7* 10.6*  --   "10.0*  --  9.8*  --   --   --   --  10.3*  --   --  11.0*   HEMATOCRIT % 29.7* 32.9* 31.3*  --  29.9*  --  29.1*  --   --   --   --  30.7*  --   --  32.9*   PLATELETS Thousands/uL 375 399* 431*  --  444*  --  412*  --   --   --   --  386  --   --  300   POTASSIUM mmol/L 4.3 3.9 4.1 4.1 3.6 4.3 3.9 3.6 4.0   < > 4.3  --    < > 3.6 3.6   CHLORIDE mmol/L 101 101 100 101 103 103 103 106 103   < > 105  --    < > 101 102   CO2 mmol/L 24 22 20* 19* 20* 17* 16* 16* 16*   < > 16*  --    < > 19* 15*   BUN mg/dL 57* 56* 57* 59* 60* 63* 70* 70* 71*   < > 74*  --    < > 72* 71*   CREATININE mg/dL 3.14* 3.37* 3.49* 3.60* 3.69* 3.77* 4.17* 4.04* 4.20*   < > 4.32*  --    < > 4.24* 4.05*   CALCIUM mg/dL 7.9* 8.0* 7.8* 7.7* 7.9* 7.9* 8.0* 7.8* 8.3*   < > 7.8*  --    < > 8.4 8.0*   MAGNESIUM mg/dL 2.0 2.1 1.8*  --  1.9  --  2.0 2.0 2.1  --  2.1  --   --  2.2 2.1   PHOSPHORUS mg/dL  --  4.2*  --   --  3.7  --  3.5 2.5 2.4  --  3.1  --   --  2.4 3.4   ALBUMIN g/dL  --   --   --   --  2.5*  --  2.8*  --   --   --  2.5*  --   --   --  2.8*    < > = values in this interval not displayed.       Administrative Statements     Portions of the record may have been created with voice recognition software. Occasional wrong word or \"sound a like\" substitutions may have occurred due to the inherent limitations of voice recognition software. Read the chart carefully and recognize, using context, where substitutions have occurred.If you have any questions, please contact the dictating provider.  "

## 2025-03-08 NOTE — PROGRESS NOTES
Progress Note - Hospitalist   Name: Nadeem Purdy Jr. 69 y.o. male I MRN: [id number]  Unit/Bed#: -01 I Date of Admission: 2/26/2025   Date of Service: 3/8/2025 I Hospital Day: 10    Assessment & Plan  Diabetic ketoacidosis without coma associated with type 2 diabetes mellitus (HCC)  Lab Results   Component Value Date    HGBA1C 8.9 (H) 02/26/2025     Transferred out of ICU with transition off insulin drip to a basal 70/30 BID regimen with additional SSI coverage per Accu-Cheks -> increase 70/30 dosing to 28 units BID today   Encourage compliance to home insulin regimen  Hypoglycemia protocol  Carbohydrate restriction  Monitor/replete any potassium/phosphate derangements, as necessary  Severe sepsis ([facility])  Initially presented with fever, leukocytosis, tachypnea, with lactic acidosis in the setting of streptococcal bacteremia secondary to lower extremity cellulitis  Hemodynamically stabilized and transitioned to the medical floor  See plan for bacteremia/cellulitis below  Streptococcal bacteremia  1 of 2 bottles from 2/26 growing Streptococcus pyogenes  IV Ancef now transitioned to oral Duricef through 3/15  Cellulitis of lower extremity  As stated above, IV Ancef now transitioned to oral Duricef  Likely source of bacteremia  Acute renal failure superimposed on stage 3a chronic kidney disease (HCC)  Baseline creatinine of approximately 1.1-1.3 -> elevated but improved at 3.14 from a prior 4.34 peak  Nephrology following -> now on twice daily oral diuretic (Lasix) due to fluid overload  Monitor renal function and urine output  Limit/avoid nephrotoxins and hypotension as possible - holding ARB (Cozaar)  Hypercholesteremia  Continue statin  Class 3 severe obesity due to excess calories with serious comorbidity and body mass index (BMI) of 40.0 to 44.9 in adult ([facility])  BMI of 43.29 currently  Lifestyle/diet modifications  Lymphedema of both lower extremities  Encourage leg elevation  Status post IV Lasix dosing by  nephrology, now transitioned to twice daily oral dosing  Atrial fibrillation with RVR (HCC)  Rate controlled on Lopressor  Not on chronic anticoagulation  Urinary retention  Status post Ramirez catheter placement -> consider voiding trial  Appreciate urology input ->   Flomax initiated  Alcohol abuse  Cessation counseling  Continue thiamine/folic acid and multivitamin supplementation  Hyponatremia  Serum sodium improved at 132 today with better blood sugar control and diuretic trial  On fluid restriction and diuretics  Hypomagnesemia  Monitor/replete serum magnesium  Chronic anemia  H/H stable      VTE Pharmacologic Prophylaxis: VTE Score: 5 High Risk (Score >/= 5) - Pharmacological DVT Prophylaxis Ordered: Heparin.     AM-PAC Basic Mobility:  Basic Mobility Inpatient Raw Score: 11  JH-HLM Goal: 4: Move to chair/commode  JH-HLM Achieved: 4: Move to chair/commode  JH-HLM Goal NOT achieved. Continue with multidisciplinary rounding and encourage appropriate mobility to improve upon JH-HLM goals.    Patient Centered Rounds:  I have performed bedside rounds and discussed plan of care with nursing today.  Discussions with Specialists or Other Care Team Provider:  see above assessments if applicable    Education and Discussions with Family / Patient:  Patient at bedside, who self updates wife    Time Spent for Care:  35 minutes. More than 50% of total time spent on counseling and coordination of care, on one or more of the following: performing physical exam; counseling and coordination of care, obtaining or reviewing history, documenting in the medical record, reviewing/ordering tests/medications/procedures, and communicating with other healthcare professionals.    Current Length of Stay: 10 day(s)  Current Patient Status: Inpatient   Certification Statement:  Patient will continue to require additional hospital stay due to assessments as noted above.    Code Status: Level 3 - DNAR and DNI      Subjective     Seen and  examined earlier today.  No new complaints at this time.  Overall, he remains in pleasant, and hopeful spirits.      Objective     Vitals:   Temp (24hrs), Av.5 °F (36.9 °C), Min:98.3 °F (36.8 °C), Max:98.7 °F (37.1 °C)    Temp:  [98.3 °F (36.8 °C)-98.7 °F (37.1 °C)] 98.5 °F (36.9 °C)  HR:  [] 96  Resp:  [18] 18  BP: (130-151)/(62-79) 151/78  SpO2:  [93 %-96 %] 93 %  Body mass index is 43.29 kg/m².     Input and Output Summary (last 24 hours):       Intake/Output Summary (Last 24 hours) at 3/8/2025 1349  Last data filed at 3/8/2025 1300  Gross per 24 hour   Intake 1800 ml   Output 1936 ml   Net -136 ml       Physical Exam:     GENERAL Waxing/waning weakness/fatigue - obese    HEAD   Normocephalic - atraumatic   EYES Nonicteric   MOUTH   Mucosa moist   NECK   Supple - full range of motion   CARDIAC Intermittently tachycardic   PULMONARY Fairly clear to auscultation   ABDOMEN Nontender/nondistended   MUSCULOSKELETAL Motor strength/range of motion deconditioned   NEUROLOGIC Alert/oriented at baseline   SKIN   Chronic wrinkles/blemishes - distal LLE erythema with areas of blistering   PSYCHIATRIC   Mood/affect stable         Labs & Recent Cultures:  Results from last 7 days   Lab Units 25  0456   WBC Thousand/uL 17.46* 19.77* 22.38*   HEMOGLOBIN g/dL 9.5* 10.7* 10.6*   HEMATOCRIT % 29.7* 32.9* 31.3*   PLATELETS Thousands/uL 375 399* 431*   BANDS PCT %  --  4  --    SEGS PCT %  --   --  68   LYMPHO PCT %  --  13* 13*   MONO PCT %  --  7 7   EOS PCT %  --  3 2     Results from last 7 days   Lab Units 25  0459   POTASSIUM mmol/L 4.3   < > 3.6   CHLORIDE mmol/L 101   < > 103   CO2 mmol/L 24   < > 20*   BUN mg/dL 57*   < > 60*   CREATININE mg/dL 3.14*   < > 3.69*   CALCIUM mg/dL 7.9*   < > 7.9*   ALK PHOS U/L  --   --  38   ALT U/L  --   --  43   AST U/L  --   --  60*    < > = values in this interval not displayed.     Results from last 7 days    Lab Units 03/03/25  0546   INR  1.30*     Results from last 7 days   Lab Units 03/08/25  1140 03/08/25  0709 03/07/25  2051 03/07/25  1637 03/07/25  1116 03/07/25  0733 03/06/25  2126 03/06/25  1558 03/06/25  1128 03/06/25  0709 03/05/25  2109 03/05/25  1640   POC GLUCOSE mg/dl 220* 173* 246* 290* 259* 189* 299* 267* 261* 249* 281* 270*         Results from last 7 days   Lab Units 03/04/25  0410 03/03/25  1021 03/03/25  0546 03/02/25  0539 03/01/25  2100   LACTIC ACID mmol/L  --  1.3  --   --  1.3   PROCALCITONIN ng/ml 1.86*  --  2.99* 5.23*  --          Results from last 7 days   Lab Units 03/03/25  0319 03/02/25  2144   BLOOD CULTURE   --  No Growth After 5 Days.  No Growth After 5 Days.   GRAM STAIN RESULT  No Polys or Bacteria seen  --    WOUND CULTURE  Few Colonies of Staphylococcus aureus*  --          Lines/Drains/Telemetry:  Invasive Devices       Peripheral Intravenous Line  Duration             Peripheral IV 03/08/25 Dorsal (posterior);Left Hand <1 day                    Last 24 Hours Medication List:   Current Facility-Administered Medications   Medication Dose Route Frequency Provider Last Rate    acetaminophen  650 mg Oral Q6H PRN JIMMY Huff      albuterol  2.5 mg Nebulization Q6H PRN JMIMY Huff      amLODIPine  10 mg Oral Daily Kiki Villarreal MD      aspirin  81 mg Oral Daily JIMMY Huff      atorvastatin  40 mg Oral Daily With Dinner JIMMY Huff      azelastine  1 spray Each Nare BID JIMMY Huff      cefadroxil  1,000 mg Oral Q12H [facility] Lupillo Alicea PA-C      chlorhexidine  15 mL Mouth/Throat Q12H [facility] JIMMY Huff      Cholecalciferol  2,000 Units Oral Daily JIMMY Huff      folic acid  1 mg Oral Daily JIMMY Huff      furosemide  40 mg Oral BID (diuretic) Brian Quan PA-C      gabapentin  200 mg Oral HS JIMMY Huff      guaiFENesin  600 mg Oral  Q12H MARGARITA JIMMY Huff      heparin (porcine)  7,500 Units Subcutaneous Q8H MARGARITA JIMMY Huff      hydrALAZINE  10 mg Intravenous Q6H PRN JIMMY Huff      insulin aspart protamine-insulin aspart  28 Units Subcutaneous BID AC Kiki Villarreal MD      insulin lispro  2-12 Units Subcutaneous 4x Daily (AC & HS) Kiki Villarreal MD      labetalol  10 mg Intravenous Q4H PRN Kiki Villarreal MD      melatonin  6 mg Oral HS JIMMY Huff      metoprolol tartrate  50 mg Oral Q12H MARGARITA JIMMY Huff      multivitamin-minerals  1 tablet Oral Daily Kiki Villarreal MD      sod citrate-citric acid  30 mL Oral BID JIMMY Huff      tamsulosin  0.4 mg Oral Daily With Dinner Jonny Miller MD      thiamine  100 mg Oral Daily JIMMY Huff      vitamin E (tocopherol)  400 Units Oral Daily JIMMY Huff MD   Hospitalist - [facility]        ** Please Note:  Documentation is constructed using a voice recognition dictation system.  An occasional wrong word/phrase or “sound-a-like” substitution may have been picked up by dictation device due to the inherent limitations of voice recognition software.  Read the chart carefully and recognize, using reasonable context, where substitutions may have occurred.**

## 2025-03-08 NOTE — ASSESSMENT & PLAN NOTE
Serum sodium improved at 132 today with better blood sugar control and diuretic trial  On fluid restriction and diuretics

## 2025-03-08 NOTE — ASSESSMENT & PLAN NOTE
Encourage leg elevation  Status post IV Lasix dosing by nephrology, now transitioned to twice daily oral dosing

## 2025-03-08 NOTE — ASSESSMENT & PLAN NOTE
Lab Results   Component Value Date    EGFR 19 03/08/2025    EGFR 17 03/07/2025    EGFR 16 03/06/2025    CREATININE 3.14 (H) 03/08/2025    CREATININE 3.37 (H) 03/07/2025    CREATININE 3.49 (H) 03/06/2025   Baseline creatinine appears to be 1.1-1.3 mg/dL since 2022; not under care of nephrology. 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  Grade A2 albuminuria, no RBCs/UA bland when checked in steady state.  Kidneys without obstruction on unenhanced imaging.  Now with acute kidney injury creatinine unchanged high threes, low fours.      Suspect etiology ATN in setting of severe sepsis, volume and hemodynamic perturbations (s/p crystalloid/colloid/diuretic), vasomotor dysfunction due to ARB, urinary retention status post Ramirez.  CK minimally elevated upon presentation and likely noncontributory to LUIS ALFREDO.  UA significant for glucosuria, 2+ protein, 1-2 RBC, 1-2 coarse granular casts.    Plan as of Monday, March 8  The ATN is improving, he is in the diuretic/recovery phase.  Creatinine is improving from 3.4-3.1.  He was started on Lasix 40 mg twice daily yesterday.  Continue with current therapy

## 2025-03-08 NOTE — ASSESSMENT & PLAN NOTE
Lab Results   Component Value Date    HGBA1C 8.9 (H) 02/26/2025     Transferred out of ICU with transition off insulin drip to a basal 70/30 BID regimen with additional SSI coverage per Accu-Cheks -> increase 70/30 dosing to 28 units BID today   Encourage compliance to home insulin regimen  Hypoglycemia protocol  Carbohydrate restriction  Monitor/replete any potassium/phosphate derangements, as necessary

## 2025-03-08 NOTE — ASSESSMENT & PLAN NOTE
Lab Results   Component Value Date    HGBA1C 8.9 (H) 02/26/2025       Recent Labs     03/07/25  1637 03/07/25 2051 03/08/25  0709 03/08/25  1140   POCGLU 290* 246* 173* 220*       Blood Sugar Average: Last 72 hrs:  (P) [id number]

## 2025-03-08 NOTE — PLAN OF CARE
Problem: Prexisting or High Potential for Compromised Skin Integrity  Goal: Skin integrity is maintained or improved  Description: INTERVENTIONS:  - Identify patients at risk for skin breakdown  - Assess and monitor skin integrity  - Assess and monitor nutrition and hydration status  - Monitor labs   - Assess for incontinence   - Turn and reposition patient  - Assist with mobility/ambulation  - Relieve pressure over bony prominences  - Avoid friction and shearing  - Provide appropriate hygiene as needed including keeping skin clean and dry  - Evaluate need for skin moisturizer/barrier cream  - Collaborate with interdisciplinary team   - Patient/family teaching  - Consider wound care consult   Outcome: Progressing    Problem: INFECTION - ADULT  Goal: Absence or prevention of progression during hospitalization  Description: INTERVENTIONS:  - Assess and monitor for signs and symptoms of infection  - Monitor lab/diagnostic results  - Monitor all insertion sites, i.e. indwelling lines, tubes, and drains  - Monitor endotracheal if appropriate and nasal secretions for changes in amount and color  - [place] appropriate cooling/warming therapies per order  - Administer medications as ordered  - Instruct and encourage patient and family to use good hand hygiene technique  - Identify and instruct in appropriate isolation precautions for identified infection/condition  Outcome: Progressing     Problem: CARDIOVASCULAR - ADULT  Goal: Maintains optimal cardiac output and hemodynamic stability  Description: INTERVENTIONS:  - Monitor I/O, vital signs and rhythm  - Monitor for S/S and trends of decreased cardiac output  - Administer and titrate ordered vasoactive medications to optimize hemodynamic stability  - Assess quality of pulses, skin color and temperature  - Assess for signs of decreased coronary artery perfusion  - Instruct patient to report change in severity of symptoms  Outcome: Progressing     Problem: GASTROINTESTINAL  - ADULT  Goal: Minimal or absence of nausea and/or vomiting  Description: INTERVENTIONS:  - Administer IV fluids if ordered to ensure adequate hydration  - Maintain NPO status until nausea and vomiting are resolved  - Nasogastric tube if ordered  - Administer ordered antiemetic medications as needed  - Provide nonpharmacologic comfort measures as appropriate  - Advance diet as tolerated, if ordered  - Consider nutrition services referral to assist patient with adequate nutrition and appropriate food choices  Outcome: Progressing  Goal: Maintains adequate nutritional intake  Description: INTERVENTIONS:  - Monitor percentage of each meal consumed  - Identify factors contributing to decreased intake, treat as appropriate  - Assist with meals as needed  - Monitor I&O, weight, and lab values if indicated  - Obtain nutrition services referral as needed  Outcome: Progressing  Goal: Maintains or returns to baseline bowel function  Description: INTERVENTIONS:  - Assess bowel function  - Encourage oral fluids to ensure adequate hydration  - Administer IV fluids if ordered to ensure adequate hydration  - Administer ordered medications as needed  - Encourage mobilization and activity  - Consider nutritional services referral to assist patient with adequate nutrition and appropriate food choices  Outcome: Progressing     Problem: METABOLIC, FLUID AND ELECTROLYTES - ADULT  Goal: Electrolytes maintained within normal limits  Description: INTERVENTIONS:  - Monitor labs and assess patient for signs and symptoms of electrolyte imbalances  - Administer electrolyte replacement as ordered  - Monitor response to electrolyte replacements, including repeat lab results as appropriate  - Instruct patient on fluid and nutrition as appropriate  Outcome: Progressing  Goal: Fluid balance maintained  Description: INTERVENTIONS:  - Monitor labs   - Monitor I/O and WT  - Instruct patient on fluid and nutrition as appropriate  - Assess for signs &  symptoms of volume excess or deficit  Outcome: Progressing  Goal: Glucose maintained within target range  Description: INTERVENTIONS:  - Monitor Blood Glucose as ordered  - Assess for signs and symptoms of hyperglycemia and hypoglycemia  - Administer ordered medications to maintain glucose within target range  - Assess nutritional intake and initiate nutrition service referral as needed  Outcome: Progressing

## 2025-03-08 NOTE — ASSESSMENT & PLAN NOTE
Baseline creatinine of approximately 1.1-1.3 -> elevated but improved at 3.14 from a prior 4.34 peak  Nephrology following -> now on twice daily oral diuretic (Lasix) due to fluid overload  Monitor renal function and urine output  Limit/avoid nephrotoxins and hypotension as possible - holding ARB (Cozaar)

## 2025-03-08 NOTE — PLAN OF CARE
Problem: Prexisting or High Potential for Compromised Skin Integrity  Goal: Skin integrity is maintained or improved  Description: INTERVENTIONS:  - Identify patients at risk for skin breakdown  - Assess and monitor skin integrity  - Assess and monitor nutrition and hydration status  - Monitor labs   - Assess for incontinence   - Turn and reposition patient  - Assist with mobility/ambulation  - Relieve pressure over bony prominences  - Avoid friction and shearing  - Provide appropriate hygiene as needed including keeping skin clean and dry  - Evaluate need for skin moisturizer/barrier cream  - Collaborate with interdisciplinary team   - Patient/family teaching  - Consider wound care consult   Outcome: Progressing     Problem: PAIN - ADULT  Goal: Verbalizes/displays adequate comfort level or baseline comfort level  Description: Interventions:  - Encourage patient to monitor pain and request assistance  - Assess pain using appropriate pain scale  - Administer analgesics based on type and severity of pain and evaluate response  - Implement non-pharmacological measures as appropriate and evaluate response  - Consider cultural and social influences on pain and pain management  - Notify physician/advanced practitioner if interventions unsuccessful or patient reports new pain  Outcome: Progressing     Problem: INFECTION - ADULT  Goal: Absence or prevention of progression during hospitalization  Description: INTERVENTIONS:  - Assess and monitor for signs and symptoms of infection  - Monitor lab/diagnostic results  - Monitor all insertion sites, i.e. indwelling lines, tubes, and drains  - Monitor endotracheal if appropriate and nasal secretions for changes in amount and color  - [place] appropriate cooling/warming therapies per order  - Administer medications as ordered  - Instruct and encourage patient and family to use good hand hygiene technique  - Identify and instruct in appropriate isolation precautions for  identified infection/condition  Outcome: Progressing     Problem: INFECTION - ADULT  Goal: Absence of fever/infection during neutropenic period  Description: INTERVENTIONS:  - Monitor WBC    Outcome: Progressing     Problem: SAFETY ADULT  Goal: Patient will remain free of falls  Description: INTERVENTIONS:  - Educate patient/family on patient safety including physical limitations  - Instruct patient to call for assistance with activity   - Consult OT/PT to assist with strengthening/mobility   - Keep Call bell within reach  - Keep bed low and locked with side rails adjusted as appropriate  - Keep care items and personal belongings within reach  - Initiate and maintain comfort rounds  - Make Fall Risk Sign visible to staff  - Offer Toileting every 2 Hours, in advance of need  - Initiate/Maintain bed alarm  - Obtain necessary fall risk management equipment  - Apply yellow socks and bracelet for high fall risk patients  - Consider moving patient to room near nurses station  Outcome: Progressing     Problem: SAFETY ADULT  Goal: Maintain or return to baseline ADL function  Description: INTERVENTIONS:  -  Assess patient's ability to carry out ADLs; assess patient's baseline for ADL function and identify physical deficits which impact ability to perform ADLs (bathing, care of mouth/teeth, toileting, grooming, dressing, etc.)  - Assess/evaluate cause of self-care deficits   - Assess range of motion  - Assess patient's mobility; develop plan if impaired  - Assess patient's need for assistive devices and provide as appropriate  - Encourage maximum independence but intervene and supervise when necessary  - Involve family in performance of ADLs  - Assess for home care needs following discharge   - Consider OT consult to assist with ADL evaluation and planning for discharge  - Provide patient education as appropriate  Outcome: Progressing     Problem: SAFETY ADULT  Goal: Maintains/Returns to pre admission functional  level  Description: INTERVENTIONS:  - Perform AM-PAC 6 Click Basic Mobility/ Daily Activity assessment daily.  - Set and communicate daily mobility goal to care team and patient/family/caregiver.   - Collaborate with rehabilitation services on mobility goals if consulted  - Perform Range of Motion 3 times a day.  - Reposition patient every 2 hours.  - Dangle patient 3 times a day  - Stand patient 3 times a day  - Ambulate patient 3 times a day  - Out of bed to chair 3 times a day   - Out of bed for meals 3 times a day  - Out of bed for toileting  - Record patient progress and toleration of activity level   Outcome: Progressing     Problem: GASTROINTESTINAL - ADULT  Goal: Maintains adequate nutritional intake  Description: INTERVENTIONS:  - Monitor percentage of each meal consumed  - Identify factors contributing to decreased intake, treat as appropriate  - Assist with meals as needed  - Monitor I&O, weight, and lab values if indicated  - Obtain nutrition services referral as needed  Outcome: Progressing

## 2025-03-09 LAB
ANION GAP SERPL CALCULATED.3IONS-SCNC: 8 MMOL/L (ref 4–13)
BUN SERPL-MCNC: 57 MG/DL (ref 5–25)
CALCIUM SERPL-MCNC: 8 MG/DL (ref 8.4–10.2)
CHLORIDE SERPL-SCNC: 99 MMOL/L (ref 96–108)
CO2 SERPL-SCNC: 25 MMOL/L (ref 21–32)
CREAT SERPL-MCNC: 3.06 MG/DL (ref 0.6–1.3)
ERYTHROCYTE [DISTWIDTH] IN BLOOD BY AUTOMATED COUNT: 13.8 % (ref 11.6–15.1)
GFR SERPL CREATININE-BSD FRML MDRD: 19 ML/MIN/1.73SQ M
GLUCOSE SERPL-MCNC: 195 MG/DL (ref 65–140)
GLUCOSE SERPL-MCNC: 205 MG/DL (ref 65–140)
GLUCOSE SERPL-MCNC: 227 MG/DL (ref 65–140)
GLUCOSE SERPL-MCNC: 251 MG/DL (ref 65–140)
GLUCOSE SERPL-MCNC: 268 MG/DL (ref 65–140)
HCT VFR BLD AUTO: 29.2 % (ref 36.5–49.3)
HGB BLD-MCNC: 9.5 G/DL (ref 12–17)
MAGNESIUM SERPL-MCNC: 1.8 MG/DL (ref 1.9–2.7)
MCH RBC QN AUTO: 31 PG (ref 26.8–34.3)
MCHC RBC AUTO-ENTMCNC: 32.5 G/DL (ref 31.4–37.4)
MCV RBC AUTO: 95 FL (ref 82–98)
PLATELET # BLD AUTO: 331 THOUSANDS/UL (ref 149–390)
PMV BLD AUTO: 8.3 FL (ref 8.9–12.7)
POTASSIUM SERPL-SCNC: 4 MMOL/L (ref 3.5–5.3)
RBC # BLD AUTO: 3.06 MILLION/UL (ref 3.88–5.62)
SODIUM SERPL-SCNC: 132 MMOL/L (ref 135–147)
WBC # BLD AUTO: 13.94 THOUSAND/UL (ref 4.31–10.16)

## 2025-03-09 PROCEDURE — 85027 COMPLETE CBC AUTOMATED: CPT | Performed by: INTERNAL MEDICINE

## 2025-03-09 PROCEDURE — 99232 SBSQ HOSP IP/OBS MODERATE 35: CPT | Performed by: INTERNAL MEDICINE

## 2025-03-09 PROCEDURE — 83735 ASSAY OF MAGNESIUM: CPT | Performed by: INTERNAL MEDICINE

## 2025-03-09 PROCEDURE — 99232 SBSQ HOSP IP/OBS MODERATE 35: CPT

## 2025-03-09 PROCEDURE — 82948 REAGENT STRIP/BLOOD GLUCOSE: CPT

## 2025-03-09 PROCEDURE — 80048 BASIC METABOLIC PNL TOTAL CA: CPT | Performed by: INTERNAL MEDICINE

## 2025-03-09 RX ORDER — INSULIN ASPART 100 [IU]/ML
30 INJECTION, SUSPENSION SUBCUTANEOUS
Status: DISCONTINUED | OUTPATIENT
Start: 2025-03-09 | End: 2025-03-10

## 2025-03-09 RX ADMIN — FUROSEMIDE 40 MG: 40 TABLET ORAL at 16:30

## 2025-03-09 RX ADMIN — GABAPENTIN 200 MG: 100 CAPSULE ORAL at 21:00

## 2025-03-09 RX ADMIN — INSULIN LISPRO 6 UNITS: 100 INJECTION, SOLUTION INTRAVENOUS; SUBCUTANEOUS at 16:30

## 2025-03-09 RX ADMIN — GUAIFENESIN 600 MG: 600 TABLET ORAL at 08:14

## 2025-03-09 RX ADMIN — HEPARIN SODIUM 7500 UNITS: 5000 INJECTION INTRAVENOUS; SUBCUTANEOUS at 15:06

## 2025-03-09 RX ADMIN — INSULIN ASPART 28 UNITS: 100 INJECTION, SUSPENSION SUBCUTANEOUS at 08:15

## 2025-03-09 RX ADMIN — GUAIFENESIN 600 MG: 600 TABLET ORAL at 20:59

## 2025-03-09 RX ADMIN — Medication 6 MG: at 20:59

## 2025-03-09 RX ADMIN — CHOLECALCIFEROL TAB 25 MCG (1000 UNIT) 2000 UNITS: 25 TAB at 08:14

## 2025-03-09 RX ADMIN — FUROSEMIDE 40 MG: 40 TABLET ORAL at 08:14

## 2025-03-09 RX ADMIN — METOPROLOL TARTRATE 50 MG: 50 TABLET, FILM COATED ORAL at 08:14

## 2025-03-09 RX ADMIN — INSULIN LISPRO 6 UNITS: 100 INJECTION, SOLUTION INTRAVENOUS; SUBCUTANEOUS at 21:03

## 2025-03-09 RX ADMIN — INSULIN ASPART 30 UNITS: 100 INJECTION, SUSPENSION SUBCUTANEOUS at 16:31

## 2025-03-09 RX ADMIN — HEPARIN SODIUM 7500 UNITS: 5000 INJECTION INTRAVENOUS; SUBCUTANEOUS at 04:52

## 2025-03-09 RX ADMIN — TAMSULOSIN HYDROCHLORIDE 0.4 MG: 0.4 CAPSULE ORAL at 16:30

## 2025-03-09 RX ADMIN — TRISODIUM CITRATE DIHYDRATE AND CITRIC ACID MONOHYDRATE 30 ML: 500; 334 SOLUTION ORAL at 08:15

## 2025-03-09 RX ADMIN — INSULIN LISPRO 6 UNITS: 100 INJECTION, SOLUTION INTRAVENOUS; SUBCUTANEOUS at 12:03

## 2025-03-09 RX ADMIN — Medication 400 UNITS: at 08:14

## 2025-03-09 RX ADMIN — ASPIRIN 81 MG: 81 TABLET, COATED ORAL at 08:14

## 2025-03-09 RX ADMIN — ATORVASTATIN CALCIUM 40 MG: 40 TABLET, FILM COATED ORAL at 16:30

## 2025-03-09 RX ADMIN — METOPROLOL TARTRATE 50 MG: 50 TABLET, FILM COATED ORAL at 21:02

## 2025-03-09 RX ADMIN — HEPARIN SODIUM 7500 UNITS: 5000 INJECTION INTRAVENOUS; SUBCUTANEOUS at 21:02

## 2025-03-09 RX ADMIN — AZELASTINE HYDROCHLORIDE 1 SPRAY: 137 SPRAY, METERED NASAL at 18:49

## 2025-03-09 RX ADMIN — INSULIN LISPRO 2 UNITS: 100 INJECTION, SOLUTION INTRAVENOUS; SUBCUTANEOUS at 08:15

## 2025-03-09 RX ADMIN — AZELASTINE HYDROCHLORIDE 1 SPRAY: 137 SPRAY, METERED NASAL at 08:16

## 2025-03-09 RX ADMIN — Medication 1 TABLET: at 08:14

## 2025-03-09 RX ADMIN — AMLODIPINE BESYLATE 10 MG: 10 TABLET ORAL at 08:14

## 2025-03-09 RX ADMIN — CHLORHEXIDINE GLUCONATE 15 ML: 1.2 SOLUTION ORAL at 21:01

## 2025-03-09 RX ADMIN — CEFADROXIL 1000 MG: 500 CAPSULE ORAL at 10:14

## 2025-03-09 RX ADMIN — TRISODIUM CITRATE DIHYDRATE AND CITRIC ACID MONOHYDRATE 30 ML: 500; 334 SOLUTION ORAL at 21:01

## 2025-03-09 RX ADMIN — FOLIC ACID 1 MG: 1 TABLET ORAL at 08:14

## 2025-03-09 RX ADMIN — CHLORHEXIDINE GLUCONATE 15 ML: 1.2 SOLUTION ORAL at 08:14

## 2025-03-09 RX ADMIN — CEFADROXIL 1000 MG: 500 CAPSULE ORAL at 20:59

## 2025-03-09 RX ADMIN — THIAMINE HCL TAB 100 MG 100 MG: 100 TAB at 08:14

## 2025-03-09 NOTE — ASSESSMENT & PLAN NOTE
Status post Ramirez catheter placement with successful removal/voiding trial  Appreciate urology input ->  Flomax continued

## 2025-03-09 NOTE — PROGRESS NOTES
Progress Note - Hospitalist   Name: Nadeem Purdy Jr. 69 y.o. male I MRN: [id number]  Unit/Bed#: -01 I Date of Admission: 2/26/2025   Date of Service: 3/9/2025 I Hospital Day: 11    Assessment & Plan  Diabetic ketoacidosis without coma associated with type 2 diabetes mellitus (HCC)  Lab Results   Component Value Date    HGBA1C 8.9 (H) 02/26/2025     Transferred out of ICU with transition off insulin drip to a basal 70/30 BID regimen with additional SSI coverage per Accu-Cheks -> titrate dosing daily, as necessary  Encourage compliance to home insulin regimen  Hypoglycemia protocol  Carbohydrate restriction  Monitor/replete any potassium/phosphate derangements, as necessary  Severe sepsis (HCC)  Initially presented with fever, leukocytosis, tachypnea, with lactic acidosis in the setting of streptococcal bacteremia secondary to lower extremity cellulitis  Hemodynamically stabilized and transitioned to the medical floor  See plan for bacteremia/cellulitis below  Streptococcal bacteremia  1 of 2 bottles from 2/26 growing Streptococcus pyogenes  IV Ancef now transitioned to oral Duricef through 3/15  Cellulitis of lower extremity  As stated above, IV Ancef now transitioned to oral Duricef  Likely source of bacteremia  Acute renal failure superimposed on stage 3a chronic kidney disease (HCC)  Baseline creatinine of approximately 1.1-1.3 -> elevated but continuing to improve over the last several days, currently at 3.06 from a prior 4.34 peak  Nephrology following -> now on twice daily oral diuretic (Lasix) due to fluid overloaded state  Monitor renal function and urine output  Limit/avoid nephrotoxins and hypotension as possible - holding ARB (Cozaar)  Hypercholesteremia  Continue statin  Class 3 severe obesity due to excess calories with serious comorbidity and body mass index (BMI) of 40.0 to 44.9 in adult ([facility])  BMI of 43.29 currently  Lifestyle/diet modifications  Lymphedema of both lower extremities  Encourage  leg elevation  Status post IV Lasix dosing by nephrology, now transitioned to twice daily oral dosing  Atrial fibrillation with RVR (HCC)  Rate controlled on Lopressor  Not on chronic anticoagulation  Urinary retention  Status post Ramirez catheter placement with successful removal/voiding trial  Appreciate urology input ->  Flomax continued  Alcohol abuse  Cessation counseling  Continue thiamine/folic acid and multivitamin supplementation  Hyponatremia  Serum sodium improved at 132 over the last few days with better blood sugar control and diuretic trial  On fluid restriction and diuretics  Hypomagnesemia  Monitor/replete serum magnesium, and potassium, as necessary  Chronic anemia  H/H stable  Essential hypertension  Continue Norvasc/Lopressor - holding Cozaar due to LUIS ALFREDO  PRN IV Labetalol/Hydralazine on board for BP spikes      VTE Pharmacologic Prophylaxis: VTE Score: 5 High Risk (Score >/= 5) - Pharmacological DVT Prophylaxis Ordered: Heparin.     AM-PAC Basic Mobility:  Basic Mobility Inpatient Raw Score: 11  JH-HLM Goal: 4: Move to chair/commode  JH-HLM Achieved: 4: Move to chair/commode  JH-HLM Goal NOT achieved. Continue with multidisciplinary rounding and encourage appropriate mobility to improve upon JH-HLM goals.    Patient Centered Rounds:  I have performed bedside rounds and discussed plan of care with nursing today.  Discussions with Specialists or Other Care Team Provider:  see above assessments if applicable    Education and Discussions with Family / Patient:  Patient at bedside, who self updates wife    Time Spent for Care:  35 minutes. More than 50% of total time spent on counseling and coordination of care, on one or more of the following: performing physical exam; counseling and coordination of care, obtaining or reviewing history, documenting in the medical record, reviewing/ordering tests/medications/procedures, and communicating with other healthcare professionals.    Current Length of Stay: 11  day(s)  Current Patient Status: Inpatient   Certification Statement:  Patient will continue to require additional hospital stay due to assessments as noted above.    Code Status: Level 3 - DNAR and DNI      Subjective     No new complaints at this time.  Denies worsening shortness of breath currently.  Feels a bit less fatigue today.      Objective     Vitals:   Temp (24hrs), Av.2 °F (36.8 °C), Min:97.7 °F (36.5 °C), Max:98.5 °F (36.9 °C)    Temp:  [97.7 °F (36.5 °C)-98.5 °F (36.9 °C)] 98.5 °F (36.9 °C)  HR:  [83-94] 92  Resp:  [18-19] 19  BP: (130-155)/(69-83) 155/79  SpO2:  [94 %-97 %] 94 %  Body mass index is 43.29 kg/m².     Input and Output Summary (last 24 hours):       Intake/Output Summary (Last 24 hours) at 3/9/2025 1420  Last data filed at 3/9/2025 1247  Gross per 24 hour   Intake 480 ml   Output 2000 ml   Net -1520 ml       Physical Exam:     GENERAL Obese - mildly improved weakness/fatigue   HEAD   Normocephalic - atraumatic   EYES Nonicteric   MOUTH   Mucosa moist   NECK   Supple - full range of motion   CARDIAC Rate controlled currently   PULMONARY Fairly clear to auscultation with mildly decreased respiratory effort due to body habitus   ABDOMEN Nontender/nondistended   MUSCULOSKELETAL Motor strength/range of motion deconditioned   NEUROLOGIC Alert/oriented at baseline   SKIN   Chronic wrinkles/blemishes - distal LLE erythema with areas of blistering   PSYCHIATRIC   Mood/affect stable         Labs & Recent Cultures:  Results from last 7 days   Lab Units 25  0441 25  0443 25  0523 25  0456   WBC Thousand/uL 13.94*   < > 19.77* 22.38*   HEMOGLOBIN g/dL 9.5*   < > 10.7* 10.6*   HEMATOCRIT % 29.2*   < > 32.9* 31.3*   PLATELETS Thousands/uL 331   < > 399* 431*   BANDS PCT %  --   --  4  --    SEGS PCT %  --   --   --  68   LYMPHO PCT %  --   --  13* 13*   MONO PCT %  --   --  7 7   EOS PCT %  --   --  3 2    < > = values in this interval not displayed.     Results from last 7  days   Lab Units 03/09/25  0441 03/05/25  2042 03/05/25  0459   POTASSIUM mmol/L 4.0   < > 3.6   CHLORIDE mmol/L 99   < > 103   CO2 mmol/L 25   < > 20*   BUN mg/dL 57*   < > 60*   CREATININE mg/dL 3.06*   < > 3.69*   CALCIUM mg/dL 8.0*   < > 7.9*   ALK PHOS U/L  --   --  38   ALT U/L  --   --  43   AST U/L  --   --  60*    < > = values in this interval not displayed.     Results from last 7 days   Lab Units 03/03/25  0546   INR  1.30*     Results from last 7 days   Lab Units 03/09/25  1049 03/09/25  0702 03/08/25  2039 03/08/25  1614 03/08/25  1140 03/08/25  0709 03/07/25  2051 03/07/25  1637 03/07/25  1116 03/07/25  0733 03/06/25  2126 03/06/25  1558   POC GLUCOSE mg/dl 251* 195* 285* 232* 220* 173* 246* 290* 259* 189* 299* 267*         Results from last 7 days   Lab Units 03/04/25  0410 03/03/25  1021 03/03/25  0546   LACTIC ACID mmol/L  --  1.3  --    PROCALCITONIN ng/ml 1.86*  --  2.99*         Results from last 7 days   Lab Units 03/03/25  0319 03/02/25  2144   BLOOD CULTURE   --  No Growth After 5 Days.  No Growth After 5 Days.   GRAM STAIN RESULT  No Polys or Bacteria seen  --    WOUND CULTURE  Few Colonies of Staphylococcus aureus*  --          Lines/Drains/Telemetry:  Invasive Devices       Peripheral Intravenous Line  Duration             Peripheral IV 03/08/25 Dorsal (posterior);Left Hand 1 day                    Last 24 Hours Medication List:   Current Facility-Administered Medications   Medication Dose Route Frequency Provider Last Rate    acetaminophen  650 mg Oral Q6H PRN JIMMY Huff      amLODIPine  10 mg Oral Daily Kiki Villarreal MD      aspirin  81 mg Oral Daily JIMMY Huff      atorvastatin  40 mg Oral Daily With Dinner JIMMY Huff      azelastine  1 spray Each Nare BID Brittany Tianna Bilofsky, CRNP      cefadroxil  1,000 mg Oral Q12H MARGARITA Lupillo Alicea PA-C      chlorhexidine  15 mL Mouth/Throat Q12H MARGARITA JIMMY Huff       Cholecalciferol  2,000 Units Oral Daily Brittany Tianna Bilofsky, JIMMY      folic acid  1 mg Oral Daily Brittany Tianna Bilofsky, JIMMY      furosemide  40 mg Oral BID (diuretic) Brian Quan PA-C      gabapentin  200 mg Oral HS Brittany Tianna BilofskyJIMMY      guaiFENesin  600 mg Oral Q12H MARGARITA Brittany Tianna Bilofsky, CRNP      heparin (porcine)  7,500 Units Subcutaneous Q8H MARGARITA Brittany Tianna Bilofronaldo, JIMMY      hydrALAZINE  10 mg Intravenous Q6H PRN Brittany Tianna BilofJIMMY higgins      insulin aspart protamine-insulin aspart  28 Units Subcutaneous BID AC Kiki Villarreal MD      insulin lispro  2-12 Units Subcutaneous 4x Daily (AC & HS) Kiki Villarreal MD      labetalol  10 mg Intravenous Q4H PRN Kiki Villarreal MD      melatonin  6 mg Oral HS Brittany Tiannaroyal Smith, JIMMY      metoprolol tartrate  50 mg Oral Q12H MARGARITA Brittany TiannaJIMMY Turcios      multivitamin-minerals  1 tablet Oral Daily Kiki Villarreal MD      sod citrate-citric acid  30 mL Oral BID Brittany JIMMY Herrera      tamsulosin  0.4 mg Oral Daily With Dinner Jonny Miller MD      thiamine  100 mg Oral Daily Brittany TiannaJIMMY Turcios      vitamin E (tocopherol)  400 Units Oral Daily Brittany JIMMY Herrera MD   Hospitalist - [facility]        ** Please Note:  Documentation is constructed using a voice recognition dictation system.  An occasional wrong word/phrase or “sound-a-like” substitution may have been picked up by dictation device due to the inherent limitations of voice recognition software.  Read the chart carefully and recognize, using reasonable context, where substitutions may have occurred.**

## 2025-03-09 NOTE — ASSESSMENT & PLAN NOTE
Lab Results   Component Value Date    HGBA1C 8.9 (H) 02/26/2025     Transferred out of ICU with transition off insulin drip to a basal 70/30 BID regimen with additional SSI coverage per Accu-Cheks -> titrate dosing daily, as necessary  Encourage compliance to home insulin regimen  Hypoglycemia protocol  Carbohydrate restriction  Monitor/replete any potassium/phosphate derangements, as necessary

## 2025-03-09 NOTE — ASSESSMENT & PLAN NOTE
Lab Results   Component Value Date    HGBA1C 8.9 (H) 02/26/2025       Recent Labs     03/08/25  1614 03/08/25 2039 03/09/25  0702 03/09/25  1049   POCGLU 232* 285* 195* 251*       Blood Sugar Average: Last 72 hrs:  (P) 244

## 2025-03-09 NOTE — PROGRESS NOTES
Progress Note - Nephrology   Name: Nadeem Purdy Jr. 69 y.o. male I MRN: [id number]  Unit/Bed#: -01 I Date of Admission: 2/26/2025   Date of Service: 3/9/2025 I Hospital Day: 11     Assessment & Plan  Acute renal failure superimposed on stage 3a chronic kidney disease (HCC)  Lab Results   Component Value Date    EGFR 19 03/09/2025    EGFR 19 03/08/2025    EGFR 17 03/07/2025    CREATININE 3.06 (H) 03/09/2025    CREATININE 3.14 (H) 03/08/2025    CREATININE 3.37 (H) 03/07/2025   Baseline creatinine appears to be 1.1-1.3 mg/dL since 2022; not under care of nephrology. 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  Grade A2 albuminuria, no RBCs/UA bland when checked in steady state.  Kidneys without obstruction on unenhanced imaging.  Now with acute kidney injury creatinine unchanged high threes, low fours.      Suspect etiology ATN in setting of severe sepsis, volume and hemodynamic perturbations (s/p crystalloid/colloid/diuretic), vasomotor dysfunction due to ARB, urinary retention status post Ramirez.  CK minimally elevated upon presentation and likely noncontributory to LUIS ALFREDO.  UA significant for glucosuria, 2+ protein, 1-2 RBC, 1-2 coarse granular casts.    Plan as of Sunday, March 9  Creatinine improving, continue supportive care/avoid nephrotoxins  Diabetic ketoacidosis without coma associated with type 2 diabetes mellitus (HCC)  Lab Results   Component Value Date    HGBA1C 8.9 (H) 02/26/2025       Recent Labs     03/08/25  1614 03/08/25  2039 03/09/25  0702 03/09/25  1049   POCGLU 232* 285* 195* 251*       Blood Sugar Average: Last 72 hrs:  (P) 244      Essential hypertension  Continue to hold losartan, continue with metoprolol, continue Lasix 40 mg twice daily  Hyponatremia  Continue 2 L fluid restriction  Urinary retention  Management per urology  Metabolic acidosis  Remains on Bicitra solution, bicarb up to 25  Severe sepsis (HCC)  Continue management per ID  colleagues   Streptococcal bacteremia    Hypomagnesemia  Replete        Subjective   Brief History of Admission - 70 yo M with h/o obesity, DM2, CKD stage 3a, HTN, b/l LE edema p/w hyperglycemia, polyuria, polydipsia. He was found to have DKA, cellulitis, sepsis, GAS bacteremia, and diarrhea. Nephrology consulted for LUIS ALFREDO.  No complaints        Objective :  Temp:  [98.5 °F (36.9 °C)] 98.5 °F (36.9 °C)  HR:  [83-94] 88  BP: (130-155)/(69-83) 144/78  Resp:  [18-19] 18  SpO2:  [94 %-97 %] 96 %  O2 Device: None (Room air)    Current Weight: Weight - Scale: (!) 141 kg (310 lb 6.5 oz)  First Weight: Weight - Scale: (!) 143 kg (315 lb 0.6 oz)  I/O         03/07 0701  03/08 0700 03/08 0701  03/09 0700 03/09 0701  03/10 0700    P.O. 1620 780 480    Total Intake(mL/kg) 1620 (11.5) 780 (5.5) 480 (3.4)    Urine (mL/kg/hr) 1336 (0.4) 2500 (0.7) 400 (0.3)    Stool  0     Total Output 1336 2500 400    Net +284 -1720 +80           Unmeasured Urine Occurrence   1 x    Unmeasured Stool Occurrence  1 x           Physical Exam  Vitals and nursing note reviewed.   Constitutional:       General: He is not in acute distress.     Appearance: He is well-developed.   HENT:      Head: Normocephalic and atraumatic.   Eyes:      Conjunctiva/sclera: Conjunctivae normal.   Cardiovascular:      Rate and Rhythm: Normal rate and regular rhythm.      Heart sounds: No murmur heard.  Pulmonary:      Effort: Pulmonary effort is normal. No respiratory distress.      Breath sounds: Normal breath sounds.   Abdominal:      Palpations: Abdomen is soft.      Tenderness: There is no abdominal tenderness.   Musculoskeletal:      Cervical back: Neck supple.      Right lower leg: Edema present.      Left lower leg: Edema present.   Skin:     General: Skin is warm and dry.      Capillary Refill: Capillary refill takes less than 2 seconds.   Neurological:      Mental Status: He is alert.   Psychiatric:         Mood and Affect: Mood normal.          Medications:    Current Facility-Administered Medications:     acetaminophen (TYLENOL) tablet 650 mg, 650 mg, Oral, Q6H PRN, JIMMY Huff, 650 mg at 02/26/25 2322    amLODIPine (NORVASC) tablet 10 mg, 10 mg, Oral, Daily, Kiki Villarreal MD, 10 mg at 03/09/25 0814    aspirin (ECOTRIN LOW STRENGTH) EC tablet 81 mg, 81 mg, Oral, Daily, RE HuffNP, 81 mg at 03/09/25 0814    atorvastatin (LIPITOR) tablet 40 mg, 40 mg, Oral, Daily With Dinner, JIMMY Huff, 40 mg at 03/08/25 1651    azelastine (ASTELIN) 0.1 % nasal spray 1 spray, 1 spray, Each Nare, BID, JIMMY Huff, 1 spray at 03/09/25 0816    cefadroxil (DURICEF) capsule 1,000 mg, 1,000 mg, Oral, Q12H MARGARITA, Lupillo Alicea PA-C, 1,000 mg at 03/09/25 1014    chlorhexidine (PERIDEX) 0.12 % oral rinse 15 mL, 15 mL, Mouth/Throat, Q12H MARGARITA, JIMMY Huff, 15 mL at 03/09/25 0814    Cholecalciferol (VITAMIN D3) tablet 2,000 Units, 2,000 Units, Oral, Daily, JIMMY Huff, 2,000 Units at 03/09/25 0814    folic acid (FOLVITE) tablet 1 mg, 1 mg, Oral, Daily, RE HuffNP, 1 mg at 03/09/25 0814    furosemide (LASIX) tablet 40 mg, 40 mg, Oral, BID (diuretic), Brian Quan PA-C, 40 mg at 03/09/25 0814    gabapentin (NEURONTIN) capsule 200 mg, 200 mg, Oral, HS, Brittany Smith, CRNP, 200 mg at 03/08/25 2106    guaiFENesin (MUCINEX) 12 hr tablet 600 mg, 600 mg, Oral, Q12H MARGARITA, JIMMY Huff, 600 mg at 03/09/25 0814    heparin (porcine) subcutaneous injection 7,500 Units, 7,500 Units, Subcutaneous, Q8H MARGARIAT, JIMMY Huff, 7,500 Units at 03/09/25 1506    hydrALAZINE (APRESOLINE) injection 10 mg, 10 mg, Intravenous, Q6H PRN, JIMMY Huff, 10 mg at 03/05/25 1120    insulin aspart protamine-insulin aspart (NovoLOG 70/30) 100 units/mL subcutaneous injection 30 Units, 30 Units, Subcutaneous, BID AC, Kiki Villarreal MD    insulin  lispro (HumALOG/ADMELOG) 100 units/mL subcutaneous injection 2-12 Units, 2-12 Units, Subcutaneous, 4x Daily (AC & HS), 6 Units at 03/09/25 1203 **AND** Fingerstick Glucose (POCT), , , 4x Daily AC and at bedtime, Kiki Villarreal MD    labetalol (NORMODYNE) injection 10 mg, 10 mg, Intravenous, Q4H PRN, Kiki Villarreal MD    melatonin tablet 6 mg, 6 mg, Oral, HS, JIMMY Huff, 6 mg at 03/08/25 2105    metoprolol tartrate (LOPRESSOR) tablet 50 mg, 50 mg, Oral, Q12H MARGARITA, JIMMY Huff, 50 mg at 03/09/25 0814    multivitamin-minerals (CENTRUM) tablet 1 tablet, 1 tablet, Oral, Daily, Kiki Villarreal MD, 1 tablet at 03/09/25 0814    sod citrate-citric acid (BICITRA) oral solution 30 mL, 30 mL, Oral, BID, JIMMY Huff, 30 mL at 03/09/25 0815    tamsulosin (FLOMAX) capsule 0.4 mg, 0.4 mg, Oral, Daily With Dinner, Jonny Miller MD, 0.4 mg at 03/08/25 1651    thiamine tablet 100 mg, 100 mg, Oral, Daily, JIMMY Huff, 100 mg at 03/09/25 0814    vitamin E (TOCOPHEROL) capsule 400 Units, 400 Units, Oral, Daily, JIMMY Huff, 400 Units at 03/09/25 0814      Lab Results: I have reviewed the following results:  Results from last 7 days   Lab Units 03/09/25  0441 03/08/25  0443 03/07/25  0523 03/06/25  0456 03/05/25  2042 03/05/25  0459 03/04/25  1818 03/04/25  0410 03/03/25  2357 03/03/25  1625 03/03/25  1021 03/03/25  0546 03/03/25  0435 03/02/25  2149 03/02/25  1636   WBC Thousand/uL 13.94* 17.46* 19.77* 22.38*  --  25.47*  --  26.37*  --   --   --   --  28.19*  --   --    HEMOGLOBIN g/dL 9.5* 9.5* 10.7* 10.6*  --  10.0*  --  9.8*  --   --   --   --  10.3*  --   --    HEMATOCRIT % 29.2* 29.7* 32.9* 31.3*  --  29.9*  --  29.1*  --   --   --   --  30.7*  --   --    PLATELETS Thousands/uL 331 375 399* 431*  --  444*  --  412*  --   --   --   --  386  --   --    POTASSIUM mmol/L 4.0 4.3 3.9 4.1 4.1 3.6 4.3 3.9 3.6 4.0   < > 4.3  --    < > 3.6   CHLORIDE mmol/L 99  "101 101 100 101 103 103 103 106 103   < > 105  --    < > 101   CO2 mmol/L 25 24 22 20* 19* 20* 17* 16* 16* 16*   < > 16*  --    < > 19*   BUN mg/dL 57* 57* 56* 57* 59* 60* 63* 70* 70* 71*   < > 74*  --    < > 72*   CREATININE mg/dL 3.06* 3.14* 3.37* 3.49* 3.60* 3.69* 3.77* 4.17* 4.04* 4.20*   < > 4.32*  --    < > 4.24*   CALCIUM mg/dL 8.0* 7.9* 8.0* 7.8* 7.7* 7.9* 7.9* 8.0* 7.8* 8.3*   < > 7.8*  --    < > 8.4   MAGNESIUM mg/dL 1.8* 2.0 2.1 1.8*  --  1.9  --  2.0 2.0 2.1  --  2.1  --   --  2.2   PHOSPHORUS mg/dL  --   --  4.2*  --   --  3.7  --  3.5 2.5 2.4  --  3.1  --   --  2.4   ALBUMIN g/dL  --   --   --   --   --  2.5*  --  2.8*  --   --   --  2.5*  --   --   --     < > = values in this interval not displayed.       Administrative Statements     Portions of the record may have been created with voice recognition software. Occasional wrong word or \"sound a like\" substitutions may have occurred due to the inherent limitations of voice recognition software. Read the chart carefully and recognize, using context, where substitutions have occurred.If you have any questions, please contact the dictating provider.  "

## 2025-03-09 NOTE — ASSESSMENT & PLAN NOTE
Serum sodium improved at 132 over the last few days with better blood sugar control and diuretic trial  On fluid restriction and diuretics

## 2025-03-09 NOTE — ASSESSMENT & PLAN NOTE
Lab Results   Component Value Date    EGFR 19 03/09/2025    EGFR 19 03/08/2025    EGFR 17 03/07/2025    CREATININE 3.06 (H) 03/09/2025    CREATININE 3.14 (H) 03/08/2025    CREATININE 3.37 (H) 03/07/2025   Baseline creatinine appears to be 1.1-1.3 mg/dL since 2022; not under care of nephrology. 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  Grade A2 albuminuria, no RBCs/UA bland when checked in steady state.  Kidneys without obstruction on unenhanced imaging.  Now with acute kidney injury creatinine unchanged high threes, low fours.      Suspect etiology ATN in setting of severe sepsis, volume and hemodynamic perturbations (s/p crystalloid/colloid/diuretic), vasomotor dysfunction due to ARB, urinary retention status post Ramirez.  CK minimally elevated upon presentation and likely noncontributory to LUIS ALFREDO.  UA significant for glucosuria, 2+ protein, 1-2 RBC, 1-2 coarse granular casts.    Plan as of Sunday, March 9  Creatinine improving, continue supportive care/avoid nephrotoxins

## 2025-03-09 NOTE — ASSESSMENT & PLAN NOTE
Baseline creatinine of approximately 1.1-1.3 -> elevated but continuing to improve over the last several days, currently at 3.06 from a prior 4.34 peak  Nephrology following -> now on twice daily oral diuretic (Lasix) due to fluid overloaded state  Monitor renal function and urine output  Limit/avoid nephrotoxins and hypotension as possible - holding ARB (Cozaar)

## 2025-03-09 NOTE — PLAN OF CARE
Problem: PAIN - ADULT  Goal: Verbalizes/displays adequate comfort level or baseline comfort level  Description: Interventions:  - Encourage patient to monitor pain and request assistance  - Assess pain using appropriate pain scale  - Administer analgesics based on type and severity of pain and evaluate response  - Implement non-pharmacological measures as appropriate and evaluate response  - Consider cultural and social influences on pain and pain management  - Notify physician/advanced practitioner if interventions unsuccessful or patient reports new pain  Outcome: Progressing     Problem: INFECTION - ADULT  Goal: Absence or prevention of progression during hospitalization  Description: INTERVENTIONS:  - Assess and monitor for signs and symptoms of infection  - Monitor lab/diagnostic results  - Monitor all insertion sites, i.e. indwelling lines, tubes, and drains  - Monitor endotracheal if appropriate and nasal secretions for changes in amount and color  - [place] appropriate cooling/warming therapies per order  - Administer medications as ordered  - Instruct and encourage patient and family to use good hand hygiene technique  - Identify and instruct in appropriate isolation precautions for identified infection/condition  Outcome: Progressing  Goal: Absence of fever/infection during neutropenic period  Description: INTERVENTIONS:  - Monitor WBC    Outcome: Progressing

## 2025-03-09 NOTE — PLAN OF CARE
Problem: Prexisting or High Potential for Compromised Skin Integrity  Goal: Skin integrity is maintained or improved  Description: INTERVENTIONS:  - Identify patients at risk for skin breakdown  - Assess and monitor skin integrity  - Assess and monitor nutrition and hydration status  - Monitor labs   - Assess for incontinence   - Turn and reposition patient  - Assist with mobility/ambulation  - Relieve pressure over bony prominences  - Avoid friction and shearing  - Provide appropriate hygiene as needed including keeping skin clean and dry  - Evaluate need for skin moisturizer/barrier cream  - Collaborate with interdisciplinary team   - Patient/family teaching  - Consider wound care consult   3/8/2025 2054 by Patience Thomas RN  Outcome: Progressing  3/8/2025 2053 by Patience Thomas RN  Outcome: Progressing      .Verticalization Therapy     Type of Bed [][employer] [] Combilizer     Attempt for the day [x] 1st     [] 2nd     [] 3rd     Duration of Therapy  []5 min  []10 min  []15 min []20 min OTHER: 70 minutes     Starting Tilt  45 Degrees     Ending Tilt 45 Degrees     Patient Tolerated [x]      Patient did not Tolerate [] Reason:       Standing at 45 degrees - 26 kg weight bearing - 30% trunk offloaded

## 2025-03-09 NOTE — ASSESSMENT & PLAN NOTE
Continue Norvasc/Lopressor - holding Cozaar due to LUIS ALFREDO  PRN IV Labetalol/Hydralazine on board for BP spikes

## 2025-03-10 LAB
ANION GAP SERPL CALCULATED.3IONS-SCNC: 7 MMOL/L (ref 4–13)
BASOPHILS # BLD AUTO: 0.06 THOUSANDS/ÂΜL (ref 0–0.1)
BASOPHILS NFR BLD AUTO: 1 % (ref 0–1)
BUN SERPL-MCNC: 55 MG/DL (ref 5–25)
CALCIUM SERPL-MCNC: 8.1 MG/DL (ref 8.4–10.2)
CHLORIDE SERPL-SCNC: 99 MMOL/L (ref 96–108)
CO2 SERPL-SCNC: 28 MMOL/L (ref 21–32)
CREAT SERPL-MCNC: 2.79 MG/DL (ref 0.6–1.3)
EOSINOPHIL # BLD AUTO: 0.38 THOUSAND/ÂΜL (ref 0–0.61)
EOSINOPHIL NFR BLD AUTO: 3 % (ref 0–6)
ERYTHROCYTE [DISTWIDTH] IN BLOOD BY AUTOMATED COUNT: 13.3 % (ref 11.6–15.1)
GFR SERPL CREATININE-BSD FRML MDRD: 21 ML/MIN/1.73SQ M
GLUCOSE SERPL-MCNC: 160 MG/DL (ref 65–140)
GLUCOSE SERPL-MCNC: 161 MG/DL (ref 65–140)
GLUCOSE SERPL-MCNC: 187 MG/DL (ref 65–140)
GLUCOSE SERPL-MCNC: 210 MG/DL (ref 65–140)
GLUCOSE SERPL-MCNC: 242 MG/DL (ref 65–140)
HCT VFR BLD AUTO: 29.1 % (ref 36.5–49.3)
HGB BLD-MCNC: 9.4 G/DL (ref 12–17)
IMM GRANULOCYTES # BLD AUTO: 0.17 THOUSAND/UL (ref 0–0.2)
IMM GRANULOCYTES NFR BLD AUTO: 2 % (ref 0–2)
LYMPHOCYTES # BLD AUTO: 2.59 THOUSANDS/ÂΜL (ref 0.6–4.47)
LYMPHOCYTES NFR BLD AUTO: 22 % (ref 14–44)
MAGNESIUM SERPL-MCNC: 1.7 MG/DL (ref 1.9–2.7)
MCH RBC QN AUTO: 30.3 PG (ref 26.8–34.3)
MCHC RBC AUTO-ENTMCNC: 32.3 G/DL (ref 31.4–37.4)
MCV RBC AUTO: 94 FL (ref 82–98)
MONOCYTES # BLD AUTO: 0.7 THOUSAND/ÂΜL (ref 0.17–1.22)
MONOCYTES NFR BLD AUTO: 6 % (ref 4–12)
NEUTROPHILS # BLD AUTO: 7.8 THOUSANDS/ÂΜL (ref 1.85–7.62)
NEUTS SEG NFR BLD AUTO: 66 % (ref 43–75)
NRBC BLD AUTO-RTO: 0 /100 WBCS
PLATELET # BLD AUTO: 297 THOUSANDS/UL (ref 149–390)
PMV BLD AUTO: 8.3 FL (ref 8.9–12.7)
POTASSIUM SERPL-SCNC: 3.7 MMOL/L (ref 3.5–5.3)
RBC # BLD AUTO: 3.1 MILLION/UL (ref 3.88–5.62)
SODIUM SERPL-SCNC: 134 MMOL/L (ref 135–147)
WBC # BLD AUTO: 11.7 THOUSAND/UL (ref 4.31–10.16)

## 2025-03-10 PROCEDURE — 85025 COMPLETE CBC W/AUTO DIFF WBC: CPT | Performed by: INTERNAL MEDICINE

## 2025-03-10 PROCEDURE — 83735 ASSAY OF MAGNESIUM: CPT | Performed by: INTERNAL MEDICINE

## 2025-03-10 PROCEDURE — 82948 REAGENT STRIP/BLOOD GLUCOSE: CPT

## 2025-03-10 PROCEDURE — 99232 SBSQ HOSP IP/OBS MODERATE 35: CPT | Performed by: INTERNAL MEDICINE

## 2025-03-10 PROCEDURE — 99232 SBSQ HOSP IP/OBS MODERATE 35: CPT | Performed by: PHYSICIAN ASSISTANT

## 2025-03-10 PROCEDURE — 80048 BASIC METABOLIC PNL TOTAL CA: CPT | Performed by: INTERNAL MEDICINE

## 2025-03-10 RX ORDER — INSULIN ASPART 100 [IU]/ML
32 INJECTION, SUSPENSION SUBCUTANEOUS
Status: DISCONTINUED | OUTPATIENT
Start: 2025-03-10 | End: 2025-03-11 | Stop reason: HOSPADM

## 2025-03-10 RX ORDER — MAGNESIUM SULFATE HEPTAHYDRATE 40 MG/ML
2 INJECTION, SOLUTION INTRAVENOUS ONCE
Status: COMPLETED | OUTPATIENT
Start: 2025-03-10 | End: 2025-03-10

## 2025-03-10 RX ADMIN — AMLODIPINE BESYLATE 10 MG: 10 TABLET ORAL at 08:59

## 2025-03-10 RX ADMIN — TAMSULOSIN HYDROCHLORIDE 0.4 MG: 0.4 CAPSULE ORAL at 16:21

## 2025-03-10 RX ADMIN — INSULIN LISPRO 2 UNITS: 100 INJECTION, SOLUTION INTRAVENOUS; SUBCUTANEOUS at 08:52

## 2025-03-10 RX ADMIN — TRISODIUM CITRATE DIHYDRATE AND CITRIC ACID MONOHYDRATE 30 ML: 500; 334 SOLUTION ORAL at 21:52

## 2025-03-10 RX ADMIN — FUROSEMIDE 40 MG: 40 TABLET ORAL at 16:21

## 2025-03-10 RX ADMIN — HEPARIN SODIUM 7500 UNITS: 5000 INJECTION INTRAVENOUS; SUBCUTANEOUS at 15:20

## 2025-03-10 RX ADMIN — INSULIN LISPRO 4 UNITS: 100 INJECTION, SOLUTION INTRAVENOUS; SUBCUTANEOUS at 21:52

## 2025-03-10 RX ADMIN — Medication 1 TABLET: at 08:59

## 2025-03-10 RX ADMIN — METOPROLOL TARTRATE 50 MG: 50 TABLET, FILM COATED ORAL at 08:59

## 2025-03-10 RX ADMIN — Medication 400 UNITS: at 08:59

## 2025-03-10 RX ADMIN — HEPARIN SODIUM 7500 UNITS: 5000 INJECTION INTRAVENOUS; SUBCUTANEOUS at 05:27

## 2025-03-10 RX ADMIN — CHOLECALCIFEROL TAB 25 MCG (1000 UNIT) 2000 UNITS: 25 TAB at 08:59

## 2025-03-10 RX ADMIN — METOPROLOL TARTRATE 50 MG: 50 TABLET, FILM COATED ORAL at 21:48

## 2025-03-10 RX ADMIN — CHLORHEXIDINE GLUCONATE 15 ML: 1.2 SOLUTION ORAL at 08:57

## 2025-03-10 RX ADMIN — CEFADROXIL 1000 MG: 500 CAPSULE ORAL at 21:54

## 2025-03-10 RX ADMIN — GUAIFENESIN 600 MG: 600 TABLET ORAL at 08:59

## 2025-03-10 RX ADMIN — TRISODIUM CITRATE DIHYDRATE AND CITRIC ACID MONOHYDRATE 30 ML: 500; 334 SOLUTION ORAL at 08:56

## 2025-03-10 RX ADMIN — INSULIN LISPRO 4 UNITS: 100 INJECTION, SOLUTION INTRAVENOUS; SUBCUTANEOUS at 12:36

## 2025-03-10 RX ADMIN — AZELASTINE HYDROCHLORIDE 1 SPRAY: 137 SPRAY, METERED NASAL at 08:55

## 2025-03-10 RX ADMIN — CEFADROXIL 1000 MG: 500 CAPSULE ORAL at 08:59

## 2025-03-10 RX ADMIN — INSULIN ASPART 30 UNITS: 100 INJECTION, SUSPENSION SUBCUTANEOUS at 08:52

## 2025-03-10 RX ADMIN — FUROSEMIDE 40 MG: 40 TABLET ORAL at 08:59

## 2025-03-10 RX ADMIN — ATORVASTATIN CALCIUM 40 MG: 40 TABLET, FILM COATED ORAL at 16:21

## 2025-03-10 RX ADMIN — HEPARIN SODIUM 7500 UNITS: 5000 INJECTION INTRAVENOUS; SUBCUTANEOUS at 21:48

## 2025-03-10 RX ADMIN — CHLORHEXIDINE GLUCONATE 15 ML: 1.2 SOLUTION ORAL at 21:48

## 2025-03-10 RX ADMIN — GUAIFENESIN 600 MG: 600 TABLET ORAL at 21:48

## 2025-03-10 RX ADMIN — FOLIC ACID 1 MG: 1 TABLET ORAL at 08:59

## 2025-03-10 RX ADMIN — THIAMINE HCL TAB 100 MG 100 MG: 100 TAB at 08:59

## 2025-03-10 RX ADMIN — ASPIRIN 81 MG: 81 TABLET, COATED ORAL at 08:59

## 2025-03-10 RX ADMIN — GABAPENTIN 200 MG: 100 CAPSULE ORAL at 21:48

## 2025-03-10 RX ADMIN — AZELASTINE HYDROCHLORIDE 1 SPRAY: 137 SPRAY, METERED NASAL at 17:36

## 2025-03-10 RX ADMIN — INSULIN ASPART 32 UNITS: 100 INJECTION, SUSPENSION SUBCUTANEOUS at 17:37

## 2025-03-10 RX ADMIN — MAGNESIUM SULFATE HEPTAHYDRATE 2 G: 40 INJECTION, SOLUTION INTRAVENOUS at 09:05

## 2025-03-10 RX ADMIN — Medication 6 MG: at 21:48

## 2025-03-10 RX ADMIN — INSULIN LISPRO 2 UNITS: 100 INJECTION, SOLUTION INTRAVENOUS; SUBCUTANEOUS at 16:22

## 2025-03-10 NOTE — ASSESSMENT & PLAN NOTE
Initially presented with fever, leukocytosis, tachypnea, with lactic acidosis in the setting of streptococcal bacteremia secondary to lower extremity cellulitis  Hemodynamically stabilized and transitioned to the medical floor  See plan for bacteremia/cellulitis

## 2025-03-10 NOTE — CASE MANAGEMENT
Case Management Discharge Planning Note    Patient name Nadeem Purdy Jr.  Location MS /MS  MRN [id number]  : 1955 Date 3/10/2025       Current Admission Date: 2025  Current Admission Diagnosis:Diabetic ketoacidosis without coma associated with type 2 diabetes mellitus ([facility])   Patient Active Problem List    Diagnosis Date Noted Date Diagnosed    Streptococcal bacteremia 2025     Chronic anemia 2025     Atrial fibrillation with RVR ([facility]) 2025     Hypomagnesemia 2025     Urinary retention 2025     Hyponatremia 2025     Metabolic acidosis 2025     Acute renal failure superimposed on stage 3a chronic kidney disease ([facility]) 2025     Diabetic ketoacidosis without coma associated with type 2 diabetes mellitus ([facility]) 2025     Severe sepsis ([facility]) 2025     Alcohol abuse 2025     Hypertensive kidney disease with chronic kidney disease stage III ([facility]) 10/08/2024     Right knee injury, subsequent encounter 2024     Osteochondral lesion 2024     Primary osteoarthritis of one knee, right 2024     Lymphedema of right lower extremity 2024     Other tear of medial meniscus, current injury, right knee, initial encounter 2024     Type 2 diabetes mellitus with hyperglycemia, with long-term current use of insulin ([facility]) 2024     Cellulitis of lower extremity 2024     Cortical age-related cataract of both eyes 10/20/2023     Numbness of left foot 2023     Lymphedema of both lower extremities 2022     Rash 2022     Stage 3 chronic kidney disease, unspecified whether stage 3a or 3b CKD ([facility]) 2022     Tachycardia 2021     Essential hypertension 2019     Hypercholesteremia 2019     Class 3 severe obesity due to excess calories with serious comorbidity and body mass index (BMI) of 40.0 to 44.9 in adult ([facility]) 2019     Steatohepatitis 2019     Allergic rhinitis  09/26/2019     Vitamin D deficiency 09/26/2019     Persistent proteinuria 09/26/2019     Type 2 diabetes mellitus with microalbuminuria, with long-term current use of insulin (HCC)        LOS (days): 12  Geometric Mean LOS (GMLOS) (days): 4.9  Days to GMLOS:-7.1     OBJECTIVE:  Risk of Unplanned Readmission Score: 22.94         Current admission status: Inpatient   Preferred Pharmacy:   [id number] - [place] - [address]  [place] [id number]  Phone: [phone] Fax: [phone]    Primary Care Provider: Jluis Glass MD    Primary Insurance: MEDICARE  Secondary Insurance: [facility]    DISCHARGE DETAILS:    Comments - Freedom of Choice: Pt chose [facility] from the choice list, reserved  in [facility]   Reviewed the IMM with the pt who is in agreement with same.  Pt will be going to [facility] upon DC.                                                                          IMM Given (Date):: 03/10/25  IMM Given to:: Patient (Reviewed the IMM with the pt who is in agreement with same)

## 2025-03-10 NOTE — ASSESSMENT & PLAN NOTE
Serum sodium improved at 134 over the last few days with better blood sugar control and diuretic trial  On fluid restriction and diuretics

## 2025-03-10 NOTE — ASSESSMENT & PLAN NOTE
Lab Results   Component Value Date    HGBA1C 8.9 (H) 02/26/2025       Recent Labs     03/09/25  1049 03/09/25  1547 03/09/25  2029 03/10/25  0739   POCGLU 251* 227* 268* 160*       Blood Sugar Average: Last 72 hrs:  (P) [id number]

## 2025-03-10 NOTE — ASSESSMENT & PLAN NOTE
Lab Results   Component Value Date    EGFR 21 03/10/2025    EGFR 19 03/09/2025    EGFR 19 03/08/2025    CREATININE 2.79 (H) 03/10/2025    CREATININE 3.06 (H) 03/09/2025    CREATININE 3.14 (H) 03/08/2025

## 2025-03-10 NOTE — ASSESSMENT & PLAN NOTE
Assessment:  Urinary retention secondary to BPH and current acute illness with decreased mobility.  Ramirez catheter has been removed.  He has been voiding with bladder scan PVR earlier this morning to 225 mL.    Plan:  Continue tamsulosin.  Continue voiding trial with close follow-up of postvoid residuals.  Urinary retention protocol.

## 2025-03-10 NOTE — NURSING NOTE
O2 sats temporarily dip into low 60s. I enter room and hear a sudden loud gasping breath and o2 sats rise to above 90. O2 placed at 2 lpm. No c/o.

## 2025-03-10 NOTE — ASSESSMENT & PLAN NOTE
Patient is on a mild fluid restriction of 2 L, serum sodium level is 134 mmol/L this morning.  May consider further relaxing fluid restriction depending on he progresses clinically.

## 2025-03-10 NOTE — ASSESSMENT & PLAN NOTE
Lab Results   Component Value Date    EGFR 21 03/10/2025    EGFR 19 03/09/2025    EGFR 19 03/08/2025    CREATININE 2.79 (H) 03/10/2025    CREATININE 3.06 (H) 03/09/2025    CREATININE 3.14 (H) 03/08/2025   Etiology of acute kidney injury appears to be acute tubular necrosis.  Continue supportive care and management and avoidance of nephrotoxins.

## 2025-03-10 NOTE — ASSESSMENT & PLAN NOTE
Blood pressure slightly above goal at 145/79 millimeters mercury this morning.  Will continue to monitor for now, losartan remains on hold, continue with metoprolol.

## 2025-03-10 NOTE — ASSESSMENT & PLAN NOTE
Baseline creatinine of approximately 1.1-1.3 -> elevated but continuing to improve over the last several days, currently at 2.79 from a prior 4.34 peak  Nephrology following -> now on twice daily oral diuretic (Lasix) due to fluid overloaded state  Monitor renal function and urine output  Limit/avoid nephrotoxins and hypotension as possible - holding ARB (Cozaar)

## 2025-03-10 NOTE — ASSESSMENT & PLAN NOTE
Lab Results   Component Value Date    HGBA1C 8.9 (H) 02/26/2025       Recent Labs     03/09/25  1049 03/09/25  1547 03/09/25  2029 03/10/25  0739   POCGLU 251* 227* 268* 160*       Blood Sugar Average: Last 72 hrs:  (P) [id number]

## 2025-03-10 NOTE — PROGRESS NOTES
Progress Note - Hospitalist   Name: Nadeem Purdy Jr. 69 y.o. male I MRN: [id number]  Unit/Bed#: -01 I Date of Admission: 2/26/2025   Date of Service: 3/10/2025 I Hospital Day: 12    Assessment & Plan  Diabetic ketoacidosis without coma associated with type 2 diabetes mellitus (HCC)  Lab Results   Component Value Date    HGBA1C 8.9 (H) 02/26/2025     Transferred out of ICU with transition off insulin drip to a basal 70/30 BID regimen with additional SSI coverage per Accu-Cheks -> titrate dosing daily, as necessary  Encourage compliance to home insulin regimen  Hypoglycemia protocol  Carbohydrate restriction  Monitor/replete any potassium/phosphate derangements, as necessary  Severe sepsis ([facility])  Initially presented with fever, leukocytosis, tachypnea, with lactic acidosis in the setting of streptococcal bacteremia secondary to lower extremity cellulitis  Hemodynamically stabilized and transitioned to the medical floor  See plan for bacteremia/cellulitis   Streptococcal bacteremia  1 of 2 bottles from 2/26 growing Streptococcus pyogenes  IV Ancef now transitioned to oral Duricef through 3/15  Cellulitis of lower extremity  As stated above, IV Ancef now transitioned to oral Duricef  Likely source of bacteremia  Acute renal failure superimposed on stage 3a chronic kidney disease (HCC)  Baseline creatinine of approximately 1.1-1.3 -> elevated but continuing to improve over the last several days, currently at 2.79 from a prior 4.34 peak  Nephrology following -> now on twice daily oral diuretic (Lasix) due to fluid overloaded state  Monitor renal function and urine output  Limit/avoid nephrotoxins and hypotension as possible - holding ARB (Cozaar)  Hypercholesteremia  Continue statin  Class 3 severe obesity due to excess calories with serious comorbidity and body mass index (BMI) of 40.0 to 44.9 in adult ([facility])  BMI of 43.29 currently  Lifestyle/diet modifications  Lymphedema of both lower extremities  Encourage leg  elevation  Status post IV Lasix dosing by nephrology, now transitioned to twice daily oral dosing  Atrial fibrillation with RVR (HCC)  Rate controlled on Lopressor  Not on chronic anticoagulation  Urinary retention  Status post Ramirez catheter placement with successful removal/voiding trial  Appreciate urology input ->  Flomax continued  Alcohol abuse  Cessation counseling  Continue thiamine/folic acid and multivitamin supplementation  Hyponatremia  Serum sodium improved at 134 over the last few days with better blood sugar control and diuretic trial  On fluid restriction and diuretics  Hypomagnesemia  Monitor/replete serum magnesium, and potassium, as necessary  Chronic anemia  Hemoglobin stable in the 9's the last few days  Continue to monitor   Essential hypertension  Continue Norvasc/Lopressor - holding Cozaar due to LUIS ALFREDO  PRN IV Labetalol/Hydralazine on board for BP spikes  Metabolic acidosis      VTE Pharmacologic Prophylaxis: VTE Score: 5 High Risk (Score >/= 5) - Pharmacological DVT Prophylaxis Ordered: heparin. Sequential Compression Devices Ordered.    Mobility:   Basic Mobility Inpatient Raw Score: 11  JH-HLM Goal: 4: Move to chair/commode  JH-HLM Achieved: 5: Stand (1 or more minutes)  JH-HLM Goal achieved. Continue to encourage appropriate mobility.    Patient Centered Rounds: I performed bedside rounds with nursing staff today.   Discussions with Specialists or Other Care Team Provider: nursing, CM    Education and Discussions with Family / Patient:  patient updated at bedside.     Current Length of Stay: 12 day(s)  Current Patient Status: Inpatient   Certification Statement: The patient will continue to require additional inpatient hospital stay due to continued monitoring of renal function  Discharge Plan: Anticipate discharge in 24-48 hrs to rehab facility.    Code Status: Level 3 - DNAR and DNI    Subjective   The patient was seen and examined. The patient was sitting up in bed in no acute distress.  He denies any complaints.    Objective :  Temp:  [97.2 °F (36.2 °C)-98.2 °F (36.8 °C)] 97.2 °F (36.2 °C)  HR:  [] 101  BP: (131-166)/(61-81) 141/69  Resp:  [18-19] 18  SpO2:  [94 %-96 %] 95 %  O2 Device: None (Room air)    Body mass index is 43.29 kg/m².     Input and Output Summary (last 24 hours):     Intake/Output Summary (Last 24 hours) at 3/10/2025 1120  Last data filed at 3/10/2025 1049  Gross per 24 hour   Intake 1720.36 ml   Output 1905 ml   Net -184.64 ml       Physical Exam  Vitals and nursing note reviewed.   Constitutional:       General: He is awake.      Appearance: Normal appearance. He is morbidly obese.   HENT:      Head: Normocephalic and atraumatic.   Cardiovascular:      Rate and Rhythm: Normal rate and regular rhythm.      Heart sounds: Normal heart sounds.   Pulmonary:      Effort: Pulmonary effort is normal.      Breath sounds: Normal breath sounds.   Abdominal:      Palpations: Abdomen is soft.      Tenderness: There is no abdominal tenderness.   Skin:     General: Skin is warm and dry.   Neurological:      General: No focal deficit present.      Mental Status: He is alert and oriented to person, place, and time.   Psychiatric:         Attention and Perception: Attention normal.         Mood and Affect: Mood normal.         Speech: Speech normal.         Behavior: Behavior is cooperative.         Cognition and Memory: Cognition and memory normal.           Lines/Drains:              Lab Results: I have reviewed the following results:   Results from last 7 days   Lab Units 03/10/25  0525 03/08/25  0443 03/07/25  0523   WBC Thousand/uL 11.70*   < > 19.77*   HEMOGLOBIN g/dL 9.4*   < > 10.7*   HEMATOCRIT % 29.1*   < > 32.9*   PLATELETS Thousands/uL 297   < > 399*   BANDS PCT %  --   --  4   SEGS PCT % 66  --   --    LYMPHO PCT % 22  --  13*   MONO PCT % 6  --  7   EOS PCT % 3  --  3    < > = values in this interval not displayed.     Results from last 7 days   Lab Units 03/10/25  0578  03/05/25 2042 03/05/25  0459   SODIUM mmol/L 134*   < > 132*   POTASSIUM mmol/L 3.7   < > 3.6   CHLORIDE mmol/L 99   < > 103   CO2 mmol/L 28   < > 20*   BUN mg/dL 55*   < > 60*   CREATININE mg/dL 2.79*   < > 3.69*   ANION GAP mmol/L 7   < > 9   CALCIUM mg/dL 8.1*   < > 7.9*   ALBUMIN g/dL  --   --  2.5*   TOTAL BILIRUBIN mg/dL  --   --  0.38   ALK PHOS U/L  --   --  38   ALT U/L  --   --  43   AST U/L  --   --  60*   GLUCOSE RANDOM mg/dL 161*   < > 110    < > = values in this interval not displayed.         Results from last 7 days   Lab Units 03/10/25  1048 03/10/25  0739 03/09/25  2029 03/09/25  1547 03/09/25  1049 03/09/25  0702 03/08/25  2039 03/08/25  1614 03/08/25  1140 03/08/25  0709 03/07/25  2051 03/07/25  1637   POC GLUCOSE mg/dl 242* 160* 268* 227* 251* 195* 285* 232* 220* 173* 246* 290*         Results from last 7 days   Lab Units 03/04/25  0410 03/03/25  1021   LACTIC ACID mmol/L  --  1.3   PROCALCITONIN ng/ml 1.86*  --        Recent Cultures (last 7 days):         Imaging Results Review: No pertinent imaging studies reviewed.  Other Study Results Review: No additional pertinent studies reviewed.    Last 24 Hours Medication List:     Current Facility-Administered Medications:     acetaminophen (TYLENOL) tablet 650 mg, Q6H PRN    amLODIPine (NORVASC) tablet 10 mg, Daily    aspirin (ECOTRIN LOW STRENGTH) EC tablet 81 mg, Daily    atorvastatin (LIPITOR) tablet 40 mg, Daily With Dinner    azelastine (ASTELIN) 0.1 % nasal spray 1 spray, BID    cefadroxil (DURICEF) capsule 1,000 mg, Q12H MARGARITA    chlorhexidine (PERIDEX) 0.12 % oral rinse 15 mL, Q12H MARGARITA    Cholecalciferol (VITAMIN D3) tablet 2,000 Units, Daily    folic acid (FOLVITE) tablet 1 mg, Daily    furosemide (LASIX) tablet 40 mg, BID (diuretic)    gabapentin (NEURONTIN) capsule 200 mg, HS    guaiFENesin (MUCINEX) 12 hr tablet 600 mg, Q12H MARGARITA    heparin (porcine) subcutaneous injection 7,500 Units, Q8H MARGARITA    hydrALAZINE (APRESOLINE) injection 10 mg,  Q6H PRN    insulin aspart protamine-insulin aspart (NovoLOG 70/30) 100 units/mL subcutaneous injection 32 Units, BID AC    insulin lispro (HumALOG/ADMELOG) 100 units/mL subcutaneous injection 2-12 Units, 4x Daily (AC & HS) **AND** Fingerstick Glucose (POCT), 4x Daily AC and at bedtime    labetalol (NORMODYNE) injection 10 mg, Q4H PRN    melatonin tablet 6 mg, HS    metoprolol tartrate (LOPRESSOR) tablet 50 mg, Q12H MARGARITA    multivitamin-minerals (CENTRUM) tablet 1 tablet, Daily    sod citrate-citric acid (BICITRA) oral solution 30 mL, BID    tamsulosin (FLOMAX) capsule 0.4 mg, Daily With Dinner    thiamine tablet 100 mg, Daily    vitamin E (TOCOPHEROL) capsule 400 Units, Daily    Administrative Statements   Today, Patient Was Seen By: Zeyad Orellana PA-C  I have spent a total time of 35 minutes in caring for this patient on the day of the visit/encounter including Documenting in the medical record, Reviewing/placing orders in the medical record (including tests, medications, and/or procedures), and Communicating with other healthcare professionals .    **Please Note: This note may have been constructed using a voice recognition system.**

## 2025-03-10 NOTE — PROGRESS NOTES
Progress Note - Nephrology   Name: Nadeem Purdy Jr. 69 y.o. male I MRN: [id number]  Unit/Bed#: -01 I Date of Admission: 2/26/2025   Date of Service: 3/10/2025 I Hospital Day: 12     Assessment & Plan  Acute renal failure superimposed on stage 3a chronic kidney disease (HCC)  Lab Results   Component Value Date    EGFR 21 03/10/2025    EGFR 19 03/09/2025    EGFR 19 03/08/2025    CREATININE 2.79 (H) 03/10/2025    CREATININE 3.06 (H) 03/09/2025    CREATININE 3.14 (H) 03/08/2025   Etiology of acute kidney injury appears to be acute tubular necrosis.  Continue supportive care and management and avoidance of nephrotoxins.  Hyponatremia  Patient is on a mild fluid restriction of 2 L, serum sodium level is 134 mmol/L this morning.  May consider further relaxing fluid restriction depending on he progresses clinically.  Essential hypertension  Blood pressure slightly above goal at 145/79 millimeters mercury this morning.  Will continue to monitor for now, losartan remains on hold, continue with metoprolol.  Diabetic ketoacidosis without coma associated with type 2 diabetes mellitus (HCC)  Lab Results   Component Value Date    HGBA1C 8.9 (H) 02/26/2025       Recent Labs     03/09/25  1049 03/09/25  1547 03/09/25  2029 03/10/25  0739   POCGLU 251* 227* 268* 160*       Blood Sugar Average: Last 72 hrs:  (P) [id number]      Urinary retention  Continue care and treatment according to our urology colleagues.  Metabolic acidosis  Stable at this time, continue to monitor  Streptococcal bacteremia  Continue with antibiotics according to our hospitalist and infectious disease colleagues  Severe sepsis (HCC)  Symptomatically improved, continue with antibiotics  Hypomagnesemia  Continue to monitor and offer repletion as indicated  Atrial fibrillation with RVR ([facility])  Appears resolved at this time, continue with beta-blockade.  Chronic anemia      Case discussed with hospitalist.  Continue with current care and therapy,  "avoid nephrotoxins.    Follow up reason for today's visit:     Diabetic ketoacidosis without coma associated with type 2 diabetes mellitus ([facility])    Patient Active Problem List   Diagnosis    Type 2 diabetes mellitus with microalbuminuria, with long-term current use of insulin ([facility])    Essential hypertension    Hypercholesteremia    Class 3 severe obesity due to excess calories with serious comorbidity and body mass index (BMI) of 40.0 to 44.9 in adult ([facility])    Steatohepatitis    Allergic rhinitis    Vitamin D deficiency    Persistent proteinuria    Tachycardia    Stage 3 chronic kidney disease, unspecified whether stage 3a or 3b CKD ([facility])    Lymphedema of both lower extremities    Rash    Numbness of left foot    Cortical age-related cataract of both eyes    Type 2 diabetes mellitus with hyperglycemia, with long-term current use of insulin ([facility])    Cellulitis of lower extremity    Right knee injury, subsequent encounter    Osteochondral lesion    Primary osteoarthritis of one knee, right    Lymphedema of right lower extremity    Other tear of medial meniscus, current injury, right knee, initial encounter    Hypertensive kidney disease with chronic kidney disease stage III ([facility])    Acute renal failure superimposed on stage 3a chronic kidney disease ([facility])    Diabetic ketoacidosis without coma associated with type 2 diabetes mellitus ([facility])    Severe sepsis ([facility])    Alcohol abuse    Hyponatremia    Metabolic acidosis    Urinary retention    Atrial fibrillation with RVR ([facility])    Hypomagnesemia    Streptococcal bacteremia    Chronic anemia         Subjective:   No acute issues, patient is eating and drinking appropriately, nausea or vomiting.    Objective:     Vitals: Blood pressure 145/79, pulse 100, temperature (!) 97.2 °F (36.2 °C), temperature source Oral, resp. rate 18, height 5' 11\" (1.803 m), weight (!) 141 kg (310 lb 6.5 oz), SpO2 94%.,Body mass index is 43.29 kg/m².    Weight (last 2 days)       None      " "        Intake/Output Summary (Last 24 hours) at 3/10/2025 0847  Last data filed at 3/10/2025 0601  Gross per 24 hour   Intake 1360.36 ml   Output 1905 ml   Net -544.64 ml     I/O last 3 completed shifts:  In: 1600.4 [P.O.:1600.4]  Out: 2705 [Urine:2705]         Physical Exam: /79 (BP Location: Right arm)   Pulse 100   Temp (!) 97.2 °F (36.2 °C) (Oral)   Resp 18   Ht 5' 11\" (1.803 m)   Wt (!) 141 kg (310 lb 6.5 oz)   SpO2 94%   BMI 43.29 kg/m²     General Appearance:    Alert, cooperative, no distress, appears stated age   Head:    Normocephalic, without obvious abnormality, atraumatic   Eyes:    Conjunctiva/corneas clear   Ears:    Normal external ears   Nose:   Nares normal, septum midline, mucosa normal, no drainage    or sinus tenderness   Throat:   Lips, mucosa, and tongue normal; teeth and gums normal   Neck:   Supple   Back:     Symmetric, no curvature, ROM normal, no CVA tenderness   Lungs:     Clear to auscultation bilaterally, respirations unlabored   Chest wall:    No tenderness or deformity   Heart:    Regular rate and rhythm, S1 and S2 normal, no murmur, rub   or gallop   Abdomen:     Soft, non-tender, bowel sounds active   Extremities:   Extremities normal, atraumatic, no cyanosis, bilateral lower extremity edema with dry skin noted   Skin:   Skin color, texture, turgor normal, no rashes or lesions   Lymph nodes:   Cervical normal   Neurologic:   CNII-XII intact            Lab, Imaging and other studies: I have personally reviewed pertinent labs.  CBC:   Lab Results   Component Value Date    WBC 11.70 (H) 03/10/2025    HGB 9.4 (L) 03/10/2025    HCT 29.1 (L) 03/10/2025    MCV 94 03/10/2025     03/10/2025    RBC 3.10 (L) 03/10/2025    MCH 30.3 03/10/2025    MCHC 32.3 03/10/2025    RDW 13.3 03/10/2025    MPV 8.3 (L) 03/10/2025    NRBC 0 03/10/2025     CMP:   Lab Results   Component Value Date    K 3.7 03/10/2025    CL 99 03/10/2025    CO2 28 03/10/2025    BUN 55 (H) 03/10/2025    " "CREATININE 2.79 (H) 03/10/2025    CALCIUM 8.1 (L) 03/10/2025    EGFR 21 03/10/2025       .  Results from last 7 days   Lab Units 03/10/25  0525 03/09/25  0441 03/08/25  0443 03/05/25  2042 03/05/25  0459 03/04/25  1818 03/04/25  0410   POTASSIUM mmol/L 3.7 4.0 4.3   < > 3.6   < > 3.9   CHLORIDE mmol/L 99 99 101   < > 103   < > 103   CO2 mmol/L 28 25 24   < > 20*   < > 16*   BUN mg/dL 55* 57* 57*   < > 60*   < > 70*   CREATININE mg/dL 2.79* 3.06* 3.14*   < > 3.69*   < > 4.17*   CALCIUM mg/dL 8.1* 8.0* 7.9*   < > 7.9*   < > 8.0*   ALK PHOS U/L  --   --   --   --  38  --  40   ALT U/L  --   --   --   --  43  --  56*   AST U/L  --   --   --   --  60*  --  54*    < > = values in this interval not displayed.         Phosphorus: No results found for: \"PHOS\"  Magnesium:   Lab Results   Component Value Date    MG 1.7 (L) 03/10/2025     Urinalysis: No results found for: \"COLORU\", \"CLARITYU\", \"SPECGRAV\", \"PHUR\", \"LEUKOCYTESUR\", \"NITRITE\", \"PROTEINUA\", \"GLUCOSEU\", \"KETONESU\", \"BILIRUBINUR\", \"BLOODU\"  Ionized Calcium: No results found for: \"CAION\"  Coagulation: No results found for: \"PT\", \"INR\", \"APTT\"  Troponin: No results found for: \"TROPONINI\"  ABG: No results found for: \"PHART\", \"WJE8PUM\", \"PO2ART\", \"HVA1RKB\", \"K3YDRYKU\", \"BEART\", \"SOURCE\"  Radiology review:     IMAGING  Procedure: VAS upper limb venous duplex scan, unilateral/limited  Result Date: 3/7/2025  Narrative:  [facility]PORT [facility]INICAL: Indications: Patient presents with right upper extremity edema s/p recent IV Operative History: Patient denies any cardiovascular surgeries. Risk Factors The patient has history of HTN, Diabetes (Yes), Hyperlipidemia and previous smoking (quit 5-10yrs ago).  FINDINGS:  Right                             Thrombus           Cephalic Upper Arm Distal         Acute - Occlusive  Ceph AC                           Acute - Occlusive  Cephalic Vein - Forearm Proximal  Acute - Occlusive     CONCLUSION: Impression RIGHT UPPER LIMB: No " evidence of acute or chronic deep vein thrombosis. There is evidence of acute occlusive superficial thrombophlebitis noted in the cephalic vein from the distal upper arm to the proximal forearm. Doppler evaluation shows a normal response to augmentation maneuvers.  LEFT UPPER LIMB LIMITED: Evaluation shows no evidence of thrombus in the internal jugular vein, subclavian vein, and the innominate vein.  Technical findings were given to Dr Kiki Villarreal  SIGNATURE: Electronically Signed by: ALEJANDRO FUNK on 2025-03-07 05:02:08 PM        Current Facility-Administered Medications:     acetaminophen (TYLENOL) tablet 650 mg, Q6H PRN    amLODIPine (NORVASC) tablet 10 mg, Daily    aspirin (ECOTRIN LOW STRENGTH) EC tablet 81 mg, Daily    atorvastatin (LIPITOR) tablet 40 mg, Daily With Dinner    azelastine (ASTELIN) 0.1 % nasal spray 1 spray, BID    cefadroxil (DURICEF) capsule 1,000 mg, Q12H MARGARITA    chlorhexidine (PERIDEX) 0.12 % oral rinse 15 mL, Q12H MARGARITA    Cholecalciferol (VITAMIN D3) tablet 2,000 Units, Daily    folic acid (FOLVITE) tablet 1 mg, Daily    furosemide (LASIX) tablet 40 mg, BID (diuretic)    gabapentin (NEURONTIN) capsule 200 mg, HS    guaiFENesin (MUCINEX) 12 hr tablet 600 mg, Q12H MARGARITA    heparin (porcine) subcutaneous injection 7,500 Units, Q8H MARGARITA    hydrALAZINE (APRESOLINE) injection 10 mg, Q6H PRN    insulin aspart protamine-insulin aspart (NovoLOG 70/30) 100 units/mL subcutaneous injection 30 Units, BID AC    insulin lispro (HumALOG/ADMELOG) 100 units/mL subcutaneous injection 2-12 Units, 4x Daily (AC & HS) **AND** Fingerstick Glucose (POCT), 4x Daily AC and at bedtime    labetalol (NORMODYNE) injection 10 mg, Q4H PRN    magnesium sulfate 2 g/50 mL IVPB (premix) 2 g, Once    melatonin tablet 6 mg, HS    metoprolol tartrate (LOPRESSOR) tablet 50 mg, Q12H MARGARITA    multivitamin-minerals (CENTRUM) tablet 1 tablet, Daily    sod citrate-citric acid (BICITRA) oral solution 30 mL, BID    tamsulosin (FLOMAX)  capsule 0.4 mg, Daily With Dinner    thiamine tablet 100 mg, Daily    vitamin E (TOCOPHEROL) capsule 400 Units, Daily  Medications Discontinued During This Encounter   Medication Reason    sodium chloride 0.9 % bolus 2,259 mL     furosemide (LASIX) injection 40 mg     furosemide (LASIX) injection 80 mg     insulin regular (HumuLIN R,NovoLIN R) 1 Units/mL in sodium chloride 0.9 % 100 mL infusion     amLODIPine (NORVASC) tablet 10 mg     multi-electrolyte (PLASMALYTE-A/ISOLYTE-S PH 7.4) IV solution     insulin regular (HumuLIN R,NovoLIN R) 1 Units/mL in sodium chloride 0.9 % 100 mL infusion     vancomycin (VANCOCIN) IVPB (premix in dextrose) 1,000 mg 200 mL     dextrose 5 % and sodium chloride 0.45 % infusion     insulin lispro (HumALOG/ADMELOG) 100 units/mL subcutaneous injection 2-12 Units     insulin lispro (HumALOG/ADMELOG) 100 units/mL subcutaneous injection 1-5 Units     insulin lispro (HumALOG/ADMELOG) 100 units/mL subcutaneous injection 2-12 Units     insulin lispro (HumALOG/ADMELOG) 100 units/mL subcutaneous injection 16 Units     folic acid 1 mg, thiamine (VITAMIN B1) 100 mg in sodium chloride 0.9 % 100 mL IV piggyback     insulin glargine (LANTUS) subcutaneous injection 48 Units 0.48 mL     insulin lispro (HumALOG/ADMELOG) 100 units/mL subcutaneous injection 1-5 Units     insulin lispro (HumALOG/ADMELOG) 100 units/mL subcutaneous injection 2-12 Units     insulin lispro (HumALOG/ADMELOG) 100 units/mL subcutaneous injection 16 Units     insulin regular (HumuLIN R,NovoLIN R) 1 Units/mL in sodium chloride 0.9 % 100 mL infusion     metoprolol succinate (TOPROL-XL) 24 hr tablet 100 mg     amLODIPine (NORVASC) tablet 10 mg     atorvastatin (LIPITOR) tablet 40 mg     sodium chloride (PF) 0.9 % injection 3 mL     multi-electrolyte (PLASMALYTE-A/ISOLYTE-S PH 7.4) IV solution     sodium bicarbonate 8.4 % injection **ADS Override Pull** Returned to ADS    sodium bicarbonate 150 mEq in dextrose 5 % 1,000 mL infusion      multi-electrolyte (PLASMALYTE-A/ISOLYTE-S PH 7.4) IV solution     vancomycin (VANCOCIN) IVPB (premix in dextrose) 750 mg 150 mL     insulin lispro (HumALOG/ADMELOG) 100 units/mL subcutaneous injection 2-12 Units     furosemide (LASIX) injection 40 mg     ceFEPime (MAXIPIME) 2,000 mg in dextrose 5 % 50 mL IVPB     furosemide (LASIX) injection 40 mg     metoprolol succinate (TOPROL-XL) 24 hr tablet 100 mg     amLODIPine (NORVASC) tablet 10 mg     heparin (porcine) subcutaneous injection 7,500 Units     insulin lispro (HumALOG/ADMELOG) 100 units/mL subcutaneous injection 1-5 Units     insulin lispro (HumALOG/ADMELOG) 100 units/mL subcutaneous injection 2-12 Units     insulin glargine (LANTUS) subcutaneous injection 48 Units 0.48 mL     insulin lispro (HumALOG/ADMELOG) 100 units/mL subcutaneous injection 16 Units     heparin (ACS LOW)     insulin glargine (LANTUS) subcutaneous injection 55 Units 0.55 mL     insulin lispro (HumALOG/ADMELOG) 100 units/mL subcutaneous injection 2-12 Units     insulin lispro (HumALOG/ADMELOG) 100 units/mL subcutaneous injection 4-20 Units     insulin lispro (HumALOG/ADMELOG) 100 units/mL subcutaneous injection 1-5 Units     insulin lispro (HumALOG/ADMELOG) 100 units/mL subcutaneous injection 18 Units     insulin glargine (LANTUS) subcutaneous injection 60 Units 0.6 mL     insulin glargine (LANTUS) subcutaneous injection 10 Units 0.1 mL     insulin lispro (HumALOG/ADMELOG) 100 units/mL subcutaneous injection 5-25 Units     insulin lispro (HumALOG/ADMELOG) 100 units/mL subcutaneous injection 1-6 Units     insulin lispro (HumALOG/ADMELOG) 100 units/mL subcutaneous injection 20 Units     cefTRIAXone (ROCEPHIN) IVPB (premix in dextrose) 1,000 mg 50 mL     diltiazem (CARDIZEM) 125 mg in sodium chloride 0.9 % 125 mL infusion     sodium bicarbonate 150 mEq in dextrose 5 % 1,000 mL infusion     metoprolol succinate (TOPROL-XL) 24 hr tablet 150 mg     diltiazem (CARDIZEM) tablet 30 mg     vancomycin  (VANCOCIN) capsule 125 mg     metroNIDAZOLE (FLAGYL) IVPB (premix) 500 mg 100 mL     cefepime (MAXIPIME) 1,000 mg in dextrose 5 % 50 mL IVPB     clindamycin (CLEOCIN) IVPB (premix in dextrose) 900 mg 50 mL     vancomycin (VANCOCIN) 1,250 mg in sodium chloride 0.9 % 250 mL IVPB     ceFAZolin (ANCEF) IVPB (premix in dextrose) 2,000 mg 50 mL     ceFAZolin (ANCEF) IVPB (premix in dextrose) 2,000 mg 50 mL     ceFAZolin (ANCEF) IVPB (premix in dextrose) 1,000 mg 50 mL     levalbuterol (XOPENEX) inhalation solution 1.25 mg     sodium bicarbonate 150 mEq in dextrose 5 % 1,000 mL infusion     heparin (porcine) injection 4,000 Units     heparin (porcine) injection 2,000 Units     hydrALAZINE (APRESOLINE) injection 5 mg     heparin (porcine) subcutaneous injection 5,000 Units     insulin regular (HumuLIN R,NovoLIN R) 1 Units/mL in sodium chloride 0.9 % 100 mL infusion     ceFAZolin (ANCEF) IVPB (premix in dextrose) 1,000 mg 50 mL     losartan (COZAAR) tablet 100 mg     cefadroxil (DURICEF) tablet 1,000 mg     insulin aspart protamine-insulin aspart (NovoLOG 70/30) 100 units/mL subcutaneous injection 20 Units     insulin lispro (HumALOG/ADMELOG) 100 units/mL subcutaneous injection 1-5 Units     insulin lispro (HumALOG/ADMELOG) 100 units/mL subcutaneous injection 1-5 Units     insulin lispro (HumALOG/ADMELOG) 100 units/mL subcutaneous injection 1-5 Units     insulin aspart protamine-insulin aspart (NovoLOG 70/30) 100 units/mL subcutaneous injection 25 Units     albuterol inhalation solution 2.5 mg     insulin aspart protamine-insulin aspart (NovoLOG 70/30) 100 units/mL subcutaneous injection 28 Units        Uday Guaman,       This progress note was produced in part using a dictation device which may document imprecise wording from author's original intent.

## 2025-03-10 NOTE — PROGRESS NOTES
Progress Note - Urology   Name: Nadeem Purdy Jr. 69 y.o. male I MRN: [id number]  Unit/Bed#: -01 I Date of Admission: 2/26/2025   Date of Service: 3/10/2025 I Hospital Day: 12     Assessment & Plan  Diabetic ketoacidosis without coma associated with type 2 diabetes mellitus ([facility])  Lab Results   Component Value Date    HGBA1C 8.9 (H) 02/26/2025       Recent Labs     03/09/25  1049 03/09/25  1547 03/09/25  2029 03/10/25  0739   POCGLU 251* 227* 268* 160*       Blood Sugar Average: Last 72 hrs:  (P) [id number]    Essential hypertension    Hypercholesteremia    Class 3 severe obesity due to excess calories with serious comorbidity and body mass index (BMI) of 40.0 to 44.9 in adult ([facility])    Lymphedema of both lower extremities    Cellulitis of lower extremity    Acute renal failure superimposed on stage 3a chronic kidney disease ([facility])  Lab Results   Component Value Date    EGFR 21 03/10/2025    EGFR 19 03/09/2025    EGFR 19 03/08/2025    CREATININE 2.79 (H) 03/10/2025    CREATININE 3.06 (H) 03/09/2025    CREATININE 3.14 (H) 03/08/2025     Severe sepsis ([facility])    Alcohol abuse    Hyponatremia    Metabolic acidosis    Urinary retention  Assessment:  Urinary retention secondary to BPH and current acute illness with decreased mobility.  Ramirez catheter has been removed.  He has been voiding with bladder scan PVR earlier this morning to 225 mL.    Plan:  Continue tamsulosin.  Continue voiding trial with close follow-up of postvoid residuals.  Urinary retention protocol.    Atrial fibrillation with RVR ([facility])    Hypomagnesemia    Streptococcal bacteremia    Chronic anemia      Subjective : Voiding without difficulty    Objective :  Temp:  [97.2 °F (36.2 °C)-98.2 °F (36.8 °C)] 97.2 °F (36.2 °C)  HR:  [] 101  BP: (131-166)/(61-81) 141/69  Resp:  [18-19] 18  SpO2:  [94 %-96 %] 95 %  O2 Device: None (Room air)    Physical Exam no abdominal or flank tenderness    Lab Results: I have reviewed the following  results:CBC/BMP:   .     03/10/25  0525   WBC 11.70*   HGB 9.4*   HCT 29.1*      SODIUM 134*   K 3.7   CL 99   CO2 28   BUN 55*   CREATININE 2.79*   GLUC 161*   MG 1.7*        Imaging Results Review: No pertinent imaging studies reviewed.  Other Study Results Review: No additional pertinent studies reviewed.    VTE Pharmacologic Prophylaxis: Sequential compression device (Venodyne)   VTE Mechanical Prophylaxis: sequential compression device

## 2025-03-11 VITALS
HEART RATE: 94 BPM | OXYGEN SATURATION: 96 % | WEIGHT: 310.41 LBS | BODY MASS INDEX: 43.46 KG/M2 | SYSTOLIC BLOOD PRESSURE: 148 MMHG | DIASTOLIC BLOOD PRESSURE: 84 MMHG | RESPIRATION RATE: 18 BRPM | TEMPERATURE: 98.2 F | HEIGHT: 71 IN

## 2025-03-11 PROBLEM — E11.21 DIABETIC NEPHROPATHY ASSOCIATED WITH TYPE 2 DIABETES MELLITUS (HCC): Status: ACTIVE | Noted: 2025-03-11

## 2025-03-11 LAB
ANION GAP SERPL CALCULATED.3IONS-SCNC: 6 MMOL/L (ref 4–13)
BASOPHILS # BLD AUTO: 0.08 THOUSANDS/ÂΜL (ref 0–0.1)
BASOPHILS NFR BLD AUTO: 1 % (ref 0–1)
BUN SERPL-MCNC: 53 MG/DL (ref 5–25)
CALCIUM SERPL-MCNC: 8.2 MG/DL (ref 8.4–10.2)
CHLORIDE SERPL-SCNC: 100 MMOL/L (ref 96–108)
CO2 SERPL-SCNC: 28 MMOL/L (ref 21–32)
CREAT SERPL-MCNC: 2.45 MG/DL (ref 0.6–1.3)
EOSINOPHIL # BLD AUTO: 0.39 THOUSAND/ÂΜL (ref 0–0.61)
EOSINOPHIL NFR BLD AUTO: 3 % (ref 0–6)
ERYTHROCYTE [DISTWIDTH] IN BLOOD BY AUTOMATED COUNT: 13.2 % (ref 11.6–15.1)
FLUAV AG UPPER RESP QL IA.RAPID: NEGATIVE
FLUBV AG UPPER RESP QL IA.RAPID: NEGATIVE
GFR SERPL CREATININE-BSD FRML MDRD: 25 ML/MIN/1.73SQ M
GLUCOSE SERPL-MCNC: 127 MG/DL (ref 65–140)
GLUCOSE SERPL-MCNC: 142 MG/DL (ref 65–140)
GLUCOSE SERPL-MCNC: 155 MG/DL (ref 65–140)
HCT VFR BLD AUTO: 27.5 % (ref 36.5–49.3)
HGB BLD-MCNC: 9.2 G/DL (ref 12–17)
IMM GRANULOCYTES # BLD AUTO: 0.11 THOUSAND/UL (ref 0–0.2)
IMM GRANULOCYTES NFR BLD AUTO: 1 % (ref 0–2)
LYMPHOCYTES # BLD AUTO: 2.56 THOUSANDS/ÂΜL (ref 0.6–4.47)
LYMPHOCYTES NFR BLD AUTO: 22 % (ref 14–44)
MAGNESIUM SERPL-MCNC: 1.9 MG/DL (ref 1.9–2.7)
MCH RBC QN AUTO: 31.7 PG (ref 26.8–34.3)
MCHC RBC AUTO-ENTMCNC: 33.5 G/DL (ref 31.4–37.4)
MCV RBC AUTO: 95 FL (ref 82–98)
MONOCYTES # BLD AUTO: 0.73 THOUSAND/ÂΜL (ref 0.17–1.22)
MONOCYTES NFR BLD AUTO: 6 % (ref 4–12)
NEUTROPHILS # BLD AUTO: 7.63 THOUSANDS/ÂΜL (ref 1.85–7.62)
NEUTS SEG NFR BLD AUTO: 67 % (ref 43–75)
NRBC BLD AUTO-RTO: 0 /100 WBCS
PLATELET # BLD AUTO: 265 THOUSANDS/UL (ref 149–390)
PMV BLD AUTO: 8.1 FL (ref 8.9–12.7)
POTASSIUM SERPL-SCNC: 3.7 MMOL/L (ref 3.5–5.3)
RBC # BLD AUTO: 2.9 MILLION/UL (ref 3.88–5.62)
SARS-COV+SARS-COV-2 AG RESP QL IA.RAPID: NEGATIVE
SODIUM SERPL-SCNC: 134 MMOL/L (ref 135–147)
WBC # BLD AUTO: 11.5 THOUSAND/UL (ref 4.31–10.16)

## 2025-03-11 PROCEDURE — 99238 HOSP IP/OBS DSCHRG MGMT 30/<: CPT | Performed by: PHYSICIAN ASSISTANT

## 2025-03-11 PROCEDURE — 87804 INFLUENZA ASSAY W/OPTIC: CPT | Performed by: PHYSICIAN ASSISTANT

## 2025-03-11 PROCEDURE — 97530 THERAPEUTIC ACTIVITIES: CPT

## 2025-03-11 PROCEDURE — 82948 REAGENT STRIP/BLOOD GLUCOSE: CPT

## 2025-03-11 PROCEDURE — 99232 SBSQ HOSP IP/OBS MODERATE 35: CPT | Performed by: INTERNAL MEDICINE

## 2025-03-11 PROCEDURE — 85025 COMPLETE CBC W/AUTO DIFF WBC: CPT | Performed by: INTERNAL MEDICINE

## 2025-03-11 PROCEDURE — 97116 GAIT TRAINING THERAPY: CPT

## 2025-03-11 PROCEDURE — 87811 SARS-COV-2 COVID19 W/OPTIC: CPT | Performed by: PHYSICIAN ASSISTANT

## 2025-03-11 PROCEDURE — 83735 ASSAY OF MAGNESIUM: CPT | Performed by: INTERNAL MEDICINE

## 2025-03-11 PROCEDURE — 97110 THERAPEUTIC EXERCISES: CPT

## 2025-03-11 PROCEDURE — 80048 BASIC METABOLIC PNL TOTAL CA: CPT | Performed by: INTERNAL MEDICINE

## 2025-03-11 PROCEDURE — 97535 SELF CARE MNGMENT TRAINING: CPT

## 2025-03-11 RX ORDER — LANOLIN ALCOHOL/MO/W.PET/CERES
100 CREAM (GRAM) TOPICAL DAILY
Start: 2025-03-12

## 2025-03-11 RX ORDER — INSULIN ASPART 100 [IU]/ML
32 INJECTION, SUSPENSION SUBCUTANEOUS
Start: 2025-03-11

## 2025-03-11 RX ORDER — METOPROLOL TARTRATE 50 MG
50 TABLET ORAL EVERY 12 HOURS SCHEDULED
Start: 2025-03-11

## 2025-03-11 RX ORDER — TAMSULOSIN HYDROCHLORIDE 0.4 MG/1
0.4 CAPSULE ORAL
Start: 2025-03-11

## 2025-03-11 RX ORDER — FOLIC ACID 1 MG/1
1 TABLET ORAL DAILY
Start: 2025-03-12

## 2025-03-11 RX ORDER — CEFADROXIL 500 MG/1
1000 CAPSULE ORAL EVERY 12 HOURS SCHEDULED
Start: 2025-03-11 | End: 2025-03-17

## 2025-03-11 RX ORDER — FUROSEMIDE 40 MG/1
40 TABLET ORAL 2 TIMES DAILY
Start: 2025-03-11

## 2025-03-11 RX ADMIN — AMLODIPINE BESYLATE 10 MG: 10 TABLET ORAL at 08:16

## 2025-03-11 RX ADMIN — FOLIC ACID 1 MG: 1 TABLET ORAL at 08:16

## 2025-03-11 RX ADMIN — CHLORHEXIDINE GLUCONATE 15 ML: 1.2 SOLUTION ORAL at 08:18

## 2025-03-11 RX ADMIN — CHOLECALCIFEROL TAB 25 MCG (1000 UNIT) 2000 UNITS: 25 TAB at 08:16

## 2025-03-11 RX ADMIN — THIAMINE HCL TAB 100 MG 100 MG: 100 TAB at 08:16

## 2025-03-11 RX ADMIN — INSULIN ASPART 32 UNITS: 100 INJECTION, SUSPENSION SUBCUTANEOUS at 08:11

## 2025-03-11 RX ADMIN — Medication 400 UNITS: at 08:16

## 2025-03-11 RX ADMIN — CEFADROXIL 1000 MG: 500 CAPSULE ORAL at 08:38

## 2025-03-11 RX ADMIN — ASPIRIN 81 MG: 81 TABLET, COATED ORAL at 08:16

## 2025-03-11 RX ADMIN — FUROSEMIDE 40 MG: 40 TABLET ORAL at 08:16

## 2025-03-11 RX ADMIN — Medication 1 TABLET: at 08:16

## 2025-03-11 RX ADMIN — METOPROLOL TARTRATE 50 MG: 50 TABLET, FILM COATED ORAL at 08:16

## 2025-03-11 RX ADMIN — INSULIN LISPRO 2 UNITS: 100 INJECTION, SOLUTION INTRAVENOUS; SUBCUTANEOUS at 08:09

## 2025-03-11 RX ADMIN — AZELASTINE HYDROCHLORIDE 1 SPRAY: 137 SPRAY, METERED NASAL at 08:13

## 2025-03-11 RX ADMIN — GUAIFENESIN 600 MG: 600 TABLET ORAL at 08:16

## 2025-03-11 RX ADMIN — TRISODIUM CITRATE DIHYDRATE AND CITRIC ACID MONOHYDRATE 30 ML: 500; 334 SOLUTION ORAL at 08:13

## 2025-03-11 RX ADMIN — HEPARIN SODIUM 7500 UNITS: 5000 INJECTION INTRAVENOUS; SUBCUTANEOUS at 05:49

## 2025-03-11 NOTE — WOUND OSTOMY CARE
Progress Note - Wound   Nadeem Purdy Jr. 69 y.o. male MRN: [id number]  Unit/Bed#: -01 Encounter: [id number]    Seen for weekly wound assessment. Wounds are resolving with decreased drainage noted on admission    Assessment:   Left inner thigh wound, posterior thigh wound. Left posterior knee and tibial wounds were all open to air, no drainage and are dry.   Left inner ankle dry intact non blanchable  Left outer ankle intact blood filled purple blister  Left posterior ankle black deep purple intact tissue.  From hip to and including foot cleansed with hibiclens and Eucerin applied to intact skin.  Foam dressing applied to dry  wounds.   Sacral inner buttocks dry flaky brown tissue foam applied       Skin care Plan:  1-Mid Sacro-Buttocks Wound: Cleanse sacro-buttocks with soap and water. Apply a Silicone Bordered Foam Dressing(Mepilex) to area. Shaheen with T for Treatment. Change every other day or PRN. Peel back and inspect skin at least Q-shift .  2-Turn/reposition q2h or when medically stable for pressure re-distribution on skin .  3-Elevate heels to offload pressure  4-Moisturize skin daily with skin nourishing cream  5-Ehob cushion in chair when out of bed.  6-Hydraguard to bilateral heels BID and PRN.   7-Cleanse left leg wounds from hip to foot with hibiclens, apply Eucerin to intact skin. Apply Silicone bordered foam dressing to cover wounds on left inner posterior thigh , posterior tibia and knee, inner outer and posterior heel.      Wound 02/26/25 Pretibial Distal;Left (Active)   Wound Image    03/11/25 1029   Wound Description Dry;Intact;Brown;Tan;Slough;Swelling 03/11/25 1029   Non-staged Wound Description     Wound Length (cm) 14 cm 03/11/25 1029   Wound Width (cm) 6 cm 03/11/25 1029   Wound Depth (cm) 0.1 cm 03/11/25 1029   Wound Surface Area (cm^2) 84 cm^2 03/11/25 1029   Wound Volume (cm^3) 8.4 cm^3 03/11/25 1029   Calculated Wound Volume (cm^3) 8.4 cm^3 03/11/25 1029   Change in Wound Size % 70  03/11/25 1029   Drainage Amount None 03/11/25 1029   Jodee-wound Assessment Scaly 03/11/25 1029   Treatments Cleansed;Site care;Elevated 03/11/25 1029   Dressing Foam, Silicon (eg. Allevyn, etc) 03/11/25 1029   Dressing Changed New 03/11/25 1029   Patient Tolerance Tolerated well 03/11/25 1029   Dressing Status Clean;Dry;Intact 03/11/25 1029       Wound 02/27/25 Heel Left;Medial (Active)   Wound Image   03/11/25 1034   Wound Description Black;Intact;Clean;Dry 03/11/25 1034   Wound Length (cm) 4 cm 03/11/25 1034   Wound Width (cm) 7 cm 03/11/25 1034   Wound Surface Area (cm^2) 28 cm^2 03/11/25 1034   Drainage Amount None 03/11/25 1034   Jodee-wound Assessment Erythema;Edema;Scaly 03/11/25 1034   Dressing Foam 03/11/25 1034   Dressing Changed New 03/11/25 1034   Patient Tolerance Tolerated well 03/11/25 1034   Dressing Status Clean;Dry;Intact 03/11/25 1034       Wound 02/27/25 Tibial Left;Posterior (Active)   Wound Image   03/11/25 1040   Wound Description Dry;Intact;Beefy red;Brown 03/11/25 1040   Non-staged Wound Description Partial thickness 03/11/25 1040   Wound Length (cm) 30 cm 03/11/25 1040   Wound Width (cm) 10 cm 03/11/25 1040   Wound Surface Area (cm^2) 300 cm^2 03/11/25 1040   Drainage Amount None 03/11/25 1040   Jodee-wound Assessment Edema;Erythema;Scaly;Swelling 03/11/25 1040   Treatments Site care;Cleansed;Elevated 03/11/25 1040   Dressing Foam 03/11/25 1040   Dressing Changed New 03/11/25 1040   Patient Tolerance Tolerated well 03/11/25 1040   Dressing Status Clean;Dry;Intact 03/11/25 1040       Wound 02/27/25 Knee Left;Posterior (Active)   Wound Image   03/11/25 1035   Wound Description Brown;Dry 03/11/25 1035   Non-staged Wound Description Partial thickness 03/11/25 1035   Wound Length (cm) 15 cm 03/11/25 1035   Wound Width (cm) 14 cm 03/11/25 1035   Wound Depth (cm) 0.1 cm 03/11/25 1035   Wound Surface Area (cm^2) 210 cm^2 03/11/25 1035   Wound Volume (cm^3) 21 cm^3 03/11/25 1035   Calculated Wound  Volume (cm^3) 21 cm^3 03/11/25 1035   Change in Wound Size % 58.33 03/11/25 1035   Drainage Amount None 03/11/25 1035   Jodee-wound Assessment Edema;Erythema;Fragile 03/11/25 1035   Treatments Elevated;Site care 03/11/25 1035   Dressing Foam 03/11/25 1035   Dressing Changed New 03/11/25 1035   Patient Tolerance Tolerated well 03/11/25 1035   Dressing Status Dry;Intact;Clean 03/11/25 1035       Wound 03/02/25 Thigh Anterior;Left (Active)   Wound Image   03/11/25 1028   Wound Description Dry;Intact;Brown;Beefy red 03/11/25 1028   Non-staged Wound Description Partial thickness 03/11/25 1028   Wound Length (cm) 16 cm 03/11/25 1028   Wound Width (cm) 16 cm 03/11/25 1028   Wound Surface Area (cm^2) 256 cm^2 03/11/25 1028   Drainage Amount None 03/11/25 1028   Jodee-wound Assessment Edema;Erythema;Swelling 03/11/25 1028   Treatments Elevated;Site care;Cleansed 03/11/25 1028   Dressing Foam, Silicon (eg. Allevyn, etc) 03/11/25 1028   Dressing Changed New 03/11/25 1028   Patient Tolerance Tolerated well 03/11/25 1028   Dressing Status Clean;Intact;Dry 03/11/25 1028       Wound 03/11/25 Pressure Injury Perineum (Active)   Wound Image   03/11/25 1042   Wound Description Beefy red;Fragile 03/11/25 0838   Pressure Injury Stage 2 03/11/25 0838   Non-staged Wound Description Partial thickness 03/11/25 0838   Wound Length (cm) 7 cm 03/11/25 0838   Wound Width (cm) 10 cm 03/11/25 0838   Wound Depth (cm) 0.1 cm 03/11/25 0838   Wound Surface Area (cm^2) 70 cm^2 03/11/25 0838   Wound Volume (cm^3) 7 cm^3 03/11/25 0838   Calculated Wound Volume (cm^3) 7 cm^3 03/11/25 0838   Drainage Amount None 03/11/25 0838   Treatments Cleansed 03/11/25 0838   Dressing Foam 03/11/25 0838   Dressing Changed Changed 03/11/25 0838   Patient Tolerance Tolerated well 03/11/25 0838   Dressing Status Clean;Dry;Intact 03/11/25 0838         Wound Care will sign off  Nata FERRARAN RN [facility]ON

## 2025-03-11 NOTE — ASSESSMENT & PLAN NOTE
Lab Results   Component Value Date    HGBA1C 8.9 (H) 02/26/2025       Recent Labs     03/10/25  1048 03/10/25  1600 03/10/25  2046 03/11/25  0657   POCGLU 242* 187* 210* 155*       Blood Sugar Average: Last 72 hrs:  (P) [id number]

## 2025-03-11 NOTE — ASSESSMENT & PLAN NOTE
Lab Results   Component Value Date    HGBA1C 8.9 (H) 02/26/2025     Transferred out of ICU with transition off insulin drip to a basal 70/30    Continue 70/30 32 units BID regimen with additional SSI coverage per Accu-Cheks   Encourage compliance to home insulin regimen  Carbohydrate restriction

## 2025-03-11 NOTE — ASSESSMENT & PLAN NOTE
Ramirez catheter was removed.  Appreciate urology's input.  Continue Flomax.  Continue to monitor PVR

## 2025-03-11 NOTE — PHYSICAL THERAPY NOTE
PT Treatment Note    NAME:  Nadeem Purdy Jr.  DATE: 03/11/25    AGE:   69 y.o.  Mrn:   [id number]  ADMIT DX:  Sinus tachycardia [R00.0]  DKA (diabetic ketoacidosis) (HCC) [E11.10]  LUIS ALFREDO (acute kidney injury) (HCC) [N17.9]  Non compliance w medication regimen [Z91.148]  Visit for wound check [Z51.89]  Bilateral lower extremity edema [R60.0]  Performed at least 2 patient identifiers during session: Name and Epic photo       03/11/25 0940   PT Last Visit   PT Visit Date 03/11/25   Note Type   Note Type Treatment   Pain Assessment   Pain Assessment Tool 0-10   Pain Score No Pain   Restrictions/Precautions   Weight Bearing Precautions Per Order No   Other Precautions Chair Alarm;Bed Alarm;Contact/isolation;Fall Risk   General   Chart Reviewed Yes   Family/Caregiver Present No   Cognition   Overall Cognitive Status WFL   Arousal/Participation Alert   Attention Within functional limits   Orientation Level Oriented X4   Memory Within functional limits   Following Commands Follows all commands and directions without difficulty   Subjective   Subjective I feel stronger then normal   Bed Mobility   Additional Comments pt out of bed and in recliner to start and end session   Transfers   Sit to Stand 4  Minimal assistance   Additional items Assist x 1;Increased time required;Armrests   Stand to Sit 4  Minimal assistance   Additional items Assist x 1;Increased time required;Verbal cues   Additional Comments pt denies dizziness with transitional movements, pt requires cuing for proper hand placement   Ambulation/Elevation   Gait pattern Improper Weight shift;Forward Flexion;Decreased foot clearance   Gait Assistance 4  Minimal assist   Additional items Assist x 1;Verbal cues   Assistive Device Rolling walker   Distance 20 ft x 2   Balance   Static Sitting Good   Dynamic Sitting Fair +   Static Standing Fair   Dynamic Standing Fair -   Ambulatory Fair -   Endurance Deficit   Endurance Deficit Yes   Activity Tolerance   Activity  Tolerance Patient limited by fatigue   Exercises   Knee AROM Long Arc Quad Sitting;20 reps;Bilateral   Ankle Pumps Sitting;20 reps;Bilateral   Marching Standing;15 reps;Bilateral   Assessment   Prognosis Good   Problem List Decreased strength;Decreased range of motion;Decreased endurance;Impaired balance;Decreased mobility   Assessment Pt seen for PT treatment session this date with interventions consisting of gait training to normalize gait pattern to decrease fall risk and therapeutic exercise to improve strength to improve functional mobility. Pt agreeable to PT treatment session upon arrival, pt found seated OOB in recliner, in no apparent distress, A&O x 4, and responsive. Since previous session, pt has made good progress as evidenced by increased distance ambulated, decreased assistance required with mobility, and ability to ambulate  Barriers during this session include pain and fatigue.  Pt continues to be functioning below baseline level, and remains limited 2* factors listed above and including decreased strength, impaired activity tolerance, impaired  balance, and decreased endurance.  Pt prognosis for achieving goals is good, pending pt progress with hospitalization/medical status improvements, and indicated by motivated to participate in therapy and ability to follow cues. PT will continue to see pt during current hospitalization in order to address the deficits listed above and provide interventions consistent w/ POC in effort to achieve goals. Current goals and POC remain appropriate, pt continues to have rehab potential  Upon conclusion pt seated OOB in recliner. The patient's AM-PAC Basic Mobility Inpatient Short Form Raw Score is 16.  A Raw score of less than or equal to 16 suggests the patient may benefit from discharge to post-acute rehabilitation services. Based on patient presentations and impairments, pt would most appropriately benefit from Level 2 resource intensity upon discharge.  Please  also refer to the recommendation of the Physical Therapist for safe discharge planning. RN verbalized pt appropriate for PT session.   Goals   Patient Goals to walk more   LTG Expiration Date 03/14/25   PT Treatment Day 5   Plan   Treatment/Interventions Functional transfer training;LE strengthening/ROM;Elevations;Therapeutic exercise;Endurance training;Gait training   Progress Progressing toward goals   PT Frequency 3-5x/wk   Discharge Recommendation   Rehab Resource Intensity Level, PT II (Moderate Resource Intensity)   Equipment Recommended Walker   Walker Package Recommended Wheeled walker   AM-PAC Basic Mobility Inpatient   Turning in Flat Bed Without Bedrails 3   Lying on Back to Sitting on Edge of Flat Bed Without Bedrails 3   Moving Bed to Chair 3   Standing Up From Chair Using Arms 3   Walk in Room 3   Climb 3-5 Stairs With Railing 1   Basic Mobility Inpatient Raw Score 16   Basic Mobility Standardized Score 38.32   [facility] Highest Level Of Mobility   -HL Goal 5: Stand one or more mins   -HLM Achieved 7: Walk 25 feet or more     Co treatment with OT secondary to complex medical condition of pt, possible A of 2 required to achieve and maintain transitional movements, requiring the need of skilled therapeutic intervention of 2 therapists to achieve delivery of services.      Time In:  0908  Time Out: 0940  Total Treatment Minutes: 32    Heriberto Javed, PT

## 2025-03-11 NOTE — ASSESSMENT & PLAN NOTE
Lab Results   Component Value Date    EGFR 25 03/11/2025    EGFR 21 03/10/2025    EGFR 19 03/09/2025    CREATININE 2.45 (H) 03/11/2025    CREATININE 2.79 (H) 03/10/2025    CREATININE 3.06 (H) 03/09/2025   As a reminder, peak creatinine 4.3 mg/dL 3/2 improved to 2.45 mg/dL today.  Etiology acute tubular necrosis.  From a nephrology perspective, no objection to discharging to rehabilitation today.  Recommend continuing to hold losartan, SGLT2 inhibitor.  Continue Lasix 40 mg twice daily.  Recommend rehab weigh patient daily and okay to escalate Lasix dose as needed for weight gain or increased lower extremity edema.  Follow-up with nephrology in the outpatient setting.  Patient prefers [facility]

## 2025-03-11 NOTE — ASSESSMENT & PLAN NOTE
Continue Lasix 40 mg twice daily, low-sodium diet, gentle fluid restriction.  Recommend daily weights at rehab and can escalate Lasix as needed to maintain euvolemia

## 2025-03-11 NOTE — PLAN OF CARE
Problem: Prexisting or High Potential for Compromised Skin Integrity  Goal: Skin integrity is maintained or improved  Description: INTERVENTIONS:  - Identify patients at risk for skin breakdown  - Assess and monitor skin integrity  - Assess and monitor nutrition and hydration status  - Monitor labs   - Assess for incontinence   - Turn and reposition patient  - Assist with mobility/ambulation  - Relieve pressure over bony prominences  - Avoid friction and shearing  - Provide appropriate hygiene as needed including keeping skin clean and dry  - Evaluate need for skin moisturizer/barrier cream  - Collaborate with interdisciplinary team   - Patient/family teaching  - Consider wound care consult   Outcome: Progressing    Problem: INFECTION - ADULT  Goal: Absence or prevention of progression during hospitalization  Description: INTERVENTIONS:  - Assess and monitor for signs and symptoms of infection  - Monitor lab/diagnostic results  - Monitor all insertion sites, i.e. indwelling lines, tubes, and drains  - Monitor endotracheal if appropriate and nasal secretions for changes in amount and color  - [place] appropriate cooling/warming therapies per order  - Administer medications as ordered  - Instruct and encourage patient and family to use good hand hygiene technique  - Identify and instruct in appropriate isolation precautions for identified infection/condition  Outcome: Progressing    Problem: CARDIOVASCULAR - ADULT  Goal: Maintains optimal cardiac output and hemodynamic stability  Description: INTERVENTIONS:  - Monitor I/O, vital signs and rhythm  - Monitor for S/S and trends of decreased cardiac output  - Administer and titrate ordered vasoactive medications to optimize hemodynamic stability  - Assess quality of pulses, skin color and temperature  - Assess for signs of decreased coronary artery perfusion  - Instruct patient to report change in severity of symptoms  Outcome: Progressing    Problem: GASTROINTESTINAL -  ADULT  Goal: Minimal or absence of nausea and/or vomiting  Description: INTERVENTIONS:  - Administer IV fluids if ordered to ensure adequate hydration  - Maintain NPO status until nausea and vomiting are resolved  - Nasogastric tube if ordered  - Administer ordered antiemetic medications as needed  - Provide nonpharmacologic comfort measures as appropriate  - Advance diet as tolerated, if ordered  - Consider nutrition services referral to assist patient with adequate nutrition and appropriate food choices  Outcome: Progressing  Goal: Maintains adequate nutritional intake  Description: INTERVENTIONS:  - Monitor percentage of each meal consumed  - Identify factors contributing to decreased intake, treat as appropriate  - Assist with meals as needed  - Monitor I&O, weight, and lab values if indicated  - Obtain nutrition services referral as needed  Outcome: Progressing  Goal: Maintains or returns to baseline bowel function  Description: INTERVENTIONS:  - Assess bowel function  - Encourage oral fluids to ensure adequate hydration  - Administer IV fluids if ordered to ensure adequate hydration  - Administer ordered medications as needed  - Encourage mobilization and activity  - Consider nutritional services referral to assist patient with adequate nutrition and appropriate food choices  Outcome: Progressing

## 2025-03-11 NOTE — ASSESSMENT & PLAN NOTE
Lab Results   Component Value Date    HGBA1C 8.9 (H) 02/26/2025       Recent Labs     03/10/25  1048 03/10/25  1600 03/10/25  2046 03/11/25  0657   POCGLU 242* 187* 210* 155*       Blood Sugar Average: Last 72 hrs:  (P) [id number]  Presumed diabetic nephropathy.  T2DM greater than 20 years.  Continue to hold losartan until reevaluated in outpatient due to acute kidney injury unresolved.  Previously patient on SGLT2 inhibitor-unclear if he was taking prior to admission.  Would not resume med given recent DKA

## 2025-03-11 NOTE — ASSESSMENT & PLAN NOTE
Initially presented with fever, leukocytosis, tachypnea, with lactic acidosis in the setting of streptococcal bacteremia secondary to lower extremity cellulitis  Hemodynamically stabilized   See plan for bacteremia/cellulitis

## 2025-03-11 NOTE — NURSING NOTE
Report called to Dinora at [facility]. Call back number provided for additional questions. All belongings present with Pt at time of discharge.

## 2025-03-11 NOTE — PLAN OF CARE
Problem: PHYSICAL THERAPY ADULT  Goal: Performs mobility at highest level of function for planned discharge setting.  See evaluation for individualized goals.  Description: Treatment/Interventions: Functional transfer training, Therapeutic exercise, Endurance training, Gait training, Bed mobility, Equipment eval/education  Equipment Recommended: Walker       See flowsheet documentation for full assessment, interventions and recommendations.  Outcome: Progressing  Note: Prognosis: Good  Problem List: Decreased strength, Decreased range of motion, Decreased endurance, Impaired balance, Decreased mobility  Assessment: Pt seen for PT treatment session this date with interventions consisting of gait training to normalize gait pattern to decrease fall risk and therapeutic exercise to improve strength to improve functional mobility. Pt agreeable to PT treatment session upon arrival, pt found seated OOB in recliner, in no apparent distress, A&O x 4, and responsive. Since previous session, pt has made good progress as evidenced by increased distance ambulated, decreased assistance required with mobility, and ability to ambulate  Barriers during this session include pain and fatigue.  Pt continues to be functioning below baseline level, and remains limited 2* factors listed above and including decreased strength, impaired activity tolerance, impaired  balance, and decreased endurance.  Pt prognosis for achieving goals is good, pending pt progress with hospitalization/medical status improvements, and indicated by motivated to participate in therapy and ability to follow cues. PT will continue to see pt during current hospitalization in order to address the deficits listed above and provide interventions consistent w/ POC in effort to achieve goals. Current goals and POC remain appropriate, pt continues to have rehab potential  Upon conclusion pt seated OOB in recliner. The patient's AM-PAC Basic Mobility Inpatient Short Form  Raw Score is 16.  A Raw score of less than or equal to 16 suggests the patient may benefit from discharge to post-acute rehabilitation services. Based on patient presentations and impairments, pt would most appropriately benefit from Level 2 resource intensity upon discharge.  Please also refer to the recommendation of the Physical Therapist for safe discharge planning. RN verbalized pt appropriate for PT session.  Barriers to Discharge:  (limited mobility)     Rehab Resource Intensity Level, PT: II (Moderate Resource Intensity)    See flowsheet documentation for full assessment.

## 2025-03-11 NOTE — ASSESSMENT & PLAN NOTE
Encourage leg elevation  Status post IV Lasix dosing by nephrology  Now transitioned to PO lasix 40 mg twice daily- patient to continue this dose on discharge

## 2025-03-11 NOTE — CASE MANAGEMENT
Case Management Discharge Planning Note    Patient name Nadeem Purdy Jr.  Location [facility]  MRN [id number]  : 1955 Date 3/11/2025       Current Admission Date: 2025  Current Admission Diagnosis:Diabetic ketoacidosis without coma associated with type 2 diabetes mellitus ([facility])   Patient Active Problem List    Diagnosis Date Noted Date Diagnosed    Streptococcal bacteremia 2025     Chronic anemia 2025     Atrial fibrillation with RVR ([facility]) 2025     Hypomagnesemia 2025     Urinary retention 2025     Hyponatremia 2025     Metabolic acidosis 2025     Acute renal failure superimposed on stage 3a chronic kidney disease ([facility]) 2025     Diabetic ketoacidosis without coma associated with type 2 diabetes mellitus ([facility]) 2025     Severe sepsis ([facility]) 2025     Alcohol abuse 2025     Hypertensive kidney disease with chronic kidney disease stage III ([facility]) 10/08/2024     Right knee injury, subsequent encounter 2024     Osteochondral lesion 2024     Primary osteoarthritis of one knee, right 2024     Lymphedema of right lower extremity 2024     Other tear of medial meniscus, current injury, right knee, initial encounter 2024     Type 2 diabetes mellitus with hyperglycemia, with long-term current use of insulin ([facility]) 2024     Cellulitis of lower extremity 2024     Cortical age-related cataract of both eyes 10/20/2023     Numbness of left foot 2023     Lymphedema of both lower extremities 2022     Rash 2022     Stage 3 chronic kidney disease, unspecified whether stage 3a or 3b CKD ([facility]) 2022     Tachycardia 2021     Essential hypertension 2019     Hypercholesteremia 2019     Class 3 severe obesity due to excess calories with serious comorbidity and body mass index (BMI) of 40.0 to 44.9 in adult ([facility]) 2019     Steatohepatitis 2019     Allergic rhinitis  09/26/2019     Vitamin D deficiency 09/26/2019     Persistent proteinuria 09/26/2019     Type 2 diabetes mellitus with microalbuminuria, with long-term current use of insulin (HCC)        LOS (days): 13  Geometric Mean LOS (GMLOS) (days): 4.9  Days to GMLOS:-8     OBJECTIVE:  Risk of Unplanned Readmission Score: 23.2         Current admission status: Inpatient   Preferred Pharmacy:   [facility]/pharm[facility] #[id number] - [place] [address] - [address]  [address]  [place] [address]  Phone: [phone] Fax: [phone]    Primary Care Provider: Jluis Glass MD    Primary Insurance: MEDICARE  Secondary Insurance: [facility]    DISCHARGE DETAILS:  Pt has been evaluated by [facility] and is stable to be DC to  [facility].  Notified [facility] of same.  Pt made aware there will be a cost for transport via [facility].  Pt is in agreement with same.  Sent for transport via [facility].  Pt will be transported via [place] [facility] at 1200.  COVID swab ordered.  TC to pt wife to notify of cost of [facility] and transport time.  Notified [facility] via [facility].  States they will see about Isolation bed.         Accepting Facility Name, City & State : [facility] [address] [place] [id number]  Receiving Facility/Agency Phone Number: [phone]  Facility/Agency Fax Number: [phone]

## 2025-03-11 NOTE — DISCHARGE SUMMARY
Discharge Summary - Hospitalist   Name: Nadeem Purdy Jr. 69 y.o. male I MRN: [id number]  Unit/Bed#: -01 I Date of Admission: 2/26/2025   Date of Service: 3/11/2025 I Hospital Day: 13     Assessment & Plan  Diabetic ketoacidosis without coma associated with type 2 diabetes mellitus (HCC)  Lab Results   Component Value Date    HGBA1C 8.9 (H) 02/26/2025     Transferred out of ICU with transition off insulin drip to a basal 70/30    Continue 70/30 32 units BID regimen with additional SSI coverage per Accu-Cheks   Encourage compliance to home insulin regimen  Carbohydrate restriction  Severe sepsis (HCC)  Initially presented with fever, leukocytosis, tachypnea, with lactic acidosis in the setting of streptococcal bacteremia secondary to lower extremity cellulitis  Hemodynamically stabilized   See plan for bacteremia/cellulitis   Streptococcal bacteremia  1 of 2 bottles from 2/26 growing Streptococcus pyogenes  IV Ancef now transitioned to oral Duricef through 3/16  Cellulitis of lower extremity  As stated above, IV Ancef now transitioned to oral Duricef  Likely source of bacteremia  Acute renal failure superimposed on stage 3a chronic kidney disease (HCC)  Baseline creatinine of approximately 1.1-1.3 -> elevated but continuing to improve over the last several days, currently at 2.45 from a prior 4.34 peak  Nephrology following -> now on twice daily oral diuretic (Lasix) due to fluid overloaded state  Monitor renal function and urine output  Limit/avoid nephrotoxins and hypotension as possible - holding ARB (Cozaar) on discharge  Outpatient follow-up with Nephrology  Hypercholesteremia  Continue statin  Class 3 severe obesity due to excess calories with serious comorbidity and body mass index (BMI) of 40.0 to 44.9 in adult (HCC)  BMI of 43.29 currently  Lifestyle/diet modifications  Lymphedema of both lower extremities  Encourage leg elevation  Status post IV Lasix dosing by nephrology  Now transitioned to PO lasix  40 mg twice daily- patient to continue this dose on discharge  Atrial fibrillation with RVR (HCC)  Rate controlled on Lopressor  Not on chronic anticoagulation  Urinary retention  Status post Ramirez catheter placement with successful removal/voiding trial  Appreciate urology input ->  Flomax continued  Alcohol abuse  Cessation counseling  Continue thiamine/folic acid and multivitamin supplementation  Hyponatremia  Serum sodium improved at 134 over the last few days with better blood sugar control and diuretic trial  On fluid restriction and diuretics  Hypomagnesemia  Monitor/replete serum magnesium, and potassium, as necessary  Chronic anemia  Hemoglobin stable in the 9's    Essential hypertension  Continue Norvasc/Lopressor -   Continue holding Cozaar on discharge until cleared by nephrology to restart due to LUIS ALFREDO  Diabetic nephropathy associated with type 2 diabetes mellitus (HCC)  Lab Results   Component Value Date    HGBA1C 8.9 (H) 02/26/2025       Recent Labs     03/10/25  1048 03/10/25  1600 03/10/25  2046 03/11/25  0657   POCGLU 242* 187* 210* 155*       Blood Sugar Average: Last 72 hrs:  (P) 215.3567732136897500       Medical Problems       Resolved Problems  Date Reviewed: 3/4/2025          Resolved    Group A strep bacteremia 3/6/2025     Resolved by  Kiki Villarreal MD    Non-traumatic rhabdomyolysis 3/1/2025     Resolved by  JIMMY Guzman    Clostridium difficile diarrhea 3/4/2025     Resolved by  JIMMY Nelson    Elevated troponin 3/4/2025     Resolved by  JIMMY Nelson    Diarrhea 3/8/2025     Resolved by  Brian Quan PA-C        Discharging Physician / Practitioner: Zeyad Orellana PA-C  PCP: Jluis Glass MD  Admission Date:   Admission Orders (From admission, onward)       Ordered        02/26/25 1347  INPATIENT ADMISSION  Once                          Discharge Date: 03/11/25    Consultations During Hospital Stay:  Endocrinology  Nephrology  General  surgery  Infectious disease  Urology    Procedures Performed:   none    Significant Findings / Test Results:   CT lower extremity wo contrast left  Result Date: 3/2/2025  Impression: 1.  Extensive circumferential skin thickening and subcutaneous edema involving the left lower extremity, compatible with cellulitis. No organized collection within the limits of this unenhanced examination. No soft tissue gas is identified. 2.  No cortical erosive changes to suggest osteomyelitis, noting MRI is more sensitive for the detection of early osteomyelitis. 3.  Moderate left knee joint effusion. Workstation performed: [id number]     XR chest portable ICU  Result Date: 3/2/2025  Impression: No acute cardiopulmonary disease. Workstation performed: [id number]     XR chest portable ICU  Result Date: 2/28/2025  Impression: No definite acute cardiopulmonary disease on this examination which is limited by low lung volumes. Consider follow-up PA and lateral views for persistent or worsening symptoms. Workstation performed: [id number]     CT lower extremity wo contrast right  Result Date: 2/28/2025  Impression: 1.  Extensive circumferential skin thickening and subcutaneous edema involving the bilateral lower legs compatible with cellulitis. No organized collection within the limits of this unenhanced examination. No soft tissue gas is identified. 2.  No cortical erosive changes to suggest osteomyelitis, noting MRI is more sensitive for the detection of early osteomyelitis. 3.  Moderate right and small to moderate left knee joint effusions. Workstation performed: [id number]     CT lower extremity wo contrast left  Result Date: 2/28/2025  Impression: 1.  Extensive circumferential skin thickening and subcutaneous edema involving the bilateral lower legs compatible with cellulitis. No organized collection within the limits of this unenhanced examination. No soft tissue gas is identified. 2.  No cortical erosive changes to suggest  osteomyelitis, noting MRI is more sensitive for the detection of early osteomyelitis. 3.  Moderate right and small to moderate left knee joint effusions. Workstation performed: [id number]     CT chest abdomen pelvis wo contrast  Result Date: 2/27/2025  Impression: There is nonspecific perinephric stranding about both kidneys, which may be senescent in nature, although possible infectious/inflammatory process is not excluded, particularly in the absence of prior examination. Correlate clinically and with urinalysis. There is liquid stool within the visualized colon. Correlate clinically for diarrheal disease. Workstation performed: [id number]     XR chest portable  Result Date: 2/26/2025  Impression: No focal airspace consolidation. Mild bilateral hilar prominence may be due to prominent vasculature/congestion. Workstation performed: [id number]         Incidental Findings:   none     Test Results Pending at Discharge (will require follow up):   none     Outpatient Tests Requested:  none    Complications:  none    Reason for Admission: DKA, LUIS ALFREDO, Sepsis, cellulitis LE    Hospital Course:   Nadeem Purdy Jr. is a 69 y.o. male patient who originally presented to the hospital on 2/26/2025 due to hyperglycemia. Please see H&P for complete details of presentation. The patient was found to be in DKA and admitted to critical care service and started on insulin gtt. He was also started on Vancomycin and Cefepime and IV fluids. Endocrinology was consulted and the patient was transitioned off insulin gtt to Humulin N 70/30. This was titrated up to 32 U BID on discharge. Nephrology was consulted and felt his LUIS ALFREDO was likely due to ATN. Creatinine peaked at 4.3. He was then started on intermittent IV lasix due to leg edema. The patient's creatinine began trending down. Blood cultures grew group A strep likely due to cellulitis. The patient transitioned to IV cefazolin then to po cefadroxil 1 g q 12 hours to complete 2 week course  "through 3/15/2025. Sepsis resolved. The patient's renal function continued to improve. The patient was cleared for discharge by a nephrology standpoint. He was discharged on po lasix 40 mg BID. He was instructed to continue to hold his losartan until reevaluated by nephrology as outpatient. Outpatient follow-up with PCP upon discharge home. The patient was discharged to [facility] for [facility]. This is a brief discharge summary, please see notes from throughout the patient's hospital stay for complete details of his hospitalization.       Please see above list of diagnoses and related plan for additional information.     Condition at Discharge: good    Discharge Day Visit / Exam:   Subjective:    Vitals: Blood Pressure: 148/84 (03/11/25 0656)  Pulse: (!) 107 (03/11/25 0656)  Temperature: 98.2 °F (36.8 °C) (03/11/25 0656)  Temp Source: Oral (03/11/25 0656)  Respirations: 18 (03/11/25 0656)  Height: 5' 11\" (180.3 cm) (02/28/25 0820)  Weight - Scale: (!) 141 kg (310 lb 6.5 oz) (03/05/25 0400)  SpO2: 95 % (03/11/25 0656)  Physical Exam  Vitals and nursing note reviewed.   Constitutional:       Appearance: Normal appearance. He is obese.   HENT:      Head: Normocephalic and atraumatic.      Nose: Nose normal.      Mouth/Throat:      Mouth: Mucous membranes are dry.   Cardiovascular:      Heart sounds: Normal heart sounds.   Pulmonary:      Effort: Pulmonary effort is normal.      Breath sounds: Normal breath sounds.   Abdominal:      General: There is no distension.      Tenderness: There is no abdominal tenderness.   Skin:     General: Skin is warm and dry.   Neurological:      General: No focal deficit present.      Mental Status: He is alert and oriented to person, place, and time.   Psychiatric:         Mood and Affect: Mood normal.         Behavior: Behavior normal.          Discussion with Family:  patient updated.     Discharge instructions/Information to patient and family:   See after visit summary for information " provided to patient and family.      Provisions for Follow-Up Care:  See after visit summary for information related to follow-up care and any pertinent home health orders.      Mobility at time of Discharge:   Basic Mobility Inpatient Raw Score: 16  JH-HLM Goal: 5: Stand one or more mins  JH-HLM Achieved: 7: Walk 25 feet or more  HLM Goal achieved. Continue to encourage appropriate mobility.     Disposition:   Other Skilled Nursing Facility at [facility]    Planned Readmission: no    Discharge Medications:  See after visit summary for reconciled discharge medications provided to patient and/or family.      Administrative Statements   Discharge Statement:  I have spent a total time of 25 minutes in caring for this patient on the day of the visit/encounter. >30 minutes of time was spent on: Documenting in the medical record.    **Please Note: This note may have been constructed using a voice recognition system**

## 2025-03-11 NOTE — CASE MANAGEMENT
Case Management Discharge Planning Note    Patient name Nadeem Purdy Jr.  Location MS /MS  MRN [id number]  : 1955 Date 3/11/2025       Current Admission Date: 2025  Current Admission Diagnosis:Diabetic ketoacidosis without coma associated with type 2 diabetes mellitus ([facility])   Patient Active Problem List    Diagnosis Date Noted Date Diagnosed    Diabetic nephropathy associated with type 2 diabetes mellitus ([facility]) 2025     Streptococcal bacteremia 2025     Chronic anemia 2025     Atrial fibrillation with RVR ([facility]) 2025     Hypomagnesemia 2025     Urinary retention 2025     Hyponatremia 2025     Metabolic acidosis 2025     Acute renal failure superimposed on stage 3a chronic kidney disease ([facility]) 2025     Diabetic ketoacidosis without coma associated with type 2 diabetes mellitus ([facility]) 2025     Severe sepsis ([facility]) 2025     Alcohol abuse 2025     Hypertensive kidney disease with chronic kidney disease stage III ([facility]) 10/08/2024     Right knee injury, subsequent encounter 2024     Osteochondral lesion 2024     Primary osteoarthritis of one knee, right 2024     Lymphedema of right lower extremity 2024     Other tear of medial meniscus, current injury, right knee, initial encounter 2024     Type 2 diabetes mellitus with hyperglycemia, with long-term current use of insulin ([facility]) 2024     Cellulitis of lower extremity 2024     Cortical age-related cataract of both eyes 10/20/2023     Numbness of left foot 2023     Lymphedema of both lower extremities 2022     Rash 2022     Stage 3 chronic kidney disease, unspecified whether stage 3a or 3b CKD ([facility]) 2022     Tachycardia 2021     Essential hypertension 2019     Hypercholesteremia 2019     Class 3 severe obesity due to excess calories with serious comorbidity and body mass index (BMI) of 40.0 to 44.9  in adult (HCC) 09/26/2019     Steatohepatitis 09/26/2019     Allergic rhinitis 09/26/2019     Vitamin D deficiency 09/26/2019     Persistent proteinuria 09/26/2019     Type 2 diabetes mellitus with microalbuminuria, with long-term current use of insulin (HCC)        LOS (days): 13  Geometric Mean LOS (GMLOS) (days): 4.9  Days to GMLOS:-8     OBJECTIVE:  Risk of Unplanned Readmission Score: 23.2         Current admission status: Inpatient   Preferred Pharmacy:   [facility] #[id number] - [place] - [address]  [address]  [facility] [id number]  Phone: [phone] Fax: [phone]    Primary Care Provider: Jluis Glass MD    Primary Insurance: MEDICARE  Secondary Insurance: [facility]    DISCHARGE DETAILS:  Received notification from Pat at [place] the isolation bed is available.  Pt will be transported at 1200.  Pt and Jill RN aware of transport time.        Accepting Facility Name, City & State : [facility] [address] [place] [id number]  Receiving Facility/Agency Phone Number: [phone]  Facility/Agency Fax Number: [phone]

## 2025-03-11 NOTE — PLAN OF CARE
"  Problem: OCCUPATIONAL THERAPY ADULT  Goal: Performs self-care activities at highest level of function for planned discharge setting.  See evaluation for individualized goals.  Description: Treatment Interventions: ADL retraining, Functional transfer training, UE strengthening/ROM, Endurance training, Patient/family training, Compensatory technique education, Activityengagement, Energy conservation          See flowsheet documentation for full assessment, interventions and recommendations.   Outcome: Progressing  Note: Limitation: Decreased ADL status, Decreased UE strength, Decreased Safe judgement during ADL, Decreased endurance, Decreased self-care trans, Decreased high-level ADLs  Prognosis: Good  Assessment: Pt greeted up in chair for OT treatment on 03/11/25 focusing on maximizing independence with ADLs. Pt is making great functional progress towards meeting goals. Pt reports he is feeling much better today and he is \"ready to walk.\" Pt completes functional transfers with min Ax1, and functional mobility with min Ax1 with RW (SBAx1 for chair follow and seated rest breaks). Pt able to complete short household distances and demonstrates standing tolerance of x2 min with UB dressing. Pt min A with UB dressing standing and S with grooming seated. Pt able to complete 1x10 chair push ups (tricep extension). Limitations that impact functional performance include decreased ADL status, UE ROM, UE strength, safe judgement during ADLs, cognition, endurance, self care transfers, and high level ADLs. Occupational performance areas to address ADL retraining, functional transfer training, UE strengthening/ROM, endurance training, cognitive reorientation, Pt/caregiver education, equipment evaluation/education, compensatory technique education, energy conservation, and activity engagement . Pt would benefit from continued skilled OT services while in hospital to maximize independence with ADLs. Will continue to follow Pt's " goals and progress. Pt would benefit from level II resources (moderate intensity) upon DC to maximize safety and independence with ADLs and functional tasks of choice.     Rehab Resource Intensity Level, OT: II (Moderate Resource Intensity)

## 2025-03-11 NOTE — ASSESSMENT & PLAN NOTE
1 of 2 bottles from 2/26 growing Streptococcus pyogenes  IV Ancef now transitioned to oral Duricef through 3/16

## 2025-03-11 NOTE — ASSESSMENT & PLAN NOTE
Continue Norvasc/Lopressor -   Continue holding Cozaar on discharge until cleared by nephrology to restart due to LUIS ALFREDO

## 2025-03-11 NOTE — PLAN OF CARE
Problem: Prexisting or High Potential for Compromised Skin Integrity  Goal: Skin integrity is maintained or improved  Description: INTERVENTIONS:  - Identify patients at risk for skin breakdown  - Assess and monitor skin integrity  - Assess and monitor nutrition and hydration status  - Monitor labs   - Assess for incontinence   - Turn and reposition patient  - Assist with mobility/ambulation  - Relieve pressure over bony prominences  - Avoid friction and shearing  - Provide appropriate hygiene as needed including keeping skin clean and dry  - Evaluate need for skin moisturizer/barrier cream  - Collaborate with interdisciplinary team   - Patient/family teaching  - Consider wound care consult   Outcome: Progressing     Problem: PAIN - ADULT  Goal: Verbalizes/displays adequate comfort level or baseline comfort level  Description: Interventions:  - Encourage patient to monitor pain and request assistance  - Assess pain using appropriate pain scale  - Administer analgesics based on type and severity of pain and evaluate response  - Implement non-pharmacological measures as appropriate and evaluate response  - Consider cultural and social influences on pain and pain management  - Notify physician/advanced practitioner if interventions unsuccessful or patient reports new pain  Outcome: Progressing     Problem: INFECTION - ADULT  Goal: Absence or prevention of progression during hospitalization  Description: INTERVENTIONS:  - Assess and monitor for signs and symptoms of infection  - Monitor lab/diagnostic results  - Monitor all insertion sites, i.e. indwelling lines, tubes, and drains  - Monitor endotracheal if appropriate and nasal secretions for changes in amount and color  - Terry appropriate cooling/warming therapies per order  - Administer medications as ordered  - Instruct and encourage patient and family to use good hand hygiene technique  - Identify and instruct in appropriate isolation precautions for  identified infection/condition  Outcome: Progressing  Goal: Absence of fever/infection during neutropenic period  Description: INTERVENTIONS:  - Monitor WBC    Outcome: Progressing     Problem: SAFETY ADULT  Goal: Patient will remain free of falls  Description: INTERVENTIONS:  - Educate patient/family on patient safety including physical limitations  - Instruct patient to call for assistance with activity   - Consult OT/PT to assist with strengthening/mobility   - Keep Call bell within reach  - Keep bed low and locked with side rails adjusted as appropriate  - Keep care items and personal belongings within reach  - Initiate and maintain comfort rounds  - Make Fall Risk Sign visible to staff  - Offer Toileting every 2 Hours, in advance of need  - Initiate/Maintain bed alarm  - Obtain necessary fall risk management equipment  - Apply yellow socks and bracelet for high fall risk patients  - Consider moving patient to room near nurses station  Outcome: Progressing  Goal: Maintain or return to baseline ADL function  Description: INTERVENTIONS:  -  Assess patient's ability to carry out ADLs; assess patient's baseline for ADL function and identify physical deficits which impact ability to perform ADLs (bathing, care of mouth/teeth, toileting, grooming, dressing, etc.)  - Assess/evaluate cause of self-care deficits   - Assess range of motion  - Assess patient's mobility; develop plan if impaired  - Assess patient's need for assistive devices and provide as appropriate  - Encourage maximum independence but intervene and supervise when necessary  - Involve family in performance of ADLs  - Assess for home care needs following discharge   - Consider OT consult to assist with ADL evaluation and planning for discharge  - Provide patient education as appropriate  Outcome: Progressing  Goal: Maintains/Returns to pre admission functional level  Description: INTERVENTIONS:  - Perform AM-PAC 6 Click Basic Mobility/ Daily Activity  assessment daily.  - Set and communicate daily mobility goal to care team and patient/family/caregiver.   - Collaborate with rehabilitation services on mobility goals if consulted  - Perform Range of Motion 3 times a day.  - Reposition patient every 2 hours.  - Dangle patient 3 times a day  - Stand patient 3 times a day  - Ambulate patient 3 times a day  - Out of bed to chair 3 times a day   - Out of bed for meals 3 times a day  - Out of bed for toileting  - Record patient progress and toleration of activity level   Outcome: Progressing     Problem: DISCHARGE PLANNING  Goal: Discharge to home or other facility with appropriate resources  Description: INTERVENTIONS:  - Identify barriers to discharge w/patient and caregiver  - Arrange for needed discharge resources and transportation as appropriate  - Identify discharge learning needs (meds, wound care, etc.)  - Arrange for interpretive services to assist at discharge as needed  - Refer to Case Management Department for coordinating discharge planning if the patient needs post-hospital services based on physician/advanced practitioner order or complex needs related to functional status, cognitive ability, or social support system  Outcome: Progressing     Problem: Knowledge Deficit  Goal: Patient/family/caregiver demonstrates understanding of disease process, treatment plan, medications, and discharge instructions  Description: Complete learning assessment and assess knowledge base.  Interventions:  - Provide teaching at level of understanding  - Provide teaching via preferred learning methods  Outcome: Progressing     Problem: CARDIOVASCULAR - ADULT  Goal: Maintains optimal cardiac output and hemodynamic stability  Description: INTERVENTIONS:  - Monitor I/O, vital signs and rhythm  - Monitor for S/S and trends of decreased cardiac output  - Administer and titrate ordered vasoactive medications to optimize hemodynamic stability  - Assess quality of pulses, skin color  and temperature  - Assess for signs of decreased coronary artery perfusion  - Instruct patient to report change in severity of symptoms  Outcome: Progressing  Goal: Absence of cardiac dysrhythmias or at baseline rhythm  Description: INTERVENTIONS:  - Continuous cardiac monitoring, vital signs, obtain 12 lead EKG if ordered  - Administer antiarrhythmic and heart rate control medications as ordered  - Monitor electrolytes and administer replacement therapy as ordered  Outcome: Progressing     Problem: GASTROINTESTINAL - ADULT  Goal: Minimal or absence of nausea and/or vomiting  Description: INTERVENTIONS:  - Administer IV fluids if ordered to ensure adequate hydration  - Maintain NPO status until nausea and vomiting are resolved  - Nasogastric tube if ordered  - Administer ordered antiemetic medications as needed  - Provide nonpharmacologic comfort measures as appropriate  - Advance diet as tolerated, if ordered  - Consider nutrition services referral to assist patient with adequate nutrition and appropriate food choices  Outcome: Progressing  Goal: Maintains adequate nutritional intake  Description: INTERVENTIONS:  - Monitor percentage of each meal consumed  - Identify factors contributing to decreased intake, treat as appropriate  - Assist with meals as needed  - Monitor I&O, weight, and lab values if indicated  - Obtain nutrition services referral as needed  Outcome: Progressing  Goal: Maintains or returns to baseline bowel function  Description: INTERVENTIONS:  - Assess bowel function  - Encourage oral fluids to ensure adequate hydration  - Administer IV fluids if ordered to ensure adequate hydration  - Administer ordered medications as needed  - Encourage mobilization and activity  - Consider nutritional services referral to assist patient with adequate nutrition and appropriate food choices  Outcome: Progressing     Problem: GENITOURINARY - ADULT  Goal: Maintains or returns to baseline urinary  function  Description: INTERVENTIONS:  - Assess urinary function  - Encourage oral fluids to ensure adequate hydration if ordered  - Administer IV fluids as ordered to ensure adequate hydration  - Administer ordered medications as needed  - Offer frequent toileting  - Follow urinary retention protocol if ordered  Outcome: Progressing  Goal: Absence of urinary retention  Description: INTERVENTIONS:  - Assess patient’s ability to void and empty bladder  - Monitor I/O  - Bladder scan as needed  - Discuss with physician/AP medications to alleviate retention as needed  - Discuss catheterization for long term situations as appropriate  Outcome: Progressing  Goal: Urinary catheter remains patent  Description: INTERVENTIONS:  - Assess patency of urinary catheter  - If patient has a chronic cates, consider changing catheter if non-functioning  - Follow guidelines for intermittent irrigation of non-functioning urinary catheter  Outcome: Progressing     Problem: METABOLIC, FLUID AND ELECTROLYTES - ADULT  Goal: Electrolytes maintained within normal limits  Description: INTERVENTIONS:  - Monitor labs and assess patient for signs and symptoms of electrolyte imbalances  - Administer electrolyte replacement as ordered  - Monitor response to electrolyte replacements, including repeat lab results as appropriate  - Instruct patient on fluid and nutrition as appropriate  Outcome: Progressing  Goal: Fluid balance maintained  Description: INTERVENTIONS:  - Monitor labs   - Monitor I/O and WT  - Instruct patient on fluid and nutrition as appropriate  - Assess for signs & symptoms of volume excess or deficit  Outcome: Progressing  Goal: Glucose maintained within target range  Description: INTERVENTIONS:  - Monitor Blood Glucose as ordered  - Assess for signs and symptoms of hyperglycemia and hypoglycemia  - Administer ordered medications to maintain glucose within target range  - Assess nutritional intake and initiate nutrition service  referral as needed  Outcome: Progressing     Problem: SKIN/TISSUE INTEGRITY - ADULT  Goal: Incision(s), wounds(s) or drain site(s) healing without S/S of infection  Description: INTERVENTIONS  - Assess and document dressing, incision, wound bed, drain sites and surrounding tissue  - Provide patient and family education  - Perform skin care/dressing changes  Outcome: Progressing     Problem: MUSCULOSKELETAL - ADULT  Goal: Maintain or return mobility to safest level of function  Description: INTERVENTIONS:  - Assess patient's ability to carry out ADLs; assess patient's baseline for ADL function and identify physical deficits which impact ability to perform ADLs (bathing, care of mouth/teeth, toileting, grooming, dressing, etc.)  - Assess/evaluate cause of self-care deficits   - Assess range of motion  - Assess patient's mobility  - Assess patient's need for assistive devices and provide as appropriate  - Encourage maximum independence but intervene and supervise when necessary  - Involve family in performance of ADLs  - Assess for home care needs following discharge   - Consider OT consult to assist with ADL evaluation and planning for discharge  - Provide patient education as appropriate  Outcome: Progressing  Goal: Maintain proper alignment of affected body part  Description: INTERVENTIONS:  - Support, maintain and protect limb and body alignment  - Provide patient/ family with appropriate education  Outcome: Progressing     Problem: Nutrition/Hydration-ADULT  Goal: Nutrient/Hydration intake appropriate for improving, restoring or maintaining nutritional needs  Description: Monitor and assess patient's nutrition/hydration status for malnutrition. Collaborate with interdisciplinary team and initiate plan and interventions as ordered.  Monitor patient's weight and dietary intake as ordered or per policy. Utilize nutrition screening tool and intervene as necessary. Determine patient's food preferences and provide  high-protein, high-caloric foods as appropriate.     INTERVENTIONS:  - Monitor oral intake, urinary output, labs, and treatment plans  - Assess nutrition and hydration status and recommend course of action  - Evaluate amount of meals eaten  - Assist patient with eating if necessary   - Allow adequate time for meals  - Recommend/ encourage appropriate diets, oral nutritional supplements, and vitamin/mineral supplements  - Order, calculate, and assess calorie counts as needed  - Recommend, monitor, and adjust tube feedings and TPN/PPN based on assessed needs  - Assess need for intravenous fluids  - Provide specific nutrition/hydration education as appropriate  - Include patient/family/caregiver in decisions related to nutrition  Outcome: Progressing     Problem: RESPIRATORY - ADULT  Goal: Achieves optimal ventilation and oxygenation  Description: INTERVENTIONS:  - Assess for changes in respiratory status  - Assess for changes in mentation and behavior  - Position to facilitate oxygenation and minimize respiratory effort  - Oxygen administered by appropriate delivery if ordered  - Initiate smoking cessation education as indicated  - Encourage broncho-pulmonary hygiene including cough, deep breathe, Incentive Spirometry  - Assess the need for suctioning and aspirate as needed  - Assess and instruct to report SOB or any respiratory difficulty  - Respiratory Therapy support as indicated  Outcome: Progressing

## 2025-03-11 NOTE — OCCUPATIONAL THERAPY NOTE
Occupational Therapy Progress Note     Patient Name: Nadeem Purdy Jr.  Today's Date: 3/11/2025  Problem List  Principal Problem:    Diabetic ketoacidosis without coma associated with type 2 diabetes mellitus (HCC)  Active Problems:    Essential hypertension    Hypercholesteremia    Class 3 severe obesity due to excess calories with serious comorbidity and body mass index (BMI) of 40.0 to 44.9 in adult (HCC)    Lymphedema of both lower extremities    Cellulitis of lower extremity    Acute renal failure superimposed on stage 3a chronic kidney disease (HCC)    Severe sepsis ([facility])    Alcohol abuse    Hyponatremia    Metabolic acidosis    Urinary retention    Atrial fibrillation with RVR ([facility])    Hypomagnesemia    Streptococcal bacteremia    Chronic anemia            03/11/25 0908   OT Last Visit   OT Visit Date 03/11/25   Note Type   Note Type Treatment for insurance authorization   Pain Assessment   Pain Assessment Tool 0-10   Pain Score No Pain   Restrictions/Precautions   Weight Bearing Precautions Per Order No   Other Precautions Chair Alarm;Bed Alarm;Fall Risk;Contact/isolation   Lifestyle   Autonomy Pt resides in two level house w/ spouse; I with ADLs and requires some A with IADLs; +    Reciprocal Relationships supportive family   Service to Others retired   Intrinsic Gratification watching tv   ADL   Where Assessed Other (Comment)  (standing with RW)   Grooming Assistance 5  Supervision/Setup   Grooming Deficit Setup;Brushing hair   UB Dressing Assistance 4  Minimal Assistance   UB Dressing Deficit Setup;Supervision/safety;Increased time to complete;Fasteners   Functional Standing Tolerance   Time x2 min   Activity UB dressing standing with RW- min A   Bed Mobility   Additional Comments Pt greeted supine in bed.   Transfers   Sit to Stand 4  Minimal assistance   Additional items Assist x 1;Increased time required;Verbal cues   Stand to Sit 4  Minimal assistance   Additional items Assist x 1;Increased  "time required;Verbal cues   Additional Comments with RW   Functional Mobility   Functional Mobility 4  Minimal assistance   Additional Comments Min Ax1 with RW- short household distances. SBAX1 for chair follow and Pt completes x1 seated rest break. HR 100s & SPO2 98% on RA with activity.   Additional items Rolling walker   Therapeutic Excerise-Strength   UE Strength Yes  (Pt engages in chair push ups (tricep extension) 1x10 in order to increase independence with functional transfers to complete ADLs.)   Cognition   Overall Cognitive Status WFL   Arousal/Participation Alert;Cooperative   Attention Within functional limits   Orientation Level Oriented X4   Memory Within functional limits   Following Commands Follows all commands and directions without difficulty   Comments Pt pleasant and cooperative during OT session. Pt is highly motivated to regain independence.   Activity Tolerance   Activity Tolerance Patient tolerated treatment well   Medical Staff Made Aware RN cleared/updated. Portions of tx completed with AMBER Louis 2* to Pt's medical complexity and decreased endurance. OT focus on maximizing independence with ADLs.   Assessment   Assessment Pt greeted up in chair for OT treatment on 03/11/25 focusing on maximizing independence with ADLs. Pt is making great functional progress towards meeting goals. Pt reports he is feeling much better today and he is \"ready to walk.\" Pt completes functional transfers with min Ax1, and functional mobility with min Ax1 with RW (SBAx1 for chair follow and seated rest breaks). Pt able to complete short household distances and demonstrates standing tolerance of x2 min with UB dressing. Pt min A with UB dressing standing and S with grooming seated. Pt able to complete 1x10 chair push ups (tricep extension). Limitations that impact functional performance include decreased ADL status, UE ROM, UE strength, safe judgement during ADLs, cognition, endurance, self care transfers, and " high level ADLs. Occupational performance areas to address ADL retraining, functional transfer training, UE strengthening/ROM, endurance training, cognitive reorientation, Pt/caregiver education, equipment evaluation/education, compensatory technique education, energy conservation, and activity engagement . Pt would benefit from continued skilled OT services while in hospital to maximize independence with ADLs. Will continue to follow Pt's goals and progress. Pt would benefit from level II resources (moderate intensity) upon DC to maximize safety and independence with ADLs and functional tasks of choice.   Plan   Treatment Interventions ADL retraining;Functional transfer training;UE strengthening/ROM;Endurance training;Cognitive reorientation;Patient/family training;Equipment evaluation/education;Compensatory technique education;Activityengagement;Energy conservation   Goal Expiration Date 03/25/25    TX COMPLETED BY OTR, EXTEND GOALS x14 DAYS UPON IE   OT Treatment Day 1   OT Frequency 3-5x/wk   Discharge Recommendation   Rehab Resource Intensity Level, OT II (Moderate Resource Intensity)   Additional Comments  The patient's raw score on the -PAC Daily Activity Inpatient Short Form is 17. A raw score of less than 19 suggests the patient may benefit from discharge to post-acute rehabilitation services. Please refer to the recommendation of the Occupational Therapist for safe discharge planning.   AM-PAC Daily Activity Inpatient   Lower Body Dressing 2   Bathing 2   Toileting 2   Upper Body Dressing 3   Grooming 4   Eating 4   Daily Activity Raw Score 17   Daily Activity Standardized Score (Calc for Raw Score >=11) 37.26   AM-PAC Applied Cognition Inpatient   Following a Speech/Presentation 4   Understanding Ordinary Conversation 4   Taking Medications 4   Remembering Where Things Are Placed or Put Away 4   Remembering List of 4-5 Errands 4   Taking Care of Complicated Tasks 4   Applied Cognition Raw Score 24    Applied Cognition Standardized Score 62.21   End of Consult   Education Provided Yes   Patient Position at End of Consult Bedside chair;Bed/Chair alarm activated;All needs within reach   Nurse Communication Nurse aware of consult         Stacy Harvey MS, OTR/L

## 2025-03-11 NOTE — PROGRESS NOTES
[facility] [place]  Progress Note   Nadeem Purdy Jr. 69 y.o. male MRN: [id number]  Unit/Bed#: -01 Encounter: [id number]      Brief summary of hospitalization: 70 yo man with CKD 3A, T2DM greater than 20 years, obesity, HTN presented 2/26B/L edema initially treated for DKA and severe sepsis in setting of lower extremity cellulitis.  Nephrology following along for acute kidney injury    Assessment & Plan  Acute renal failure superimposed on stage 3a chronic kidney disease (HCC)  Lab Results   Component Value Date    EGFR 25 03/11/2025    EGFR 21 03/10/2025    EGFR 19 03/09/2025    CREATININE 2.45 (H) 03/11/2025    CREATININE 2.79 (H) 03/10/2025    CREATININE 3.06 (H) 03/09/2025   As a reminder, peak creatinine 4.3 mg/dL 3/2 improved to 2.45 mg/dL today.  Etiology acute tubular necrosis.  From a nephrology perspective, no objection to discharging to rehabilitation today.  Recommend continuing to hold losartan, SGLT2 inhibitor.  Continue Lasix 40 mg twice daily.  Recommend rehab weigh patient daily and okay to escalate Lasix dose as needed for weight gain or increased lower extremity edema.  Follow-up with nephrology in the outpatient setting.  Patient prefers [facility]  Hyponatremia  Serum sodium appropriate 134 mmol/L.  No change to fluid restriction.  Continue low-sodium diet-discussed with patient today  Essential hypertension  Blood pressure mildly above goal.  Recommend holding losartan until reevaluated by nephrology as an outpatient.  Continue losartan 10 mg daily, Lasix 40 mg twice daily, metoprolol 50 mg twice daily.  Diabetic ketoacidosis without coma associated with type 2 diabetes mellitus (HCC)  Lab Results   Component Value Date    HGBA1C 8.9 (H) 02/26/2025       Recent Labs     03/10/25  1048 03/10/25  1600 03/10/25  2046 03/11/25  0657   POCGLU 242* 187* 210* 155*       Blood Sugar Average: Last 72 hrs:  (P) 215.8856588207951266      Urinary retention  Ramirez  catheter was removed.  Appreciate urology's input.  Continue Flomax.  Continue to monitor PVR  Metabolic acidosis  tCO2 28 mmol/L.  Discontinue Bicitra  Streptococcal bacteremia  Receiving cefadroxil per primary team  Lymphedema of both lower extremities  Continue Lasix 40 mg twice daily, low-sodium diet, gentle fluid restriction.  Recommend daily weights at rehab and can escalate Lasix as needed to maintain euvolemia  Diabetic nephropathy associated with type 2 diabetes mellitus (HCC)  Lab Results   Component Value Date    HGBA1C 8.9 (H) 02/26/2025       Recent Labs     03/10/25  1048 03/10/25  1600 03/10/25  2046 03/11/25  0657   POCGLU 242* 187* 210* 155*       Blood Sugar Average: Last 72 hrs:  (P) 215.4215545880813082  Presumed diabetic nephropathy.  T2DM greater than 20 years.  Continue to hold losartan until reevaluated in outpatient due to acute kidney injury unresolved.  Previously patient on SGLT2 inhibitor-unclear if he was taking prior to admission.  Would not resume med given recent DKA      I have reviewed the nephrology recommendations including okay to discharge from nephrology perspective however recommend holding SGLT2 inhibitor and RAASi, with hospital team, and we are in agreement with renal plan including the information outlined above.    SUBJECTIVE / 24H INTERVAL HISTORY:  Examined sitting upright in chair.  No complaints when asked system by system.  Eating well.  Denies trouble with urination.  Had formed bowel movement earlier today.    Historical Information   Past Medical History:   Diagnosis Date    Clostridium difficile diarrhea 02/27/2025    Elevated troponin 02/27/2025    Group A strep bacteremia 02/27/2025    Hypertension      Past Surgical History:   Procedure Laterality Date    CATARACT EXTRACTION, BILATERAL Bilateral      Social History   Social History     Substance and Sexual Activity   Alcohol Use Yes    Alcohol/week: 4.0 standard drinks of alcohol    Types: 4 Shots of liquor  per week     Social History     Substance and Sexual Activity   Drug Use Never     Social History     Tobacco Use   Smoking Status Former   Smokeless Tobacco Never       Family History:   History reviewed. No pertinent family history.    Medications:  Pertinent medications were reviewed  Current Facility-Administered Medications   Medication Dose Route Frequency Provider Last Rate    acetaminophen  650 mg Oral Q6H PRN BrittanyJIMMY Lopez      amLODIPine  10 mg Oral Daily Kiki Villarreal MD      aspirin  81 mg Oral Daily JIMMY Huff      atorvastatin  40 mg Oral Daily With Dinner JIMMY Huff      azelastine  1 spray Each Nare BID JIMMY Huff      cefadroxil  1,000 mg Oral Q12H [facility] Lupillo Alicea PA-C      chlorhexidine  15 mL Mouth/Throat Q12H MARGARITA Brittany JIMMY Herrera      Cholecalciferol  2,000 Units Oral Daily BrittanyJIMMY Lopez      folic acid  1 mg Oral Daily JIMMY Huff      furosemide  40 mg Oral BID (diuretic) Brian Quan PA-C      gabapentin  200 mg Oral HS Brittany JIMMY Herrera      guaiFENesin  600 mg Oral Q12H MARGARITA BrittanyJIMMY Lopez      heparin (porcine)  7,500 Units Subcutaneous Q8H MARGARITA BrittanyJIMMY Lopez      hydrALAZINE  10 mg Intravenous Q6H PRN BrittanyJIMMY Lopez      insulin aspart protamine-insulin aspart  32 Units Subcutaneous BID AC Zeyad Orellana PA-C      insulin lispro  2-12 Units Subcutaneous 4x Daily (AC & HS) Kiki Villarreal MD      labetalol  10 mg Intravenous Q4H PRN Kiki Villarreal MD      melatonin  6 mg Oral HS JIMMY Huff      metoprolol tartrate  50 mg Oral Q12H MARGARITA JIMMY Huff      multivitamin-minerals  1 tablet Oral Daily Kiki Villarreal MD      sod citrate-citric acid  30 mL Oral BID JIMMY Huff      tamsulosin  0.4 mg Oral Daily With Dinner Jonny Miller MD      thiamine  100 mg Oral Daily Brittany Wynn  "JIMMY Smith      vitamin E (tocopherol)  400 Units Oral Daily Brittany Wynn JIMMY Smith           No Known Allergies      Vitals:   /84 (BP Location: Left arm)   Pulse (!) 107   Temp 98.2 °F (36.8 °C) (Oral)   Resp 18   Ht 5' 11\" (1.803 m)   Wt (!) 141 kg (310 lb 6.5 oz)   SpO2 95%   BMI 43.29 kg/m²   Body mass index is 43.29 kg/m².  SpO2: 95 %,   SpO2 Activity: At Rest,   O2 Device: None (Room air)      Intake/Output Summary (Last 24 hours) at 3/11/2025 1037  Last data filed at 3/11/2025 1013  Gross per 24 hour   Intake 1560 ml   Output 2350 ml   Net -790 ml     Invasive Devices       Peripheral Intravenous Line  Duration             Peripheral IV 03/08/25 Dorsal (posterior);Left Hand 3 days                    Physical Exam  General: conscious, cooperative, in no acute distress  Eyes: conjunctivae pink, anicteric sclerae  ENT: lips and mucous membranes moist  Neck: supple, no JVD, no masses  Chest: clear breath sounds bilaterally, no crackles, ronchus or wheezings  CVS: S1 & S2, normal rate, regular rhythm  Abdomen: soft, non-tender, non-distended, normoactive bowel sounds obese  Extremities: Severe chronic lymph edema of both legs  Skin: no rash left inner thigh wound  Neuro: awake, alert, oriented    Diagnostic Data:  Lab: I have personally reviewed pertinent lab results.  CBC:  Results from last 7 days   Lab Units 03/11/25  0546   WBC Thousand/uL 11.50*   HEMOGLOBIN g/dL 9.2*   HEMATOCRIT % 27.5*   PLATELETS Thousands/uL 265      CMP:   Lab Results   Component Value Date    SODIUM 134 (L) 03/11/2025    K 3.7 03/11/2025     03/11/2025    CO2 28 03/11/2025    BUN 53 (H) 03/11/2025    CREATININE 2.45 (H) 03/11/2025    CALCIUM 8.2 (L) 03/11/2025    EGFR 25 03/11/2025   ,   PT/INR: No results found for: \"PT\", \"INR\",   Magnesium: No components found for: \"MAG\",  Phosphorous: No results found for: \"PHOS\"    Microbiology:  @[facility],(urinecx:7)@        JIMMY Lund    Portions of the record " "may have been created with voice recognition software. Occasional wrong word or \"sound a like\" substitutions may have occurred due to the inherent limitations of voice recognition software. Read the chart carefully and recognize, using context, where substitutions have occurred.  "

## 2025-03-11 NOTE — ASSESSMENT & PLAN NOTE
Baseline creatinine of approximately 1.1-1.3 -> elevated but continuing to improve over the last several days, currently at 2.45 from a prior 4.34 peak  Nephrology following -> now on twice daily oral diuretic (Lasix) due to fluid overloaded state  Monitor renal function and urine output  Limit/avoid nephrotoxins and hypotension as possible - holding ARB (Cozaar) on discharge  Outpatient follow-up with Nephrology

## 2025-03-11 NOTE — ASSESSMENT & PLAN NOTE
Serum sodium appropriate 134 mmol/L.  No change to fluid restriction.  Continue low-sodium diet-discussed with patient today

## 2025-03-11 NOTE — ASSESSMENT & PLAN NOTE
Lab Results   Component Value Date    HGBA1C 8.9 (H) 02/26/2025       Recent Labs     03/10/25  1048 03/10/25  1600 03/10/25  2046 03/11/25  0657   POCGLU 242* 187* 210* 155*       Blood Sugar Average: Last 72 hrs:  (P) [id number]

## 2025-03-11 NOTE — ASSESSMENT & PLAN NOTE
Blood pressure mildly above goal.  Recommend holding losartan until reevaluated by nephrology as an outpatient.  Continue losartan 10 mg daily, Lasix 40 mg twice daily, metoprolol 50 mg twice daily.

## 2025-03-12 ENCOUNTER — TELEPHONE (OUTPATIENT)
Dept: OTHER | Facility: HOSPITAL | Age: 70
End: 2025-03-12

## 2025-03-13 ENCOUNTER — HOSPITAL ENCOUNTER (INPATIENT)
Facility: HOSPITAL | Age: 70
LOS: 4 days | Discharge: NURSING, REHAB OR LONG-TERM CARE | DRG: 864 | End: 2025-03-17
Attending: EMERGENCY MEDICINE | Admitting: HOSPITALIST
Payer: MEDICARE

## 2025-03-13 ENCOUNTER — APPOINTMENT (EMERGENCY)
Dept: RADIOLOGY | Facility: HOSPITAL | Age: 70
DRG: 864 | End: 2025-03-13
Payer: MEDICARE

## 2025-03-13 DIAGNOSIS — A41.9 SEPSIS WITHOUT ACUTE ORGAN DYSFUNCTION, DUE TO UNSPECIFIED ORGANISM (HCC): ICD-10-CM

## 2025-03-13 DIAGNOSIS — A41.9 SEPSIS (HCC): Primary | ICD-10-CM

## 2025-03-13 DIAGNOSIS — L03.90 CELLULITIS: ICD-10-CM

## 2025-03-13 LAB
ALBUMIN SERPL BCG-MCNC: 2.7 G/DL (ref 3.5–5)
ALP SERPL-CCNC: 32 U/L (ref 34–104)
ALT SERPL W P-5'-P-CCNC: 23 U/L (ref 7–52)
ANION GAP SERPL CALCULATED.3IONS-SCNC: 6 MMOL/L (ref 4–13)
APTT PPP: 25 SECONDS (ref 23–34)
AST SERPL W P-5'-P-CCNC: 21 U/L (ref 13–39)
B-OH-BUTYR SERPL-MCNC: <0.05 MMOL/L (ref 0.02–0.27)
BACTERIA UR QL AUTO: NORMAL /HPF
BASOPHILS # BLD AUTO: 0.08 THOUSANDS/ÂΜL (ref 0–0.1)
BASOPHILS NFR BLD AUTO: 1 % (ref 0–1)
BILIRUB SERPL-MCNC: 0.51 MG/DL (ref 0.2–1)
BILIRUB UR QL STRIP: NEGATIVE
BUN SERPL-MCNC: 34 MG/DL (ref 5–25)
CALCIUM ALBUM COR SERPL-MCNC: 9.5 MG/DL (ref 8.3–10.1)
CALCIUM SERPL-MCNC: 8.5 MG/DL (ref 8.4–10.2)
CHLORIDE SERPL-SCNC: 99 MMOL/L (ref 96–108)
CLARITY UR: CLEAR
CO2 SERPL-SCNC: 29 MMOL/L (ref 21–32)
COLOR UR: YELLOW
CREAT SERPL-MCNC: 2.3 MG/DL (ref 0.6–1.3)
EOSINOPHIL # BLD AUTO: 0.4 THOUSAND/ÂΜL (ref 0–0.61)
EOSINOPHIL NFR BLD AUTO: 3 % (ref 0–6)
ERYTHROCYTE [DISTWIDTH] IN BLOOD BY AUTOMATED COUNT: 13.2 % (ref 11.6–15.1)
FLUAV RNA RESP QL NAA+PROBE: NEGATIVE
FLUBV RNA RESP QL NAA+PROBE: NEGATIVE
GFR SERPL CREATININE-BSD FRML MDRD: 27 ML/MIN/1.73SQ M
GLUCOSE SERPL-MCNC: 107 MG/DL (ref 65–140)
GLUCOSE SERPL-MCNC: 111 MG/DL (ref 65–140)
GLUCOSE SERPL-MCNC: 114 MG/DL (ref 65–140)
GLUCOSE SERPL-MCNC: 137 MG/DL (ref 65–140)
GLUCOSE UR STRIP-MCNC: NEGATIVE MG/DL
HCT VFR BLD AUTO: 27.9 % (ref 36.5–49.3)
HGB BLD-MCNC: 9.2 G/DL (ref 12–17)
HGB UR QL STRIP.AUTO: ABNORMAL
IMM GRANULOCYTES # BLD AUTO: 0.07 THOUSAND/UL (ref 0–0.2)
IMM GRANULOCYTES NFR BLD AUTO: 1 % (ref 0–2)
INR PPP: 1.25 (ref 0.85–1.19)
KETONES UR STRIP-MCNC: NEGATIVE MG/DL
LACTATE SERPL-SCNC: 1.6 MMOL/L (ref 0.5–2)
LEUKOCYTE ESTERASE UR QL STRIP: NEGATIVE
LYMPHOCYTES # BLD AUTO: 1.3 THOUSANDS/ÂΜL (ref 0.6–4.47)
LYMPHOCYTES NFR BLD AUTO: 9 % (ref 14–44)
MCH RBC QN AUTO: 31.5 PG (ref 26.8–34.3)
MCHC RBC AUTO-ENTMCNC: 33 G/DL (ref 31.4–37.4)
MCV RBC AUTO: 96 FL (ref 82–98)
MONOCYTES # BLD AUTO: 0.72 THOUSAND/ÂΜL (ref 0.17–1.22)
MONOCYTES NFR BLD AUTO: 5 % (ref 4–12)
NEUTROPHILS # BLD AUTO: 11.46 THOUSANDS/ÂΜL (ref 1.85–7.62)
NEUTS SEG NFR BLD AUTO: 81 % (ref 43–75)
NITRITE UR QL STRIP: NEGATIVE
NON-SQ EPI CELLS URNS QL MICRO: NORMAL /HPF
NRBC BLD AUTO-RTO: 0 /100 WBCS
PH UR STRIP.AUTO: 6 [PH]
PLATELET # BLD AUTO: 256 THOUSANDS/UL (ref 149–390)
PMV BLD AUTO: 8.3 FL (ref 8.9–12.7)
POTASSIUM SERPL-SCNC: 3.9 MMOL/L (ref 3.5–5.3)
PROCALCITONIN SERPL-MCNC: 0.1 NG/ML
PROT SERPL-MCNC: 7.5 G/DL (ref 6.4–8.4)
PROT UR STRIP-MCNC: NEGATIVE MG/DL
PROTHROMBIN TIME: 16.2 SECONDS (ref 12.3–15)
RBC # BLD AUTO: 2.92 MILLION/UL (ref 3.88–5.62)
RBC #/AREA URNS AUTO: NORMAL /HPF
RSV RNA RESP QL NAA+PROBE: NEGATIVE
SARS-COV-2 RNA RESP QL NAA+PROBE: NEGATIVE
SODIUM SERPL-SCNC: 134 MMOL/L (ref 135–147)
SP GR UR STRIP.AUTO: 1.01
UROBILINOGEN UR QL STRIP.AUTO: 0.2 E.U./DL
WBC # BLD AUTO: 14.03 THOUSAND/UL (ref 4.31–10.16)
WBC #/AREA URNS AUTO: NORMAL /HPF

## 2025-03-13 PROCEDURE — 96361 HYDRATE IV INFUSION ADD-ON: CPT

## 2025-03-13 PROCEDURE — 99285 EMERGENCY DEPT VISIT HI MDM: CPT | Performed by: EMERGENCY MEDICINE

## 2025-03-13 PROCEDURE — 84145 PROCALCITONIN (PCT): CPT | Performed by: EMERGENCY MEDICINE

## 2025-03-13 PROCEDURE — 96365 THER/PROPH/DIAG IV INF INIT: CPT

## 2025-03-13 PROCEDURE — 0241U HB NFCT DS VIR RESP RNA 4 TRGT: CPT | Performed by: EMERGENCY MEDICINE

## 2025-03-13 PROCEDURE — 81001 URINALYSIS AUTO W/SCOPE: CPT | Performed by: EMERGENCY MEDICINE

## 2025-03-13 PROCEDURE — 36415 COLL VENOUS BLD VENIPUNCTURE: CPT | Performed by: EMERGENCY MEDICINE

## 2025-03-13 PROCEDURE — 71045 X-RAY EXAM CHEST 1 VIEW: CPT

## 2025-03-13 PROCEDURE — 94664 DEMO&/EVAL PT USE INHALER: CPT

## 2025-03-13 PROCEDURE — 80053 COMPREHEN METABOLIC PANEL: CPT | Performed by: EMERGENCY MEDICINE

## 2025-03-13 PROCEDURE — 99285 EMERGENCY DEPT VISIT HI MDM: CPT

## 2025-03-13 PROCEDURE — 85730 THROMBOPLASTIN TIME PARTIAL: CPT | Performed by: EMERGENCY MEDICINE

## 2025-03-13 PROCEDURE — 73590 X-RAY EXAM OF LOWER LEG: CPT

## 2025-03-13 PROCEDURE — 99223 1ST HOSP IP/OBS HIGH 75: CPT

## 2025-03-13 PROCEDURE — 85025 COMPLETE CBC W/AUTO DIFF WBC: CPT | Performed by: EMERGENCY MEDICINE

## 2025-03-13 PROCEDURE — 85610 PROTHROMBIN TIME: CPT | Performed by: EMERGENCY MEDICINE

## 2025-03-13 PROCEDURE — 94760 N-INVAS EAR/PLS OXIMETRY 1: CPT

## 2025-03-13 PROCEDURE — 87040 BLOOD CULTURE FOR BACTERIA: CPT | Performed by: EMERGENCY MEDICINE

## 2025-03-13 PROCEDURE — 82948 REAGENT STRIP/BLOOD GLUCOSE: CPT

## 2025-03-13 PROCEDURE — 83605 ASSAY OF LACTIC ACID: CPT | Performed by: EMERGENCY MEDICINE

## 2025-03-13 PROCEDURE — 82010 KETONE BODYS QUAN: CPT | Performed by: EMERGENCY MEDICINE

## 2025-03-13 RX ORDER — LANOLIN ALCOHOL/MO/W.PET/CERES
100 CREAM (GRAM) TOPICAL DAILY
Status: DISCONTINUED | OUTPATIENT
Start: 2025-03-14 | End: 2025-03-17 | Stop reason: HOSPADM

## 2025-03-13 RX ORDER — INSULIN LISPRO 100 [IU]/ML
2-12 INJECTION, SOLUTION INTRAVENOUS; SUBCUTANEOUS
Status: DISCONTINUED | OUTPATIENT
Start: 2025-03-13 | End: 2025-03-17 | Stop reason: HOSPADM

## 2025-03-13 RX ORDER — ACETAMINOPHEN 325 MG/1
650 TABLET ORAL ONCE
Status: COMPLETED | OUTPATIENT
Start: 2025-03-13 | End: 2025-03-13

## 2025-03-13 RX ORDER — AZELASTINE 1 MG/ML
1 SPRAY, METERED NASAL DAILY
Status: DISCONTINUED | OUTPATIENT
Start: 2025-03-13 | End: 2025-03-17 | Stop reason: HOSPADM

## 2025-03-13 RX ORDER — ACETAMINOPHEN 325 MG/1
650 TABLET ORAL EVERY 6 HOURS PRN
Status: DISCONTINUED | OUTPATIENT
Start: 2025-03-13 | End: 2025-03-17 | Stop reason: HOSPADM

## 2025-03-13 RX ORDER — METOPROLOL TARTRATE 50 MG
50 TABLET ORAL EVERY 12 HOURS SCHEDULED
Status: DISCONTINUED | OUTPATIENT
Start: 2025-03-13 | End: 2025-03-17 | Stop reason: HOSPADM

## 2025-03-13 RX ORDER — ASPIRIN 81 MG/1
81 TABLET ORAL DAILY
Status: DISCONTINUED | OUTPATIENT
Start: 2025-03-14 | End: 2025-03-17 | Stop reason: HOSPADM

## 2025-03-13 RX ORDER — ATORVASTATIN CALCIUM 40 MG/1
40 TABLET, FILM COATED ORAL DAILY
Status: DISCONTINUED | OUTPATIENT
Start: 2025-03-14 | End: 2025-03-17 | Stop reason: HOSPADM

## 2025-03-13 RX ORDER — TAMSULOSIN HYDROCHLORIDE 0.4 MG/1
0.4 CAPSULE ORAL
Status: DISCONTINUED | OUTPATIENT
Start: 2025-03-13 | End: 2025-03-17 | Stop reason: HOSPADM

## 2025-03-13 RX ORDER — FOLIC ACID 1 MG/1
1 TABLET ORAL DAILY
Status: DISCONTINUED | OUTPATIENT
Start: 2025-03-14 | End: 2025-03-17 | Stop reason: HOSPADM

## 2025-03-13 RX ORDER — AMLODIPINE BESYLATE 10 MG/1
10 TABLET ORAL DAILY
Status: DISCONTINUED | OUTPATIENT
Start: 2025-03-14 | End: 2025-03-17 | Stop reason: HOSPADM

## 2025-03-13 RX ORDER — GABAPENTIN 100 MG/1
200 CAPSULE ORAL
Status: DISCONTINUED | OUTPATIENT
Start: 2025-03-13 | End: 2025-03-17 | Stop reason: HOSPADM

## 2025-03-13 RX ORDER — VANCOMYCIN HYDROCHLORIDE 1 G/200ML
10 INJECTION, SOLUTION INTRAVENOUS EVERY 24 HOURS
Status: DISCONTINUED | OUTPATIENT
Start: 2025-03-14 | End: 2025-03-17

## 2025-03-13 RX ORDER — HEPARIN SODIUM 5000 [USP'U]/ML
7500 INJECTION, SOLUTION INTRAVENOUS; SUBCUTANEOUS EVERY 8 HOURS SCHEDULED
Status: DISCONTINUED | OUTPATIENT
Start: 2025-03-13 | End: 2025-03-17 | Stop reason: HOSPADM

## 2025-03-13 RX ADMIN — SODIUM CHLORIDE 500 ML: 0.9 INJECTION, SOLUTION INTRAVENOUS at 18:21

## 2025-03-13 RX ADMIN — METOPROLOL TARTRATE 50 MG: 50 TABLET, FILM COATED ORAL at 21:37

## 2025-03-13 RX ADMIN — HEPARIN SODIUM 7500 UNITS: 5000 INJECTION INTRAVENOUS; SUBCUTANEOUS at 21:37

## 2025-03-13 RX ADMIN — AZELASTINE HYDROCHLORIDE 1 SPRAY: 137 SPRAY, METERED NASAL at 19:06

## 2025-03-13 RX ADMIN — CEFEPIME 2000 MG: 2 INJECTION, POWDER, FOR SOLUTION INTRAVENOUS at 19:28

## 2025-03-13 RX ADMIN — GABAPENTIN 200 MG: 100 CAPSULE ORAL at 21:37

## 2025-03-13 RX ADMIN — SODIUM CHLORIDE 1000 ML: 0.9 INJECTION, SOLUTION INTRAVENOUS at 14:54

## 2025-03-13 RX ADMIN — TAMSULOSIN HYDROCHLORIDE 0.4 MG: 0.4 CAPSULE ORAL at 18:45

## 2025-03-13 RX ADMIN — VANCOMYCIN HYDROCHLORIDE 2000 MG: 1 INJECTION, POWDER, LYOPHILIZED, FOR SOLUTION INTRAVENOUS at 15:31

## 2025-03-13 RX ADMIN — ACETAMINOPHEN 650 MG: 325 TABLET ORAL at 14:49

## 2025-03-13 NOTE — ASSESSMENT & PLAN NOTE
Lab Results   Component Value Date    HGBA1C 8.9 (H) 02/26/2025       Recent Labs     03/10/25  2046 03/11/25  0657 03/11/25  1110 03/13/25  1505   POCGLU 210* 155* 127 114       Blood Sugar Average: Last 72 hrs:  (P) 114    Blood sugar inpatient 140-180  Given poor appetite we will hold prehospital 70/30 insulin for now  Initiate SSI ACHS  Diabetic low-salt diet  Hypoglycemia protocol  BMP [email or web address]

## 2025-03-13 NOTE — ASSESSMENT & PLAN NOTE
See plan for sepsis  Blood pressure is currently well-controlled  Continue prehospital antihypertensives with hold parameters  Holding Lasix for now  Monitor blood pressure per protocol

## 2025-03-13 NOTE — ED PROVIDER NOTES
Time reflects when diagnosis was documented in both MDM as applicable and the Disposition within this note       Time User Action Codes Description Comment    3/13/2025  4:03 PM Demar Pereyra [A41.9] Sepsis (HCC)     3/13/2025  4:04 PM Demar Pereyra [L03.90] Cellulitis           ED Disposition       ED Disposition   Admit    Condition   Stable    Date/Time   Thu Mar 13, 2025  4:04 PM    Comment   Case was discussed with Dr. Finnegan and the patient's admission status was agreed to be Admission Status: observation status to the service of Dr. Finnegan .               Assessment & Plan       Medical Decision Making  69-year-old male presents emergency department secondary to fever.  Patient notes he has had bacteremia as well as lower extremity cellulitis and is supposed to be on Duricef through the 16th of the month.  Symptoms have worsened over time since being out of the hospital where he is getting more ill so he was sent back to the hospital.  Patient is found to have rigors upon my evaluation.  Patient is asking for multiple warm blankets.  Differential diagnosis based on my evaluation is sepsis via bacteremia or cellulitis.  Patient's legs are hot and red.  The patient is febrile and tachycardic.  Patient given Tylenol as well as IV vancomycin.  Lab studies show an elevated white count of 14,000 which is increased from the patient's discharge.  The remainder of the labs appear to be similar from the previous patient's results.  Patient given IV fluids and will be hospitalized.    Amount and/or Complexity of Data Reviewed  Labs: ordered. Decision-making details documented in ED Course.  Radiology: ordered and independent interpretation performed.    Risk  OTC drugs.  Decision regarding hospitalization.        ED Course as of 03/13/25 1605   Thu Mar 13, 2025   1512 WBC(!): 14.03       Medications   vancomycin (VANCOCIN) 2,000 mg in sodium chloride 0.9 % 500 mL IVPB (2,000 mg Intravenous New Bag 3/13/25  1531)   sodium chloride 0.9 % bolus 1,000 mL (1,000 mL Intravenous New Bag 3/13/25 1454)   acetaminophen (TYLENOL) tablet 650 mg (650 mg Oral Given 3/13/25 1449)       ED Risk Strat Scores                                                History of Present Illness       Chief Complaint   Patient presents with    Fever     [facility] sepsis in bilateral lower legs.  2 days of fever.       Past Medical History:   Diagnosis Date    Clostridium difficile diarrhea 02/27/2025    Elevated troponin 02/27/2025    Group A strep bacteremia 02/27/2025    Hypertension       Past Surgical History:   Procedure Laterality Date    CATARACT EXTRACTION, BILATERAL Bilateral       History reviewed. No pertinent family history.   Social History     Tobacco Use    Smoking status: Former    Smokeless tobacco: Never   Vaping Use    Vaping status: Never Used   Substance Use Topics    Alcohol use: Yes     Alcohol/week: 4.0 standard drinks of alcohol     Types: 4 Shots of liquor per week    Drug use: Never      E-Cigarette/Vaping    E-Cigarette Use Never User       E-Cigarette/Vaping Substances    Nicotine No     THC No     CBD No     Flavoring No     Other No     Unknown No       I have reviewed and agree with the history as documented.     69-year-old male presents emergency room complaining of fever.  Patient notes he has had shaking chills since earlier today.  The patient is currently in a facility and being treated for cellulitis of the lower extremities.  Patient has a temperature of about 101 and is tachycardic and has rigors at the bedside.  Patient was sent to the ER for evaluation due to worsening infectious symptoms.  Patient denies chest pain shortness of breath cough abdominal pain diarrhea nausea vomiting or headache.        Review of Systems   Constitutional:  Positive for activity change, chills and fever.   HENT:  Negative for ear pain and sore throat.    Eyes:  Negative for pain and  visual disturbance.   Respiratory:  Negative for cough and shortness of breath.    Cardiovascular:  Negative for chest pain and palpitations.   Gastrointestinal:  Negative for abdominal pain and vomiting.   Genitourinary:  Negative for dysuria and hematuria.   Musculoskeletal:  Positive for myalgias. Negative for arthralgias and back pain.   Skin:  Negative for color change and rash.   Neurological:  Positive for weakness. Negative for seizures, syncope and facial asymmetry.   All other systems reviewed and are negative.          Objective       ED Triage Vitals [03/13/25 1416]   Temperature Pulse Blood Pressure Respirations SpO2 Patient Position - Orthostatic VS   (!) 100.8 °F (38.2 °C) (!) 106 155/72 20 92 % Sitting      Temp Source Heart Rate Source BP Location FiO2 (%) Pain Score    Oral Monitor Left arm -- No Pain      Vitals      Date and Time Temp Pulse SpO2 Resp BP Pain Score FACES Pain Rating User   03/13/25 1500 -- 109 96 % 24 153/72 -- -- JW   03/13/25 1416 100.8 °F (38.2 °C) 106 92 % 20 155/72 No Pain -- EMR            Physical Exam  Constitutional:       General: He is not in acute distress.     Appearance: Normal appearance. He is normal weight. He is ill-appearing.   HENT:      Head: Normocephalic and atraumatic.      Right Ear: External ear normal.      Left Ear: External ear normal.      Nose: Nose normal.      Mouth/Throat:      Mouth: Mucous membranes are moist.      Pharynx: No oropharyngeal exudate or posterior oropharyngeal erythema.   Eyes:      Conjunctiva/sclera: Conjunctivae normal.   Cardiovascular:      Rate and Rhythm: Regular rhythm. Tachycardia present.      Pulses: Normal pulses.      Heart sounds: Normal heart sounds.   Pulmonary:      Effort: Pulmonary effort is normal.      Breath sounds: Normal breath sounds.   Abdominal:      General: Abdomen is flat. There is no distension.      Palpations: Abdomen is soft. There is no mass.   Musculoskeletal:         General: No swelling,  tenderness or deformity. Normal range of motion.      Cervical back: Normal range of motion.      Right lower leg: Edema present.      Left lower leg: Edema present.      Comments: Bilateral hot lower extremities that are red and tender to palpation.   Skin:     General: Skin is warm and dry.      Capillary Refill: Capillary refill takes 2 to 3 seconds.      Coloration: Skin is not pale.   Neurological:      General: No focal deficit present.      Mental Status: He is alert and oriented to person, place, and time. Mental status is at baseline.   Psychiatric:         Mood and Affect: Mood normal.         Results Reviewed       Procedure Component Value Units Date/Time    Urine Microscopic [[id number]]  (Normal) Collected: 03/13/25 1535    Lab Status: Final result Specimen: Urine, Other Updated: 03/13/25 1602     RBC, UA 0-1 /hpf      WBC, UA None Seen /hpf      Epithelial Cells Occasional /hpf      Bacteria, UA None Seen /hpf     UA w Reflex to Microscopic w Reflex to Culture [[id number]]  (Abnormal) Collected: 03/13/25 1535    Lab Status: Final result Specimen: Urine, Other Updated: 03/13/25 1546     Color, UA Yellow     Clarity, UA Clear     Specific Gravity, UA 1.015     pH, UA 6.0     Leukocytes, UA Negative     Nitrite, UA Negative     Protein, UA Negative mg/dl      Glucose, UA Negative mg/dl      Ketones, UA Negative mg/dl      Urobilinogen, UA 0.2 E.U./dl      Bilirubin, UA Negative     Occult Blood, UA Trace-Intact    FLU/RSV/COVID - if FLU/RSV clinically relevant [[id number]]  (Normal) Collected: 03/13/25 1443    Lab Status: Final result Specimen: Nares from Nose Updated: 03/13/25 1529     SARS-CoV-2 Negative     INFLUENZA A PCR Negative     INFLUENZA B PCR Negative     RSV PCR Negative    Narrative:      This test has been performed using the CoV-2/Flu/RSV plus assay on the [employer] GeneXpert platform. This test has been validated by the  and verified by the performing laboratory.     This test is  designed to amplify and detect the following: nucleocapsid (N), envelope (E), and RNA-dependent RNA polymerase (RdRP) genes of the SARS-CoV-2 genome; matrix (M), basic polymerase (PB2), and acidic protein (PA) segments of the influenza A genome; matrix (M) and non-structural protein (NS) segments of the influenza B genome, and the nucleocapsid genes of RSV A and RSV B.     Positive results are indicative of the presence of Flu A, Flu B, RSV, and/or SARS-CoV-2 RNA. Positive results for SARS-CoV-2 or suspected novel influenza should be reported to state, local, or federal health departments according to local reporting requirements.      All results should be assessed in conjunction with clinical presentation and other laboratory markers for clinical management.     FOR PEDIATRIC PATIENTS - copy/paste COVID Guidelines URL to browser: [email or web address]       Procalcitonin [[id number]]  (Normal) Collected: 03/13/25 1443    Lab Status: Final result Specimen: Blood from Arm, Left Updated: 03/13/25 1518     Procalcitonin 0.10 ng/ml     Lactic acid [[id number]]  (Normal) Collected: 03/13/25 1443    Lab Status: Final result Specimen: Blood from Arm, Left Updated: 03/13/25 1510     LACTIC ACID 1.6 mmol/L     Narrative:      Result may be elevated if tourniquet was used during collection.    Beta Hydroxybutyrate [[id number]]  (Normal) Collected: 03/13/25 1443    Lab Status: Final result Specimen: Blood from Arm, Left Updated: 03/13/25 1510     Beta- Hydroxybutyrate <0.05 mmol/L     Comprehensive metabolic panel [[id number]]  (Abnormal) Collected: 03/13/25 1443    Lab Status: Final result Specimen: Blood from Arm, Left Updated: 03/13/25 1510     Sodium 134 mmol/L      Potassium 3.9 mmol/L      Chloride 99 mmol/L      CO2 29 mmol/L      ANION GAP 6 mmol/L      BUN 34 mg/dL      Creatinine 2.30 mg/dL      Glucose 111 mg/dL      Calcium 8.5 mg/dL      Corrected Calcium 9.5 mg/dL       AST 21 U/L      ALT 23 U/L      Alkaline Phosphatase 32 U/L      Total Protein 7.5 g/dL      Albumin 2.7 g/dL      Total Bilirubin 0.51 mg/dL      eGFR 27 ml/min/1.73sq m     Narrative:      National Kidney Disease Foundation guidelines for Chronic Kidney Disease (CKD):     Stage 1 with normal or high GFR (GFR > 90 mL/min/1.73 square meters)    Stage 2 Mild CKD (GFR = 60-89 mL/min/1.73 square meters)    Stage 3A Moderate CKD (GFR = 45-59 mL/min/1.73 square meters)    Stage 3B Moderate CKD (GFR = 30-44 mL/min/1.73 square meters)    Stage 4 Severe CKD (GFR = 15-29 mL/min/1.73 square meters)    Stage 5 End Stage CKD (GFR <15 mL/min/1.73 square meters)  Note: GFR calculation is accurate only with a steady state creatinine    Protime-INR [[id number]]  (Abnormal) Collected: 03/13/25 1443    Lab Status: Final result Specimen: Blood from Arm, Left Updated: 03/13/25 1508     Protime 16.2 seconds      INR 1.25    Narrative:      INR Therapeutic Range    Indication                                             INR Range      Atrial Fibrillation                                               2.0-3.0  Hypercoagulable State                                    2.0.2.3  Left Ventricular Asist Device                            2.0-3.0  Mechanical Heart Valve                                  -    Aortic(with afib, MI, embolism, HF, LA enlargement,    and/or coagulopathy)                                     2.0-3.0 (2.5-3.5)     Mitral                                                             2.5-3.5  Prosthetic/Bioprosthetic Heart Valve               2.0-3.0  Venous thromboembolism (VTE: VT, PE        2.0-3.0    APTT [[id number]]  (Normal) Collected: 03/13/25 1443    Lab Status: Final result Specimen: Blood from Arm, Left Updated: 03/13/25 1508     PTT 25 seconds     Fingerstick Glucose (POCT) [[id number]]  (Normal) Collected: 03/13/25 1505    Lab Status: Final result Specimen: Blood Updated: 03/13/25 1506     POC Glucose 114 mg/dl      CBC and differential [[id number]]  (Abnormal) Collected: 25    Lab Status: Final result Specimen: Blood from Arm, Left Updated: 25     WBC 14.03 Thousand/uL      RBC 2.92 Million/uL      Hemoglobin 9.2 g/dL      Hematocrit 27.9 %      MCV 96 fL      MCH 31.5 pg      MCHC 33.0 g/dL      RDW 13.2 %      MPV 8.3 fL      Platelets 256 Thousands/uL      nRBC 0 /100 WBCs      Segmented % 81 %      Immature Grans % 1 %      Lymphocytes % 9 %      Monocytes % 5 %      Eosinophils Relative 3 %      Basophils Relative 1 %      Absolute Neutrophils 11.46 Thousands/µL      Absolute Immature Grans 0.07 Thousand/uL      Absolute Lymphocytes 1.30 Thousands/µL      Absolute Monocytes 0.72 Thousand/µL      Eosinophils Absolute 0.40 Thousand/µL      Basophils Absolute 0.08 Thousands/µL     Blood culture #2 [[id number]] Collected: 25    Lab Status: In process Specimen: Blood from Arm, Right Updated: 25    Blood culture #1 [[id number]] Collected: 25    Lab Status: In process Specimen: Blood from Arm, Left Updated: 25            XR chest portable   ED Interpretation by Demar Pereyra Jr., DO ([id number])   No definitive acute pulmonary pathology.      XR tibia fibula 2 views LEFT    (Results Pending)   XR tibia fibula 2 views RIGHT    (Results Pending)       ECG 12 Lead Documentation Only    Date/Time: 3/13/2025 2:52 PM    Performed by: Demar Pereyra Jr., DO  Authorized by: Demar Pereyra Jr., DO    ECG reviewed by me, the ED Provider: yes    Patient location:  ED  Comments:      EKG is a sinus tach at 105 beats a minute with a left axis deviation.  There is a first-degree AV block noted.  There is no other definitive acute ST or T wave changes present.      ED Medication and Procedure Management   Prior to Admission Medications   Prescriptions Last Dose Informant Patient Reported? Taking?   Azelastine HCl 137 MCG/SPRAY SOLN   No No   Si puff each nostril  twice a day   BD Pen Needle Katelyn 2nd Gen 32G X 4 MM MISC   No No   Sig: INJECT AS DIRECTED 2 (TWO) TIMES A DAY   Cholecalciferol (VITAMIN D3) 2000 units TABS  Self Yes No   Sig: Take 2,000 Units by mouth daily   Glucose Blood (ONETOUCH ULTRA BLUE VI)  Self Yes No   Si Units by In Vitro route 3 (three) times a day   Krill Oil 1000 MG CAPS  Self Yes No   Sig: Take 1 capsule by mouth daily   amLODIPine (NORVASC) 10 mg tablet   No No   Sig: Take 1 tablet (10 mg total) by mouth daily   aspirin (ECOTRIN LOW STRENGTH) 81 mg EC tablet  Self Yes No   Sig: Take 81 mg by mouth daily   atorvastatin (LIPITOR) 40 mg tablet   No No   Sig: Take 1 tablet (40 mg total) by mouth daily   cefadroxil (DURICEF) 500 mg capsule   No No   Sig: Take 2 capsules (1,000 mg total) by mouth every 12 (twelve) hours for 9 doses   folic acid (FOLVITE) 1 mg tablet   No No   Sig: Take 1 tablet (1 mg total) by mouth daily   furosemide (LASIX) 40 mg tablet   No No   Sig: Take 1 tablet (40 mg total) by mouth 2 (two) times a day   gabapentin (NEURONTIN) 100 mg capsule   No No   Sig: Take 2 capsules (200 mg total) by mouth daily at bedtime   glucose blood (OneTouch Ultra) test strip  Self No No   Sig: Test 3 times daily   insulin aspart protamine-insulin aspart (NovoLOG 70/30) 100 units/mL injection   No No   Sig: Inject 32 Units under the skin 2 (two) times a day before meals   losartan (COZAAR) 100 MG tablet   No No   Sig: TAKE 1 TABLET BY MOUTH EVERY DAY   melatonin 3 mg   No No   Sig: Take 2 tablets (6 mg total) by mouth daily at bedtime   metoprolol tartrate (LOPRESSOR) 50 mg tablet   No No   Sig: Take 1 tablet (50 mg total) by mouth every 12 (twelve) hours   potassium chloride (Klor-Con M20) 20 mEq tablet   No No   Sig: Take 1 tablet (20 mEq total) by mouth daily   tamsulosin (FLOMAX) 0.4 mg   No No   Sig: Take 1 capsule (0.4 mg total) by mouth daily with dinner   thiamine 100 MG tablet   No No   Sig: Take 1 tablet (100 mg total) by mouth daily    vitamin E, tocopherol, 400 units capsule  Self Yes No   Sig: Take 400 Units by mouth daily      Facility-Administered Medications: None     Patient's Medications   Discharge Prescriptions    No medications on file     No discharge procedures on file.  ED SEPSIS DOCUMENTATION   Time reflects when diagnosis was documented in both MDM as applicable and the Disposition within this note       Time User Action Codes Description Comment    3/13/2025  4:03 PM Demar Pereyra [A41.9] Sepsis (HCC)     3/13/2025  4:04 PM Demar Pereyra [L03.90] Cellulitis                  Demar Pereyra Jr., DO  03/13/25 1605

## 2025-03-13 NOTE — RESPIRATORY THERAPY NOTE
RT Protocol Note  Nadeem Purdy Jr. 69 y.o. male MRN: [id number]  Unit/Bed#: -01 Encounter: [id number]    Assessment    Principal Problem:    Sepsis without acute organ dysfunction ([facility])  Active Problems:    Essential hypertension    Hypercholesteremia    Class 3 severe obesity due to excess calories with serious comorbidity and body mass index (BMI) of 40.0 to 44.9 in adult ([facility])    Stage 3 chronic kidney disease, unspecified whether stage 3a or 3b CKD ([facility])    Type 2 diabetes mellitus with hyperglycemia, with long-term current use of insulin ([facility])    Cellulitis of lower extremity    Urinary retention    Streptococcal bacteremia    Chronic anemia      Home Pulmonary Medications:  none       Past Medical History:   Diagnosis Date    Clostridium difficile diarrhea 02/27/2025    Elevated troponin 02/27/2025    Group A strep bacteremia 02/27/2025    Hypertension      Social History     Socioeconomic History    Marital status: /Civil Union     Spouse name: None    Number of children: None    Years of education: None    Highest education level: None   Occupational History    None   Tobacco Use    Smoking status: Former    Smokeless tobacco: Never   Vaping Use    Vaping status: Never Used   Substance and Sexual Activity    Alcohol use: Yes     Alcohol/week: 4.0 standard drinks of alcohol     Types: 4 Shots of liquor per week    Drug use: Never    Sexual activity: None   Other Topics Concern    None   Social History Narrative    None     Social Drivers of Health     Financial Resource Strain: Low Risk  (9/28/2023)    Overall Financial Resource Strain (CARDIA)     Difficulty of Paying Living Expenses: Not hard at all   Food Insecurity: No Food Insecurity (3/13/2025)    Nursing - Inadequate Food Risk Classification     Worried About Running Out of Food in the Last Year: Never true     Ran Out of Food in the Last Year: Never true     Ran Out of Food in the Last Year: Never true   Transportation Needs: No  "Transportation Needs (3/13/2025)    Nursing - Transportation Risk Classification     Lack of Transportation: Not on file     Lack of Transportation: No   Physical Activity: Not on file   Stress: Not on file   Social Connections: Not on file   Intimate Partner Violence: Unknown (3/13/2025)    Nursing IPS     Feels Physically and Emotionally Safe: Not on file     Physically Hurt by Someone: Not on file     Humiliated or Emotionally Abused by Someone: Not on file     Physically Hurt by Someone: No     Hurt or Threatened by Someone: No   Housing Stability: Unknown (3/13/2025)    Nursing: Inadequate Housing Risk Classification     Has Housing: Not on file     Worried About Losing Housing: Not on file     Unable to Get Utilities: Not on file     Unable to Pay for Housing in the Last Year: No     Has Housin       Subjective         Objective    Physical Exam:   Assessment Type: Assess only  General Appearance: Awake  Respiratory Pattern: Normal  Chest Assessment: Chest expansion symmetrical  Bilateral Breath Sounds: Clear    Vitals:  Blood pressure 136/71, pulse 103, temperature (!) 101.9 °F (38.8 °C), temperature source Oral, resp. rate 19, height 5' 11\" (1.803 m), weight 135 kg (298 lb 1 oz), SpO2 97%.          Imaging and other studies: Results Review Statement: I reviewed radiology reports from this admission including: chest xray.          Plan    Respiratory Plan: Discontinue Protocol        Resp Comments: Pt admitted with fever, COVID, flu, and RSV negative. Pt on 2lnc with clear BS. No pulmonary hx, no home bronchodilator therapy, CXR shows clear lung. No therapy indicated at this time.   "

## 2025-03-13 NOTE — PLAN OF CARE
Problem: PAIN - ADULT  Goal: Verbalizes/displays adequate comfort level or baseline comfort level  Description: Interventions:  - Encourage patient to monitor pain and request assistance  - Assess pain using appropriate pain scale  - Administer analgesics based on type and severity of pain and evaluate response  - Implement non-pharmacological measures as appropriate and evaluate response  - Consider cultural and social influences on pain and pain management  - Notify physician/advanced practitioner if interventions unsuccessful or patient reports new pain  Outcome: Progressing     Problem: INFECTION - ADULT  Goal: Absence or prevention of progression during hospitalization  Description: INTERVENTIONS:  - Assess and monitor for signs and symptoms of infection  - Monitor lab/diagnostic results  - Monitor all insertion sites, i.e. indwelling lines, tubes, and drains  - Monitor endotracheal if appropriate and nasal secretions for changes in amount and color  - [place] appropriate cooling/warming therapies per order  - Administer medications as ordered  - Instruct and encourage patient and family to use good hand hygiene technique  - Identify and instruct in appropriate isolation precautions for identified infection/condition  Outcome: Progressing     Problem: SAFETY ADULT  Goal: Maintain or return to baseline ADL function  Description: INTERVENTIONS:  -  Assess patient's ability to carry out ADLs; assess patient's baseline for ADL function and identify physical deficits which impact ability to perform ADLs (bathing, care of mouth/teeth, toileting, grooming, dressing, etc.)  - Assess/evaluate cause of self-care deficits   - Assess range of motion  - Assess patient's mobility; develop plan if impaired  - Assess patient's need for assistive devices and provide as appropriate  - Encourage maximum independence but intervene and supervise when necessary  - Involve family in performance of ADLs  - Assess for home care  needs following discharge   - Consider OT consult to assist with ADL evaluation and planning for discharge  - Provide patient education as appropriate  Outcome: Progressing

## 2025-03-13 NOTE — ASSESSMENT & PLAN NOTE
Patient with recent hospitalization discharged 3/11 to skilled nursing facility-was noted to have streptococcal bacteremia felt likely related to bilateral lower extremity cellulitis.  Patient was discharged on Duricef as recommended by the infectious disease specialist who followed.  Presented to the ED with rigors.  Met sepsis criteria with tachycardia, tachypnea, fever, leukocytosis with bilateral lower extremity cellulitis  See plan for sepsis  Elevate legs in bed  Consult wound nurse

## 2025-03-13 NOTE — ASSESSMENT & PLAN NOTE
History of streptococcal bacteremia on recent hospitalization.  Discharged to skilled facility 3/11 on Duricef per recommendations.  Presented today with fever/rigors.  See plan for sepsis

## 2025-03-13 NOTE — ASSESSMENT & PLAN NOTE
Patient with recent admission 2/26 through 3/11 at this campus.  Initial presentation was DKA, also noted to be septic in setting of bilateral lower extremity cellulitis with streptococcal bacteremia.  ID followed patient throughout the hospitalization.  Follow-up blood cultures were negative.  Patient was discharged to skilled nursing facility 3/11 on Duricef as recommended with plan to continue through 3/16  Patient was sent from skilled nursing facility to ED for fever/rigors  Patient met sepsis criteria on arrival with fever, tachycardia, tachypnea, and leukocytosis with bilateral lower extremity cellulitis  Blood cultures x 2 obtained in the ED  COVID/flu/RSV negative  Procalcitonin 0.10  Lactic acid 1.6  UA negative  WBC 14.03  Chest x-ray no acute cardiopulmonary disease  Received 1 L IV fluids in the ED-will not give fluids per sepsis protocol due to concern for volume overload  Received vancomycin in the ED-will continue on the [facility] floor  Consult pharmacy  Adding cefepime  Tylenol as needed for fever  Continue to trend fever and procalcitonin  CBC, procalcitonin a.m.

## 2025-03-13 NOTE — PROGRESS NOTES
Nadeem Purdy Jr. is a 69 y.o. male who is currently ordered Vancomycin IV with management by the Pharmacy Consult service.  Relevant clinical data and objective / subjective history reviewed.  Vancomycin Assessment:  Indication and Goal AUC/Trough: Soft tissue (goal -600, trough >10)  Clinical Status: stable  Micro:   Cx pending  Renal Function:  SCr: 2.3 mg/dL  CrCl: 41.9 mL/min  Renal replacement: Not on dialysis  Days of Therapy: 1  Current Dose: new dose  Vancomycin Plan:  New Dosin mg LD then 1000 mg IV q24  Estimated AUC: 475 mcg*hr/mL  Estimated Trough: 15.6 mcg/mL  Next Level: 3/16 am  Renal Function Monitoring: Daily BMP and UOP  Pharmacy will continue to follow closely for s/sx of nephrotoxicity, infusion reactions and appropriateness of therapy.  BMP and CBC will be ordered per protocol. We will continue to follow the patient’s culture results and clinical progress daily.    Richard Corona, Pharmacist

## 2025-03-13 NOTE — ASSESSMENT & PLAN NOTE
Baseline hemoglobin appears to be around 10  Hemoglobin on arrival 9.2  No signs of bleeding  Did receive 1 L IV fluids in the ED  Continue to monitor  Continue prehospital folic acid  CBC a.m.

## 2025-03-13 NOTE — H&P
H&P - Hospitalist   Name: Nadeem Purdy Jr. 69 y.o. male I MRN: [id number]  Unit/Bed#: -01 I Date of Admission: 3/13/2025   Date of Service: 3/13/2025 I Hospital Day: 0     Assessment & Plan  Sepsis without acute organ dysfunction (HCC)  Patient with recent admission 2/26 through 3/11 at this campus.  Initial presentation was DKA, also noted to be septic in setting of bilateral lower extremity cellulitis with streptococcal bacteremia.  ID followed patient throughout the hospitalization.  Follow-up blood cultures were negative.  Patient was discharged to skilled nursing facility 3/11 on Duricef as recommended with plan to continue through 3/16  Patient was sent from skilled nursing facility to ED for fever/rigors  Patient met sepsis criteria on arrival with fever, tachycardia, tachypnea, and leukocytosis with bilateral lower extremity cellulitis  Blood cultures x 2 obtained in the ED  COVID/flu/RSV negative  Procalcitonin 0.10  Lactic acid 1.6  UA negative  WBC 14.03  Chest x-ray no acute cardiopulmonary disease  Received 1 L IV fluids in the ED-will not give fluids per sepsis protocol due to concern for volume overload  Received vancomycin in the ED-will continue on the Medr floor  Consult pharmacy  Adding cefepime  Tylenol as needed for fever  Continue to trend fever and procalcitonin  CBC, procalcitonin a.m.  Essential hypertension  See plan for sepsis  Blood pressure is currently well-controlled  Continue prehospital antihypertensives with hold parameters  Holding Lasix for now  Monitor blood pressure per protocol  Hypercholesteremia  Continue prehospital statin  Class 3 severe obesity due to excess calories with serious comorbidity and body mass index (BMI) of 40.0 to 44.9 in adult ([facility])  BMI 41.57  Counseled on diet, exercise, lifestyle changes  Stage 3 chronic kidney disease, unspecified whether stage 3a or 3b CKD ([facility])  Lab Results   Component Value Date    EGFR 27 03/13/2025    EGFR 25 03/11/2025     EGFR 21 03/10/2025    CREATININE 2.30 (H) 03/13/2025    CREATININE 2.45 (H) 03/11/2025    CREATININE 2.79 (H) 03/10/2025     Patient recent hospitalization discharge 3/11-was evaluated by nephrology during that hospitalization for LUIS ALFREDO.  Creatinine was continuing to improve.  At time of discharge it was recommended to continue patient's prehospital diuretic regimen.  Losartan was to be held until creatinine improves closer to baseline.  Patient presented with sepsis see plan for same  Poor appetite at this time.  Appears euvolemic on exam  We will continue to hold diuretics for now, hold losartan  Avoid nephrotoxins and hypotension  BMP a.m.  Type 2 diabetes mellitus with hyperglycemia, with long-term current use of insulin ([facility])  Lab Results   Component Value Date    HGBA1C 8.9 (H) 02/26/2025       Recent Labs     03/10/25  2046 03/11/25  0657 03/11/25  1110 03/13/25  1505   POCGLU 210* 155* 127 114       Blood Sugar Average: Last 72 hrs:  (P) 114    Blood sugar inpatient 140-180  Given poor appetite we will hold prehospital 70/30 insulin for now  Initiate SSI ACHS  Diabetic low-salt diet  Hypoglycemia protocol  BMP a.m.  Cellulitis of lower extremity  Patient with recent hospitalization discharged 3/11 to skilled nursing facility-was noted to have streptococcal bacteremia felt likely related to bilateral lower extremity cellulitis.  Patient was discharged on Duricef as recommended by the infectious disease specialist who followed.  Presented to the ED with rigors.  Met sepsis criteria with tachycardia, tachypnea, fever, leukocytosis with bilateral lower extremity cellulitis  See plan for sepsis  Elevate legs in bed  Consult wound nurse  Urinary retention  History of urinary retention  Prehospital Flomax  Monitor urinary outputs  Retention protocol  Streptococcal bacteremia  History of streptococcal bacteremia on recent hospitalization.  Discharged to skilled facility 3/11 on Duricef per recommendations.  Presented  today with fever/rigors.  See plan for sepsis  Chronic anemia  Baseline hemoglobin appears to be around 10  Hemoglobin on arrival 9.2  No signs of bleeding  Did receive 1 L IV fluids in the ED  Continue to monitor  Continue prehospital folic acid  CBC a.m.      VTE Pharmacologic Prophylaxis: VTE Score: 8 High Risk (Score >/= 5) - Pharmacological DVT Prophylaxis Ordered: heparin. Sequential Compression Devices Ordered.  Code Status: Level 3 - DNAR and DNI   Discussion with family: Updated  (wife) via phone.    Anticipated Length of Stay: Patient will be admitted on an inpatient basis with an anticipated length of stay of greater than 2 midnights secondary to sepsis requiring IV antibiotics, ID consult, close monitoring of fluid status,.    History of Present Illness   Chief Complaint: Fever    Nadeem Purdy Jr. is a 69 y.o. male with a PMH of essential hypertension, hypercholesteremia, obesity, stage III chronic kidney disease, type 2 diabetes on insulin, urinary retention, and anemia who presented from skilled nursing facility today due to fever.  Of note, patient had recent extended hospitalization at this facility from 2/26 to 3/11 treated for sepsis bacteremia felt related to bilateral lower extremity cellulitis.  ID followed patient throughout and patient was discharged on Duricef based on sensitivities on 3/11.  Patient met sepsis criteria on arrival with fever, tachycardia, tachypnea, and leukocytosis in setting of bilateral lower extremity cellulitis.  He received 1 L of IV fluids in the ED and a dose of vancomycin.  Given concern for possible volume overload will not initiate full sepsis IV fluid resuscitation.  Will continue vancomycin on the floor as well as initiate cefepime.  Will place consult to ID.  Patient is being admitted to [facility] service for further management of sepsis.    Review of Systems   Constitutional:  Positive for activity change, appetite change, chills and fever.   HENT:  Negative.     Eyes: Negative.    Respiratory: Negative.  Negative for shortness of breath and wheezing.    Cardiovascular: Negative.  Negative for chest pain and palpitations.   Gastrointestinal: Negative.  Negative for diarrhea, nausea and vomiting.   Endocrine: Negative.    Genitourinary: Negative.  Negative for dysuria, frequency, hematuria and urgency.   Musculoskeletal:  Positive for myalgias.   Skin: Negative.    Allergic/Immunologic: Negative.    Neurological:  Negative for dizziness and light-headedness.   Hematological: Negative.    Psychiatric/Behavioral: Negative.         Historical Information   Past Medical History:   Diagnosis Date    Clostridium difficile diarrhea 02/27/2025    Elevated troponin 02/27/2025    Group A strep bacteremia 02/27/2025    Hypertension      Past Surgical History:   Procedure Laterality Date    CATARACT EXTRACTION, BILATERAL Bilateral      Social History     Tobacco Use    Smoking status: Former    Smokeless tobacco: Never   Vaping Use    Vaping status: Never Used   Substance and Sexual Activity    Alcohol use: Yes     Alcohol/week: 4.0 standard drinks of alcohol     Types: 4 Shots of liquor per week    Drug use: Never    Sexual activity: Not on file     E-Cigarette/Vaping    E-Cigarette Use Never User      E-Cigarette/Vaping Substances    Nicotine No     THC No     CBD No     Flavoring No     Other No     Unknown No      History reviewed. No pertinent family history.  Social History:  Marital Status: /Civil Union   Occupation: Retired  Patient Pre-hospital Living Situation: Skilled Nursing Facility: [facility]  Patient Pre-hospital Level of Mobility:  Unclear I can not clarify with patient  Patient Pre-hospital Diet Restrictions: Diabetic    Meds/Allergies   I have reveiwed home medications using records provided by SNF.  Prior to Admission medications    Medication Sig Start Date End Date Taking? Authorizing Provider   amLODIPine (NORVASC) 10 mg tablet Take 1 tablet (10  mg total) by mouth daily 7/11/24  Yes Jluis Glass MD   aspirin (ECOTRIN LOW STRENGTH) 81 mg EC tablet Take 81 mg by mouth daily   Yes Historical Provider, MD   atorvastatin (LIPITOR) 40 mg tablet Take 1 tablet (40 mg total) by mouth daily 7/11/24  Yes Jluis Glass MD   Azelastine HCl 137 MCG/SPRAY SOLN 1 puff each nostril twice a day 11/6/24  Yes Jluis Glass MD   BD Pen Needle Katelyn 2nd Gen 32G X 4 MM MISC INJECT AS DIRECTED 2 (TWO) TIMES A DAY 2/17/25  Yes Jluis Glass MD   cefadroxil (DURICEF) 500 mg capsule Take 2 capsules (1,000 mg total) by mouth every 12 (twelve) hours for 9 doses 3/11/25 3/16/25 Yes Zeyad Orellana PA-C   Cholecalciferol (VITAMIN D3) 2000 units TABS Take 2,000 Units by mouth daily   Yes Historical Provider, MD   folic acid (FOLVITE) 1 mg tablet Take 1 tablet (1 mg total) by mouth daily 3/12/25  Yes Zeyad Orellana PA-C   furosemide (LASIX) 40 mg tablet Take 1 tablet (40 mg total) by mouth 2 (two) times a day 3/11/25  Yes Zeyad Orellana PA-C   gabapentin (NEURONTIN) 100 mg capsule Take 2 capsules (200 mg total) by mouth daily at bedtime 2/7/25  Yes Jluis Glass MD   Glucose Blood (ONETOUCH ULTRA BLUE VI) 1 Units by In Vitro route 3 (three) times a day   Yes Historical Provider, MD   glucose blood (OneTouch Ultra) test strip Test 3 times daily 3/19/21  Yes Paresh Alicea MD   insulin aspart protamine-insulin aspart (NovoLOG 70/30) 100 units/mL injection Inject 32 Units under the skin 2 (two) times a day before meals 3/11/25  Yes Zeyad Orellana PA-C   Krill Oil 1000 MG CAPS Take 1 capsule by mouth daily   Yes Historical Provider, MD   losartan (COZAAR) 100 MG tablet TAKE 1 TABLET BY MOUTH EVERY DAY 2/24/25  Yes Jluis Glass MD   melatonin 3 mg Take 2 tablets (6 mg total) by mouth daily at bedtime 3/11/25  Yes Zeyad Orellana PA-C   metoprolol tartrate (LOPRESSOR) 50 mg tablet Take 1 tablet (50 mg total) by mouth every 12 (twelve) hours 3/11/25  Yes  Zeyad Orellana PA-C   potassium chloride (Klor-Con M20) 20 mEq tablet Take 1 tablet (20 mEq total) by mouth daily 2/7/25  Yes Jluis Glass MD   tamsulosin (FLOMAX) 0.4 mg Take 1 capsule (0.4 mg total) by mouth daily with dinner 3/11/25  Yes Zeyad Orellana PA-C   thiamine 100 MG tablet Take 1 tablet (100 mg total) by mouth daily 3/12/25  Yes Zeyad Orellana PA-C   vitamin E, tocopherol, 400 units capsule Take 400 Units by mouth daily   Yes Historical Provider, MD     No Known Allergies    Objective :  Temp:  [100.8 °F (38.2 °C)-101.9 °F (38.8 °C)] 101.9 °F (38.8 °C)  HR:  [] 103  BP: (129-155)/(57-72) 136/71  Resp:  [19-26] 19  SpO2:  [92 %-97 %] 95 %  O2 Device: Nasal cannula  Nasal Cannula O2 Flow Rate (L/min):  [2 L/min] 2 L/min    Physical Exam  Vitals reviewed.   Constitutional:       General: He is not in acute distress.     Appearance: He is well-developed. He is obese.   HENT:      Head: Normocephalic and atraumatic.   Cardiovascular:      Rate and Rhythm: Normal rate and regular rhythm.      Heart sounds: No murmur heard.  Pulmonary:      Effort: Pulmonary effort is normal. No respiratory distress.      Breath sounds: Normal breath sounds. No wheezing or rales.   Abdominal:      General: Bowel sounds are normal. There is no distension.      Palpations: Abdomen is soft.      Tenderness: There is no abdominal tenderness. There is no guarding.   Musculoskeletal:         General: Swelling present.      Right lower leg: Edema present.      Left lower leg: Edema present.      Comments: Chronic lymphedema   Skin:     General: Skin is warm and dry.      Capillary Refill: Capillary refill takes less than 2 seconds.   Neurological:      General: No focal deficit present.      Mental Status: He is alert. Mental status is at baseline.   Psychiatric:         Mood and Affect: Mood normal.         Thought Content: Thought content normal.          Lines/Drains:            Lab Results: I have reviewed the  following results:  Results from last 7 days   Lab Units 03/13/25  1443 03/08/25  0443 03/07/25  0523   WBC Thousand/uL 14.03*   < > 19.77*   HEMOGLOBIN g/dL 9.2*   < > 10.7*   HEMATOCRIT % 27.9*   < > 32.9*   PLATELETS Thousands/uL 256   < > 399*   BANDS PCT %  --   --  4   SEGS PCT % 81*   < >  --    LYMPHO PCT % 9*   < > 13*   MONO PCT % 5   < > 7   EOS PCT % 3   < > 3    < > = values in this interval not displayed.     Results from last 7 days   Lab Units 03/13/25  1443   SODIUM mmol/L 134*   POTASSIUM mmol/L 3.9   CHLORIDE mmol/L 99   CO2 mmol/L 29   BUN mg/dL 34*   CREATININE mg/dL 2.30*   ANION GAP mmol/L 6   CALCIUM mg/dL 8.5   ALBUMIN g/dL 2.7*   TOTAL BILIRUBIN mg/dL 0.51   ALK PHOS U/L 32*   ALT U/L 23   AST U/L 21   GLUCOSE RANDOM mg/dL 111     Results from last 7 days   Lab Units 03/13/25  1443   INR  1.25*     Results from last 7 days   Lab Units 03/13/25  1505 03/11/25  1110 03/11/25  0657 03/10/25  2046 03/10/25  1600 03/10/25  1048 03/10/25  0739 03/09/25  2029 03/09/25  1547 03/09/25  1049 03/09/25  0702 03/08/25 2039   POC GLUCOSE mg/dl 114 127 155* 210* 187* 242* 160* 268* 227* 251* 195* 285*     Lab Results   Component Value Date    HGBA1C 8.9 (H) 02/26/2025    HGBA1C 8.4 (H) 02/03/2025    HGBA1C 7.7 (H) 07/08/2024     Results from last 7 days   Lab Units 03/13/25  1443   LACTIC ACID mmol/L 1.6   PROCALCITONIN ng/ml 0.10       Imaging Results Review: I reviewed radiology reports from this admission including: Right, bilateral tib-fib x-rays.  Other Study Results Review: No additional pertinent studies reviewed.    Administrative Statements   I have spent a total time of 55 minutes in caring for this patient on the day of the visit/encounter including Patient and family education, Counseling / Coordination of care, Documenting in the medical record, Reviewing/placing orders in the medical record (including tests, medications, and/or procedures), Obtaining or reviewing history  , and  Communicating with other healthcare professionals .    ** Please Note: This note has been constructed using a voice recognition system. **

## 2025-03-13 NOTE — ASSESSMENT & PLAN NOTE
Lab Results   Component Value Date    EGFR 27 03/13/2025    EGFR 25 03/11/2025    EGFR 21 03/10/2025    CREATININE 2.30 (H) 03/13/2025    CREATININE 2.45 (H) 03/11/2025    CREATININE 2.79 (H) 03/10/2025     Patient recent hospitalization discharge 3/11-was evaluated by nephrology during that hospitalization for LUIS ALFREDO.  Creatinine was continuing to improve.  At time of discharge it was recommended to continue patient's prehospital diuretic regimen.  Losartan was to be held until creatinine improves closer to baseline.  Patient presented with sepsis see plan for same  Poor appetite at this time.  Appears euvolemic on exam  We will continue to hold diuretics for now, hold losartan  Avoid nephrotoxins and hypotension  BMP a.m.

## 2025-03-14 ENCOUNTER — APPOINTMENT (INPATIENT)
Dept: CT IMAGING | Facility: HOSPITAL | Age: 70
DRG: 864 | End: 2025-03-14
Payer: MEDICARE

## 2025-03-14 PROBLEM — R19.7 DIARRHEA: Status: ACTIVE | Noted: 2025-03-14

## 2025-03-14 LAB
ALBUMIN SERPL BCG-MCNC: 2.5 G/DL (ref 3.5–5)
ALP SERPL-CCNC: 29 U/L (ref 34–104)
ALT SERPL W P-5'-P-CCNC: 20 U/L (ref 7–52)
ANION GAP SERPL CALCULATED.3IONS-SCNC: 9 MMOL/L (ref 4–13)
AST SERPL W P-5'-P-CCNC: 18 U/L (ref 13–39)
BASOPHILS # BLD AUTO: 0.1 THOUSANDS/ÂΜL (ref 0–0.1)
BASOPHILS NFR BLD AUTO: 1 % (ref 0–1)
BILIRUB SERPL-MCNC: 0.54 MG/DL (ref 0.2–1)
BUN SERPL-MCNC: 29 MG/DL (ref 5–25)
CALCIUM ALBUM COR SERPL-MCNC: 9.3 MG/DL (ref 8.3–10.1)
CALCIUM SERPL-MCNC: 8.1 MG/DL (ref 8.4–10.2)
CHLORIDE SERPL-SCNC: 100 MMOL/L (ref 96–108)
CO2 SERPL-SCNC: 24 MMOL/L (ref 21–32)
CREAT SERPL-MCNC: 2.18 MG/DL (ref 0.6–1.3)
EOSINOPHIL # BLD AUTO: 0.27 THOUSAND/ÂΜL (ref 0–0.61)
EOSINOPHIL NFR BLD AUTO: 2 % (ref 0–6)
ERYTHROCYTE [DISTWIDTH] IN BLOOD BY AUTOMATED COUNT: 13.3 % (ref 11.6–15.1)
GFR SERPL CREATININE-BSD FRML MDRD: 29 ML/MIN/1.73SQ M
GLUCOSE SERPL-MCNC: 153 MG/DL (ref 65–140)
GLUCOSE SERPL-MCNC: 184 MG/DL (ref 65–140)
GLUCOSE SERPL-MCNC: 206 MG/DL (ref 65–140)
GLUCOSE SERPL-MCNC: 229 MG/DL (ref 65–140)
GLUCOSE SERPL-MCNC: 271 MG/DL (ref 65–140)
HCT VFR BLD AUTO: 27.5 % (ref 36.5–49.3)
HGB BLD-MCNC: 8.9 G/DL (ref 12–17)
IMM GRANULOCYTES # BLD AUTO: 0.06 THOUSAND/UL (ref 0–0.2)
IMM GRANULOCYTES NFR BLD AUTO: 1 % (ref 0–2)
LYMPHOCYTES # BLD AUTO: 1.43 THOUSANDS/ÂΜL (ref 0.6–4.47)
LYMPHOCYTES NFR BLD AUTO: 12 % (ref 14–44)
MAGNESIUM SERPL-MCNC: 1.4 MG/DL (ref 1.9–2.7)
MCH RBC QN AUTO: 30.9 PG (ref 26.8–34.3)
MCHC RBC AUTO-ENTMCNC: 32.4 G/DL (ref 31.4–37.4)
MCV RBC AUTO: 96 FL (ref 82–98)
MONOCYTES # BLD AUTO: 0.67 THOUSAND/ÂΜL (ref 0.17–1.22)
MONOCYTES NFR BLD AUTO: 6 % (ref 4–12)
NEUTROPHILS # BLD AUTO: 9.18 THOUSANDS/ÂΜL (ref 1.85–7.62)
NEUTS SEG NFR BLD AUTO: 78 % (ref 43–75)
NRBC BLD AUTO-RTO: 0 /100 WBCS
PHOSPHATE SERPL-MCNC: 3.8 MG/DL (ref 2.3–4.1)
PLATELET # BLD AUTO: 244 THOUSANDS/UL (ref 149–390)
PMV BLD AUTO: 8.3 FL (ref 8.9–12.7)
POTASSIUM SERPL-SCNC: 3.8 MMOL/L (ref 3.5–5.3)
PROCALCITONIN SERPL-MCNC: 0.19 NG/ML
PROT SERPL-MCNC: 7.2 G/DL (ref 6.4–8.4)
RBC # BLD AUTO: 2.88 MILLION/UL (ref 3.88–5.62)
SODIUM SERPL-SCNC: 133 MMOL/L (ref 135–147)
WBC # BLD AUTO: 11.71 THOUSAND/UL (ref 4.31–10.16)

## 2025-03-14 PROCEDURE — 85025 COMPLETE CBC W/AUTO DIFF WBC: CPT

## 2025-03-14 PROCEDURE — 99232 SBSQ HOSP IP/OBS MODERATE 35: CPT | Performed by: PHYSICIAN ASSISTANT

## 2025-03-14 PROCEDURE — 99223 1ST HOSP IP/OBS HIGH 75: CPT | Performed by: INTERNAL MEDICINE

## 2025-03-14 PROCEDURE — 83735 ASSAY OF MAGNESIUM: CPT

## 2025-03-14 PROCEDURE — 74176 CT ABD & PELVIS W/O CONTRAST: CPT

## 2025-03-14 PROCEDURE — 84100 ASSAY OF PHOSPHORUS: CPT

## 2025-03-14 PROCEDURE — 84145 PROCALCITONIN (PCT): CPT

## 2025-03-14 PROCEDURE — G0545 PR INHERENT VISIT TO INPT: HCPCS | Performed by: INTERNAL MEDICINE

## 2025-03-14 PROCEDURE — 82948 REAGENT STRIP/BLOOD GLUCOSE: CPT

## 2025-03-14 PROCEDURE — 71250 CT THORAX DX C-: CPT

## 2025-03-14 PROCEDURE — 80053 COMPREHEN METABOLIC PANEL: CPT

## 2025-03-14 RX ORDER — MAGNESIUM SULFATE HEPTAHYDRATE 40 MG/ML
4 INJECTION, SOLUTION INTRAVENOUS ONCE
Status: COMPLETED | OUTPATIENT
Start: 2025-03-14 | End: 2025-03-14

## 2025-03-14 RX ADMIN — MAGNESIUM SULFATE HEPTAHYDRATE 4 G: 40 INJECTION, SOLUTION INTRAVENOUS at 09:39

## 2025-03-14 RX ADMIN — VANCOMYCIN HYDROCHLORIDE 1000 MG: 1 INJECTION, SOLUTION INTRAVENOUS at 06:32

## 2025-03-14 RX ADMIN — ASPIRIN 81 MG: 81 TABLET, COATED ORAL at 09:39

## 2025-03-14 RX ADMIN — CHOLECALCIFEROL TAB 25 MCG (1000 UNIT) 2000 UNITS: 25 TAB at 09:39

## 2025-03-14 RX ADMIN — INSULIN LISPRO 4 UNITS: 100 INJECTION, SOLUTION INTRAVENOUS; SUBCUTANEOUS at 12:02

## 2025-03-14 RX ADMIN — FOLIC ACID 1 MG: 1 TABLET ORAL at 09:39

## 2025-03-14 RX ADMIN — METOPROLOL TARTRATE 50 MG: 50 TABLET, FILM COATED ORAL at 21:29

## 2025-03-14 RX ADMIN — ATORVASTATIN CALCIUM 40 MG: 40 TABLET, FILM COATED ORAL at 09:39

## 2025-03-14 RX ADMIN — CEFEPIME 2000 MG: 2 INJECTION, POWDER, FOR SOLUTION INTRAVENOUS at 18:38

## 2025-03-14 RX ADMIN — HEPARIN SODIUM 7500 UNITS: 5000 INJECTION INTRAVENOUS; SUBCUTANEOUS at 14:10

## 2025-03-14 RX ADMIN — HEPARIN SODIUM 7500 UNITS: 5000 INJECTION INTRAVENOUS; SUBCUTANEOUS at 05:36

## 2025-03-14 RX ADMIN — CEFEPIME 2000 MG: 2 INJECTION, POWDER, FOR SOLUTION INTRAVENOUS at 05:36

## 2025-03-14 RX ADMIN — Medication 400 UNITS: at 09:39

## 2025-03-14 RX ADMIN — TAMSULOSIN HYDROCHLORIDE 0.4 MG: 0.4 CAPSULE ORAL at 16:54

## 2025-03-14 RX ADMIN — AZELASTINE HYDROCHLORIDE 1 SPRAY: 137 SPRAY, METERED NASAL at 09:41

## 2025-03-14 RX ADMIN — METOPROLOL TARTRATE 50 MG: 50 TABLET, FILM COATED ORAL at 09:39

## 2025-03-14 RX ADMIN — INSULIN LISPRO 2 UNITS: 100 INJECTION, SOLUTION INTRAVENOUS; SUBCUTANEOUS at 07:56

## 2025-03-14 RX ADMIN — HEPARIN SODIUM 7500 UNITS: 5000 INJECTION INTRAVENOUS; SUBCUTANEOUS at 21:30

## 2025-03-14 RX ADMIN — GABAPENTIN 200 MG: 100 CAPSULE ORAL at 21:29

## 2025-03-14 RX ADMIN — INSULIN LISPRO 6 UNITS: 100 INJECTION, SOLUTION INTRAVENOUS; SUBCUTANEOUS at 16:54

## 2025-03-14 RX ADMIN — THIAMINE HCL TAB 100 MG 100 MG: 100 TAB at 09:39

## 2025-03-14 RX ADMIN — INSULIN LISPRO 4 UNITS: 100 INJECTION, SOLUTION INTRAVENOUS; SUBCUTANEOUS at 21:29

## 2025-03-14 RX ADMIN — AMLODIPINE BESYLATE 10 MG: 10 TABLET ORAL at 09:39

## 2025-03-14 NOTE — ASSESSMENT & PLAN NOTE
Patient reported 4 episodes of diarrhea since last night  Patient does have history of Cdiff  Will check Cdiff toxin by PCR with EIA and stool enteric bacterial panel by PCR

## 2025-03-14 NOTE — ASSESSMENT & PLAN NOTE
Recent admission with LUIS ALFREDO on CKD. Baseline Cr previously 1.1-1.3. LUIS ALFREDO was most likely due to ischemic ATN in the setting of severe sepsis and hemodynamic instability, complicated by vasomotor dysfunction from ARB and urinary retention. Cr continues to improve.  We will renally dose antibiotics as needed.  Recommend ongoing nephrology follow-up in the outpatient setting.  Will continue with serial labs while inpatient.

## 2025-03-14 NOTE — ASSESSMENT & PLAN NOTE
Lab Results   Component Value Date    HGBA1C 8.9 (H) 02/26/2025       Recent Labs     03/13/25  1819 03/13/25 2035 03/14/25  0726 03/14/25  1103   POCGLU 107 137 184* 206*       Blood Sugar Average: Last 72 hrs:  (P) 149.6    Blood sugar inpatient 140-180  Given poor appetite we will hold prehospital 70/30 insulin for now  Initiate SSI ACHS  Diabetic low-salt diet  Hypoglycemia protocol  BMP abeatriz

## 2025-03-14 NOTE — ASSESSMENT & PLAN NOTE
Recent admission to ICU for DKA. HA1C 8.9%. BS improved so far this admission compared to last. Recommend ongoing tight glycemic control.

## 2025-03-14 NOTE — CONSULTS
Consultation - Infectious Disease   Name: Nadeem Purdy Jr. 69 y.o. male I MRN: [id number]  Unit/Bed#: -01 I Date of Admission: 3/13/2025   Date of Service: 3/14/2025 I Hospital Day: 1     Inpatient consult to Infectious Diseases  Consult performed by: Lupillo Alicea PA-C  Consult ordered by: JIMMY Corrales        Physician Requesting Evaluation: Caprice Finnegan MD   Reason for Evaluation / Principal Problem: fevers     Assessment & Plan  Sepsis without acute organ dysfunction (HCC)  Patient presented from skilled nursing facility with fever and rigors.  Tmax 101.9 °F.  Leukocytosis is improving though appears chronic over the last few weeks given acute illness.  Unknown source.  Consider possibility of recurrent bacteremia.  During recent admission to ICU he did not require invasive lines. blood cultures x 2 are pending.  He recently had Ramirez catheter in place thus consider possibility of UTI however urine micro is benign.  COVID/flu/RSV negative.  Chest x-ray negative.  Consider possibility of occult source in chest, abdomen or pelvis.  Consider possibility of C. difficile given recent antibiotic exposure though so far only had 1 episode of diarrhea since admission.  Lower extremity cellulitis appears to be resolving. Denies joint or back pains. No soft tissue gas noted on x-rays.  Patient remains hemodynamically stable.  Continue IV vanco and cefepime for now   Rx consult for vanco trough management   Follow-up blood cultures  Recommend CT chest abdomen pelvis  Consider C. difficile testing should patient develop worsening diarrhea, abdominal pain  Daily CBC DIF and chemistry to monitor response to treatment and for developing toxicities  Trend temps and hemodynamics  Resolving cellulitis of left lower extremity  During recent admission for DKA, patient was found to have extensive left lower extremity cellulitis in the setting of nonhealing lower extremity wounds.  Wound culture  positive for strep pyogenes and MSSA and clinical findings suspicious of streptococcal cellulitis.  This continues to improve on physical exam.  Patient was due to complete antibiotic course tomorrow.  This appears to be resolving  Continue local wound care and serial skin exams  Streptococcal bacteremia  Diagnosed with group A strep bacteremia last admission, 1 of 2 sets of blood cultures isolated strep pyogenes.  Due to skin and soft tissue infection.  Plan was to continue p.o. cefadroxil 1 g every 12 to complete 2-week course through 3/15/2025.  Follow-up blood cultures this admission  If blood cultures positive, will tailor antibiotics to BC ID, obtain echocardiogram and repeat blood cultures  Diarrhea  Reports 1 episode of diarrhea since admission.  Consider possibility of chronic condition as he reported the same last admission.  Recent C. difficile PCR positive and EIA negative.  No prior history of C. difficile.  Primary prophylaxis has not been shown to be beneficial.  Will monitor stool output at this time and consider C. difficile testing should diarrhea persist.  Lymphedema of both lower extremities  History of bilateral lower extremity lymphedema with chronic wounds.  Remains a risk factor for infection, poor wound healing, and recurrent admissions for cellulitis.  Encourage compliance with lower extremity elevation, compression, diuresis.  Consider podiatry or wound care consultations.  Stage 3 chronic kidney disease, unspecified whether stage 3a or 3b CKD (HCC)  Recent admission with LUIS ALFREDO on CKD. Baseline Cr previously 1.1-1.3. LUIS ALFREDO was most likely due to ischemic ATN in the setting of severe sepsis and hemodynamic instability, complicated by vasomotor dysfunction from ARB and urinary retention. Cr continues to improve.  We will renally dose antibiotics as needed.  Recommend ongoing nephrology follow-up in the outpatient setting.  Will continue with serial labs while inpatient.  Type 2 diabetes mellitus  with hyperglycemia, with long-term current use of insulin (Formerly [facility])  Recent admission to ICU for DKA. HA1C 8.9%. BS improved so far this admission compared to last. Recommend ongoing tight glycemic control.   Class 3 severe obesity due to excess calories with serious comorbidity and body mass index (BMI) of 40.0 to 44.9 in adult (Formerly [facility])  BMI greater than 40. Recommend lifestyle modifications. Remains a risk factor for infection   Alcohol abuse  History of alcohol abuse, with self-reported 1 to 2 glasses of bourbon a day the last beverage was about 1 month prior to recent admission.  No need for CIWA protocol at this time.    I have discussed the above management plan in detail with the primary service.   Infectious Disease service will follow.    Antibiotics:  IV cefepime day 2  IV vancomycin day 2    History of Present Illness   Nadeem Purdy Jr. is a 69 y.o. year old male who 3/13 with complaints of fevers and rigors at nursing home.  Patient was a recent admission to [facility] 2/26/2025 to 3/11/2025 with altered mental status found to have diabetic ketoacidosis and admitted to the ICU.  Also found to have left lower extremity cellulitis and started on IV antibiotics which were broadened due to progressive findings.  No evidence of soft tissue gas on prior imaging of the lower extremity.  Infectious disease was following and had patient on IV antibiotics, including Ancef.  One of the 2 sets blood cultures were positive for strep pyogenes and he was ultimately discharged on 2-week course of p.o. Duricef through 3/15.  Patient states he is otherwise doing well at rehab, working on ambulating and using the wheelchair as needed.  States fevers and shaking chills just started yesterday.  States his left leg is improving but he still has some pruritus and discomfort due to the swelling and flaking of skin, wounds.  He denies any new sores on his bottom or feet.  Denies cough, shortness of breath, congestion or known  exposures to flu or COVID.  Viral testing in the ED has been negative.  He recently had a Ramirez catheter last admission.  Urinalysis normal and he denies any urinary complaints at this time.  He denies any abdominal pain, nausea or vomiting but does report 1 episode of loose stool this morning.  He has no intravascular or prosthetic devices in place.    A complete review of systems is negative other than that noted in the HPI.    Medical History Review: I have reviewed the patient's PMH, PSH, Social History, Family History, Meds, and Allergies     Objective :  Temp:  [98.9 °F (37.2 °C)-101.9 °F (38.8 °C)] 98.9 °F (37.2 °C)  HR:  [] 100  BP: (129-155)/(57-78) 149/78  Resp:  [18-26] 19  SpO2:  [92 %-98 %] 98 %  O2 Device: None (Room air)  Nasal Cannula O2 Flow Rate (L/min):  [2 L/min] 2 L/min    Physical exam:  General:  No acute distress, laying in bed watching television, morbidly obese  Psychiatric:  Awake and alert, pleasant and cooperative, flat affect  HEENT: Mucous membranes moist, neck supple, head normal cephalic  Cardiovascular: Mild tachycardia, no murmur  Pulmonary: Lungs clear bilaterally though decreased at bases likely due to body habitus, otherwise on room air without dyspnea  Abdomen:  Soft, nontender, obese abdomen  Extremities: Chronic lower extremity lymphedema with resolving left lower extremity cellulitis with flaking of the skin and superficial crusting over blisters medial upper thigh  Skin:  No rashes.  As above.      Lab Results: I have reviewed the following results:  Results from last 7 days   Lab Units 03/14/25 0448 03/13/25 1443 03/11/25  0546   WBC Thousand/uL 11.71* 14.03* 11.50*   HEMOGLOBIN g/dL 8.9* 9.2* 9.2*   PLATELETS Thousands/uL 244 256 265     Results from last 7 days   Lab Units 03/14/25 0448 03/13/25 1443 03/11/25  0546 03/11/25  0546   SODIUM mmol/L 133* 134*  --  134*   POTASSIUM mmol/L 3.8 3.9  --  3.7   CHLORIDE mmol/L 100 99  --  100   CO2 mmol/L 24 29  --  28    BUN mg/dL 29* 34*  --  53*   CREATININE mg/dL 2.18* 2.30*  --  2.45*   EGFR ml/min/1.73sq m 29 27  --  25   CALCIUM mg/dL 8.1* 8.5  --  8.2*   AST U/L 18 21  --   --    ALT U/L 20 23   < >  --    ALK PHOS U/L 29* 32*   < >  --    ALBUMIN g/dL 2.5* 2.7*   < >  --     < > = values in this interval not displayed.     Results from last 7 days   Lab Units 03/13/25  1443   BLOOD CULTURE  Received in Microbiology Lab. Culture in Progress.  Received in Microbiology Lab. Culture in Progress.     Results from last 7 days   Lab Units 03/14/25  0448 03/13/25  1443   PROCALCITONIN ng/ml 0.19 0.10                   Imaging Results Review: I reviewed radiology reports from this admission including: chest xray and xray(s).  Other Study Results Review: Other studies reviewed include: Pending micro

## 2025-03-14 NOTE — PROGRESS NOTES
Progress Note - Hospitalist   Name: Nadeem Purdy Jr. 69 y.o. male I MRN: [id number]  Unit/Bed#: -01 I Date of Admission: 3/13/2025   Date of Service: 3/14/2025 I Hospital Day: 1    Assessment & Plan  Sepsis without acute organ dysfunction (HCC)  Patient with recent admission 2/26 through 3/11 at this campus.  Initial presentation was DKA, also noted to be septic in setting of bilateral lower extremity cellulitis with streptococcal bacteremia.  ID followed patient throughout the hospitalization.  Follow-up blood cultures were negative.  Patient was discharged to skilled nursing facility 3/11 on Duricef as recommended with plan to continue through 3/16  Patient was sent from skilled nursing facility to ED for fever/rigors  Patient met sepsis criteria on arrival with fever, tachycardia, tachypnea, and leukocytosis   Blood cultures x 2 obtained in the ED  COVID/flu/RSV negative  Procalcitonin 0.10  Lactic acid 1.6  UA negative  WBC 14.03  Chest x-ray no acute cardiopulmonary disease  Received 1 L IV fluids in the ED-will not give fluids per sepsis protocol due to concern for volume overload  Received vancomycin in the ED-will continue on the MedSur floor  ID consultation appreciated   Source of sepsis unknown at this time   Continue IV vancomycin/cefepime for now.   Check CT chest/abd/pelvis without contrast given renal function  Tylenol as needed for fever  Patient reported 4 episodes of diarrhea since last night  Check Cdiff and stool enteric bacterial panel  Continue to trend fever and procalcitonin  CBC, procalcitonin a.m.  Essential hypertension  Blood pressure is currently well-controlled  Continue prehospital antihypertensives with hold parameters  Holding Lasix for now  Monitor blood pressure per protocol  Hypercholesteremia  Continue prehospital statin  Class 3 severe obesity due to excess calories with serious comorbidity and body mass index (BMI) of 40.0 to 44.9 in adult (HCC)  BMI 41.57  Counseled on  diet, exercise, lifestyle changes  Stage 3 chronic kidney disease, unspecified whether stage 3a or 3b CKD (Formerly [facility])  Lab Results   Component Value Date    EGFR 29 03/14/2025    EGFR 27 03/13/2025    EGFR 25 03/11/2025    CREATININE 2.18 (H) 03/14/2025    CREATININE 2.30 (H) 03/13/2025    CREATININE 2.45 (H) 03/11/2025     Patient recent hospitalization discharge 3/11-was evaluated by nephrology during that hospitalization for LUIS ALFREDO.  Creatinine was continuing to improve.  At time of discharge it was recommended to continue patient's prehospital diuretic regimen.  Losartan was to be held until creatinine improves closer to baseline.  Patient presented with sepsis see plan for same  Poor appetite at this time.  Appears euvolemic on exam  We will continue to hold diuretics for now, hold losartan  Avoid nephrotoxins and hypotension  BMP a.m.  Type 2 diabetes mellitus with hyperglycemia, with long-term current use of insulin (Former[facility])  Lab Results   Component Value Date    HGBA1C 8.9 (H) 02/26/2025       Recent Labs     03/13/25  1819 03/13/25 2035 03/14/25  0726 03/14/25  1103   POCGLU 107 137 184* 206*       Blood Sugar Average: Last 72 hrs:  (P) 149.6    Blood sugar inpatient 140-180  Given poor appetite we will hold prehospital 70/30 insulin for now  Initiate SSI ACHS  Diabetic low-salt diet  Hypoglycemia protocol  BMP a.m.  Resolving cellulitis of left lower extremity  Patient with recent hospitalization discharged 3/11 to skilled nursing facility-was noted to have streptococcal bacteremia felt likely related to bilateral lower extremity cellulitis.  Patient was discharged on Duricef as recommended by the infectious disease specialist who followed.  Presented to the ED with rigors.  Met sepsis criteria with tachycardia, tachypnea, fever, leukocytosis with bilateral lower extremity cellulitis  See plan for sepsis  Elevate legs in bed  Consult wound nurse  Urinary retention  History of urinary retention  Prehospital  Flomax  Monitor urinary outputs  Retention protocol  Streptococcal bacteremia  History of streptococcal bacteremia on recent hospitalization.  Discharged to skilled facility 3/11 on Duricef per recommendations.  Presented 3/13/25 with fever/rigors.  See plan for sepsis  Chronic anemia  Baseline hemoglobin appears to be around 10  Hemoglobin on arrival 9.2 -> 8.9  No signs of bleeding  Did receive 1 L IV fluids in the ED  Continue to monitor  Continue prehospital folic acid  CBC a.m.  Alcohol abuse  Continue folic acid and thiamine  Diarrhea  Patient reported 4 episodes of diarrhea since last night  Patient does have history of Cdiff  Will check Cdiff toxin by PCR with EIA and stool enteric bacterial panel by PCR    VTE Pharmacologic Prophylaxis: VTE Score: 8 High Risk (Score >/= 5) - Pharmacological DVT Prophylaxis Ordered: heparin. Sequential Compression Devices Ordered.    Mobility:   Basic Mobility Inpatient Raw Score: 18  JH-HLM Goal: 6: Walk 10 steps or more  JH-HLM Achieved: 6: Walk 10 steps or more  JH-HLM Goal achieved. Continue to encourage appropriate mobility.    Patient Centered Rounds: I performed bedside rounds with nursing staff today.   Discussions with Specialists or Other Care Team Provider: nursing, CM, ID    Education and Discussions with Family / Patient: Patient declined call to .     Current Length of Stay: 1 day(s)  Current Patient Status: Inpatient   Certification Statement: The patient will continue to require additional inpatient hospital stay due to continued need for IV abx while workup for sepsis source  Discharge Plan: Anticipate discharge in 48-72 hrs to discharge location to be determined pending rehab evaluations.    Code Status: Level 3 - DNAR and DNI    Subjective   The patient was seen and examined. The patient is lying in bed in no acute distress. He reports having 4 episodes of diarrhea since last night.     Objective :  Temp:  [98.9 °F (37.2 °C)-101.9 °F (38.8  °C)] 98.9 °F (37.2 °C)  HR:  [] 100  BP: (129-155)/(57-78) 149/78  Resp:  [18-26] 19  SpO2:  [92 %-98 %] 98 %  O2 Device: None (Room air)  Nasal Cannula O2 Flow Rate (L/min):  [2 L/min] 2 L/min    Body mass index is 41.57 kg/m².     Input and Output Summary (last 24 hours):     Intake/Output Summary (Last 24 hours) at 3/14/2025 1332  Last data filed at 3/14/2025 0922  Gross per 24 hour   Intake 1500 ml   Output 900 ml   Net 600 ml       Physical Exam  Vitals and nursing note reviewed.   Constitutional:       General: He is awake.      Appearance: He is morbidly obese.   HENT:      Head: Normocephalic and atraumatic.   Cardiovascular:      Rate and Rhythm: Normal rate and regular rhythm.      Heart sounds: Normal heart sounds.   Pulmonary:      Effort: Pulmonary effort is normal.      Breath sounds: Normal breath sounds.   Abdominal:      Palpations: Abdomen is soft.      Tenderness: There is abdominal tenderness (mild- at area of ecchymosis on LLQ).   Musculoskeletal:      Right lower leg: Edema present.      Left lower leg: Edema present.   Skin:     General: Skin is warm and dry.      Findings: Erythema (bilateral LE) present.   Neurological:      General: No focal deficit present.      Mental Status: He is alert and oriented to person, place, and time.   Psychiatric:         Attention and Perception: Attention normal.         Mood and Affect: Mood normal.         Speech: Speech normal.         Behavior: Behavior is cooperative.         Cognition and Memory: Cognition and memory normal.           Lines/Drains:              Lab Results: I have reviewed the following results:   Results from last 7 days   Lab Units 03/14/25  0448   WBC Thousand/uL 11.71*   HEMOGLOBIN g/dL 8.9*   HEMATOCRIT % 27.5*   PLATELETS Thousands/uL 244   SEGS PCT % 78*   LYMPHO PCT % 12*   MONO PCT % 6   EOS PCT % 2     Results from last 7 days   Lab Units 03/14/25  0448   SODIUM mmol/L 133*   POTASSIUM mmol/L 3.8   CHLORIDE mmol/L 100    CO2 mmol/L 24   BUN mg/dL 29*   CREATININE mg/dL 2.18*   ANION GAP mmol/L 9   CALCIUM mg/dL 8.1*   ALBUMIN g/dL 2.5*   TOTAL BILIRUBIN mg/dL 0.54   ALK PHOS U/L 29*   ALT U/L 20   AST U/L 18   GLUCOSE RANDOM mg/dL 153*     Results from last 7 days   Lab Units 03/13/25  1443   INR  1.25*     Results from last 7 days   Lab Units 03/14/25  1103 03/14/25  0726 03/13/25  2035 03/13/25  1819 03/13/25  1505 03/11/25  1110 03/11/25  0657 03/10/25  2046 03/10/25  1600 03/10/25  1048 03/10/25  0739 03/09/25 2029   POC GLUCOSE mg/dl 206* 184* 137 107 114 127 155* 210* 187* 242* 160* 268*         Results from last 7 days   Lab Units 03/14/25  0448 03/13/25  1443   LACTIC ACID mmol/L  --  1.6   PROCALCITONIN ng/ml 0.19 0.10       Recent Cultures (last 7 days):   Results from last 7 days   Lab Units 03/13/25  1443   BLOOD CULTURE  Received in Microbiology Lab. Culture in Progress.  Received in Microbiology Lab. Culture in Progress.       Imaging Results Review: No pertinent imaging studies reviewed.  Other Study Results Review: No additional pertinent studies reviewed.    Last 24 Hours Medication List:     Current Facility-Administered Medications:     acetaminophen (TYLENOL) tablet 650 mg, Q6H PRN    amLODIPine (NORVASC) tablet 10 mg, Daily    aspirin (ECOTRIN LOW STRENGTH) EC tablet 81 mg, Daily    atorvastatin (LIPITOR) tablet 40 mg, Daily    azelastine (ASTELIN) 0.1 % nasal spray 1 spray, Daily    ceFEPime (MAXIPIME) 2,000 mg in dextrose 5 % 50 mL IVPB, Q12H, Last Rate: Stopped (03/14/25 0604)    Cholecalciferol (VITAMIN D3) tablet 2,000 Units, Daily    folic acid (FOLVITE) tablet 1 mg, Daily    gabapentin (NEURONTIN) capsule 200 mg, HS    heparin (porcine) subcutaneous injection 7,500 Units, Q8H MARGARITA **AND** Platelet count, Once    insulin lispro (HumALOG/ADMELOG) 100 units/mL subcutaneous injection 2-12 Units, TID AC **AND** Fingerstick Glucose (POCT), TID AC    insulin lispro (HumALOG/ADMELOG) 100 units/mL subcutaneous  injection 2-12 Units, HS    iohexol (OMNIPAQUE) 350 MG/ML injection (MULTI-DOSE) 100 mL, Once in imaging    magnesium sulfate 4 g/100 mL IVPB (premix) 4 g, Once, Last Rate: 4 g (03/14/25 0939)    metoprolol tartrate (LOPRESSOR) tablet 50 mg, Q12H MARGARITA    tamsulosin (FLOMAX) capsule 0.4 mg, Daily With Dinner    thiamine tablet 100 mg, Daily    vancomycin (VANCOCIN) IVPB (premix in dextrose) 1,000 mg 200 mL, Q24H, Last Rate: 1,000 mg (03/14/25 0632)    vitamin E (TOCOPHEROL) capsule 400 Units, Daily    Administrative Statements   Today, Patient Was Seen By: Zeyad Orellana PA-C  I have spent a total time of 35 minutes in caring for this patient on the day of the visit/encounter including Documenting in the medical record, Reviewing/placing orders in the medical record (including tests, medications, and/or procedures), Obtaining or reviewing history  , and Communicating with other healthcare professionals .    **Please Note: This note may have been constructed using a voice recognition system.**

## 2025-03-14 NOTE — DISCHARGE INSTR - OTHER ORDERS
Skin care plans:  1-Hydraguard/Silicone Cream to bilateral sacrum, buttock, and heels BID and PRN  2-Elevate heels to offload pressure.  3-Ehob cushion in chair when out of bed.  4-Moisturize skin daily with skin nourishing cream.  5-Turn/reposition q2h for pressure re-distribution on skin.   6-Left medial/posterior thigh and tibia- Cleanse skin with soap and water, pat dry. Apply Hydraguard/Silicone barrier cream 2 times a day and as needed.  7-Buttock wound- Cleanse with soap and water, pat dry. Apply Triad paste over open aspect and Hydraguard/Silicone barrier cream to shubham-wound skin. Apply 3 times a day and as needed with shubham-care.  8-B/L lower extremity weeping legs- Cleanse with soap and water, pat dry. Apply 4x4's, wrap with Francisco. Change daily and as needed for soilage/displacement.

## 2025-03-14 NOTE — ASSESSMENT & PLAN NOTE
Patient presented from skilled nursing facility with fever and rigors.  Tmax 101.9 °F.  Leukocytosis is improving though appears chronic over the last few weeks given acute illness.  Unknown source.  Consider possibility of recurrent bacteremia.  During recent admission to ICU he did not require invasive lines. blood cultures x 2 are pending.  He recently had Ramirez catheter in place thus consider possibility of UTI however urine micro is benign.  COVID/flu/RSV negative.  Chest x-ray negative.  Consider possibility of occult source in chest, abdomen or pelvis.  Consider possibility of C. difficile given recent antibiotic exposure though so far only had 1 episode of diarrhea since admission.  Lower extremity cellulitis appears to be resolving. Denies joint or back pains. No soft tissue gas noted on x-rays.  Patient remains hemodynamically stable.  Continue IV vanco and cefepime for now   Rx consult for vanco trough management   Follow-up blood cultures  Recommend CT chest abdomen pelvis  Consider C. difficile testing should patient develop worsening diarrhea, abdominal pain  Daily CBC DIF and chemistry to monitor response to treatment and for developing toxicities  Trend temps and hemodynamics

## 2025-03-14 NOTE — PLAN OF CARE
Problem: PAIN - ADULT  Goal: Verbalizes/displays adequate comfort level or baseline comfort level  Description: Interventions:  - Encourage patient to monitor pain and request assistance  - Assess pain using appropriate pain scale  - Administer analgesics based on type and severity of pain and evaluate response  - Implement non-pharmacological measures as appropriate and evaluate response  - Consider cultural and social influences on pain and pain management  - Notify physician/advanced practitioner if interventions unsuccessful or patient reports new pain  Outcome: Progressing     Problem: INFECTION - ADULT  Goal: Absence or prevention of progression during hospitalization  Description: INTERVENTIONS:  - Assess and monitor for signs and symptoms of infection  - Monitor lab/diagnostic results  - Monitor all insertion sites, i.e. indwelling lines, tubes, and drains  - Monitor endotracheal if appropriate and nasal secretions for changes in amount and color  - [place] appropriate cooling/warming therapies per order  - Administer medications as ordered  - Instruct and encourage patient and family to use good hand hygiene technique  - Identify and instruct in appropriate isolation precautions for identified infection/condition  Outcome: Progressing     Problem: RESPIRATORY - ADULT  Goal: Achieves optimal ventilation and oxygenation  Description: INTERVENTIONS:  - Assess for changes in respiratory status  - Assess for changes in mentation and behavior  - Position to facilitate oxygenation and minimize respiratory effort  - Oxygen administered by appropriate delivery if ordered  - Initiate smoking cessation education as indicated  - Encourage broncho-pulmonary hygiene including cough, deep breathe, Incentive Spirometry  - Assess the need for suctioning and aspirate as needed  - Assess and instruct to report SOB or any respiratory difficulty  - Respiratory Therapy support as indicated  Outcome: Progressing     Problem:  GASTROINTESTINAL - ADULT  Goal: Minimal or absence of nausea and/or vomiting  Description: INTERVENTIONS:  - Administer IV fluids if ordered to ensure adequate hydration  - Maintain NPO status until nausea and vomiting are resolved  - Nasogastric tube if ordered  - Administer ordered antiemetic medications as needed  - Provide nonpharmacologic comfort measures as appropriate  - Advance diet as tolerated, if ordered  - Consider nutrition services referral to assist patient with adequate nutrition and appropriate food choices  Outcome: Progressing     Problem: SKIN/TISSUE INTEGRITY - ADULT  Goal: Incision(s), wounds(s) or drain site(s) healing without S/S of infection  Description: INTERVENTIONS  - Assess and document dressing, incision, wound bed, drain sites and surrounding tissue  - Provide patient and family education    Outcome: Progressing

## 2025-03-14 NOTE — ASSESSMENT & PLAN NOTE
History of bilateral lower extremity lymphedema with chronic wounds.  Remains a risk factor for infection, poor wound healing, and recurrent admissions for cellulitis.  Encourage compliance with lower extremity elevation, compression, diuresis.  Consider podiatry or wound care consultations.

## 2025-03-14 NOTE — PLAN OF CARE
Problem: PAIN - ADULT  Goal: Verbalizes/displays adequate comfort level or baseline comfort level  Description: Interventions:  - Encourage patient to monitor pain and request assistance  - Assess pain using appropriate pain scale  - Administer analgesics based on type and severity of pain and evaluate response  - Implement non-pharmacological measures as appropriate and evaluate response  - Consider cultural and social influences on pain and pain management  - Notify physician/advanced practitioner if interventions unsuccessful or patient reports new pain  Outcome: Progressing     Problem: INFECTION - ADULT  Goal: Absence or prevention of progression during hospitalization  Description: INTERVENTIONS:  - Assess and monitor for signs and symptoms of infection  - Monitor lab/diagnostic results  - Monitor all insertion sites, i.e. indwelling lines, tubes, and drains  - Monitor endotracheal if appropriate and nasal secretions for changes in amount and color  - [place] appropriate cooling/warming therapies per order  - Administer medications as ordered  - Instruct and encourage patient and family to use good hand hygiene technique  - Identify and instruct in appropriate isolation precautions for identified infection/condition  Outcome: Progressing  Goal: Absence of fever/infection during neutropenic period  Description: INTERVENTIONS:  - Monitor WBC    Outcome: Progressing     Problem: SAFETY ADULT  Goal: Patient will remain free of falls  Description: INTERVENTIONS:  - Educate patient/family on patient safety including physical limitations  - Instruct patient to call for assistance with activity   - Consult OT/PT to assist with strengthening/mobility   - Keep Call bell within reach  - Keep bed low and locked with side rails adjusted as appropriate  - Keep care items and personal belongings within reach  - Initiate and maintain comfort rounds  - Make Fall Risk Sign visible to staff  - Offer Toileting every 2 Hours,  in advance of need  - Initiate/Maintain bed alarm  - Obtain necessary fall risk management equipment:   - Apply yellow socks and bracelet for high fall risk patients  - Consider moving patient to room near nurses station  Outcome: Progressing  Goal: Maintain or return to baseline ADL function  Description: INTERVENTIONS:  -  Assess patient's ability to carry out ADLs; assess patient's baseline for ADL function and identify physical deficits which impact ability to perform ADLs (bathing, care of mouth/teeth, toileting, grooming, dressing, etc.)  - Assess/evaluate cause of self-care deficits   - Assess range of motion  - Assess patient's mobility; develop plan if impaired  - Assess patient's need for assistive devices and provide as appropriate  - Encourage maximum independence but intervene and supervise when necessary  - Involve family in performance of ADLs  - Assess for home care needs following discharge   - Consider OT consult to assist with ADL evaluation and planning for discharge  - Provide patient education as appropriate  Outcome: Progressing     Problem: RESPIRATORY - ADULT  Goal: Achieves optimal ventilation and oxygenation  Description: INTERVENTIONS:  - Assess for changes in respiratory status  - Assess for changes in mentation and behavior  - Position to facilitate oxygenation and minimize respiratory effort  - Oxygen administered by appropriate delivery if ordered  - Initiate smoking cessation education as indicated  - Encourage broncho-pulmonary hygiene including cough, deep breathe, Incentive Spirometry  - Assess the need for suctioning and aspirate as needed  - Assess and instruct to report SOB or any respiratory difficulty  - Respiratory Therapy support as indicated  Outcome: Progressing     Problem: GASTROINTESTINAL - ADULT  Goal: Minimal or absence of nausea and/or vomiting  Description: INTERVENTIONS:  - Administer IV fluids if ordered to ensure adequate hydration  - Maintain NPO status until  nausea and vomiting are resolved  - Nasogastric tube if ordered  - Administer ordered antiemetic medications as needed  - Provide nonpharmacologic comfort measures as appropriate  - Advance diet as tolerated, if ordered  - Consider nutrition services referral to assist patient with adequate nutrition and appropriate food choices  Outcome: Progressing  Goal: Maintains or returns to baseline bowel function  Description: INTERVENTIONS:  - Assess bowel function  - Encourage oral fluids to ensure adequate hydration  - Administer IV fluids if ordered to ensure adequate hydration  - Administer ordered medications as needed  - Encourage mobilization and activity  - Consider nutritional services referral to assist patient with adequate nutrition and appropriate food choices  Outcome: Progressing  Goal: Maintains adequate nutritional intake  Description: INTERVENTIONS:  - Monitor percentage of each meal consumed  - Identify factors contributing to decreased intake, treat as appropriate  - Assist with meals as needed  - Monitor I&O, weight, and lab values if indicated  - Obtain nutrition services referral as needed  Outcome: Progressing     Problem: METABOLIC, FLUID AND ELECTROLYTES - ADULT  Goal: Electrolytes maintained within normal limits  Description: INTERVENTIONS:  - Monitor labs and assess patient for signs and symptoms of electrolyte imbalances  - Administer electrolyte replacement as ordered  - Monitor response to electrolyte replacements, including repeat lab results as appropriate  - Instruct patient on fluid and nutrition as appropriate  Outcome: Progressing  Goal: Fluid balance maintained  Description: INTERVENTIONS:  - Monitor labs   - Monitor I/O and WT  - Instruct patient on fluid and nutrition as appropriate  - Assess for signs & symptoms of volume excess or deficit  Outcome: Progressing     Problem: SKIN/TISSUE INTEGRITY - ADULT  Goal: Skin Integrity remains intact(Skin Breakdown Prevention)  Description:  Assess:  -Perform Bright assessment every shift  -Clean and moisturize skin every shift  -Inspect skin when repositioning, toileting, and assisting with ADLS  -Assess under medical devices every shift  -Assess extremities for adequate circulation and sensation     Bed Management:  -Have minimal linens on bed & keep smooth, unwrinkled  -Change linens as needed when moist or perspiring  -Avoid sitting or lying in one position for more than 2 hours while in bed  -Keep HOB at 30degrees     Toileting:  -Offer bedside commode  -Assess for incontinence every shift  -Use incontinent care products after each incontinent episode     Activity:  -Mobilize patient 2 times a day  -Encourage activity and walks on unit  -Encourage or provide ROM exercises   -Turn and reposition patient every 2 Hours  -Use appropriate equipment to lift or move patient in bed  -Instruct/ Assist with weight shifting every 2 when out of bed in chair  -Consider limitation of chair time 2 hour intervals    Skin Care:  -Avoid use of baby powder, tape, friction and shearing, hot water or constrictive clothing  -Relieve pressure over bony prominences  -Do not massage red bony areas    Next Steps:  -Teach patient strategies to minimize risks    -Consider consults to  interdisciplinary teams   Outcome: Progressing  Goal: Incision(s), wounds(s) or drain site(s) healing without S/S of infection  Description: INTERVENTIONS  - Assess and document dressing, incision, wound bed, drain sites and surrounding tissue  - Provide patient and family education  - Perform skin care/dressing changes every shift  Outcome: Progressing     Problem: Nutrition/Hydration-ADULT  Goal: Nutrient/Hydration intake appropriate for improving, restoring or maintaining nutritional needs  Description: Monitor and assess patient's nutrition/hydration status for malnutrition. Collaborate with interdisciplinary team and initiate plan and interventions as ordered.  Monitor patient's weight and  dietary intake as ordered or per policy. Utilize nutrition screening tool and intervene as necessary. Determine patient's food preferences and provide high-protein, high-caloric foods as appropriate.     INTERVENTIONS:  - Monitor oral intake, urinary output, labs, and treatment plans  - Assess nutrition and hydration status and recommend course of action  - Evaluate amount of meals eaten  - Assist patient with eating if necessary   - Allow adequate time for meals  - Recommend/ encourage appropriate diets, oral nutritional supplements, and vitamin/mineral supplements  - Order, calculate, and assess calorie counts as needed  - Recommend, monitor, and adjust tube feedings and TPN/PPN based on assessed needs  - Assess need for intravenous fluids  - Provide specific nutrition/hydration education as appropriate  - Include patient/family/caregiver in decisions related to nutrition  Outcome: Progressing

## 2025-03-14 NOTE — ASSESSMENT & PLAN NOTE
History of streptococcal bacteremia on recent hospitalization.  Discharged to skilled facility 3/11 on Duricef per recommendations.  Presented 3/13/25 with fever/rigors.  See plan for sepsis

## 2025-03-14 NOTE — ASSESSMENT & PLAN NOTE
Lab Results   Component Value Date    EGFR 29 03/14/2025    EGFR 27 03/13/2025    EGFR 25 03/11/2025    CREATININE 2.18 (H) 03/14/2025    CREATININE 2.30 (H) 03/13/2025    CREATININE 2.45 (H) 03/11/2025     Patient recent hospitalization discharge 3/11-was evaluated by nephrology during that hospitalization for LUIS ALFREDO.  Creatinine was continuing to improve.  At time of discharge it was recommended to continue patient's prehospital diuretic regimen.  Losartan was to be held until creatinine improves closer to baseline.  Patient presented with sepsis see plan for same  Poor appetite at this time.  Appears euvolemic on exam  We will continue to hold diuretics for now, hold losartan  Avoid nephrotoxins and hypotension  BMP a.m.

## 2025-03-14 NOTE — ASSESSMENT & PLAN NOTE
Patient with recent admission 2/26 through 3/11 at this campus.  Initial presentation was DKA, also noted to be septic in setting of bilateral lower extremity cellulitis with streptococcal bacteremia.  ID followed patient throughout the hospitalization.  Follow-up blood cultures were negative.  Patient was discharged to skilled nursing facility 3/11 on Duricef as recommended with plan to continue through 3/16  Patient was sent from skilled nursing facility to ED for fever/rigors  Patient met sepsis criteria on arrival with fever, tachycardia, tachypnea, and leukocytosis   Blood cultures x 2 obtained in the ED  COVID/flu/RSV negative  Procalcitonin 0.10  Lactic acid 1.6  UA negative  WBC 14.03  Chest x-ray no acute cardiopulmonary disease  Received 1 L IV fluids in the ED-will not give fluids per sepsis protocol due to concern for volume overload  Received vancomycin in the ED-will continue on the [facility] floor  ID consultation appreciated   Source of sepsis unknown at this time   Continue IV vancomycin/cefepime for now.   Check CT chest/abd/pelvis without contrast given renal function  Tylenol as needed for fever  Patient reported 4 episodes of diarrhea since last night  Check Cdiff and stool enteric bacterial panel  Continue to trend fever and procalcitonin  CBC, procalcitonin a.m.

## 2025-03-14 NOTE — ASSESSMENT & PLAN NOTE
History of alcohol abuse, with self-reported 1 to 2 glasses of bourbon a day the last beverage was about 1 month prior to recent admission.  No need for CIWA protocol at this time.

## 2025-03-14 NOTE — WOUND OSTOMY CARE
Consult Note - Wound   Nadeem Purdy Jr. 69 y.o. male MRN: [id number]  Unit/Bed#: -01 Encounter: [id number]        Assessment :   Patient admitted to [facility] due to sepsis. Wound care nurse consulted for lower extremity cellulitis. Wound care nurse consult completed virtually today by viewing/assessing wounds pictured by primary nurse and communicating with primary nurse through [employer] secure chat.     1. Left medial/posterior distal thigh and proximal tibia- Wounds appear to have resolved, skin is dry, intact, redness, with brown scaly adhered skin.    2. Bilateral buttock pressure injury- Wound is oval in shape, appears partial thickness with pink and yellow tissue, minimal drainage per primary nurse. Shubham-wound appears dry and intact.     3. Primary nurse reports small amount of weeping from lower extremities due to edema, no visible open aspects/wounds noted by primary nurse. Will place dressing orders to manage drainage.    4. No other wounds reported at this time.     Educated patient on importance of frequent offloading of pressure via turning, repositioning, and weight-shifting. Verbalized understanding of plan of care.    Primary nurse aware of plan of care. Wound care nurse will follow along.      Skin care plans:  1-Hydraguard/Silicone Cream to bilateral sacrum, buttock, and heels BID and PRN  2-Elevate heels to offload pressure.  3-Ehob cushion in chair when out of bed.  4-Moisturize skin daily with skin nourishing cream.  5-Turn/reposition q2h for pressure re-distribution on skin.   6-Left medial/posterior thigh and tibia- Cleanse skin with soap and water, pat dry. Apply Hydraguard/Silicone barrier cream 2 times a day and as needed.  7-Buttock wound- Cleanse with soap and water, pat dry. Apply Triad paste over open aspect and Hydraguard/Silicone barrier cream to shubham-wound skin. Apply 3 times a day and as needed with shubham-care.  8-B/L lower extremity weeping legs- Cleanse with soap and water, pat dry.  Apply 4x4's, wrap with [place]. Change daily and as needed for soilage/displacement.         Contact through [facility] Secure Chat with any questions  Wound Care will continue to follow while inpatient    Joan FERRARAN RN CWON  Wound and Ostomy care

## 2025-03-14 NOTE — ASSESSMENT & PLAN NOTE
During recent admission for DKA, patient was found to have extensive left lower extremity cellulitis in the setting of nonhealing lower extremity wounds.  Wound culture positive for strep pyogenes and MSSA and clinical findings suspicious of streptococcal cellulitis.  This continues to improve on physical exam.  Patient was due to complete antibiotic course tomorrow.  This appears to be resolving  Continue local wound care and serial skin exams

## 2025-03-14 NOTE — PROGRESS NOTES
Patient:    MRN:  [id number]    Kassidy Request ID:  [id number]    Level of care reserved:  Skilled Nursing Facility    Partner Reserved:  [facility], [place] [phone]    Clinical needs requested:    Geography searched:  20 miles around [phone]    Start of Service:    Request sent:  2:05pm EDT on 3/14/2025 by Lilian Quezada    Partner reserved:  4:53pm EDT on 3/14/2025 by Lilian Quezada    Choice list shared:  4:53pm EDT on 3/14/2025 by Lilian Quezada

## 2025-03-14 NOTE — CASE MANAGEMENT
Case Management Assessment & Discharge Planning Note    Patient name Nadeem Purdy Jr.  Location /-01 MRN [id number]  : 1955 Date 3/14/2025       Current Admission Date: 3/13/2025  Current Admission Diagnosis:Sepsis without acute organ dysfunction ([facility])   Patient Active Problem List    Diagnosis Date Noted Date Diagnosed    Diarrhea 2025     Sepsis without acute organ dysfunction ([facility]) 2025     Diabetic nephropathy associated with type 2 diabetes mellitus ([facility]) 2025     Streptococcal bacteremia 2025     Chronic anemia 2025     Atrial fibrillation with RVR ([facility]) 2025     Hypomagnesemia 2025     Urinary retention 2025     Hyponatremia 2025     Metabolic acidosis 2025     Acute renal failure superimposed on stage 3a chronic kidney disease ([facility]) 2025     Diabetic ketoacidosis without coma associated with type 2 diabetes mellitus ([facility]) 2025     Severe sepsis ([facility]) 2025     Alcohol abuse 2025     Hypertensive kidney disease with chronic kidney disease stage III ([facility]) 10/08/2024     Right knee injury, subsequent encounter 2024     Osteochondral lesion 2024     Primary osteoarthritis of one knee, right 2024     Lymphedema of right lower extremity 2024     Other tear of medial meniscus, current injury, right knee, initial encounter 2024     Type 2 diabetes mellitus with hyperglycemia, with long-term current use of insulin ([facility]) 2024     Resolving cellulitis of left lower extremity 2024     Cortical age-related cataract of both eyes 10/20/2023     Numbness of left foot 2023     Lymphedema of both lower extremities 2022     Rash 2022     Stage 3 chronic kidney disease, unspecified whether stage 3a or 3b CKD ([facility]) 2022     Tachycardia 2021     Essential hypertension 2019     Hypercholesteremia 2019     Class 3 severe obesity due to excess  calories with serious comorbidity and body mass index (BMI) of 40.0 to 44.9 in adult (HCC) 09/26/2019     Steatohepatitis 09/26/2019     Allergic rhinitis 09/26/2019     Vitamin D deficiency 09/26/2019     Persistent proteinuria 09/26/2019     Type 2 diabetes mellitus with microalbuminuria, with long-term current use of insulin (HCC)        LOS (days): 1  Geometric Mean LOS (GMLOS) (days): 3.5  Days to GMLOS:2.6     OBJECTIVE:  PATIENT READMITTED TO HOSPITAL  Risk of Unplanned Readmission Score: 30.72         Current admission status: Inpatient  Referral Reason: Other (d/c planning)    Preferred Pharmacy:   [id number] [place] - [address]  [address]  [place] [place] [address]  Phone: [phone] Fax: [phone]    Primary Care Provider: Jluis Glass MD    Primary Insurance: MEDICARE  Secondary Insurance: AAR    ASSESSMENT:  Active Health Care Proxies       [facility] Representative - Spouse   Primary Phone: [phone] (Mobile)                 Advance Directives  Does patient have a Health Care POA?: Yes  Does patient have Advance Directives?: Yes         Readmission Root Cause  30 Day Readmission: Yes  During your hospital stay, did someone (provider, nurse, ) explain your care to you in a way you could understand?: Yes  Did you feel medically stable to leave the hospital?: Yes  Were you able to pay for your medication at the pharmacy?: Unable to Assess (d/c to snf)  Did you have reliable transportation to take you to your appointments?: Unable to Assess (d/c to snf)  During previous admission, was a post-acute recommendation made?: Yes  What post-acute resources were offered?: STR  Patient was readmitted due to: pt was admitted to the hospital from [place] with cellelulitis, sepsis , Bacteremia  Action Plan: pt to return back  to snf when stable for d/c for rehab    Patient Information  Admitted from:: Facility  ([facility])  Mental Status: Alert  During Assessment patient was accompanied by: Not accompanied during assessment  Assessment information provided by:: Patient  Primary Caregiver: Self  Support Systems: Spouse/significant other  County of Residence: [place]  What [facility] do you live in?: [place]  Home entry access options. Select all that apply.: Stairs  Number of steps to enter home.: 2  Do the steps have railings?: Yes  Type of Current Residence: 2 story home  Upon entering residence, is there a bedroom on the main floor (no further steps)?: No  A bedroom is located on the following floor levels of residence (select all that apply):: 2nd Floor  Upon entering residence, is there a bathroom on the main floor (no further steps)?: Yes  Number of steps to 2nd floor from main floor: One Flight  Living Arrangements: Lives w/ Spouse/significant other  Is patient a ?: Yes ([employer]   no benefits)  Is patient active with VA ( [employer]?: No    Activities of Daily Living Prior to Admission  Functional Status: Independent  Completes ADLs independently?: Yes  Ambulates independently?: Yes  Does patient use assisted devices?: Yes  Assisted Devices (DME) used: Straight Cane  Does patient currently own DME?: Yes  What DME does the patient currently own?: Straight Cane  Does patient have a history of Outpatient Therapy (PT/OT)?: No  Does the patient have a history of Short-Term Rehab?: Yes ([facility])  Does patient have a history of HHC?: No  Does patient currently have HHC?: No         Patient Information Continued  Income Source: Pension/FPC  Does patient have prescription coverage?: Yes ([facility])  Can the patient afford their medications and any related supplies (such as glucometers or test strips)?: Yes  Does patient receive dialysis treatments?: No  Does patient have a history of substance abuse?: No  Does patient have a history of Mental Health Diagnosis?: No         Means of Transportation  [facility] Self          DISCHARGE DETAILS:    Discharge planning discussed with:: patient & James( wife ) was called at 13:50pm  Freedom of Choice: Yes  Comments - Freedom of Choice: pt was admitted from [place] - pt would like to return for rehab-referral was sent via aidin with permission  CM contacted family/caregiver?: Yes             Contacts  Patient Contacts: Jessica Purdy wife  Relationship to Patient:: Family (wife)  Contact Method: Phone  Phone Number: [phone]  Reason/Outcome: Discharge Planning    Requested Home Health Care         Is the patient interested in HHC at discharge?: No    DME Referral Provided  Referral made for DME?: No    Other Referral/Resources/Interventions Provided:  Interventions: Short Term Rehab  Referral Comments: referral senr via aidin    Would you like to participate in our [facility] service program?  : No - Declined    Treatment Team Recommendation: Short Term Rehab ([facility])

## 2025-03-14 NOTE — ASSESSMENT & PLAN NOTE
Baseline hemoglobin appears to be around 10  Hemoglobin on arrival 9.2 -> 8.9  No signs of bleeding  Did receive 1 L IV fluids in the ED  Continue to monitor  Continue prehospital folic acid  CBC a.m.

## 2025-03-14 NOTE — ASSESSMENT & PLAN NOTE
Blood pressure is currently well-controlled  Continue prehospital antihypertensives with hold parameters  Holding Lasix for now  Monitor blood pressure per protocol

## 2025-03-14 NOTE — ASSESSMENT & PLAN NOTE
Diagnosed with group A strep bacteremia last admission, 1 of 2 sets of blood cultures isolated strep pyogenes.  Due to skin and soft tissue infection.  Plan was to continue p.o. cefadroxil 1 g every 12 to complete 2-week course through 3/15/2025.  Follow-up blood cultures this admission  If blood cultures positive, will tailor antibiotics to BC ID, obtain echocardiogram and repeat blood cultures

## 2025-03-14 NOTE — ASSESSMENT & PLAN NOTE
Reports 1 episode of diarrhea since admission.  Consider possibility of chronic condition as he reported the same last admission.  Recent C. difficile PCR positive and EIA negative.  No prior history of C. difficile.  Primary prophylaxis has not been shown to be beneficial.  Will monitor stool output at this time and consider C. difficile testing should diarrhea persist.

## 2025-03-14 NOTE — NURSING NOTE
Pt bathed oral care done walked to the bathroom back into bed call bell in reach bed alarm on RN aware

## 2025-03-15 LAB
ALBUMIN SERPL BCG-MCNC: 2.6 G/DL (ref 3.5–5)
ALP SERPL-CCNC: 31 U/L (ref 34–104)
ALT SERPL W P-5'-P-CCNC: 19 U/L (ref 7–52)
ANION GAP SERPL CALCULATED.3IONS-SCNC: 7 MMOL/L (ref 4–13)
AST SERPL W P-5'-P-CCNC: 16 U/L (ref 13–39)
BILIRUB SERPL-MCNC: 0.43 MG/DL (ref 0.2–1)
BUN SERPL-MCNC: 27 MG/DL (ref 5–25)
CALCIUM ALBUM COR SERPL-MCNC: 9.3 MG/DL (ref 8.3–10.1)
CALCIUM SERPL-MCNC: 8.2 MG/DL (ref 8.4–10.2)
CHLORIDE SERPL-SCNC: 99 MMOL/L (ref 96–108)
CO2 SERPL-SCNC: 26 MMOL/L (ref 21–32)
CREAT SERPL-MCNC: 1.93 MG/DL (ref 0.6–1.3)
ERYTHROCYTE [DISTWIDTH] IN BLOOD BY AUTOMATED COUNT: 13.2 % (ref 11.6–15.1)
GFR SERPL CREATININE-BSD FRML MDRD: 34 ML/MIN/1.73SQ M
GLUCOSE SERPL-MCNC: 190 MG/DL (ref 65–140)
GLUCOSE SERPL-MCNC: 218 MG/DL (ref 65–140)
GLUCOSE SERPL-MCNC: 227 MG/DL (ref 65–140)
GLUCOSE SERPL-MCNC: 236 MG/DL (ref 65–140)
GLUCOSE SERPL-MCNC: 247 MG/DL (ref 65–140)
HCT VFR BLD AUTO: 27.8 % (ref 36.5–49.3)
HGB BLD-MCNC: 9.1 G/DL (ref 12–17)
MAGNESIUM SERPL-MCNC: 1.9 MG/DL (ref 1.9–2.7)
MCH RBC QN AUTO: 31.2 PG (ref 26.8–34.3)
MCHC RBC AUTO-ENTMCNC: 32.7 G/DL (ref 31.4–37.4)
MCV RBC AUTO: 95 FL (ref 82–98)
PLATELET # BLD AUTO: 247 THOUSANDS/UL (ref 149–390)
PMV BLD AUTO: 8.3 FL (ref 8.9–12.7)
POTASSIUM SERPL-SCNC: 3.8 MMOL/L (ref 3.5–5.3)
PROT SERPL-MCNC: 7.2 G/DL (ref 6.4–8.4)
RBC # BLD AUTO: 2.92 MILLION/UL (ref 3.88–5.62)
SODIUM SERPL-SCNC: 132 MMOL/L (ref 135–147)
WBC # BLD AUTO: 8.82 THOUSAND/UL (ref 4.31–10.16)

## 2025-03-15 PROCEDURE — 82948 REAGENT STRIP/BLOOD GLUCOSE: CPT

## 2025-03-15 PROCEDURE — 87505 NFCT AGENT DETECTION GI: CPT | Performed by: PHYSICIAN ASSISTANT

## 2025-03-15 PROCEDURE — 83735 ASSAY OF MAGNESIUM: CPT | Performed by: PHYSICIAN ASSISTANT

## 2025-03-15 PROCEDURE — 85027 COMPLETE CBC AUTOMATED: CPT | Performed by: PHYSICIAN ASSISTANT

## 2025-03-15 PROCEDURE — 99232 SBSQ HOSP IP/OBS MODERATE 35: CPT | Performed by: PHYSICIAN ASSISTANT

## 2025-03-15 PROCEDURE — 80053 COMPREHEN METABOLIC PANEL: CPT | Performed by: PHYSICIAN ASSISTANT

## 2025-03-15 RX ORDER — INSULIN ASPART 100 [IU]/ML
10 INJECTION, SUSPENSION SUBCUTANEOUS
Status: DISCONTINUED | OUTPATIENT
Start: 2025-03-15 | End: 2025-03-16

## 2025-03-15 RX ADMIN — INSULIN ASPART 10 UNITS: 100 INJECTION, SUSPENSION SUBCUTANEOUS at 17:32

## 2025-03-15 RX ADMIN — CEFEPIME 2000 MG: 2 INJECTION, POWDER, FOR SOLUTION INTRAVENOUS at 18:37

## 2025-03-15 RX ADMIN — FOLIC ACID 1 MG: 1 TABLET ORAL at 08:45

## 2025-03-15 RX ADMIN — HEPARIN SODIUM 7500 UNITS: 5000 INJECTION INTRAVENOUS; SUBCUTANEOUS at 05:59

## 2025-03-15 RX ADMIN — AZELASTINE HYDROCHLORIDE 1 SPRAY: 137 SPRAY, METERED NASAL at 08:00

## 2025-03-15 RX ADMIN — INSULIN ASPART 10 UNITS: 100 INJECTION, SUSPENSION SUBCUTANEOUS at 12:04

## 2025-03-15 RX ADMIN — INSULIN LISPRO 6 UNITS: 100 INJECTION, SOLUTION INTRAVENOUS; SUBCUTANEOUS at 12:03

## 2025-03-15 RX ADMIN — GABAPENTIN 200 MG: 100 CAPSULE ORAL at 21:47

## 2025-03-15 RX ADMIN — CEFEPIME 2000 MG: 2 INJECTION, POWDER, FOR SOLUTION INTRAVENOUS at 05:59

## 2025-03-15 RX ADMIN — INSULIN LISPRO 2 UNITS: 100 INJECTION, SOLUTION INTRAVENOUS; SUBCUTANEOUS at 21:52

## 2025-03-15 RX ADMIN — INSULIN LISPRO 6 UNITS: 100 INJECTION, SOLUTION INTRAVENOUS; SUBCUTANEOUS at 07:51

## 2025-03-15 RX ADMIN — ASPIRIN 81 MG: 81 TABLET, COATED ORAL at 08:45

## 2025-03-15 RX ADMIN — HEPARIN SODIUM 7500 UNITS: 5000 INJECTION INTRAVENOUS; SUBCUTANEOUS at 17:30

## 2025-03-15 RX ADMIN — AMLODIPINE BESYLATE 10 MG: 10 TABLET ORAL at 08:45

## 2025-03-15 RX ADMIN — TAMSULOSIN HYDROCHLORIDE 0.4 MG: 0.4 CAPSULE ORAL at 17:30

## 2025-03-15 RX ADMIN — Medication 400 UNITS: at 08:45

## 2025-03-15 RX ADMIN — CHOLECALCIFEROL TAB 25 MCG (1000 UNIT) 2000 UNITS: 25 TAB at 08:45

## 2025-03-15 RX ADMIN — ATORVASTATIN CALCIUM 40 MG: 40 TABLET, FILM COATED ORAL at 08:45

## 2025-03-15 RX ADMIN — METOPROLOL TARTRATE 50 MG: 50 TABLET, FILM COATED ORAL at 21:47

## 2025-03-15 RX ADMIN — INSULIN LISPRO 6 UNITS: 100 INJECTION, SOLUTION INTRAVENOUS; SUBCUTANEOUS at 17:33

## 2025-03-15 RX ADMIN — THIAMINE HCL TAB 100 MG 100 MG: 100 TAB at 08:45

## 2025-03-15 RX ADMIN — METOPROLOL TARTRATE 50 MG: 50 TABLET, FILM COATED ORAL at 08:44

## 2025-03-15 RX ADMIN — VANCOMYCIN HYDROCHLORIDE 1000 MG: 1 INJECTION, SOLUTION INTRAVENOUS at 06:40

## 2025-03-15 RX ADMIN — HEPARIN SODIUM 7500 UNITS: 5000 INJECTION INTRAVENOUS; SUBCUTANEOUS at 21:47

## 2025-03-15 NOTE — ASSESSMENT & PLAN NOTE
Patient with recent admission 2/26 through 3/11 at this campus.  Initial presentation was DKA, also noted to be septic in setting of bilateral lower extremity cellulitis with streptococcal bacteremia.  Patient was discharged to [facility] on 3/11/25 on Duricef as recommended by ID with plan to continue through 3/15  Patient was sent from STR to ED for fever/rigors  Patient met SIRS criteria on arrival with fever, tachycardia, tachypnea, and leukocytosis. Source unclear at this time.   COVID/flu/RSV negative  Procalcitonin 0.10, Lactic acid 1.6  UA negative  Chest x-ray no acute cardiopulmonary disease  Received 1 L IV fluids in the ED-will not give fluids per sepsis protocol due to concern for volume overload  Blood cultures: no growth at 24 hours  ID consultation appreciated   Source of sepsis unknown at this time   Continue IV vancomycin/cefepime for now.   CT chest/abd/pelvis without contrast given renal function  CT chest/abd/pelvis:  Small oval-shaped fat density in the anterior aspect of the left hemipelvis, new from prior, likely epiploic appendagitis or omental infarct. Faint fat stranding around the ascending colon, which may indicate colitis.  Patient reported 4 episodes of diarrhea on 3/14  Cdiff and stool enteric bacterial panel- pending  Continue to trend fever and procalcitonin  CBC

## 2025-03-15 NOTE — ASSESSMENT & PLAN NOTE
Patient discharged 3/11/25 from [facility] to skilled nursing facility-was noted to have streptococcal bacteremia felt likely related to bilateral lower extremity cellulitis.    Patient was discharged on Duricef as recommended by ID  See plan for sepsis

## 2025-03-15 NOTE — ASSESSMENT & PLAN NOTE
Blood pressure is currently controlled  Continue prehospital antihypertensives with hold parameters  Holding Lasix for now  Monitor blood pressure per protocol

## 2025-03-15 NOTE — ASSESSMENT & PLAN NOTE
Patient reported 4 episodes of diarrhea on 3/14  Patient does have history of Cdiff  Cdiff toxin by PCR with EIA and stool enteric bacterial panel by PCR- pending

## 2025-03-15 NOTE — ASSESSMENT & PLAN NOTE
Lab Results   Component Value Date    EGFR 34 03/15/2025    EGFR 29 03/14/2025    EGFR 27 03/13/2025    CREATININE 1.93 (H) 03/15/2025    CREATININE 2.18 (H) 03/14/2025    CREATININE 2.30 (H) 03/13/2025     Patient recent hospitalization 2/26/25 to 3/11/25 and noted to have LUIS ALFREDO due to ATN and followed by nephrology.    At time of discharge it was recommended to continue patient's prehospital diuretic regimen.  Losartan was to be held until creatinine improves closer to baseline.  Appears euvolemic on exam but with poor appetite  We will continue to hold diuretics for now, hold losartan  Previous Creatinine baseline 1.1-1.3   Creatinine continues to improve at 1.93  Avoid nephrotoxins and hypotension  BMP a.m.

## 2025-03-15 NOTE — PROGRESS NOTES
Nadeem Purdy Jr. is a 69 y.o. male who is currently ordered Vancomycin IV with management by the Pharmacy Consult service.  Relevant clinical data and objective / subjective history reviewed.  Vancomycin Assessment:  Indication and Goal AUC/Trough: Soft tissue (goal -600, trough >10)  Clinical Status: stable  Micro:     Renal Function:  SCr: 1.93 mg/dL  CrCl: 50 mL/min  Renal replacement: Not on dialysis  Days of Therapy: 3  Current Dose: 1000mg IV Q24hrs  Vancomycin Plan:  New Dosing: continue 1000mg IV Q24hrs  Estimated AUC: 425 mcg*hr/mL  Estimated Trough: 13.5 mcg/mL  Next Level: 3/16 with am labs   Renal Function Monitoring: Daily BMP and UOP  Pharmacy will continue to follow closely for s/sx of nephrotoxicity, infusion reactions and appropriateness of therapy.  BMP and CBC will be ordered per protocol. We will continue to follow the patient’s culture results and clinical progress daily.    Christiane Alvarado, Pharmacist

## 2025-03-15 NOTE — ASSESSMENT & PLAN NOTE
Lab Results   Component Value Date    HGBA1C 8.9 (H) 02/26/2025       Recent Labs     03/14/25  1103 03/14/25  1611 03/14/25  2113 03/15/25  0721   POCGLU 206* 271* 229* 227*       Blood Sugar Average: Last 72 hrs:  (P) 184.375    Blood sugar inpatient 140-180  Blood sugars are increasing  Will start decreased dose of NovoLog 70/30 at 10 U BID with meals  Initiate SSI ACHS  Diabetic low-salt diet  Hypoglycemia protocol  BMP a.mIsacc

## 2025-03-15 NOTE — PROGRESS NOTES
Progress Note - Hospitalist   Name: Nadeem Purdy Jr. 69 y.o. male I MRN: [id number]  Unit/Bed#: -01 I Date of Admission: 3/13/2025   Date of Service: 3/15/2025 I Hospital Day: 2    Assessment & Plan  Sepsis without acute organ dysfunction (HCC)  Patient with recent admission 2/26 through 3/11 at this campus.  Initial presentation was DKA, also noted to be septic in setting of bilateral lower extremity cellulitis with streptococcal bacteremia.  Patient was discharged to STR on 3/11/25 on Duricef as recommended by ID with plan to continue through 3/15  Patient was sent from STR to ED for fever/rigors  Patient met SIRS criteria on arrival with fever, tachycardia, tachypnea, and leukocytosis. Source unclear at this time.   COVID/flu/RSV negative  Procalcitonin 0.10, Lactic acid 1.6  UA negative  Chest x-ray no acute cardiopulmonary disease  Received 1 L IV fluids in the ED-will not give fluids per sepsis protocol due to concern for volume overload  Blood cultures: no growth at 24 hours  ID consultation appreciated   Source of sepsis unknown at this time   Continue IV vancomycin/cefepime for now.   CT chest/abd/pelvis without contrast given renal function  CT chest/abd/pelvis:  Small oval-shaped fat density in the anterior aspect of the left hemipelvis, new from prior, likely epiploic appendagitis or omental infarct. Faint fat stranding around the ascending colon, which may indicate colitis.  Patient reported 4 episodes of diarrhea on 3/14  Cdiff and stool enteric bacterial panel- pending  Continue to trend fever and procalcitonin  CBC  Essential hypertension  Blood pressure is currently controlled  Continue prehospital antihypertensives with hold parameters  Holding Lasix for now  Monitor blood pressure per protocol  Hypercholesteremia  Continue prehospital statin  Class 3 severe obesity due to excess calories with serious comorbidity and body mass index (BMI) of 40.0 to 44.9 in adult ([facility])  BMI 41.57  Counseled  on diet, exercise, lifestyle changes  Stage 3 chronic kidney disease, unspecified whether stage 3a or 3b CKD ([facility])  Lab Results   Component Value Date    EGFR 34 03/15/2025    EGFR 29 03/14/2025    EGFR 27 03/13/2025    CREATININE 1.93 (H) 03/15/2025    CREATININE 2.18 (H) 03/14/2025    CREATININE 2.30 (H) 03/13/2025     Patient recent hospitalization 2/26/25 to 3/11/25 and noted to have LUIS ALFREDO due to ATN and followed by nephrology.    At time of discharge it was recommended to continue patient's prehospital diuretic regimen.  Losartan was to be held until creatinine improves closer to baseline.  Appears euvolemic on exam but with poor appetite  We will continue to hold diuretics for now, hold losartan  Previous Creatinine baseline 1.1-1.3   Creatinine continues to improve at 1.93  Avoid nephrotoxins and hypotension  BMP a.m.  Type 2 diabetes mellitus with hyperglycemia, with long-term current use of insulin ([facility])  Lab Results   Component Value Date    HGBA1C 8.9 (H) 02/26/2025       Recent Labs     03/14/25  1103 03/14/25  1611 03/14/25  2113 03/15/25  0721   POCGLU 206* 271* 229* 227*       Blood Sugar Average: Last 72 hrs:  (P) 184.375    Blood sugar inpatient 140-180  Blood sugars are increasing  Will start decreased dose of NovoLog 70/30 at 10 U BID with meals  Initiate SSI ACHS  Diabetic low-salt diet  Hypoglycemia protocol  BMP a.m.  Resolving cellulitis of left lower extremity  Patient discharged 3/11/[facility] from [facility] to skilled nursing facility-was noted to have streptococcal bacteremia felt likely related to bilateral lower extremity cellulitis.    Patient was discharged on Duricef as recommended by ID  See plan for sepsis  Urinary retention  History of urinary retention  Prehospital Flomax  Monitor urinary outputs  Retention protocol  Streptococcal bacteremia  See plan for sepsis  Chronic anemia  Baseline hemoglobin appears to be around 10  Hemoglobin on arrival 9.2 -> 8.9  No signs of bleeding  Did receive 1 L  IV fluids in the ED  Continue to monitor  Continue prehospital folic acid  CBC a.m.  Alcohol abuse  Patient has not had any alcohol since previous hospitalization   Continue folic acid and thiamine  Diarrhea  Patient reported 4 episodes of diarrhea on 3/14  Patient does have history of Cdiff  Cdiff toxin by PCR with EIA and stool enteric bacterial panel by PCR- pending    VTE Pharmacologic Prophylaxis: VTE Score: 8 High Risk (Score >/= 5) - Pharmacological DVT Prophylaxis Ordered: heparin. Sequential Compression Devices Ordered.    Mobility:   Basic Mobility Inpatient Raw Score: 18  JH-HLM Goal: 6: Walk 10 steps or more  JH-HLM Achieved: 7: Walk 25 feet or more  JH-HLM Goal achieved. Continue to encourage appropriate mobility.    Patient Centered Rounds: I performed bedside rounds with nursing staff today.   Discussions with Specialists or Other Care Team Provider: nursingDORI      Current Length of Stay: 2 day(s)  Current Patient Status: Inpatient   Certification Statement: The patient will continue to require additional inpatient hospital stay due to continued workup for SIRS and need for IV abx  Discharge Plan: Anticipate discharge in 24-48 hrs to rehab facility.    Code Status: Level 3 - DNAR and DNI    Subjective   The patient was seen and examined. The patient is sitting up in bed in no acute distress. He reports 1 episode of diarrhea early this morning, none since.     Objective :  Temp:  [98.8 °F (37.1 °C)-99.1 °F (37.3 °C)] 98.8 °F (37.1 °C)  HR:  [] 92  BP: (139-171)/(73-93) 151/88  Resp:  [20] 20  SpO2:  [93 %-98 %] 95 %  O2 Device: None (Room air)    Body mass index is 41.57 kg/m².     Input and Output Summary (last 24 hours):     Intake/Output Summary (Last 24 hours) at 3/15/2025 0945  Last data filed at 3/15/2025 0547  Gross per 24 hour   Intake 1370 ml   Output 900 ml   Net 470 ml       Physical Exam  Vitals and nursing note reviewed.   Constitutional:       General: He is awake.       Appearance: Normal appearance. He is morbidly obese.   HENT:      Head: Normocephalic and atraumatic.   Cardiovascular:      Rate and Rhythm: Normal rate and regular rhythm.      Heart sounds: Normal heart sounds.   Pulmonary:      Effort: Pulmonary effort is normal.      Breath sounds: Normal breath sounds.   Abdominal:      Palpations: Abdomen is soft.      Tenderness: There is no abdominal tenderness.   Skin:     General: Skin is warm and dry.   Neurological:      General: No focal deficit present.      Mental Status: He is alert and oriented to person, place, and time.   Psychiatric:         Attention and Perception: Attention normal.         Mood and Affect: Mood normal.         Speech: Speech normal.         Behavior: Behavior is cooperative.         Cognition and Memory: Cognition and memory normal.           Lines/Drains:              Lab Results: I have reviewed the following results:   Results from last 7 days   Lab Units 03/15/25  0522 03/14/25  0448   WBC Thousand/uL 8.82 11.71*   HEMOGLOBIN g/dL 9.1* 8.9*   HEMATOCRIT % 27.8* 27.5*   PLATELETS Thousands/uL 247 244   SEGS PCT %  --  78*   LYMPHO PCT %  --  12*   MONO PCT %  --  6   EOS PCT %  --  2     Results from last 7 days   Lab Units 03/15/25  0522   SODIUM mmol/L 132*   POTASSIUM mmol/L 3.8   CHLORIDE mmol/L 99   CO2 mmol/L 26   BUN mg/dL 27*   CREATININE mg/dL 1.93*   ANION GAP mmol/L 7   CALCIUM mg/dL 8.2*   ALBUMIN g/dL 2.6*   TOTAL BILIRUBIN mg/dL 0.43   ALK PHOS U/L 31*   ALT U/L 19   AST U/L 16   GLUCOSE RANDOM mg/dL 218*     Results from last 7 days   Lab Units 03/13/25  1443   INR  1.25*     Results from last 7 days   Lab Units 03/15/25  0721 03/14/25  2113 03/14/25  1611 03/14/25  1103 03/14/25  0726 03/13/25  2035 03/13/25  1819 03/13/25  1505 03/11/25  1110 03/11/25  0657 03/10/25  2046 03/10/25  1600   POC GLUCOSE mg/dl 227* 229* 271* 206* 184* 137 107 114 127 155* 210* 187*         Results from last 7 days   Lab Units 03/14/25  0448  03/13/25  1443   LACTIC ACID mmol/L  --  1.6   PROCALCITONIN ng/ml 0.19 0.10       Recent Cultures (last 7 days):   Results from last 7 days   Lab Units 03/13/25  1443   BLOOD CULTURE  No Growth at 24 hrs.  No Growth at 24 hrs.       Imaging Results Review: I reviewed radiology reports from this admission including: CT chest and CT abdomen/pelvis.  Other Study Results Review: No additional pertinent studies reviewed.    Last 24 Hours Medication List:     Current Facility-Administered Medications:     acetaminophen (TYLENOL) tablet 650 mg, Q6H PRN    amLODIPine (NORVASC) tablet 10 mg, Daily    aspirin (ECOTRIN LOW STRENGTH) EC tablet 81 mg, Daily    atorvastatin (LIPITOR) tablet 40 mg, Daily    azelastine (ASTELIN) 0.1 % nasal spray 1 spray, Daily    ceFEPime (MAXIPIME) 2,000 mg in dextrose 5 % 50 mL IVPB, Q12H, Last Rate: 2,000 mg (03/15/25 0559)    Cholecalciferol (VITAMIN D3) tablet 2,000 Units, Daily    folic acid (FOLVITE) tablet 1 mg, Daily    gabapentin (NEURONTIN) capsule 200 mg, HS    heparin (porcine) subcutaneous injection 7,500 Units, Q8H MARGARITA **AND** Platelet count, Once    insulin aspart protamine-insulin aspart (NovoLOG 70/30) 100 units/mL subcutaneous injection 10 Units, BID AC    insulin lispro (HumALOG/ADMELOG) 100 units/mL subcutaneous injection 2-12 Units, TID AC **AND** Fingerstick Glucose (POCT), TID AC    insulin lispro (HumALOG/ADMELOG) 100 units/mL subcutaneous injection 2-12 Units, HS    iohexol (OMNIPAQUE) 350 MG/ML injection (MULTI-DOSE) 100 mL, Once in imaging    metoprolol tartrate (LOPRESSOR) tablet 50 mg, Q12H MARGARITA    tamsulosin (FLOMAX) capsule 0.4 mg, Daily With Dinner    thiamine tablet 100 mg, Daily    vancomycin (VANCOCIN) IVPB (premix in dextrose) 1,000 mg 200 mL, Q24H, Last Rate: 1,000 mg (03/15/25 0640)    vitamin E (TOCOPHEROL) capsule 400 Units, Daily    Administrative Statements   Today, Patient Was Seen By: Zeyad Orellana PA-C  I have spent a total time of 35 minutes in  caring for this patient on the day of the visit/encounter including Counseling / Coordination of care, Documenting in the medical record, and Reviewing/placing orders in the medical record (including tests, medications, and/or procedures).    **Please Note: This note may have been constructed using a voice recognition system.**

## 2025-03-15 NOTE — PLAN OF CARE
Problem: SAFETY ADULT  Goal: Patient will remain free of falls  Description: INTERVENTIONS:  - Educate patient/family on patient safety including physical limitations  - Instruct patient to call for assistance with activity   - Consult OT/PT to assist with strengthening/mobility   - Keep Call bell within reach  - Keep bed low and locked with side rails adjusted as appropriate  - Keep care items and personal belongings within reach  - Initiate and maintain comfort rounds  - Make Fall Risk Sign visible to staff  - Offer Toileting every 2 Hours, in advance of need  - Initiate/Maintain alarm  - Obtain necessary fall risk management equipment:   - Apply yellow socks and bracelet for high fall risk patients  - Consider moving patient to room near nurses station  Outcome: Progressing  Goal: Maintain or return to baseline ADL function  Description: INTERVENTIONS:  -  Assess patient's ability to carry out ADLs; assess patient's baseline for ADL function and identify physical deficits which impact ability to perform ADLs (bathing, care of mouth/teeth, toileting, grooming, dressing, etc.)  - Assess/evaluate cause of self-care deficits   - Assess range of motion  - Assess patient's mobility; develop plan if impaired  - Assess patient's need for assistive devices and provide as appropriate  - Encourage maximum independence but intervene and supervise when necessary  - Involve family in performance of ADLs  - Assess for home care needs following discharge   - Consider OT consult to assist with ADL evaluation and planning for discharge  - Provide patient education as appropriate  Outcome: Progressing

## 2025-03-15 NOTE — PLAN OF CARE
Problem: PAIN - ADULT  Goal: Verbalizes/displays adequate comfort level or baseline comfort level  Description: Interventions:  - Encourage patient to monitor pain and request assistance  - Assess pain using appropriate pain scale  - Administer analgesics based on type and severity of pain and evaluate response  - Implement non-pharmacological measures as appropriate and evaluate response  - Consider cultural and social influences on pain and pain management  - Notify physician/advanced practitioner if interventions unsuccessful or patient reports new pain  3/15/2025 1146 by Erika Gonzalez RN  Outcome: Progressing  3/15/2025 1141 by Erika Gonzalez RN  Outcome: Progressing  3/15/2025 1141 by Erika Gonzalez RN  Outcome: Progressing

## 2025-03-15 NOTE — PLAN OF CARE
Problem: PAIN - ADULT  Goal: Verbalizes/displays adequate comfort level or baseline comfort level  Description: Interventions:  - Encourage patient to monitor pain and request assistance  - Assess pain using appropriate pain scale  - Administer analgesics based on type and severity of pain and evaluate response  - Implement non-pharmacological measures as appropriate and evaluate response  - Consider cultural and social influences on pain and pain management  - Notify physician/advanced practitioner if interventions unsuccessful or patient reports new pain  Outcome: Progressing     Problem: INFECTION - ADULT  Goal: Absence or prevention of progression during hospitalization  Description: INTERVENTIONS:  - Assess and monitor for signs and symptoms of infection  - Monitor lab/diagnostic results  - Monitor all insertion sites, i.e. indwelling lines, tubes, and drains  - Monitor endotracheal if appropriate and nasal secretions for changes in amount and color  - [place] appropriate cooling/warming therapies per order  - Administer medications as ordered  - Instruct and encourage patient and family to use good hand hygiene technique  - Identify and instruct in appropriate isolation precautions for identified infection/condition  Outcome: Progressing  Goal: Absence of fever/infection during neutropenic period  Description: INTERVENTIONS:  - Monitor WBC    Outcome: Progressing     Problem: SAFETY ADULT  Goal: Patient will remain free of falls  Description: INTERVENTIONS:  - Educate patient/family on patient safety including physical limitations  - Instruct patient to call for assistance with activity   - Consult OT/PT to assist with strengthening/mobility   - Keep Call bell within reach  - Keep bed low and locked with side rails adjusted as appropriate  - Keep care items and personal belongings within reach  - Initiate and maintain comfort rounds  - Make Fall Risk Sign visible to staff  - Offer Toileting every 2 Hours,  in advance of need  - Initiate/Maintain bed alarm  - Obtain necessary fall risk management equipment:   - Apply yellow socks and bracelet for high fall risk patients  - Consider moving patient to room near nurses station  Outcome: Progressing  Goal: Maintain or return to baseline ADL function  Description: INTERVENTIONS:  -  Assess patient's ability to carry out ADLs; assess patient's baseline for ADL function and identify physical deficits which impact ability to perform ADLs (bathing, care of mouth/teeth, toileting, grooming, dressing, etc.)  - Assess/evaluate cause of self-care deficits   - Assess range of motion  - Assess patient's mobility; develop plan if impaired  - Assess patient's need for assistive devices and provide as appropriate  - Encourage maximum independence but intervene and supervise when necessary  - Involve family in performance of ADLs  - Assess for home care needs following discharge   - Consider OT consult to assist with ADL evaluation and planning for discharge  - Provide patient education as appropriate  Outcome: Progressing     Problem: RESPIRATORY - ADULT  Goal: Achieves optimal ventilation and oxygenation  Description: INTERVENTIONS:  - Assess for changes in respiratory status  - Assess for changes in mentation and behavior  - Position to facilitate oxygenation and minimize respiratory effort  - Oxygen administered by appropriate delivery if ordered  - Initiate smoking cessation education as indicated  - Encourage broncho-pulmonary hygiene including cough, deep breathe, Incentive Spirometry  - Assess the need for suctioning and aspirate as needed  - Assess and instruct to report SOB or any respiratory difficulty  - Respiratory Therapy support as indicated  Outcome: Progressing     Problem: GASTROINTESTINAL - ADULT  Goal: Minimal or absence of nausea and/or vomiting  Description: INTERVENTIONS:  - Administer IV fluids if ordered to ensure adequate hydration  - Maintain NPO status until  nausea and vomiting are resolved  - Nasogastric tube if ordered  - Administer ordered antiemetic medications as needed  - Provide nonpharmacologic comfort measures as appropriate  - Advance diet as tolerated, if ordered  - Consider nutrition services referral to assist patient with adequate nutrition and appropriate food choices  Outcome: Progressing  Goal: Maintains or returns to baseline bowel function  Description: INTERVENTIONS:  - Assess bowel function  - Encourage oral fluids to ensure adequate hydration  - Administer IV fluids if ordered to ensure adequate hydration  - Administer ordered medications as needed  - Encourage mobilization and activity  - Consider nutritional services referral to assist patient with adequate nutrition and appropriate food choices  Outcome: Progressing  Goal: Maintains adequate nutritional intake  Description: INTERVENTIONS:  - Monitor percentage of each meal consumed  - Identify factors contributing to decreased intake, treat as appropriate  - Assist with meals as needed  - Monitor I&O, weight, and lab values if indicated  - Obtain nutrition services referral as needed  Outcome: Progressing     Problem: METABOLIC, FLUID AND ELECTROLYTES - ADULT  Goal: Electrolytes maintained within normal limits  Description: INTERVENTIONS:  - Monitor labs and assess patient for signs and symptoms of electrolyte imbalances  - Administer electrolyte replacement as ordered  - Monitor response to electrolyte replacements, including repeat lab results as appropriate  - Instruct patient on fluid and nutrition as appropriate  Outcome: Progressing  Goal: Fluid balance maintained  Description: INTERVENTIONS:  - Monitor labs   - Monitor I/O and WT  - Instruct patient on fluid and nutrition as appropriate  - Assess for signs & symptoms of volume excess or deficit  Outcome: Progressing     Problem: SKIN/TISSUE INTEGRITY - ADULT  Goal: Skin Integrity remains intact(Skin Breakdown Prevention)  Description:  Assess:  -Perform Bright assessment every shift  -Clean and moisturize skin every shift  -Inspect skin when repositioning, toileting, and assisting with ADLS  -Assess under medical devices every shift  -Assess extremities for adequate circulation and sensation     Bed Management:  -Have minimal linens on bed & keep smooth, unwrinkled  -Change linens as needed when moist or perspiring  -Avoid sitting or lying in one position for more than 2 hours while in bed  -Keep HOB at 30degrees     Toileting:  -Offer bedside commode  -Assess for incontinence every shift  -Use incontinent care products after each incontinent episode     Activity:  -Mobilize patient 2 times a day  -Encourage activity and walks on unit  -Encourage or provide ROM exercises   -Turn and reposition patient every 2 Hours  -Use appropriate equipment to lift or move patient in bed  -Instruct/ Assist with weight shifting every 2 when out of bed in chair  -Consider limitation of chair time 2 hour intervals    Skin Care:  -Avoid use of baby powder, tape, friction and shearing, hot water or constrictive clothing  -Relieve pressure over bony prominences  -Do not massage red bony areas    Next Steps:  -Teach patient strategies to minimize risks    -Consider consults to  interdisciplinary teams  Outcome: Progressing  Goal: Incision(s), wounds(s) or drain site(s) healing without S/S of infection  Description: INTERVENTIONS  - Assess and document dressing, incision, wound bed, drain sites and surrounding tissue  - Provide patient and family education  - Perform skin care/dressing changes every shift  Outcome: Progressing     Problem: Nutrition/Hydration-ADULT  Goal: Nutrient/Hydration intake appropriate for improving, restoring or maintaining nutritional needs  Description: Monitor and assess patient's nutrition/hydration status for malnutrition. Collaborate with interdisciplinary team and initiate plan and interventions as ordered.  Monitor patient's weight and  dietary intake as ordered or per policy. Utilize nutrition screening tool and intervene as necessary. Determine patient's food preferences and provide high-protein, high-caloric foods as appropriate.     INTERVENTIONS:  - Monitor oral intake, urinary output, labs, and treatment plans  - Assess nutrition and hydration status and recommend course of action  - Evaluate amount of meals eaten  - Assist patient with eating if necessary   - Allow adequate time for meals  - Recommend/ encourage appropriate diets, oral nutritional supplements, and vitamin/mineral supplements  - Order, calculate, and assess calorie counts as needed  - Recommend, monitor, and adjust tube feedings and TPN/PPN based on assessed needs  - Assess need for intravenous fluids  - Provide specific nutrition/hydration education as appropriate  - Include patient/family/caregiver in decisions related to nutrition  Outcome: Progressing     Problem: Prexisting or High Potential for Compromised Skin Integrity  Goal: Skin integrity is maintained or improved  Description: INTERVENTIONS:  - Identify patients at risk for skin breakdown  - Assess and monitor skin integrity  - Assess and monitor nutrition and hydration status  - Monitor labs   - Assess for incontinence   - Turn and reposition patient  - Assist with mobility/ambulation  - Relieve pressure over bony prominences  - Avoid friction and shearing  - Provide appropriate hygiene as needed including keeping skin clean and dry  - Evaluate need for skin moisturizer/barrier cream  - Collaborate with interdisciplinary team   - Patient/family teaching  - Consider wound care consult   Outcome: Progressing

## 2025-03-16 LAB
ANION GAP SERPL CALCULATED.3IONS-SCNC: 7 MMOL/L (ref 4–13)
BUN SERPL-MCNC: 29 MG/DL (ref 5–25)
C COLI+JEJUNI TUF STL QL NAA+PROBE: NEGATIVE
C DIFF TOX A+B STL QL IA: NEGATIVE
C DIFF TOX GENS STL QL NAA+PROBE: POSITIVE
CALCIUM SERPL-MCNC: 8.1 MG/DL (ref 8.4–10.2)
CHLORIDE SERPL-SCNC: 100 MMOL/L (ref 96–108)
CO2 SERPL-SCNC: 25 MMOL/L (ref 21–32)
CREAT SERPL-MCNC: 1.96 MG/DL (ref 0.6–1.3)
EC STX1+STX2 GENES STL QL NAA+PROBE: NEGATIVE
ERYTHROCYTE [DISTWIDTH] IN BLOOD BY AUTOMATED COUNT: 12.9 % (ref 11.6–15.1)
GFR SERPL CREATININE-BSD FRML MDRD: 33 ML/MIN/1.73SQ M
GLUCOSE SERPL-MCNC: 177 MG/DL (ref 65–140)
GLUCOSE SERPL-MCNC: 193 MG/DL (ref 65–140)
GLUCOSE SERPL-MCNC: 220 MG/DL (ref 65–140)
GLUCOSE SERPL-MCNC: 245 MG/DL (ref 65–140)
GLUCOSE SERPL-MCNC: 278 MG/DL (ref 65–140)
HCT VFR BLD AUTO: 26.3 % (ref 36.5–49.3)
HGB BLD-MCNC: 8.3 G/DL (ref 12–17)
MCH RBC QN AUTO: 29.7 PG (ref 26.8–34.3)
MCHC RBC AUTO-ENTMCNC: 31.6 G/DL (ref 31.4–37.4)
MCV RBC AUTO: 94 FL (ref 82–98)
PLATELET # BLD AUTO: 232 THOUSANDS/UL (ref 149–390)
PMV BLD AUTO: 8.4 FL (ref 8.9–12.7)
POTASSIUM SERPL-SCNC: 3.6 MMOL/L (ref 3.5–5.3)
RBC # BLD AUTO: 2.79 MILLION/UL (ref 3.88–5.62)
SALMONELLA SP SPAO STL QL NAA+PROBE: NEGATIVE
SHIGELLA SP+EIEC IPAH STL QL NAA+PROBE: NEGATIVE
SODIUM SERPL-SCNC: 132 MMOL/L (ref 135–147)
VANCOMYCIN SERPL-MCNC: 13 UG/ML (ref 10–20)
WBC # BLD AUTO: 6.6 THOUSAND/UL (ref 4.31–10.16)

## 2025-03-16 PROCEDURE — 85027 COMPLETE CBC AUTOMATED: CPT | Performed by: PHYSICIAN ASSISTANT

## 2025-03-16 PROCEDURE — 80202 ASSAY OF VANCOMYCIN: CPT

## 2025-03-16 PROCEDURE — 80048 BASIC METABOLIC PNL TOTAL CA: CPT | Performed by: PHYSICIAN ASSISTANT

## 2025-03-16 PROCEDURE — 99232 SBSQ HOSP IP/OBS MODERATE 35: CPT | Performed by: PHYSICIAN ASSISTANT

## 2025-03-16 PROCEDURE — 82948 REAGENT STRIP/BLOOD GLUCOSE: CPT

## 2025-03-16 RX ORDER — INSULIN ASPART 100 [IU]/ML
20 INJECTION, SUSPENSION SUBCUTANEOUS
Status: DISCONTINUED | OUTPATIENT
Start: 2025-03-16 | End: 2025-03-17 | Stop reason: HOSPADM

## 2025-03-16 RX ORDER — FUROSEMIDE 40 MG/1
40 TABLET ORAL DAILY
Status: DISCONTINUED | OUTPATIENT
Start: 2025-03-16 | End: 2025-03-17 | Stop reason: HOSPADM

## 2025-03-16 RX ADMIN — AMLODIPINE BESYLATE 10 MG: 10 TABLET ORAL at 09:47

## 2025-03-16 RX ADMIN — HEPARIN SODIUM 7500 UNITS: 5000 INJECTION INTRAVENOUS; SUBCUTANEOUS at 06:17

## 2025-03-16 RX ADMIN — VANCOMYCIN HYDROCHLORIDE 1000 MG: 1 INJECTION, SOLUTION INTRAVENOUS at 11:59

## 2025-03-16 RX ADMIN — ATORVASTATIN CALCIUM 40 MG: 40 TABLET, FILM COATED ORAL at 09:48

## 2025-03-16 RX ADMIN — HEPARIN SODIUM 7500 UNITS: 5000 INJECTION INTRAVENOUS; SUBCUTANEOUS at 13:58

## 2025-03-16 RX ADMIN — METOPROLOL TARTRATE 50 MG: 50 TABLET, FILM COATED ORAL at 20:33

## 2025-03-16 RX ADMIN — FUROSEMIDE 40 MG: 40 TABLET ORAL at 13:58

## 2025-03-16 RX ADMIN — ASPIRIN 81 MG: 81 TABLET, COATED ORAL at 09:48

## 2025-03-16 RX ADMIN — INSULIN ASPART 10 UNITS: 100 INJECTION, SUSPENSION SUBCUTANEOUS at 09:47

## 2025-03-16 RX ADMIN — GABAPENTIN 200 MG: 100 CAPSULE ORAL at 20:35

## 2025-03-16 RX ADMIN — HEPARIN SODIUM 7500 UNITS: 5000 INJECTION INTRAVENOUS; SUBCUTANEOUS at 20:34

## 2025-03-16 RX ADMIN — THIAMINE HCL TAB 100 MG 100 MG: 100 TAB at 09:48

## 2025-03-16 RX ADMIN — METOPROLOL TARTRATE 50 MG: 50 TABLET, FILM COATED ORAL at 09:48

## 2025-03-16 RX ADMIN — AZELASTINE HYDROCHLORIDE 1 SPRAY: 137 SPRAY, METERED NASAL at 12:10

## 2025-03-16 RX ADMIN — CEFEPIME 2000 MG: 2 INJECTION, POWDER, FOR SOLUTION INTRAVENOUS at 18:14

## 2025-03-16 RX ADMIN — INSULIN ASPART 20 UNITS: 100 INJECTION, SUSPENSION SUBCUTANEOUS at 18:15

## 2025-03-16 RX ADMIN — CHOLECALCIFEROL TAB 25 MCG (1000 UNIT) 2000 UNITS: 25 TAB at 09:48

## 2025-03-16 RX ADMIN — FOLIC ACID 1 MG: 1 TABLET ORAL at 09:48

## 2025-03-16 RX ADMIN — INSULIN LISPRO 6 UNITS: 100 INJECTION, SOLUTION INTRAVENOUS; SUBCUTANEOUS at 18:14

## 2025-03-16 RX ADMIN — Medication 400 UNITS: at 09:48

## 2025-03-16 RX ADMIN — INSULIN LISPRO 2 UNITS: 100 INJECTION, SOLUTION INTRAVENOUS; SUBCUTANEOUS at 09:50

## 2025-03-16 RX ADMIN — CEFEPIME 2000 MG: 2 INJECTION, POWDER, FOR SOLUTION INTRAVENOUS at 06:17

## 2025-03-16 RX ADMIN — INSULIN LISPRO 4 UNITS: 100 INJECTION, SOLUTION INTRAVENOUS; SUBCUTANEOUS at 21:30

## 2025-03-16 RX ADMIN — TAMSULOSIN HYDROCHLORIDE 0.4 MG: 0.4 CAPSULE ORAL at 18:15

## 2025-03-16 RX ADMIN — INSULIN LISPRO 4 UNITS: 100 INJECTION, SOLUTION INTRAVENOUS; SUBCUTANEOUS at 12:10

## 2025-03-16 NOTE — ASSESSMENT & PLAN NOTE
Patient with recent admission 2/26 through 3/11 at this campus.  Initial presentation was DKA, also noted to be septic in setting of bilateral lower extremity cellulitis with streptococcal bacteremia.  Patient was discharged to [facility] on 3/11/25 on Duricef as recommended by ID with plan to continue through 3/15  Patient was sent from STR to ED for fever/rigors  Patient met SIRS criteria on arrival with fever, tachycardia, tachypnea, and leukocytosis. Source unclear at this time.   COVID/flu/RSV negative  Procalcitonin 0.10, Lactic acid 1.6  UA negative  Chest x-ray no acute cardiopulmonary disease  Received 1 L IV fluids in the ED-will not give fluids per sepsis protocol due to concern for volume overload  Blood cultures: no growth at 48 hours  ID consultation appreciated   Source of sepsis unknown at this time   Continue IV vancomycin/cefepime for now.   CT chest/abd/pelvis without contrast given renal function  CT chest/abd/pelvis:  Small oval-shaped fat density in the anterior aspect of the left hemipelvis, new from prior, likely epiploic appendagitis or omental infarct. Faint fat stranding around the ascending colon, which may indicate colitis.  Patient reported 4 episodes of diarrhea on 3/14  Cdiff and stool enteric bacterial panel- negative  Continue to trend fever and procalcitonin  CBC

## 2025-03-16 NOTE — ASSESSMENT & PLAN NOTE
Blood pressure is currently controlled  Continue prehospital antihypertensives with hold parameters  Restart Lasix 40 mg daily  Monitor blood pressure per protocol

## 2025-03-16 NOTE — ASSESSMENT & PLAN NOTE
Patient discharged 3/11/25 from [facility] to skilled nursing facility-was noted to have streptococcal bacteremia felt likely related to bilateral lower extremity cellulitis.    Patient was discharged on Duricef as recommended by ID  See plan for sepsis

## 2025-03-16 NOTE — PROGRESS NOTES
Nadeem Purdy Jr. is a 69 y.o. male who is currently ordered Vancomycin IV with management by the Pharmacy Consult service.  Relevant clinical data and objective / subjective history reviewed.  Vancomycin Assessment:  Indication and Goal AUC/Trough: Soft tissue (goal -600, trough >10)  Clinical Status: stable  Micro:     Renal Function:  SCr: 1.96 mg/dL  CrCl: 48.9 mL/min  Renal replacement: Not on dialysis  Days of Therapy: 4  Current Dose: 1000mg IV Q24hrs  Vancomycin Plan:  New Dosing: continue 1000mg IV Q24hrs  Estimated AUC: 441 mcg*hr/mL  Estimated Trough: 14.1 mcg/mL  Next Level: 3/21 with am labs   Renal Function Monitoring: Daily BMP and UOP  Pharmacy will continue to follow closely for s/sx of nephrotoxicity, infusion reactions and appropriateness of therapy.  BMP and CBC will be ordered per protocol. We will continue to follow the patient’s culture results and clinical progress daily.    Christiane Alvarado, Pharmacist

## 2025-03-16 NOTE — PLAN OF CARE
Problem: PAIN - ADULT  Goal: Verbalizes/displays adequate comfort level or baseline comfort level  Description: Interventions:  - Encourage patient to monitor pain and request assistance  - Assess pain using appropriate pain scale  - Administer analgesics based on type and severity of pain and evaluate response  - Implement non-pharmacological measures as appropriate and evaluate response  - Consider cultural and social influences on pain and pain management  - Notify physician/advanced practitioner if interventions unsuccessful or patient reports new pain  Outcome: Progressing     Problem: INFECTION - ADULT  Goal: Absence or prevention of progression during hospitalization  Description: INTERVENTIONS:  - Assess and monitor for signs and symptoms of infection  - Monitor lab/diagnostic results  - Monitor all insertion sites, i.e. indwelling lines, tubes, and drains  - Monitor endotracheal if appropriate and nasal secretions for changes in amount and color  - [place] appropriate cooling/warming therapies per order  - Administer medications as ordered  - Instruct and encourage patient and family to use good hand hygiene technique  - Identify and instruct in appropriate isolation precautions for identified infection/condition  Outcome: Progressing

## 2025-03-16 NOTE — ASSESSMENT & PLAN NOTE
Lab Results   Component Value Date    HGBA1C 8.9 (H) 02/26/2025       Recent Labs     03/15/25  1559 03/15/25  2101 03/16/25  0724 03/16/25  1134   POCGLU 236* 190* 177* 245*       Blood Sugar Average: Last 72 hrs:  (P) [id number]    Blood sugar inpatient 140-180  Blood sugars are increasing  Increased dose of NovoLog 70/30 10 U BID to 20 U BID with meals  Initiate SSI ACHS  Diabetic low-salt diet  Hypoglycemia protocol  BMP a.m.

## 2025-03-16 NOTE — ASSESSMENT & PLAN NOTE
Patient reported 4 episodes of diarrhea on 3/14  Patient does have history of Cdiff  Cdiff toxin by PCR with EIA and stool enteric bacterial panel by PCR- negative

## 2025-03-16 NOTE — PROGRESS NOTES
Progress Note - Hospitalist   Name: Nadeem Purdy Jr. 69 y.o. male I MRN: [id number]  Unit/Bed#: -01 I Date of Admission: 3/13/2025   Date of Service: 3/16/2025 I Hospital Day: 3    Assessment & Plan  Sepsis without acute organ dysfunction (HCC)  Patient with recent admission 2/26 through 3/11 at this [facility].  Initial presentation was DKA, also noted to be septic in setting of bilateral lower extremity cellulitis with streptococcal bacteremia.  Patient was discharged to [facility] on 3/11/25 on Duricef as recommended by ID with plan to continue through 3/15  Patient was sent from STR to ED for fever/rigors  Patient met SIRS criteria on arrival with fever, tachycardia, tachypnea, and leukocytosis. Source unclear at this time.   COVID/flu/RSV negative  Procalcitonin 0.10, Lactic acid 1.6  UA negative  Chest x-ray no acute cardiopulmonary disease  Received 1 L IV fluids in the ED-will not give fluids per sepsis protocol due to concern for volume overload  Blood cultures: no growth at 48 hours  ID consultation appreciated   Source of sepsis unknown at this time   Continue IV vancomycin/cefepime for now.   CT chest/abd/pelvis without contrast given renal function  CT chest/abd/pelvis:  Small oval-shaped fat density in the anterior aspect of the left hemipelvis, new from prior, likely epiploic appendagitis or omental infarct. Faint fat stranding around the ascending colon, which may indicate colitis.  Patient reported 4 episodes of diarrhea on 3/14  Cdiff and stool enteric bacterial panel- negative  Continue to trend fever and procalcitonin  CBC  Essential hypertension  Blood pressure is currently controlled  Continue prehospital antihypertensives with hold parameters  Restart Lasix 40 mg daily  Monitor blood pressure per protocol  Hypercholesteremia  Continue prehospital statin  Class 3 severe obesity due to excess calories with serious comorbidity and body mass index (BMI) of 40.0 to 44.9 in adult ([facility])  BMI  41.57  Counseled on diet, exercise, lifestyle changes  Stage 3 chronic kidney disease, unspecified whether stage 3a or 3b CKD ([facility])  Lab Results   Component Value Date    EGFR 33 03/16/2025    EGFR 34 03/15/2025    EGFR 29 03/14/2025    CREATININE 1.96 (H) 03/16/2025    CREATININE 1.93 (H) 03/15/2025    CREATININE 2.18 (H) 03/14/2025     Patient recent hospitalization 2/26/25 to 3/11/25 and noted to have LUIS ALFREDO due to ATN and followed by nephrology.    At time of discharge it was recommended to continue patient's prehospital diuretic regimen.  Losartan was to be held until creatinine improves closer to baseline.  Appears euvolemic on exam but with poor appetite  We will continue to hold losartan  Restart po lasix 40 mg daily  Previous Creatinine baseline 1.1-1.3   Creatinine stable at 1.93 -> 1.96  Avoid nephrotoxins and hypotension  BMP a.m.  Type 2 diabetes mellitus with hyperglycemia, with long-term current use of insulin ([facility])  Lab Results   Component Value Date    HGBA1C 8.9 (H) 02/26/2025       Recent Labs     03/15/25  1559 03/15/25  2101 03/16/25  0724 03/16/25  1134   POCGLU 236* 190* 177* 245*       Blood Sugar Average: Last 72 hrs:  (P) [id number]    Blood sugar inpatient 140-180  Blood sugars are increasing  Increased dose of NovoLog 70/30 10 U BID to 20 U BID with meals  Initiate SSI ACHS  Diabetic low-salt diet  Hypoglycemia protocol  BMP a.m.  Resolving cellulitis of left lower extremity  Patient discharged [facility] from [facility] to skilled nursing facility-was noted to have streptococcal bacteremia felt likely related to bilateral lower extremity cellulitis.    Patient was discharged on Duricef as recommended by ID  See plan for sepsis  Urinary retention  History of urinary retention  Prehospital Flomax  Monitor urinary outputs  Retention protocol  Streptococcal bacteremia  See plan for sepsis  Chronic anemia  Baseline hemoglobin appears to be around 10  Hemoglobin on arrival 9.2   Stable in the 8s  to 9s  No signs of bleeding  Continue to monitor  Continue prehospital folic acid  CBC a.m.  Alcohol abuse  Patient has not had any alcohol since previous hospitalization   Continue folic acid and thiamine  Diarrhea  Patient reported 4 episodes of diarrhea on 3/14  Patient does have history of Cdiff  Cdiff toxin by PCR with EIA and stool enteric bacterial panel by PCR- negative    VTE Pharmacologic Prophylaxis: VTE Score: 8 High Risk (Score >/= 5) - Pharmacological DVT Prophylaxis Ordered: heparin. Sequential Compression Devices Ordered.    Mobility:   Basic Mobility Inpatient Raw Score: 18  JH-HLM Goal: 6: Walk 10 steps or more  JH-HLM Achieved: 7: Walk 25 feet or more  JH-HLM Goal achieved. Continue to encourage appropriate mobility.    Patient Centered Rounds: I performed bedside rounds with nursing staff today.   Discussions with Specialists or Other Care Team Provider: nursing    Education and Discussions with Family / Patient:  patient updated at bedside.     Current Length of Stay: 3 day(s)  Current Patient Status: Inpatient   Certification Statement: The patient will continue to require additional inpatient hospital stay due to continued monitoring, PT/OT eval for return back to STR  Discharge Plan: Anticipate discharge in 24-48 hrs to rehab facility.    Code Status: Level 3 - DNAR and DNI    Subjective   The patient was seen and examined. The patient is sitting up on his couch in his room in no acute distress. He denies any complaints and is asking when he can go back to rehab.    Objective :  Temp:  [98 °F (36.7 °C)-98.6 °F (37 °C)] 98 °F (36.7 °C)  HR:  [] 105  BP: (127-159)/(75-86) 149/86  Resp:  [18] 18  SpO2:  [94 %-97 %] 94 %  O2 Device: None (Room air)    Body mass index is 41.57 kg/m².     Input and Output Summary (last 24 hours):     Intake/Output Summary (Last 24 hours) at 3/16/2025 1230  Last data filed at 3/16/2025 1135  Gross per 24 hour   Intake 720 ml   Output 1775 ml   Net -1055 ml        Physical Exam  Vitals and nursing note reviewed.   Constitutional:       General: He is awake.      Appearance: He is morbidly obese.   HENT:      Head: Normocephalic and atraumatic.   Cardiovascular:      Rate and Rhythm: Normal rate and regular rhythm.      Heart sounds: Normal heart sounds.   Pulmonary:      Effort: Pulmonary effort is normal.      Breath sounds: Normal breath sounds.   Abdominal:      Palpations: Abdomen is soft.      Tenderness: There is no abdominal tenderness.   Musculoskeletal:      Right lower leg: Edema present.      Left lower leg: Edema present.   Skin:     General: Skin is warm and dry.   Neurological:      General: No focal deficit present.      Mental Status: He is alert and oriented to person, place, and time.   Psychiatric:         Attention and Perception: Attention normal.         Mood and Affect: Mood normal.         Speech: Speech normal.         Behavior: Behavior is cooperative.         Cognition and Memory: Cognition and memory normal.           Lines/Drains:              Lab Results: I have reviewed the following results:   Results from last 7 days   Lab Units 03/16/25  0552 03/15/25  0522 03/14/25  0448   WBC Thousand/uL 6.60   < > 11.71*   HEMOGLOBIN g/dL 8.3*   < > 8.9*   HEMATOCRIT % 26.3*   < > 27.5*   PLATELETS Thousands/uL 232   < > 244   SEGS PCT %  --   --  78*   LYMPHO PCT %  --   --  12*   MONO PCT %  --   --  6   EOS PCT %  --   --  2    < > = values in this interval not displayed.     Results from last 7 days   Lab Units 03/16/25  0552 03/15/25  0522   SODIUM mmol/L 132* 132*   POTASSIUM mmol/L 3.6 3.8   CHLORIDE mmol/L 100 99   CO2 mmol/L 25 26   BUN mg/dL 29* 27*   CREATININE mg/dL 1.96* 1.93*   ANION GAP mmol/L 7 7   CALCIUM mg/dL 8.1* 8.2*   ALBUMIN g/dL  --  2.6*   TOTAL BILIRUBIN mg/dL  --  0.43   ALK PHOS U/L  --  31*   ALT U/L  --  19   AST U/L  --  16   GLUCOSE RANDOM mg/dL 193* 218*     Results from last 7 days   Lab Units 03/13/25  1443   INR   1.25*     Results from last 7 days   Lab Units 03/16/25  1134 03/16/25  0724 03/15/25  2101 03/15/25  1559 03/15/25  1059 03/15/25  0721 03/14/25  2113 03/14/25  1611 03/14/25  1103 03/14/25  0726 03/13/25  2035 03/13/25  1819   POC GLUCOSE mg/dl 245* 177* 190* 236* 247* 227* 229* 271* 206* 184* 137 107         Results from last 7 days   Lab Units 03/14/25  0448 03/13/25  1443   LACTIC ACID mmol/L  --  1.6   PROCALCITONIN ng/ml 0.19 0.10       Recent Cultures (last 7 days):   Results from last 7 days   Lab Units 03/15/25  0617 03/13/25  1443   BLOOD CULTURE   --  No Growth at 48 hrs.  No Growth at 48 hrs.   C DIFF TOXIN B BY PCR  Positive*  --        Imaging Results Review: No pertinent imaging studies reviewed.  Other Study Results Review: No additional pertinent studies reviewed.    Last 24 Hours Medication List:     Current Facility-Administered Medications:     acetaminophen (TYLENOL) tablet 650 mg, Q6H PRN    amLODIPine (NORVASC) tablet 10 mg, Daily    aspirin (ECOTRIN LOW STRENGTH) EC tablet 81 mg, Daily    atorvastatin (LIPITOR) tablet 40 mg, Daily    azelastine (ASTELIN) 0.1 % nasal spray 1 spray, Daily    ceFEPime (MAXIPIME) 2,000 mg in dextrose 5 % 50 mL IVPB, Q12H, Last Rate: 2,000 mg (03/16/25 0617)    Cholecalciferol (VITAMIN D3) tablet 2,000 Units, Daily    folic acid (FOLVITE) tablet 1 mg, Daily    furosemide (LASIX) tablet 40 mg, Daily    gabapentin (NEURONTIN) capsule 200 mg, HS    heparin (porcine) subcutaneous injection 7,500 Units, Q8H MARGARITA **AND** Platelet count, Once    insulin aspart protamine-insulin aspart (NovoLOG 70/30) 100 units/mL subcutaneous injection 20 Units, BID AC    insulin lispro (HumALOG/ADMELOG) 100 units/mL subcutaneous injection 2-12 Units, TID AC **AND** Fingerstick Glucose (POCT), TID AC    insulin lispro (HumALOG/ADMELOG) 100 units/mL subcutaneous injection 2-12 Units, HS    iohexol (OMNIPAQUE) 350 MG/ML injection (MULTI-DOSE) 100 mL, Once in imaging    metoprolol tartrate  (LOPRESSOR) tablet 50 mg, Q12H MARGARITA    tamsulosin (FLOMAX) capsule 0.4 mg, Daily With Dinner    thiamine tablet 100 mg, Daily    vancomycin (VANCOCIN) IVPB (premix in dextrose) 1,000 mg 200 mL, Q24H, Last Rate: 1,000 mg (03/16/25 1159)    vitamin E (TOCOPHEROL) capsule 400 Units, Daily    Administrative Statements   Today, Patient Was Seen By: Zeyad Orellana PA-C  I have spent a total time of 35 minutes in caring for this patient on the day of the visit/encounter including Patient and family education, Documenting in the medical record, and Reviewing/placing orders in the medical record (including tests, medications, and/or procedures).    **Please Note: This note may have been constructed using a voice recognition system.**

## 2025-03-16 NOTE — ASSESSMENT & PLAN NOTE
Baseline hemoglobin appears to be around 10  Hemoglobin on arrival 9.2   Stable in the 8s to 9s  No signs of bleeding  Continue to monitor  Continue prehospital folic acid  CBC a.m.

## 2025-03-16 NOTE — ASSESSMENT & PLAN NOTE
Lab Results   Component Value Date    EGFR 33 03/16/2025    EGFR 34 03/15/2025    EGFR 29 03/14/2025    CREATININE 1.96 (H) 03/16/2025    CREATININE 1.93 (H) 03/15/2025    CREATININE 2.18 (H) 03/14/2025     Patient recent hospitalization 2/26/25 to 3/11/25 and noted to have LUIS ALFREDO due to ATN and followed by nephrology.    At time of discharge it was recommended to continue patient's prehospital diuretic regimen.  Losartan was to be held until creatinine improves closer to baseline.  Appears euvolemic on exam but with poor appetite  We will continue to hold losartan  Restart po lasix 40 mg daily  Previous Creatinine baseline 1.1-1.3   Creatinine stable at 1.93 -> 1.96  Avoid nephrotoxins and hypotension  BMP a.m.

## 2025-03-17 VITALS
SYSTOLIC BLOOD PRESSURE: 134 MMHG | RESPIRATION RATE: 18 BRPM | WEIGHT: 298.06 LBS | TEMPERATURE: 98 F | OXYGEN SATURATION: 94 % | BODY MASS INDEX: 41.73 KG/M2 | HEART RATE: 102 BPM | HEIGHT: 71 IN | DIASTOLIC BLOOD PRESSURE: 76 MMHG

## 2025-03-17 PROBLEM — R65.10 SIRS (SYSTEMIC INFLAMMATORY RESPONSE SYNDROME) (HCC): Status: ACTIVE | Noted: 2025-03-17

## 2025-03-17 LAB
ANION GAP SERPL CALCULATED.3IONS-SCNC: 7 MMOL/L (ref 4–13)
BUN SERPL-MCNC: 29 MG/DL (ref 5–25)
CALCIUM SERPL-MCNC: 8.2 MG/DL (ref 8.4–10.2)
CHLORIDE SERPL-SCNC: 100 MMOL/L (ref 96–108)
CO2 SERPL-SCNC: 26 MMOL/L (ref 21–32)
CREAT SERPL-MCNC: 1.97 MG/DL (ref 0.6–1.3)
ERYTHROCYTE [DISTWIDTH] IN BLOOD BY AUTOMATED COUNT: 12.8 % (ref 11.6–15.1)
FLUAV AG UPPER RESP QL IA.RAPID: NEGATIVE
FLUBV AG UPPER RESP QL IA.RAPID: NEGATIVE
GFR SERPL CREATININE-BSD FRML MDRD: 33 ML/MIN/1.73SQ M
GLUCOSE SERPL-MCNC: 171 MG/DL (ref 65–140)
GLUCOSE SERPL-MCNC: 181 MG/DL (ref 65–140)
GLUCOSE SERPL-MCNC: 194 MG/DL (ref 65–140)
HCT VFR BLD AUTO: 25.3 % (ref 36.5–49.3)
HGB BLD-MCNC: 8.1 G/DL (ref 12–17)
MCH RBC QN AUTO: 30 PG (ref 26.8–34.3)
MCHC RBC AUTO-ENTMCNC: 32 G/DL (ref 31.4–37.4)
MCV RBC AUTO: 94 FL (ref 82–98)
PLATELET # BLD AUTO: 258 THOUSANDS/UL (ref 149–390)
PMV BLD AUTO: 8.4 FL (ref 8.9–12.7)
POTASSIUM SERPL-SCNC: 3.5 MMOL/L (ref 3.5–5.3)
RBC # BLD AUTO: 2.7 MILLION/UL (ref 3.88–5.62)
SARS-COV+SARS-COV-2 AG RESP QL IA.RAPID: NEGATIVE
SODIUM SERPL-SCNC: 133 MMOL/L (ref 135–147)
WBC # BLD AUTO: 6.56 THOUSAND/UL (ref 4.31–10.16)

## 2025-03-17 PROCEDURE — 97163 PT EVAL HIGH COMPLEX 45 MIN: CPT

## 2025-03-17 PROCEDURE — 97166 OT EVAL MOD COMPLEX 45 MIN: CPT

## 2025-03-17 PROCEDURE — 85027 COMPLETE CBC AUTOMATED: CPT | Performed by: PHYSICIAN ASSISTANT

## 2025-03-17 PROCEDURE — G0545 PR INHERENT VISIT TO INPT: HCPCS | Performed by: INTERNAL MEDICINE

## 2025-03-17 PROCEDURE — 87811 SARS-COV-2 COVID19 W/OPTIC: CPT | Performed by: PHYSICIAN ASSISTANT

## 2025-03-17 PROCEDURE — 99233 SBSQ HOSP IP/OBS HIGH 50: CPT | Performed by: INTERNAL MEDICINE

## 2025-03-17 PROCEDURE — 99238 HOSP IP/OBS DSCHRG MGMT 30/<: CPT | Performed by: PHYSICIAN ASSISTANT

## 2025-03-17 PROCEDURE — 87804 INFLUENZA ASSAY W/OPTIC: CPT | Performed by: PHYSICIAN ASSISTANT

## 2025-03-17 PROCEDURE — 80048 BASIC METABOLIC PNL TOTAL CA: CPT | Performed by: PHYSICIAN ASSISTANT

## 2025-03-17 PROCEDURE — 82948 REAGENT STRIP/BLOOD GLUCOSE: CPT

## 2025-03-17 RX ADMIN — CHOLECALCIFEROL TAB 25 MCG (1000 UNIT) 2000 UNITS: 25 TAB at 08:08

## 2025-03-17 RX ADMIN — INSULIN LISPRO 2 UNITS: 100 INJECTION, SOLUTION INTRAVENOUS; SUBCUTANEOUS at 07:44

## 2025-03-17 RX ADMIN — Medication 400 UNITS: at 08:08

## 2025-03-17 RX ADMIN — INSULIN ASPART 20 UNITS: 100 INJECTION, SUSPENSION SUBCUTANEOUS at 07:45

## 2025-03-17 RX ADMIN — CEFEPIME 2000 MG: 2 INJECTION, POWDER, FOR SOLUTION INTRAVENOUS at 05:41

## 2025-03-17 RX ADMIN — METOPROLOL TARTRATE 50 MG: 50 TABLET, FILM COATED ORAL at 08:08

## 2025-03-17 RX ADMIN — INSULIN LISPRO 2 UNITS: 100 INJECTION, SOLUTION INTRAVENOUS; SUBCUTANEOUS at 13:04

## 2025-03-17 RX ADMIN — FUROSEMIDE 40 MG: 40 TABLET ORAL at 08:08

## 2025-03-17 RX ADMIN — HEPARIN SODIUM 7500 UNITS: 5000 INJECTION INTRAVENOUS; SUBCUTANEOUS at 05:46

## 2025-03-17 RX ADMIN — AZELASTINE HYDROCHLORIDE 1 SPRAY: 137 SPRAY, METERED NASAL at 08:10

## 2025-03-17 RX ADMIN — AMLODIPINE BESYLATE 10 MG: 10 TABLET ORAL at 08:08

## 2025-03-17 RX ADMIN — FOLIC ACID 1 MG: 1 TABLET ORAL at 08:08

## 2025-03-17 RX ADMIN — ASPIRIN 81 MG: 81 TABLET, COATED ORAL at 08:09

## 2025-03-17 RX ADMIN — VANCOMYCIN HYDROCHLORIDE 1000 MG: 1 INJECTION, SOLUTION INTRAVENOUS at 06:46

## 2025-03-17 RX ADMIN — THIAMINE HCL TAB 100 MG 100 MG: 100 TAB at 08:08

## 2025-03-17 RX ADMIN — ATORVASTATIN CALCIUM 40 MG: 40 TABLET, FILM COATED ORAL at 08:08

## 2025-03-17 NOTE — PLAN OF CARE
Problem: PAIN - ADULT  Goal: Verbalizes/displays adequate comfort level or baseline comfort level  Description: Interventions:  - Encourage patient to monitor pain and request assistance  - Assess pain using appropriate pain scale  - Administer analgesics based on type and severity of pain and evaluate response  - Implement non-pharmacological measures as appropriate and evaluate response  - Consider cultural and social influences on pain and pain management  - Notify physician/advanced practitioner if interventions unsuccessful or patient reports new pain  Outcome: Progressing     Problem: INFECTION - ADULT  Goal: Absence or prevention of progression during hospitalization  Description: INTERVENTIONS:  - Assess and monitor for signs and symptoms of infection  - Monitor lab/diagnostic results  - Monitor all insertion sites, i.e. indwelling lines, tubes, and drains  - Monitor endotracheal if appropriate and nasal secretions for changes in amount and color  - [place] appropriate cooling/warming therapies per order  - Administer medications as ordered  - Instruct and encourage patient and family to use good hand hygiene technique  - Identify and instruct in appropriate isolation precautions for identified infection/condition  Outcome: Progressing     Problem: SAFETY ADULT  Goal: Patient will remain free of falls  Description: INTERVENTIONS:  - Educate patient/family on patient safety including physical limitations  - Instruct patient to call for assistance with activity   - Consult OT/PT to assist with strengthening/mobility   - Keep Call bell within reach  - Keep bed low and locked with side rails adjusted as appropriate  - Keep care items and personal belongings within reach  - Initiate and maintain comfort rounds  - Make Fall Risk Sign visible to staff  - Offer Toileting every 2 Hours, in advance of need  - Initiate/Maintain alarm  - Obtain necessary fall risk management equipment:   - Apply yellow socks and  bracelet for high fall risk patients  - Consider moving patient to room near nurses station  Outcome: Progressing  Goal: Maintain or return to baseline ADL function  Description: INTERVENTIONS:  -  Assess patient's ability to carry out ADLs; assess patient's baseline for ADL function and identify physical deficits which impact ability to perform ADLs (bathing, care of mouth/teeth, toileting, grooming, dressing, etc.)  - Assess/evaluate cause of self-care deficits   - Assess range of motion  - Assess patient's mobility; develop plan if impaired  - Assess patient's need for assistive devices and provide as appropriate  - Encourage maximum independence but intervene and supervise when necessary  - Involve family in performance of ADLs  - Assess for home care needs following discharge   - Consider OT consult to assist with ADL evaluation and planning for discharge  - Provide patient education as appropriate  Outcome: Progressing     Problem: RESPIRATORY - ADULT  Goal: Achieves optimal ventilation and oxygenation  Description: INTERVENTIONS:  - Assess for changes in respiratory status  - Assess for changes in mentation and behavior  - Position to facilitate oxygenation and minimize respiratory effort  - Oxygen administered by appropriate delivery if ordered  - Initiate smoking cessation education as indicated  - Encourage broncho-pulmonary hygiene including cough, deep breathe, Incentive Spirometry  - Assess the need for suctioning and aspirate as needed  - Assess and instruct to report SOB or any respiratory difficulty  - Respiratory Therapy support as indicated  Outcome: Progressing

## 2025-03-17 NOTE — PHYSICAL THERAPY NOTE
PHYSICAL THERAPY EVALUATION  NAME:  Nadeem Purdy Jr.  DATE: 03/17/25    AGE:   69 y.o.  Mrn:   [id number]  ADMIT DX:  Chills [R68.83]  Cellulitis [L03.90]  Fever [R50.9]  Sepsis (HCC) [A41.9]  Problem List:   Patient Active Problem List   Diagnosis    Type 2 diabetes mellitus with microalbuminuria, with long-term current use of insulin ([facility])    Essential hypertension    Hypercholesteremia    Class 3 severe obesity due to excess calories with serious comorbidity and body mass index (BMI) of 40.0 to 44.9 in adult ([facility])    Steatohepatitis    Allergic rhinitis    Vitamin D deficiency    Persistent proteinuria    Tachycardia    Stage 3 chronic kidney disease, unspecified whether stage 3a or 3b CKD ([facility])    Lymphedema of both lower extremities    Rash    Numbness of left foot    Cortical age-related cataract of both eyes    Type 2 diabetes mellitus with hyperglycemia, with long-term current use of insulin ([facility])    Resolving cellulitis of left lower extremity    Right knee injury, subsequent encounter    Osteochondral lesion    Primary osteoarthritis of one knee, right    Lymphedema of right lower extremity    Other tear of medial meniscus, current injury, right knee, initial encounter    Hypertensive kidney disease with chronic kidney disease stage III (HCC)    Acute renal failure superimposed on stage 3a chronic kidney disease (HCC)    Diabetic ketoacidosis without coma associated with type 2 diabetes mellitus (HCC)    Severe sepsis (HCC)    Alcohol abuse    Hyponatremia    Metabolic acidosis    Urinary retention    Atrial fibrillation with RVR ([facility])    Hypomagnesemia    Streptococcal bacteremia    Chronic anemia    Diabetic nephropathy associated with type 2 diabetes mellitus (HCC)    Sepsis without acute organ dysfunction (HCC)    Diarrhea    SIRS (systemic inflammatory response syndrome) ([facility])       Past Medical History  Past Medical History:   Diagnosis Date    Clostridium difficile diarrhea 02/27/2025    Elevated  troponin 02/27/2025    Group A strep bacteremia 02/27/2025    Hypertension        Past Surgical History  Past Surgical History:   Procedure Laterality Date    CATARACT EXTRACTION, BILATERAL Bilateral        Length Of Stay: 4  Performed at least 2 patient identifiers during session: Name and ID bracelet       03/17/25 0907   PT Last Visit   PT Visit Date 03/17/25   Note Type   Note type Evaluation   Pain Assessment   Pain Assessment Tool 0-10   Pain Score No Pain   Restrictions/Precautions   Weight Bearing Precautions Per Order No   Other Precautions Contact/isolation;Chair Alarm;Bed Alarm;Fall Risk   Home Living   Type of Home House   Home Layout Two level;Bed/bath upstairs;Access  (FOS to 2nd floor)   Bathroom Shower/Tub Walk-in shower   Bathroom Toilet Standard   Bathroom Equipment   (none reported)   Home Equipment Cane;Walker  (using RW since prior hospital stay)   Prior Function   Level of [place] Independent with ADLs;Independent with functional mobility;Needs assistance with IADLS   Lives With Spouse   Receives Help From Family   IADLs Independent with driving;Family/Friend/Other provides meals;Family/Friend/Other provides medication management  (shared responsiblities with spouse for IADLs)   Falls in the last 6 months 0  (pt denies)   Vocational Retired   Comments pt admitted from [facility] where he was receiving STR   General   Family/Caregiver Present No   Cognition   Overall Cognitive Status WFL   Arousal/Participation Alert   Attention Within functional limits   Orientation Level Oriented X4   Memory Decreased recall of precautions;Decreased recall of recent events   Following Commands Follows one step commands without difficulty   RUE Assessment   RUE Assessment WFL   RUE Strength   RUE Overall Strength   (3+/5)   LUE Assessment   LUE Assessment WFL   LUE Strength   LUE Overall Strength   (3+/5)   RLE Assessment   RLE Assessment WFL   LLE Assessment   LLE Assessment WFL   Vision-Basic  Assessment   Current Vision Wears glasses all the time   Coordination   Sensation WFL   Bed Mobility   Additional Comments pt denied dizziness with transitional movement   Transfers   Sit to Stand   (CGA)   Additional items Assist x 1;Increased time required;Verbal cues   Stand to Sit   (CGA)   Additional items Assist x 1;Increased time required;Verbal cues   Toilet transfer   (CGA)   Additional items Assist x 1;Increased time required;Standard toilet   Additional Comments pt required cues for proper hand placement and for activity pacing   Ambulation/Elevation   Gait pattern Improper Weight shift;Decreased foot clearance;Excessively slow;Short stride   Gait Assistance   (CGA)   Additional items Verbal cues   Assistive Device Rolling walker   Distance 10 and 30 ft   Ambulation/Elevation Additional Comments fair safety awareness noted   Balance   Static Sitting Good   Dynamic Sitting Fair +   Static Standing Fair -   Dynamic Standing Fair -   Ambulatory Fair -   Endurance Deficit   Endurance Deficit Yes   Endurance Deficit Description pt reported fatigue with activity  (PATLE noted with ambulation; SpO2 decreased to 83% with ambulation)   Activity Tolerance   Activity Tolerance Patient limited by fatigue;Patient limited by pain   Assessment   Prognosis Good   Assessment Pt is 69 y.o. male seen for PT evaluation s/p admit to [facility] on 3/13/2025 w/ SIRS (systemic inflammatory response syndrome) (HCC). PT consulted to assess pt's functional mobility and d/c needs. Order placed for PT eval and tx, w/ activity as tolerated order. Pt agreeable to PT  session upon arrival, pt found on toilet in bathroom.  PTA, pt was independent w/ all functional mobility w/ no AD and lives w/ wife in 2 level home .  Pt to benefit from continued PT tx to address deficits and maximize level of functional independent mobility and consistency. Upon conclusion pt  seated in recliner. Complexity: Comorbidities affecting pt's physical  performance at time of assessment include: DM, obesity, and lymphedema. Personal factors affecting pt at time of IE include: coming from rehab, limited mobility, lives in multistory home, ambulating with assistive device, and inability to ambulate household distances. Please find objective findings from PT assessment regarding body systems outlined above with impairments and limitations including impaired balance, decreased endurance, gait deviations, decreased activity tolerance, decreased functional mobility tolerance, fall risk, and SOB upon exertion.  Pt's clinical presentation is currently unstable/unpredictable seen in pt's presentation of abnormal renal values, abnormal H&H, abnormal sodium levels, abnormal calcium levels, abnormal blood sugar levels, low SaO2 levels, wounds, and multiple readmissions. The patient's AM-PAC Basic Mobility Inpatient Short Form Raw Score is 17.  Based on patient presentations and impairments, pt would most appropriately benefit from Level 2 resource intensity upon discharge. A Raw score of greater than 16 suggests the patient may benefit from discharge to home. Please also refer to the recommendation of the Physical Therapist for safe discharge planning. RN verbalized pt appropriate for PT session. Pt seen as a co-eval with OT due to the patient's co-morbidities, clinically unstable presentation, and present impairments which are a regression from the patient's baseline.   Barriers to Discharge Inaccessible home environment   Goals   Patient Goals to back to rehab   LTG Expiration Date 03/27/25   Long Term Goal #1 Pt will: Perform bed mobility tasks to modified I to improve ease of bed mobility. Perform transfers to modified I to improve ease of transfers. Perform ambulation with MI and RW for 250 feet to increase Indep in home environment. Increase dynamic standing balance to F+ to decrease fall risk. Increase OOB activity tolerance to 10 minutes without s/s of exertion to  decrease fall risk. Navigate up and down 12 steps with MI so patient can enter and exit 2nd FL of home.   Plan   Treatment/Interventions Functional transfer training;Therapeutic exercise;Endurance training;Gait training;Bed mobility;Equipment eval/education;Elevations   PT Frequency 3-5x/wk   Discharge Recommendation   Rehab Resource Intensity Level, PT II (Moderate Resource Intensity)   Equipment Recommended Walker   Walker Package Recommended Wheeled walker   AM-PAC Basic Mobility Inpatient   Turning in Flat Bed Without Bedrails 3   Lying on Back to Sitting on Edge of Flat Bed Without Bedrails 3   Moving Bed to Chair 3   Standing Up From Chair Using Arms 3   Walk in Room 3   Climb 3-5 Stairs With Railing 2   Basic Mobility Inpatient Raw Score 17   Basic Mobility Standardized Score 39.67   [facility] Highest Level Of Mobility   -HLM Goal 5: Stand one or more mins   -HLM Achieved 4: Move to chair/commode       Time In: 0855  Time Out: 0907  Total Evaluation Minutes: 12    Tatyana Rodriguez, PT

## 2025-03-17 NOTE — ASSESSMENT & PLAN NOTE
Reports 1 episode of diarrhea since admission.  Consider possibility of chronic condition as he reported the same last admission.  Recent C. difficile PCR positive and EIA negative.  No prior history of C. difficile.  Primary prophylaxis has not been shown to be beneficial.  Again negative EIA C. difficile testing this admission.

## 2025-03-17 NOTE — ASSESSMENT & PLAN NOTE
Patient reported 4 episodes of diarrhea on 3/14  Patient does have history of Cdiff  Cdiff toxin by PCR with EIA and stool enteric bacterial panel by PCR- negative  Now resolved

## 2025-03-17 NOTE — PROGRESS NOTES
Progress Note - Infectious Disease   Name: Nadeem Purdy Jr. 69 y.o. male I MRN: [id number]  Unit/Bed#: -01 I Date of Admission: 3/13/2025   Date of Service: 3/17/2025 I Hospital Day: 4    Assessment & Plan  SIRS (systemic inflammatory response syndrome) (HCC)  [facility] with fever and rigors.  Fevers and leukocytosis resolved.  Unknown source.  Consider possibility of recurrent bacteremia, though fortunately repeat blood cultures are negative.  He recently had Ramirez catheter in place thus consider possibility of UTI however urine micro is benign.  COVID/flu/RSV negative.  Chest x-ray negative.  Consider possibility of occult source in chest, abdomen or pelvis. CT only showed possible omental infarct, unsure of significance.  Consider possibility of C. difficile given recent abx exposures however testing negative. Lower extremity cellulitis appears to be resolving. Denies joint or back pains. No soft tissue gas noted on x-rays.  Consider possibility of beta-lactam drug fever. Patient remains hemodynamically stable and clinically improved.   Discontinue IV vanco and cefepime   Monitor off abx   Continue to follow-up blood cultures  Daily CBC DIF and chemistry to monitor response to treatment and for developing toxicities  Trend temps and hemodynamics  Resolving cellulitis of left lower extremity  During recent admission for DKA, patient was found to have extensive left lower extremity cellulitis in the setting of nonhealing lower extremity wounds.  Wound culture positive for strep pyogenes and MSSA and clinical findings suspicious of streptococcal cellulitis.  This continues to improve on physical exam.  Patient was due to complete antibiotic course 3/15.  This appears to be resolving  Continue local wound care and serial skin exams  Streptococcal bacteremia  Diagnosed with group A strep bacteremia last admission, 1 of 2 sets of blood cultures isolated strep pyogenes.  Due  to skin and soft tissue infection.  Plan was to continue p.o. cefadroxil 1 g every 12 to complete 2-week course through 3/15/2025. Blood cx this admission so far negative.   Diarrhea  Reports 1 episode of diarrhea since admission.  Consider possibility of chronic condition as he reported the same last admission.  Recent C. difficile PCR positive and EIA negative.  No prior history of C. difficile.  Primary prophylaxis has not been shown to be beneficial.  Again negative EIA C. difficile testing this admission.   Lymphedema of both lower extremities  History of bilateral lower extremity lymphedema with chronic wounds.  Remains a risk factor for infection, poor wound healing, and recurrent admissions for cellulitis.  Encourage compliance with lower extremity elevation, compression, diuresis.  Consider podiatry or wound care consultations.  Stage 3 chronic kidney disease, unspecified whether stage 3a or 3b CKD ([facility])  Recent admission with LUIS ALFREDO on CKD. Baseline Cr previously 1.1-1.3. LUIS ALFREDO was most likely due to ischemic ATN in the setting of severe sepsis and hemodynamic instability, complicated by vasomotor dysfunction from ARB and urinary retention. Cr continues to improve.  We will renally dose antibiotics as needed.  Recommend ongoing nephrology follow-up in the outpatient setting.  Will continue with serial labs while inpatient. This is improving.   Type 2 diabetes mellitus with hyperglycemia, with long-term current use of insulin ([facility])  Recent admission to ICU for DKA. HA1C 8.9%. BS improved so far this admission compared to last. Recommend ongoing tight glycemic control.   Class 3 severe obesity due to excess calories with serious comorbidity and body mass index (BMI) of 40.0 to 44.9 in adult ([facility])  BMI greater than 40. Recommend lifestyle modifications. Remains a risk factor for infection   Alcohol abuse  History of alcohol abuse, with self-reported 1 to 2 glasses of bourbon a day the last beverage was about 1  month prior to recent admission.  No need for CIWA protocol at this time.    I have discussed the above management plan in detail with the primary service.     Antibiotics:  D5 IV cefepime and vanco    Subjective   Patient has no fever, chills, sweats; no nausea, vomiting, or diarrhea; no cough, shortness of breath; no pain. No new symptoms. Left leg is pruritic.     Objective :  Temp:  [98.2 °F (36.8 °C)-98.7 °F (37.1 °C)] 98.4 °F (36.9 °C)  HR:  [93-99] 96  BP: (133-148)/(71-81) 148/81  Resp:  [18] 18  SpO2:  [91 %-97 %] 96 %  O2 Device: None (Room air)  Nasal Cannula O2 Flow Rate (L/min):  [2 L/min] 2 L/min    Physical exam:  General:  No acute distress, sitting in bedside recliner, morbidly obese  Psychiatric:  Awake and alert, pleasant and cooperative, flat affect  HEENT: Mucous membranes moist, neck supple, head normal cephalic  Cardiovascular: Mild tachycardia, no murmur  Pulmonary: Lungs clear bilaterally though decreased at bases likely due to body habitus, otherwise on room air without dyspnea  Abdomen:  Soft, nontender, obese abdomen  Extremities: Chronic lower extremity lymphedema with resolving left lower extremity cellulitis with flaking of the skin and superficial crusting over blisters medial upper thigh  Skin:  No rashes.  As above      Lab Results: I have reviewed the following results:  Results from last 7 days   Lab Units 03/17/25  0536 03/16/25  0552 03/15/25  0522   WBC Thousand/uL 6.56 6.60 8.82   HEMOGLOBIN g/dL 8.1* 8.3* 9.1*   PLATELETS Thousands/uL 258 232 247     Results from last 7 days   Lab Units 03/17/25  0536 03/16/25  0552 03/15/25  0522 03/14/25  0448 03/13/25  1443   SODIUM mmol/L 133* 132* 132* 133* 134*   POTASSIUM mmol/L 3.5 3.6 3.8 3.8 3.9   CHLORIDE mmol/L 100 100 99 100 99   CO2 mmol/L 26 25 26 24 29   BUN mg/dL 29* 29* 27* 29* 34*   CREATININE mg/dL 1.97* 1.96* 1.93* 2.18* 2.30*   EGFR ml/min/1.73sq m 33 33 34 29 27   CALCIUM mg/dL 8.2* 8.1* 8.2* 8.1* 8.5   AST U/L  --   --   16 18 21   ALT U/L  --   --  19 20 23   ALK PHOS U/L  --   --  31* 29* 32*   ALBUMIN g/dL  --   --  2.6* 2.5* 2.7*     Results from last 7 days   Lab Units 03/15/25  0617 03/13/25  1443   BLOOD CULTURE   --  No Growth at 72 hrs.  No Growth at 72 hrs.   C DIFF TOXIN B BY PCR  Positive*  --      Results from last 7 days   Lab Units 03/14/25  0448 03/13/25  1443   PROCALCITONIN ng/ml 0.19 0.10                 Imaging Results Review: No pertinent imaging studies reviewed.  Other Study Results Review: Other studies reviewed include: micro

## 2025-03-17 NOTE — NURSING NOTE
Hospitalist was in to see and eval the pt and he is ok for dc to home, iv site removed with the tip intact, AVS reviewed with the receiving facility, report called and given to lili, all questions answered to her satisfaction, report to [facility] staff, pt sat in their wc and was dcd from the hospital

## 2025-03-17 NOTE — ASSESSMENT & PLAN NOTE
Recent admission with LUIS ALFREDO on CKD. Baseline Cr previously 1.1-1.3. LUIS ALFREDO was most likely due to ischemic ATN in the setting of severe sepsis and hemodynamic instability, complicated by vasomotor dysfunction from ARB and urinary retention. Cr continues to improve.  We will renally dose antibiotics as needed.  Recommend ongoing nephrology follow-up in the outpatient setting.  Will continue with serial labs while inpatient. This is improving.

## 2025-03-17 NOTE — ASSESSMENT & PLAN NOTE
Patient with recent admission 2/26 through 3/11 at this campus.  Initial presentation was DKA, also noted to be septic in setting of bilateral lower extremity cellulitis with streptococcal bacteremia.  Patient was discharged to [facility] on 3/11/25 on Duricef as recommended by ID with plan to continue through 3/15  Patient was sent from STR to ED for fever/rigors  Patient met SIRS criteria on arrival with fever, tachycardia, tachypnea, and leukocytosis.    COVID/flu/RSV negative  Procalcitonin 0.10, Lactic acid 1.6  UA negative  Chest x-ray no acute cardiopulmonary disease  Received 1 L IV fluids in the ED-will not give fluids per sepsis protocol due to concern for volume overload  Blood cultures: no growth at 72 hours  ID consultation appreciated   Fevers likely due to beta-lactam drug fever  Discontinue IV vancomycin/cefepime- no additional antibiotics needed  CT chest/abd/pelvis:  Small oval-shaped fat density in the anterior aspect of the left hemipelvis, new from prior, likely epiploic appendagitis or omental infarct. Faint fat stranding around the ascending colon, which may indicate colitis.  Patient reported 4 episodes of diarrhea on 3/14  Cdiff and stool enteric bacterial panel- negative  Patient medically stable for discharge back to [facility] for [facility]  Outpatient follow-up with PCP

## 2025-03-17 NOTE — PLAN OF CARE
Problem: PAIN - ADULT  Goal: Verbalizes/displays adequate comfort level or baseline comfort level  Description: Interventions:  - Encourage patient to monitor pain and request assistance  - Assess pain using appropriate pain scale  - Administer analgesics based on type and severity of pain and evaluate response  - Implement non-pharmacological measures as appropriate and evaluate response  - Consider cultural and social influences on pain and pain management  - Notify physician/advanced practitioner if interventions unsuccessful or patient reports new pain  Outcome: Progressing     Problem: INFECTION - ADULT  Goal: Absence or prevention of progression during hospitalization  Description: INTERVENTIONS:  - Assess and monitor for signs and symptoms of infection  - Monitor lab/diagnostic results  - Monitor all insertion sites, i.e. indwelling lines, tubes, and drains  - Monitor endotracheal if appropriate and nasal secretions for changes in amount and color  - Transfer appropriate cooling/warming therapies per order  - Administer medications as ordered  - Instruct and encourage patient and family to use good hand hygiene technique  - Identify and instruct in appropriate isolation precautions for identified infection/condition  Outcome: Progressing     Problem: SAFETY ADULT  Goal: Patient will remain free of falls  Description: INTERVENTIONS:  - Educate patient/family on patient safety including physical limitations  - Instruct patient to call for assistance with activity   - Consult OT/PT to assist with strengthening/mobility   - Keep Call bell within reach  - Keep bed low and locked with side rails adjusted as appropriate  - Keep care items and personal belongings within reach  - Initiate and maintain comfort rounds  - Make Fall Risk Sign visible to staff  - Offer Toileting every 2 Hours, in advance of need  - Initiate/Maintain alarm  - Obtain necessary fall risk management equipment:   - Apply yellow socks and  bracelet for high fall risk patients  - Consider moving patient to room near nurses station  Outcome: Progressing  Goal: Maintain or return to baseline ADL function  Description: INTERVENTIONS:  -  Assess patient's ability to carry out ADLs; assess patient's baseline for ADL function and identify physical deficits which impact ability to perform ADLs (bathing, care of mouth/teeth, toileting, grooming, dressing, etc.)  - Assess/evaluate cause of self-care deficits   - Assess range of motion  - Assess patient's mobility; develop plan if impaired  - Assess patient's need for assistive devices and provide as appropriate  - Encourage maximum independence but intervene and supervise when necessary  - Involve family in performance of ADLs  - Assess for home care needs following discharge   - Consider OT consult to assist with ADL evaluation and planning for discharge  - Provide patient education as appropriate  Outcome: Progressing     Problem: RESPIRATORY - ADULT  Goal: Achieves optimal ventilation and oxygenation  Description: INTERVENTIONS:  - Assess for changes in respiratory status  - Assess for changes in mentation and behavior  - Position to facilitate oxygenation and minimize respiratory effort  - Oxygen administered by appropriate delivery if ordered  - Initiate smoking cessation education as indicated  - Encourage broncho-pulmonary hygiene including cough, deep breathe, Incentive Spirometry  - Assess the need for suctioning and aspirate as needed  - Assess and instruct to report SOB or any respiratory difficulty  - Respiratory Therapy support as indicated  Outcome: Progressing     Problem: GASTROINTESTINAL - ADULT  Goal: Minimal or absence of nausea and/or vomiting  Description: INTERVENTIONS:  - Administer IV fluids if ordered to ensure adequate hydration  - Maintain NPO status until nausea and vomiting are resolved  - Nasogastric tube if ordered  - Administer ordered antiemetic medications as needed  - Provide  nonpharmacologic comfort measures as appropriate  - Advance diet as tolerated, if ordered  - Consider nutrition services referral to assist patient with adequate nutrition and appropriate food choices  Outcome: Progressing  Goal: Maintains or returns to baseline bowel function  Description: INTERVENTIONS:  - Assess bowel function  - Encourage oral fluids to ensure adequate hydration  - Administer IV fluids if ordered to ensure adequate hydration  - Administer ordered medications as needed  - Encourage mobilization and activity  - Consider nutritional services referral to assist patient with adequate nutrition and appropriate food choices  Outcome: Progressing  Goal: Maintains adequate nutritional intake  Description: INTERVENTIONS:  - Monitor percentage of each meal consumed  - Identify factors contributing to decreased intake, treat as appropriate  - Assist with meals as needed  - Monitor I&O, weight, and lab values if indicated  - Obtain nutrition services referral as needed  Outcome: Progressing     Problem: METABOLIC, FLUID AND ELECTROLYTES - ADULT  Goal: Electrolytes maintained within normal limits  Description: INTERVENTIONS:  - Monitor labs and assess patient for signs and symptoms of electrolyte imbalances  - Administer electrolyte replacement as ordered  - Monitor response to electrolyte replacements, including repeat lab results as appropriate  - Instruct patient on fluid and nutrition as appropriate  Outcome: Progressing  Goal: Fluid balance maintained  Description: INTERVENTIONS:  - Monitor labs   - Monitor I/O and WT  - Instruct patient on fluid and nutrition as appropriate  - Assess for signs & symptoms of volume excess or deficit  Outcome: Progressing     Problem: SKIN/TISSUE INTEGRITY - ADULT  Goal: Skin Integrity remains intact(Skin Breakdown Prevention)  Description: Assess:  -Perform Bright assessment every shift  -Clean and moisturize skin every daily/PRN   -Inspect skin when repositioning,  toileting, and assisting with ADLS  -Assess under medical devices such as Masimo every shift  -Assess extremities for adequate circulation and sensation     Bed Management:  -Have minimal linens on bed & keep smooth, unwrinkled  -Change linens as needed when moist or perspiring  -Avoid sitting or lying in one position for more than 2 hours while in bed  -Keep HOB at 30degrees     Toileting:  -Offer bedside commode  -Assess for incontinence during hourly rounds      Activity:  -Mobilize patient 3 times a day  -Encourage activity and walks on unit  -Encourage or provide ROM exercises   -Encourage pt to repo q 2 hr   -Use appropriate equipment to lift or move patient in bed      Skin Care:  -Avoid use of baby powder, tape, friction and shearing, hot water or constrictive clothing  -Do not massage red bony areas    Next Steps:  -Teach patient strategies to minimize risks such as clean and ensure folds are dry properly  -Consider consults to  interdisciplinary teams such as wound care and nutrition  Outcome: Progressing  Goal: Incision(s), wounds(s) or drain site(s) healing without S/S of infection  Description: INTERVENTIONS  - Assess and document dressing, incision, wound bed, drain sites and surrounding tissue  - Provide patient and family education  - Perform skin care/dressing changes daily/PRN as per order  Outcome: Progressing     Problem: Nutrition/Hydration-ADULT  Goal: Nutrient/Hydration intake appropriate for improving, restoring or maintaining nutritional needs  Description: Monitor and assess patient's nutrition/hydration status for malnutrition. Collaborate with interdisciplinary team and initiate plan and interventions as ordered.  Monitor patient's weight and dietary intake as ordered or per policy. Utilize nutrition screening tool and intervene as necessary. Determine patient's food preferences and provide high-protein, high-caloric foods as appropriate.     INTERVENTIONS:  - Monitor oral intake, urinary  output, labs, and treatment plans  - Assess nutrition and hydration status and recommend course of action  - Evaluate amount of meals eaten  - Assist patient with eating if necessary   - Allow adequate time for meals  - Recommend/ encourage appropriate diets, oral nutritional supplements, and vitamin/mineral supplements  - Order, calculate, and assess calorie counts as needed  - Recommend, monitor, and adjust tube feedings and TPN/PPN based on assessed needs  - Assess need for intravenous fluids  - Provide specific nutrition/hydration education as appropriate  - Include patient/family/caregiver in decisions related to nutrition  Outcome: Progressing     Problem: Prexisting or High Potential for Compromised Skin Integrity  Goal: Skin integrity is maintained or improved  Description: INTERVENTIONS:  - Identify patients at risk for skin breakdown  - Assess and monitor skin integrity  - Assess and monitor nutrition and hydration status  - Monitor labs   - Assess for incontinence   - Turn and reposition patient  - Assist with mobility/ambulation  - Relieve pressure over bony prominences  - Avoid friction and shearing  - Provide appropriate hygiene as needed including keeping skin clean and dry  - Evaluate need for skin moisturizer/barrier cream  - Collaborate with interdisciplinary team   - Patient/family teaching  - Consider wound care consult   Outcome: Progressing

## 2025-03-17 NOTE — ASSESSMENT & PLAN NOTE
During recent admission for DKA, patient was found to have extensive left lower extremity cellulitis in the setting of nonhealing lower extremity wounds.  Wound culture positive for strep pyogenes and MSSA and clinical findings suspicious of streptococcal cellulitis.  This continues to improve on physical exam.  Patient was due to complete antibiotic course 3/15.  This appears to be resolving  Continue local wound care and serial skin exams

## 2025-03-17 NOTE — ASSESSMENT & PLAN NOTE
Lab Results   Component Value Date    EGFR 33 03/17/2025    EGFR 33 03/16/2025    EGFR 34 03/15/2025    CREATININE 1.97 (H) 03/17/2025    CREATININE 1.96 (H) 03/16/2025    CREATININE 1.93 (H) 03/15/2025     Patient recent hospitalization 2/26/25 to 3/11/25 and noted to have LUIS ALFREDO due to ATN and followed by nephrology.    At time of discharge it was recommended to continue patient's prehospital diuretic regimen.  Losartan was to be held until creatinine improves closer to baseline.  Appears euvolemic on exam   We will continue to hold losartan  Restart po lasix   Previous Creatinine baseline 1.1-1.3   Creatinine stable at 1.93 -> 1.96 -> 1.97  Outpatient follow-up with Nephrology

## 2025-03-17 NOTE — OCCUPATIONAL THERAPY NOTE
Occupational Therapy Evaluation     Patient Name: Nadeem Purdy Jr.  Today's Date: 3/17/2025  Problem List  Principal Problem:    Sepsis without acute organ dysfunction ([facility])  Active Problems:    Essential hypertension    Hypercholesteremia    Class 3 severe obesity due to excess calories with serious comorbidity and body mass index (BMI) of 40.0 to 44.9 in adult ([facility])    Stage 3 chronic kidney disease, unspecified whether stage 3a or 3b CKD ([facility])    Lymphedema of both lower extremities    Type 2 diabetes mellitus with hyperglycemia, with long-term current use of insulin ([facility])    Resolving cellulitis of left lower extremity    Alcohol abuse    Urinary retention    Streptococcal bacteremia    Chronic anemia    Diarrhea    SIRS (systemic inflammatory response syndrome) ([facility])    Past Medical History  Past Medical History:   Diagnosis Date    Clostridium difficile diarrhea 02/27/2025    Elevated troponin 02/27/2025    Group A strep bacteremia 02/27/2025    Hypertension      Past Surgical History  Past Surgical History:   Procedure Laterality Date    CATARACT EXTRACTION, BILATERAL Bilateral         03/17/25 0854   OT Last Visit   OT Visit Date 03/17/25   Note Type   Note type Evaluation   Additional Comments Pt seen as a co-eval with PT due to the patient's co-morbidities, clinically unstable presentation, and present impairments which are a regression from the patient's baseline.   Pain Assessment   Pain Assessment Tool 0-10   Pain Score No Pain   Restrictions/Precautions   Weight Bearing Precautions Per Order No   Other Precautions Contact/isolation;Chair Alarm;Bed Alarm;Fall Risk   Home Living   Type of Home House   Home Layout Two level;Bed/bath upstairs;Access  (FOS to 2nd floor)   Bathroom Shower/Tub Walk-in shower   Bathroom Toilet Standard   Bathroom Equipment   (no DME reported)   Bathroom Accessibility Accessible   Home Equipment Cane;Walker  (Pt denies use of AD at baseline but has been utilizing RW since  "admission to STR)   Prior Function   Level of [place] Independent with ADLs;Independent with functional mobility;Needs assistance with IADLS   Lives With Spouse   Receives Help From Family   IADLs Independent with driving;Family/Friend/Other provides meals;Family/Friend/Other provides medication management  (shared responsiblities with spouse for IADLs)   Falls in the last 6 months 0  (pt denies)   Vocational Retired   Comments Pt was recently admitted to [facility] then discharged to [place] for STR services - pt plans to return to [facility] once medically stable   Lifestyle   Autonomy Pt resides in two level house w/ spouse; I with ADls and requires some A with IADLs; +    Reciprocal Relationships supportive family   Service to Others retired   Intrinsic Gratification watching tv   Subjective   Subjective \"I want to be as independent as possible\"   ADL   Where Assessed Chair   Eating Assistance 6  Modified independent   Grooming Assistance 6  Modified Independent   UB Bathing Assistance 5  Supervision/Setup   LB Bathing Assistance 4  Minimal Assistance   UB Dressing Assistance 5  Supervision/Setup   LB Dressing Assistance 3  Moderate Assistance   Toileting Assistance  4  Minimal Assistance   Additional Comments Given functional performance skills + medical complexity, therapist suspects via clinical judgement + skilled analysis; pt currently requires stated assist above to perform each area of ADLs d/t limitations including: functional mobility, functional activity tolerance, coordination, balance, problem-solving and overall pain   Bed Mobility   Additional Comments DNT; pt in bathroom upon arrival and seated in recliner upon conclusion   Transfers   Sit to Stand   (CGA)   Additional items Assist x 1;Increased time required;Verbal cues   Stand to Sit   (CGA)   Additional items Assist x 1;Increased time required;Verbal cues   Toilet transfer   (CGA)   Additional items Assist x 1;Increased time " required;Standard toilet   Additional Comments RW used; VC for safe hand placement, proper body mechanics and overall RW management during directional turns   Functional Mobility   Functional Mobility 4  Minimal assistance   Additional Comments Pt completed short distance ADL mobility around room at min A x1 w/ RW. No significant LOB observed, mild instability   Additional items Rolling walker   Balance   Static Sitting Good   Dynamic Sitting Fair +   Static Standing Fair +   Dynamic Standing Fair   Ambulatory Fair -   Activity Tolerance   Activity Tolerance Patient limited by fatigue;Patient limited by pain   Medical Staff Made Aware Yes, CM made aware of d/c recs   Nurse Made Aware Yes, nursing staff made aware of session outcomes   RUE Assessment   RUE Assessment WFL   RUE Strength   RUE Overall Strength   (3+/5)   LUE Assessment   LUE Assessment WFL   LUE Strength   LUE Overall Strength   (3+/5)   Hand Function   Gross Motor Coordination Functional   Fine Motor Coordination Functional   Psychosocial   Psychosocial (WDL) WDL   Cognition   Overall Cognitive Status WFL   Arousal/Participation Alert;Responsive;Cooperative   Attention Within functional limits   Orientation Level Oriented X4   Memory Decreased recall of precautions;Decreased recall of recent events   Following Commands Follows one step commands without difficulty   Comments Pt agreeable to OT evaluation, pleasant   Assessment   Limitation Decreased ADL status;Decreased UE strength;Decreased Safe judgement during ADL;Decreased endurance;Decreased high-level ADLs   Prognosis Good   Assessment Pt is a 69 y.o. male seen for OT evaluation s/p admit to [facility] on 3/13/2025 w/ Sepsis without acute organ dysfunction (HCC).  Comorbidities affecting pt's functional performance at time of assessment include: HTN, obesity, CKD, lymphedema of both LE, type 2 diabetes, anemia, SIRS, tachycardia, numbness of left foot. Personal factors affecting pt at time of IE  include:steps to enter environment, difficulty performing ADLS, difficulty performing IADLS , limited insight into deficits, decreased initiation and engagement , and health management . OT order placed to assess Pt's ADLs, cognitive status, and performance during functional tasks in order to maximize safety and independence while making most appropriate d/c recommendations. Prior to admission, pt was I with ADLs and requires some A with IADLs. Upon evaluation: the following deficits impact occupational performance: weakness, decreased strength, decreased balance, decreased tolerance, impaired problem solving, and decreased safety awareness. Pt's clinical presentation is currently stable  given new onset deficits that effect Pt's occupational performance and ability to safely return to OF including decrease activity tolerance, decrease standing balance, decrease performance during ADL tasks, decrease safety awareness , decrease generalized strength, decrease performance during functional transfers, and high fall risk combined with medical complications of pain impacting overall mobility status, wounds, decreased skin integrity, and need for input for mobility technique/safety.  Pt to benefit from continued skilled OT tx while in the hospital to address deficits as defined above and maximize level of functional independence w ADL's and functional mobility. Occupational Performance areas to address include: grooming, bathing/shower, toilet hygiene, dressing, functional mobility, community mobility, and clothing management. From OT standpoint, recommendation at time of d/c would with moderate intensity OT resources.   Goals   Patient Goals to go back to rehab   Plan   Treatment Interventions ADL retraining;Functional transfer training;UE strengthening/ROM;Endurance training;Patient/family training;Compensatory technique education;Activityengagement;Energy conservation   Goal Expiration Date 03/27/25   OT Treatment  Day 0   OT Frequency 2-3x/wk   Discharge Recommendation   Rehab Resource Intensity Level, OT II (Moderate Resource Intensity)   Additional Comments  The patient's raw score on the AM-PAC Daily Activity inpatient short form is 18, standardized score is 38.66, less than 39.4. Patients at this level are likely to benefit from discharge with moderate intensity OT resources. Please refer to the recommendation of the Occupational Therapist for safe discharge planning.   AM-PAC Daily Activity Inpatient   Lower Body Dressing 2   Bathing 3   Toileting 3   Upper Body Dressing 3   Grooming 3   Eating 4   Daily Activity Raw Score 18   Daily Activity Standardized Score (Calc for Raw Score >=11) 38.66   AM-PAC Applied Cognition Inpatient   Following a Speech/Presentation 3   Understanding Ordinary Conversation 4   Taking Medications 4   Remembering Where Things Are Placed or Put Away 3   Remembering List of 4-5 Errands 3   Taking Care of Complicated Tasks 3   Applied Cognition Raw Score 20   Applied Cognition Standardized Score 41.76     GOALS:    Pt will achieve the following within specified time frame: LTG  Pt will achieve the following goals within 10 days    *ADL transfers with (I) for inc'd independence with ADLs/purposeful tasks    *UB ADL with (I) for inc'd independence with self cares    *LB ADL with (I) using AE prn for inc'd independence with self cares    *Toileting with (I) for clothing management and hygiene for return to PLOF with personal care    *Increase static stand balance and dyn stand balance to F+ for inc'd safety with standing purposeful tasks    *Increase stand tolerance x7 m for inc'd tolerance with standing purposeful tasks    *Bed mobility- (I) for inc'd independence to manage own comfort and initiate EOB & OOB purposeful tasks    *Participate in 10-15m UE therex to increase overall stamina/activity tolerance for purposeful tasks    Magda Berrios MS, OTR/L

## 2025-03-17 NOTE — PLAN OF CARE
Problem: OCCUPATIONAL THERAPY ADULT  Goal: Performs self-care activities at highest level of function for planned discharge setting.  See evaluation for individualized goals.  Description: Treatment Interventions: ADL retraining, Functional transfer training, UE strengthening/ROM, Endurance training, Patient/family training, Compensatory technique education, Activityengagement, Energy conservation      See flowsheet documentation for full assessment, interventions and recommendations.   Note: Limitation: Decreased ADL status, Decreased UE strength, Decreased Safe judgement during ADL, Decreased endurance, Decreased high-level ADLs  Prognosis: Good  Assessment: Pt is a 69 y.o. male seen for OT evaluation s/p admit to [facility] on 3/13/2025 w/ Sepsis without acute organ dysfunction (HCC).  Comorbidities affecting pt's functional performance at time of assessment include: HTN, obesity, CKD, lymphedema of both LE, type 2 diabetes, anemia, SIRS, tachycardia, numbness of left foot. Personal factors affecting pt at time of IE include:steps to enter environment, difficulty performing ADLS, difficulty performing IADLS , limited insight into deficits, decreased initiation and engagement , and health management . OT order placed to assess Pt's ADLs, cognitive status, and performance during functional tasks in order to maximize safety and independence while making most appropriate d/c recommendations. Prior to admission, pt was I with ADLs and requires some A with IADLs. Upon evaluation: the following deficits impact occupational performance: weakness, decreased strength, decreased balance, decreased tolerance, impaired problem solving, and decreased safety awareness. Pt's clinical presentation is currently stable  given new onset deficits that effect Pt's occupational performance and ability to safely return to OF including decrease activity tolerance, decrease standing balance, decrease performance during ADL tasks,  decrease safety awareness , decrease generalized strength, decrease performance during functional transfers, and high fall risk combined with medical complications of pain impacting overall mobility status, wounds, decreased skin integrity, and need for input for mobility technique/safety.  Pt to benefit from continued skilled OT tx while in the hospital to address deficits as defined above and maximize level of functional independence w ADL's and functional mobility. Occupational Performance areas to address include: grooming, bathing/shower, toilet hygiene, dressing, functional mobility, community mobility, and clothing management. From OT standpoint, recommendation at time of d/c would with moderate intensity OT resources.     Rehab Resource Intensity Level, OT: II (Moderate Resource Intensity)     Magda Berrios OTR/L

## 2025-03-17 NOTE — PLAN OF CARE
Problem: PHYSICAL THERAPY ADULT  Goal: Performs mobility at highest level of function for planned discharge setting.  See evaluation for individualized goals.  Description: Treatment/Interventions: Functional transfer training, Therapeutic exercise, Endurance training, Gait training, Bed mobility, Equipment eval/education, Elevations  Equipment Recommended: Walker       See flowsheet documentation for full assessment, interventions and recommendations.  Note: Prognosis: Good     Assessment: Pt is 69 y.o. male seen for PT evaluation s/p admit to [facility] on 3/13/2025 w/ SIRS (systemic inflammatory response syndrome) (HCC). PT consulted to assess pt's functional mobility and d/c needs. Order placed for PT eval and tx, w/ activity as tolerated order. Pt agreeable to PT  session upon arrival, pt found on toilet in bathroom.  PTA, pt was independent w/ all functional mobility w/ no AD and lives w/ wife in 2 level home .  Pt to benefit from continued PT tx to address deficits and maximize level of functional independent mobility and consistency. Upon conclusion pt  seated in recliner. Complexity: Comorbidities affecting pt's physical performance at time of assessment include: DM, obesity, and lymphedema. Personal factors affecting pt at time of IE include: coming from rehab, limited mobility, lives in multistory home, ambulating with assistive device, and inability to ambulate household distances. Please find objective findings from PT assessment regarding body systems outlined above with impairments and limitations including impaired balance, decreased endurance, gait deviations, decreased activity tolerance, decreased functional mobility tolerance, fall risk, and SOB upon exertion.  Pt's clinical presentation is currently unstable/unpredictable seen in pt's presentation of abnormal renal values, abnormal H&H, abnormal sodium levels, abnormal calcium levels, abnormal blood sugar levels, low SaO2 levels,  wounds, and multiple readmissions. The patient's AM-PAC Basic Mobility Inpatient Short Form Raw Score is 17.  Based on patient presentations and impairments, pt would most appropriately benefit from Level 2 resource intensity upon discharge. A Raw score of greater than 16 suggests the patient may benefit from discharge to home. Please also refer to the recommendation of the Physical Therapist for safe discharge planning. RN verbalized pt appropriate for PT session. Pt seen as a co-eval with OT due to the patient's co-morbidities, clinically unstable presentation, and present impairments which are a regression from the patient's baseline.  Barriers to Discharge: Inaccessible home environment     Rehab Resource Intensity Level, PT: II (Moderate Resource Intensity)    See flowsheet documentation for full assessment.

## 2025-03-17 NOTE — ASSESSMENT & PLAN NOTE
Baseline hemoglobin appears to be around 10  Hemoglobin on arrival 9.2   Stable in the 8s to 9s  No signs of bleeding.

## 2025-03-17 NOTE — ASSESSMENT & PLAN NOTE
Blood pressure is currently controlled  Continue prehospital Norvasc, Lopressor and lasix  Continue to hold losartan until seen by nephrology

## 2025-03-17 NOTE — ASSESSMENT & PLAN NOTE
Patient with recent admission 2/26 through 3/11 at this campus.  Initial presentation was DKA, also noted to be septic in setting of bilateral lower extremity cellulitis with streptococcal bacteremia.  Patient was discharged to [facility] on 3/11/25 on Duricef as recommended by ID with plan to continue through 3/15  Patient was sent from [facility] to ED for fever/rigors  Patient met SIRS criteria on arrival with fever, tachycardia, tachypnea, and leukocytosis.    COVID/flu/RSV negative  Procalcitonin 0.10, Lactic acid 1.6  UA negative  Chest x-ray no acute cardiopulmonary disease  Received 1 L IV fluids in the ED-will not give fluids per sepsis protocol due to concern for volume overload  Blood cultures: no growth at 72 hours  ID consultation appreciated   Fevers likely due to beta-lactam drug fever  Discontinue IV vancomycin/cefepime- no additional antibiotics needed  CT chest/abd/pelvis:  Small oval-shaped fat density in the anterior aspect of the left hemipelvis, new from prior, likely epiploic appendagitis or omental infarct. Faint fat stranding around the ascending colon, which may indicate colitis.  Patient reported 4 episodes of diarrhea on 3/14  Cdiff and stool enteric bacterial panel- negative  Patient medically stable for discharge back to [facility] for [facility]  Outpatient follow-up with PCP

## 2025-03-17 NOTE — DISCHARGE SUMMARY
Discharge Summary - Hospitalist   Name: Nadeem Purdy Jr. 69 y.o. male I MRN: [id number]  Unit/Bed#: -01 I Date of Admission: 3/13/2025   Date of Service: 3/17/2025 I Hospital Day: 4     Assessment & Plan  SIRS (systemic inflammatory response syndrome) ([facility])  Patient with recent admission 2/26 through 3/11 at this [place].  Initial presentation was DKA, also noted to be septic in setting of bilateral lower extremity cellulitis with streptococcal bacteremia.  Patient was discharged to [facility] on 3/11/25 on Duricef as recommended by ID with plan to continue through 3/15  Patient was sent from [facility] to ED for fever/rigors  Patient met SIRS criteria on arrival with fever, tachycardia, tachypnea, and leukocytosis.    COVID/flu/RSV negative  Procalcitonin 0.10, Lactic acid 1.6  UA negative  Chest x-ray no acute cardiopulmonary disease  Received 1 L IV fluids in the ED-will not give fluids per sepsis protocol due to concern for volume overload  Blood cultures: no growth at 72 hours  ID consultation appreciated   Fevers likely due to beta-lactam drug fever  Discontinue IV vancomycin/cefepime- no additional antibiotics needed  CT chest/abd/pelvis:  Small oval-shaped fat density in the anterior aspect of the left hemipelvis, new from prior, likely epiploic appendagitis or omental infarct. Faint fat stranding around the ascending colon, which may indicate colitis.  Patient reported 4 episodes of diarrhea on 3/14  Cdiff and stool enteric bacterial panel- negative  Patient medically stable for discharge back to [facility] for [facility]  Outpatient follow-up with PCP  Essential hypertension  Blood pressure is currently controlled  Continue prehospital Norvasc, Lopressor and lasix  Continue to hold losartan until seen by nephrology  Hypercholesteremia  Continue prehospital statin  Class 3 severe obesity due to excess calories with serious comorbidity and body mass index (BMI) of 40.0 to 44.9 in adult ([facility])  BMI 41.57  Counseled on  diet, exercise, lifestyle changes  Stage 3 chronic kidney disease, unspecified whether stage 3a or 3b CKD ([facility])  Lab Results   Component Value Date    EGFR 33 03/17/2025    EGFR 33 03/16/2025    EGFR 34 03/15/2025    CREATININE 1.97 (H) 03/17/2025    CREATININE 1.96 (H) 03/16/2025    CREATININE 1.93 (H) 03/15/2025     Patient recent hospitalization 2/26/25 to 3/11/25 and noted to have LUIS ALFREDO due to ATN and followed by nephrology.    At time of discharge it was recommended to continue patient's prehospital diuretic regimen.  Losartan was to be held until creatinine improves closer to baseline.  Appears euvolemic on exam   We will continue to hold losartan  Restart po lasix   Previous Creatinine baseline 1.1-1.3   Creatinine stable at 1.93 -> 1.96 -> 1.97  Outpatient follow-up with Nephrology  Type 2 diabetes mellitus with hyperglycemia, with long-term current use of insulin ([facility])  Lab Results   Component Value Date    HGBA1C 8.9 (H) 02/26/2025       Recent Labs     03/16/25  1636 03/16/25  2120 03/17/25  0731 03/17/25  1102   POCGLU 278* 220* 181* 194*       Blood Sugar Average: Last 72 hrs:  (P) 220.1054818830247975  Continue NovoLog 70/30   Diabetic low-salt diet  Resolving cellulitis of left lower extremity  Patient discharged 3/11/25 from [facility] to skilled nursing facility-was noted to have streptococcal bacteremia felt likely related to bilateral lower extremity cellulitis.    Patient was discharged on Duricef as recommended by ID  See plan for sepsis  Urinary retention  History of urinary retention  Prehospital Flomax  Monitor urinary outputs  Retention protocol  Streptococcal bacteremia  See plan for sepsis  Chronic anemia  Baseline hemoglobin appears to be around 10  Hemoglobin on arrival 9.2   Stable in the 8s to 9s  No signs of bleeding.  Alcohol abuse  Patient has not had any alcohol since previous hospitalization   Continue folic acid and thiamine  Diarrhea  Patient reported 4 episodes of diarrhea on  3/14  Patient does have history of Cdiff  Cdiff toxin by PCR with EIA and stool enteric bacterial panel by PCR- negative  Now resolved     Medical Problems       Resolved Problems  Date Reviewed: 3/4/2025   None       Discharging Physician / Practitioner: Zeyad Orellana PA-C  PCP: Jluis Glass MD  Admission Date:   Admission Orders (From admission, onward)       Ordered        03/13/25 2046  INPATIENT ADMISSION  Once            03/13/25 1604  Place in Observation  Once                          Discharge Date: 03/17/25    Consultations During Hospital Stay:  Infectious Disease     Procedures Performed:   none    Significant Findings / Test Results:   CT chest abdomen pelvis wo contrast  Result Date: 3/14/2025  Impression: 1.  Small oval-shaped fat density in the anterior aspect of the left hemipelvis, new from prior, likely epiploic appendagitis or omental infarct. 2.  Faint fat stranding around the ascending colon, which may indicate colitis. 3.  No other acute abdominal pelvic pathology within limitation of a noncontrast study. 4.  No acute pulmonary pathology. 5.  Additional findings as above. Workstation performed: [id number]     XR tibia fibula 2 views LEFT  Result Date: 3/13/2025  Impression: No soft tissue gas. Computerized Assisted Algorithm (CAA) may have been used to analyze all applicable images. Workstation performed: [id number]     XR tibia fibula 2 views RIGHT  Result Date: 3/13/2025  Impression: No soft tissue gas. Computerized Assisted Algorithm (CAA) may have been used to analyze all applicable images. Workstation performed: [id number]     XR chest portable  Result Date: 3/13/2025  Impression: No acute cardiopulmonary disease. Workstation performed: [id number]         Incidental Findings:   none     Test Results Pending at Discharge (will require follow up):   none     Outpatient Tests Requested:  none    Complications:  none    Reason for Admission: SIRS    Hospital Course:   Nadeem Purdy  " is a 69 y.o. male patient who originally presented to the hospital on 3/13/2025 due to fevers/chills. Please see H&P for complete details of presentation. The patient was noted to have af fever of 101.9 and met SIRS criteria with leukocytosis, tachypnea, and tachycardia. The patient was started on IV Vancomycin and Cefepime. ID consulted. Blood cultures were negative at 72 hours. CT chest /abdomen/pelvis was negative for acute findings. The reported multiple episodes of diarrhea. Cdiff and stool enteric bacterial panel was negative. IV antibiotics were discontinued as it was thought the patient's fever was due to beta-lactam drug fever given infectious workup was negative. The patient was afebrile for more than 48 hours, leukocytosis resolved. The patient was discharged back to [facility] for [facility]. Please see notes from throughout the patient's hospital stay for complete details of his hospitalization.       Please see above list of diagnoses and related plan for additional information.     Condition at Discharge: good    Discharge Day Visit / Exam:   Subjective:    Vitals: Blood Pressure: 148/81 (03/17/25 0731)  Pulse: 96 (03/17/25 0731)  Temperature: 98.4 °F (36.9 °C) (03/17/25 0731)  Temp Source: Oral (03/16/25 2321)  Respirations: 18 (03/16/25 2321)  Height: 5' 11\" (180.3 cm) (03/13/25 1655)  Weight - Scale: 135 kg (298 lb 1 oz) (03/13/25 1655)  SpO2: 96 % (03/17/25 0731)  Physical Exam  Vitals and nursing note reviewed.   Constitutional:       Appearance: Normal appearance. He is obese.   HENT:      Head: Normocephalic and atraumatic.      Nose: Nose normal.      Mouth/Throat:      Mouth: Mucous membranes are dry.   Cardiovascular:      Pulses: Normal pulses.      Heart sounds: Normal heart sounds.   Pulmonary:      Effort: Pulmonary effort is normal.      Breath sounds: Normal breath sounds.   Abdominal:      General: Bowel sounds are normal. There is no distension.      Palpations: Abdomen is soft.      " Tenderness: There is no abdominal tenderness.   Skin:     General: Skin is warm and dry.   Neurological:      General: No focal deficit present.      Mental Status: He is alert and oriented to person, place, and time.   Psychiatric:         Mood and Affect: Mood normal.         Behavior: Behavior normal.          Discussion with Family: Patient declined call to .     Discharge instructions/Information to patient and family:   See after visit summary for information provided to patient and family.      Provisions for Follow-Up Care:  See after visit summary for information related to follow-up care and any pertinent home health orders.      Mobility at time of Discharge:   Basic Mobility Inpatient Raw Score: 18  JH-HLM Goal: 6: Walk 10 steps or more  JH-HLM Achieved: 7: Walk 25 feet or more  HLM Goal NOT achieved. Continue to encourage mobility in post discharge setting.     Disposition:   Other Skilled Nursing Facility at [facility]    Planned Readmission: no    Discharge Medications:  See after visit summary for reconciled discharge medications provided to patient and/or family.      Administrative Statements   Discharge Statement:  I have spent a total time of 25 minutes in caring for this patient on the day of the visit/encounter. >30 minutes of time was spent on: Patient and family education, Documenting in the medical record, and Reviewing / ordering tests, medicine, procedures  .    **Please Note: This note may have been constructed using a voice recognition system**

## 2025-03-17 NOTE — CONSULTS
Vancomycin IV Pharmacy-to-Dose Consultation     Vancomycin has been discontinued.  Pharmacy will sign off.  Please contact or re-consult with questions.    Chucky Jensen, Pharmacist

## 2025-03-17 NOTE — ASSESSMENT & PLAN NOTE
Lab Results   Component Value Date    HGBA1C 8.9 (H) 02/26/2025       Recent Labs     03/16/25  1636 03/16/25  2120 03/17/25  0731 03/17/25  1102   POCGLU 278* 220* 181* 194*       Blood Sugar Average: Last 72 hrs:  (P) [id number]  Continue NovoLog 70/30   Diabetic low-salt diet

## 2025-03-17 NOTE — PLAN OF CARE
Problem: PAIN - ADULT  Goal: Verbalizes/displays adequate comfort level or baseline comfort level  Description: Interventions:  - Encourage patient to monitor pain and request assistance  - Assess pain using appropriate pain scale  - Administer analgesics based on type and severity of pain and evaluate response  - Implement non-pharmacological measures as appropriate and evaluate response  - Consider cultural and social influences on pain and pain management  - Notify physician/advanced practitioner if interventions unsuccessful or patient reports new pain  3/17/2025 1445 by Michele Rodriguez RN  Outcome: Adequate for Discharge  3/17/2025 1214 by Michele Rodriguez RN  Outcome: Progressing     Problem: INFECTION - ADULT  Goal: Absence or prevention of progression during hospitalization  Description: INTERVENTIONS:  - Assess and monitor for signs and symptoms of infection  - Monitor lab/diagnostic results  - Monitor all insertion sites, i.e. indwelling lines, tubes, and drains  - Monitor endotracheal if appropriate and nasal secretions for changes in amount and color  - [place] appropriate cooling/warming therapies per order  - Administer medications as ordered  - Instruct and encourage patient and family to use good hand hygiene technique  - Identify and instruct in appropriate isolation precautions for identified infection/condition  3/17/2025 1445 by Michele Rodriguez RN  Outcome: Adequate for Discharge  3/17/2025 1214 by Michele Rodriguez RN  Outcome: Progressing     Problem: SAFETY ADULT  Goal: Patient will remain free of falls  Description: INTERVENTIONS:  - Educate patient/family on patient safety including physical limitations  - Instruct patient to call for assistance with activity   - Consult OT/PT to assist with strengthening/mobility   - Keep Call bell within reach  - Keep bed low and locked with side rails adjusted as appropriate  - Keep care items and personal belongings within reach  - Initiate and  maintain comfort rounds  - Make Fall Risk Sign visible to staff  - Offer Toileting every 2 Hours, in advance of need  - Initiate/Maintain alarm  - Obtain necessary fall risk management equipment:   - Apply yellow socks and bracelet for high fall risk patients  - Consider moving patient to room near nurses station  3/17/2025 1445 by Michele Rodriguez RN  Outcome: Adequate for Discharge  3/17/2025 1214 by Michele Rodriguez RN  Outcome: Progressing  Goal: Maintain or return to baseline ADL function  Description: INTERVENTIONS:  -  Assess patient's ability to carry out ADLs; assess patient's baseline for ADL function and identify physical deficits which impact ability to perform ADLs (bathing, care of mouth/teeth, toileting, grooming, dressing, etc.)  - Assess/evaluate cause of self-care deficits   - Assess range of motion  - Assess patient's mobility; develop plan if impaired  - Assess patient's need for assistive devices and provide as appropriate  - Encourage maximum independence but intervene and supervise when necessary  - Involve family in performance of ADLs  - Assess for home care needs following discharge   - Consider OT consult to assist with ADL evaluation and planning for discharge  - Provide patient education as appropriate  3/17/2025 1445 by Michele Rodriguez RN  Outcome: Adequate for Discharge  3/17/2025 1214 by Michele Rodriguez RN  Outcome: Progressing     Problem: RESPIRATORY - ADULT  Goal: Achieves optimal ventilation and oxygenation  Description: INTERVENTIONS:  - Assess for changes in respiratory status  - Assess for changes in mentation and behavior  - Position to facilitate oxygenation and minimize respiratory effort  - Oxygen administered by appropriate delivery if ordered  - Initiate smoking cessation education as indicated  - Encourage broncho-pulmonary hygiene including cough, deep breathe, Incentive Spirometry  - Assess the need for suctioning and aspirate as needed  - Assess and instruct to  report SOB or any respiratory difficulty  - Respiratory Therapy support as indicated  3/17/2025 1445 by Michele Rodriguez RN  Outcome: Adequate for Discharge  3/17/2025 1214 by Michele Rodriguez RN  Outcome: Progressing     Problem: GASTROINTESTINAL - ADULT  Goal: Minimal or absence of nausea and/or vomiting  Description: INTERVENTIONS:  - Administer IV fluids if ordered to ensure adequate hydration  - Maintain NPO status until nausea and vomiting are resolved  - Nasogastric tube if ordered  - Administer ordered antiemetic medications as needed  - Provide nonpharmacologic comfort measures as appropriate  - Advance diet as tolerated, if ordered  - Consider nutrition services referral to assist patient with adequate nutrition and appropriate food choices  3/17/2025 1445 by Michele Rodriguez RN  Outcome: Adequate for Discharge  3/17/2025 1214 by Michele Rodriguez RN  Outcome: Progressing  Goal: Maintains or returns to baseline bowel function  Description: INTERVENTIONS:  - Assess bowel function  - Encourage oral fluids to ensure adequate hydration  - Administer IV fluids if ordered to ensure adequate hydration  - Administer ordered medications as needed  - Encourage mobilization and activity  - Consider nutritional services referral to assist patient with adequate nutrition and appropriate food choices  3/17/2025 1445 by Michele Rodriguez RN  Outcome: Adequate for Discharge  3/17/2025 1214 by Michele Rodriguez RN  Outcome: Progressing  Goal: Maintains adequate nutritional intake  Description: INTERVENTIONS:  - Monitor percentage of each meal consumed  - Identify factors contributing to decreased intake, treat as appropriate  - Assist with meals as needed  - Monitor I&O, weight, and lab values if indicated  - Obtain nutrition services referral as needed  3/17/2025 1445 by Michele Rodriguez RN  Outcome: Adequate for Discharge  3/17/2025 1214 by Michele Rodriguez RN  Outcome: Progressing     Problem: METABOLIC, FLUID AND  ELECTROLYTES - ADULT  Goal: Electrolytes maintained within normal limits  Description: INTERVENTIONS:  - Monitor labs and assess patient for signs and symptoms of electrolyte imbalances  - Administer electrolyte replacement as ordered  - Monitor response to electrolyte replacements, including repeat lab results as appropriate  - Instruct patient on fluid and nutrition as appropriate  3/17/2025 1445 by Michele Rodriguez RN  Outcome: Adequate for Discharge  3/17/2025 1214 by Michele Rodriguez RN  Outcome: Progressing  Goal: Fluid balance maintained  Description: INTERVENTIONS:  - Monitor labs   - Monitor I/O and WT  - Instruct patient on fluid and nutrition as appropriate  - Assess for signs & symptoms of volume excess or deficit  3/17/2025 1445 by Michele Rodriguez RN  Outcome: Adequate for Discharge  3/17/2025 1214 by Michele Rodirguez RN  Outcome: Progressing     Problem: SKIN/TISSUE INTEGRITY - ADULT  Goal: Skin Integrity remains intact(Skin Breakdown Prevention)  Description: Assess:  -Perform Bright assessment every shift  -Clean and moisturize skin every daily/PRN   -Inspect skin when repositioning, toileting, and assisting with ADLS  -Assess under medical devices such as Masimo every shift  -Assess extremities for adequate circulation and sensation     Bed Management:  -Have minimal linens on bed & keep smooth, unwrinkled  -Change linens as needed when moist or perspiring  -Avoid sitting or lying in one position for more than 2 hours while in bed  -Keep HOB at 30degrees     Toileting:  -Offer bedside commode  -Assess for incontinence during hourly rounds      Activity:  -Mobilize patient 3 times a day  -Encourage activity and walks on unit  -Encourage or provide ROM exercises   -Encourage pt to repo q 2 hr   -Use appropriate equipment to lift or move patient in bed      Skin Care:  -Avoid use of baby powder, tape, friction and shearing, hot water or constrictive clothing  -Do not massage red bony areas    Next  Steps:  -Teach patient strategies to minimize risks such as clean and ensure folds are dry properly  -Consider consults to  interdisciplinary teams such as wound care and nutrition  3/17/2025 1445 by Michele Rodriguez RN  Outcome: Adequate for Discharge  3/17/2025 1214 by Michele Rodriguez RN  Outcome: Progressing  Goal: Incision(s), wounds(s) or drain site(s) healing without S/S of infection  Description: INTERVENTIONS  - Assess and document dressing, incision, wound bed, drain sites and surrounding tissue  - Provide patient and family education  - Perform skin care/dressing changes daily/PRN as per order  3/17/2025 1445 by Michele Rodriguez RN  Outcome: Adequate for Discharge  3/17/2025 1214 by Michele Rodriguez RN  Outcome: Progressing     Problem: Nutrition/Hydration-ADULT  Goal: Nutrient/Hydration intake appropriate for improving, restoring or maintaining nutritional needs  Description: Monitor and assess patient's nutrition/hydration status for malnutrition. Collaborate with interdisciplinary team and initiate plan and interventions as ordered.  Monitor patient's weight and dietary intake as ordered or per policy. Utilize nutrition screening tool and intervene as necessary. Determine patient's food preferences and provide high-protein, high-caloric foods as appropriate.     INTERVENTIONS:  - Monitor oral intake, urinary output, labs, and treatment plans  - Assess nutrition and hydration status and recommend course of action  - Evaluate amount of meals eaten  - Assist patient with eating if necessary   - Allow adequate time for meals  - Recommend/ encourage appropriate diets, oral nutritional supplements, and vitamin/mineral supplements  - Order, calculate, and assess calorie counts as needed  - Recommend, monitor, and adjust tube feedings and TPN/PPN based on assessed needs  - Assess need for intravenous fluids  - Provide specific nutrition/hydration education as appropriate  - Include patient/family/caregiver  in decisions related to nutrition  3/17/2025 1445 by Michele Rodriguez RN  Outcome: Adequate for Discharge  3/17/2025 1214 by Michele Rodriguez RN  Outcome: Progressing     Problem: Prexisting or High Potential for Compromised Skin Integrity  Goal: Skin integrity is maintained or improved  Description: INTERVENTIONS:  - Identify patients at risk for skin breakdown  - Assess and monitor skin integrity  - Assess and monitor nutrition and hydration status  - Monitor labs   - Assess for incontinence   - Turn and reposition patient  - Assist with mobility/ambulation  - Relieve pressure over bony prominences  - Avoid friction and shearing  - Provide appropriate hygiene as needed including keeping skin clean and dry  - Evaluate need for skin moisturizer/barrier cream  - Collaborate with interdisciplinary team   - Patient/family teaching  - Consider wound care consult   3/17/2025 1445 by Michele Rodriguez RN  Outcome: Adequate for Discharge  3/17/2025 1214 by Michele Rodriguez RN  Outcome: Progressing

## 2025-03-17 NOTE — ASSESSMENT & PLAN NOTE
Diagnosed with group A strep bacteremia last admission, 1 of 2 sets of blood cultures isolated strep pyogenes.  Due to skin and soft tissue infection.  Plan was to continue p.o. cefadroxil 1 g every 12 to complete 2-week course through 3/15/2025. Blood cx this admission so far negative.

## 2025-03-17 NOTE — ASSESSMENT & PLAN NOTE
Patient discharged 3/11/25 from [facility] to skilled nursing facility-was noted to have streptococcal bacteremia felt likely related to bilateral lower extremity cellulitis.    Patient was discharged on Duricef as recommended by ID  See plan for sepsis

## 2025-03-17 NOTE — NURSING NOTE
Pt presently oob in his chair in no acute distress watching tv, all skin/wound care done this am by the hs shift, verified by the pt, pt high fall risk and refusing to have staff turn on bed/chair alarm, pt rings the callbell with any needs

## 2025-03-17 NOTE — ASSESSMENT & PLAN NOTE
Patient presented from skilled nursing facility with fever and rigors.  Fevers and leukocytosis resolved.  Unknown source.  Consider possibility of recurrent bacteremia, though fortunately repeat blood cultures are negative.  He recently had Ramirez catheter in place thus consider possibility of UTI however urine micro is benign.  COVID/flu/RSV negative.  Chest x-ray negative.  Consider possibility of occult source in chest, abdomen or pelvis. CT only showed possible omental infarct, unsure of significance.  Consider possibility of C. difficile given recent abx exposures however testing negative. Lower extremity cellulitis appears to be resolving. Denies joint or back pains. No soft tissue gas noted on x-rays.  Consider possibility of beta-lactam drug fever. Patient remains hemodynamically stable and clinically improved.   Discontinue IV vanco and cefepime   Monitor off abx   Continue to follow-up blood cultures  Daily CBC DIF and chemistry to monitor response to treatment and for developing toxicities  Trend temps and hemodynamics

## 2025-03-17 NOTE — NURSING NOTE
Patient sitting edge of bed for breakfast. Got self to edge of bed. Refusing bed/chair alarm, says he does not need it and feels confident on his feet. This writer observed him walking to bathroom, seemingly steady. Patient high fall risk at this time, educated by RN, still refused. RN aware

## 2025-03-18 LAB
BACTERIA BLD CULT: NORMAL
BACTERIA BLD CULT: NORMAL

## 2025-03-18 NOTE — CASE MANAGEMENT
Case Management Discharge Planning Note    Patient name Nadeem Purdy Jr.  Location /-01 MRN [id number]  : 1955 Date 3/17/2025       Current Admission Date: 3/13/2025  Current Admission Diagnosis:SIRS (systemic inflammatory response syndrome) ([facility])   Patient Active Problem List    Diagnosis Date Noted Date Diagnosed    SIRS (systemic inflammatory response syndrome) ([facility]) 2025     Diarrhea 2025     Sepsis without acute organ dysfunction ([facility]) 2025     Diabetic nephropathy associated with type 2 diabetes mellitus ([facility]) 2025     Streptococcal bacteremia 2025     Chronic anemia 2025     Atrial fibrillation with RVR ([facility]) 2025     Hypomagnesemia 2025     Urinary retention 2025     Hyponatremia 2025     Metabolic acidosis 2025     Acute renal failure superimposed on stage 3a chronic kidney disease ([facility]) 2025     Diabetic ketoacidosis without coma associated with type 2 diabetes mellitus ([facility]) 2025     Severe sepsis ([facility]) 2025     Alcohol abuse 2025     Hypertensive kidney disease with chronic kidney disease stage III ([facility]) 10/08/2024     Right knee injury, subsequent encounter 2024     Osteochondral lesion 2024     Primary osteoarthritis of one knee, right 2024     Lymphedema of right lower extremity 2024     Other tear of medial meniscus, current injury, right knee, initial encounter 2024     Type 2 diabetes mellitus with hyperglycemia, with long-term current use of insulin ([facility]) 2024     Resolving cellulitis of left lower extremity 2024     Cortical age-related cataract of both eyes 10/20/2023     Numbness of left foot 2023     Lymphedema of both lower extremities 2022     Rash 2022     Stage 3 chronic kidney disease, unspecified whether stage 3a or 3b CKD ([facility]) 2022     Tachycardia 2021     Essential hypertension 2019      Hypercholesteremia 09/26/2019     Class 3 severe obesity due to excess calories with serious comorbidity and body mass index (BMI) of 40.0 to 44.9 in adult (HCC) 09/26/2019     Steatohepatitis 09/26/2019     Allergic rhinitis 09/26/2019     Vitamin D deficiency 09/26/2019     Persistent proteinuria 09/26/2019     Type 2 diabetes mellitus with microalbuminuria, with long-term current use of insulin (HCC)        LOS (days): 4  Geometric Mean LOS (GMLOS) (days): 2.7  Days to GMLOS:-1.1     OBJECTIVE:  Risk of Unplanned Readmission Score: 25.9         Current admission status: Inpatient   Preferred Pharmacy:   [id number] - [place] - [address]  [place] [id number]  Phone: [phone] Fax: [phone]    Primary Care Provider: Jluis Glass MD    Primary Insurance: MEDICARE  Secondary Insurance: [facility]    DISCHARGE DETAILS:    Discharge planning discussed with:: patient & wife waas called at 12:52 pm & 13:03pm & 13:35 pm  Freedom of Choice: Yes  Comments - Freedom of Choice: pt was approved to return back to [facility] - family & pt's choice -  pt  & family are in  agreement with the d/c & d/c plan  CM contacted family/caregiver?: Yes  Were Treatment Team discharge recommendations reviewed with patient/caregiver?: Yes  Did patient/caregiver verbalize understanding of patient care needs?: Yes  Were patient/caregiver advised of the risks associated with not following Treatment Team discharge recommendations?: Yes    Contacts  Patient Contacts: Jessica Purdy wife  Relationship to Patient:: Family (wife)  Contact Method: Phone  Phone Number: [phone]  Reason/Outcome: Discharge Planning    Requested Home Health Care         Is the patient interested in HHC at discharge?: No         Other Referral/Resources/Interventions Provided:  Interventions: Short Term Rehab  Referral Comments: pt accepted to [facility], covid test completed, rn was given the  report & fax#- 4pm w/c indu [place]- family & pt are aware that there is a fee for the w/c indu- rn, provider, snf pt & family aware of the trransport time    Would you like to participate in our [facility] service program?  : No - Declined    Treatment Team Recommendation: Short Term Rehab ([facility] - 4pm w/c indu)  Discharge Destination Plan:: Short Term Rehab ([facility]- 4pm w/c indu [facility])                                IMM Given (Date):: 03/17/25     Family notified:: wife was called       Accepting Facility Name, City & State : [facility] [address] [place] [address]  [place] [address]  Receiving Facility/Agency Phone Number: [phone]  Facility/Agency Fax Number: [phone]

## 2025-03-24 DIAGNOSIS — I10 ESSENTIAL HYPERTENSION: Primary | ICD-10-CM

## 2025-03-26 ENCOUNTER — TELEPHONE (OUTPATIENT)
Age: 70
End: 2025-03-26

## 2025-03-26 NOTE — TELEPHONE ENCOUNTER
I called and left a VM for [place] regarding completing blood/urine labs prior to upcoming appt on 03/31/2025

## 2025-03-27 DIAGNOSIS — R00.0 TACHYCARDIA: ICD-10-CM

## 2025-03-27 DIAGNOSIS — I10 ESSENTIAL HYPERTENSION: ICD-10-CM

## 2025-03-27 RX ORDER — AMLODIPINE BESYLATE 10 MG/1
10 TABLET ORAL DAILY
Qty: 90 TABLET | Refills: 1 | Status: SHIPPED | OUTPATIENT
Start: 2025-03-27

## 2025-03-27 RX ORDER — ATORVASTATIN CALCIUM 40 MG/1
40 TABLET, FILM COATED ORAL DAILY
Qty: 90 TABLET | Refills: 1 | Status: SHIPPED | OUTPATIENT
Start: 2025-03-27

## 2025-03-27 RX ORDER — METOPROLOL SUCCINATE 100 MG/1
100 TABLET, EXTENDED RELEASE ORAL DAILY
Qty: 90 TABLET | Refills: 1 | OUTPATIENT
Start: 2025-03-27

## 2025-03-29 PROBLEM — A41.9 SEVERE SEPSIS (HCC): Status: RESOLVED | Noted: 2025-02-26 | Resolved: 2025-03-29

## 2025-03-29 PROBLEM — E11.10 DIABETIC KETOACIDOSIS WITHOUT COMA ASSOCIATED WITH TYPE 2 DIABETES MELLITUS (HCC): Status: RESOLVED | Noted: 2025-02-26 | Resolved: 2025-03-29

## 2025-03-29 PROBLEM — R65.20 SEVERE SEPSIS (HCC): Status: RESOLVED | Noted: 2025-02-26 | Resolved: 2025-03-29

## 2025-03-31 ENCOUNTER — RA CDI HCC (OUTPATIENT)
Dept: OTHER | Facility: HOSPITAL | Age: 70
End: 2025-03-31

## 2025-03-31 ENCOUNTER — OFFICE VISIT (OUTPATIENT)
Age: 70
End: 2025-03-31
Payer: MEDICARE

## 2025-03-31 VITALS
HEIGHT: 71 IN | SYSTOLIC BLOOD PRESSURE: 128 MMHG | WEIGHT: 299.4 LBS | HEART RATE: 85 BPM | DIASTOLIC BLOOD PRESSURE: 66 MMHG | BODY MASS INDEX: 41.92 KG/M2 | OXYGEN SATURATION: 96 % | TEMPERATURE: 98.4 F

## 2025-03-31 DIAGNOSIS — E87.1 HYPONATREMIA: ICD-10-CM

## 2025-03-31 DIAGNOSIS — N18.30 STAGE 3 CHRONIC KIDNEY DISEASE, UNSPECIFIED WHETHER STAGE 3A OR 3B CKD (HCC): Primary | ICD-10-CM

## 2025-03-31 DIAGNOSIS — I10 ESSENTIAL HYPERTENSION: ICD-10-CM

## 2025-03-31 DIAGNOSIS — N17.9 AKI (ACUTE KIDNEY INJURY) (HCC): ICD-10-CM

## 2025-03-31 DIAGNOSIS — R60.0 BILATERAL LOWER EXTREMITY EDEMA: ICD-10-CM

## 2025-03-31 PROBLEM — E11.22 TYPE 2 DIABETES MELLITUS WITH DIABETIC CHRONIC KIDNEY DISEASE (HCC): Status: ACTIVE | Noted: 2025-03-31

## 2025-03-31 PROCEDURE — 99214 OFFICE O/P EST MOD 30 MIN: CPT

## 2025-03-31 RX ORDER — FUROSEMIDE 40 MG/1
40 TABLET ORAL 2 TIMES DAILY
Qty: 180 TABLET | Refills: 0 | Status: SHIPPED | OUTPATIENT
Start: 2025-03-31 | End: 2025-04-07 | Stop reason: SDUPTHER

## 2025-03-31 NOTE — PROGRESS NOTES
HCC coding opportunities          Chart Reviewed number of suggestions sent to Provider: 2     Patients Insurance     Medicare Insurance: Medicare        E11.65  E11.22

## 2025-03-31 NOTE — PATIENT INSTRUCTIONS
It was nice seeing you today. Your kidney function is stable. Please follow up with us in 4-5 months. I have ordered lab work for you to get done before then. In the meantime, please avoid NSAIDs and have a healthy diet and exercise.

## 2025-03-31 NOTE — PROGRESS NOTES
OFFICE FOLLOW UP - Nephrology   Nadeem Purdy Jr. 69 y.o. male MRN: [id number]     Interval HPI  Nadeem Purdy Jr. has a PMH of hypertension, atrial fibrillation, type 2 diabetes, fatty liver, CKD, lymphedema, obesity who returns to office for hospital follow-up after complaining of bilateral lower extremity edema, polyuria, polydipsia, urinary incontinence, potential noncompliance with home medications.  He was initially admitted to the ICU for DKA and sepsis due to cellulitis of the lower extremity.  Nephrology was consulted during this hospital stay for acute kidney injury.  He was started on Flomax and Ramirez catheter was removed.  He also completed a course of antibiotics for bacteremia.  After discharge, the patient unfortunately worsened as he was experiencing fevers/rigors and was referred back to the hospital for admission.  He was found to have sepsis with bilateral lower extremity cellulitis.  Nephrology was not consulted during this hospitalization.  The primary team had continued to hold the patient's losartan but restarted the Lasix on March 16 and patient was discharged on Lasix 40 mg twice daily.  Since discharge, the patient has no complaints at this time and is feeling well. Patient denies any chest pain, shortness of breath or swelling. The last blood work was done on March 18, 2025 which we have reviewed together.      ASSESSMENT and PLAN:  [place] was seen today for hospital follow-up.    Diagnoses and all orders for this visit:    Stage 3 chronic kidney disease, unspecified whether stage 3a or 3b CKD ([facility])  -     Basic metabolic panel; Future  -     Basic metabolic panel; Future  -     CBC; Future  -     Magnesium; Future  -     Phosphorus; Future  -     PTH, intact; Future  -     Urinalysis with microscopic; Future  -     Albumin / creatinine urine ratio; Future    Bilateral lower extremity edema  -     furosemide (LASIX) 40 mg tablet; Take 1 tablet (40 mg total) by mouth 2 (two) times a  day    Essential hypertension    LUIS ALFREDO (acute kidney injury) (HCC)    Hyponatremia        Acute kidney injury:  Etiology: Was suspected to be due to ATN in setting of sepsis, volume and hemodynamic perturbations, vasomotor dysfunction, urinary retention status post Ramirez  Peak creatinine was 4.3 mg/dL on March 2.  The patient's acute tubular necrosis was progressively improving and it settled down to 2.45 mg/dL on the day of discharge.  He was discharged on Lasix 40 mg twice daily for volume management.  His losartan was held on the day of discharge until improvement was seen in kidney function in the outpatient setting.    Chronic kidney disease stage IIIa:  Etiology: presumed diabetic nephropathy, obesity related FSGS, hypertensive nephrosclerosis.   Baseline creatinine 1.1 to 1.3 mg/dL  Current creatinine 1.98 mg/dL, may be around new baseline creatinine, will continue to monitor with repeat blood work in a month, and at that time if kidney function is stable, will consider restarting losartan at previous dose   Avoid NSAIDs  Recommend hemoglobin A1c less than 7 to prevent progression of CKD  No hydronephrosis on recent CT imaging of the kidneys  Previously had evidence of mildly increased albuminuria, we will reevaluate patient's candidacy for losartan in the near future. The patient's jardiance is too cost prohibitive for him at this time     Hypertension:  Current /66  Currently on amlodipine 10 mg daily, losartan 100 mg which is currently on pause until manually resumed. We have discussed with patient that we will continue to monitor his kidney function closely in the next month or so with a repeat labwork and if the creatinine is still 1.9 or better than I would be ok with restarting the losartan.   Recommend low sodium diet  Recommend Hme BP monitoring  Recommended diet and low impact exercise for weight loss and health benefits    Bilateral lower extremity edema  Appears multifactorial in the setting  of cellulitis and lymphedema. Is also maintained on torsemide what also appears to be due to true edema. He was discharged on lasix 40 mg twice daily which appears to be appropriate at this time. He is requesting a refill on the lasix and this was provided to him.     Electrolytes/acid base:  Hyponatremia -continue with mild fluid restriction. We will assume this is due to hypervolemia and patient should also follow a sodium restricted diet along with his water pills.       Medical records through [facility] and [facility] and care everywhere been reviewed for this patient encounter      Age related screening:   Your primary caregiver may do yearly screening for colorectal cancer. It is recommended in all men and women over 45 years old. You may have screening earlier if you have colon disease or a family history of colorectal cancer.       Patient Instructions   It was nice seeing you today. Your kidney function is stable. Please follow up with us in 4-5 months. I have ordered lab work for you to get done before then. In the meantime, please avoid NSAIDs and have a healthy diet and exercise.            ROS:   Review of Systems   Constitutional:  Negative for chills and fever.   HENT:  Negative for rhinorrhea and sore throat.    Respiratory:  Negative for cough and shortness of breath.    Cardiovascular:  Negative for chest pain and palpitations.   Gastrointestinal:  Negative for abdominal pain and nausea.   Genitourinary:  Negative for dysuria and hematuria.   Neurological:  Negative for dizziness and headaches.        Allergies: Patient has no known allergies.    Medications:   Current Outpatient Medications:     amLODIPine (NORVASC) 10 mg tablet, TAKE 1 TABLET BY MOUTH EVERY DAY, Disp: 90 tablet, Rfl: 1    aspirin (ECOTRIN LOW STRENGTH) 81 mg EC tablet, Take 81 mg by mouth daily, Disp: , Rfl:     atorvastatin (LIPITOR) 40 mg tablet, TAKE 1 TABLET BY MOUTH EVERY DAY, Disp: 90 tablet, Rfl: 1    Azelastine HCl 137 MCG/SPRAY  SOLN, 1 puff each nostril twice a day, Disp: 30 mL, Rfl: 1    BD Pen Needle Katelyn 2nd Gen 32G X 4 MM MISC, INJECT AS DIRECTED 2 (TWO) TIMES A DAY, Disp: 100 each, Rfl: 1    Cholecalciferol (VITAMIN D3) 2000 units TABS, Take 2,000 Units by mouth daily, Disp: , Rfl:     folic acid (FOLVITE) 1 mg tablet, Take 1 tablet (1 mg total) by mouth daily, Disp: , Rfl:     furosemide (LASIX) 40 mg tablet, Take 1 tablet (40 mg total) by mouth 2 (two) times a day, Disp: 180 tablet, Rfl: 0    gabapentin (NEURONTIN) 100 mg capsule, Take 2 capsules (200 mg total) by mouth daily at bedtime, Disp: 180 capsule, Rfl: 1    Glucose Blood (ONETOUCH ULTRA BLUE VI), 1 Units by In Vitro route 3 (three) times a day, Disp: , Rfl:     glucose blood (OneTouch Ultra) test strip, Test 3 times daily, Disp: 300 each, Rfl: 5    Krill Oil 1000 MG CAPS, Take 1 capsule by mouth daily, Disp: , Rfl:     melatonin 3 mg, Take 2 tablets (6 mg total) by mouth daily at bedtime (Patient taking differently: Take 6 mg by mouth as needed), Disp: , Rfl:     metoprolol tartrate (LOPRESSOR) 50 mg tablet, Take 1 tablet (50 mg total) by mouth every 12 (twelve) hours (Patient taking differently: Take 50 mg by mouth daily), Disp: , Rfl:     potassium chloride (Klor-Con M20) 20 mEq tablet, Take 1 tablet (20 mEq total) by mouth daily, Disp: 90 tablet, Rfl: 1    thiamine 100 MG tablet, Take 1 tablet (100 mg total) by mouth daily, Disp: , Rfl:     vitamin E, tocopherol, 400 units capsule, Take 400 Units by mouth daily, Disp: , Rfl:     insulin aspart protamine-insulin aspart (NovoLOG 70/30) 100 units/mL injection, Inject 32 Units under the skin 2 (two) times a day before meals, Disp: , Rfl:     [Paused] losartan (COZAAR) 100 MG tablet, TAKE 1 TABLET BY MOUTH EVERY DAY, Disp: 90 tablet, Rfl: 1    tamsulosin (FLOMAX) 0.4 mg, Take 1 capsule (0.4 mg total) by mouth daily with dinner (Patient not taking: Reported on 3/31/2025), Disp: , Rfl:     Past Medical History:   Diagnosis Date  "   Allergic     All My Life    Clostridium difficile diarrhea 02/27/2025    Diabetes mellitus (HCC) 20 years ago?    Elevated troponin 02/27/2025    Group A strep bacteremia 02/27/2025    Hypertension     Obesity     all my     Past Surgical History:   Procedure Laterality Date    APPENDECTOMY  1970    CATARACT EXTRACTION, BILATERAL Bilateral      History reviewed. No pertinent family history.   reports that he has quit smoking. His smoking use included cigarettes. He has never used smokeless tobacco. He reports current alcohol use of about 4.0 standard drinks of alcohol per week. He reports that he does not use drugs.      Physical Exam:   Vitals:    03/31/25 0931   BP: 128/66   BP Location: Left arm   Patient Position: Sitting   Cuff Size: Large   Pulse: 85   Temp: 98.4 °F (36.9 °C)   TempSrc: Temporal   SpO2: 96%   Weight: 136 kg (299 lb 6.4 oz)   Height: 5' 11\" (1.803 m)     Body mass index is 41.76 kg/m².  Physical Exam  Constitutional:       General: He is not in acute distress.  HENT:      Head: Normocephalic and atraumatic.   Cardiovascular:      Rate and Rhythm: Normal rate and regular rhythm.      Pulses: Normal pulses.      Heart sounds: No murmur heard.  Pulmonary:      Effort: Pulmonary effort is normal. No respiratory distress.      Breath sounds: Normal breath sounds.   Abdominal:      General: Bowel sounds are normal.      Palpations: Abdomen is soft.      Tenderness: There is no abdominal tenderness.   Musculoskeletal:      Right lower leg: Edema present.      Left lower leg: Edema present.   Skin:     General: Skin is warm and dry.   Neurological:      General: No focal deficit present.      Mental Status: He is alert.   Psychiatric:         Mood and Affect: Mood normal.         Behavior: Behavior normal.            Lab Results:  Results for orders placed or performed during the hospital encounter of 03/13/25   Blood culture #1    Specimen: Arm, Left; Blood   Result Value Ref Range    Blood Culture " No Growth After 5 Days.    Blood culture #2    Specimen: Arm, Right; Blood   Result Value Ref Range    Blood Culture No Growth After 5 Days.    FLU/RSV/COVID - if FLU/RSV clinically relevant    Specimen: Nose; Nares   Result Value Ref Range    SARS-CoV-2 Negative Negative    INFLUENZA A PCR Negative Negative    INFLUENZA B PCR Negative Negative    RSV PCR Negative Negative   Clostridioides difficile toxin by PCR with EIA    Specimen: Rectum; Stool   Result Value Ref Range    C.difficile toxin by PCR Positive (A) Negative    C difficile Toxins A+B, EIA Negative Negative   Stool Enteric Bacterial Panel by PCR    Specimen: Rectum; Stool   Result Value Ref Range    Salmonella sp PCR Negative Negative    Shigella sp/Enteroinvasive E. coli (EIEC) PCR Negative Negative    Campylobacter sp (jejuni and coli) PCR Negative Negative    Shiga toxin 1/Shiga toxin 2 genes PCR Negative Negative   COVID-19/ Infleunza A/B Rapid Anitgen(30 min. TAT)    Specimen: Nose; Nares   Result Value Ref Range    SARS COV Rapid Antigen Negative Negative    Influenza A Rapid Antigen Negative Negative    Influenza B Rapid Antigen Negative Negative   CBC and differential   Result Value Ref Range    WBC 14.03 (H) 4.31 - 10.16 Thousand/uL    RBC 2.92 (L) 3.88 - 5.62 Million/uL    Hemoglobin 9.2 (L) 12.0 - 17.0 g/dL    Hematocrit 27.9 (L) 36.5 - 49.3 %    MCV 96 82 - 98 fL    MCH 31.5 26.8 - 34.3 pg    MCHC 33.0 31.4 - 37.4 g/dL    RDW 13.2 11.6 - 15.1 %    MPV 8.3 (L) 8.9 - 12.7 fL    Platelets 256 149 - 390 Thousands/uL    nRBC 0 /100 WBCs    Segmented % 81 (H) 43 - 75 %    Immature Grans % 1 0 - 2 %    Lymphocytes % 9 (L) 14 - 44 %    Monocytes % 5 4 - 12 %    Eosinophils Relative 3 0 - 6 %    Basophils Relative 1 0 - 1 %    Absolute Neutrophils 11.46 (H) 1.85 - 7.62 Thousands/µL    Absolute Immature Grans 0.07 0.00 - 0.20 Thousand/uL    Absolute Lymphocytes 1.30 0.60 - 4.47 Thousands/µL    Absolute Monocytes 0.72 0.17 - 1.22 Thousand/µL     Eosinophils Absolute 0.40 0.00 - 0.61 Thousand/µL    Basophils Absolute 0.08 0.00 - 0.10 Thousands/µL   Comprehensive metabolic panel   Result Value Ref Range    Sodium 134 (L) 135 - 147 mmol/L    Potassium 3.9 3.5 - 5.3 mmol/L    Chloride 99 96 - 108 mmol/L    CO2 29 21 - 32 mmol/L    ANION GAP 6 4 - 13 mmol/L    BUN 34 (H) 5 - 25 mg/dL    Creatinine 2.30 (H) 0.60 - 1.30 mg/dL    Glucose 111 65 - 140 mg/dL    Calcium 8.5 8.4 - 10.2 mg/dL    Corrected Calcium 9.5 8.3 - 10.1 mg/dL    AST 21 13 - 39 U/L    ALT 23 7 - 52 U/L    Alkaline Phosphatase 32 (L) 34 - 104 U/L    Total Protein 7.5 6.4 - 8.4 g/dL    Albumin 2.7 (L) 3.5 - 5.0 g/dL    Total Bilirubin 0.51 0.20 - 1.00 mg/dL    eGFR 27 ml/min/1.73sq m   Lactic acid   Result Value Ref Range    LACTIC ACID 1.6 0.5 - 2.0 mmol/L   Procalcitonin   Result Value Ref Range    Procalcitonin 0.10 <=0.25 ng/ml   Protime-INR   Result Value Ref Range    Protime 16.2 (H) 12.3 - 15.0 seconds    INR 1.25 (H) 0.85 - 1.19   APTT   Result Value Ref Range    PTT 25 23 - 34 seconds   UA w Reflex to Microscopic w Reflex to Culture    Specimen: Urine, Other   Result Value Ref Range    Color, UA Yellow Yellow, Straw    Clarity, UA Clear Hazy, Clear    Specific Gravity, UA 1.015 >1.005 - <1.030    pH, UA 6.0 5.0, 5.5, 6.0, 6.5, 7.0, 7.5    Leukocytes, UA Negative Negative    Nitrite, UA Negative Negative    Protein, UA Negative Negative, Interference- unable to analyze mg/dl    Glucose, UA Negative Negative mg/dl    Ketones, UA Negative Negative mg/dl    Urobilinogen, UA 0.2 0.2, 1.0 E.U./dl E.U./dl    Bilirubin, UA Negative Negative    Occult Blood, UA Trace-Intact (A) Negative   Beta Hydroxybutyrate   Result Value Ref Range    Beta- Hydroxybutyrate <0.05 0.02 - 0.27 mmol/L   Urine Microscopic   Result Value Ref Range    RBC, UA 0-1 None Seen, 0-1, 1-2, 2-4, 0-5 /hpf    WBC, UA None Seen None Seen, 0-1, 1-2, 0-5, 2-4 /hpf    Epithelial Cells Occasional None Seen, Occasional /hpf     Bacteria, UA None Seen None Seen, Occasional /hpf   Procalcitonin, Next Day AM Collection   Result Value Ref Range    Procalcitonin 0.19 <=0.25 ng/ml   Comprehensive metabolic panel   Result Value Ref Range    Sodium 133 (L) 135 - 147 mmol/L    Potassium 3.8 3.5 - 5.3 mmol/L    Chloride 100 96 - 108 mmol/L    CO2 24 21 - 32 mmol/L    ANION GAP 9 4 - 13 mmol/L    BUN 29 (H) 5 - 25 mg/dL    Creatinine 2.18 (H) 0.60 - 1.30 mg/dL    Glucose 153 (H) 65 - 140 mg/dL    Calcium 8.1 (L) 8.4 - 10.2 mg/dL    Corrected Calcium 9.3 8.3 - 10.1 mg/dL    AST 18 13 - 39 U/L    ALT 20 7 - 52 U/L    Alkaline Phosphatase 29 (L) 34 - 104 U/L    Total Protein 7.2 6.4 - 8.4 g/dL    Albumin 2.5 (L) 3.5 - 5.0 g/dL    Total Bilirubin 0.54 0.20 - 1.00 mg/dL    eGFR 29 ml/min/1.73sq m   Magnesium   Result Value Ref Range    Magnesium 1.4 (L) 1.9 - 2.7 mg/dL   Phosphorus   Result Value Ref Range    Phosphorus 3.8 2.3 - 4.1 mg/dL   CBC and differential   Result Value Ref Range    WBC 11.71 (H) 4.31 - 10.16 Thousand/uL    RBC 2.88 (L) 3.88 - 5.62 Million/uL    Hemoglobin 8.9 (L) 12.0 - 17.0 g/dL    Hematocrit 27.5 (L) 36.5 - 49.3 %    MCV 96 82 - 98 fL    MCH 30.9 26.8 - 34.3 pg    MCHC 32.4 31.4 - 37.4 g/dL    RDW 13.3 11.6 - 15.1 %    MPV 8.3 (L) 8.9 - 12.7 fL    Platelets 244 149 - 390 Thousands/uL    nRBC 0 /100 WBCs    Segmented % 78 (H) 43 - 75 %    Immature Grans % 1 0 - 2 %    Lymphocytes % 12 (L) 14 - 44 %    Monocytes % 6 4 - 12 %    Eosinophils Relative 2 0 - 6 %    Basophils Relative 1 0 - 1 %    Absolute Neutrophils 9.18 (H) 1.85 - 7.62 Thousands/µL    Absolute Immature Grans 0.06 0.00 - 0.20 Thousand/uL    Absolute Lymphocytes 1.43 0.60 - 4.47 Thousands/µL    Absolute Monocytes 0.67 0.17 - 1.22 Thousand/µL    Eosinophils Absolute 0.27 0.00 - 0.61 Thousand/µL    Basophils Absolute 0.10 0.00 - 0.10 Thousands/µL   CBC   Result Value Ref Range    WBC 8.82 4.31 - 10.16 Thousand/uL    RBC 2.92 (L) 3.88 - 5.62 Million/uL    Hemoglobin 9.1  (L) 12.0 - 17.0 g/dL    Hematocrit 27.8 (L) 36.5 - 49.3 %    MCV 95 82 - 98 fL    MCH 31.2 26.8 - 34.3 pg    MCHC 32.7 31.4 - 37.4 g/dL    RDW 13.2 11.6 - 15.1 %    Platelets 247 149 - 390 Thousands/uL    MPV 8.3 (L) 8.9 - 12.7 fL   Magnesium   Result Value Ref Range    Magnesium 1.9 1.9 - 2.7 mg/dL   Comprehensive metabolic panel   Result Value Ref Range    Sodium 132 (L) 135 - 147 mmol/L    Potassium 3.8 3.5 - 5.3 mmol/L    Chloride 99 96 - 108 mmol/L    CO2 26 21 - 32 mmol/L    ANION GAP 7 4 - 13 mmol/L    BUN 27 (H) 5 - 25 mg/dL    Creatinine 1.93 (H) 0.60 - 1.30 mg/dL    Glucose 218 (H) 65 - 140 mg/dL    Calcium 8.2 (L) 8.4 - 10.2 mg/dL    Corrected Calcium 9.3 8.3 - 10.1 mg/dL    AST 16 13 - 39 U/L    ALT 19 7 - 52 U/L    Alkaline Phosphatase 31 (L) 34 - 104 U/L    Total Protein 7.2 6.4 - 8.4 g/dL    Albumin 2.6 (L) 3.5 - 5.0 g/dL    Total Bilirubin 0.43 0.20 - 1.00 mg/dL    eGFR 34 ml/min/1.73sq m   Vancomycin, random   Result Value Ref Range    Vancomycin Rm 13.0 10.0 - 20.0 ug/mL   CBC   Result Value Ref Range    WBC 6.60 4.31 - 10.16 Thousand/uL    RBC 2.79 (L) 3.88 - 5.62 Million/uL    Hemoglobin 8.3 (L) 12.0 - 17.0 g/dL    Hematocrit 26.3 (L) 36.5 - 49.3 %    MCV 94 82 - 98 fL    MCH 29.7 26.8 - 34.3 pg    MCHC 31.6 31.4 - 37.4 g/dL    RDW 12.9 11.6 - 15.1 %    Platelets 232 149 - 390 Thousands/uL    MPV 8.4 (L) 8.9 - 12.7 fL   Basic metabolic panel   Result Value Ref Range    Sodium 132 (L) 135 - 147 mmol/L    Potassium 3.6 3.5 - 5.3 mmol/L    Chloride 100 96 - 108 mmol/L    CO2 25 21 - 32 mmol/L    ANION GAP 7 4 - 13 mmol/L    BUN 29 (H) 5 - 25 mg/dL    Creatinine 1.96 (H) 0.60 - 1.30 mg/dL    Glucose 193 (H) 65 - 140 mg/dL    Calcium 8.1 (L) 8.4 - 10.2 mg/dL    eGFR 33 ml/min/1.73sq m   CBC   Result Value Ref Range    WBC 6.56 4.31 - 10.16 Thousand/uL    RBC 2.70 (L) 3.88 - 5.62 Million/uL    Hemoglobin 8.1 (L) 12.0 - 17.0 g/dL    Hematocrit 25.3 (L) 36.5 - 49.3 %    MCV 94 82 - 98 fL    MCH 30.0 26.8  - 34.3 pg    MCHC 32.0 31.4 - 37.4 g/dL    RDW 12.8 11.6 - 15.1 %    Platelets 258 149 - 390 Thousands/uL    MPV 8.4 (L) 8.9 - 12.7 fL   Basic metabolic panel   Result Value Ref Range    Sodium 133 (L) 135 - 147 mmol/L    Potassium 3.5 3.5 - 5.3 mmol/L    Chloride 100 96 - 108 mmol/L    CO2 26 21 - 32 mmol/L    ANION GAP 7 4 - 13 mmol/L    BUN 29 (H) 5 - 25 mg/dL    Creatinine 1.97 (H) 0.60 - 1.30 mg/dL    Glucose 171 (H) 65 - 140 mg/dL    Calcium 8.2 (L) 8.4 - 10.2 mg/dL    eGFR 33 ml/min/1.73sq m   Fingerstick Glucose (POCT)   Result Value Ref Range    POC Glucose 114 65 - 140 mg/dl   Fingerstick Glucose (POCT)   Result Value Ref Range    POC Glucose 107 65 - 140 mg/dl   Fingerstick Glucose (POCT)   Result Value Ref Range    POC Glucose 137 65 - 140 mg/dl   Fingerstick Glucose (POCT)   Result Value Ref Range    POC Glucose 184 (H) 65 - 140 mg/dl   Fingerstick Glucose (POCT)   Result Value Ref Range    POC Glucose 206 (H) 65 - 140 mg/dl   Fingerstick Glucose (POCT)   Result Value Ref Range    POC Glucose 271 (H) 65 - 140 mg/dl   Fingerstick Glucose (POCT)   Result Value Ref Range    POC Glucose 229 (H) 65 - 140 mg/dl   Fingerstick Glucose (POCT)   Result Value Ref Range    POC Glucose 227 (H) 65 - 140 mg/dl   Fingerstick Glucose (POCT)   Result Value Ref Range    POC Glucose 247 (H) 65 - 140 mg/dl   Fingerstick Glucose (POCT)   Result Value Ref Range    POC Glucose 236 (H) 65 - 140 mg/dl   Fingerstick Glucose (POCT)   Result Value Ref Range    POC Glucose 190 (H) 65 - 140 mg/dl   Fingerstick Glucose (POCT)   Result Value Ref Range    POC Glucose 177 (H) 65 - 140 mg/dl   Fingerstick Glucose (POCT)   Result Value Ref Range    POC Glucose 245 (H) 65 - 140 mg/dl   Fingerstick Glucose (POCT)   Result Value Ref Range    POC Glucose 278 (H) 65 - 140 mg/dl   Fingerstick Glucose (POCT)   Result Value Ref Range    POC Glucose 220 (H) 65 - 140 mg/dl   Fingerstick Glucose (POCT)   Result Value Ref Range    POC Glucose 181 (H)  "65 - 140 mg/dl   Fingerstick Glucose (POCT)   Result Value Ref Range    POC Glucose 194 (H) 65 - 140 mg/dl             Invalid input(s): \"ALBUMIN\"        Portions of the record may have been created with voice recognition software. Occasional wrong word or \"sound a like\" substitutions may have occurred due to the inherent limitations of voice recognition software. Read the chart carefully and recognize,    "

## 2025-04-07 ENCOUNTER — OFFICE VISIT (OUTPATIENT)
Dept: INTERNAL MEDICINE CLINIC | Facility: CLINIC | Age: 70
End: 2025-04-07
Payer: MEDICARE

## 2025-04-07 VITALS
BODY MASS INDEX: 41.44 KG/M2 | WEIGHT: 296 LBS | TEMPERATURE: 100.5 F | HEIGHT: 71 IN | RESPIRATION RATE: 18 BRPM | DIASTOLIC BLOOD PRESSURE: 68 MMHG | HEART RATE: 106 BPM | OXYGEN SATURATION: 99 % | SYSTOLIC BLOOD PRESSURE: 134 MMHG

## 2025-04-07 DIAGNOSIS — I12.9 HYPERTENSIVE KIDNEY DISEASE WITH STAGE 3B CHRONIC KIDNEY DISEASE (HCC): Primary | ICD-10-CM

## 2025-04-07 DIAGNOSIS — R80.9 TYPE 2 DIABETES MELLITUS WITH MICROALBUMINURIA, WITH LONG-TERM CURRENT USE OF INSULIN (HCC): ICD-10-CM

## 2025-04-07 DIAGNOSIS — E11.29 TYPE 2 DIABETES MELLITUS WITH MICROALBUMINURIA, WITH LONG-TERM CURRENT USE OF INSULIN (HCC): ICD-10-CM

## 2025-04-07 DIAGNOSIS — A41.9 SEPSIS WITHOUT ACUTE ORGAN DYSFUNCTION, DUE TO UNSPECIFIED ORGANISM (HCC): ICD-10-CM

## 2025-04-07 DIAGNOSIS — Z79.4 TYPE 2 DIABETES MELLITUS WITH MICROALBUMINURIA, WITH LONG-TERM CURRENT USE OF INSULIN (HCC): ICD-10-CM

## 2025-04-07 DIAGNOSIS — Z79.4 TYPE 2 DIABETES MELLITUS WITH HYPERGLYCEMIA, WITH LONG-TERM CURRENT USE OF INSULIN (HCC): ICD-10-CM

## 2025-04-07 DIAGNOSIS — R60.0 BILATERAL LOWER EXTREMITY EDEMA: ICD-10-CM

## 2025-04-07 DIAGNOSIS — N18.32 HYPERTENSIVE KIDNEY DISEASE WITH STAGE 3B CHRONIC KIDNEY DISEASE (HCC): Primary | ICD-10-CM

## 2025-04-07 DIAGNOSIS — E11.65 TYPE 2 DIABETES MELLITUS WITH HYPERGLYCEMIA, WITH LONG-TERM CURRENT USE OF INSULIN (HCC): ICD-10-CM

## 2025-04-07 DIAGNOSIS — R65.10 SIRS (SYSTEMIC INFLAMMATORY RESPONSE SYNDROME) (HCC): ICD-10-CM

## 2025-04-07 PROCEDURE — G2211 COMPLEX E/M VISIT ADD ON: HCPCS | Performed by: INTERNAL MEDICINE

## 2025-04-07 PROCEDURE — 99214 OFFICE O/P EST MOD 30 MIN: CPT | Performed by: INTERNAL MEDICINE

## 2025-04-07 RX ORDER — FUROSEMIDE 40 MG/1
40 TABLET ORAL 2 TIMES DAILY
Qty: 180 TABLET | Refills: 0 | Status: SHIPPED | OUTPATIENT
Start: 2025-04-07

## 2025-04-07 NOTE — ASSESSMENT & PLAN NOTE
Lab Results   Component Value Date    EGFR 36 (L) 03/18/2025    EGFR 33 03/17/2025    EGFR 33 03/16/2025    CREATININE 1.98 (H) 03/18/2025    CREATININE 1.97 (H) 03/17/2025    CREATININE 1.96 (H) 03/16/2025   GFR 36 creatinine 1.98 stable at baseline avoid nephrotoxic quinn awaiting repeat BMP followed by nephrology nephrology follow-up note reviewed awaiting phosphorus PTH magnesium calcium and urine analysis    Orders:  •  Hemoglobin A1C; Future  •  Comprehensive metabolic panel; Future

## 2025-04-07 NOTE — PROGRESS NOTES
Name: Nadeem Purdy Jr.      : 1955      MRN: [id number]  Encounter Provider: Jluis Glass MD  Encounter Date: 2025   Encounter department: [facility] INTERNAL MEDICINE  :  Assessment & Plan  Hypertensive kidney disease with stage 3b chronic kidney disease (HCC)  Lab Results   Component Value Date    EGFR 36 (L) 2025    EGFR 33 2025    EGFR 33 2025    CREATININE 1.98 (H) 2025    CREATININE 1.97 (H) 2025    CREATININE 1.96 (H) 2025   GFR 36 creatinine 1.98 stable at baseline avoid nephrotoxic quinn awaiting repeat BMP followed by nephrology nephrology follow-up note reviewed awaiting phosphorus PTH magnesium calcium and urine analysis    Orders:  •  Hemoglobin A1C; Future  •  Comprehensive metabolic panel; Future    Sepsis without acute organ dysfunction, due to unspecified organism (HCC)  Was treated in the hospital for strep pyogenes and bacteremia due to the cellulitis leg with intravenous Ancef and then treated with Duricef oral finish the course of Duricef afebrile no signs symptom of any sepsis resolved.  Awaiting repeat CBC  Orders:  •  Hemoglobin A1C; Future  •  Comprehensive metabolic panel; Future    SIRS (systemic inflammatory response syndrome) ([facility])  Patient with recent admission  through 3/11 at this [place].  Initial presentation was DKA, also noted to be septic in setting of bilateral lower extremity cellulitis with streptococcal bacteremia.  Patient was discharged to [facility] on 3/11/25 on Duricef as recommended by ID with plan to continue through 3/15  Patient was sent from [facility] to ED for fever/rigors  Patient met SIRS criteria on arrival with fever, tachycardia, tachypnea, and leukocytosis.    COVID/flu/RSV negative  Procalcitonin 0.10, Lactic acid 1.6  UA negative  Chest x-ray no acute cardiopulmonary disease  Received 1 L IV fluids in the ED-will not give fluids per sepsis protocol due to concern for volume overload  Blood cultures:  no growth at 72 hours    Finish the course  duriceffor strep bacteremia       Type 2 diabetes mellitus with hyperglycemia, with long-term current use of insulin ([facility])    Lab Results   Component Value Date    HGBA1C 9.8 (H) 03/18/2025     A1c elevated 9.8    At present time the Novolin 70/30 25 in a.m. and 20 in the evening metformin was discontinued patient noncompliant stop metformin claims it was not helping and changing the doses and is on for now on Novolin 70/30    Counseling done for the diet    Hypoglycemia protocol in place  Check A1c urine for microalbumin before next visit        Glycemia protocol in place    Awaiting repeat A1c urine for microalbumin BMP before next visit  Orders:  •  Hemoglobin A1C; Future  •  Comprehensive metabolic panel; Future    Type 2 diabetes mellitus with microalbuminuria, with long-term current use of insulin ([facility])    Lab Results   Component Value Date    HGBA1C 9.8 (H) 03/18/2025     Same as above awaiting repeat urine for microalbumin       Bilateral lower extremity edema  Continue Lasix 40 mg daily helping patient control edema  Orders:  •  furosemide (LASIX) 40 mg tablet; Take 1 tablet (40 mg total) by mouth 2 (two) times a day           History of Present Illness   TCM Call (since 3/24/2025)     None      TCM Call (since 3/24/2025)     None           Review of Systems   Constitutional:  Positive for activity change. Negative for appetite change, chills, diaphoresis, fatigue, fever and unexpected weight change.   HENT:  Negative for congestion, dental problem, drooling, ear discharge, ear pain, facial swelling, hearing loss, mouth sores, nosebleeds, postnasal drip, rhinorrhea, sinus pressure, sneezing, sore throat, tinnitus, trouble swallowing and voice change.    Eyes:  Negative for photophobia, pain, discharge, redness, itching and visual disturbance.   Respiratory:  Negative for apnea, cough, choking, chest tightness, shortness of breath, wheezing and stridor.   "  Cardiovascular:  Positive for leg swelling. Negative for chest pain and palpitations.   Gastrointestinal:  Negative for abdominal distention, abdominal pain, anal bleeding, blood in stool, constipation, diarrhea, nausea, rectal pain and vomiting.   Endocrine: Negative for cold intolerance, heat intolerance, polydipsia, polyphagia and polyuria.   Genitourinary:  Negative for decreased urine volume, difficulty urinating, dysuria, enuresis, flank pain, frequency, genital sores, hematuria and urgency.   Musculoskeletal:  Positive for arthralgias and back pain. Negative for gait problem, joint swelling, myalgias, neck pain and neck stiffness.   Skin:  Negative for color change, pallor, rash and wound.   Allergic/Immunologic: Negative.  Negative for environmental allergies, food allergies and immunocompromised state.   Neurological:  Negative for dizziness, tremors, seizures, syncope, facial asymmetry, speech difficulty, weakness, light-headedness, numbness and headaches.   Psychiatric/Behavioral:  Negative for agitation, behavioral problems, confusion, decreased concentration, dysphoric mood, hallucinations, self-injury, sleep disturbance and suicidal ideas. The patient is not nervous/anxious and is not hyperactive.        Objective   /68   Pulse (!) 106   Temp 100.5 °F (38.1 °C)   Resp 18   Ht 5' 11\" (1.803 m)   Wt 134 kg (296 lb)   SpO2 99%   BMI 41.28 kg/m²      Physical Exam  Vitals and nursing note reviewed.   Constitutional:       General: He is not in acute distress.     Appearance: He is well-developed. He is obese. He is not ill-appearing, toxic-appearing or diaphoretic.   HENT:      Head: Normocephalic and atraumatic.      Right Ear: External ear normal.      Left Ear: External ear normal.      Nose: Nose normal.      Mouth/Throat:      Pharynx: No oropharyngeal exudate.   Eyes:      General: Lids are normal. Lids are everted, no foreign bodies appreciated. No scleral icterus.        Right eye: " No discharge.         Left eye: No discharge.      Conjunctiva/sclera: Conjunctivae normal.      Pupils: Pupils are equal, round, and reactive to light.   Neck:      Thyroid: No thyromegaly.      Vascular: Normal carotid pulses. No carotid bruit, hepatojugular reflux or JVD.      Trachea: No tracheal tenderness or tracheal deviation.   Cardiovascular:      Rate and Rhythm: Normal rate and regular rhythm.      Pulses: Normal pulses.      Heart sounds: Normal heart sounds. No murmur heard.     No friction rub. No gallop.   Pulmonary:      Effort: Pulmonary effort is normal. No respiratory distress.      Breath sounds: Normal breath sounds. No stridor. No wheezing or rales.   Chest:      Chest wall: No tenderness.   Abdominal:      General: Bowel sounds are normal. There is no distension.      Palpations: Abdomen is soft. There is no mass.      Tenderness: There is no abdominal tenderness. There is no guarding or rebound.   Musculoskeletal:         General: No tenderness or deformity. Normal range of motion.      Cervical back: Normal range of motion and neck supple. No edema, erythema or rigidity. No spinous process tenderness or muscular tenderness. Normal range of motion.      Right lower leg: Edema present.      Left lower leg: Edema present.   Lymphadenopathy:      Head:      Right side of head: No submental, submandibular, tonsillar, preauricular or posterior auricular adenopathy.      Left side of head: No submental, submandibular, tonsillar, preauricular, posterior auricular or occipital adenopathy.      Cervical: No cervical adenopathy.      Right cervical: No superficial, deep or posterior cervical adenopathy.     Left cervical: No superficial, deep or posterior cervical adenopathy.      Upper Body:      Right upper body: No pectoral adenopathy.      Left upper body: No pectoral adenopathy.   Skin:     General: Skin is warm and dry.      Coloration: Skin is not pale.      Findings: No erythema or rash.    Neurological:      Mental Status: He is alert and oriented to person, place, and time.      Cranial Nerves: No cranial nerve deficit.      Sensory: No sensory deficit.      Motor: No tremor, abnormal muscle tone or seizure activity.      Coordination: Coordination normal.      Gait: Gait abnormal.      Deep Tendon Reflexes: Reflexes are normal and symmetric. Reflexes normal.   Psychiatric:         Behavior: Behavior normal.         Thought Content: Thought content normal.         Judgment: Judgment normal.

## 2025-04-07 NOTE — ASSESSMENT & PLAN NOTE
Lab Results   Component Value Date    EGFR 36 (L) 03/18/2025    EGFR 33 03/17/2025    EGFR 33 03/16/2025    CREATININE 1.98 (H) 03/18/2025    CREATININE 1.97 (H) 03/17/2025    CREATININE 1.96 (H) 03/16/2025   Baseline creatinine of approximately 1.1-1.3 -> elevated but continuing to improve over the last several days, currently at 2.45 from a prior 4.34 peak  Nephrology following -> now on twice daily oral diuretic (Lasix) due to fluid overloaded state  Monitor renal function and urine output  Limit/avoid nephrotoxins and hypotension as possible - holding ARB (Cozaar) on discharge

## 2025-04-07 NOTE — ASSESSMENT & PLAN NOTE
Lab Results   Component Value Date    HGBA1C 9.8 (H) 03/18/2025     A1c elevated 9.8    At present time the Novolin 70/30 25 in a.m. and 20 in the evening metformin was discontinued patient noncompliant stop metformin claims it was not helping and changing the doses and is on for now on Novolin 70/30    Counseling done for the diet    Hypoglycemia protocol in place  Check A1c urine for microalbumin before next visit        Glycemia protocol in place    Awaiting repeat A1c urine for microalbumin BMP before next visit  Orders:  •  Hemoglobin A1C; Future  •  Comprehensive metabolic panel; Future

## 2025-04-07 NOTE — ASSESSMENT & PLAN NOTE
Was treated in the hospital for strep pyogenes and bacteremia due to the cellulitis leg with intravenous Ancef and then treated with Duricef oral finish the course of Duricef afebrile no signs symptom of any sepsis resolved.  Awaiting repeat CBC  Orders:  •  Hemoglobin A1C; Future  •  Comprehensive metabolic panel; Future

## 2025-04-07 NOTE — ASSESSMENT & PLAN NOTE
Lab Results   Component Value Date    HGBA1C 9.8 (H) 03/18/2025     Same as above awaiting repeat urine for microalbumin

## 2025-04-07 NOTE — ASSESSMENT & PLAN NOTE
Patient with recent admission 2/26 through 3/11 at this [place].  Initial presentation was DKA, also noted to be septic in setting of bilateral lower extremity cellulitis with streptococcal bacteremia.  Patient was discharged to [facility] on 3/11/25 on Duricef as recommended by ID with plan to continue through 3/15  Patient was sent from [facility] to ED for fever/rigors  Patient met SIRS criteria on arrival with fever, tachycardia, tachypnea, and leukocytosis.    COVID/flu/RSV negative  Procalcitonin 0.10, Lactic acid 1.6  UA negative  Chest x-ray no acute cardiopulmonary disease  Received 1 L IV fluids in the ED-will not give fluids per sepsis protocol due to concern for volume overload  Blood cultures: no growth at 72 hours    Finish the course  duriceffor strep bacteremia

## 2025-04-18 DIAGNOSIS — I10 ESSENTIAL HYPERTENSION: ICD-10-CM

## 2025-04-18 RX ORDER — LOSARTAN POTASSIUM 100 MG/1
100 TABLET ORAL DAILY
Qty: 90 TABLET | Refills: 2 | Status: SHIPPED | OUTPATIENT
Start: 2025-04-18

## 2025-04-18 NOTE — TELEPHONE ENCOUNTER
Script states: Paused since Tue 3/11/2025 until manually resumed  Patient states that he has been taking daily and was not aware that it was paused.   Reason for call:   [x] Refill   [] Prior Auth  [] Other:     Office:   [x] PCP/Provider - [facility]  Authorized By: Jluis Glass MD    [] Specialty/Provider -     Medication: losartan (COZAAR) 100 MG tablet     Dose/Frequency: TAKE 1 TABLET BY MOUTH EVERY DAY    Quantity: 90 tablet    Pharmacy: [facility]/pharmacy #[id number] - [place] - [address], [address]    Local Pharmacy   Does the patient have enough for 3 days?   [] Yes   [x] No - Send as HP to POD    Mail Away Pharmacy   Does the patient have enough for 10 days?   [] Yes   [] No - Send as HP to POD

## 2025-04-28 ENCOUNTER — APPOINTMENT (OUTPATIENT)
Dept: LAB | Facility: CLINIC | Age: 70
End: 2025-04-28
Attending: INTERNAL MEDICINE
Payer: MEDICARE

## 2025-04-28 ENCOUNTER — RESULTS FOLLOW-UP (OUTPATIENT)
Dept: INTERNAL MEDICINE CLINIC | Facility: CLINIC | Age: 70
End: 2025-04-28

## 2025-04-28 DIAGNOSIS — I12.9 HYPERTENSIVE KIDNEY DISEASE WITH STAGE 3B CHRONIC KIDNEY DISEASE (HCC): ICD-10-CM

## 2025-04-28 DIAGNOSIS — E11.65 TYPE 2 DIABETES MELLITUS WITH HYPERGLYCEMIA, WITH LONG-TERM CURRENT USE OF INSULIN (HCC): ICD-10-CM

## 2025-04-28 DIAGNOSIS — A41.9 SEPSIS WITHOUT ACUTE ORGAN DYSFUNCTION, DUE TO UNSPECIFIED ORGANISM (HCC): ICD-10-CM

## 2025-04-28 DIAGNOSIS — N18.32 HYPERTENSIVE KIDNEY DISEASE WITH STAGE 3B CHRONIC KIDNEY DISEASE (HCC): ICD-10-CM

## 2025-04-28 DIAGNOSIS — Z79.4 TYPE 2 DIABETES MELLITUS WITH HYPERGLYCEMIA, WITH LONG-TERM CURRENT USE OF INSULIN (HCC): ICD-10-CM

## 2025-04-28 LAB
ALBUMIN SERPL BCG-MCNC: 3.8 G/DL (ref 3.5–5)
ALP SERPL-CCNC: 42 U/L (ref 34–104)
ALT SERPL W P-5'-P-CCNC: 11 U/L (ref 7–52)
ANION GAP SERPL CALCULATED.3IONS-SCNC: 11 MMOL/L (ref 4–13)
AST SERPL W P-5'-P-CCNC: 16 U/L (ref 13–39)
BILIRUB SERPL-MCNC: 0.4 MG/DL (ref 0.2–1)
BUN SERPL-MCNC: 16 MG/DL (ref 5–25)
CALCIUM SERPL-MCNC: 9.7 MG/DL (ref 8.4–10.2)
CHLORIDE SERPL-SCNC: 103 MMOL/L (ref 96–108)
CO2 SERPL-SCNC: 24 MMOL/L (ref 21–32)
CREAT SERPL-MCNC: 1.38 MG/DL (ref 0.6–1.3)
EST. AVERAGE GLUCOSE BLD GHB EST-MCNC: 131 MG/DL
GFR SERPL CREATININE-BSD FRML MDRD: 51 ML/MIN/1.73SQ M
GLUCOSE P FAST SERPL-MCNC: 56 MG/DL (ref 65–99)
HBA1C MFR BLD: 6.2 %
POTASSIUM SERPL-SCNC: 4 MMOL/L (ref 3.5–5.3)
PROT SERPL-MCNC: 8.6 G/DL (ref 6.4–8.4)
SODIUM SERPL-SCNC: 138 MMOL/L (ref 135–147)

## 2025-04-28 PROCEDURE — 80053 COMPREHEN METABOLIC PANEL: CPT

## 2025-04-28 PROCEDURE — 36415 COLL VENOUS BLD VENIPUNCTURE: CPT

## 2025-04-28 PROCEDURE — 83036 HEMOGLOBIN GLYCOSYLATED A1C: CPT

## 2025-04-30 NOTE — PATIENT INSTRUCTIONS
Addended by: MALIHA BISWAS on: 4/30/2025 12:31 PM     Modules accepted: Orders     Recommend to see eye doctor please set up your appointment and have them send us the report    please go ahead and set up appointment for colonoscopy  Edema of legs management:    Will discontinue hydrochlorothiazide 25 mg daily  We will switch to Lasix 40 mg daily and KCl 20 M EQ daily  Recommend CMP and proBNP in 2 weeks  Recommend to avoid salt  Continue your other blood pressure medicine same including metoprolol and losartan  Strongly recommend to see cardiologist at your convenience  Continue your insulin same 20 units twice a day  Watch your weight  Weigh yourself every day  Go for the blood test in 2 weeks  New cream for your rash on your left leg clobetasol twice a day stronger steroid cream     Follow back in 3 weeks  Referring to the gastroenterologist for colonoscopy    Follow with Consultants as per their and our suggestion    Follow up in approximately three week or as needed earlier    Follow all instructions as advised and discussed  Take your medications as prescribed  Call the office immediately if you experience any side effects  Ask questions if you do not understand  Keep your scheduled appointment as advised or come sooner if necessary or in doubt  Best time to call for non-urgent matter or questions on weekdays is between 9am and 12 noon  See physician for any new symptoms or worsening of current symptoms  Urgent or emergent situations call 911 and report to nearest emergency room      I spent  30+ minutes taking care of this patient including clinical care, conseling, collaboration, chart, lab and consultaion review as appropriate    Patient is to get labs 1 week(s) prior to next visit if advised

## 2025-05-01 ENCOUNTER — RA CDI HCC (OUTPATIENT)
Dept: OTHER | Facility: HOSPITAL | Age: 70
End: 2025-05-01

## 2025-05-06 DIAGNOSIS — E11.65 TYPE 2 DIABETES MELLITUS WITH HYPERGLYCEMIA, WITH LONG-TERM CURRENT USE OF INSULIN (HCC): ICD-10-CM

## 2025-05-06 DIAGNOSIS — I10 ESSENTIAL HYPERTENSION: ICD-10-CM

## 2025-05-06 DIAGNOSIS — R00.0 TACHYCARDIA: ICD-10-CM

## 2025-05-06 DIAGNOSIS — Z79.4 TYPE 2 DIABETES MELLITUS WITH HYPERGLYCEMIA, WITH LONG-TERM CURRENT USE OF INSULIN (HCC): ICD-10-CM

## 2025-05-06 RX ORDER — INSULIN ASPART 100 [IU]/ML
25 INJECTION, SUSPENSION SUBCUTANEOUS
Qty: 45 ML | Refills: 1 | Status: SHIPPED | OUTPATIENT
Start: 2025-05-06 | End: 2025-05-07

## 2025-05-06 NOTE — TELEPHONE ENCOUNTER
Reason for call:   [x] Refill   [] Prior Auth  [x] Other: PER PATIENT - USES 20 UNITS IN MORNING AND 25 UNITS IN EVENING - ASKING DR GRADY TO TAKE OVER REFILLS - HE WAS IN A REHAB FACILITY     Office:   [x] PCP/Provider - Jluis Glass, / [facility]   [] Specialty/Provider -    Medication: insulin aspart protamine-insulin aspart (NovoLOG 70/30) 100 units/mL injection     Dose/Frequency:  20 UNITS IN MORNING AND 25 UNITS IN EVENING     Quantity: 45    Pharmacy: [facility]acy #[id number] - [place] - [address], [address]      Local Pharmacy   Does the patient have enough for 3 days?   [] Yes   [x] No - Send as HP to POD

## 2025-05-07 RX ORDER — INSULIN ASPART 100 [IU]/ML
INJECTION, SUSPENSION SUBCUTANEOUS
Qty: 50 ML | Refills: 1 | Status: SHIPPED | OUTPATIENT
Start: 2025-05-07 | End: 2025-05-09

## 2025-05-07 RX ORDER — METOPROLOL SUCCINATE 100 MG/1
100 TABLET, EXTENDED RELEASE ORAL DAILY
Qty: 90 TABLET | Refills: 1 | OUTPATIENT
Start: 2025-05-07

## 2025-05-09 ENCOUNTER — OFFICE VISIT (OUTPATIENT)
Dept: INTERNAL MEDICINE CLINIC | Facility: CLINIC | Age: 70
End: 2025-05-09
Payer: MEDICARE

## 2025-05-09 VITALS
OXYGEN SATURATION: 98 % | BODY MASS INDEX: 42.7 KG/M2 | SYSTOLIC BLOOD PRESSURE: 155 MMHG | HEIGHT: 71 IN | WEIGHT: 305 LBS | DIASTOLIC BLOOD PRESSURE: 82 MMHG | HEART RATE: 83 BPM

## 2025-05-09 DIAGNOSIS — E11.22 TYPE 2 DIABETES MELLITUS WITH STAGE 3A CHRONIC KIDNEY DISEASE, WITH LONG-TERM CURRENT USE OF INSULIN (HCC): ICD-10-CM

## 2025-05-09 DIAGNOSIS — Z79.4 TYPE 2 DIABETES MELLITUS WITH STAGE 3A CHRONIC KIDNEY DISEASE, WITH LONG-TERM CURRENT USE OF INSULIN (HCC): ICD-10-CM

## 2025-05-09 DIAGNOSIS — N18.31 TYPE 2 DIABETES MELLITUS WITH STAGE 3A CHRONIC KIDNEY DISEASE, WITH LONG-TERM CURRENT USE OF INSULIN (HCC): ICD-10-CM

## 2025-05-09 DIAGNOSIS — E11.65 TYPE 2 DIABETES MELLITUS WITH HYPERGLYCEMIA, WITH LONG-TERM CURRENT USE OF INSULIN (HCC): Primary | ICD-10-CM

## 2025-05-09 DIAGNOSIS — I10 ESSENTIAL HYPERTENSION: ICD-10-CM

## 2025-05-09 DIAGNOSIS — N18.30 STAGE 3 CHRONIC KIDNEY DISEASE, UNSPECIFIED WHETHER STAGE 3A OR 3B CKD (HCC): ICD-10-CM

## 2025-05-09 DIAGNOSIS — Z79.4 TYPE 2 DIABETES MELLITUS WITH HYPERGLYCEMIA, WITH LONG-TERM CURRENT USE OF INSULIN (HCC): Primary | ICD-10-CM

## 2025-05-09 DIAGNOSIS — K75.81 STEATOHEPATITIS: ICD-10-CM

## 2025-05-09 PROCEDURE — 99214 OFFICE O/P EST MOD 30 MIN: CPT | Performed by: INTERNAL MEDICINE

## 2025-05-09 PROCEDURE — G2211 COMPLEX E/M VISIT ADD ON: HCPCS | Performed by: INTERNAL MEDICINE

## 2025-05-09 RX ORDER — LOSARTAN POTASSIUM 100 MG/1
100 TABLET ORAL DAILY
Qty: 90 TABLET | Refills: 2 | Status: SHIPPED | OUTPATIENT
Start: 2025-05-09

## 2025-05-09 RX ORDER — INSULIN HUMAN 100 [IU]/ML
INJECTION, SUSPENSION SUBCUTANEOUS
Qty: 15 ML | Refills: 5 | Status: SHIPPED | OUTPATIENT
Start: 2025-05-09

## 2025-05-09 NOTE — ASSESSMENT & PLAN NOTE
Asymptomatic CMP reviewed as follows    Lab Results   Component Value Date     06/06/2018    SODIUM 138 04/28/2025    K 4.0 04/28/2025     04/28/2025    CO2 24 04/28/2025    ANIONGAP 12.3 06/06/2018    AGAP 11 04/28/2025    BUN 16 04/28/2025    CREATININE 1.38 (H) 04/28/2025    GLUC 189 (H) 03/18/2025    GLUF 56 (L) 04/28/2025    CALCIUM 9.7 04/28/2025    AST 16 04/28/2025    ALT 11 04/28/2025    ALKPHOS 42 04/28/2025    PROT 6.9 06/06/2018    TP 8.6 (H) 04/28/2025    BILITOT 0.5 06/06/2018    TBILI 0.40 04/28/2025    EGFR 51 04/28/2025   Asymptomatic counseling done complete abstinence from the alcohol will monitor closely

## 2025-05-09 NOTE — ASSESSMENT & PLAN NOTE
Lab Results   Component Value Date    HGBA1C 6.2 (H) 04/28/2025   Same is under chronic kidney disease diabetes management same as under type 2 diabetes with hyperglycemia

## 2025-05-09 NOTE — PROGRESS NOTES
Name: Nadeem Purdy Jr.      : 1955      MRN: [id number]  Encounter Provider: Jluis Glass MD  Encounter Date: 2025   Encounter department: [facility] INTERNAL MEDICINE  :  Assessment & Plan  Type 2 diabetes mellitus with hyperglycemia, with long-term current use of insulin (HCC)    Lab Results   Component Value Date    HGBA1C 6.2 (H) 2025   Diabetes very well-controlled A1c came down to 6.2  Monitor blood sugar at home  Improving    Continue diabetic diet    Agree continue manage medications for    Novolin Humulin 70/30 20 units in the morning and 20 5 in the evening    Glycemia protocol in place    Urine for microalbumin BMP reviewed  Continue current regimen diet exercise    Orders:  •  insulin isophane-insulin regular (HumuLIN 70/30 KwikPen) 100 units/mL injection pen; 20u in the am and 25u in the pm    Essential hypertension  Blood pressure reviewed uncontrolled goal systolic less than 140 diastolic less than 80    Restart losartan 100 mg daily with low-salt diet no diet exercise lose weight    Amlodipine 10 mg daily  Lasix 40 mg daily    Metoprolol 50 mg daily    BMP reviewed as follows    Lab Results   Component Value Date    SODIUM 138 2025    K 4.0 2025     2025    CO2 24 2025    BUN 16 2025    CREATININE 1.38 (H) 2025    GLUC 189 (H) 2025    CALCIUM 9.7 2025      Orders:  •  losartan (COZAAR) 100 MG tablet; Take 1 tablet (100 mg total) by mouth daily    Type 2 diabetes mellitus with stage 3a chronic kidney disease, with long-term current use of insulin (HCC)    Lab Results   Component Value Date    HGBA1C 6.2 (H) 2025   Same is under chronic kidney disease diabetes management same as under type 2 diabetes with hyperglycemia         Steatohepatitis  Asymptomatic CMP reviewed as follows    Lab Results   Component Value Date     2018    SODIUM 138 2025    K 4.0 2025     2025     CO2 24 04/28/2025    ANIONGAP 12.3 06/06/2018    AGAP 11 04/28/2025    BUN 16 04/28/2025    CREATININE 1.38 (H) 04/28/2025    GLUC 189 (H) 03/18/2025    GLUF 56 (L) 04/28/2025    CALCIUM 9.7 04/28/2025    AST 16 04/28/2025    ALT 11 04/28/2025    ALKPHOS 42 04/28/2025    PROT 6.9 06/06/2018    TP 8.6 (H) 04/28/2025    BILITOT 0.5 06/06/2018    TBILI 0.40 04/28/2025    EGFR 51 04/28/2025   Asymptomatic counseling done complete abstinence from the alcohol will monitor closely       Stage 3 chronic kidney disease, unspecified whether stage 3a or 3b CKD ([facility])  Lab Results   Component Value Date    EGFR 51 04/28/2025    EGFR 36 (L) 03/18/2025    EGFR 33 03/17/2025    CREATININE 1.38 (H) 04/28/2025    CREATININE 1.98 (H) 03/18/2025    CREATININE 1.97 (H) 03/17/2025   Kidney function much improved and GFR improved 51 creatinine down to 1.38 stable at baseline.  Losartan was added for the better control blood pressure  Was 3A chronic kidney disease now 3B  Avoid nephrotoxic psych will monitor closely with BMP                History of Present Illness   Came in follow-up chronic medical condition listed visit diagnosis denies any chest pain difficulty breathing patient's labs reviewed A1c much improved GFR creatinine much improved no new symptoms overall health slowly improving blood pressure uncontrolled asymptomatic changes made in the management of hypertension for details refer to assessment plan visit diagnosis      Review of Systems   Constitutional:  Positive for activity change. Negative for appetite change, chills, diaphoresis, fatigue, fever and unexpected weight change.   HENT:  Negative for congestion, dental problem, drooling, ear discharge, ear pain, facial swelling, hearing loss, mouth sores, nosebleeds, postnasal drip, rhinorrhea, sinus pressure, sneezing, sore throat, tinnitus, trouble swallowing and voice change.    Eyes:  Negative for photophobia, pain, discharge, redness, itching and visual  "disturbance.   Respiratory:  Negative for apnea, cough, choking, chest tightness, shortness of breath, wheezing and stridor.    Cardiovascular:  Positive for leg swelling. Negative for chest pain and palpitations.   Gastrointestinal:  Negative for abdominal distention, abdominal pain, anal bleeding, blood in stool, constipation, diarrhea, nausea, rectal pain and vomiting.   Endocrine: Negative for cold intolerance, heat intolerance, polydipsia, polyphagia and polyuria.   Genitourinary:  Negative for decreased urine volume, difficulty urinating, dysuria, enuresis, flank pain, frequency, genital sores, hematuria and urgency.   Musculoskeletal:  Positive for arthralgias and back pain. Negative for gait problem, joint swelling, myalgias, neck pain and neck stiffness.   Skin:  Negative for color change, pallor, rash and wound.   Allergic/Immunologic: Negative.  Negative for environmental allergies, food allergies and immunocompromised state.   Neurological:  Negative for dizziness, tremors, seizures, syncope, facial asymmetry, speech difficulty, weakness, light-headedness, numbness and headaches.   Psychiatric/Behavioral:  Negative for agitation, behavioral problems, confusion, decreased concentration, dysphoric mood, hallucinations, self-injury, sleep disturbance and suicidal ideas. The patient is not nervous/anxious and is not hyperactive.        Objective   /82   Pulse 83   Ht 5' 11\" (1.803 m)   Wt (!) 138 kg (305 lb)   SpO2 98%   BMI 42.54 kg/m²      Physical Exam  Vitals and nursing note reviewed.   Constitutional:       General: He is not in acute distress.     Appearance: He is well-developed. He is obese. He is not ill-appearing, toxic-appearing or diaphoretic.   HENT:      Head: Normocephalic and atraumatic.      Right Ear: External ear normal.      Left Ear: External ear normal.      Nose: Nose normal.      Mouth/Throat:      Pharynx: No oropharyngeal exudate.   Eyes:      General: Lids are normal. " Lids are everted, no foreign bodies appreciated. No scleral icterus.        Right eye: No discharge.         Left eye: No discharge.      Conjunctiva/sclera: Conjunctivae normal.      Pupils: Pupils are equal, round, and reactive to light.   Neck:      Thyroid: No thyromegaly.      Vascular: Normal carotid pulses. No carotid bruit, hepatojugular reflux or JVD.      Trachea: No tracheal tenderness or tracheal deviation.   Cardiovascular:      Rate and Rhythm: Normal rate and regular rhythm.      Pulses: Normal pulses.      Heart sounds: Normal heart sounds. No murmur heard.     No friction rub. No gallop.   Pulmonary:      Effort: Pulmonary effort is normal. No respiratory distress.      Breath sounds: Normal breath sounds. No stridor. No wheezing or rales.   Chest:      Chest wall: No tenderness.   Abdominal:      General: Bowel sounds are normal. There is no distension.      Palpations: Abdomen is soft. There is no mass.      Tenderness: There is no abdominal tenderness. There is no guarding or rebound.   Musculoskeletal:         General: No tenderness or deformity. Normal range of motion.      Cervical back: Normal range of motion and neck supple. No edema, erythema or rigidity. No spinous process tenderness or muscular tenderness. Normal range of motion.      Right lower leg: Edema present.      Left lower leg: Edema present.   Lymphadenopathy:      Head:      Right side of head: No submental, submandibular, tonsillar, preauricular or posterior auricular adenopathy.      Left side of head: No submental, submandibular, tonsillar, preauricular, posterior auricular or occipital adenopathy.      Cervical: No cervical adenopathy.      Right cervical: No superficial, deep or posterior cervical adenopathy.     Left cervical: No superficial, deep or posterior cervical adenopathy.      Upper Body:      Right upper body: No pectoral adenopathy.      Left upper body: No pectoral adenopathy.   Skin:     General: Skin is warm  and dry.      Coloration: Skin is not pale.      Findings: No erythema or rash.   Neurological:      Mental Status: He is alert and oriented to person, place, and time.      Cranial Nerves: No cranial nerve deficit.      Sensory: No sensory deficit.      Motor: No tremor, abnormal muscle tone or seizure activity.      Coordination: Coordination normal.      Gait: Gait abnormal.      Deep Tendon Reflexes: Reflexes are normal and symmetric. Reflexes normal.   Psychiatric:         Behavior: Behavior normal.         Thought Content: Thought content normal.         Judgment: Judgment normal.

## 2025-05-09 NOTE — ASSESSMENT & PLAN NOTE
Lab Results   Component Value Date    HGBA1C 6.2 (H) 04/28/2025   Diabetes very well-controlled A1c came down to 6.2  Monitor blood sugar at home  Improving    Continue diabetic diet    Agree continue manage medications for    Novolin Humulin 70/30 20 units in the morning and 20 5 in the evening    Glycemia protocol in place    Urine for microalbumin BMP reviewed  Continue current regimen diet exercise    Orders:  •  insulin isophane-insulin regular (HumuLIN 70/30 KwikPen) 100 units/mL injection pen; 20u in the am and 25u in the pm

## 2025-05-09 NOTE — ASSESSMENT & PLAN NOTE
Lab Results   Component Value Date    EGFR 51 04/28/2025    EGFR 36 (L) 03/18/2025    EGFR 33 03/17/2025    CREATININE 1.38 (H) 04/28/2025    CREATININE 1.98 (H) 03/18/2025    CREATININE 1.97 (H) 03/17/2025   Kidney function much improved and GFR improved 51 creatinine down to 1.38 stable at baseline.  Losartan was added for the better control blood pressure  Was 3A chronic kidney disease now 3B  Avoid nephrotoxic psych will monitor closely with BMP

## 2025-05-09 NOTE — ASSESSMENT & PLAN NOTE
Blood pressure reviewed uncontrolled goal systolic less than 140 diastolic less than 80    Restart losartan 100 mg daily with low-salt diet no diet exercise lose weight    Amlodipine 10 mg daily  Lasix 40 mg daily    Metoprolol 50 mg daily    BMP reviewed as follows    Lab Results   Component Value Date    SODIUM 138 04/28/2025    K 4.0 04/28/2025     04/28/2025    CO2 24 04/28/2025    BUN 16 04/28/2025    CREATININE 1.38 (H) 04/28/2025    GLUC 189 (H) 03/18/2025    CALCIUM 9.7 04/28/2025      Orders:  •  losartan (COZAAR) 100 MG tablet; Take 1 tablet (100 mg total) by mouth daily

## 2025-05-21 ENCOUNTER — TELEPHONE (OUTPATIENT)
Age: 70
End: 2025-05-21

## 2025-05-21 NOTE — TELEPHONE ENCOUNTER
Patient calling to follow up from OV 4/7 with Primary Care Provider regarding bilateral leg cellulitis. Reports that about about 2 -3 weeks ago his legs started weeping a clear yellow fluid. Reports weeping is around ankles. Open areas on right ankle. Reports that his legs look about the same in size as when he was there in April and he denies chest pain or shortness of breath. Patient has been keeping wounds clean (has special wound wash he has been using) and then drying completely before covering. Reports he does this multiple times a day. Denies any signs of infection. OV scheduled for 5/22 at 11:00 with Primary Care Provider

## 2025-05-22 ENCOUNTER — OFFICE VISIT (OUTPATIENT)
Dept: INTERNAL MEDICINE CLINIC | Facility: CLINIC | Age: 70
End: 2025-05-22

## 2025-05-22 VITALS
DIASTOLIC BLOOD PRESSURE: 80 MMHG | HEIGHT: 71 IN | SYSTOLIC BLOOD PRESSURE: 170 MMHG | HEART RATE: 96 BPM | BODY MASS INDEX: 43.26 KG/M2 | OXYGEN SATURATION: 98 % | WEIGHT: 309 LBS

## 2025-05-22 DIAGNOSIS — L03.115 CELLULITIS OF RIGHT LOWER EXTREMITY: Primary | ICD-10-CM

## 2025-05-22 DIAGNOSIS — Z12.11 SCREENING FOR COLON CANCER: ICD-10-CM

## 2025-05-22 RX ORDER — CEPHALEXIN 500 MG/1
500 CAPSULE ORAL EVERY 6 HOURS SCHEDULED
Qty: 40 CAPSULE | Refills: 0 | Status: SHIPPED | OUTPATIENT
Start: 2025-05-22 | End: 2025-06-01

## 2025-05-22 NOTE — PROGRESS NOTES
Name: Nadeem Purdy Jr.      : 1955      MRN: [id number]  Encounter Provider: Jluis Glass MD  Encounter Date: 2025   Encounter department: Counts include 234 beds at the [facility]  :  Assessment & Plan  Cellulitis of right lower extremity  Came in for last 1 week been developing some swelling redness both legs right worse than left no fever chills for last 2 days been getting worse mainly both legs below the knee denies any other ASSO symptom has similar symptoms was treated with oral antibiotic improved the past    Will give Keflex 500 every 6 hour    Advised if the area of redness is spreading and or fever chills nausea vomiting or any develops any open areas should go to the emergency room  Orders:  •  cephalexin (KEFLEX) 500 mg capsule; Take 1 capsule (500 mg total) by mouth every 6 (six) hours for 10 days    Screening for colon cancer    Orders:  •  Ambulatory Referral to Gastroenterology; Future           History of Present Illness   Came in for last 1 week been developing some swelling redness both legs right worse than left no fever chills for last 2 days been getting worse mainly both legs below the knee denies any other ASSO symptom has similar symptoms was treated with oral antibiotic improved the past      Review of Systems   Constitutional:  Positive for activity change. Negative for appetite change, chills, diaphoresis, fatigue, fever and unexpected weight change.   HENT:  Negative for congestion, dental problem, drooling, ear discharge, ear pain, facial swelling, hearing loss, mouth sores, nosebleeds, postnasal drip, rhinorrhea, sinus pressure, sneezing, sore throat, tinnitus, trouble swallowing and voice change.    Eyes:  Negative for photophobia, pain, discharge, redness, itching and visual disturbance.   Respiratory:  Negative for apnea, cough, choking, chest tightness, shortness of breath, wheezing and stridor.    Cardiovascular:  Positive for leg swelling. Negative for  "chest pain and palpitations.   Gastrointestinal:  Negative for abdominal distention, abdominal pain, anal bleeding, blood in stool, constipation, diarrhea, nausea, rectal pain and vomiting.   Endocrine: Negative for cold intolerance, heat intolerance, polydipsia, polyphagia and polyuria.   Genitourinary:  Negative for decreased urine volume, difficulty urinating, dysuria, enuresis, flank pain, frequency, genital sores, hematuria and urgency.   Musculoskeletal:  Positive for arthralgias and back pain. Negative for gait problem, joint swelling, myalgias, neck pain and neck stiffness.   Skin:  Positive for color change. Negative for pallor, rash and wound.   Allergic/Immunologic: Negative.  Negative for environmental allergies, food allergies and immunocompromised state.   Neurological:  Negative for dizziness, tremors, seizures, syncope, facial asymmetry, speech difficulty, weakness, light-headedness, numbness and headaches.   Psychiatric/Behavioral:  Negative for agitation, behavioral problems, confusion, decreased concentration, dysphoric mood, hallucinations, self-injury, sleep disturbance and suicidal ideas. The patient is not nervous/anxious and is not hyperactive.        Objective   /80   Pulse 96   Ht 5' 11\" (1.803 m)   Wt (!) 140 kg (309 lb)   SpO2 98%   BMI 43.10 kg/m²      Physical Exam  Vitals and nursing note reviewed.   Constitutional:       General: He is not in acute distress.     Appearance: He is well-developed. He is not ill-appearing, toxic-appearing or diaphoretic.   HENT:      Head: Normocephalic and atraumatic.      Right Ear: External ear normal.      Left Ear: External ear normal.      Nose: Nose normal.      Mouth/Throat:      Pharynx: No oropharyngeal exudate.     Eyes:      General: Lids are normal. Lids are everted, no foreign bodies appreciated. No scleral icterus.        Right eye: No discharge.         Left eye: No discharge.      Conjunctiva/sclera: Conjunctivae normal.      " Pupils: Pupils are equal, round, and reactive to light.     Neck:      Thyroid: No thyromegaly.      Vascular: Normal carotid pulses. No carotid bruit, hepatojugular reflux or JVD.      Trachea: No tracheal tenderness or tracheal deviation.     Cardiovascular:      Rate and Rhythm: Normal rate and regular rhythm.      Pulses: Normal pulses.      Heart sounds: Normal heart sounds. No murmur heard.     No friction rub. No gallop.   Pulmonary:      Effort: Pulmonary effort is normal. No respiratory distress.      Breath sounds: Normal breath sounds. No stridor. No wheezing or rales.   Chest:      Chest wall: No tenderness.   Abdominal:      General: Bowel sounds are normal. There is no distension.      Palpations: Abdomen is soft. There is no mass.      Tenderness: There is no abdominal tenderness. There is no guarding or rebound.     Musculoskeletal:         General: No tenderness or deformity. Normal range of motion.      Cervical back: Normal range of motion and neck supple. No edema, erythema or rigidity. No spinous process tenderness or muscular tenderness. Normal range of motion.      Comments: Redness swelling both legs   Lymphadenopathy:      Head:      Right side of head: No submental, submandibular, tonsillar, preauricular or posterior auricular adenopathy.      Left side of head: No submental, submandibular, tonsillar, preauricular, posterior auricular or occipital adenopathy.      Cervical: No cervical adenopathy.      Right cervical: No superficial, deep or posterior cervical adenopathy.     Left cervical: No superficial, deep or posterior cervical adenopathy.      Upper Body:      Right upper body: No pectoral adenopathy.      Left upper body: No pectoral adenopathy.     Skin:     General: Skin is warm and dry.      Coloration: Skin is not pale.      Findings: No erythema or rash.     Neurological:      Mental Status: He is alert and oriented to person, place, and time.      Cranial Nerves: No cranial  nerve deficit.      Sensory: No sensory deficit.      Motor: No tremor, abnormal muscle tone or seizure activity.      Coordination: Coordination normal.      Gait: Gait normal.      Deep Tendon Reflexes: Reflexes are normal and symmetric. Reflexes normal.     Psychiatric:         Behavior: Behavior normal.         Thought Content: Thought content normal.         Judgment: Judgment normal.

## 2025-05-22 NOTE — PATIENT INSTRUCTIONS
Patient Education     Cellulitis and erysipelas (skin infections)   The Basics   Written by the doctors and editors at [facility]   What are cellulitis and erysipelas? -- Cellulitis and erysipelas are both infections of the skin. These infections can cause redness, pain, and swelling. The difference between them is that erysipelas tends to affect the upper layers of skin, and cellulitis tends to affect deep layers of skin and sometimes the fat under the skin.  Cellulitis and erysipelas can happen when germs get into the skin. Normally, different types of germs live on a person's skin. Most of the time, these germs do not cause any problems. But if a person gets a cut or a break in the skin, the germs can get into the skin and cause an infection.  Certain conditions can increase a person's chance of getting cellulitis or erysipelas. These include:   Having a cut (even a tiny one)   Having another type of skin infection or a long-term skin condition   Having swelling of the skin or swelling in the body   Being overweight  What are the symptoms of cellulitis and erysipelas? -- Both types of infection cause very similar symptoms. Either infection can cause the infected area to be painful, red, swollen, or warm. Some people with cellulitis or erysipelas can sometimes also have fever or chills. And sometimes, people with these infections have no symptoms or only some of these symptoms.  Most of the time, cellulitis and erysipelas happen on the legs or arms. But people can get these infections in other places, such as the belly, face, in the mouth, or around the anus.  Will I need tests? -- Most people do not need any tests. Your doctor or nurse will do an exam and look at your skin.  It's important for a doctor or nurse to do an exam to figure out what kind of infection you have. The right treatment for a skin infection depends on the type of infection it is and which germs are causing it. Your doctor or nurse might need to  "do tests to figure out the cause of your infection.  If you have cellulitis or erysipelas, it's important to get treated. These infections can spread to the whole body and become serious if not treated.  How are cellulitis and erysipelas treated? -- These infections are treated with antibiotic pills. If your doctor prescribes medicine for you to take at home, it is important to follow the directions exactly. Take all of the pills you are given, even if you feel better before you finish them. If you do not take all the pills, the infection can come back worse.  People who have severe infections might be treated in the hospital and given antibiotics through a thin tube that goes into a vein, called an \"IV\".  What can I do to help treat my infection? -- You can:   Raise your arm or leg (if your infection is on your arm or leg) to reduce swelling - Raise the arm or leg up above the level of your heart 3 or 4 times a day, for 30 minutes each time.   Keep the infected area clean and dry - You can take a shower or bath, but be sure to pat the area dry with a towel afterward.  Antibiotic ointments or creams do not work to treat cellulitis and erysipelas.  Should I call my doctor or nurse? -- You should call your doctor or nurse if your symptoms do not get better within 3 days of starting treatment. You should also call if the affected area gets:   Bigger   More swollen   More painful  Your doctor or nurse might do another exam or tests to see if you need different medicines.  Can skin infections be prevented? -- Sometimes. If you cut your skin, make sure to wash the area well with soap and water. This can help prevent the area from getting infected. If you have a long-term skin condition, ask your doctor or nurse what you can do to help prevent infections.  All topics are updated as new evidence becomes available and our peer review process is complete.  This topic retrieved from [employer] on: Feb 26, 2024.  Topic 87828 " Version 15.0  Release: 32.2.4 - C32.56  © 2024 UpToDate, Inc. and/or its affiliates. All rights reserved.  Consumer Information Use and Disclaimer   Disclaimer: This generalized information is a limited summary of diagnosis, treatment, and/or medication information. It is not meant to be comprehensive and should be used as a tool to help the user understand and/or assess potential diagnostic and treatment options. It does NOT include all information about conditions, treatments, medications, side effects, or risks that may apply to a specific patient. It is not intended to be medical advice or a substitute for the medical advice, diagnosis, or treatment of a health care provider based on the health care provider's examination and assessment of a patient's specific and unique circumstances. Patients must speak with a health care provider for complete information about their health, medical questions, and treatment options, including any risks or benefits regarding use of medications. This information does not endorse any treatments or medications as safe, effective, or approved for treating a specific patient. UpToDate, Inc. and its affiliates disclaim any warranty or liability relating to this information or the use thereof.The use of this information is governed by the Terms of Use, available at https://www.woltersOddslifeuwer.com/en/know/clinical-effectiveness-terms. 2024© UpToDate, Inc. and its affiliates and/or licensors. All rights reserved.  Copyright   © 2024 UpToDate, Inc. and/or its affiliates. All rights reserved.

## 2025-05-22 NOTE — ASSESSMENT & PLAN NOTE
Came in for last 1 week been developing some swelling redness both legs right worse than left no fever chills for last 2 days been getting worse mainly both legs below the knee denies any other ASSO symptom has similar symptoms was treated with oral antibiotic improved the past    Will give Keflex 500 every 6 hour    Advised if the area of redness is spreading and or fever chills nausea vomiting or any develops any open areas should go to the emergency room  Orders:  •  cephalexin (KEFLEX) 500 mg capsule; Take 1 capsule (500 mg total) by mouth every 6 (six) hours for 10 days

## 2025-06-02 ENCOUNTER — TELEPHONE (OUTPATIENT)
Age: 70
End: 2025-06-02

## 2025-06-02 NOTE — TELEPHONE ENCOUNTER
Patient called with continued right lower extremity symptoms,redness,swelling and serous drainage.Patient was seen in the office on 5/22 and ordered Keflex 50 mg four times a day for ten days.Patient finished the course of antibiotics yesterday.Patient scheduled for an office visit tomorrow.Patient afebrile ,denies chills.

## 2025-06-03 ENCOUNTER — OFFICE VISIT (OUTPATIENT)
Dept: INTERNAL MEDICINE CLINIC | Facility: CLINIC | Age: 70
End: 2025-06-03
Payer: MEDICARE

## 2025-06-03 VITALS
WEIGHT: 299 LBS | HEART RATE: 108 BPM | BODY MASS INDEX: 41.86 KG/M2 | HEIGHT: 71 IN | OXYGEN SATURATION: 98 % | DIASTOLIC BLOOD PRESSURE: 80 MMHG | SYSTOLIC BLOOD PRESSURE: 144 MMHG

## 2025-06-03 DIAGNOSIS — Z79.4 TYPE 2 DIABETES MELLITUS WITH HYPERGLYCEMIA, WITH LONG-TERM CURRENT USE OF INSULIN (HCC): ICD-10-CM

## 2025-06-03 DIAGNOSIS — E11.65 TYPE 2 DIABETES MELLITUS WITH HYPERGLYCEMIA, WITH LONG-TERM CURRENT USE OF INSULIN (HCC): ICD-10-CM

## 2025-06-03 DIAGNOSIS — L03.115 CELLULITIS OF RIGHT LOWER EXTREMITY: Primary | ICD-10-CM

## 2025-06-03 PROCEDURE — G2211 COMPLEX E/M VISIT ADD ON: HCPCS | Performed by: INTERNAL MEDICINE

## 2025-06-03 PROCEDURE — 99213 OFFICE O/P EST LOW 20 MIN: CPT | Performed by: INTERNAL MEDICINE

## 2025-06-03 RX ORDER — DOXYCYCLINE HYCLATE 100 MG
100 TABLET ORAL 2 TIMES DAILY
Qty: 28 TABLET | Refills: 0 | Status: SHIPPED | OUTPATIENT
Start: 2025-06-03 | End: 2025-06-17

## 2025-06-03 NOTE — PATIENT INSTRUCTIONS
Patient Education     Cellulitis and erysipelas (skin infections)   The Basics   Written by the doctors and editors at [facility]   What are cellulitis and erysipelas? -- Cellulitis and erysipelas are both infections of the skin. These infections can cause redness, pain, and swelling. The difference between them is that erysipelas tends to affect the upper layers of skin, and cellulitis tends to affect deep layers of skin and sometimes the fat under the skin.  Cellulitis and erysipelas can happen when germs get into the skin. Normally, different types of germs live on a person's skin. Most of the time, these germs do not cause any problems. But if a person gets a cut or a break in the skin, the germs can get into the skin and cause an infection.  Certain conditions can increase a person's chance of getting cellulitis or erysipelas. These include:   Having a cut (even a tiny one)   Having another type of skin infection or a long-term skin condition   Having swelling of the skin or swelling in the body   Being overweight  What are the symptoms of cellulitis and erysipelas? -- Both types of infection cause very similar symptoms. Either infection can cause the infected area to be painful, red, swollen, or warm. Some people with cellulitis or erysipelas can sometimes also have fever or chills. And sometimes, people with these infections have no symptoms or only some of these symptoms.  Most of the time, cellulitis and erysipelas happen on the legs or arms. But people can get these infections in other places, such as the belly, face, in the mouth, or around the anus.  Will I need tests? -- Most people do not need any tests. Your doctor or nurse will do an exam and look at your skin.  It's important for a doctor or nurse to do an exam to figure out what kind of infection you have. The right treatment for a skin infection depends on the type of infection it is and which germs are causing it. Your doctor or nurse might need to  "do tests to figure out the cause of your infection.  If you have cellulitis or erysipelas, it's important to get treated. These infections can spread to the whole body and become serious if not treated.  How are cellulitis and erysipelas treated? -- These infections are treated with antibiotic pills. If your doctor prescribes medicine for you to take at home, it is important to follow the directions exactly. Take all of the pills you are given, even if you feel better before you finish them. If you do not take all the pills, the infection can come back worse.  People who have severe infections might be treated in the hospital and given antibiotics through a thin tube that goes into a vein, called an \"IV\".  What can I do to help treat my infection? -- You can:   Raise your arm or leg (if your infection is on your arm or leg) to reduce swelling - Raise the arm or leg up above the level of your heart 3 or 4 times a day, for 30 minutes each time.   Keep the infected area clean and dry - You can take a shower or bath, but be sure to pat the area dry with a towel afterward.  Antibiotic ointments or creams do not work to treat cellulitis and erysipelas.  Should I call my doctor or nurse? -- You should call your doctor or nurse if your symptoms do not get better within 3 days of starting treatment. You should also call if the affected area gets:   Bigger   More swollen   More painful  Your doctor or nurse might do another exam or tests to see if you need different medicines.  Can skin infections be prevented? -- Sometimes. If you cut your skin, make sure to wash the area well with soap and water. This can help prevent the area from getting infected. If you have a long-term skin condition, ask your doctor or nurse what you can do to help prevent infections.  All topics are updated as new evidence becomes available and our peer review process is complete.  This topic retrieved from [employer] on: Feb 26, 2024.  Topic 79402 " Version 15.0  Release: 32.2.4 - C32.56  © 2024 UpToDate, Inc. and/or its affiliates. All rights reserved.  Consumer Information Use and Disclaimer   Disclaimer: This generalized information is a limited summary of diagnosis, treatment, and/or medication information. It is not meant to be comprehensive and should be used as a tool to help the user understand and/or assess potential diagnostic and treatment options. It does NOT include all information about conditions, treatments, medications, side effects, or risks that may apply to a specific patient. It is not intended to be medical advice or a substitute for the medical advice, diagnosis, or treatment of a health care provider based on the health care provider's examination and assessment of a patient's specific and unique circumstances. Patients must speak with a health care provider for complete information about their health, medical questions, and treatment options, including any risks or benefits regarding use of medications. This information does not endorse any treatments or medications as safe, effective, or approved for treating a specific patient. UpToDate, Inc. and its affiliates disclaim any warranty or liability relating to this information or the use thereof.The use of this information is governed by the Terms of Use, available at https://www.woltersViVuuwer.com/en/know/clinical-effectiveness-terms. 2024© UpToDate, Inc. and its affiliates and/or licensors. All rights reserved.  Copyright   © 2024 UpToDate, Inc. and/or its affiliates. All rights reserved.

## 2025-06-03 NOTE — ASSESSMENT & PLAN NOTE
Persistent redness right leg with slight oozing at the bottom finish Keflex very minimal improvement although the wheezing has improved    Persistent redness right leg    Finish the course of Keflex 500 mg Q6 for 10 days    No fever no chills no signs symptom of any sepsis or eschar I asked    Clean dry dressing at the area with oozing    Doxycycline 100 mg twice a day for 2 weeks if any fever chills or worsening of the symptoms should go to the emergency room      Orders:  •  doxycycline hyclate (VIBRA-TABS) 100 mg tablet; Take 1 tablet (100 mg total) by mouth 2 (two) times a day for 14 days

## 2025-06-03 NOTE — PROGRESS NOTES
Name: Nadeem Purdy Jr.      : 1955      MRN: [id number]  Encounter Provider: Jluis Glass MD  Encounter Date: 6/3/2025   [facility]  :  Assessment & Plan  Cellulitis of right lower extremity  Persistent redness right leg with slight oozing at the bottom finish Keflex very minimal improvement although the wheezing has improved    Persistent redness right leg    Finish the course of Keflex 500 mg Q6 for 10 days    No fever no chills no signs symptom of any sepsis or eschar I asked    Clean dry dressing at the area with oozing    Doxycycline 100 mg twice a day for 2 weeks if any fever chills or worsening of the symptoms should go to the emergency room      Orders:  •  doxycycline hyclate (VIBRA-TABS) 100 mg tablet; Take 1 tablet (100 mg total) by mouth 2 (two) times a day for 14 days           History of Present Illness   Came in follow-up for the cellulitis right leg which has improved minimally but no fever no chills no other new symptoms for details refer to assessment plan visit diagnosis      Review of Systems   Constitutional:  Positive for activity change. Negative for appetite change, chills, diaphoresis, fatigue, fever and unexpected weight change.   HENT:  Negative for congestion, dental problem, drooling, ear discharge, ear pain, facial swelling, hearing loss, mouth sores, nosebleeds, postnasal drip, rhinorrhea, sinus pressure, sneezing, sore throat, tinnitus, trouble swallowing and voice change.    Eyes:  Negative for photophobia, pain, discharge, redness, itching and visual disturbance.   Respiratory:  Negative for apnea, cough, choking, chest tightness, shortness of breath, wheezing and stridor.    Cardiovascular:  Positive for leg swelling. Negative for chest pain and palpitations.   Gastrointestinal:  Negative for abdominal distention, abdominal pain, anal bleeding, blood in stool, constipation, diarrhea, nausea, rectal pain and  "vomiting.   Endocrine: Negative for cold intolerance, heat intolerance, polydipsia, polyphagia and polyuria.   Genitourinary:  Negative for decreased urine volume, difficulty urinating, dysuria, enuresis, flank pain, frequency, genital sores, hematuria and urgency.   Musculoskeletal:  Positive for arthralgias and back pain. Negative for gait problem, joint swelling, myalgias, neck pain and neck stiffness.   Skin:  Negative for color change, pallor, rash and wound.   Allergic/Immunologic: Negative.  Negative for environmental allergies, food allergies and immunocompromised state.   Neurological:  Negative for dizziness, tremors, seizures, syncope, facial asymmetry, speech difficulty, weakness, light-headedness, numbness and headaches.   Psychiatric/Behavioral:  Negative for agitation, behavioral problems, confusion, decreased concentration, dysphoric mood, hallucinations, self-injury, sleep disturbance and suicidal ideas. The patient is not nervous/anxious and is not hyperactive.      Diabetic Foot Exam    Patient's shoes and socks removed.    Right Foot/Ankle   Right Foot Inspection  Skin Exam: skin normal and skin intact. No dry skin, no warmth, no callus, no erythema, no maceration, no abnormal color, no pre-ulcer, no ulcer and no callus.     Toe Exam: ROM and strength within normal limits.     Sensory   Monofilament testing: diminished    Vascular  The right DP pulse is 2+. The right PT pulse is 1+.     Left Foot/Ankle  Left Foot Inspection  Skin Exam: skin normal and skin intact. No dry skin, no warmth, no erythema, no maceration, normal color, no pre-ulcer, no ulcer and no callus.     Toe Exam: ROM and strength within normal limits.     Sensory   Monofilament testing: diminished    Vascular  The left DP pulse is 2+. The left PT pulse is 1+.     Assign Risk Category  No deformity present  No loss of protective sensation  No weak pulses  Risk: 0    Objective   /80   Pulse (!) 108   Ht 5' 11\" (1.803 m)   " Wt 136 kg (299 lb)   SpO2 98%   BMI 41.70 kg/m²      Physical Exam  Vitals and nursing note reviewed.   Constitutional:       General: He is not in acute distress.     Appearance: He is well-developed. He is obese. He is not ill-appearing, toxic-appearing or diaphoretic.   HENT:      Head: Normocephalic and atraumatic.      Right Ear: External ear normal.      Left Ear: External ear normal.      Nose: Nose normal.      Mouth/Throat:      Pharynx: No oropharyngeal exudate.     Eyes:      General: Lids are normal. Lids are everted, no foreign bodies appreciated. No scleral icterus.        Right eye: No discharge.         Left eye: No discharge.      Conjunctiva/sclera: Conjunctivae normal.      Pupils: Pupils are equal, round, and reactive to light.     Neck:      Thyroid: No thyromegaly.      Vascular: Normal carotid pulses. No carotid bruit, hepatojugular reflux or JVD.      Trachea: No tracheal tenderness or tracheal deviation.     Cardiovascular:      Rate and Rhythm: Normal rate and regular rhythm.      Pulses: no weak pulses.           Dorsalis pedis pulses are 2+ on the right side and 2+ on the left side.        Posterior tibial pulses are 1+ on the right side and 1+ on the left side.      Heart sounds: Normal heart sounds. No murmur heard.     No friction rub. No gallop.   Pulmonary:      Effort: Pulmonary effort is normal. No respiratory distress.      Breath sounds: Normal breath sounds. No stridor. No wheezing or rales.   Chest:      Chest wall: No tenderness.   Abdominal:      General: Bowel sounds are normal. There is no distension.      Palpations: Abdomen is soft. There is no mass.      Tenderness: There is no abdominal tenderness. There is no guarding or rebound.     Musculoskeletal:         General: No tenderness or deformity. Normal range of motion.      Cervical back: Normal range of motion and neck supple. No edema, erythema or rigidity. No spinous process tenderness or muscular tenderness.  Normal range of motion.      Right lower leg: No edema.      Left lower leg: Edema present.      Comments: Redness swelling both legs more so on the right leg with some oozing at the bottom   Feet:      Right foot:      Skin integrity: No ulcer, skin breakdown, erythema, warmth, callus or dry skin.      Left foot:      Skin integrity: No ulcer, skin breakdown, erythema, warmth, callus or dry skin.   Lymphadenopathy:      Head:      Right side of head: No submental, submandibular, tonsillar, preauricular or posterior auricular adenopathy.      Left side of head: No submental, submandibular, tonsillar, preauricular, posterior auricular or occipital adenopathy.      Cervical: No cervical adenopathy.      Right cervical: No superficial, deep or posterior cervical adenopathy.     Left cervical: No superficial, deep or posterior cervical adenopathy.      Upper Body:      Right upper body: No pectoral adenopathy.      Left upper body: No pectoral adenopathy.     Skin:     General: Skin is warm and dry.      Coloration: Skin is not pale.      Findings: No erythema or rash.     Neurological:      Mental Status: He is alert and oriented to person, place, and time.      Cranial Nerves: No cranial nerve deficit.      Sensory: No sensory deficit.      Motor: No tremor, abnormal muscle tone or seizure activity.      Coordination: Coordination normal.      Gait: Gait normal.      Deep Tendon Reflexes: Reflexes are normal and symmetric. Reflexes normal.     Psychiatric:         Behavior: Behavior normal.         Thought Content: Thought content normal.         Judgment: Judgment normal.

## 2025-06-04 RX ORDER — INSULIN HUMAN 100 [IU]/ML
INJECTION, SUSPENSION SUBCUTANEOUS
Qty: 15 ML | Refills: 5 | Status: SHIPPED | OUTPATIENT
Start: 2025-06-04

## 2025-06-18 ENCOUNTER — OFFICE VISIT (OUTPATIENT)
Dept: INTERNAL MEDICINE CLINIC | Facility: CLINIC | Age: 70
End: 2025-06-18
Payer: MEDICARE

## 2025-06-18 VITALS
WEIGHT: 289 LBS | DIASTOLIC BLOOD PRESSURE: 70 MMHG | BODY MASS INDEX: 40.46 KG/M2 | HEART RATE: 90 BPM | SYSTOLIC BLOOD PRESSURE: 140 MMHG | HEIGHT: 71 IN | OXYGEN SATURATION: 97 %

## 2025-06-18 DIAGNOSIS — Z79.4 TYPE 2 DIABETES MELLITUS WITH MICROALBUMINURIA, WITH LONG-TERM CURRENT USE OF INSULIN (HCC): ICD-10-CM

## 2025-06-18 DIAGNOSIS — R80.9 TYPE 2 DIABETES MELLITUS WITH MICROALBUMINURIA, WITH LONG-TERM CURRENT USE OF INSULIN (HCC): ICD-10-CM

## 2025-06-18 DIAGNOSIS — N18.30 STAGE 3 CHRONIC KIDNEY DISEASE, UNSPECIFIED WHETHER STAGE 3A OR 3B CKD (HCC): ICD-10-CM

## 2025-06-18 DIAGNOSIS — E11.29 TYPE 2 DIABETES MELLITUS WITH MICROALBUMINURIA, WITH LONG-TERM CURRENT USE OF INSULIN (HCC): ICD-10-CM

## 2025-06-18 DIAGNOSIS — L97.912 NONHEALING ULCER OF RIGHT LOWER EXTREMITY WITH FAT LAYER EXPOSED (HCC): Primary | ICD-10-CM

## 2025-06-18 PROCEDURE — G2211 COMPLEX E/M VISIT ADD ON: HCPCS | Performed by: INTERNAL MEDICINE

## 2025-06-18 PROCEDURE — 99213 OFFICE O/P EST LOW 20 MIN: CPT | Performed by: INTERNAL MEDICINE

## 2025-06-18 NOTE — ASSESSMENT & PLAN NOTE
Redness swelling right leg treated twice with Keflex 503 times a day more than 3 weeks ago and then was given a prescription about 10 days ago finish the antibiotic twice the redness improved now has some open areas and swelling persistent with surrounding redness emergency room oozing no fever chills with a history of diabetes nonhealing ulcer    Refer to wound center    Diabetes very well-controlled  Orders:  •  Ambulatory Referral to Wound Care; Future

## 2025-06-18 NOTE — ASSESSMENT & PLAN NOTE
Lab Results   Component Value Date    HGBA1C 6.2 (H) 04/28/2025   Continue to insulin regimen as recommended  Metformin thousand twice a day  Jardiance 25 mg daily awaiting repeat A1c BMP  Same as above awaiting repeat urine for microalbumin    Orders:  •  Hemoglobin A1C; Future  •  Basic metabolic panel; Future

## 2025-06-18 NOTE — ASSESSMENT & PLAN NOTE
Lab Results   Component Value Date    EGFR 51 04/28/2025    EGFR 36 (L) 03/18/2025    EGFR 33 03/17/2025    CREATININE 1.38 (H) 04/28/2025    CREATININE 1.98 (H) 03/18/2025    CREATININE 1.97 (H) 03/17/2025     Labile at baseline avoid nephrotoxic drug    Check BMP before next visit  Orders:  •  Basic metabolic panel; Future

## 2025-06-18 NOTE — PROGRESS NOTES
Name: Nadeem Purdy Jr.      : 1955      MRN: [id number]  Encounter Provider: Jluis Glass MD  Encounter Date: 2025   Encounter department: [facility] INTERNAL MEDICINE  :  Assessment & Plan  Nonhealing ulcer of right lower extremity with fat layer exposed (HCC)  Redness swelling right leg treated twice with Keflex 503 times a day more than 3 weeks ago and then was given a prescription about 10 days ago finish the antibiotic twice the redness improved now has some open areas and swelling persistent with surrounding redness emergency room oozing no fever chills with a history of diabetes nonhealing ulcer    Refer to wound center    Diabetes very well-controlled  Orders:  •  Ambulatory Referral to Wound Care; Future    Type 2 diabetes mellitus with microalbuminuria, with long-term current use of insulin (formerly [facility])    Lab Results   Component Value Date    HGBA1C 6.2 (H) 2025   Continue to insulin regimen as recommended  Metformin thousand twice a day  Jardiance 25 mg daily awaiting repeat A1c BMP  Same as above awaiting repeat urine for microalbumin    Orders:  •  Hemoglobin A1C; Future  •  Basic metabolic panel; Future     Stage 3 chronic kidney disease, unspecified whether stage 3a or 3b CKD (formerly [facility])  Lab Results   Component Value Date    EGFR 51 2025    EGFR 36 (L) 2025    EGFR 33 2025    CREATININE 1.38 (H) 2025    CREATININE 1.98 (H) 2025    CREATININE 1.97 (H) 2025     Labile at baseline avoid nephrotoxic drug    Check BMP before next visit  Orders:  •  Basic metabolic panel; Future           History of Present Illness   Came in follow-up for the nonhealing ulcer right leg persistent oozing with some open areas right leg are also now started very minute small open areas in the left side diabetes seems to be controlled monitoring blood sugar at home no fever chills finish the course of Keflex waiting to be seen at wound center refer to assessment  plan visit diagnoses for the detail      Review of Systems   Constitutional:  Positive for activity change. Negative for appetite change, chills, diaphoresis, fatigue, fever and unexpected weight change.   HENT:  Negative for congestion, dental problem, drooling, ear discharge, ear pain, facial swelling, hearing loss, mouth sores, nosebleeds, postnasal drip, rhinorrhea, sinus pressure, sneezing, sore throat, tinnitus, trouble swallowing and voice change.    Eyes:  Negative for photophobia, pain, discharge, redness, itching and visual disturbance.   Respiratory:  Negative for apnea, cough, choking, chest tightness, shortness of breath, wheezing and stridor.    Cardiovascular:  Positive for leg swelling. Negative for chest pain and palpitations.   Gastrointestinal:  Negative for abdominal distention, abdominal pain, anal bleeding, blood in stool, constipation, diarrhea, nausea, rectal pain and vomiting.   Endocrine: Negative for cold intolerance, heat intolerance, polydipsia, polyphagia and polyuria.   Genitourinary:  Negative for decreased urine volume, difficulty urinating, dysuria, enuresis, flank pain, frequency, genital sores, hematuria and urgency.   Musculoskeletal:  Positive for arthralgias and back pain. Negative for gait problem, joint swelling, myalgias, neck pain and neck stiffness.   Skin:  Positive for color change. Negative for pallor, rash and wound.   Allergic/Immunologic: Negative.  Negative for environmental allergies, food allergies and immunocompromised state.   Neurological:  Negative for dizziness, tremors, seizures, syncope, facial asymmetry, speech difficulty, weakness, light-headedness, numbness and headaches.   Psychiatric/Behavioral:  Negative for agitation, behavioral problems, confusion, decreased concentration, dysphoric mood, hallucinations, self-injury, sleep disturbance and suicidal ideas. The patient is not nervous/anxious and is not hyperactive.        Objective   /70   Pulse  "90   Ht 5' 11\" (1.803 m)   Wt 131 kg (289 lb)   SpO2 97%   BMI 40.31 kg/m²      Physical Exam  Vitals and nursing note reviewed.   Constitutional:       General: He is not in acute distress.     Appearance: He is well-developed. He is not ill-appearing, toxic-appearing or diaphoretic.   HENT:      Head: Normocephalic and atraumatic.      Right Ear: External ear normal.      Left Ear: External ear normal.      Nose: Nose normal.      Mouth/Throat:      Pharynx: No oropharyngeal exudate.     Eyes:      General: Lids are normal. Lids are everted, no foreign bodies appreciated. No scleral icterus.        Right eye: No discharge.         Left eye: No discharge.      Conjunctiva/sclera: Conjunctivae normal.      Pupils: Pupils are equal, round, and reactive to light.     Neck:      Thyroid: No thyromegaly.      Vascular: Normal carotid pulses. No carotid bruit, hepatojugular reflux or JVD.      Trachea: No tracheal tenderness or tracheal deviation.     Cardiovascular:      Rate and Rhythm: Normal rate and regular rhythm.      Pulses: Normal pulses.      Heart sounds: Normal heart sounds. No murmur heard.     No friction rub. No gallop.   Pulmonary:      Effort: Pulmonary effort is normal. No respiratory distress.      Breath sounds: Normal breath sounds. No stridor. No wheezing or rales.   Chest:      Chest wall: No tenderness.   Abdominal:      General: Bowel sounds are normal. There is no distension.      Palpations: Abdomen is soft. There is no mass.      Tenderness: There is no abdominal tenderness. There is no guarding or rebound.     Musculoskeletal:         General: No tenderness or deformity. Normal range of motion.      Cervical back: Normal range of motion and neck supple. No edema, erythema or rigidity. No spinous process tenderness or muscular tenderness. Normal range of motion.      Right lower leg: Edema present.      Left lower leg: Edema present.      Comments: Redness swelling both legs open areas on " the right leg with some oozing   Lymphadenopathy:      Head:      Right side of head: No submental, submandibular, tonsillar, preauricular or posterior auricular adenopathy.      Left side of head: No submental, submandibular, tonsillar, preauricular, posterior auricular or occipital adenopathy.      Cervical: No cervical adenopathy.      Right cervical: No superficial, deep or posterior cervical adenopathy.     Left cervical: No superficial, deep or posterior cervical adenopathy.      Upper Body:      Right upper body: No pectoral adenopathy.      Left upper body: No pectoral adenopathy.     Skin:     General: Skin is warm and dry.      Coloration: Skin is not pale.      Findings: No erythema or rash.     Neurological:      Mental Status: He is alert and oriented to person, place, and time.      Cranial Nerves: No cranial nerve deficit.      Sensory: No sensory deficit.      Motor: No tremor, abnormal muscle tone or seizure activity.      Coordination: Coordination normal.      Gait: Gait normal.      Deep Tendon Reflexes: Reflexes are normal and symmetric. Reflexes normal.     Psychiatric:         Behavior: Behavior normal.         Thought Content: Thought content normal.         Judgment: Judgment normal.

## 2025-06-19 ENCOUNTER — OFFICE VISIT (OUTPATIENT)
Facility: HOSPITAL | Age: 70
End: 2025-06-19
Payer: MEDICARE

## 2025-06-19 DIAGNOSIS — E11.628 TYPE 2 DIABETES MELLITUS WITH OTHER SKIN COMPLICATION, WITH LONG-TERM CURRENT USE OF INSULIN (HCC): ICD-10-CM

## 2025-06-19 DIAGNOSIS — L97.921 SKIN ULCER OF LEFT LOWER LEG, LIMITED TO BREAKDOWN OF SKIN (HCC): ICD-10-CM

## 2025-06-19 DIAGNOSIS — B36.9 FUNGAL DERMATITIS: Primary | ICD-10-CM

## 2025-06-19 DIAGNOSIS — Z79.4 TYPE 2 DIABETES MELLITUS WITH OTHER SKIN COMPLICATION, WITH LONG-TERM CURRENT USE OF INSULIN (HCC): ICD-10-CM

## 2025-06-19 DIAGNOSIS — L97.911 ULCER OF RIGHT LOWER LEG, LIMITED TO BREAKDOWN OF SKIN (HCC): ICD-10-CM

## 2025-06-19 PROCEDURE — 99213 OFFICE O/P EST LOW 20 MIN: CPT | Performed by: STUDENT IN AN ORGANIZED HEALTH CARE EDUCATION/TRAINING PROGRAM

## 2025-06-19 PROCEDURE — 99204 OFFICE O/P NEW MOD 45 MIN: CPT | Performed by: STUDENT IN AN ORGANIZED HEALTH CARE EDUCATION/TRAINING PROGRAM

## 2025-06-19 PROCEDURE — 87102 FUNGUS ISOLATION CULTURE: CPT | Performed by: STUDENT IN AN ORGANIZED HEALTH CARE EDUCATION/TRAINING PROGRAM

## 2025-06-19 RX ORDER — NYSTATIN 100000 [USP'U]/G
POWDER TOPICAL 2 TIMES DAILY
Qty: 60 G | Refills: 1 | Status: SHIPPED | OUTPATIENT
Start: 2025-06-19 | End: 2025-07-03

## 2025-06-19 RX ORDER — ZINC OXIDE 20 G/100G
OINTMENT TOPICAL 2 TIMES DAILY
Qty: 85 G | Refills: 2 | Status: SHIPPED | OUTPATIENT
Start: 2025-06-19 | End: 2025-07-03

## 2025-06-19 NOTE — PATIENT INSTRUCTIONS
Orders Placed This Encounter   Procedures    Wound cleansing and dressings     Right and left Leg :    Wash your hands with soap and water.  Remove old dressing, discard into plastic bag and place in trash.    Cleanse the wound with mild soap--Dove gentle soap, rinse well and pat dry prior to applying a clean dressing. Do not use tissue or cotton balls.   Do not scrub the wound. Pat dry using gauze.  Shower yes ---do not soak in the tub or body of water    Apply a mix of Nystatin powder and Zinc oxide ( Desitin ) directly to the open areas of your legs and Cover with ABD Pads and rolled gauze  Change dressing 2 x daily    If you are resting in your house, you may remove the dressings, lay a pad under your legs and may leave open to the air    Wound culture was completed today at the office--we will notify you with the results    Avoid prolonged standing in one place.    Elevate leg(s) above the level of the heart when sitting or as much as possible.       You will need to  the Nystatin and Zinc at your pharmacy today      Follow up in 1 week at the [facility]     Standing Status:   Future     Expiration Date:   6/26/2025    Fungal culture     Standing Status:   Future     Expiration Date:   8/19/2026     Release to patient through [facility]:   Immediate

## 2025-06-19 NOTE — PROGRESS NOTES
Patient ID: Nadeem Purdy Jr. is a 69 y.o. male Date of Birth 1955       No chief complaint on file.      Allergies:  Patient has no known allergies.    Diagnosis:   Diagnosis ICD-10-CM Associated Orders   1. Fungal dermatitis  B36.9 Fungal culture     nystatin (MYCOSTATIN) powder     zinc oxide 20 % ointment     Wound cleansing and dressings     Wound Procedure Treatment     Fungal culture      2. Type 2 diabetes mellitus with hyperglycemia, with long-term current use of insulin ([facility])  E11.65     Z79.4       3. Skin ulcer of left lower leg, limited to breakdown of skin ([facility])  L97.921       4. Ulcer of right lower leg, limited to breakdown of skin ([facility])  L97.911            Assessment  & Plan:    Initial evaluation of bilateral lower extremities. Rash is present on bilateral lower legs. There has been a loss of epidermis and now weeping from the affected skin. Linear excoriations on legs also present from manual scratching. There is a clear line of demarcation where rash ends therefore suspicion for cellulitis is low. No lymphangitic streaking.   Suspect fungal etiology based on appearance of legs. Culture obtained today to assess for fungal etiology as well as bacterial overgrowth.   Cleanse legs with gentle soap and water such as Dove. Would like to start with topical Nystatin powder mixed with zinc oxide ointment applied to the wounds twice daily. Given loss of epidermis and weeping keep covered with ABD pads. D/t moisture component with fungal infections may benefit from legs being open to air on a clean pad/towel surface for some period of time each day.    A1C results reviewed with the patient today. Glucose is well controlled at 6.2 as of one month ago.   Instructed to monitor for any changes including redness or swelling surrounding the wound, increased drainage or pain as well as fevers or chills.    F/u in 1 week. Instructed to call if any questions or concerns arise in meantime.           "  Subjective:   06/19/25: 70 y/o M with Pmhx of DM, HTN, obesity, CKD, atrial fibrillation, alcohol use, recent hospitalization related to sepsis present for evaluation of his bilateral lower legs. Pt was hospitalized from 03/13-03/17/25; per hospital course summary, \"Nadeem Purdy Jr. is a 69 y.o. male patient who originally presented to the hospital on 3/13/2025 due to fevers/chills. Please see H&P for complete details of presentation. The patient was noted to have af fever of 101.9 and met SIRS criteria with leukocytosis, tachypnea, and tachycardia. The patient was started on IV Vancomycin and Cefepime. ID consulted. Blood cultures were negative at 72 hours. CT chest /abdomen/pelvis was negative for acute findings. The reported multiple episodes of diarrhea. Cdiff and stool enteric bacterial panel was negative. IV antibiotics were discontinued as it was thought the patient's fever was due to beta-lactam drug fever given infectious workup was negative. The patient was afebrile for more than 48 hours, leukocytosis resolved. The patient was discharged back to [facility] for STR. Please see notes from throughout the patient's hospital stay for complete details of his hospitalization.\" His legs were improved however he recently began to notice redness of the legs again and saw internal medicine on 05/22/25 at which time he was prescribed Keflex. Had f/u on 06/03/25 at which point continued erythema was noted and his antibiotic was changed to Doxycycline. Given the redness persisted despite multiple courses of antibiotics, pt was referred to wound care center. On evaluation today pt reports his legs have been weeping and are very itchy. He has been covering his legs with ABD pads to absorb the drainage. He does report itching his legs, especially at night. No fevers, chills.           The following portions of the patient's history were reviewed and updated as appropriate:   Problem List[1]  Past Medical " History[2]  Past Surgical History[3]  Family History[4]  Social History[5]  Current Medications[6]    Review of Systems      Objective:  There were no vitals taken for this visit.        Physical Exam  Vitals reviewed.   Constitutional:       Appearance: He is morbidly obese.     Skin:     Findings: Erythema present.          Neurological:      Mental Status: He is alert.                  Wound 06/19/25 Irritant Contact Dermatitis Leg Right;Lower (Active)   Wound Image      06/19/25 0914   Wound Description Epithelialization;Granulation tissue;Drainage 06/19/25 0935   Non-staged Wound Description Full thickness 06/19/25 0935   Wound Length (cm) 33 cm 06/19/25 0935   Wound Width (cm) 42.5 cm 06/19/25 0935   Wound Depth (cm) 0.1 cm 06/19/25 0935   Wound Surface Area (cm^2) 1101.52 cm^2 06/19/25 0935   Wound Volume (cm^3) 73.435 cm^3 06/19/25 0935   Calculated Wound Volume (cm^3) 140.25 cm^3 06/19/25 0935   Drainage Amount Moderate 06/19/25 0935   Drainage Description Serous;Yellow 06/19/25 0935   Jodee-wound Assessment Edema;Rash;Dry;Scaly 06/19/25 0935   Dressing Status Intact 06/19/25 0935       Wound 06/19/25 Irritant Contact Dermatitis Leg Left;Lower (Active)   Wound Image      06/19/25 0916   Wound Description Granulation tissue;Epithelialization;Pink;Yellow;Drainage 06/19/25 0936   Non-staged Wound Description Full thickness 06/19/25 0936   Wound Length (cm) 30 cm 06/19/25 0936   Wound Width (cm) 26 cm 06/19/25 0936   Wound Depth (cm) 0.1 cm 06/19/25 0936   Wound Surface Area (cm^2) 612.61 cm^2 06/19/25 0936   Wound Volume (cm^3) 40.841 cm^3 06/19/25 0936   Calculated Wound Volume (cm^3) 78 cm^3 06/19/25 0936   Drainage Amount Moderate 06/19/25 0936   Drainage Description Serous;Yellow 06/19/25 0936   Jodee-wound Assessment Edema;Rash;Dry;Scaly 06/19/25 0936   Dressing Status Intact 06/19/25 0936             Results from last 6 Months   Lab Units 03/03/25  0319   WOUND CULTURE  Few Colonies of Staphylococcus  "aureus*           Wound Instructions:  Orders Placed This Encounter   Procedures    Wound cleansing and dressings     Right and left Leg :    Wash your hands with soap and water.  Remove old dressing, discard into plastic bag and place in trash.    Cleanse the wound with mild soap--Dove gentle soap, rinse well and pat dry prior to applying a clean dressing. Do not use tissue or cotton balls.   Do not scrub the wound. Pat dry using gauze.  Shower yes ---do not soak in the tub or body of water    Apply a mix of Nystatin powder and Zinc oxide ( Desitin ) directly to the open areas of your legs and Cover with ABD Pads and rolled gauze  Change dressing 2 x daily    If you are resting in your house, you may remove the dressings, lay a pad under your legs and may leave open to the air    Wound culture was completed today at the office--we will notify you with the results    Avoid prolonged standing in one place.    Elevate leg(s) above the level of the heart when sitting or as much as possible.       You will need to  the Nystatin and Zinc at your pharmacy today      Follow up in 1 week at the [facility]     Standing Status:   Future     Expiration Date:   6/26/2025    Wound Procedure Treatment     This order was created via procedure documentation    Fungal culture     Standing Status:   Future     Number of Occurrences:   1     Expiration Date:   8/19/2026     Release to patient through Mychart:   Sherron Helms PA-C          Portions of the record may have been created with voice recognition software. Occasional wrong word or \"sound alike\" substitutions may have occurred due to the inherent limitations of voice recognition software. Read the chart carefully and recognize, using context, where substitutions have occurred.         [1]   Patient Active Problem List  Diagnosis    Type 2 diabetes mellitus with microalbuminuria, with long-term current use of insulin (HCC)    Essential hypertension    " Hypercholesteremia    Class 3 severe obesity due to excess calories with serious comorbidity and body mass index (BMI) of 40.0 to 44.9 in adult    Steatohepatitis    Allergic rhinitis    Vitamin D deficiency    Persistent proteinuria    Tachycardia    Stage 3 chronic kidney disease, unspecified whether stage 3a or 3b CKD (HCC)    Lymphedema of both lower extremities    Rash    Numbness of left foot    Cortical age-related cataract of both eyes    Type 2 diabetes mellitus with hyperglycemia, with long-term current use of insulin (HCC)    Resolving cellulitis of left lower extremity    Right knee injury, subsequent encounter    Osteochondral lesion    Primary osteoarthritis of one knee, right    Lymphedema of right lower extremity    Other tear of medial meniscus, current injury, right knee, initial encounter    Hypertensive kidney disease with chronic kidney disease stage III (HCC)    Acute renal failure superimposed on stage 3a chronic kidney disease (HCC)    Alcohol abuse    Hyponatremia    Metabolic acidosis    Urinary retention    Atrial fibrillation with RVR (HCC)    Hypomagnesemia    Streptococcal bacteremia    Chronic anemia    Diabetic nephropathy associated with type 2 diabetes mellitus (HCC)    Sepsis without acute organ dysfunction (HCC)    Diarrhea    SIRS (systemic inflammatory response syndrome) (HCC)    Type 2 diabetes mellitus with diabetic chronic kidney disease (HCC)    Nonhealing ulcer of right lower extremity with fat layer exposed ([facility])   [2]   Past Medical History:  Diagnosis Date    Allergic     All My Life    Clostridium difficile diarrhea 02/27/2025    Diabetes mellitus (HCC) 20 years ago?    Elevated troponin 02/27/2025    Group A strep bacteremia 02/27/2025    Hypertension     Obesity     all my   [3]   Past Surgical History:  Procedure Laterality Date    APPENDECTOMY  1970    CATARACT EXTRACTION, BILATERAL Bilateral    [4] No family history on file.  [5]   Social History  Socioeconomic  History    Marital status: /Civil Union   Tobacco Use    Smoking status: Former     Current packs/day: 0.00     Types: Cigarettes    Smokeless tobacco: Never   Vaping Use    Vaping status: Never Used   Substance and Sexual Activity    Alcohol use: Yes     Alcohol/week: 4.0 standard drinks of alcohol     Types: 4 Shots of liquor per week    Drug use: Never     Social Drivers of Health     Financial Resource Strain: Low Risk  (9/28/2023)    Overall Financial Resource Strain (CARDIA)     Difficulty of Paying Living Expenses: Not hard at all   Food Insecurity: No Food Insecurity (3/13/2025)    Nursing - Inadequate Food Risk Classification     Worried About Running Out of Food in the Last Year: Never true     Ran Out of Food in the Last Year: Never true     Ran Out of Food in the Last Year: Never true   Transportation Needs: No Transportation Needs (3/13/2025)    Nursing - Transportation Risk Classification     Lack of Transportation: No   Intimate Partner Violence: Unknown (3/13/2025)    Nursing IPS     Physically Hurt by Someone: No     Hurt or Threatened by Someone: No   Housing Stability: Unknown (3/13/2025)    Nursing: Inadequate Housing Risk Classification     Unable to Pay for Housing in the Last Year: No     Has Housing: No   [6]   Current Outpatient Medications:     nystatin (MYCOSTATIN) powder, Apply topically 2 (two) times a day for 14 days Mixed with zinc oxide, Disp: 60 g, Rfl: 1    zinc oxide 20 % ointment, Apply topically 2 (two) times a day for 14 days Mixed with nystatin, Disp: 85 g, Rfl: 2    amLODIPine (NORVASC) 10 mg tablet, TAKE 1 TABLET BY MOUTH EVERY DAY, Disp: 90 tablet, Rfl: 1    aspirin (ECOTRIN LOW STRENGTH) 81 mg EC tablet, Take 81 mg by mouth in the morning., Disp: , Rfl:     atorvastatin (LIPITOR) 40 mg tablet, TAKE 1 TABLET BY MOUTH EVERY DAY, Disp: 90 tablet, Rfl: 1    Azelastine HCl 137 MCG/SPRAY SOLN, 1 puff each nostril twice a day, Disp: 30 mL, Rfl: 1    BD Pen Needle Katelyn 2nd  Gen 32G X 4 MM MISC, INJECT AS DIRECTED 2 (TWO) TIMES A DAY, Disp: 100 each, Rfl: 1    Cholecalciferol (VITAMIN D3) 2000 units TABS, Take 2,000 Units by mouth in the morning., Disp: , Rfl:     furosemide (LASIX) 40 mg tablet, Take 1 tablet (40 mg total) by mouth 2 (two) times a day (Patient taking differently: Take 40 mg by mouth daily), Disp: 180 tablet, Rfl: 0    gabapentin (NEURONTIN) 100 mg capsule, Take 2 capsules (200 mg total) by mouth daily at bedtime, Disp: 180 capsule, Rfl: 1    Glucose Blood (ONETOUCH ULTRA BLUE VI), Test 1 Units in the morning and 1 Units in the evening and 1 Units before bedtime., Disp: , Rfl:     glucose blood (OneTouch Ultra) test strip, Test 3 times daily, Disp: 300 each, Rfl: 5    insulin isophane-insulin regular (HumuLIN 70/30 KwikPen) 100 units/mL injection pen, 20u in the am and 25u in the pm, Disp: 15 mL, Rfl: 5    Krill Oil 1000 MG CAPS, Take 1 capsule by mouth in the morning., Disp: , Rfl:     losartan (COZAAR) 100 MG tablet, Take 1 tablet (100 mg total) by mouth daily, Disp: 90 tablet, Rfl: 2    metoprolol tartrate (LOPRESSOR) 50 mg tablet, Take 1 tablet (50 mg total) by mouth every 12 (twelve) hours, Disp: , Rfl:     potassium chloride (Klor-Con M20) 20 mEq tablet, Take 1 tablet (20 mEq total) by mouth daily, Disp: 90 tablet, Rfl: 1

## 2025-06-19 NOTE — PROGRESS NOTES
Wound Procedure Treatment    Performed by: Brittany Monae RN  Authorized by: Anahi Helms PA-C  Associated wounds:   Wound 06/19/25 Irritant Contact Dermatitis Leg Right;Lower  Wound 06/19/25 Irritant Contact Dermatitis Leg Left;Lower    Applied topical:  Other   Applied secondary dressing:  ABD   Dressing secured with:  Francisco and Tape   Comments:  Mix of Nystatin powder and Zinc

## 2025-06-23 LAB — FUNGUS SPEC CULT: NORMAL

## 2025-06-26 ENCOUNTER — OFFICE VISIT (OUTPATIENT)
Facility: HOSPITAL | Age: 70
End: 2025-06-26
Payer: MEDICARE

## 2025-06-26 VITALS
TEMPERATURE: 98.2 F | HEART RATE: 79 BPM | RESPIRATION RATE: 20 BRPM | SYSTOLIC BLOOD PRESSURE: 161 MMHG | DIASTOLIC BLOOD PRESSURE: 72 MMHG

## 2025-06-26 DIAGNOSIS — L97.921 SKIN ULCER OF LEFT LOWER LEG, LIMITED TO BREAKDOWN OF SKIN (HCC): ICD-10-CM

## 2025-06-26 DIAGNOSIS — L97.911 ULCER OF RIGHT LOWER LEG, LIMITED TO BREAKDOWN OF SKIN (HCC): ICD-10-CM

## 2025-06-26 DIAGNOSIS — Z79.4 TYPE 2 DIABETES MELLITUS WITH OTHER SKIN COMPLICATION, WITH LONG-TERM CURRENT USE OF INSULIN (HCC): ICD-10-CM

## 2025-06-26 DIAGNOSIS — E11.628 TYPE 2 DIABETES MELLITUS WITH OTHER SKIN COMPLICATION, WITH LONG-TERM CURRENT USE OF INSULIN (HCC): ICD-10-CM

## 2025-06-26 DIAGNOSIS — B36.9 FUNGAL DERMATITIS: Primary | ICD-10-CM

## 2025-06-26 PROCEDURE — 99214 OFFICE O/P EST MOD 30 MIN: CPT | Performed by: STUDENT IN AN ORGANIZED HEALTH CARE EDUCATION/TRAINING PROGRAM

## 2025-06-26 PROCEDURE — 99213 OFFICE O/P EST LOW 20 MIN: CPT | Performed by: STUDENT IN AN ORGANIZED HEALTH CARE EDUCATION/TRAINING PROGRAM

## 2025-06-26 RX ORDER — KETOCONAZOLE 20 MG/G
CREAM TOPICAL 2 TIMES DAILY
Qty: 60 G | Refills: 1 | Status: SHIPPED | OUTPATIENT
Start: 2025-06-26 | End: 2025-07-10

## 2025-06-26 NOTE — PATIENT INSTRUCTIONS
Orders Placed This Encounter   Procedures    Wound cleansing and dressings     Right and left Leg :     Wash your hands with soap and water.  Remove old dressing, discard into plastic bag and place in trash.    Cleanse the wound with mild soap--Dove gentle soap, rinse well and pat dry prior to applying a clean dressing. Do not use tissue or cotton balls.   Do not scrub the wound. Pat dry using gauze.  Shower yes ---do not soak in the tub or body of water     Apply a mix of Nystatin powder and Zinc oxide ( Desitin ) directly to the open areas of your legs and Cover with ABD Pads and rolled gauze  Change dressing 2 x daily     If you are resting in your house, you may remove the dressings, lay a pad under your legs and may leave open to the air       Avoid prolonged standing in one place.     Elevate leg(s) above the level of the heart when sitting or as much as possible.          Anahi Helms Pa-C will call you if you are to  prescription for the oral fungal medication from your pharmacy      Follow up in 1 week at the [facility]     Standing Status:   Future     Expiration Date:   7/3/2025

## 2025-06-26 NOTE — PROGRESS NOTES
"Patient ID: Nadeem Purdy Jr. is a 69 y.o. male Date of Birth 1955       Chief Complaint   Patient presents with    Follow Up Wound Care Visit     BLE dermatitis        Allergies:  Patient has no known allergies.    Diagnosis:   Diagnosis ICD-10-CM Associated Orders   1. Fungal dermatitis  B36.9 Wound cleansing and dressings     Wound Procedure Treatment     ketoconazole (NIZORAL) 2 % cream      2. Type 2 diabetes mellitus with other skin complication, with long-term current use of insulin ([facility])  E11.628     Z79.4       3. Skin ulcer of left lower leg, limited to breakdown of skin ([facility])  L97.921       4. Ulcer of right lower leg, limited to breakdown of skin ([facility])  L97.911            Assessment  & Plan:    F/u bilateral lower extremities. Continues to have severe pruritic rash with associated weeping. Suspect severe fungal dermatitis vs severe stasis dermatitis, erisypelas less likely given lack of progression compared to prior visit and no response to previous abx. Pt has noted a positive response to use of nystatin and zinc in terms of itching.   Given severe kidney disease will opt for topical treatment for time being. Would like to trial twice daily application of ketoconazole cream. Allow cream to dry before applying ABD pads. Will consider dermatology referral or bx should there not be an improvement by next visit.   Instructed to monitor for any changes including redness or swelling surrounding the wound, increased drainage or pain as well as fevers or chills.  Notify office or report to ED should this occur.   F/u in 1 week. Instructed to call if any questions or concerns arise in meantime.            Subjective:   06/19/25: 70 y/o M with Pmhx of DM, HTN, obesity, CKD, atrial fibrillation, alcohol use, recent hospitalization related to sepsis present for evaluation of his bilateral lower legs. Pt was hospitalized from 03/13-03/17/25; per hospital course summary, \"Nadeem Purdy Jr. is a 69 y.o. male " "patient who originally presented to the hospital on 3/13/2025 due to fevers/chills. Please see H&P for complete details of presentation. The patient was noted to have af fever of 101.9 and met SIRS criteria with leukocytosis, tachypnea, and tachycardia. The patient was started on IV Vancomycin and Cefepime. ID consulted. Blood cultures were negative at 72 hours. CT chest /abdomen/pelvis was negative for acute findings. The reported multiple episodes of diarrhea. Cdiff and stool enteric bacterial panel was negative. IV antibiotics were discontinued as it was thought the patient's fever was due to beta-lactam drug fever given infectious workup was negative. The patient was afebrile for more than 48 hours, leukocytosis resolved. The patient was discharged back to [facility] for STR. Please see notes from throughout the patient's hospital stay for complete details of his hospitalization.\" His legs were improved however he recently began to notice redness of the legs again and saw internal medicine on 05/22/25 at which time he was prescribed Keflex. Had f/u on 06/03/25 at which point continued erythema was noted and his antibiotic was changed to Doxycycline. Given the redness persisted despite multiple courses of antibiotics, pt was referred to wound care center. On evaluation today pt reports his legs have been weeping and are very itchy. He has been covering his legs with ABD pads to absorb the drainage. He does report itching his legs, especially at night. No fevers, chills.       06/26/25: Pt presents for f/u evaluation of bilateral lower extremities. He has been applying zinc and nystatin mix to the legs and leaving them open to air for some time while at home and wrapping the legs with ABD pads when unable to rest. He is continuing to have significant amount of drainage from the legs but does report that he has noticed an improvement in the itching sensation and also feels as though his legs are less red " compared to before. No fevers.           The following portions of the patient's history were reviewed and updated as appropriate:   Problem List[1]  Past Medical History[2]  Past Surgical History[3]  Family History[4]  Social History[5]  Current Medications[6]    Review of Systems      Objective:  /72   Pulse 79   Temp 98.2 °F (36.8 °C) (Temporal)   Resp 20   Pain Score:   5     Physical Exam           Wound 06/19/25 Irritant Contact Dermatitis Leg Right;Lower (Active)   Wound Image      06/26/25 0826   Wound Description Epithelialization;Granulation tissue;Drainage;Pink 06/26/25 0835   Non-staged Wound Description Full thickness 06/26/25 0835   Wound Length (cm) 30.5 cm 06/26/25 0835   Wound Width (cm) 36.5 cm 06/26/25 0835   Wound Depth (cm) 0.1 cm 06/26/25 0835   Wound Surface Area (cm^2) 874.34 cm^2 06/26/25 0835   Wound Volume (cm^3) 58.29 cm^3 06/26/25 0835   Calculated Wound Volume (cm^3) 111.33 cm^3 06/26/25 0835   Change in Wound Size % 20.62 06/26/25 0835   Drainage Amount Moderate 06/26/25 0835   Drainage Description Serous;Yellow 06/26/25 0835   Jodee-wound Assessment Edema;Dry;Scaly;Pink 06/26/25 0835   Treatments Cleansed 06/26/25 0835   Dressing Status Intact 06/26/25 0835       Wound 06/19/25 Irritant Contact Dermatitis Leg Left;Lower (Active)   Wound Image    06/26/25 0827   Wound Description Granulation tissue;Epithelialization;Pink;Drainage;Other (Comment) 06/26/25 0834   Non-staged Wound Description Full thickness 06/26/25 0834   Wound Length (cm) 29.4 cm 06/26/25 0834   Wound Width (cm) 26 cm 06/26/25 0834   Wound Depth (cm) 0.1 cm 06/26/25 0834   Wound Surface Area (cm^2) 600.36 cm^2 06/26/25 0834   Wound Volume (cm^3) 40.024 cm^3 06/26/25 0834   Calculated Wound Volume (cm^3) 76.44 cm^3 06/26/25 0834   Change in Wound Size % 2 06/26/25 0834   Drainage Amount Moderate 06/26/25 0834   Drainage Description Serous;Yellow 06/26/25 0834   Jodee-wound Assessment Edema;Rash;Dry;Scaly 06/26/25  "0834   Dressing Status Intact 06/26/25 0834                 Results from last 6 Months   Lab Units 03/03/25  0319   WOUND CULTURE  Few Colonies of Staphylococcus aureus*           Wound Instructions:  Orders Placed This Encounter   Procedures    Wound cleansing and dressings     Right and left Leg :     Wash your hands with soap and water.  Remove old dressing, discard into plastic bag and place in trash.    Cleanse the wound with mild soap--Dove gentle soap, rinse well and pat dry prior to applying a clean dressing. Do not use tissue or cotton balls.   Do not scrub the wound. Pat dry using gauze.  Shower yes ---do not soak in the tub or body of water     Apply a mix of Nystatin powder and Zinc oxide ( Desitin ) directly to the open areas of your legs and Cover with ABD Pads and rolled gauze  Change dressing 2 x daily     If you are resting in your house, you may remove the dressings, lay a pad under your legs and may leave open to the air       Avoid prolonged standing in one place.     Elevate leg(s) above the level of the heart when sitting or as much as possible.          Anahi Helms Pa-C will call you if you are to  prescription for the oral fungal medication from your pharmacy      Follow up in 1 week at the [facility]     Standing Status:   Future     Expiration Date:   7/3/2025    Wound Procedure Treatment     This order was created via procedure documentation       Cally Helms, PA-C      Portions of the record may have been created with voice recognition software. Occasional wrong word or \"sound alike\" substitutions may have occurred due to the inherent limitations of voice recognition software. Read the chart carefully and recognize, using context, where substitutions have occurred.           [1]   Patient Active Problem List  Diagnosis    Type 2 diabetes mellitus with microalbuminuria, with long-term current use of insulin (HCC)    Essential hypertension    Hypercholesteremia    Class 3 severe " obesity due to excess calories with serious comorbidity and body mass index (BMI) of 40.0 to 44.9 in adult    Steatohepatitis    Allergic rhinitis    Vitamin D deficiency    Persistent proteinuria    Tachycardia    Stage 3 chronic kidney disease, unspecified whether stage 3a or 3b CKD (HCC)    Lymphedema of both lower extremities    Rash    Numbness of left foot    Cortical age-related cataract of both eyes    Type 2 diabetes mellitus with hyperglycemia, with long-term current use of insulin (HCC)    Resolving cellulitis of left lower extremity    Right knee injury, subsequent encounter    Osteochondral lesion    Primary osteoarthritis of one knee, right    Lymphedema of right lower extremity    Other tear of medial meniscus, current injury, right knee, initial encounter    Hypertensive kidney disease with chronic kidney disease stage III (HCC)    Acute renal failure superimposed on stage 3a chronic kidney disease (HCC)    Alcohol abuse    Hyponatremia    Metabolic acidosis    Urinary retention    Atrial fibrillation with RVR (HCC)    Hypomagnesemia    Streptococcal bacteremia    Chronic anemia    Diabetic nephropathy associated with type 2 diabetes mellitus (HCC)    Sepsis without acute organ dysfunction (HCC)    Diarrhea    SIRS (systemic inflammatory response syndrome) (HCC)    Type 2 diabetes mellitus with diabetic chronic kidney disease (HCC)    Nonhealing ulcer of right lower extremity with fat layer exposed ([facility])   [2]   Past Medical History:  Diagnosis Date    Allergic     All My Life    Clostridium difficile diarrhea 02/27/2025    Diabetes mellitus (HCC) 20 years ago?    Elevated troponin 02/27/2025    Group A strep bacteremia 02/27/2025    Hypertension     Obesity     all my   [3]   Past Surgical History:  Procedure Laterality Date    APPENDECTOMY  1970    CATARACT EXTRACTION, BILATERAL Bilateral    [4] No family history on file.  [5]   Social History  Socioeconomic History    Marital status: /Civil  [place]   Tobacco Use    Smoking status: Former     Current packs/day: 0.00     Types: Cigarettes    Smokeless tobacco: Never   Vaping Use    Vaping status: Never Used   Substance and Sexual Activity    Alcohol use: Yes     Alcohol/week: 4.0 standard drinks of alcohol     Types: 4 Shots of liquor per week    Drug use: Never     Social Drivers of Health     Financial Resource Strain: Low Risk  (9/28/2023)    Overall Financial Resource Strain (CARDIA)     Difficulty of Paying Living Expenses: Not hard at all   Food Insecurity: No Food Insecurity (3/13/2025)    Nursing - Inadequate Food Risk Classification     Worried About Running Out of Food in the Last Year: Never true     Ran Out of Food in the Last Year: Never true     Ran Out of Food in the Last Year: Never true   Transportation Needs: No Transportation Needs (3/13/2025)    Nursing - Transportation Risk Classification     Lack of Transportation: No   Intimate Partner Violence: Unknown (3/13/2025)    Nursing IPS     Physically Hurt by Someone: No     Hurt or Threatened by Someone: No   Housing Stability: Unknown (3/13/2025)    Nursing: Inadequate Housing Risk Classification     Unable to Pay for Housing in the Last Year: No     Has Housing: No   [6]   Current Outpatient Medications:     ketoconazole (NIZORAL) 2 % cream, Apply topically 2 (two) times a day for 14 days To skin of bilateral legs, Disp: 60 g, Rfl: 1    amLODIPine (NORVASC) 10 mg tablet, TAKE 1 TABLET BY MOUTH EVERY DAY, Disp: 90 tablet, Rfl: 1    aspirin (ECOTRIN LOW STRENGTH) 81 mg EC tablet, Take 81 mg by mouth in the morning., Disp: , Rfl:     atorvastatin (LIPITOR) 40 mg tablet, TAKE 1 TABLET BY MOUTH EVERY DAY, Disp: 90 tablet, Rfl: 1    Azelastine HCl 137 MCG/SPRAY SOLN, 1 puff each nostril twice a day, Disp: 30 mL, Rfl: 1    BD Pen Needle Katelyn 2nd Gen 32G X 4 MM MISC, INJECT AS DIRECTED 2 (TWO) TIMES A DAY, Disp: 100 each, Rfl: 1    Cholecalciferol (VITAMIN D3) 2000 units TABS, Take 2,000 Units  by mouth in the morning., Disp: , Rfl:     furosemide (LASIX) 40 mg tablet, Take 1 tablet (40 mg total) by mouth 2 (two) times a day (Patient taking differently: Take 40 mg by mouth daily), Disp: 180 tablet, Rfl: 0    gabapentin (NEURONTIN) 100 mg capsule, Take 2 capsules (200 mg total) by mouth daily at bedtime, Disp: 180 capsule, Rfl: 1    Glucose Blood (ONETOUCH ULTRA BLUE VI), Test 1 Units in the morning and 1 Units in the evening and 1 Units before bedtime., Disp: , Rfl:     glucose blood (OneTouch Ultra) test strip, Test 3 times daily, Disp: 300 each, Rfl: 5    insulin isophane-insulin regular (HumuLIN 70/30 KwikPen) 100 units/mL injection pen, 20u in the am and 25u in the pm, Disp: 15 mL, Rfl: 5    Krill Oil 1000 MG CAPS, Take 1 capsule by mouth in the morning., Disp: , Rfl:     losartan (COZAAR) 100 MG tablet, Take 1 tablet (100 mg total) by mouth daily, Disp: 90 tablet, Rfl: 2    metoprolol tartrate (LOPRESSOR) 50 mg tablet, Take 1 tablet (50 mg total) by mouth every 12 (twelve) hours, Disp: , Rfl:     nystatin (MYCOSTATIN) powder, Apply topically 2 (two) times a day for 14 days Mixed with zinc oxide, Disp: 60 g, Rfl: 1    potassium chloride (Klor-Con M20) 20 mEq tablet, Take 1 tablet (20 mEq total) by mouth daily, Disp: 90 tablet, Rfl: 1    zinc oxide 20 % ointment, Apply topically 2 (two) times a day for 14 days Mixed with nystatin, Disp: 85 g, Rfl: 2

## 2025-06-26 NOTE — PROGRESS NOTES
Wound Procedure Treatment    Performed by: Macarena Lala RN  Authorized by: Anahi Helms PA-C  Associated wounds:   Wound 06/19/25 Irritant Contact Dermatitis Leg Right;Lower  Wound 06/19/25 Irritant Contact Dermatitis Leg Left;Lower    Wound cleansed with:  Soap and water   Applied topical:  Other   Applied secondary dressing:  ABD   Dressing secured with:  Kerlix and Tape   Comments:  Zinc and nystatin mix 50/50

## 2025-06-30 LAB — FUNGUS SPEC CULT: NORMAL

## 2025-07-03 ENCOUNTER — OFFICE VISIT (OUTPATIENT)
Facility: HOSPITAL | Age: 70
End: 2025-07-03
Payer: MEDICARE

## 2025-07-03 VITALS
TEMPERATURE: 98 F | RESPIRATION RATE: 20 BRPM | HEART RATE: 88 BPM | SYSTOLIC BLOOD PRESSURE: 146 MMHG | DIASTOLIC BLOOD PRESSURE: 60 MMHG

## 2025-07-03 DIAGNOSIS — Z79.4 TYPE 2 DIABETES MELLITUS WITH OTHER SKIN COMPLICATION, WITH LONG-TERM CURRENT USE OF INSULIN (HCC): ICD-10-CM

## 2025-07-03 DIAGNOSIS — L97.911 ULCER OF RIGHT LOWER LEG, LIMITED TO BREAKDOWN OF SKIN (HCC): ICD-10-CM

## 2025-07-03 DIAGNOSIS — E11.628 TYPE 2 DIABETES MELLITUS WITH OTHER SKIN COMPLICATION, WITH LONG-TERM CURRENT USE OF INSULIN (HCC): ICD-10-CM

## 2025-07-03 DIAGNOSIS — B36.9 FUNGAL DERMATITIS: Primary | ICD-10-CM

## 2025-07-03 DIAGNOSIS — L97.921 SKIN ULCER OF LEFT LOWER LEG, LIMITED TO BREAKDOWN OF SKIN (HCC): ICD-10-CM

## 2025-07-03 PROCEDURE — 99213 OFFICE O/P EST LOW 20 MIN: CPT | Performed by: STUDENT IN AN ORGANIZED HEALTH CARE EDUCATION/TRAINING PROGRAM

## 2025-07-03 PROCEDURE — 99214 OFFICE O/P EST MOD 30 MIN: CPT | Performed by: STUDENT IN AN ORGANIZED HEALTH CARE EDUCATION/TRAINING PROGRAM

## 2025-07-03 RX ORDER — KETOCONAZOLE 20 MG/G
CREAM TOPICAL 2 TIMES DAILY
Qty: 60 G | Refills: 3 | Status: SHIPPED | OUTPATIENT
Start: 2025-07-03 | End: 2025-07-11 | Stop reason: ALTCHOICE

## 2025-07-03 NOTE — PROGRESS NOTES
Wound Procedure Treatment    Performed by: Brittany Monae RN  Authorized by: Anahi Helms PA-C  Associated wounds:   Wound 06/19/25 Irritant Contact Dermatitis Leg Right;Lower  Wound 06/19/25 Irritant Contact Dermatitis Leg Left;Lower    Applied secondary dressing:  ABD   Dressing secured with:  Francisco and Tape

## 2025-07-03 NOTE — PATIENT INSTRUCTIONS
Orders Placed This Encounter   Procedures    Wound cleansing and dressings     Right and left Leg :     Wash your hands with soap and water.  Remove old dressing, discard into plastic bag and place in trash.    Cleanse the wound with mild soap--Dove gentle soap, rinse well and pat dry prior to applying a clean dressing. Do not use tissue or cotton balls.   Do not scrub the wound. Pat dry using gauze.  Shower yes ---do not soak in the tub or body of water     Apply Ketoconazole Cream directly to the open areas of your legs and Cover with ABD Pads and rolled gauze  Change dressing 2 x daily     If you are resting in your house, you may remove the dressings, lay a pad under your legs and may leave open to the air        Avoid prolonged standing in one place.     Elevate leg(s) above the level of the heart when sitting or as much as possible.            Follow up in 1 week at the [facility]     Standing Status:   Future     Expiration Date:   7/10/2025

## 2025-07-03 NOTE — PROGRESS NOTES
"Patient ID: Nadeem Purdy Jr. is a 69 y.o. male Date of Birth 1955       Chief Complaint   Patient presents with    Follow Up Wound Care Visit     Follow up for BLE       Allergies:  Patient has no known allergies.    Diagnosis:   Diagnosis ICD-10-CM Associated Orders   1. Fungal dermatitis  B36.9 Wound cleansing and dressings     ketoconazole (NIZORAL) 2 % cream     Wound Procedure Treatment      2. Type 2 diabetes mellitus with other skin complication, with long-term current use of insulin ([facility])  E11.628     Z79.4       3. Skin ulcer of left lower leg, limited to breakdown of skin ([facility])  L97.921       4. Ulcer of right lower leg, limited to breakdown of skin ([facility])  L97.911            Assessment  & Plan:    F/u bilateral lower extremities. Significant improvement with use of topical ketoconazole. Less erythema. Much less weeping/drainage from the lower legs. Rash has significantly improved.   Continue with twice daily application of ketoconazole cream for additional week. Allow cream to dry before applying ABD pads.   Instructed to monitor for any changes including redness or swelling surrounding the wound, increased drainage or pain as well as fevers or chills.  Notify office or report to ED should this occur.   F/u in 1 week. Instructed to call if any questions or concerns arise in meantime.             Subjective:   06/19/25: 68 y/o M with Pmhx of DM, HTN, obesity, CKD, atrial fibrillation, alcohol use, recent hospitalization related to sepsis present for evaluation of his bilateral lower legs. Pt was hospitalized from 03/13-03/17/25; per hospital course summary, \"Nadeem Purdy Jr. is a 69 y.o. male patient who originally presented to the hospital on 3/13/2025 due to fevers/chills. Please see H&P for complete details of presentation. The patient was noted to have af fever of 101.9 and met SIRS criteria with leukocytosis, tachypnea, and tachycardia. The patient was started on IV Vancomycin and Cefepime. ID " "consulted. Blood cultures were negative at 72 hours. CT chest /abdomen/pelvis was negative for acute findings. The reported multiple episodes of diarrhea. Cdiff and stool enteric bacterial panel was negative. IV antibiotics were discontinued as it was thought the patient's fever was due to beta-lactam drug fever given infectious workup was negative. The patient was afebrile for more than 48 hours, leukocytosis resolved. The patient was discharged back to [facility] for STR. Please see notes from throughout the patient's hospital stay for complete details of his hospitalization.\" His legs were improved however he recently began to notice redness of the legs again and saw internal medicine on 05/22/25 at which time he was prescribed Keflex. Had f/u on 06/03/25 at which point continued erythema was noted and his antibiotic was changed to Doxycycline. Given the redness persisted despite multiple courses of antibiotics, pt was referred to wound care center. On evaluation today pt reports his legs have been weeping and are very itchy. He has been covering his legs with ABD pads to absorb the drainage. He does report itching his legs, especially at night. No fevers, chills.       06/26/25: Pt presents for f/u evaluation of bilateral lower extremities. He has been applying zinc and nystatin mix to the legs and leaving them open to air for some time while at home and wrapping the legs with ABD pads when unable to rest. He is continuing to have significant amount of drainage from the legs but does report that he has noticed an improvement in the itching sensation and also feels as though his legs are less red compared to before. No fevers.       07/03/25: Pt presents for f/u evaluation of bilateral lower legs. He has been applying ketoconazole cream this past week and has noticed an improvement. Legs are draining much less than before.           The following portions of the patient's history were reviewed and updated as " appropriate:   Problem List[1]  Past Medical History[2]  Past Surgical History[3]  Family History[4]  Social History[5]  Current Medications[6]    Review of Systems      Objective:  /60   Pulse 88   Temp 98 °F (36.7 °C)   Resp 20   Pain Score: 0-No pain     Physical Exam  Vitals reviewed.   Constitutional:       Appearance: He is morbidly obese.     Skin:     Findings: Rash present.      Comments: Rash of bilateral lower extremities is significantly improved. Much less weeping/draining      Neurological:      Mental Status: He is alert.           Wound 06/19/25 Irritant Contact Dermatitis Leg Right;Lower (Active)   Wound Image    07/03/25 0851   Wound Description Epithelialization;Pink 07/03/25 0858   Non-staged Wound Description Partial thickness 07/03/25 0858   Wound Length (cm) 11 cm 07/03/25 0858   Wound Width (cm) 20 cm 07/03/25 0858   Wound Depth (cm) 0.1 cm 07/03/25 0858   Wound Surface Area (cm^2) 172.79 cm^2 07/03/25 0858   Wound Volume (cm^3) 11.519 cm^3 07/03/25 0858   Calculated Wound Volume (cm^3) 22 cm^3 07/03/25 0858   Change in Wound Size % 84.31 07/03/25 0858   Drainage Amount Small 07/03/25 0858   Drainage Description Serous;Yellow 07/03/25 0858   Jodee-wound Assessment Dry;Scaly;Fragile;Edema 07/03/25 0858   Treatments Cleansed 06/26/25 0835   Dressing Status Intact 07/03/25 0858       Wound 06/19/25 Irritant Contact Dermatitis Leg Left;Lower (Active)   Wound Image    07/03/25 0852   Wound Description Epithelialization;Pink 07/03/25 0855   Non-staged Wound Description Partial thickness 07/03/25 0855   Wound Length (cm) 13 cm 07/03/25 0855   Wound Width (cm) 8 cm 07/03/25 0855   Wound Depth (cm) 0.1 cm 07/03/25 0855   Wound Surface Area (cm^2) 81.68 cm^2 07/03/25 0855   Wound Volume (cm^3) 5.445 cm^3 07/03/25 0855   Calculated Wound Volume (cm^3) 10.4 cm^3 07/03/25 0855   Change in Wound Size % 86.67 07/03/25 0855   Drainage Amount Small 07/03/25 0855   Drainage Description Serous;Yellow  "07/03/25 0855   Jodee-wound Assessment Dry;Scaly;Edema;Fragile 07/03/25 0855   Dressing Status Intact 07/03/25 0855                              Results from last 6 Months   Lab Units 03/03/25  0319   WOUND CULTURE  Few Colonies of Staphylococcus aureus*           Wound Instructions:  Orders Placed This Encounter   Procedures    Wound cleansing and dressings     Right and left Leg :     Wash your hands with soap and water.  Remove old dressing, discard into plastic bag and place in trash.    Cleanse the wound with mild soap--Dove gentle soap, rinse well and pat dry prior to applying a clean dressing. Do not use tissue or cotton balls.   Do not scrub the wound. Pat dry using gauze.  Shower yes ---do not soak in the tub or body of water     Apply Ketoconazole Cream directly to the open areas of your legs and Cover with ABD Pads and rolled gauze  Change dressing 2 x daily     If you are resting in your house, you may remove the dressings, lay a pad under your legs and may leave open to the air        Avoid prolonged standing in one place.     Elevate leg(s) above the level of the heart when sitting or as much as possible.            Follow up in 1 week at the [facility]     Standing Status:   Future     Expiration Date:   7/10/2025    Wound Procedure Treatment     This order was created via procedure documentation       Cally Helms, PA-C          Portions of the record may have been created with voice recognition software. Occasional wrong word or \"sound alike\" substitutions may have occurred due to the inherent limitations of voice recognition software. Read the chart carefully and recognize, using context, where substitutions have occurred.         [1]   Patient Active Problem List  Diagnosis    Type 2 diabetes mellitus with microalbuminuria, with long-term current use of insulin (HCC)    Essential hypertension    Hypercholesteremia    Class 3 severe obesity due to excess calories with serious comorbidity and body mass " index (BMI) of 40.0 to 44.9 in adult    Steatohepatitis    Allergic rhinitis    Vitamin D deficiency    Persistent proteinuria    Tachycardia    Stage 3 chronic kidney disease, unspecified whether stage 3a or 3b CKD (HCC)    Lymphedema of both lower extremities    Rash    Numbness of left foot    Cortical age-related cataract of both eyes    Type 2 diabetes mellitus with hyperglycemia, with long-term current use of insulin (HCC)    Resolving cellulitis of left lower extremity    Right knee injury, subsequent encounter    Osteochondral lesion    Primary osteoarthritis of one knee, right    Lymphedema of right lower extremity    Other tear of medial meniscus, current injury, right knee, initial encounter    Hypertensive kidney disease with chronic kidney disease stage III (HCC)    Acute renal failure superimposed on stage 3a chronic kidney disease (HCC)    Alcohol abuse    Hyponatremia    Metabolic acidosis    Urinary retention    Atrial fibrillation with RVR (HCC)    Hypomagnesemia    Streptococcal bacteremia    Chronic anemia    Diabetic nephropathy associated with type 2 diabetes mellitus (HCC)    Sepsis without acute organ dysfunction (HCC)    Diarrhea    SIRS (systemic inflammatory response syndrome) (HCC)    Type 2 diabetes mellitus with diabetic chronic kidney disease (HCC)    Nonhealing ulcer of right lower extremity with fat layer exposed (HCC)   [2]   Past Medical History:  Diagnosis Date    Allergic     All My Life    Clostridium difficile diarrhea 02/27/2025    Diabetes mellitus (HCC) 20 years ago?    Elevated troponin 02/27/2025    Group A strep bacteremia 02/27/2025    Hypertension     Obesity     all my   [3]   Past Surgical History:  Procedure Laterality Date    APPENDECTOMY  1970    CATARACT EXTRACTION, BILATERAL Bilateral    [4] No family history on file.  [5]   Social History  Socioeconomic History    Marital status: /Civil Union   Tobacco Use    Smoking status: Former     Current packs/day:  0.00     Types: Cigarettes    Smokeless tobacco: Never   Vaping Use    Vaping status: Never Used   Substance and Sexual Activity    Alcohol use: Yes     Alcohol/week: 4.0 standard drinks of alcohol     Types: 4 Shots of liquor per week    Drug use: Never     Social Drivers of Health     Financial Resource Strain: Low Risk  (9/28/2023)    Overall Financial Resource Strain (CARDIA)     Difficulty of Paying Living Expenses: Not hard at all   Food Insecurity: No Food Insecurity (3/13/2025)    Nursing - Inadequate Food Risk Classification     Worried About Running Out of Food in the Last Year: Never true     Ran Out of Food in the Last Year: Never true     Ran Out of Food in the Last Year: Never true   Transportation Needs: No Transportation Needs (3/13/2025)    Nursing - Transportation Risk Classification     Lack of Transportation: No   Intimate Partner Violence: Unknown (3/13/2025)    Nursing IPS     Physically Hurt by Someone: No     Hurt or Threatened by Someone: No   Housing Stability: Unknown (3/13/2025)    Nursing: Inadequate Housing Risk Classification     Unable to Pay for Housing in the Last Year: No     Has Housing: No   [6]   Current Outpatient Medications:     ketoconazole (NIZORAL) 2 % cream, Apply topically 2 (two) times a day for 14 days To skin of bilateral legs, Disp: 60 g, Rfl: 3    amLODIPine (NORVASC) 10 mg tablet, TAKE 1 TABLET BY MOUTH EVERY DAY, Disp: 90 tablet, Rfl: 1    aspirin (ECOTRIN LOW STRENGTH) 81 mg EC tablet, Take 81 mg by mouth in the morning., Disp: , Rfl:     atorvastatin (LIPITOR) 40 mg tablet, TAKE 1 TABLET BY MOUTH EVERY DAY, Disp: 90 tablet, Rfl: 1    Azelastine HCl 137 MCG/SPRAY SOLN, 1 puff each nostril twice a day, Disp: 30 mL, Rfl: 1    BD Pen Needle Katelyn 2nd Gen 32G X 4 MM MISC, INJECT AS DIRECTED 2 (TWO) TIMES A DAY, Disp: 100 each, Rfl: 1    Cholecalciferol (VITAMIN D3) 2000 units TABS, Take 2,000 Units by mouth in the morning., Disp: , Rfl:     furosemide (LASIX) 40 mg  tablet, Take 1 tablet (40 mg total) by mouth 2 (two) times a day (Patient taking differently: Take 40 mg by mouth daily), Disp: 180 tablet, Rfl: 0    gabapentin (NEURONTIN) 100 mg capsule, Take 2 capsules (200 mg total) by mouth daily at bedtime, Disp: 180 capsule, Rfl: 1    Glucose Blood (ONETOUCH ULTRA BLUE VI), Test 1 Units in the morning and 1 Units in the evening and 1 Units before bedtime., Disp: , Rfl:     glucose blood (OneTouch Ultra) test strip, Test 3 times daily, Disp: 300 each, Rfl: 5    insulin isophane-insulin regular (HumuLIN 70/30 KwikPen) 100 units/mL injection pen, 20u in the am and 25u in the pm, Disp: 15 mL, Rfl: 5    Krill Oil 1000 MG CAPS, Take 1 capsule by mouth in the morning., Disp: , Rfl:     losartan (COZAAR) 100 MG tablet, Take 1 tablet (100 mg total) by mouth daily, Disp: 90 tablet, Rfl: 2    metoprolol tartrate (LOPRESSOR) 50 mg tablet, Take 1 tablet (50 mg total) by mouth every 12 (twelve) hours, Disp: , Rfl:     nystatin (MYCOSTATIN) powder, Apply topically 2 (two) times a day for 14 days Mixed with zinc oxide, Disp: 60 g, Rfl: 1    potassium chloride (Klor-Con M20) 20 mEq tablet, Take 1 tablet (20 mEq total) by mouth daily, Disp: 90 tablet, Rfl: 1    zinc oxide 20 % ointment, Apply topically 2 (two) times a day for 14 days Mixed with nystatin, Disp: 85 g, Rfl: 2

## 2025-07-07 DIAGNOSIS — R60.0 BILATERAL LOWER EXTREMITY EDEMA: ICD-10-CM

## 2025-07-07 LAB — FUNGUS SPEC CULT: NORMAL

## 2025-07-08 ENCOUNTER — APPOINTMENT (OUTPATIENT)
Dept: LAB | Facility: CLINIC | Age: 70
End: 2025-07-08
Payer: MEDICARE

## 2025-07-08 DIAGNOSIS — R80.9 TYPE 2 DIABETES MELLITUS WITH MICROALBUMINURIA, WITH LONG-TERM CURRENT USE OF INSULIN (HCC): ICD-10-CM

## 2025-07-08 DIAGNOSIS — N18.30 STAGE 3 CHRONIC KIDNEY DISEASE, UNSPECIFIED WHETHER STAGE 3A OR 3B CKD (HCC): ICD-10-CM

## 2025-07-08 DIAGNOSIS — Z79.4 TYPE 2 DIABETES MELLITUS WITH MICROALBUMINURIA, WITH LONG-TERM CURRENT USE OF INSULIN (HCC): ICD-10-CM

## 2025-07-08 DIAGNOSIS — E11.29 TYPE 2 DIABETES MELLITUS WITH MICROALBUMINURIA, WITH LONG-TERM CURRENT USE OF INSULIN (HCC): ICD-10-CM

## 2025-07-08 LAB
ANION GAP SERPL CALCULATED.3IONS-SCNC: 10 MMOL/L (ref 4–13)
BUN SERPL-MCNC: 20 MG/DL (ref 5–25)
CALCIUM SERPL-MCNC: 9.5 MG/DL (ref 8.4–10.2)
CHLORIDE SERPL-SCNC: 101 MMOL/L (ref 96–108)
CO2 SERPL-SCNC: 28 MMOL/L (ref 21–32)
CREAT SERPL-MCNC: 1.54 MG/DL (ref 0.6–1.3)
EST. AVERAGE GLUCOSE BLD GHB EST-MCNC: 157 MG/DL
GFR SERPL CREATININE-BSD FRML MDRD: 45 ML/MIN/1.73SQ M
GLUCOSE P FAST SERPL-MCNC: 93 MG/DL (ref 65–99)
HBA1C MFR BLD: 7.1 %
POTASSIUM SERPL-SCNC: 4 MMOL/L (ref 3.5–5.3)
SODIUM SERPL-SCNC: 139 MMOL/L (ref 135–147)

## 2025-07-08 PROCEDURE — 80048 BASIC METABOLIC PNL TOTAL CA: CPT

## 2025-07-08 PROCEDURE — 36415 COLL VENOUS BLD VENIPUNCTURE: CPT

## 2025-07-08 PROCEDURE — 83036 HEMOGLOBIN GLYCOSYLATED A1C: CPT

## 2025-07-08 RX ORDER — FUROSEMIDE 40 MG/1
40 TABLET ORAL 2 TIMES DAILY
Qty: 180 TABLET | Refills: 0 | Status: SHIPPED | OUTPATIENT
Start: 2025-07-08

## 2025-07-09 ENCOUNTER — OFFICE VISIT (OUTPATIENT)
Age: 70
End: 2025-07-09
Payer: MEDICARE

## 2025-07-09 ENCOUNTER — TELEPHONE (OUTPATIENT)
Age: 70
End: 2025-07-09

## 2025-07-09 ENCOUNTER — PREP FOR PROCEDURE (OUTPATIENT)
Age: 70
End: 2025-07-09

## 2025-07-09 VITALS
HEIGHT: 71 IN | SYSTOLIC BLOOD PRESSURE: 110 MMHG | TEMPERATURE: 98.9 F | OXYGEN SATURATION: 98 % | WEIGHT: 287 LBS | DIASTOLIC BLOOD PRESSURE: 64 MMHG | RESPIRATION RATE: 16 BRPM | HEART RATE: 79 BPM | BODY MASS INDEX: 40.18 KG/M2

## 2025-07-09 DIAGNOSIS — N18.31 TYPE 2 DIABETES MELLITUS WITH STAGE 3A CHRONIC KIDNEY DISEASE, WITH LONG-TERM CURRENT USE OF INSULIN (HCC): Primary | ICD-10-CM

## 2025-07-09 DIAGNOSIS — E11.22 TYPE 2 DIABETES MELLITUS WITH STAGE 3A CHRONIC KIDNEY DISEASE, WITH LONG-TERM CURRENT USE OF INSULIN (HCC): Primary | ICD-10-CM

## 2025-07-09 DIAGNOSIS — Z12.11 COLON CANCER SCREENING: ICD-10-CM

## 2025-07-09 DIAGNOSIS — N18.30 STAGE 3 CHRONIC KIDNEY DISEASE, UNSPECIFIED WHETHER STAGE 3A OR 3B CKD (HCC): ICD-10-CM

## 2025-07-09 DIAGNOSIS — Z12.11 COLON CANCER SCREENING: Primary | ICD-10-CM

## 2025-07-09 DIAGNOSIS — Z12.11 SCREENING FOR COLON CANCER: Primary | ICD-10-CM

## 2025-07-09 DIAGNOSIS — Z79.4 TYPE 2 DIABETES MELLITUS WITH STAGE 3A CHRONIC KIDNEY DISEASE, WITH LONG-TERM CURRENT USE OF INSULIN (HCC): Primary | ICD-10-CM

## 2025-07-09 PROCEDURE — 99213 OFFICE O/P EST LOW 20 MIN: CPT | Performed by: INTERNAL MEDICINE

## 2025-07-09 PROCEDURE — G2211 COMPLEX E/M VISIT ADD ON: HCPCS | Performed by: INTERNAL MEDICINE

## 2025-07-09 NOTE — ASSESSMENT & PLAN NOTE
Lab Results   Component Value Date    EGFR 45 07/08/2025    EGFR 51 04/28/2025    EGFR 36 (L) 03/18/2025    CREATININE 1.54 (H) 07/08/2025    CREATININE 1.38 (H) 04/28/2025    CREATININE 1.98 (H) 03/18/2025     GFR stable at baseline 45 creatinine 1.54 on Lasix 40 mg daily avoid nephrotoxic drug will monitor closely with BMP

## 2025-07-09 NOTE — ASSESSMENT & PLAN NOTE
Lab Results   Component Value Date    HGBA1C 7.1 (H) 07/08/2025   Same is under chronic kidney disease diabetes management same as under type 2 diabetes with hyperglycemia  Hype per glycemia resolved A1c now 7.1    GFR creatinine stable at baseline reviewed as follows  Agree continue main medication as follow    Continue diabetic diet     Agree continue manage medications for     Novolin Humulin 70/30 20 units in the morning and 20 5 in the evening  Hypoglycemia protocol in place      Lab Results   Component Value Date    SODIUM 139 07/08/2025    K 4.0 07/08/2025     07/08/2025    CO2 28 07/08/2025    BUN 20 07/08/2025    CREATININE 1.54 (H) 07/08/2025    GLUC 189 (H) 03/18/2025    CALCIUM 9.5 07/08/2025

## 2025-07-09 NOTE — TELEPHONE ENCOUNTER
07/09/25  Screened by: Marlin Martin MA    Referring Provider Dr. Glass    Pre- Screening:     There is no height or weight on file to calculate BMI.  Has patient been referred for a routine screening Colonoscopy? yes  Is the patient between 45-75 years old? yes      Previous Colonoscopy yes   If yes:    Date: about 10 years ago    Facility: doesn't remember    Reason:       SCHEDULING STAFF: If the patient is between 45yrs-49yrs, please advise patient to confirm benefits/coverage with their insurance company for a routine screening colonoscopy, some insurance carriers will only cover at 50yrs or older. If the patient is over 75years old, please schedule an office visit.     Does the patient want to see a Gastroenterologist prior to their procedure OR are they having any GI symptoms? no    Has the patient been hospitalized or had abdominal surgery in the past 6 months? no    Does the patient use supplemental oxygen? no    Does the patient take Coumadin, Lovenox, Plavix, Elliquis, Xarelto, or other blood thinning medication? no    Has the patient had a stroke, cardiac event, or stent placed in the past year? no    SCHEDULING STAFF: If patient answers NO to above questions, then schedule procedure. If patient answers YES to above questions, then schedule office appointment.     If patient is between 45yrs - 49yrs, please advise patient that we will have to confirm benefits & coverage with their insurance company for a routine screening colonoscopy.

## 2025-07-09 NOTE — PROGRESS NOTES
Name: Nadeem Purdy Jr.      : 1955      MRN: [id number]  Encounter Provider: Jluis Glass MD  Encounter Date: 2025   [facility]  :  Assessment & Plan  Colon cancer screening    Orders:  •  Ambulatory Referral to Gastroenterology; Future    Type 2 diabetes mellitus with stage 3a chronic kidney disease, with long-term current use of insulin (HCC)    Lab Results   Component Value Date    HGBA1C 7.1 (H) 2025   Same is under chronic kidney disease diabetes management same as under type 2 diabetes with hyperglycemia  Hype per glycemia resolved A1c now 7.1    GFR creatinine stable at baseline reviewed as follows  Agree continue main medication as follow    Continue diabetic diet     Agree continue manage medications for     Novolin Humulin 70/30 20 units in the morning and 20 5 in the evening  Hypoglycemia protocol in place      Lab Results   Component Value Date    SODIUM 139 2025    K 4.0 2025     2025    CO2 28 2025    BUN 20 2025    CREATININE 1.54 (H) 2025    GLUC 189 (H) 2025    CALCIUM 9.5 2025            Stage 3 chronic kidney disease, unspecified whether stage 3a or 3b CKD ([facility])  Lab Results   Component Value Date    EGFR 45 2025    EGFR 51 2025    EGFR 36 (L) 2025    CREATININE 1.54 (H) 2025    CREATININE 1.38 (H) 2025    CREATININE 1.98 (H) 2025     GFR stable at baseline 45 creatinine 1.54 on Lasix 40 mg daily avoid nephrotoxic drug will monitor closely with BMP              History of Present Illness   Came in follow-up chronic medical condition list of his diagnosis patient's leg edema improving cellulitis improving followed at the wound center much improved with antifungal cream cannot give any antifungal antibiotic due to the chronic kidney disease no chest pain difficulty breathing overall health improved since the last visit for details refer to  assessment plan visit diagnosis labs reviewed      Review of Systems   Constitutional:  Positive for activity change. Negative for appetite change, chills, diaphoresis, fatigue, fever and unexpected weight change.   HENT:  Negative for congestion, dental problem, drooling, ear discharge, ear pain, facial swelling, hearing loss, mouth sores, nosebleeds, postnasal drip, rhinorrhea, sinus pressure, sneezing, sore throat, tinnitus, trouble swallowing and voice change.    Eyes:  Negative for photophobia, pain, discharge, redness, itching and visual disturbance.   Respiratory:  Negative for apnea, cough, choking, chest tightness, shortness of breath, wheezing and stridor.    Cardiovascular:  Positive for leg swelling. Negative for chest pain and palpitations.   Gastrointestinal:  Negative for abdominal distention, abdominal pain, anal bleeding, blood in stool, constipation, diarrhea, nausea, rectal pain and vomiting.   Endocrine: Negative for cold intolerance, heat intolerance, polydipsia, polyphagia and polyuria.   Genitourinary:  Negative for decreased urine volume, difficulty urinating, dysuria, enuresis, flank pain, frequency, genital sores, hematuria and urgency.   Musculoskeletal:  Positive for arthralgias and back pain. Negative for gait problem, joint swelling, myalgias, neck pain and neck stiffness.   Skin:  Positive for color change. Negative for pallor, rash and wound.   Allergic/Immunologic: Negative.  Negative for environmental allergies, food allergies and immunocompromised state.   Neurological:  Negative for dizziness, tremors, seizures, syncope, facial asymmetry, speech difficulty, weakness, light-headedness, numbness and headaches.   Psychiatric/Behavioral:  Negative for agitation, behavioral problems, confusion, decreased concentration, dysphoric mood, hallucinations, self-injury, sleep disturbance and suicidal ideas. The patient is not nervous/anxious and is not hyperactive.        Objective   BP  "110/64   Pulse 79   Temp 98.9 °F (37.2 °C)   Resp 16   Ht 5' 11\" (1.803 m)   Wt 130 kg (287 lb)   SpO2 98%   BMI 40.03 kg/m²      Physical Exam  Vitals and nursing note reviewed.   Constitutional:       General: He is not in acute distress.     Appearance: He is well-developed. He is obese. He is not ill-appearing, toxic-appearing or diaphoretic.   HENT:      Head: Normocephalic and atraumatic.      Right Ear: External ear normal.      Left Ear: External ear normal.      Nose: Nose normal.      Mouth/Throat:      Pharynx: No oropharyngeal exudate.     Eyes:      General: Lids are normal. Lids are everted, no foreign bodies appreciated. No scleral icterus.        Right eye: No discharge.         Left eye: No discharge.      Conjunctiva/sclera: Conjunctivae normal.      Pupils: Pupils are equal, round, and reactive to light.     Neck:      Thyroid: No thyromegaly.      Vascular: Normal carotid pulses. No carotid bruit, hepatojugular reflux or JVD.      Trachea: No tracheal tenderness or tracheal deviation.     Cardiovascular:      Rate and Rhythm: Normal rate and regular rhythm.      Pulses: Normal pulses.      Heart sounds: Normal heart sounds. No murmur heard.     No friction rub. No gallop.   Pulmonary:      Effort: Pulmonary effort is normal. No respiratory distress.      Breath sounds: Normal breath sounds. No stridor. No wheezing or rales.   Chest:      Chest wall: No tenderness.   Abdominal:      General: Bowel sounds are normal. There is no distension.      Palpations: Abdomen is soft. There is no mass.      Tenderness: There is no abdominal tenderness. There is no guarding or rebound.     Musculoskeletal:         General: No tenderness or deformity. Normal range of motion.      Cervical back: Normal range of motion and neck supple. No edema, erythema or rigidity. No spinous process tenderness or muscular tenderness. Normal range of motion.      Right lower leg: Edema present.      Left lower leg: Edema " present.   Lymphadenopathy:      Head:      Right side of head: No submental, submandibular, tonsillar, preauricular or posterior auricular adenopathy.      Left side of head: No submental, submandibular, tonsillar, preauricular, posterior auricular or occipital adenopathy.      Cervical: No cervical adenopathy.      Right cervical: No superficial, deep or posterior cervical adenopathy.     Left cervical: No superficial, deep or posterior cervical adenopathy.      Upper Body:      Right upper body: No pectoral adenopathy.      Left upper body: No pectoral adenopathy.     Skin:     General: Skin is warm and dry.      Coloration: Skin is not pale.      Findings: No erythema or rash.     Neurological:      General: No focal deficit present.      Mental Status: He is alert and oriented to person, place, and time.      Cranial Nerves: No cranial nerve deficit.      Sensory: No sensory deficit.      Motor: No tremor, abnormal muscle tone or seizure activity.      Coordination: Coordination normal.      Gait: Gait normal.      Deep Tendon Reflexes: Reflexes are normal and symmetric. Reflexes normal.     Psychiatric:         Behavior: Behavior normal.         Thought Content: Thought content normal.         Judgment: Judgment normal.

## 2025-07-11 ENCOUNTER — OFFICE VISIT (OUTPATIENT)
Facility: HOSPITAL | Age: 70
End: 2025-07-11
Payer: MEDICARE

## 2025-07-11 VITALS
TEMPERATURE: 98.6 F | DIASTOLIC BLOOD PRESSURE: 79 MMHG | HEART RATE: 81 BPM | SYSTOLIC BLOOD PRESSURE: 168 MMHG | RESPIRATION RATE: 18 BRPM

## 2025-07-11 DIAGNOSIS — E66.813 CLASS 3 SEVERE OBESITY DUE TO EXCESS CALORIES WITH SERIOUS COMORBIDITY AND BODY MASS INDEX (BMI) OF 40.0 TO 44.9 IN ADULT: ICD-10-CM

## 2025-07-11 DIAGNOSIS — I87.2 VENOUS STASIS DERMATITIS OF BOTH LOWER EXTREMITIES: Primary | ICD-10-CM

## 2025-07-11 DIAGNOSIS — Z79.4 TYPE 2 DIABETES MELLITUS WITH OTHER SKIN COMPLICATION, WITH LONG-TERM CURRENT USE OF INSULIN (HCC): ICD-10-CM

## 2025-07-11 DIAGNOSIS — E11.628 TYPE 2 DIABETES MELLITUS WITH OTHER SKIN COMPLICATION, WITH LONG-TERM CURRENT USE OF INSULIN (HCC): ICD-10-CM

## 2025-07-11 DIAGNOSIS — I89.0 LYMPHEDEMA OF BOTH LOWER EXTREMITIES: ICD-10-CM

## 2025-07-11 DIAGNOSIS — I87.2 VENOUS STASIS DERMATITIS: ICD-10-CM

## 2025-07-11 PROCEDURE — 99214 OFFICE O/P EST MOD 30 MIN: CPT

## 2025-07-11 PROCEDURE — 99213 OFFICE O/P EST LOW 20 MIN: CPT

## 2025-07-11 RX ORDER — TRIAMCINOLONE ACETONIDE 1 MG/G
CREAM TOPICAL DAILY
Qty: 45 G | Refills: 1 | Status: SHIPPED | OUTPATIENT
Start: 2025-07-11 | End: 2025-07-18 | Stop reason: SDUPTHER

## 2025-07-11 NOTE — PROGRESS NOTES
Patient ID: Nadeem Purdy Jr. is a 69 y.o. male Date of Birth 1955       Chief Complaint   Patient presents with    Follow Up Wound Care Visit     Bilateral lower legs       Allergies:  Patient has no known allergies.    Diagnosis:      Diagnosis ICD-10-CM Associated Orders   1. Venous stasis dermatitis of both lower extremities  I87.2 Wound cleansing and dressings      2. Venous stasis dermatitis  I87.2 triamcinolone (KENALOG) 0.1 % cream      3. Type 2 diabetes mellitus with other skin complication, with long-term current use of insulin ([facility])  E11.628     Z79.4       4. Lymphedema of both lower extremities  I89.0       5. Class 3 severe obesity due to excess calories with serious comorbidity and body mass index (BMI) of 40.0 to 44.9 in adult  E66.813     Z68.41               Assessment & Plan:  B/l lower extremities with increased open aspects, weeping and noted excoriations from patient scratching.  Given that patient has underwent 2 weeks of topical antifungals with minimal improvement suspect this may not be related to a fungal dermatitis rather than underlying venous stasis dermatitis due to patient's diagnosis of lymphedema of both lower extremities.  Will recommend to discontinue topical antifungal and begin Kenalog cream to the skin daily.  Recommend patient to keep areas covered with ABD pads secured with Francisco as keeping the wounds open to air is an infection risk.  Reviewed in office ABIs which were obtained in June 2025 and are acceptable. Will recommend to begin gentle compression of Tubigrip bilaterally.  Prescription sent to patient's pharmacy.  See wound orders below  Wounds are not showing any clinical s/s of infection  Elevate lower extremities and avoid prolonged standing/keeping legs in dependent position for edema management.   Counseled patient on the importance of tight glycemic control and adequate protein intake (3-4 servings per day) to promote wound healing.   A1C results reviewed  with the patient today.   Keep wounds covered at all times, and do not leave wounds open to air as this is an infection risk. No harsh cleansers such as hydrogen peroxide, antibacterial soap or alcohol. Do not submerge wound or soak wound in any body of water, such as bath tub, pool or ocean/lake, etc.  Follow-up in 1 week(s). Call sooner with questions or concerns or any signs of infection such as redness, swelling, increased/purulent drainage, fever or chills, and increased pain.  Patient verbalized understanding and agrees with plan of care.         Subjective:   7/11/25: Patient presents to the wound care center for follow-up visit of bilateral lower extremity wounds.  Patient states that he feels that the areas have opened more and there is increased weeping.  Patient states that he has been using the topical antifungal as prescribed but still endorses significant pruritus especially at night.  Patient states that he is able to resist the urge to scratch himself during the day but he scratches during the night and wakes up with blood on his sheets.  Patient states that he has not been keeping the areas covered as he felt leaving them open to air would be helpful. Patient offers no other wound related complaints, no increased pain. Has not been wearing compression of any kind. No fever or chills.        The following portions of the patient's history were reviewed and updated as appropriate:   Problem List[1]  Past Medical History[2]  Past Surgical History[3]  Family History[4]   Social History[5]   Current Medications[6]    Review of Systems   Constitutional:  Negative for chills and fever.   HENT:  Negative for congestion and sneezing.    Respiratory:  Negative for cough and shortness of breath.    Cardiovascular:  Positive for leg swelling.   Skin:  Positive for wound.   Psychiatric/Behavioral:  Negative for agitation.        Objective:  /79   Pulse 81   Temp 98.6 °F (37 °C)   Resp 18          Physical Exam  Constitutional:       General: He is awake. He is not in acute distress.     Appearance: He is obese. He is not ill-appearing, toxic-appearing or diaphoretic.   HENT:      Head: Normocephalic and atraumatic.      Right Ear: External ear normal.      Left Ear: External ear normal.     Eyes:      Conjunctiva/sclera: Conjunctivae normal.       Cardiovascular:      Pulses:           Dorsalis pedis pulses are 1+ on the right side and 1+ on the left side.   Pulmonary:      Effort: Pulmonary effort is normal. No respiratory distress.     Musculoskeletal:      Right lower leg: Edema present.      Left lower leg: Edema present.     Skin:     General: Skin is warm and dry.      Findings: Wound present.      Comments: 1. B/l lower extremities with partial thickness skin loss and self-inflicted excoriations from patient scratching- suspect venous dermatitis.  Noted hemosiderin staining present to the lower extremities. No devitalized tissue present. No purulent drainage or malodor. No erythema, warmth or lymphangitic streaking to suggest cellulitis. Refer to wound assessment for additional details.     Neurological:      Mental Status: He is alert.     Psychiatric:         Behavior: Behavior is cooperative.         Wound 06/19/25 Irritant Contact Dermatitis Leg Right;Lower (Active)   Wound Image    07/11/25 0828   Wound Description Epithelialization;Pink;Dry;Drainage 07/11/25 0833   Non-staged Wound Description Partial thickness 07/11/25 0833   Wound Length (cm) 11 cm 07/11/25 0833   Wound Width (cm) 21 cm 07/11/25 0833   Wound Depth (cm) 0.1 cm 07/11/25 0833   Wound Surface Area (cm^2) 181.43 cm^2 07/11/25 0833   Wound Volume (cm^3) 12.095 cm^3 07/11/25 0833   Calculated Wound Volume (cm^3) 23.1 cm^3 07/11/25 0833   Change in Wound Size % 83.53 07/11/25 0833   Drainage Amount Moderate 07/11/25 0833   Drainage Description Bloody;Serosanguineous;Serous 07/11/25 0833   Jodee-wound Assessment  Dry;Scaly;Fragile;Edema;Excoriated 07/11/25 0833   Treatments Cleansed 06/26/25 0835   Dressing Status Other (Comment) 07/11/25 0833       Wound 06/19/25 Irritant Contact Dermatitis Leg Left;Lower (Active)   Wound Image    07/11/25 0829   Wound Description Epithelialization;Pink 07/11/25 0830   Non-staged Wound Description Partial thickness 07/11/25 0830   Wound Length (cm) 11.2 cm 07/11/25 0830   Wound Width (cm) 14 cm 07/11/25 0830   Wound Depth (cm) 0.1 cm 07/11/25 0830   Wound Surface Area (cm^2) 123.15 cm^2 07/11/25 0830   Wound Volume (cm^3) 8.21 cm^3 07/11/25 0830   Calculated Wound Volume (cm^3) 15.68 cm^3 07/11/25 0830   Change in Wound Size % 79.9 07/11/25 0830   Drainage Amount Small 07/11/25 0830   Drainage Description Serous;Yellow;Bloody 07/11/25 0830   Jodee-wound Assessment Dry;Scaly;Edema;Fragile;Excoriated 07/11/25 0830   Dressing Status Other (Comment) 07/11/25 0830         Results from last 6 Months   Lab Units 03/03/25  0319   WOUND CULTURE  Few Colonies of Staphylococcus aureus*       Lab Results   Component Value Date    HGBA1C 7.1 (H) 07/08/2025       Wound Instructions:  Orders Placed This Encounter   Procedures    Wound cleansing and dressings     Right and left Leg :     Wash your hands with soap and water.  Remove old dressing, discard into plastic bag and place in trash.    Cleanse the wound with mild soap--Dove gentle soap, rinse well and pat dry prior to applying a clean dressing. Do not use tissue or cotton balls.   Do not scrub the wound. Pat dry using gauze.  Shower yes ---do not soak in the tub or body of water     Stop using the Ketoconazole Cream   Instead apply Steroid cream directly to the open areas of your legs. This can be picked up at your pharmacy.   Cover with ABD Pads and rolled gauze  Change dressing once daily  Apply Spandagrip to both legs.     Compression Stocking  Remove compression stockings every night HS and re-apply first thing qAM. Follow daily skin care as  "instructed.  Avoid prolonged standing in one place.  Elevate leg(s) above the level of the heart when sitting or as much as possible.            Avoid prolonged standing in one place.     Elevate leg(s) above the level of the heart when sitting or as much as possible.            Follow up in 1 week at the [facility]     Standing Status:   Future     Expiration Date:   7/18/2025           JIMMY Pillai, JAKOB, LEEANN    Portions of the record may have been created with voice recognition software. Occasional wrong word or \"sound alike\" substitutions may have occurred due to the inherent limitations of voice recognition software. Read the chart carefully and recognize, using context, where substitutions have occurred.          Total time spent today:    Total time (face-to-face and non-face-to-face) spent on today's visit was 13 minutes. This includes preparation for the visits (i.e. reviewing test results from date recent hospitalizations, ER/Urgent Care/primary care visits and recent consultant office visits), performance of a medically appropriate history and examination, and orders for medications or testing.        [1]   Patient Active Problem List  Diagnosis    Type 2 diabetes mellitus with microalbuminuria, with long-term current use of insulin (HCC)    Essential hypertension    Hypercholesteremia    Class 3 severe obesity due to excess calories with serious comorbidity and body mass index (BMI) of 40.0 to 44.9 in adult    Steatohepatitis    Allergic rhinitis    Vitamin D deficiency    Persistent proteinuria    Tachycardia    Stage 3 chronic kidney disease, unspecified whether stage 3a or 3b CKD (HCC)    Lymphedema of both lower extremities    Rash    Numbness of left foot    Cortical age-related cataract of both eyes    Type 2 diabetes mellitus with hyperglycemia, with long-term current use of insulin (HCC)    Resolving cellulitis of left lower extremity    Right knee injury, subsequent encounter    " Osteochondral lesion    Primary osteoarthritis of one knee, right    Lymphedema of right lower extremity    Other tear of medial meniscus, current injury, right knee, initial encounter    Hypertensive kidney disease with chronic kidney disease stage III (HCC)    Acute renal failure superimposed on stage 3a chronic kidney disease (HCC)    Alcohol abuse    Hyponatremia    Metabolic acidosis    Urinary retention    Atrial fibrillation with RVR (HCC)    Hypomagnesemia    Streptococcal bacteremia    Chronic anemia    Diabetic nephropathy associated with type 2 diabetes mellitus (HCC)    Sepsis without acute organ dysfunction (HCC)    Diarrhea    SIRS (systemic inflammatory response syndrome) (HCC)    Type 2 diabetes mellitus with diabetic chronic kidney disease (HCC)    Nonhealing ulcer of right lower extremity with fat layer exposed (HCC)   [2]   Past Medical History:  Diagnosis Date    Allergic     All My Life    Clostridium difficile diarrhea 02/27/2025    Diabetes mellitus (HCC) 20 years ago?    Elevated troponin 02/27/2025    Group A strep bacteremia 02/27/2025    Hypertension     Obesity     all my   [3]   Past Surgical History:  Procedure Laterality Date    APPENDECTOMY  1970    CATARACT EXTRACTION, BILATERAL Bilateral    [4] No family history on file.  [5]   Social History  Socioeconomic History    Marital status: /Civil Union   Tobacco Use    Smoking status: Former     Current packs/day: 0.00     Types: Cigarettes    Smokeless tobacco: Never   Vaping Use    Vaping status: Never Used   Substance and Sexual Activity    Alcohol use: Yes     Alcohol/week: 4.0 standard drinks of alcohol     Types: 4 Shots of liquor per week    Drug use: Never     Social Drivers of Health     Financial Resource Strain: Low Risk  (9/28/2023)    Overall Financial Resource Strain (CARDIA)     Difficulty of Paying Living Expenses: Not hard at all   Food Insecurity: No Food Insecurity (3/13/2025)    Nursing - Inadequate Food Risk  Classification     Worried About Running Out of Food in the Last Year: Never true     Ran Out of Food in the Last Year: Never true     Ran Out of Food in the Last Year: Never true   Transportation Needs: No Transportation Needs (3/13/2025)    Nursing - Transportation Risk Classification     Lack of Transportation: No   Intimate Partner Violence: Unknown (3/13/2025)    Nursing IPS     Physically Hurt by Someone: No     Hurt or Threatened by Someone: No   Housing Stability: Unknown (3/13/2025)    Nursing: Inadequate Housing Risk Classification     Unable to Pay for Housing in the Last Year: No     Has Housing: No   [6]   Current Outpatient Medications:     triamcinolone (KENALOG) 0.1 % cream, Apply topically in the morning, Disp: 45 g, Rfl: 1    amLODIPine (NORVASC) 10 mg tablet, TAKE 1 TABLET BY MOUTH EVERY DAY, Disp: 90 tablet, Rfl: 1    aspirin (ECOTRIN LOW STRENGTH) 81 mg EC tablet, Take 81 mg by mouth in the morning., Disp: , Rfl:     atorvastatin (LIPITOR) 40 mg tablet, TAKE 1 TABLET BY MOUTH EVERY DAY, Disp: 90 tablet, Rfl: 1    Azelastine HCl 137 MCG/SPRAY SOLN, 1 puff each nostril twice a day, Disp: 30 mL, Rfl: 1    BD Pen Needle Katelyn 2nd Gen 32G X 4 MM MISC, INJECT AS DIRECTED 2 (TWO) TIMES A DAY, Disp: 100 each, Rfl: 1    Cholecalciferol (VITAMIN D3) 2000 units TABS, Take 2,000 Units by mouth in the morning., Disp: , Rfl:     furosemide (LASIX) 40 mg tablet, Take 1 tablet (40 mg total) by mouth 2 (two) times a day, Disp: 180 tablet, Rfl: 0    gabapentin (NEURONTIN) 100 mg capsule, Take 2 capsules (200 mg total) by mouth daily at bedtime, Disp: 180 capsule, Rfl: 1    Glucose Blood (ONETOUCH ULTRA BLUE VI), Test 1 Units in the morning and 1 Units in the evening and 1 Units before bedtime., Disp: , Rfl:     glucose blood (OneTouch Ultra) test strip, Test 3 times daily, Disp: 300 each, Rfl: 5    insulin isophane-insulin regular (HumuLIN 70/30 KwikPen) 100 units/mL injection pen, 20u in the am and 25u in the pm,  Disp: 15 mL, Rfl: 5    Krill Oil 1000 MG CAPS, Take 1 capsule by mouth in the morning., Disp: , Rfl:     losartan (COZAAR) 100 MG tablet, Take 1 tablet (100 mg total) by mouth daily, Disp: 90 tablet, Rfl: 2    metoprolol tartrate (LOPRESSOR) 50 mg tablet, Take 1 tablet (50 mg total) by mouth every 12 (twelve) hours, Disp: , Rfl:     polyethylene glycol (GOLYTELY) 4000 mL solution, Take 4,000 mL by mouth once for 1 dose, Disp: 4000 mL, Rfl: 0    potassium chloride (Klor-Con M20) 20 mEq tablet, Take 1 tablet (20 mEq total) by mouth daily, Disp: 90 tablet, Rfl: 1

## 2025-07-11 NOTE — PATIENT INSTRUCTIONS
Orders Placed This Encounter   Procedures    Wound cleansing and dressings     Right and left Leg :     Wash your hands with soap and water.  Remove old dressing, discard into plastic bag and place in trash.    Cleanse the wound with mild soap--Dove gentle soap, rinse well and pat dry prior to applying a clean dressing. Do not use tissue or cotton balls.   Do not scrub the wound. Pat dry using gauze.  Shower yes ---do not soak in the tub or body of water     Stop using the Ketoconazole Cream   Instead apply Steroid cream directly to the open areas of your legs. This can be picked up at your pharmacy.   Cover with ABD Pads and rolled gauze  Change dressing once daily  Apply Spandagrip to both legs.     Compression Stocking  Remove compression stockings every night HS and re-apply first thing qAM. Follow daily skin care as instructed.  Avoid prolonged standing in one place.  Elevate leg(s) above the level of the heart when sitting or as much as possible.            Avoid prolonged standing in one place.     Elevate leg(s) above the level of the heart when sitting or as much as possible.            Follow up in 1 week at the [facility]     Standing Status:   Future     Expiration Date:   7/18/2025

## 2025-07-11 NOTE — PROGRESS NOTES
Wound Procedure Treatment    Performed by: Azul Diaz RN  Authorized by: JIMMY Villatoro  Associated wounds:   Wound 06/19/25 Irritant Contact Dermatitis Leg Left;Lower  Wound 06/19/25 Irritant Contact Dermatitis Leg Right;Lower    Wound cleansed with:  Soap and water   Applied to periwound:  Steroid cream / ointment   Applied secondary dressing:  ABD   Dressing secured with:  Kerlix, Tape, Elastic tubular stocking and Size G   Comments:  Betamethasone        Pelvic exam under anesthesia, dilation and curettage under ultrasound guidance

## 2025-07-14 LAB — FUNGUS SPEC CULT: NORMAL

## 2025-07-18 ENCOUNTER — OFFICE VISIT (OUTPATIENT)
Facility: HOSPITAL | Age: 70
End: 2025-07-18
Payer: MEDICARE

## 2025-07-18 VITALS
SYSTOLIC BLOOD PRESSURE: 139 MMHG | HEART RATE: 67 BPM | TEMPERATURE: 97.7 F | DIASTOLIC BLOOD PRESSURE: 63 MMHG | RESPIRATION RATE: 18 BRPM

## 2025-07-18 DIAGNOSIS — S81.801S WOUND OF RIGHT LOWER EXTREMITY, SEQUELA: ICD-10-CM

## 2025-07-18 DIAGNOSIS — S81.802S WOUND OF LEFT LEG, SEQUELA: ICD-10-CM

## 2025-07-18 DIAGNOSIS — I87.2 VENOUS STASIS DERMATITIS OF BOTH LOWER EXTREMITIES: Primary | ICD-10-CM

## 2025-07-18 DIAGNOSIS — T14.8XXA SCRATCHING: ICD-10-CM

## 2025-07-18 DIAGNOSIS — B36.9 FUNGAL DERMATITIS: ICD-10-CM

## 2025-07-18 PROCEDURE — 99213 OFFICE O/P EST LOW 20 MIN: CPT | Performed by: STUDENT IN AN ORGANIZED HEALTH CARE EDUCATION/TRAINING PROGRAM

## 2025-07-18 RX ORDER — CLOTRIMAZOLE 1 %
CREAM (GRAM) TOPICAL 2 TIMES DAILY
Qty: 60 G | Refills: 1 | Status: SHIPPED | OUTPATIENT
Start: 2025-07-18 | End: 2025-07-25

## 2025-07-18 RX ORDER — LIDOCAINE 40 MG/G
CREAM TOPICAL ONCE
Status: COMPLETED | OUTPATIENT
Start: 2025-07-18 | End: 2025-07-18

## 2025-07-18 RX ORDER — TRIAMCINOLONE ACETONIDE 1 MG/G
CREAM TOPICAL 2 TIMES DAILY
Qty: 80 G | Refills: 1 | Status: SHIPPED | OUTPATIENT
Start: 2025-07-18 | End: 2025-07-25 | Stop reason: SDUPTHER

## 2025-07-18 RX ADMIN — LIDOCAINE: 40 CREAM TOPICAL at 08:37

## 2025-07-18 NOTE — PROGRESS NOTES
Patient ID: Nadeem Purdy Jr. is a 69 y.o. male Date of Birth 1955       Chief Complaint   Patient presents with    Follow Up Wound Care Visit     B/L lower legs       Allergies:  Patient has no known allergies.    Diagnosis:   Diagnosis ICD-10-CM Associated Orders   1. Venous stasis dermatitis of both lower extremities  I87.2 lidocaine (LMX) 4 % cream     Wound cleansing and dressings     Wound Procedure Treatment     triamcinolone (KENALOG) 0.1 % cream      2. Fungal dermatitis  B36.9 clotrimazole (LOTRIMIN) 1 % cream      3. Scratching  T14.8XXA       4. Wound of right lower extremity, sequela  S81.801S       5. Wound of left leg, sequela  S81.802S            Assessment  & Plan:    F/u bilateral lower extremity ulcerations in setting of venous stasis dermatitis with likely superimposed fungal component. There has been significant improvement in appearance of rash and areas of weeping. There are multiple open wounds present (likely from manual trauma from scratching). Small serous drainage. Area of dry scaling skin on L dorsal foot that appears fungal in nature.   Recommend twice daily application of topical steroid ointment to bilateral legs. Bacitracin can be applied to open wounds. Topical fungal ointment can be applied to scaling skin of L dorsal foot. Continue with Spandagrip for light compression. Would benefit from daily use of compression stockings when healed.   Instructed to monitor for any changes including redness or swelling surrounding the wound, increased drainage or pain as well as fevers or chills.    F/u in 1 week. Instructed to call if any questions or concerns arise in meantime.            Subjective:   07/18/25: Pt presents for f/u bilateral lower extremity ulcerations. Last week was changed to topical steroid ointment which he has been applying. Still reports some itching and scratching especially at night during sleep.           The following portions of the patient's history were  reviewed and updated as appropriate:   Problem List[1]  Past Medical History[2]  Past Surgical History[3]  Family History[4]  Social History[5]  Current Medications[6]    Review of Systems      Objective:  /63   Pulse 67   Temp 97.7 °F (36.5 °C)   Resp 18         Physical Exam  Constitutional:       General: He is awake. He is not in acute distress.     Appearance: He is obese. He is not ill-appearing, toxic-appearing or diaphoretic.   HENT:      Head: Normocephalic and atraumatic.      Right Ear: External ear normal.      Left Ear: External ear normal.     Eyes:      Conjunctiva/sclera: Conjunctivae normal.       Cardiovascular:      Pulses:           Dorsalis pedis pulses are 1+ on the right side and 1+ on the left side.   Pulmonary:      Effort: Pulmonary effort is normal. No respiratory distress.     Musculoskeletal:      Right lower leg: Edema present.      Left lower leg: Edema present.     Skin:     General: Skin is warm and dry.      Findings: Wound present.      Comments: Significant improvement in the appearance of the lower extremity skin.  Rash has significantly improved.  Continues to have scattered areas of open wounds related to scratching that are partial-thickness with small drainage.  There is some dried scaling crusted skin on the left dorsal foot more consistent with fungal dermatitis.     Neurological:      Mental Status: He is alert.     Psychiatric:         Behavior: Behavior is cooperative.             Wound 06/19/25 Irritant Contact Dermatitis Leg Right;Lower (Active)   Wound Image    07/18/25 0827   Wound Description Epithelialization;Pink;Dry;Drainage;White 07/18/25 0829   Non-staged Wound Description Full thickness 07/18/25 0829   Wound Length (cm) 15.7 cm 07/18/25 0829   Wound Width (cm) 16.5 cm 07/18/25 0829   Wound Depth (cm) 0.1 cm 07/18/25 0829   Wound Surface Area (cm^2) 203.46 cm^2 07/18/25 0829   Wound Volume (cm^3) 13.564 cm^3 07/18/25 0829   Calculated Wound Volume  (cm^3) 25.91 cm^3 07/18/25 0829   Change in Wound Size % 81.53 07/18/25 0829   Drainage Amount MATEUS 07/18/25 0829   Drainage Description MATEUS 07/18/25 0829   Jodee-wound Assessment Dry;Scaly;Edema;Excoriated;Erythema 07/18/25 0829   Treatments Cleansed 06/26/25 0835   Dressing Status Other (Comment) 07/18/25 0829       Wound 06/19/25 Irritant Contact Dermatitis Leg Left;Lower (Active)   Wound Image    07/18/25 0826   Wound Description Epithelialization;Pink 07/18/25 0831   Non-staged Wound Description Full thickness 07/18/25 0831   Wound Length (cm) 0.2 cm 07/18/25 0831   Wound Width (cm) 0.2 cm 07/18/25 0831   Wound Depth (cm) 0.1 cm 07/18/25 0831   Wound Surface Area (cm^2) 0.03 cm^2 07/18/25 0831   Wound Volume (cm^3) 0.002 cm^3 07/18/25 0831   Calculated Wound Volume (cm^3) 0 cm^3 07/18/25 0831   Change in Wound Size % 100 07/18/25 0831   Drainage Amount Scant 07/18/25 0831   Drainage Description Tan 07/18/25 0831   Jodee-wound Assessment Dry;Scaly;Excoriated 07/18/25 0831   Dressing Status Other (Comment) 07/18/25 0831               Results from last 6 Months   Lab Units 03/03/25  0319   WOUND CULTURE  Few Colonies of Staphylococcus aureus*           Wound Instructions:  Orders Placed This Encounter   Procedures    Wound cleansing and dressings     Right and left Leg:     Wash your hands with soap and water.  Remove old dressing, discard into plastic bag and place in trash.    Cleanse the wound with mild soap--Dove gentle soap, rinse well and pat dry prior to applying a clean dressing. Do not use tissue or cotton balls.   Do not scrub the wound. Pat dry using gauze.  Shower yes ---do not soak in the tub or body of water     Apply Steroid cream to your legs, avoiding the open areas. You can apply this twice a day.   It's recommended that you apply bacitracin to the open areas on your right lower leg. You can pick this up at the pharmacy, it's an over-the-counter medication.   Cover with ABD Pads and rolled  "gauze  Change dressing once daily.  Apply Spandagrip to both legs.       Compression Stocking: Size G  Remove compression stockings every night and re-apply first thing in the morning. Follow daily skin care as instructed.  Avoid prolonged standing in one place.  Elevate leg(s) above the level of the heart when sitting or as much as possible.         Avoid prolonged standing in one place.     Elevate leg(s) above the level of the heart when sitting or as much as possible.        *A refill for the topical steroid and the fungal ointment was sent to your pharmacy.  Continue using the steroid ointment, until next appointment.       Follow up in 1 week at the [facility].     Standing Status:   Future     Expiration Date:   7/25/2025    Wound Procedure Treatment     This order was created via procedure documentation       Cally Helms, PA-C          Portions of the record may have been created with voice recognition software. Occasional wrong word or \"sound alike\" substitutions may have occurred due to the inherent limitations of voice recognition software. Read the chart carefully and recognize, using context, where substitutions have occurred.         [1]   Patient Active Problem List  Diagnosis    Type 2 diabetes mellitus with microalbuminuria, with long-term current use of insulin (HCC)    Essential hypertension    Hypercholesteremia    Class 3 severe obesity due to excess calories with serious comorbidity and body mass index (BMI) of 40.0 to 44.9 in adult    Steatohepatitis    Allergic rhinitis    Vitamin D deficiency    Persistent proteinuria    Tachycardia    Stage 3 chronic kidney disease, unspecified whether stage 3a or 3b CKD (HCC)    Lymphedema of both lower extremities    Rash    Numbness of left foot    Cortical age-related cataract of both eyes    Type 2 diabetes mellitus with hyperglycemia, with long-term current use of insulin (HCC)    Resolving cellulitis of left lower extremity    Right knee injury, " subsequent encounter    Osteochondral lesion    Primary osteoarthritis of one knee, right    Lymphedema of right lower extremity    Other tear of medial meniscus, current injury, right knee, initial encounter    Hypertensive kidney disease with chronic kidney disease stage III (HCC)    Acute renal failure superimposed on stage 3a chronic kidney disease (HCC)    Alcohol abuse    Hyponatremia    Metabolic acidosis    Urinary retention    Atrial fibrillation with RVR (HCC)    Hypomagnesemia    Streptococcal bacteremia    Chronic anemia    Diabetic nephropathy associated with type 2 diabetes mellitus (HCC)    Sepsis without acute organ dysfunction (HCC)    Diarrhea    SIRS (systemic inflammatory response syndrome) (HCC)    Type 2 diabetes mellitus with diabetic chronic kidney disease (HCC)    Nonhealing ulcer of right lower extremity with fat layer exposed (HCC)   [2]   Past Medical History:  Diagnosis Date    Allergic     All My Life    Clostridium difficile diarrhea 02/27/2025    Diabetes mellitus (HCC) 20 years ago?    Elevated troponin 02/27/2025    Group A strep bacteremia 02/27/2025    Hypertension     Obesity     all my   [3]   Past Surgical History:  Procedure Laterality Date    APPENDECTOMY  1970    CATARACT EXTRACTION, BILATERAL Bilateral    [4] No family history on file.  [5]   Social History  Socioeconomic History    Marital status: /Civil Union   Tobacco Use    Smoking status: Former     Current packs/day: 0.00     Types: Cigarettes    Smokeless tobacco: Never   Vaping Use    Vaping status: Never Used   Substance and Sexual Activity    Alcohol use: Yes     Alcohol/week: 4.0 standard drinks of alcohol     Types: 4 Shots of liquor per week    Drug use: Never     Social Drivers of Health     Financial Resource Strain: Low Risk  (9/28/2023)    Overall Financial Resource Strain (CARDIA)     Difficulty of Paying Living Expenses: Not hard at all   Food Insecurity: No Food Insecurity (3/13/2025)    Nursing -  Inadequate Food Risk Classification     Worried About Running Out of Food in the Last Year: Never true     Ran Out of Food in the Last Year: Never true     Ran Out of Food in the Last Year: Never true   Transportation Needs: No Transportation Needs (3/13/2025)    Nursing - Transportation Risk Classification     Lack of Transportation: No   Intimate Partner Violence: Unknown (3/13/2025)    Nursing IPS     Physically Hurt by Someone: No     Hurt or Threatened by Someone: No   Housing Stability: Unknown (3/13/2025)    Nursing: Inadequate Housing Risk Classification     Unable to Pay for Housing in the Last Year: No     Has Housing: No   [6]   Current Outpatient Medications:     clotrimazole (LOTRIMIN) 1 % cream, Apply topically 2 (two) times a day for 7 days To L foot, Disp: 60 g, Rfl: 1    triamcinolone (KENALOG) 0.1 % cream, Apply topically 2 (two) times a day for 7 days To intact skin of bilateral lower legs, Disp: 80 g, Rfl: 1    amLODIPine (NORVASC) 10 mg tablet, TAKE 1 TABLET BY MOUTH EVERY DAY, Disp: 90 tablet, Rfl: 1    aspirin (ECOTRIN LOW STRENGTH) 81 mg EC tablet, Take 81 mg by mouth in the morning., Disp: , Rfl:     atorvastatin (LIPITOR) 40 mg tablet, TAKE 1 TABLET BY MOUTH EVERY DAY, Disp: 90 tablet, Rfl: 1    Azelastine HCl 137 MCG/SPRAY SOLN, 1 puff each nostril twice a day, Disp: 30 mL, Rfl: 1    BD Pen Needle Katelyn 2nd Gen 32G X 4 MM MISC, INJECT AS DIRECTED 2 (TWO) TIMES A DAY, Disp: 100 each, Rfl: 1    Cholecalciferol (VITAMIN D3) 2000 units TABS, Take 2,000 Units by mouth in the morning., Disp: , Rfl:     furosemide (LASIX) 40 mg tablet, Take 1 tablet (40 mg total) by mouth 2 (two) times a day, Disp: 180 tablet, Rfl: 0    gabapentin (NEURONTIN) 100 mg capsule, Take 2 capsules (200 mg total) by mouth daily at bedtime, Disp: 180 capsule, Rfl: 1    Glucose Blood (ONETOUCH ULTRA BLUE VI), Test 1 Units in the morning and 1 Units in the evening and 1 Units before bedtime., Disp: , Rfl:     glucose blood  (OneTouch Ultra) test strip, Test 3 times daily, Disp: 300 each, Rfl: 5    insulin isophane-insulin regular (HumuLIN 70/30 KwikPen) 100 units/mL injection pen, 20u in the am and 25u in the pm, Disp: 15 mL, Rfl: 5    Krill Oil 1000 MG CAPS, Take 1 capsule by mouth in the morning., Disp: , Rfl:     losartan (COZAAR) 100 MG tablet, Take 1 tablet (100 mg total) by mouth daily, Disp: 90 tablet, Rfl: 2    metoprolol tartrate (LOPRESSOR) 50 mg tablet, Take 1 tablet (50 mg total) by mouth every 12 (twelve) hours, Disp: , Rfl:     polyethylene glycol (GOLYTELY) 4000 mL solution, Take 4,000 mL by mouth once for 1 dose, Disp: 4000 mL, Rfl: 0    potassium chloride (Klor-Con M20) 20 mEq tablet, Take 1 tablet (20 mEq total) by mouth daily, Disp: 90 tablet, Rfl: 1

## 2025-07-18 NOTE — PATIENT INSTRUCTIONS
Orders Placed This Encounter   Procedures    Wound cleansing and dressings     Right and left Leg:     Wash your hands with soap and water.  Remove old dressing, discard into plastic bag and place in trash.    Cleanse the wound with mild soap--Dove gentle soap, rinse well and pat dry prior to applying a clean dressing. Do not use tissue or cotton balls.   Do not scrub the wound. Pat dry using gauze.  Shower yes ---do not soak in the tub or body of water     Apply Steroid cream to your legs, avoiding the open areas. You can apply this twice a day.   It's recommended that you apply bacitracin to the open areas on your right lower leg. You can pick this up at the pharmacy, it's an over-the-counter medication.   Cover with ABD Pads and rolled gauze  Change dressing once daily.  Apply Spandagrip to both legs.       Compression Stocking: Size G  Remove compression stockings every night and re-apply first thing in the morning. Follow daily skin care as instructed.  Avoid prolonged standing in one place.  Elevate leg(s) above the level of the heart when sitting or as much as possible.         Avoid prolonged standing in one place.     Elevate leg(s) above the level of the heart when sitting or as much as possible.        *A refill for the topical steroid and the fugal ointment was sent to your pharmacy.  Continue using the steroid ointment, until next appointment.       Follow up in 1 week at the [facility].     Standing Status:   Future     Expiration Date:   7/25/2025

## 2025-07-18 NOTE — PROGRESS NOTES
Wound Procedure Treatment    Performed by: Azul Diaz RN  Authorized by: Anahi Helms PA-C  Associated wounds:   Wound 06/19/25 Irritant Contact Dermatitis Leg Right;Lower  Wound 06/19/25 Irritant Contact Dermatitis Leg Left;Lower    Wound cleansed with:  Soap and water   Applied to periwound:  Steroid cream / ointment   Applied topical:  Other   Applied secondary dressing:  ABD   Dressing secured with:  Francisco, Tape, Elastic tubular stocking and Size G   Comments:  Bacitracin to open areas  Betamethasone to periwound  Spandagrip Size G

## 2025-07-21 LAB — FUNGUS SPEC CULT: NORMAL

## 2025-07-24 ENCOUNTER — OFFICE VISIT (OUTPATIENT)
Facility: HOSPITAL | Age: 70
End: 2025-07-24
Payer: MEDICARE

## 2025-07-24 VITALS
HEART RATE: 73 BPM | SYSTOLIC BLOOD PRESSURE: 167 MMHG | RESPIRATION RATE: 18 BRPM | DIASTOLIC BLOOD PRESSURE: 71 MMHG | TEMPERATURE: 98.3 F

## 2025-07-24 DIAGNOSIS — I87.2 VENOUS STASIS DERMATITIS OF BOTH LOWER EXTREMITIES: Primary | ICD-10-CM

## 2025-07-24 DIAGNOSIS — S81.802S WOUND OF LEFT LEG, SEQUELA: ICD-10-CM

## 2025-07-24 DIAGNOSIS — S81.801S WOUND OF RIGHT LOWER EXTREMITY, SEQUELA: ICD-10-CM

## 2025-07-24 DIAGNOSIS — Z79.4 TYPE 2 DIABETES MELLITUS WITH OTHER SKIN COMPLICATION, WITH LONG-TERM CURRENT USE OF INSULIN (HCC): ICD-10-CM

## 2025-07-24 DIAGNOSIS — E11.628 TYPE 2 DIABETES MELLITUS WITH OTHER SKIN COMPLICATION, WITH LONG-TERM CURRENT USE OF INSULIN (HCC): ICD-10-CM

## 2025-07-24 DIAGNOSIS — T14.8XXA SCRATCHING: ICD-10-CM

## 2025-07-24 PROCEDURE — 99212 OFFICE O/P EST SF 10 MIN: CPT | Performed by: STUDENT IN AN ORGANIZED HEALTH CARE EDUCATION/TRAINING PROGRAM

## 2025-07-24 PROCEDURE — 99214 OFFICE O/P EST MOD 30 MIN: CPT | Performed by: STUDENT IN AN ORGANIZED HEALTH CARE EDUCATION/TRAINING PROGRAM

## 2025-07-24 RX ORDER — LIDOCAINE 40 MG/G
CREAM TOPICAL ONCE
Status: COMPLETED | OUTPATIENT
Start: 2025-07-24 | End: 2025-07-24

## 2025-07-24 RX ADMIN — LIDOCAINE 1 APPLICATION: 40 CREAM TOPICAL at 08:27

## 2025-07-24 NOTE — PROGRESS NOTES
Wound Procedure Treatment    Performed by: Jacinda Murguia LPN  Authorized by: Anahi Helms PA-C  Associated wounds:   Wound 06/19/25 Irritant Contact Dermatitis Leg Right;Lower  Wound 06/19/25 Irritant Contact Dermatitis Leg Left;Lower    Wound cleansed with:  NSS   Applied to periwound:  Steroid cream / ointment   Applied topical:  Other   Applied primary dressing:  Silicone bordered foam   Dressing secured with:  Elastic tubular stocking and Size G   Comments:  Bacitracin to open areas, Betamethasone to surrounding irritated skin.

## 2025-07-24 NOTE — PATIENT INSTRUCTIONS
Orders Placed This Encounter   Procedures    Wound cleansing and dressings     Wound cleansing and dressings       Right and left Leg:     Wash your hands with soap and water.  Remove old dressing, discard into plastic bag and place in trash.    Cleanse the wound with mild soap--Dove gentle soap, rinse well and pat dry prior to applying a clean dressing. Do not use tissue or cotton balls.   Do not scrub the wound. Pat dry using gauze.  Shower yes ---do not soak in the tub or body of water     Apply Steroid cream to your legs. Cover open areas with a bandage. You can apply this twice a day.   Change dressing once daily.  **Apply Compression socks to both legs.**        Compression socks  Remove compression stockings every night and re-apply first thing in the morning. Follow daily skin care as instructed.  Avoid prolonged standing in one place.  Elevate leg(s) above the level of the heart when sitting or as much as possible.       *A refill for the topical steroid  was sent to your pharmacy.  Continue using the steroid ointment, until next appointment.         Follow up in 1 week at the [facility].     Standing Status:   Future     Expiration Date:   7/31/2025

## 2025-07-25 RX ORDER — TRIAMCINOLONE ACETONIDE 1 MG/G
CREAM TOPICAL 2 TIMES DAILY
Qty: 80 G | Refills: 1 | Status: SHIPPED | OUTPATIENT
Start: 2025-07-25 | End: 2025-08-01

## 2025-07-25 NOTE — PROGRESS NOTES
Patient ID: Nadeem Purdy Jr. is a 69 y.o. male Date of Birth 1955       Chief Complaint   Patient presents with    Follow Up Wound Care Visit     Bilateral lower legs       Allergies:  Patient has no known allergies.    Diagnosis:   Diagnosis ICD-10-CM Associated Orders   1. Venous stasis dermatitis of both lower extremities  I87.2 Wound cleansing and dressings     lidocaine (LMX) 4 % cream     Wound Procedure Treatment      2. Scratching  T14.8XXA       3. Type 2 diabetes mellitus with other skin complication, with long-term current use of insulin ([facility])  E11.628     Z79.4       4. Wound of right lower extremity, sequela  S81.801S       5. Wound of left leg, sequela  S81.802S            Assessment  & Plan:    F/u evaluation of bilateral lower legs. Overall significant improvement. Rash has almost completely resolved. Drainage/weeping is significantly improved. One small area of partial thickness skin breakdown is present on each leg with small serous drainage. No diffuse erythema to suggest cellulitis.   Apply topical Bacitracin and keep two remaining wounds covered with a bandage. Cleanse with gentle soap and water. Keep covered.   Continue with additional week of twice daily application of triamcinolone ointment to the legs given. Would benefit from adding compression now that drainage has significant reduced and rash has almost completely resolved. Pt reports he has compression stockings for use at home. Recommend application in the morning. May remove at night while sleeping flat.   Instructed to monitor for any changes including redness or swelling surrounding the wound, increased drainage or pain as well as fevers or chills.    F/u in 1 week. Instructed to call if any questions or concerns arise in meantime.            Subjective:   07/18/25: Pt presents for f/u bilateral lower extremity ulcerations. Last week was changed to topical steroid ointment which he has been applying. Still reports some itching  and scratching especially at night during sleep.     07/25/25: Pt presents for f/u bilateral lower extremity ulcerations. Has continued with topical steroid and antifungal to scaling skin of foot. Has overall noticed a significant improvement in the rash and amount of drainage he weeping from his legs. Does have compression stockings for use at home.           The following portions of the patient's history were reviewed and updated as appropriate:   Problem List[1]  Past Medical History[2]  Past Surgical History[3]  Family History[4]  Social History[5]  Current Medications[6]    Review of Systems      Objective:  /71   Pulse 73   Temp 98.3 °F (36.8 °C)   Resp 18         Physical Exam  Constitutional:       General: He is awake. He is not in acute distress.     Appearance: He is obese. He is not ill-appearing, toxic-appearing or diaphoretic.   HENT:      Head: Normocephalic and atraumatic.      Right Ear: External ear normal.      Left Ear: External ear normal.     Eyes:      Conjunctiva/sclera: Conjunctivae normal.       Cardiovascular:      Pulses:           Dorsalis pedis pulses are 1+ on the right side and 1+ on the left side.   Pulmonary:      Effort: Pulmonary effort is normal. No respiratory distress.     Musculoskeletal:      Right lower leg: Edema present.      Left lower leg: Edema present.     Skin:     General: Skin is warm and dry.      Findings: Wound present.      Comments: Significant improvement of rash and weeping of both legs. Rash has almost completely resolved. Each leg has one small partial thickness wound remaining with small serous drainage. No evidence of cellulitis on exam.      Neurological:      Mental Status: He is alert.     Psychiatric:         Behavior: Behavior is cooperative.           Wound 06/19/25 Irritant Contact Dermatitis Leg Right;Lower (Active)   Wound Image   07/24/25 0818   Wound Description Epithelialization;Pink;Dry;Drainage 07/24/25 0819   Non-staged Wound  Description Full thickness 07/24/25 0819   Wound Length (cm) 0.2 cm 07/24/25 0819   Wound Width (cm) 0.1 cm 07/24/25 0819   Wound Depth (cm) 0.1 cm 07/24/25 0819   Wound Surface Area (cm^2) 0.02 cm^2 07/24/25 0819   Wound Volume (cm^3) 0.001 cm^3 07/24/25 0819   Calculated Wound Volume (cm^3) 0 cm^3 07/24/25 0819   Change in Wound Size % 100 07/24/25 0819   Drainage Amount Scant 07/24/25 0819   Drainage Description Serosanguineous 07/24/25 0819   Jodee-wound Assessment Dry;Scaly 07/24/25 0819   Treatments Cleansed 06/26/25 0835   Dressing Status Other (Comment) 07/24/25 0819       Wound 06/19/25 Irritant Contact Dermatitis Leg Left;Lower (Active)   Wound Image   07/24/25 0818   Wound Description Pink;Epithelialization;Granulation tissue 07/24/25 0821   Non-staged Wound Description Full thickness 07/24/25 0821   Wound Length (cm) 2.2 cm 07/24/25 0821   Wound Width (cm) 0.3 cm 07/24/25 0821   Wound Depth (cm) 0.1 cm 07/24/25 0821   Wound Surface Area (cm^2) 0.52 cm^2 07/24/25 0821   Wound Volume (cm^3) 0.035 cm^3 07/24/25 0821   Calculated Wound Volume (cm^3) 0.07 cm^3 07/24/25 0821   Change in Wound Size % 99.91 07/24/25 0821   Drainage Amount Scant 07/24/25 0821   Drainage Description Serosanguineous 07/24/25 0821   Jodee-wound Assessment Dry;Scaly;Edema 07/24/25 0821   Dressing Status Other (Comment) 07/24/25 0821             Results from last 6 Months   Lab Units 03/03/25  0319   WOUND CULTURE  Few Colonies of Staphylococcus aureus*           Wound Instructions:  Orders Placed This Encounter   Procedures    Wound cleansing and dressings     Wound cleansing and dressings       Right and left Leg:     Wash your hands with soap and water.  Remove old dressing, discard into plastic bag and place in trash.    Cleanse the wound with mild soap--Dove gentle soap, rinse well and pat dry prior to applying a clean dressing. Do not use tissue or cotton balls.   Do not scrub the wound. Pat dry using gauze.  Shower yes ---do not  "soak in the tub or body of water     Apply Steroid cream to your legs. Cover open areas with a bandage. You can apply this twice a day.   Change dressing once daily.  **Apply Compression socks to both legs.**        Compression socks  Remove compression stockings every night and re-apply first thing in the morning. Follow daily skin care as instructed.  Avoid prolonged standing in one place.  Elevate leg(s) above the level of the heart when sitting or as much as possible.       *A refill for the topical steroid  was sent to your pharmacy.  Continue using the steroid ointment, until next appointment.         Follow up in 1 week at the [facility].     Standing Status:   Future     Expiration Date:   7/31/2025    Wound Procedure Treatment     This order was created via procedure documentation       Cally Helms, PA-C          Portions of the record may have been created with voice recognition software. Occasional wrong word or \"sound alike\" substitutions may have occurred due to the inherent limitations of voice recognition software. Read the chart carefully and recognize, using context, where substitutions have occurred.           [1]   Patient Active Problem List  Diagnosis    Type 2 diabetes mellitus with microalbuminuria, with long-term current use of insulin (HCC)    Essential hypertension    Hypercholesteremia    Class 3 severe obesity due to excess calories with serious comorbidity and body mass index (BMI) of 40.0 to 44.9 in adult    Steatohepatitis    Allergic rhinitis    Vitamin D deficiency    Persistent proteinuria    Tachycardia    Stage 3 chronic kidney disease, unspecified whether stage 3a or 3b CKD (HCC)    Lymphedema of both lower extremities    Rash    Numbness of left foot    Cortical age-related cataract of both eyes    Type 2 diabetes mellitus with hyperglycemia, with long-term current use of insulin (HCC)    Resolving cellulitis of left lower extremity    Right knee injury, subsequent encounter    " Osteochondral lesion    Primary osteoarthritis of one knee, right    Lymphedema of right lower extremity    Other tear of medial meniscus, current injury, right knee, initial encounter    Hypertensive kidney disease with chronic kidney disease stage III (HCC)    Acute renal failure superimposed on stage 3a chronic kidney disease (HCC)    Alcohol abuse    Hyponatremia    Metabolic acidosis    Urinary retention    Atrial fibrillation with RVR (HCC)    Hypomagnesemia    Streptococcal bacteremia    Chronic anemia    Diabetic nephropathy associated with type 2 diabetes mellitus (HCC)    Sepsis without acute organ dysfunction (HCC)    Diarrhea    SIRS (systemic inflammatory response syndrome) (HCC)    Type 2 diabetes mellitus with diabetic chronic kidney disease (HCC)    Nonhealing ulcer of right lower extremity with fat layer exposed (HCC)   [2]   Past Medical History:  Diagnosis Date    Allergic     All My Life    Clostridium difficile diarrhea 02/27/2025    Diabetes mellitus (HCC) 20 years ago?    Elevated troponin 02/27/2025    Group A strep bacteremia 02/27/2025    Hypertension     Obesity     all my   [3]   Past Surgical History:  Procedure Laterality Date    APPENDECTOMY  1970    CATARACT EXTRACTION, BILATERAL Bilateral    [4] No family history on file.  [5]   Social History  Socioeconomic History    Marital status: /Civil Union   Tobacco Use    Smoking status: Former     Current packs/day: 0.00     Types: Cigarettes    Smokeless tobacco: Never   Vaping Use    Vaping status: Never Used   Substance and Sexual Activity    Alcohol use: Yes     Alcohol/week: 4.0 standard drinks of alcohol     Types: 4 Shots of liquor per week    Drug use: Never     Social Drivers of Health     Financial Resource Strain: Low Risk  (9/28/2023)    Overall Financial Resource Strain (CARDIA)     Difficulty of Paying Living Expenses: Not hard at all   Food Insecurity: No Food Insecurity (3/13/2025)    Nursing - Inadequate Food Risk  Classification     Worried About Running Out of Food in the Last Year: Never true     Ran Out of Food in the Last Year: Never true     Ran Out of Food in the Last Year: Never true   Transportation Needs: No Transportation Needs (3/13/2025)    Nursing - Transportation Risk Classification     Lack of Transportation: No   Intimate Partner Violence: Unknown (3/13/2025)    Nursing IPS     Physically Hurt by Someone: No     Hurt or Threatened by Someone: No   Housing Stability: Unknown (3/13/2025)    Nursing: Inadequate Housing Risk Classification     Unable to Pay for Housing in the Last Year: No     Has Housing: No   [6]   Current Outpatient Medications:     amLODIPine (NORVASC) 10 mg tablet, TAKE 1 TABLET BY MOUTH EVERY DAY, Disp: 90 tablet, Rfl: 1    aspirin (ECOTRIN LOW STRENGTH) 81 mg EC tablet, Take 81 mg by mouth in the morning., Disp: , Rfl:     atorvastatin (LIPITOR) 40 mg tablet, TAKE 1 TABLET BY MOUTH EVERY DAY, Disp: 90 tablet, Rfl: 1    Azelastine HCl 137 MCG/SPRAY SOLN, 1 puff each nostril twice a day, Disp: 30 mL, Rfl: 1    BD Pen Needle Katelyn 2nd Gen 32G X 4 MM MISC, INJECT AS DIRECTED 2 (TWO) TIMES A DAY, Disp: 100 each, Rfl: 1    Cholecalciferol (VITAMIN D3) 2000 units TABS, Take 2,000 Units by mouth in the morning., Disp: , Rfl:     clotrimazole (LOTRIMIN) 1 % cream, Apply topically 2 (two) times a day for 7 days To L foot, Disp: 60 g, Rfl: 1    furosemide (LASIX) 40 mg tablet, Take 1 tablet (40 mg total) by mouth 2 (two) times a day, Disp: 180 tablet, Rfl: 0    gabapentin (NEURONTIN) 100 mg capsule, Take 2 capsules (200 mg total) by mouth daily at bedtime, Disp: 180 capsule, Rfl: 1    Glucose Blood (ONETOUCH ULTRA BLUE VI), Test 1 Units in the morning and 1 Units in the evening and 1 Units before bedtime., Disp: , Rfl:     glucose blood (OneTouch Ultra) test strip, Test 3 times daily, Disp: 300 each, Rfl: 5    insulin isophane-insulin regular (HumuLIN 70/30 KwikPen) 100 units/mL injection pen, 20u in  the am and 25u in the pm, Disp: 15 mL, Rfl: 5    Krill Oil 1000 MG CAPS, Take 1 capsule by mouth in the morning., Disp: , Rfl:     losartan (COZAAR) 100 MG tablet, Take 1 tablet (100 mg total) by mouth daily, Disp: 90 tablet, Rfl: 2    metoprolol tartrate (LOPRESSOR) 50 mg tablet, Take 1 tablet (50 mg total) by mouth every 12 (twelve) hours, Disp: , Rfl:     polyethylene glycol (GOLYTELY) 4000 mL solution, Take 4,000 mL by mouth once for 1 dose, Disp: 4000 mL, Rfl: 0    potassium chloride (Klor-Con M20) 20 mEq tablet, Take 1 tablet (20 mEq total) by mouth daily, Disp: 90 tablet, Rfl: 1    triamcinolone (KENALOG) 0.1 % cream, Apply topically 2 (two) times a day for 7 days To intact skin of bilateral lower legs, Disp: 80 g, Rfl: 1

## 2025-07-31 ENCOUNTER — OFFICE VISIT (OUTPATIENT)
Facility: HOSPITAL | Age: 70
End: 2025-07-31
Payer: MEDICARE

## 2025-07-31 VITALS
SYSTOLIC BLOOD PRESSURE: 158 MMHG | DIASTOLIC BLOOD PRESSURE: 87 MMHG | HEART RATE: 74 BPM | RESPIRATION RATE: 16 BRPM | TEMPERATURE: 97.6 F

## 2025-07-31 DIAGNOSIS — B36.9 FUNGAL DERMATITIS: ICD-10-CM

## 2025-07-31 DIAGNOSIS — L97.921 NON-PRESSURE CHRONIC ULCER LEFT LOWER LEG, LIMITED TO BREAKDOWN SKIN (HCC): ICD-10-CM

## 2025-07-31 DIAGNOSIS — I87.2 VENOUS STASIS DERMATITIS OF BOTH LOWER EXTREMITIES: Primary | ICD-10-CM

## 2025-07-31 DIAGNOSIS — L97.911 NON-PRESSURE CHRONIC ULCER RIGHT LOWER LEG, LIMITED TO BREAKDOWN SKIN (HCC): ICD-10-CM

## 2025-07-31 DIAGNOSIS — E11.628 TYPE 2 DIABETES MELLITUS WITH OTHER SKIN COMPLICATION, WITH LONG-TERM CURRENT USE OF INSULIN (HCC): ICD-10-CM

## 2025-07-31 DIAGNOSIS — Z79.4 TYPE 2 DIABETES MELLITUS WITH OTHER SKIN COMPLICATION, WITH LONG-TERM CURRENT USE OF INSULIN (HCC): ICD-10-CM

## 2025-07-31 PROCEDURE — 99213 OFFICE O/P EST LOW 20 MIN: CPT | Performed by: STUDENT IN AN ORGANIZED HEALTH CARE EDUCATION/TRAINING PROGRAM

## 2025-07-31 PROCEDURE — 99214 OFFICE O/P EST MOD 30 MIN: CPT | Performed by: STUDENT IN AN ORGANIZED HEALTH CARE EDUCATION/TRAINING PROGRAM

## 2025-07-31 RX ORDER — CLOTRIMAZOLE AND BETAMETHASONE DIPROPIONATE 10; .64 MG/G; MG/G
CREAM TOPICAL 2 TIMES DAILY
Qty: 45 G | Refills: 1 | Status: SHIPPED | OUTPATIENT
Start: 2025-07-31 | End: 2025-08-08 | Stop reason: SDUPTHER

## 2025-08-08 ENCOUNTER — OFFICE VISIT (OUTPATIENT)
Facility: HOSPITAL | Age: 70
End: 2025-08-08
Payer: MEDICARE

## 2025-08-12 ENCOUNTER — HOSPITAL ENCOUNTER (OUTPATIENT)
Dept: GASTROENTEROLOGY | Facility: HOSPITAL | Age: 70
Setting detail: OUTPATIENT SURGERY
Discharge: HOME | End: 2025-08-12
Attending: INTERNAL MEDICINE
Payer: MEDICARE

## 2025-08-12 ENCOUNTER — ANESTHESIA EVENT (OUTPATIENT)
Dept: GASTROENTEROLOGY | Facility: HOSPITAL | Age: 70
End: 2025-08-12
Payer: MEDICARE

## 2025-08-12 ENCOUNTER — ANESTHESIA (OUTPATIENT)
Dept: GASTROENTEROLOGY | Facility: HOSPITAL | Age: 70
End: 2025-08-12
Payer: MEDICARE

## 2025-08-15 ENCOUNTER — OFFICE VISIT (OUTPATIENT)
Facility: HOSPITAL | Age: 70
End: 2025-08-15
Payer: MEDICARE

## 2025-08-15 PROBLEM — I87.2 VENOUS STASIS DERMATITIS OF BOTH LOWER EXTREMITIES: Status: ACTIVE | Noted: 2025-08-15

## 2025-08-15 PROBLEM — I87.2 EDEMA OF BOTH LOWER EXTREMITIES DUE TO PERIPHERAL VENOUS INSUFFICIENCY: Status: ACTIVE | Noted: 2025-08-15

## 2025-08-19 ENCOUNTER — TELEPHONE (OUTPATIENT)
Age: 70
End: 2025-08-19

## 2025-08-19 ENCOUNTER — OFFICE VISIT (OUTPATIENT)
Age: 70
End: 2025-08-19
Payer: MEDICARE

## 2025-08-19 ENCOUNTER — PREP FOR PROCEDURE (OUTPATIENT)
Dept: GASTROENTEROLOGY | Facility: CLINIC | Age: 70
End: 2025-08-19

## 2025-08-19 VITALS
WEIGHT: 292 LBS | BODY MASS INDEX: 40.88 KG/M2 | HEIGHT: 71 IN | HEART RATE: 81 BPM | SYSTOLIC BLOOD PRESSURE: 148 MMHG | OXYGEN SATURATION: 95 % | DIASTOLIC BLOOD PRESSURE: 72 MMHG

## 2025-08-19 DIAGNOSIS — I10 ESSENTIAL HYPERTENSION: ICD-10-CM

## 2025-08-19 DIAGNOSIS — Z86.0100 HISTORY OF COLON POLYPS: Primary | ICD-10-CM

## 2025-08-19 DIAGNOSIS — R60.0 BILATERAL LOWER EXTREMITY EDEMA: ICD-10-CM

## 2025-08-19 DIAGNOSIS — E11.22 TYPE 2 DIABETES MELLITUS WITH STAGE 3A CHRONIC KIDNEY DISEASE, WITH LONG-TERM CURRENT USE OF INSULIN (HCC): Primary | ICD-10-CM

## 2025-08-19 DIAGNOSIS — N18.30 STAGE 3 CHRONIC KIDNEY DISEASE, UNSPECIFIED WHETHER STAGE 3A OR 3B CKD (HCC): ICD-10-CM

## 2025-08-19 DIAGNOSIS — Z79.4 TYPE 2 DIABETES MELLITUS WITH STAGE 3A CHRONIC KIDNEY DISEASE, WITH LONG-TERM CURRENT USE OF INSULIN (HCC): Primary | ICD-10-CM

## 2025-08-19 DIAGNOSIS — E10.9 TYPE 1 DIABETES MELLITUS WITHOUT COMPLICATION (HCC): ICD-10-CM

## 2025-08-19 DIAGNOSIS — D64.9 ANEMIA, UNSPECIFIED TYPE: ICD-10-CM

## 2025-08-19 DIAGNOSIS — N18.31 TYPE 2 DIABETES MELLITUS WITH STAGE 3A CHRONIC KIDNEY DISEASE, WITH LONG-TERM CURRENT USE OF INSULIN (HCC): Primary | ICD-10-CM

## 2025-08-19 PROCEDURE — 99214 OFFICE O/P EST MOD 30 MIN: CPT | Performed by: INTERNAL MEDICINE

## 2025-08-19 PROCEDURE — G2211 COMPLEX E/M VISIT ADD ON: HCPCS | Performed by: INTERNAL MEDICINE

## 2025-08-19 RX ORDER — ATORVASTATIN CALCIUM 40 MG/1
40 TABLET, FILM COATED ORAL DAILY
Qty: 90 TABLET | Refills: 1 | Status: SHIPPED | OUTPATIENT
Start: 2025-08-19

## 2025-08-19 RX ORDER — AMLODIPINE BESYLATE 10 MG/1
10 TABLET ORAL DAILY
Qty: 90 TABLET | Refills: 1 | Status: SHIPPED | OUTPATIENT
Start: 2025-08-19

## 2025-08-19 RX ORDER — PEN NEEDLE, DIABETIC 32GX 5/32"
NEEDLE, DISPOSABLE MISCELLANEOUS 2 TIMES DAILY
Qty: 100 EACH | Refills: 3 | Status: SHIPPED | OUTPATIENT
Start: 2025-08-19

## 2025-08-19 RX ORDER — FUROSEMIDE 40 MG/1
40 TABLET ORAL DAILY
Qty: 90 TABLET | Refills: 1 | Status: SHIPPED | OUTPATIENT
Start: 2025-08-19

## 2025-08-22 ENCOUNTER — OFFICE VISIT (OUTPATIENT)
Facility: HOSPITAL | Age: 70
End: 2025-08-22
Payer: MEDICARE

## 2025-08-22 VITALS
SYSTOLIC BLOOD PRESSURE: 178 MMHG | RESPIRATION RATE: 18 BRPM | HEART RATE: 72 BPM | DIASTOLIC BLOOD PRESSURE: 90 MMHG | TEMPERATURE: 97.4 F

## 2025-08-22 DIAGNOSIS — L97.921 NON-PRESSURE CHRONIC ULCER LEFT LOWER LEG, LIMITED TO BREAKDOWN SKIN (HCC): ICD-10-CM

## 2025-08-22 DIAGNOSIS — I89.0 LYMPHEDEMA: ICD-10-CM

## 2025-08-22 DIAGNOSIS — L97.911 NON-PRESSURE CHRONIC ULCER RIGHT LOWER LEG, LIMITED TO BREAKDOWN SKIN (HCC): ICD-10-CM

## 2025-08-22 DIAGNOSIS — I87.2 VENOUS STASIS DERMATITIS OF BOTH LOWER EXTREMITIES: Primary | ICD-10-CM

## 2025-08-22 PROCEDURE — 29580 STRAPPING UNNA BOOT: CPT
